# Patient Record
Sex: MALE | Race: WHITE | NOT HISPANIC OR LATINO | Employment: OTHER | URBAN - NONMETROPOLITAN AREA
[De-identification: names, ages, dates, MRNs, and addresses within clinical notes are randomized per-mention and may not be internally consistent; named-entity substitution may affect disease eponyms.]

---

## 2017-02-03 ENCOUNTER — COMMUNICATION - GICH (OUTPATIENT)
Dept: FAMILY MEDICINE | Facility: OTHER | Age: 57
End: 2017-02-03

## 2017-02-03 DIAGNOSIS — E11.65 TYPE 2 DIABETES MELLITUS WITH HYPERGLYCEMIA (H): ICD-10-CM

## 2017-02-08 ENCOUNTER — COMMUNICATION - GICH (OUTPATIENT)
Dept: FAMILY MEDICINE | Facility: OTHER | Age: 57
End: 2017-02-08

## 2017-02-08 DIAGNOSIS — E11.65 TYPE 2 DIABETES MELLITUS WITH HYPERGLYCEMIA (H): ICD-10-CM

## 2017-02-08 DIAGNOSIS — E78.1 PURE HYPERGLYCERIDEMIA: ICD-10-CM

## 2017-02-08 DIAGNOSIS — R74.8 ABNORMAL LEVELS OF OTHER SERUM ENZYMES: ICD-10-CM

## 2017-02-09 ENCOUNTER — AMBULATORY - GICH (OUTPATIENT)
Dept: LAB | Facility: OTHER | Age: 57
End: 2017-02-09

## 2017-02-09 DIAGNOSIS — E11.65 TYPE 2 DIABETES MELLITUS WITH HYPERGLYCEMIA (H): ICD-10-CM

## 2017-02-09 DIAGNOSIS — R74.8 ABNORMAL LEVELS OF OTHER SERUM ENZYMES: ICD-10-CM

## 2017-02-09 DIAGNOSIS — E78.1 PURE HYPERGLYCERIDEMIA: ICD-10-CM

## 2017-02-09 LAB
A/G RATIO - HISTORICAL: 1.7 (ref 1–2)
ALBUMIN SERPL-MCNC: 4.3 G/DL (ref 3.5–5.7)
ALP SERPL-CCNC: 41 IU/L (ref 34–104)
ALT (SGPT) - HISTORICAL: 28 IU/L (ref 7–52)
AST SERPL-CCNC: 18 IU/L (ref 13–39)
BILIRUB SERPL-MCNC: 0.5 MG/DL (ref 0.3–1)
BILIRUBIN, DIRECT: 0.09 MG/DL (ref 0.03–0.18)
BILIRUBIN,INDIRECT: 0.4 MG/DL (ref 0.2–0.8)
CHOL/HDL RATIO - HISTORICAL: 5.9
CHOLESTEROL TOTAL: 183 MG/DL
ESTIMATED AVERAGE GLUCOSE: 114 MG/DL
GLOBULIN - HISTORICAL: 2.6 G/DL (ref 2–3.7)
HDLC SERPL-MCNC: 31 MG/DL (ref 23–92)
HEMOGLOBIN A1C MONITORING (POCT) - HISTORICAL: 5.6 % (ref 4–6.2)
LDL COMMENT - HISTORICAL: ABNORMAL
NON-HDL CHOLESTEROL - HISTORICAL: 152 MG/DL
PATIENT STATUS - HISTORICAL: ABNORMAL
PROT SERPL-MCNC: 6.9 G/DL (ref 6.4–8.9)
TRIGL SERPL-MCNC: 634 MG/DL

## 2017-02-13 ENCOUNTER — OFFICE VISIT - GICH (OUTPATIENT)
Dept: FAMILY MEDICINE | Facility: OTHER | Age: 57
End: 2017-02-13

## 2017-02-13 DIAGNOSIS — E11.65 TYPE 2 DIABETES MELLITUS WITH HYPERGLYCEMIA (H): ICD-10-CM

## 2017-02-13 DIAGNOSIS — S76.211A STRAIN OF ADDUCTOR MUSCLE, FASCIA AND TENDON OF RIGHT THIGH, INITIAL ENCOUNTER: ICD-10-CM

## 2017-02-13 DIAGNOSIS — R74.8 ABNORMAL LEVELS OF OTHER SERUM ENZYMES: ICD-10-CM

## 2017-02-13 DIAGNOSIS — E78.1 PURE HYPERGLYCERIDEMIA: ICD-10-CM

## 2017-02-15 ENCOUNTER — AMBULATORY - GICH (OUTPATIENT)
Dept: LAB | Facility: OTHER | Age: 57
End: 2017-02-15

## 2017-02-15 DIAGNOSIS — E78.1 PURE HYPERGLYCERIDEMIA: ICD-10-CM

## 2017-02-15 LAB
CHOL/HDL RATIO - HISTORICAL: 5.16
CHOLESTEROL TOTAL: 196 MG/DL
HDLC SERPL-MCNC: 38 MG/DL (ref 23–92)
LDLC SERPL CALC-MCNC: 94 MG/DL
NON-HDL CHOLESTEROL - HISTORICAL: 158 MG/DL
PATIENT STATUS - HISTORICAL: ABNORMAL
TRIGL SERPL-MCNC: 321 MG/DL

## 2017-07-28 ENCOUNTER — COMMUNICATION - GICH (OUTPATIENT)
Dept: FAMILY MEDICINE | Facility: OTHER | Age: 57
End: 2017-07-28

## 2017-07-28 DIAGNOSIS — E11.65 TYPE 2 DIABETES MELLITUS WITH HYPERGLYCEMIA (H): ICD-10-CM

## 2017-08-04 ENCOUNTER — COMMUNICATION - GICH (OUTPATIENT)
Dept: FAMILY MEDICINE | Facility: OTHER | Age: 57
End: 2017-08-04

## 2017-08-04 DIAGNOSIS — R21 RASH AND OTHER NONSPECIFIC SKIN ERUPTION: ICD-10-CM

## 2017-09-21 ENCOUNTER — COMMUNICATION - GICH (OUTPATIENT)
Dept: FAMILY MEDICINE | Facility: OTHER | Age: 57
End: 2017-09-21

## 2017-09-21 DIAGNOSIS — E11.69 TYPE 2 DIABETES MELLITUS WITH OTHER SPECIFIED COMPLICATION (H): ICD-10-CM

## 2017-09-21 DIAGNOSIS — E11.65 TYPE 2 DIABETES MELLITUS WITH HYPERGLYCEMIA (H): ICD-10-CM

## 2017-09-22 ENCOUNTER — AMBULATORY - GICH (OUTPATIENT)
Dept: LAB | Facility: OTHER | Age: 57
End: 2017-09-22

## 2017-09-22 DIAGNOSIS — E11.65 TYPE 2 DIABETES MELLITUS WITH HYPERGLYCEMIA (H): ICD-10-CM

## 2017-09-22 DIAGNOSIS — E11.69 TYPE 2 DIABETES MELLITUS WITH OTHER SPECIFIED COMPLICATION (H): ICD-10-CM

## 2017-09-22 LAB
CHOL/HDL RATIO - HISTORICAL: 6.36
CHOLESTEROL TOTAL: 210 MG/DL
ESTIMATED AVERAGE GLUCOSE: 111 MG/DL
HDLC SERPL-MCNC: 33 MG/DL (ref 23–92)
HEMOGLOBIN A1C MONITORING (POCT) - HISTORICAL: 5.5 % (ref 4–6.2)
LDL COMMENT - HISTORICAL: ABNORMAL
NON-HDL CHOLESTEROL - HISTORICAL: 177 MG/DL
PROVIDER ORDERDED STATUS - HISTORICAL: ABNORMAL
TRIGL SERPL-MCNC: 520 MG/DL

## 2017-10-05 ENCOUNTER — HISTORY (OUTPATIENT)
Dept: FAMILY MEDICINE | Facility: OTHER | Age: 57
End: 2017-10-05

## 2017-10-05 ENCOUNTER — OFFICE VISIT - GICH (OUTPATIENT)
Dept: FAMILY MEDICINE | Facility: OTHER | Age: 57
End: 2017-10-05

## 2017-10-05 DIAGNOSIS — E78.1 PURE HYPERGLYCERIDEMIA: ICD-10-CM

## 2017-12-28 NOTE — TELEPHONE ENCOUNTER
Patient Information     Patient Name MRN Sex Jesus Franklin [id number] Male 1960      Telephone Encounter by Jesusita Wheeler RN at 2017  4:11 PM     Author:  Jesusita Wheeler RN Service:  (none) Author Type:  NURS- Registered Nurse     Filed:  2017  4:12 PM Encounter Date:  2017 Status:  Signed     :  Jesusita Wheeler RN (NURS- Registered Nurse)            Diabetic Supplies    Office visit in the past 12 months.    Last visit with MATT WHIPPLE was on: 2017 in [facility]  Next visit with MATT WHIPPLE is on: No future appointment listed with this provider  Next visit with Family Practice is on: No future appointment listed in this department    Max refill for 12 months from last office visit.  Always add ICD-9 code.    Needs not be listed on Med List to fill.  Need new RX every 12 months or if exceeds the limitations set by Medicare, then every 6 months.  Also when testing frequency is changed is there a need to obtain a new order.  A refill request does not need to be approved by the ordering physician-a beneficiary or their caregiver may request refills.  Physicians are not required to fill out additional forms such as home testing results for suppliers or provide additional documentation unless the supplier is audited and the  is requesting such documentation.      Prescription refilled per RN Medication Refill Policy.................... Jesusita Wheeler RN ....................  2017   4:11 PM

## 2017-12-28 NOTE — PROGRESS NOTES
Patient Information     Patient Name MRN Sex Jesus Franklin [id number] Male 1960      Progress Notes by Rodrigo Richardson MD at 10/5/2017 11:45 AM     Author:  Rodrigo Richardson MD  Service:  (none) Author Type:  Physician     Filed:  10/5/2017 12:20 PM  Encounter Date:  10/5/2017 Status:  Addendum     :  Rodrigo Richardson MD (Physician)        Related Notes: Original Note by Rodrigo Richardson MD (Physician) filed at 10/5/2017 12:15 PM            Nursing Notes:   Hemalatha Gil  10/5/2017 12:06 PM  Signed  Patient presents to the clinic to discuss recent labs.  Hemalatha Gil LPN....................10/5/2017 11:51 AM    Previous A1C is at goal of <8  HEMOGLOBIN A1C MONITORING (POCT) (%)    Date Value   2017 5.5     Urine microalbumin:creatine:   Foot exam will do today  Eye exam about a year ago    Patient is not a current smoker  Patient is on a daily aspirin  Patient is not on a Statin.  Blood pressure today of 128/74 is at the goal of <139/89 for diabetics.    Hemalatha Gil LPN..............10/5/2017 11:58 AM    Jesus Varghese is a 56 y.o. male who presents for   Chief Complaint     Patient presents with       Lab      Discuss lab results     HPI: Mr. Varghese has diabetes and hypertriglyceridemia- we discussed diet today and he does get some high fat foods- he has ++ FH of CAD at young age so medications are indicated.  Past Medical History:     Diagnosis  Date     Back pain      Colon cancer (HC)      CTS (carpal tunnel syndrome)      Diverticulosis     mild      Hepatitis     ? cause       Hypertriglyceridemia      Renal lithiasis      Rupture of left distal biceps tendon 2016     S/P left distal biceps tendon repair 2016     Senile cataract      Past Surgical History:      Procedure  Laterality Date     CATARACT REMOVAL  ,     Bilateral cataracts R 2011.. L 2012       COLECTOMY  06    Sigmoid colectomy for [facility] C2 adenocarcinoma       COLONOSCOPY  DIAGNOSTIC  10/19/15     F/U 2020       COLONOSCOPY SCREENING  9/07/07    next due in 2010       COLONOSCOPY SCREENING  08/30/2010    F/U 2015       HERNIA REPAIR  08/31/2010    Mesh Underlay       IR URETERAL STENT LEFT  2/12/15     LAP CHOLECYSTECTOMY  04/24/07     PORTACATH  10/11/06    Placement of Port-A-Cath, right anterior chest, for chemotherapy        S/P LEFT DISTAL BICEPS REPAIR Left 1/14/2016    arthrex equipment used       TONSIL AND ADENOIDECTOMY  age 1     VASECTOMY       x2, spontaneous reconnected, so needed 2nd procedure       Family History       Problem   Relation Age of Onset     Hypertension  Mother      Heart Disease  Father      40s and 50s       Heart Disease  Other      40s and 50s       Anesthesia Problem  No Family History      Stroke  No Family History      Blood Disease  No Family History      Current Outpatient Prescriptions       Medication  Sig Dispense Refill     ACCU-CHEK FASTCLIX CHECK GLUCOSE TWICE DAILY 200 Each 2     ACCU-CHEK SMARTVIEW TEST STRIP strip CHECK GLUCOSE TWICE DAILY 200 Strip 2     aspirin chewable 81 mg chewable tablet Take 1 tablet by mouth once daily with a meal.  0     Blood-Glucose Meter (ACCU-CHEK MARIETTA) Dispense glucose meter, test strips and lancets covered by the patient insurance. Test 2 times per day. 1 Device 0     Fluocinolone-Shower Cap (DERMA-SMOOTHE/FS SCALP OIL) 0.01 % oil Apply  topically to affected area(s) at bedtime if needed (for dermatitis). 118 mL 0     Fluocinolone-Shower Cap 0.01 % oil Apply 1 Cap topically to affected area(s) once daily in the evening.       loratadine (CLARITIN) 10 mg tablet Take 1 tablet by mouth once daily.  0     metFORMIN (GLUCOPHAGE) 500 mg tablet Take 2 tablets by mouth 2 times daily with meals. 360 tablet 3     multivitamins-minerals-lutein (CENTRUM SILVER) tab tablet Take 1 tablet by mouth once daily.  0     No current facility-administered medications for this visit.      Medications have been reviewed by me and  "are current to the best of my knowledge and ability.    No Known Allergies    EXAM:   Vitals:     10/05/17 1151   BP: 128/74   Weight: 101.2 kg (223 lb 3.2 oz)   Height: 1.71 m (5' 7.32\")     General Appearance: Pleasant, alert, appropriate appearance for age. No acute distress  Chest/Respiratory Exam: Normal chest wall and respirations. Clear to auscultation.  Cardiovascular Exam: Regular rate and rhythm. S1, S2, no murmur, click, gallop, or rubs.  ASSESSMENT AND PLAN:  1. HYPERTRIGLYCERIDEMIA  Will add atorvastatin   foot exam shows normal sensation and circulation/ looked at sk on his back and benign mole left arm              "

## 2017-12-28 NOTE — TELEPHONE ENCOUNTER
Patient Information     Patient Name MRN Sex Jesus Franklin [id number] Male 1960      Telephone Encounter by Rodrigo Richardson MD at 2017  2:23 PM     Author:  Rodrigo Richardson MD Service:  (none) Author Type:  Physician     Filed:  2017  2:23 PM Encounter Date:  2017 Status:  Signed     :  Rodrigo Richardson MD (Physician)            Ok for lab

## 2017-12-28 NOTE — TELEPHONE ENCOUNTER
Patient Information     Patient Name MRN Sex Jesus Franklin [id number] Male 1960      Telephone Encounter by Urbano Denson MD at 2017 10:50 AM     Author:  Urbano Denson MD Service:  (none) Author Type:  Physician     Filed:  2017 10:51 AM Encounter Date:  2017 Status:  Signed     :  Urbano Denson MD (Physician)            Not sure why the patient is on this medication. We'll need to wait for primary care physician or be seen.

## 2017-12-28 NOTE — TELEPHONE ENCOUNTER
Patient Information     Patient Name MRN Sex Jesus Franklin [id number] Male 1960      Telephone Encounter by Mary Lovelace at 2017 12:17 PM     Author:  Mary Lovelace Service:  (none) Author Type:  (none)     Filed:  2017 12:22 PM Encounter Date:  2017 Status:  Signed     :  Mary Lovelace            Patient inquiring about lab work. Wondering if he should come in. Did inform he could come in and have A1C, (if you feel necessary last one 17, as well as the lipids) patient wondering if you also would like a lipid, and anything else? Orders pending if appropriate. Pt would like call back.     Mary Lovelace LPN...................2017   12:19 PM

## 2017-12-28 NOTE — TELEPHONE ENCOUNTER
Patient Information     Patient Name MRN Sex Jesus Franklin [id number] Male 1960      Telephone Encounter by Hemalatha Gil at 2017  2:50 PM     Author:  Hemalatha Gil Service:  (none) Author Type:  (none)     Filed:  2017  2:50 PM Encounter Date:  2017 Status:  Signed     :  Hemalatha Gil            Patient transferred to call center to schedule appointment.  Hemalatha Gil LPN....................2017 2:50 PM

## 2017-12-28 NOTE — TELEPHONE ENCOUNTER
Patient Information     Patient Name MRN Sex Jesus Franklin [id number] Male 1960      Telephone Encounter by Sangeetha Stallings RN at 2017 10:31 AM     Author:  Sangeetha Stallings RN Service:  (none) Author Type:  NURS- Registered Nurse     Filed:  2017 10:41 AM Encounter Date:  2017 Status:  Signed     :  Sangeetha Stallings RN (NURS- Registered Nurse)            In clinical absence of patient's primary, Rodrigo Richardson MD, patient is requesting that this message be sent to the primary provider's Teamlet for consideration please.      This is a Refill request from: [facility]   Name of Medication: Fluocinonide Scalp 0.01% oil   Quantity requested: 118  Last fill date: Historical  Due for refill: unknown  Last visit with RODRIGO RICHARDSON was on: 2017 in [facility]  PCP:  Rodrigo Richardson MD  Controlled Substance Agreement:  n/a   Diagnosis r/t this medication request: none associated.     Unable to complete prescription refill per RN Medication Refill Policy.................... Sangeetha Stallings RN ....................  2017   10:33 AM

## 2017-12-30 NOTE — NURSING NOTE
Patient Information     Patient Name MRN Sex Jesus Franklin [id number] Male 1960      Nursing Note by Hemalatha Gil at 10/5/2017 11:45 AM     Author:  Hemalatha Gil Service:  (none) Author Type:  (none)     Filed:  10/5/2017 12:06 PM Encounter Date:  10/5/2017 Status:  Signed     :  Hemalatha Gil            Patient presents to the clinic to discuss recent labs.  Hemalatha Gil LPN....................10/5/2017 11:51 AM    Previous A1C is at goal of <8  HEMOGLOBIN A1C MONITORING (POCT) (%)    Date Value   2017 5.5     Urine microalbumin:creatine:   Foot exam will do today  Eye exam about a year ago    Patient is not a current smoker  Patient is on a daily aspirin  Patient is not on a Statin.  Blood pressure today of 128/74 is at the goal of <139/89 for diabetics.    Hemalatha Gil LPN..............10/5/2017 11:58 AM

## 2018-01-03 NOTE — TELEPHONE ENCOUNTER
Patient Information     Patient Name MRN Sex Jesus Franklin [id number] Male 1960      Telephone Encounter by Lindsay Lee at 2017 12:19 PM     Author:  Lindsay Lee Service:  (none) Author Type:  (none)     Filed:  2017 12:22 PM Encounter Date:  2017 Status:  Signed     :  Lindsay Lee            SDG- PATIENT CALLED TO SET UP AN APPT FOR AN A1C AND WE DO NOT HAVE ANY OPEN ORDERS FOR THIS. HE ALSO SAID HE WANTED TO TALK TO DR. WHIPPLE ABOUT IF HE NEEDED TO HAVE ADDITIONAL BLOOD WORK DONE. WE SET UP AN APPT FOR THE PATIENT WITH DR. WHIPPLE. PLEASE LET ME KNOW IF WE SHOULD JUST KEEP THIS APPT OR IF WE CAN GET ORDERS AND TO CHANGE IT TO A LAB APPT. THANKS    Lindsay Lee ....................  2017   12:21 PM

## 2018-01-03 NOTE — TELEPHONE ENCOUNTER
Patient Information     Patient Name MRN Sex Jesus Franklin [id number] Male 1960      Telephone Encounter by Hemalatha Gil at 2017  1:39 PM     Author:  Hemalatha Gil Service:  (none) Author Type:  (none)     Filed:  2017  1:39 PM Encounter Date:  2017 Status:  Signed     :  Hemalatha Gil            Patient has an appointment next week.  Hemalatha Gil LPN....................2017 1:39 PM

## 2018-01-03 NOTE — TELEPHONE ENCOUNTER
Patient Information     Patient Name MRN Sex Jesus Franklin [id number] Male 1960      Telephone Encounter by Rodrigo Richardson MD at 2017 12:53 PM     Author:  Rodrigo Richardson MD Service:  (none) Author Type:  Physician     Filed:  2017 12:54 PM Encounter Date:  2017 Status:  Signed     :  Rodrigo Richardson MD (Physician)            Lab orders in but it would be good to see him also if possible

## 2018-01-03 NOTE — NURSING NOTE
Patient Information     Patient Name MRN Sex Jesus Franklin [id number] Male 1960      Nursing Note by Hemalatha Gil at 2017  3:45 PM     Author:  Hemalatha Gil Service:  (none) Author Type:  (none)     Filed:  2017  4:07 PM Encounter Date:  2017 Status:  Signed     :  Hemalatha Gil            Patient presents to the clinic for a diabetic check.     HEMOGLOBIN A1C MONITORING (POCT) (%)    Date Value   2017 5.6     Hemalatha Gil LPN....................2017 4:00 PM  \

## 2018-01-03 NOTE — TELEPHONE ENCOUNTER
Patient Information     Patient Name MRN Sex Jesus Franklin [id number] Male 1960      Telephone Encounter by Sangeetha Stallings RN at 2/3/2017  8:20 AM     Author:  Sangeetha Stallings RN Service:  (none) Author Type:  NURS- Registered Nurse     Filed:  2/3/2017  8:46 AM Encounter Date:  2/3/2017 Status:  Signed     :  Sangeetha Stallings RN (NURS- Registered Nurse)            Biguanides    Office visit in the past 12 months or per provider note.    Last visit with MATT WHIPPLE was on: 2016 in [facility]  Next visit with MATT WHIPPLE is on: No future appointment listed with this provider  Next visit with Family Practice is on: No future appointment listed in this department    Lab test requirements:  HgbA1c annually or per provider note.  HEMOGLOBIN A1C MONITORING (POCT) (%)    Date Value   2016 6.9 (H)       Max refill for 12 months from last office visit or per provider note.    If taking for polycystic ovary disease, may refill for 12 months.    Per community measures guidelines, patient will be due for follow up and A1c in 2017. Limited refill provided at this time. Letter mailed for reminder to patient.Prescription refilled per RN Medication Refill Policy.................... Sangeetha Stallings RN ....................  2/3/2017   8:30 AM

## 2018-01-03 NOTE — PROGRESS NOTES
Patient Information     Patient Name MRN Sex Jesus Franklin [id number] Male 1960      Progress Notes by Rodrigo Richardson MD at 2017  3:45 PM     Author:  Rodrigo Richardson MD Service:  (none) Author Type:  Physician     Filed:  2017  4:17 PM Encounter Date:  2017 Status:  Signed     :  Rodrigo Ricahrdson MD (Physician)            Nursing Notes:   Hemalatha Gil  2017  4:07 PM  Signed  Patient presents to the clinic for a diabetic check.     HEMOGLOBIN A1C MONITORING (POCT) (%)    Date Value   2017 5.6     Hemalatha LILLY Gil LPN....................2017 4:00 PM  \  Jesus Varghese is a 56 y.o. male who presents for   Chief Complaint    Patient presents with      Diabetes     HPI: Mr. Varghese comes in for review of labs for diabetes- he had A1C which was 5.4- excellent- but he had elevated triglycerides but had just eaten barbecued wings! So this needs to be repeated.   Past Medical History      Diagnosis   Date     Back pain       Colon cancer (HCC)       CTS (carpal tunnel syndrome)       Diverticulosis       mild      Hepatitis       ? cause       Hypertriglyceridemia       Renal lithiasis       Rupture of left distal biceps tendon  2016     S/P left distal biceps tendon repair  2016     Senile cataract       Past Surgical History       Procedure   Laterality Date     Vasectomy         x2, spontaneous reconnected, so needed 2nd procedure       Tonsil and adenoidectomy   age 1     Colectomy   06     Sigmoid colectomy for [facility] C2 adenocarcinoma       Portacath   10/11/06     Placement of Port-A-Cath, right anterior chest, for chemotherapy        Lap cholecystectomy   07     Colonoscopy screening   07     next due in        Colonoscopy screening   2010     F/U 2015       Hernia repair   2010     Mesh Underlay       Cataract removal   ,      Bilateral cataracts R 2011.. L 2012       Ir ureteral stent left    "2/12/15     Colonoscopy diagnostic   10/19/15      F/U 2020       S/p left distal biceps repair  Left 1/14/2016     arthrex equipment used       Family History       Problem   Relation Age of Onset     Hypertension  Mother      Heart Disease  Father      40s and 50s       Heart Disease  Other      40s and 50s       Anesthesia Problem  No Family History      Stroke  No Family History      Blood Disease  No Family History      Current Outpatient Prescriptions       Medication  Sig Dispense Refill     aspirin chewable 81 mg chewable tablet Take 1 tablet by mouth once daily with a meal.  0     blood sugar diagnostic (ACCU-CHEK SMARTVIEW TEST STRIP) strip Dispense item covered by pt ins. E11.65 NIDDM type II, uncontrolled - Test 2 times/day. Reason: High A1C 200 Strip 3     Blood-Glucose Meter (ACCU-CHEK MARIETTA) Dispense glucose meter, test strips and lancets covered by the patient insurance. Test 2 times per day. 1 Device 0     Fluocinolone-Shower Cap 0.01 % oil Apply 1 Cap topically to affected area(s) once daily in the evening.       lancets (ACCU-CHEK FASTCLIX) Dispense item covered by pt ins. E11.65 NIDDM type II, uncontrolled - Test 2 times/day. Reason: High A1C 200 Each 3     loratadine (CLARITIN) 10 mg tablet Take 1 tablet by mouth once daily.  0     metFORMIN (GLUCOPHAGE) 500 mg tablet TAKE TWO TABLETS BY MOUTH TWICE DAILY 360 tablet 0     multivitamins-minerals-lutein (CENTRUM SILVER) tab tablet Take 1 tablet by mouth once daily.  0     No current facility-administered medications for this visit.      Medications have been reviewed by me and are current to the best of my knowledge and ability.    No Known Allergies    EXAM:   Vitals:     02/13/17 1559   BP: 126/84   Weight: 102.6 kg (226 lb 3.2 oz)   Height: 1.71 m (5' 7.32\")     General Appearance: Pleasant, alert, appropriate appearance for age. No acute distress  Abdomen/ no mass nor sign of hernia  ASSESSMENT AND PLAN:  1. Uncontrolled type 2 diabetes " mellitus without complication, without long-term current use of insulin (HC)  Excellent control    2. Groin strain, right, initial encounter  no bulge    3. Elevated liver enzymes  resolved    4. HYPERTRIGLYCERIDEMIA  Needs fasting level- not on med - fish oil constipated him

## 2018-01-15 ENCOUNTER — AMBULATORY - GICH (OUTPATIENT)
Dept: FAMILY MEDICINE | Facility: OTHER | Age: 58
End: 2018-01-15

## 2018-01-15 DIAGNOSIS — Z23 ENCOUNTER FOR IMMUNIZATION: ICD-10-CM

## 2018-01-16 ENCOUNTER — OFFICE VISIT - GICH (OUTPATIENT)
Dept: FAMILY MEDICINE | Facility: OTHER | Age: 58
End: 2018-01-16

## 2018-01-16 ENCOUNTER — HISTORY (OUTPATIENT)
Dept: FAMILY MEDICINE | Facility: OTHER | Age: 58
End: 2018-01-16

## 2018-01-16 DIAGNOSIS — E11.9 TYPE 2 DIABETES MELLITUS WITHOUT COMPLICATIONS (H): ICD-10-CM

## 2018-01-16 DIAGNOSIS — E78.1 PURE HYPERGLYCERIDEMIA: ICD-10-CM

## 2018-01-16 LAB
ALT (SGPT) - HISTORICAL: 32 IU/L (ref 7–52)
AST SERPL-CCNC: 24 IU/L (ref 13–39)
CHOL/HDL RATIO - HISTORICAL: 3.15
CHOLESTEROL TOTAL: 104 MG/DL
ESTIMATED AVERAGE GLUCOSE: 126 MG/DL
HDLC SERPL-MCNC: 33 MG/DL (ref 23–92)
HEMOGLOBIN A1C MONITORING (POCT) - HISTORICAL: 6 % (ref 4–6.2)
LDLC SERPL CALC-MCNC: 38 MG/DL
NON-HDL CHOLESTEROL - HISTORICAL: 71 MG/DL
PROVIDER ORDERDED STATUS - HISTORICAL: ABNORMAL
TRIGL SERPL-MCNC: 166 MG/DL

## 2018-01-25 VITALS
WEIGHT: 226.2 LBS | HEIGHT: 67 IN | DIASTOLIC BLOOD PRESSURE: 84 MMHG | BODY MASS INDEX: 35.5 KG/M2 | SYSTOLIC BLOOD PRESSURE: 126 MMHG

## 2018-01-25 VITALS
SYSTOLIC BLOOD PRESSURE: 128 MMHG | DIASTOLIC BLOOD PRESSURE: 74 MMHG | BODY MASS INDEX: 35.03 KG/M2 | HEIGHT: 67 IN | WEIGHT: 223.2 LBS

## 2018-01-26 ENCOUNTER — DOCUMENTATION ONLY (OUTPATIENT)
Dept: FAMILY MEDICINE | Facility: OTHER | Age: 58
End: 2018-01-26

## 2018-01-26 PROBLEM — G56.03 CARPAL TUNNEL SYNDROME, BILATERAL: Status: ACTIVE | Noted: 2018-01-26

## 2018-01-26 PROBLEM — Z85.038 HISTORY OF MALIGNANT NEOPLASM OF LARGE INTESTINE: Status: ACTIVE | Noted: 2018-01-26

## 2018-01-26 PROBLEM — K75.9 INFLAMMATORY LIVER DISEASE: Status: ACTIVE | Noted: 2018-01-26

## 2018-01-26 RX ORDER — LORATADINE 10 MG/1
10 TABLET ORAL DAILY
COMMUNITY
End: 2018-04-19

## 2018-01-26 RX ORDER — ATORVASTATIN CALCIUM 40 MG/1
40 TABLET, FILM COATED ORAL DAILY
COMMUNITY
Start: 2017-10-05 | End: 2018-08-21

## 2018-01-26 RX ORDER — FLUOCINOLONE ACETONIDE 0.11 MG/ML
OIL TOPICAL AT BEDTIME
Status: ON HOLD | COMMUNITY
Start: 2017-08-08 | End: 2022-02-06

## 2018-01-26 RX ORDER — BLOOD-GLUCOSE METER
EACH MISCELLANEOUS
COMMUNITY
Start: 2016-06-20 | End: 2021-03-08

## 2018-01-26 RX ORDER — ASPIRIN 81 MG/1
81 TABLET, CHEWABLE ORAL
COMMUNITY
End: 2018-11-20

## 2018-02-08 ENCOUNTER — AMBULATORY - GICH (OUTPATIENT)
Dept: SCHEDULING | Facility: OTHER | Age: 58
End: 2018-02-08

## 2018-02-09 VITALS
HEIGHT: 67 IN | BODY MASS INDEX: 33.56 KG/M2 | DIASTOLIC BLOOD PRESSURE: 88 MMHG | SYSTOLIC BLOOD PRESSURE: 138 MMHG | WEIGHT: 213.8 LBS

## 2018-02-12 NOTE — PROGRESS NOTES
Patient Information     Patient Name MRN Sex Jesus Franklin [id number] Male 1960      Progress Notes by Rodrigo Richardson MD at 2018 10:15 AM     Author:  Rodrigo Richardson MD Service:  (none) Author Type:  Physician     Filed:  2018 10:47 AM Encounter Date:  2018 Status:  Signed     :  Rodrigo Richardson MD (Physician)            Nursing Notes:   Hemalatha Gil  2018 10:33 AM  Signed  Previous A1C is at goal of <8  HEMOGLOBIN A1C MONITORING (POCT) (%)    Date Value   2017 5.5     Urine microalbumin:creatine:   Foot exam today  Eye exam due, will make an appointment.    Patient is not a current smoker  Patient is on a daily aspirin  Patient is on a Statin.  Blood pressure today of 138/88 is at the goal of <139/89 for diabetics.    Hemalatha Gil LPN..............2018 10:26 AM    Jesus Varghese is a 57 y.o. male who presents for   Chief Complaint     Patient presents with       Diabetes      Follow up     HPI: Mr. Varghese comes in for review of diabetes; sugars are great at home with readings rarely > 125. hehas been on lipitor now for 3 months so need to check progress there. He is down 10#  Past Medical History:     Diagnosis  Date     Back pain      Colon cancer (HC)      CTS (carpal tunnel syndrome)      Diverticulosis     mild      Hepatitis     ? cause       Hypertriglyceridemia      Renal lithiasis      Rupture of left distal biceps tendon 2016     S/P left distal biceps tendon repair 2016     Senile cataract      Past Surgical History:      Procedure  Laterality Date     CATARACT REMOVAL  ,     Bilateral cataracts R 2011.. L 2012       COLECTOMY  06    Sigmoid colectomy for [facility] C2 adenocarcinoma       COLONOSCOPY DIAGNOSTIC  10/19/15     F/U        COLONOSCOPY SCREENING  07    next due in        COLONOSCOPY SCREENING  2010    F/U        HERNIA REPAIR  2010    Mesh Underlay       IR URETERAL STENT  LEFT  2/12/15     LAP CHOLECYSTECTOMY  04/24/07     PORTACATH  10/11/06    Placement of Port-A-Cath, right anterior chest, for chemotherapy        S/P LEFT DISTAL BICEPS REPAIR Left 1/14/2016    arthrex equipment used       TONSIL AND ADENOIDECTOMY  age 1     VASECTOMY       x2, spontaneous reconnected, so needed 2nd procedure       Family History       Problem   Relation Age of Onset     Hypertension  Mother      Heart Disease  Father      40s and 50s       Heart Disease  Other      40s and 50s       Anesthesia Problem  No Family History      Stroke  No Family History      Blood Disease  No Family History      Current Outpatient Prescriptions       Medication  Sig Dispense Refill     ACCU-CHEK FASTCLIX CHECK GLUCOSE TWICE DAILY 200 Each 2     ACCU-CHEK SMARTVIEW TEST STRIP strip CHECK GLUCOSE TWICE DAILY 200 Strip 2     aspirin chewable 81 mg chewable tablet Take 1 tablet by mouth once daily with a meal.  0     atorvastatin (LIPITOR) 40 mg tablet Take 1 tablet by mouth once daily. 90 tablet 3     Blood-Glucose Meter (ACCU-CHEK MARIETTA) Dispense glucose meter, test strips and lancets covered by the patient insurance. Test 2 times per day. 1 Device 0     Fluocinolone-Shower Cap (DERMA-SMOOTHE/FS SCALP OIL) 0.01 % oil Apply  topically to affected area(s) at bedtime if needed (for dermatitis). 118 mL 0     Fluocinolone-Shower Cap 0.01 % oil Apply 1 Cap topically to affected area(s) once daily in the evening.       loratadine (CLARITIN) 10 mg tablet Take 1 tablet by mouth once daily.  0     metFORMIN (GLUCOPHAGE) 500 mg tablet Take 2 tablets by mouth 2 times daily with meals. 360 tablet 3     multivitamins-minerals-lutein (CENTRUM SILVER) tab tablet Take 1 tablet by mouth once daily.  0     No current facility-administered medications for this visit.      Medications have been reviewed by me and are current to the best of my knowledge and ability.    No Known Allergies    EXAM:   Vitals:     01/16/18 1024   BP: 138/88  "  Cuff Site: Right Arm   Position: Sitting   Cuff Size: Adult Large   Weight: 97 kg (213 lb 12.8 oz)   Height: 1.71 m (5' 7.32\")     General Appearance: Pleasant, alert, appropriate appearance for age. No acute distress  Chest/Respiratory Exam: Normal chest wall and respirations. Clear to auscultation.  Cardiovascular Exam: Regular rate and rhythm. S1, S2, no murmur, click, gallop, or rubs.  ASSESSMENT AND PLAN:  1.type 2 diabetes mellitus without complication, without long-term current use of insulin (HC)    - metFORMIN (GLUCOPHAGE) 500 mg tablet; Take 2 tablets by mouth 2 times daily with meals.  Dispense: 360 tablet; Refill: 3    2. HYPERTRIGLYCERIDEMIA    - atorvastatin (LIPITOR) 40 mg tablet; Take 1 tablet by mouth once daily.  Dispense: 90 tablet; Refill: 3    3 type 2 diabetes mellitus without complication, unspecified long term insulin use status (HC)    - blood sugar diagnostic (ACCU-CHEK SMARTVIEW TEST STRIP) strip; Dispense item covered by pt ins. DX [id number]  Dispense: 200 Strip; Refill: 2                 "

## 2018-02-12 NOTE — NURSING NOTE
Patient Information     Patient Name MRN Sex Jesus Franklin [id number] Male 1960      Nursing Note by Hemalatha Gil at 2018 10:15 AM     Author:  Hemalatha Gil Service:  (none) Author Type:  (none)     Filed:  2018 10:33 AM Encounter Date:  2018 Status:  Signed     :  Hemalatha Gil            Previous A1C is at goal of <8  HEMOGLOBIN A1C MONITORING (POCT) (%)    Date Value   2017 5.5     Urine microalbumin:creatine:   Foot exam today  Eye exam due, will make an appointment.    Patient is not a current smoker  Patient is on a daily aspirin  Patient is on a Statin.  Blood pressure today of 138/88 is at the goal of <139/89 for diabetics.    Hemalatha Gil LPN..............2018 10:26 AM

## 2018-04-19 ENCOUNTER — OFFICE VISIT (OUTPATIENT)
Dept: FAMILY MEDICINE | Facility: OTHER | Age: 58
End: 2018-04-19
Attending: FAMILY MEDICINE
Payer: COMMERCIAL

## 2018-04-19 VITALS
TEMPERATURE: 97 F | WEIGHT: 209.2 LBS | HEIGHT: 67 IN | SYSTOLIC BLOOD PRESSURE: 134 MMHG | DIASTOLIC BLOOD PRESSURE: 70 MMHG | BODY MASS INDEX: 32.83 KG/M2 | HEART RATE: 72 BPM

## 2018-04-19 DIAGNOSIS — H61.21 IMPACTED CERUMEN OF RIGHT EAR: ICD-10-CM

## 2018-04-19 DIAGNOSIS — F41.9 ANXIETY: ICD-10-CM

## 2018-04-19 DIAGNOSIS — H69.91 DYSFUNCTION OF RIGHT EUSTACHIAN TUBE: Primary | ICD-10-CM

## 2018-04-19 PROCEDURE — 69210 REMOVE IMPACTED EAR WAX UNI: CPT | Performed by: FAMILY MEDICINE

## 2018-04-19 PROCEDURE — 99213 OFFICE O/P EST LOW 20 MIN: CPT | Mod: 25 | Performed by: FAMILY MEDICINE

## 2018-04-19 RX ORDER — PAROXETINE 20 MG/1
TABLET, FILM COATED ORAL
Qty: 90 TABLET | Refills: 3 | Status: SHIPPED | OUTPATIENT
Start: 2018-04-19 | End: 2018-11-20

## 2018-04-19 ASSESSMENT — ANXIETY QUESTIONNAIRES
6. BECOMING EASILY ANNOYED OR IRRITABLE: NEARLY EVERY DAY
1. FEELING NERVOUS, ANXIOUS, OR ON EDGE: NEARLY EVERY DAY
2. NOT BEING ABLE TO STOP OR CONTROL WORRYING: NOT AT ALL
GAD7 TOTAL SCORE: 10
5. BEING SO RESTLESS THAT IT IS HARD TO SIT STILL: NOT AT ALL
7. FEELING AFRAID AS IF SOMETHING AWFUL MIGHT HAPPEN: NEARLY EVERY DAY
3. WORRYING TOO MUCH ABOUT DIFFERENT THINGS: SEVERAL DAYS

## 2018-04-19 ASSESSMENT — PATIENT HEALTH QUESTIONNAIRE - PHQ9: 5. POOR APPETITE OR OVEREATING: NOT AT ALL

## 2018-04-19 ASSESSMENT — PAIN SCALES - GENERAL: PAINLEVEL: NO PAIN (0)

## 2018-04-19 NOTE — MR AVS SNAPSHOT
"              After Visit Summary   2018    Jesus Varghese    MRN: [id number]           Patient Information     Date Of Birth          1960        Visit Information        Provider Department      2018 9:00 AM Rodrigo Richardson MD [facility]        Today's Diagnoses     Dysfunction of right eustachian tube    -  1    Anxiety        Impacted cerumen of right ear           Follow-ups after your visit        Who to contact     If you have questions or need follow up information about today's clinic visit or your schedule please contact [facility] directly at [phone].  Normal or non-critical lab and imaging results will be communicated to you by [employer]vas[employer], letter or phone within 4 business days after the clinic has received the results. If you do not hear from us within 7 days, please contact the clinic through [employer] or phone. If you have a critical or abnormal lab result, we will notify you by phone as soon as possible.  Submit refill requests through [employer] or call your pharmacy and they will forward the refill request to us. Please allow 3 business days for your refill to be completed.          Additional Information About Your Visit        MyChart Information     [employer] lets you send messages to your doctor, view your test results, renew your prescriptions, schedule appointments and more. To sign up, go to [email or web address] . Click on \"Log in\" on the left side of the screen, which will take you to the Welcome page. Then click on \"Sign up Now\" on the right side of the page.     You will be asked to enter the access code listed below, as well as some personal information. Please follow the directions to create your username and password.     Your access code is: [email or web address]  Expires: 2018  9:34 AM     Your access code will  in 90 days. If you need help or a new code, please call your [place] clinic or [phone].        Care " "EveryWhere ID     This is your Care EveryWhere ID. This could be used by other organizations to access your [facility] medical records  [id number]        Your Vitals Were     Pulse Temperature Height BMI (Body Mass Index)          72 97  F (36.1  C) (Temporal) 5' 7\" (1.702 m) 32.77 kg/m2         Blood Pressure from Last 3 Encounters:   04/19/18 134/70   01/16/18 138/88   10/05/17 128/74    Weight from Last 3 Encounters:   04/19/18 209 lb 3.2 oz (94.9 kg)   01/16/18 213 lb 12.8 oz (97 kg)   10/05/17 223 lb 3.2 oz (101.2 kg)              We Performed the Following     REMOVE IMPACTED [facility]        Primary Care Provider Office Phone # Fax #    Rodrigo Richardson [phone] [address] [facility] [address]        Equal Access to Services     [facility]: Hadii aad ku hadcurtiso Soanabel, waaxda luqadaha, qaybta kaalmada adeegyada, suzanna flanagan . So [facility] [phone].    ATENCIÓN: Si rolanla español, tiene a brown disposición servicios gratuitos de asistencia lingüística. Llame al [phone].    We comply with applicable federal civil rights laws and [place] laws. We do not discriminate on the basis of race, color, national origin, age, disability, sex, sexual orientation, or gender identity.            Thank you!     Thank you for choosing [facility] [place] [facility]  for your care. Our goal is always to provide you with excellent care. Hearing back from our patients is one way we can continue to improve our services. Please take a few minutes to complete the written survey that you may receive in the mail after your visit with us. Thank you!             Your Updated Medication List - Protect others around you: Learn how to safely use, store and throw away your medicines at www.disposemymeds.org.          This list is accurate as of 4/19/18  9:34 AM.  Always use your most recent med list.                   Brand Name Dispense Instructions for use " Diagnosis    aspirin 81 MG chewable tablet      Take 81 mg by mouth daily with food        atorvastatin 40 MG tablet    LIPITOR     Take 40 mg by mouth daily        Fluocinolone Acetonide Scalp 0.01 % Oil oil      Apply topically At Bedtime        GLUCOCOM BLOOD GLUCOSE MONITOR Dilma      Dispense glucose meter, test strips and lancets covered by the patient insurance. Test 2 times per day.        metFORMIN 500 MG tablet    GLUCOPHAGE     Take 1,000 mg by mouth 2 times daily (with meals)

## 2018-04-19 NOTE — NURSING NOTE
Patient presents in the clinic with concerns of a plugged right ear, for over a month.  Sapphire Florence LPN 4/19/2018 8:52 AM

## 2018-04-20 ASSESSMENT — ANXIETY QUESTIONNAIRES: GAD7 TOTAL SCORE: 10

## 2018-07-23 NOTE — PROGRESS NOTES
Patient Information     Patient Name  Jesus Varghese MRN  [id number] Sex  Male   1960      Letter by Rodrigo Richardson MD at      Author:  Rodrigo Richardson MD Service:  (none) Author Type:  (none)    Filed:   Encounter Date:  2017 Status:  (Other)           Jesus Varghese  [address]  [address]  [place] [address]          2017    Dear Mr. Varghese:    Sugar is great on your labs; lipids not so good; it would be wise to see you to discuss.  Rodrigo Richardson MD ....................  2017   1:10 PM     Results for orders placed or performed in visit on 17       HEMOGLOBIN A1C MONITORING (POCT)       Result  Value Ref Range Status    HEMOGLOBIN A1C MONITORING (POCT) 5.5 4.0 - 6.2 % Final    ESTIMATED AVERAGE GLUCOSE  111 mg/dL Final   LIPID PANEL       Result  Value Ref Range Status    CHOLESTEROL,TOTAL 210 (H) <200 mg/dL Final    TRIGLYCERIDES 520 (H) <150 mg/dL Final    HDL CHOLESTEROL 33 23 - 92 mg/dL Final    NON-HDL CHOLESTEROL 177 (H) <145 mg/dl Final    CHOL/HDL RATIO            6.36 (H) <4.50                 Final    LDL CHOLESTEROL   Final    PROVIDER ORDERED STATUS FASTING  Final

## 2018-07-24 NOTE — PROGRESS NOTES
Patient Information     Patient Name  Jesus Varghese MRN  [id number] Sex  Male   1960      Letter by Rodrigo Richardson MD at      Author:  Rodrigo Richardson MD Service:  (none) Author Type:  (none)    Filed:   Encounter Date:  2/15/2017 Status:  (Other)           Jesus Varghese  [address]  [address]  [place] [address]          February 15, 2017    Dear Mr. Varghese:    Your triglycerides are still fairly high. I would like to see you cut back on both your carbs and your fat calories which should bring the triglycerides down and cause some weight loss as a nice side effect.  Rodrigo Richardson MD ....................  2/15/2017   8:04 AM     Results for orders placed or performed in visit on 02/15/17       LIPID PANEL       Result  Value Ref Range Status    CHOLESTEROL,TOTAL 196 <200 mg/dL Final    TRIGLYCERIDES 321 (H) <150 mg/dL Final    HDL CHOLESTEROL 38 23 - 92 mg/dL Final    NON-HDL CHOLESTEROL 158 (H) <145 mg/dl Final    CHOL/HDL RATIO            5.16 (H) <4.50                 Final    LDL CHOLESTEROL 94 <100 mg/dL Final    PATIENT STATUS            FASTING                 Final

## 2018-07-24 NOTE — PROGRESS NOTES
Patient Information     Patient Name  Jesus Varghese MRN  [id number] Sex  Male   1960      Letter by Rodrigo Richardson MD at      Author:  Rodrigo Richardson MD Service:  (none) Author Type:  (none)    Filed:   Encounter Date:  2018 Status:  (Other)           Jesus Varghese  [address]  [address]  [place] [address]          2018    Dear Mr. Varghese:    Your labs look great.  Rodrigo Richardson MD ....................  2018   12:06 PM     Results for orders placed or performed in visit on 18       LIPID PANEL       Result  Value Ref Range Status    CHOLESTEROL,TOTAL 104 <200 mg/dL Final    TRIGLYCERIDES 166 (H) <150 mg/dL Final    HDL CHOLESTEROL 33 23 - 92 mg/dL Final    NON-HDL CHOLESTEROL 71 <145 mg/dl Final    CHOL/HDL RATIO            3.15 <4.50                 Final    LDL CHOLESTEROL 38 <100 mg/dL Final    PROVIDER ORDERED STATUS FASTING  Final   AST (SGOT)       Result  Value Ref Range Status    AST (SGOT) 24 13 - 39 IU/L Final   ALT (SGPT)       Result  Value Ref Range Status    ALT (SGPT) 32 7 - 52 IU/L Final   Hgb A1c       Result  Value Ref Range Status    HEMOGLOBIN A1C MONITORING (POCT) 6.0 4.0 - 6.2 % Final    ESTIMATED AVERAGE GLUCOSE  126 mg/dL Final

## 2018-08-21 ENCOUNTER — TELEPHONE (OUTPATIENT)
Dept: FAMILY MEDICINE | Facility: OTHER | Age: 58
End: 2018-08-21

## 2018-08-21 ENCOUNTER — OFFICE VISIT (OUTPATIENT)
Dept: FAMILY MEDICINE | Facility: OTHER | Age: 58
End: 2018-08-21
Attending: FAMILY MEDICINE
Payer: COMMERCIAL

## 2018-08-21 VITALS
DIASTOLIC BLOOD PRESSURE: 82 MMHG | WEIGHT: 211.6 LBS | SYSTOLIC BLOOD PRESSURE: 122 MMHG | BODY MASS INDEX: 33.21 KG/M2 | HEIGHT: 67 IN

## 2018-08-21 DIAGNOSIS — R74.8 ELEVATED LIVER ENZYMES: ICD-10-CM

## 2018-08-21 DIAGNOSIS — Z85.038 HISTORY OF MALIGNANT NEOPLASM OF LARGE INTESTINE: ICD-10-CM

## 2018-08-21 DIAGNOSIS — E11.9 TYPE 2 DIABETES MELLITUS WITHOUT COMPLICATION, WITHOUT LONG-TERM CURRENT USE OF INSULIN (H): ICD-10-CM

## 2018-08-21 DIAGNOSIS — N52.02 CORPORO-VENOUS OCCLUSIVE ERECTILE DYSFUNCTION: ICD-10-CM

## 2018-08-21 DIAGNOSIS — E11.9 TYPE 2 DIABETES MELLITUS WITHOUT COMPLICATION, WITHOUT LONG-TERM CURRENT USE OF INSULIN (H): Primary | ICD-10-CM

## 2018-08-21 DIAGNOSIS — N20.1 URETEROLITHIASIS: ICD-10-CM

## 2018-08-21 LAB
ALBUMIN SERPL-MCNC: 4.7 G/DL (ref 3.5–5.7)
ALP SERPL-CCNC: 39 U/L (ref 34–104)
ALT SERPL W P-5'-P-CCNC: 28 U/L (ref 7–52)
AST SERPL W P-5'-P-CCNC: 20 U/L (ref 13–39)
BILIRUB DIRECT SERPL-MCNC: 0.2 MG/DL (ref 0–0.2)
BILIRUB SERPL-MCNC: 0.8 MG/DL (ref 0.3–1)
CHOLEST SERPL-MCNC: 119 MG/DL
HBA1C MFR BLD: 5.8 % (ref 4–6)
HDLC SERPL-MCNC: 33 MG/DL (ref 23–92)
LDLC SERPL CALC-MCNC: 33 MG/DL
NONHDLC SERPL-MCNC: 86 MG/DL
PROT SERPL-MCNC: 7.1 G/DL (ref 6.4–8.9)
TRIGL SERPL-MCNC: 267 MG/DL

## 2018-08-21 PROCEDURE — 80076 HEPATIC FUNCTION PANEL: CPT | Performed by: FAMILY MEDICINE

## 2018-08-21 PROCEDURE — 83036 HEMOGLOBIN GLYCOSYLATED A1C: CPT | Performed by: FAMILY MEDICINE

## 2018-08-21 PROCEDURE — 99214 OFFICE O/P EST MOD 30 MIN: CPT | Performed by: FAMILY MEDICINE

## 2018-08-21 PROCEDURE — 80061 LIPID PANEL: CPT | Performed by: FAMILY MEDICINE

## 2018-08-21 PROCEDURE — 36415 COLL VENOUS BLD VENIPUNCTURE: CPT | Performed by: FAMILY MEDICINE

## 2018-08-21 RX ORDER — LANCETS
EACH MISCELLANEOUS
COMMUNITY
Start: 2018-01-20 | End: 2019-11-20

## 2018-08-21 RX ORDER — SILDENAFIL CITRATE 20 MG/1
40-100 TABLET ORAL DAILY PRN
Qty: 30 TABLET | Refills: 11 | Status: SHIPPED | OUTPATIENT
Start: 2018-08-21 | End: 2018-11-20

## 2018-08-21 RX ORDER — BLOOD SUGAR DIAGNOSTIC
STRIP MISCELLANEOUS
COMMUNITY
Start: 2018-01-20 | End: 2019-05-13

## 2018-08-21 RX ORDER — ATORVASTATIN CALCIUM 40 MG/1
40 TABLET, FILM COATED ORAL DAILY
Qty: 90 TABLET | Refills: 3 | Status: SHIPPED | OUTPATIENT
Start: 2018-08-21 | End: 2018-11-20

## 2018-08-21 ASSESSMENT — PAIN SCALES - GENERAL: PAINLEVEL: NO PAIN (0)

## 2018-08-21 NOTE — MR AVS SNAPSHOT
"              After Visit Summary   8/21/2018    Jesus Varghese    MRN: [id number]           Patient Information     Date Of Birth          1960        Visit Information        Provider Department      8/21/2018 1:45 PM Rodrigo Richardson MD [facility] [place]        Today's Diagnoses     Uncontrolled type 2 diabetes mellitus without complication, without long-term current use of insulin (H)    -  1    History of malignant neoplasm of large intestine        Elevated liver enzymes        Ureterolithiasis        Corporo-venous occlusive erectile dysfunction           Follow-ups after your visit        Who to contact     If you have questions or need follow up information about today's clinic visit or your schedule please contact [facility] directly at [phone].  Normal or non-critical lab and imaging results will be communicated to you by MyChart, letter or phone within 4 business days after the clinic has received the results. If you do not hear from us within 7 days, please contact the clinic through MyChart or phone. If you have a critical or abnormal lab result, we will notify you by phone as soon as possible.  Submit refill requests through [employer] or call your pharmacy and they will forward the refill request to us. Please allow 3 business days for your refill to be completed.          Additional Information About Your Visit        Care EveryWhere ID     This is your Care EveryWhere ID. This could be used by other organizations to access your [facility] medical records  [id number]        Your Vitals Were     Height BMI (Body Mass Index)                5' 7\" (1.702 m) 33.14 kg/m2           Blood Pressure from Last 3 Encounters:   08/21/18 122/82   04/19/18 134/70   01/16/18 138/88    Weight from Last 3 Encounters:   08/21/18 211 lb 9.6 oz (96 kg)   04/19/18 209 lb 3.2 oz (94.9 kg)   01/16/18 213 lb 12.8 oz (97 kg)              Today, you had the following     No " orders found for display         Today's Medication Changes          These changes are accurate as of 8/21/18  2:50 PM.  If you have any questions, ask your nurse or doctor.               Start taking these medicines.        Dose/Directions    sildenafil 20 MG tablet   Commonly known as:  REVATIO   Used for:  Corporo-venous occlusive erectile dysfunction   Started by:  Rodrigo Richardson MD        Dose:   mg   Take 2-5 tablets ( mg) by mouth daily as needed Take 1 tablet (20 mg) by mouth three times daily for pulmonary hypertension.  Never use with nitroglycerin, terazosin or doxazosin.   Quantity:  30 tablet   Refills:  11            Where to get your medicines      These medications were sent to [facility] [address] [address]     Phone:  [phone]     atorvastatin 40 MG tablet    metFORMIN 500 MG tablet         Some of these will need a paper prescription and others can be bought over the counter.  Ask your nurse if you have questions.     Bring a paper prescription for each of these medications     sildenafil 20 MG tablet                Primary Care Provider Office Phone # Fax #    Rodrigo Richardson -[phone]       [address] [facility] [address]        Equal Access to Services     DOMINGA BISWAS AH: Hadem silvao Soafricaali, waaxda luqadaha, qaybta kaalmada adeegyada, suzanna nelson. So [facility] [phone].    ATENCIÓN: Si habla español, tiene a brown disposición servicios gratuitos de asistencia lingüística. Srikanth al [phone].    We comply with applicable federal civil rights laws and [place] laws. We do not discriminate on the basis of race, color, national origin, age, disability, sex, sexual orientation, or gender identity.            Thank you!     Thank you for choosing [facility] AND [facility]  for your care. Our goal is always to provide you  with excellent care. Hearing back from our patients is one way we can continue to improve our services. Please take a few minutes to complete the written survey that you may receive in the mail after your visit with us. Thank you!             Your Updated Medication List - Protect others around you: Learn how to safely use, store and throw away your medicines at www.disposemymeds.org.          This list is accurate as of 8/21/18  2:50 PM.  Always use your most recent med list.                   Brand Name Dispense Instructions for use Diagnosis    ACCU-CHEK SMARTVIEW test strip   Generic drug:  blood glucose monitoring           aspirin 81 MG chewable tablet      Take 81 mg by mouth daily with food        atorvastatin 40 MG tablet    LIPITOR    90 tablet    Take 1 tablet (40 mg) by mouth daily    Uncontrolled type 2 diabetes mellitus without complication, without long-term current use of insulin (H)       blood glucose monitoring lancets           Fluocinolone Acetonide Scalp 0.01 % Oil oil      Apply topically At Bedtime        GLUCOCOM BLOOD GLUCOSE MONITOR Dilma      Dispense glucose meter, test strips and lancets covered by the patient insurance. Test 2 times per day.        metFORMIN 500 MG tablet    GLUCOPHAGE    360 tablet    Take 2 tablets (1,000 mg) by mouth 2 times daily (with meals)    Uncontrolled type 2 diabetes mellitus without complication, without long-term current use of insulin (H)       PARoxetine 20 MG tablet    PAXIL    90 tablet    Take 1/2 the first week and then 1 daily after that    Anxiety       sildenafil 20 MG tablet    REVATIO    30 tablet    Take 2-5 tablets ( mg) by mouth daily as needed Take 1 tablet (20 mg) by mouth three times daily for pulmonary hypertension.  Never use with nitroglycerin, terazosin or doxazosin.    Corporo-venous occlusive erectile dysfunction

## 2018-08-21 NOTE — TELEPHONE ENCOUNTER
Patient contacted scheduling wondering if you could come in now before his 1:45 appt for labs as he states he is getting hungry.  No future orders.  Will route to PCP and contact patient with information.    Alaina Love RN  ....................  8/21/2018   11:44 AM

## 2018-08-21 NOTE — PROGRESS NOTES
"  SUBJECTIVE:   Jesus Varghese is a 57 year old male who presents to clinic today for the following health issues:  Comes in for health care maintenance. He is doing very well having no complaints. He is active and his liver and other labs look great- A1C 5.8         Problem list and histories reviewed & adjusted, as indicated.  Additional history: as documented        Reviewed and updated as needed this visit by clinical staff  Tobacco  Meds       Reviewed and updated as needed this visit by Provider         ROS:  CONSTITUTIONAL: NEGATIVE for fever, chills, change in weight  INTEGUMENTARY/SKIN: NEGATIVE for worrisome rashes, moles or lesions  ENT/MOUTH: NEGATIVE for ear, mouth and throat problems  RESP: NEGATIVE for significant cough or SOB  CV: NEGATIVE for chest pain, palpitations or peripheral edema  GI: NEGATIVE for nausea, abdominal pain, heartburn, or change in bowel habits  : NEGATIVE for frequency, dysuria, or hematuria  MUSCULOSKELETAL: NEGATIVE for significant arthralgias or myalgia  NEURO: NEGATIVE for weakness, dizziness or paresthesias  HEME: NEGATIVE for bleeding problems  PSYCHIATRIC: NEGATIVE for changes in mood or affect    OBJECTIVE:     /82 (BP Location: Right arm, Patient Position: Sitting, Cuff Size: Adult Large)  Ht 5' 7\" (1.702 m)  Wt 211 lb 9.6 oz (96 kg)  BMI 33.14 kg/m2  Body mass index is 33.14 kg/(m^2).  GENERAL: healthy, alert and no distress  RESP: lungs clear to auscultation - no rales, rhonchi or wheezes  CV: regular rate and rhythm, normal S1 S2, no S3 or S4, no murmur, click or rub, no peripheral edema and peripheral pulses strong  ABDOMEN: soft, nontender, no hepatosplenomegaly, no masses and bowel sounds normal  MS: no gross musculoskeletal defects noted, no edema        ASSESSMENT/PLAN:         1. Uncontrolled type 2 diabetes mellitus without complication, without long-term current use of insulin (H)  5.8 A1C is good    2. History of malignant neoplasm of large " intestine  No sign of trouble    3. Elevated liver enzymes  resolved    4. Ureterolithiasis  No recurrence       See Patient Instructions    MATT WHIPPLE MD  [facility] AND [facility]

## 2018-08-21 NOTE — TELEPHONE ENCOUNTER
Noted labs signed for future and alerted patient and he will come a bit earlier for labs.  Still fasting. Patient appreciative.    Alaina Love RN  ....................  8/21/2018   1:20 PM

## 2018-08-21 NOTE — NURSING NOTE
Patient presents to clinic for a diabetic check.      Histories reviewed and updated in Epic.    Previous A1C is at goal of <8  No components found for: HGBA1C  Urine microalbumin:creatine:   Foot exam 3 months ago  Eye exam June 2018    Patient is not a current smoker  Patient is on a daily aspirin  Patient is on a Statin.  Blood pressure today of   is at the goal of <139/89 for diabetics.    Hemalatha Gil LPN..............8/21/2018 1:58 PM

## 2018-10-04 ENCOUNTER — ALLIED HEALTH/NURSE VISIT (OUTPATIENT)
Dept: FAMILY MEDICINE | Facility: OTHER | Age: 58
End: 2018-10-04
Attending: FAMILY MEDICINE
Payer: COMMERCIAL

## 2018-10-04 ENCOUNTER — TELEPHONE (OUTPATIENT)
Dept: PEDIATRICS | Facility: OTHER | Age: 58
End: 2018-10-04

## 2018-10-04 DIAGNOSIS — R74.8 ELEVATED LIVER ENZYMES: Primary | ICD-10-CM

## 2018-10-04 DIAGNOSIS — E78.1 HYPERTRIGLYCERIDEMIA: ICD-10-CM

## 2018-10-04 DIAGNOSIS — Z23 NEED FOR PROPHYLACTIC VACCINATION AND INOCULATION AGAINST INFLUENZA: Primary | ICD-10-CM

## 2018-10-04 DIAGNOSIS — Z13.29 SCREENING FOR THYROID DISORDER: ICD-10-CM

## 2018-10-04 DIAGNOSIS — Z23 NEED FOR VACCINATION: ICD-10-CM

## 2018-10-04 DIAGNOSIS — R73.03 PRE-DIABETES: ICD-10-CM

## 2018-10-04 DIAGNOSIS — Z12.5 SCREENING FOR PROSTATE CANCER: ICD-10-CM

## 2018-10-04 DIAGNOSIS — K75.9 INFLAMMATORY LIVER DISEASE: ICD-10-CM

## 2018-10-04 PROCEDURE — 90471 IMMUNIZATION ADMIN: CPT

## 2018-10-04 PROCEDURE — 90686 IIV4 VACC NO PRSV 0.5 ML IM: CPT

## 2018-10-04 NOTE — TELEPHONE ENCOUNTER
Will discuss vaccine in OV      ICD-10-CM    1. Elevated liver enzymes R74.8 Comprehensive metabolic panel     CBC with platelets   2. Inflammatory liver disease K75.9 Comprehensive metabolic panel     CBC with platelets   3. Hypertriglyceridemia E78.1 Lipid Profile   4. Pre-diabetes R73.03 Hemoglobin A1c   5. Screening for prostate cancer Z12.5 PSA Screen GH   6. Screening for thyroid disorder Z13.29 Thyrotropin GH   7. Need for vaccination Z23       No orders of the defined types were placed in this encounter.    Orders Placed This Encounter   Procedures     Comprehensive metabolic panel     CBC with platelets     Hemoglobin A1c     Lipid Profile     Thyrotropin GH     PSA Screen GH     Signed, Filiberto John MD  Internal Medicine & Pediatrics

## 2018-10-04 NOTE — TELEPHONE ENCOUNTER
Patient will be coming in November 20th for a diabetic check and to est care with . He would like to have lab work done that morning so they can review the results in the visit. Also he would like orders placed to have the Shingles vaccine and the chicken pox vaccine done at that time as well.     Yoly Gauthier on 10/4/2018 at 11:49 AM

## 2018-10-04 NOTE — MR AVS SNAPSHOT
After Visit Summary   10/4/2018    Jesus Varghese    MRN: [id number]           Patient Information     Date Of Birth          1960        Visit Information        Provider Department      10/4/2018 11:15 AM GH FLU [facility]        Today's Diagnoses     Need for prophylactic vaccination and inoculation against influenza    -  1       Follow-ups after your visit        Who to contact     If you have questions or need follow up information about today's clinic visit or your schedule please contact [facility] directly at [phone].  Normal or non-critical lab and imaging results will be communicated to you by MyChart, letter or phone within 4 business days after the clinic has received the results. If you do not hear from us within 7 days, please contact the clinic through MyChart or phone. If you have a critical or abnormal lab result, we will notify you by phone as soon as possible.  Submit refill requests through [employer] or call your pharmacy and they will forward the refill request to us. Please allow 3 business days for your refill to be completed.          Additional Information About Your Visit        Care EveryWhere ID     This is your Care EveryWhere ID. This could be used by other organizations to access your [place] medical records  [id number]         Blood Pressure from Last 3 Encounters:   08/21/18 122/82   04/19/18 134/70   01/16/18 138/88    Weight from Last 3 Encounters:   08/21/18 211 lb 9.6 oz (96 kg)   04/19/18 209 lb 3.2 oz (94.9 kg)   01/16/18 213 lb 12.8 oz (97 kg)              We Performed the Following     HC FLU VAC PRESRV FREE QUAD SPLIT VIR 3+YRS IM        Primary Care Provider Fax #    Physician No Ref-Primary [phone]       No address on file        Equal Access to Services     DOMINGA BISWAS : ashley Ellsworth, suzanna terry. So [facility]  [phone].    ATENCIÓN: Si jerry cano, tiene a brown disposición servicios gratuitos de asistencia lingüística. Srikanht cole [phone].    We comply with applicable federal civil rights laws and [place] laws. We do not discriminate on the basis of race, color, national origin, age, disability, sex, sexual orientation, or gender identity.            Thank you!     Thank you for choosing [facility] AND [facility]  for your care. Our goal is always to provide you with excellent care. Hearing back from our patients is one way we can continue to improve our services. Please take a few minutes to complete the written survey that you may receive in the mail after your visit with us. Thank you!             Your Updated Medication List - Protect others around you: Learn how to safely use, store and throw away your medicines at www.disposemymeds.org.          This list is accurate as of 10/4/18 11:38 AM.  Always use your most recent med list.                   Brand Name Dispense Instructions for use Diagnosis    ACCU-CHEK SMARTVIEW test strip   Generic drug:  blood glucose monitoring           aspirin 81 MG chewable tablet      Take 81 mg by mouth daily with food        atorvastatin 40 MG tablet    LIPITOR    90 tablet    Take 1 tablet (40 mg) by mouth daily    Uncontrolled type 2 diabetes mellitus without complication, without long-term current use of insulin (H)       blood glucose monitoring lancets           Fluocinolone Acetonide Scalp 0.01 % Oil oil      Apply topically At Bedtime        GLUCOCOM BLOOD GLUCOSE MONITOR Dilma      Dispense glucose meter, test strips and lancets covered by the patient insurance. Test 2 times per day.        metFORMIN 500 MG tablet    GLUCOPHAGE    360 tablet    Take 2 tablets (1,000 mg) by mouth 2 times daily (with meals)    Uncontrolled type 2 diabetes mellitus without complication, without long-term current use of insulin (H)       PARoxetine 20 MG tablet    PAXIL    90 tablet     Take 1/2 the first week and then 1 daily after that    Anxiety       sildenafil 20 MG tablet    REVATIO    30 tablet    Take 2-5 tablets ( mg) by mouth daily as needed Take 1 tablet (20 mg) by mouth three times daily for pulmonary hypertension.  Never use with nitroglycerin, terazosin or doxazosin.    Corporo-venous occlusive erectile dysfunction

## 2018-10-04 NOTE — PROGRESS NOTES
Injectable Influenza Immunization Documentation    1.  Is the person to be vaccinated sick today?   No    2. Does the person to be vaccinated have an allergy to a component   of the vaccine?   No  Egg Allergy Algorithm Link    3. Has the person to be vaccinated ever had a serious reaction   to influenza vaccine in the past?   No    4. Has the person to be vaccinated ever had Guillain-Barré syndrome?   No    Form completed by Cadence Koch LPN............. October 4, 2018 11:30 AM     Prior to injection verified patient identity using patient's name and date of birth.  Due to injection administration, patient instructed to remain in clinic for 15 minutes  afterwards, and to report any adverse reaction to me immediately.     Cadence Koch LPN............. October 4, 2018 11:30 AM

## 2018-10-05 NOTE — TELEPHONE ENCOUNTER
Called patient and informed him of the lab orders and that the immunizations will be discussed at the office visit. He already had a lab only appointment.  Carolann Amaya LPN on 10/5/2018 at 12:14 PM

## 2018-10-05 NOTE — TELEPHONE ENCOUNTER
Left message to call back....................  10/5/2018   8:55 AM  Carolann Amaya LPN on 10/5/2018 at 8:55 AM

## 2018-11-20 ENCOUNTER — OFFICE VISIT (OUTPATIENT)
Dept: PEDIATRICS | Facility: OTHER | Age: 58
End: 2018-11-20
Attending: INTERNAL MEDICINE
Payer: COMMERCIAL

## 2018-11-20 VITALS
HEART RATE: 82 BPM | DIASTOLIC BLOOD PRESSURE: 74 MMHG | WEIGHT: 222.8 LBS | SYSTOLIC BLOOD PRESSURE: 128 MMHG | BODY MASS INDEX: 34.97 KG/M2 | HEIGHT: 67 IN

## 2018-11-20 DIAGNOSIS — E78.2 MIXED HYPERLIPIDEMIA: ICD-10-CM

## 2018-11-20 DIAGNOSIS — Z23 NEED FOR VACCINATION: ICD-10-CM

## 2018-11-20 DIAGNOSIS — R73.03 PRE-DIABETES: ICD-10-CM

## 2018-11-20 DIAGNOSIS — F41.9 ANXIETY: ICD-10-CM

## 2018-11-20 DIAGNOSIS — E66.09 NON MORBID OBESITY DUE TO EXCESS CALORIES: ICD-10-CM

## 2018-11-20 DIAGNOSIS — Z76.89 ENCOUNTER TO ESTABLISH CARE: Primary | ICD-10-CM

## 2018-11-20 DIAGNOSIS — E78.1 HYPERTRIGLYCERIDEMIA: ICD-10-CM

## 2018-11-20 DIAGNOSIS — K75.9 INFLAMMATORY LIVER DISEASE: ICD-10-CM

## 2018-11-20 DIAGNOSIS — Z13.29 SCREENING FOR THYROID DISORDER: ICD-10-CM

## 2018-11-20 DIAGNOSIS — R74.8 ELEVATED LIVER ENZYMES: ICD-10-CM

## 2018-11-20 DIAGNOSIS — N52.02 CORPORO-VENOUS OCCLUSIVE ERECTILE DYSFUNCTION: ICD-10-CM

## 2018-11-20 DIAGNOSIS — Z87.442 HISTORY OF NEPHROLITHIASIS: ICD-10-CM

## 2018-11-20 DIAGNOSIS — E11.8 CONTROLLED TYPE 2 DIABETES MELLITUS WITH COMPLICATION, WITHOUT LONG-TERM CURRENT USE OF INSULIN (H): ICD-10-CM

## 2018-11-20 DIAGNOSIS — Z12.5 SCREENING FOR PROSTATE CANCER: ICD-10-CM

## 2018-11-20 DIAGNOSIS — Z90.49 S/P PARTIAL COLECTOMY: ICD-10-CM

## 2018-11-20 DIAGNOSIS — T45.1X5A CHEMOTHERAPY-INDUCED NEUROPATHY (H): ICD-10-CM

## 2018-11-20 DIAGNOSIS — C18.9 MALIGNANT NEOPLASM OF COLON, UNSPECIFIED PART OF COLON (H): ICD-10-CM

## 2018-11-20 DIAGNOSIS — R79.89 ELEVATED LFTS: ICD-10-CM

## 2018-11-20 DIAGNOSIS — G62.0 CHEMOTHERAPY-INDUCED NEUROPATHY (H): ICD-10-CM

## 2018-11-20 LAB
ALBUMIN SERPL-MCNC: 4.6 G/DL (ref 3.5–5.7)
ALP SERPL-CCNC: 39 U/L (ref 34–104)
ALT SERPL W P-5'-P-CCNC: 38 U/L (ref 7–52)
ANION GAP SERPL CALCULATED.3IONS-SCNC: 12 MMOL/L (ref 3–14)
AST SERPL W P-5'-P-CCNC: 24 U/L (ref 13–39)
BILIRUB SERPL-MCNC: 0.5 MG/DL (ref 0.3–1)
BUN SERPL-MCNC: 23 MG/DL (ref 7–25)
CALCIUM SERPL-MCNC: 9.2 MG/DL (ref 8.6–10.3)
CHLORIDE SERPL-SCNC: 103 MMOL/L (ref 98–107)
CHOLEST SERPL-MCNC: 130 MG/DL
CO2 SERPL-SCNC: 23 MMOL/L (ref 21–31)
CREAT SERPL-MCNC: 0.98 MG/DL (ref 0.7–1.3)
ERYTHROCYTE [DISTWIDTH] IN BLOOD BY AUTOMATED COUNT: 13.2 % (ref 10–15)
GFR SERPL CREATININE-BSD FRML MDRD: 78 ML/MIN/1.7M2
GLUCOSE SERPL-MCNC: 115 MG/DL (ref 70–105)
HBA1C MFR BLD: 6 % (ref 4–6)
HCT VFR BLD AUTO: 42.8 % (ref 40–53)
HDLC SERPL-MCNC: 30 MG/DL (ref 23–92)
HGB BLD-MCNC: 14.2 G/DL (ref 13.3–17.7)
LDLC SERPL CALC-MCNC: 45 MG/DL
MCH RBC QN AUTO: 28.5 PG (ref 26.5–33)
MCHC RBC AUTO-ENTMCNC: 33.2 G/DL (ref 31.5–36.5)
MCV RBC AUTO: 86 FL (ref 78–100)
NONHDLC SERPL-MCNC: 100 MG/DL
PLATELET # BLD AUTO: 165 10E9/L (ref 150–450)
POTASSIUM SERPL-SCNC: 4.3 MMOL/L (ref 3.5–5.1)
PROT SERPL-MCNC: 6.8 G/DL (ref 6.4–8.9)
PSA SERPL-ACNC: 0.15 NG/ML
RBC # BLD AUTO: 4.99 10E12/L (ref 4.4–5.9)
SODIUM SERPL-SCNC: 138 MMOL/L (ref 134–144)
TRIGL SERPL-MCNC: 275 MG/DL
TSH SERPL DL<=0.05 MIU/L-ACNC: 0.62 IU/ML (ref 0.34–5.6)
WBC # BLD AUTO: 16.3 10E9/L (ref 4–11)

## 2018-11-20 PROCEDURE — G0009 ADMIN PNEUMOCOCCAL VACCINE: HCPCS | Performed by: INTERNAL MEDICINE

## 2018-11-20 PROCEDURE — 80053 COMPREHEN METABOLIC PANEL: CPT | Performed by: INTERNAL MEDICINE

## 2018-11-20 PROCEDURE — 99214 OFFICE O/P EST MOD 30 MIN: CPT | Mod: 25 | Performed by: INTERNAL MEDICINE

## 2018-11-20 PROCEDURE — 85027 COMPLETE CBC AUTOMATED: CPT | Performed by: INTERNAL MEDICINE

## 2018-11-20 PROCEDURE — G0103 PSA SCREENING: HCPCS | Performed by: INTERNAL MEDICINE

## 2018-11-20 PROCEDURE — 36415 COLL VENOUS BLD VENIPUNCTURE: CPT | Performed by: INTERNAL MEDICINE

## 2018-11-20 PROCEDURE — 90472 IMMUNIZATION ADMIN EACH ADD: CPT | Performed by: INTERNAL MEDICINE

## 2018-11-20 PROCEDURE — 80061 LIPID PANEL: CPT | Performed by: INTERNAL MEDICINE

## 2018-11-20 PROCEDURE — 84443 ASSAY THYROID STIM HORMONE: CPT | Performed by: INTERNAL MEDICINE

## 2018-11-20 PROCEDURE — 90732 PPSV23 VACC 2 YRS+ SUBQ/IM: CPT | Performed by: INTERNAL MEDICINE

## 2018-11-20 PROCEDURE — 90715 TDAP VACCINE 7 YRS/> IM: CPT | Performed by: INTERNAL MEDICINE

## 2018-11-20 PROCEDURE — 83036 HEMOGLOBIN GLYCOSYLATED A1C: CPT | Performed by: INTERNAL MEDICINE

## 2018-11-20 RX ORDER — ASPIRIN 81 MG/1
81 TABLET, CHEWABLE ORAL
Qty: 90 TABLET | Refills: 3 | Status: SHIPPED | OUTPATIENT
Start: 2018-11-20 | End: 2024-05-28

## 2018-11-20 RX ORDER — ATORVASTATIN CALCIUM 40 MG/1
40 TABLET, FILM COATED ORAL DAILY
Qty: 90 TABLET | Refills: 3 | Status: SHIPPED | OUTPATIENT
Start: 2018-11-20 | End: 2019-07-15

## 2018-11-20 RX ORDER — PAROXETINE 20 MG/1
20 TABLET, FILM COATED ORAL EVERY MORNING
Qty: 90 TABLET | Refills: 3 | Status: SHIPPED | OUTPATIENT
Start: 2018-11-20 | End: 2019-07-15

## 2018-11-20 RX ORDER — LISINOPRIL 2.5 MG/1
2.5 TABLET ORAL DAILY
Qty: 90 TABLET | Refills: 3 | Status: SHIPPED | OUTPATIENT
Start: 2018-11-20 | End: 2019-07-15

## 2018-11-20 RX ORDER — SILDENAFIL CITRATE 20 MG/1
40-100 TABLET ORAL DAILY PRN
Qty: 30 TABLET | Refills: 11 | Status: SHIPPED | OUTPATIENT
Start: 2018-11-20 | End: 2022-01-13

## 2018-11-20 ASSESSMENT — PATIENT HEALTH QUESTIONNAIRE - PHQ9: SUM OF ALL RESPONSES TO PHQ QUESTIONS 1-9: 0

## 2018-11-20 ASSESSMENT — PAIN SCALES - GENERAL: PAINLEVEL: NO PAIN (0)

## 2018-11-20 NOTE — PATIENT INSTRUCTIONS
Aspects of Diabetes we can improve:  Hemoglobin A1c Lab Results   Component Value Date    A1C 6.0 11/20/2018    A1C 5.8 08/21/2018    Goal range is under 8. Best is 6.5 to 7   Blood Pressure 128/74 Goal to keep less than 140/90   Tobacco  reports that he quit smoking about 8 years ago. His smoking use included Cigarettes. He has never used smokeless tobacco. Goal to abstain from tobacco   Aspirin yes Aspirin reduces risk of heart disease and stroke   ACE/ARB Start lisinopril 2.5 mg daily These medications reduce risk of kidney disease   Cholesterol Lipitor Statins reduce risk of heart disease and stroke   Eye Exam due Annual diabetic eye exam   Healthy weight Body mass index is 34.9 kg/(m^2). Goal BMI under 30, best is under 25.      -- I'm trying to exercise daily (goal at least 20 min/day) with moderate aerobic activity   -- Eat healthy (resources from ADA at http://www.diabetes.org/)   -- I'm taking good care of my feet. Consider seeing the Podiatrist   -- Check blood sugars as directed, record in log book and bring to every appointment   -- Next diabetes lab draw: 6-12 months   -- Next diabetes office visit: 6-12 months        Your BMI is Body mass index is 34.9 kg/(m^2).  (BMI ranges: Normal 18.5 - 25, Overweight 25 - 30, Obesity greater than 30,     Morbid Obesity greater than 40 or greater than 35 with associated conditions.)    Facts about losing weight:   -- Overweight and Obesity increase your risk for developing diabetes, high blood pressure and stroke, and shorten your life.   -- 90% of weight loss comes from dietary changes, only 10% from exercise    What should I do?   -- Work on 5-10% weight loss   -- Focus on a few healthy dietary changes   -- Eat more fresh fruits and vegetables, and fewer carbohydrates   -- Cut out all calorie-containing beverages (milk, juice, alcohol, etc)   -- Exercise every day   -- Weigh yourself once a week   -- Consider the DASH Diet (http://http://bit.ly/DASHDiet -  redirects to the NIH)   -- Consider Weight Watchers (http://www.weightwatchers.com)   -- Consider My Fitness Pal (iOS, Android, http://www.KYCK.comnesspal.com)

## 2018-11-20 NOTE — NURSING NOTE
Patient presents to clinic to establish care today and for diabetic check.  Dottie Abel LPN ....................  11/20/2018   1:53 PM    No LMP for male patient.  Medication Reconciliation: complete    Dottie Abel LPN  11/20/2018 1:53 PM    Previous A1C is at goal of <8  Lab Results   Component Value Date    A1C 6.0 11/20/2018    A1C 5.8 08/21/2018     Urine microalbumin:creatine: DUE  Foot exam TODAY  Eye exam 7/218    Tobacco User NO  Patient is on a daily aspirin  Patient is on a Statin.  Blood pressure today of:     BP Readings from Last 1 Encounters:   11/20/18 128/74      is at the goal of <139/89 for diabetics.    Dottie Abel LPN on 11/20/2018 at 1:53 PM

## 2018-11-20 NOTE — MR AVS SNAPSHOT
After Visit Summary   11/20/2018    Jesus Varghese    MRN: [id number]           Patient Information     Date Of Birth          1960        Visit Information        Provider Department      11/20/2018 1:45 PM Filiberto John MD [facility]        Today's Diagnoses     Encounter to establish care    -  1    Malignant neoplasm of colon, unspecified part of colon (H)        Chemotherapy-induced neuropathy (H)        S/P partial colectomy        History of nephrolithiasis        Elevated LFTs        Controlled type 2 diabetes mellitus with complication, without long-term current use of insulin (H)        Non morbid obesity due to excess calories        Anxiety        Corporo-venous occlusive erectile dysfunction        Mixed hyperlipidemia        Need for vaccination          Care Instructions    Aspects of Diabetes we can improve:  Hemoglobin A1c Lab Results   Component Value Date    A1C 6.0 11/20/2018    A1C 5.8 08/21/2018    Goal range is under 8. Best is 6.5 to 7   Blood Pressure 128/74 Goal to keep less than 140/90   Tobacco  reports that he quit smoking about 8 years ago. His smoking use included Cigarettes. He has never used smokeless tobacco. Goal to abstain from tobacco   Aspirin yes Aspirin reduces risk of heart disease and stroke   ACE/ARB Start lisinopril 2.5 mg daily These medications reduce risk of kidney disease   Cholesterol Lipitor Statins reduce risk of heart disease and stroke   Eye Exam due Annual diabetic eye exam   Healthy weight Body mass index is 34.9 kg/(m^2). Goal BMI under 30, best is under 25.      -- I'm trying to exercise daily (goal at least 20 min/day) with moderate aerobic activity   -- Eat healthy (resources from ADA at http://www.diabetes.org/)   -- I'm taking good care of my feet. Consider seeing the Podiatrist   -- Check blood sugars as directed, record in log book and bring to every appointment   -- Next diabetes lab draw: 6-12  months   -- Next diabetes office visit: 6-12 months        Your BMI is Body mass index is 34.9 kg/(m^2).  (BMI ranges: Normal 18.5 - 25, Overweight 25 - 30, Obesity greater than 30,     Morbid Obesity greater than 40 or greater than 35 with associated conditions.)    Facts about losing weight:   -- Overweight and Obesity increase your risk for developing diabetes, high blood pressure and stroke, and shorten your life.   -- 90% of weight loss comes from dietary changes, only 10% from exercise    What should I do?   -- Work on 5-10% weight loss   -- Focus on a few healthy dietary changes   -- Eat more fresh fruits and vegetables, and fewer carbohydrates   -- Cut out all calorie-containing beverages (milk, juice, alcohol, etc)   -- Exercise every day   -- Weigh yourself once a week   -- Consider the DASH Diet (http://http://Fantom.TextCorner/DASHDiet - redirects to the [facility])   -- Consider Weight Watchers (http://www.weightUdemy.Flowdock)   -- Consider My Fitness Pal (iOS, Android, http://www.Loyalis.Flowdock)                Follow-ups after your visit        Follow-up notes from your care team     Return in about 1 year (around 11/20/2019) for diabetes.      Who to contact     If you have questions or need follow up information about today's clinic visit or your schedule please contact [facility] directly at [phone].  Normal or non-critical lab and imaging results will be communicated to you by MyChart, letter or phone within 4 business days after the clinic has received the results. If you do not hear from us within 7 days, please contact the clinic through MyChart or phone. If you have a critical or abnormal lab result, we will notify you by phone as soon as possible.  Submit refill requests through Hoods or call your pharmacy and they will forward the refill request to us. Please allow 3 business days for your refill to be completed.          Additional Information About Your Visit        Care  "EveryWhere ID     This is your Care EveryWhere ID. This could be used by other organizations to access your [facility] medical records  [id number]        Your Vitals Were     Pulse Height BMI (Body Mass Index)             82 5' 7\" (1.702 m) 34.9 kg/m2          Blood Pressure from Last 3 Encounters:   11/20/18 128/74   08/21/18 122/82   04/19/18 134/70    Weight from Last 3 Encounters:   11/20/18 222 lb 12.8 oz (101.1 kg)   08/21/18 211 lb 9.6 oz (96 kg)   04/19/18 209 lb 3.2 oz (94.9 kg)              We Performed the Following     Pneumococcal vaccine 23 valent PPSV23  (Pneumovax) [03176]     TDAP VACCINE (BOOSTRIX)          Today's Medication Changes          These changes are accurate as of 11/20/18  2:34 PM.  If you have any questions, ask your nurse or doctor.               Start taking these medicines.        Dose/Directions    lisinopril 2.5 MG tablet   Commonly known as:  PRINIVIL/Zestril   Used for:  Controlled type 2 diabetes mellitus with complication, without long-term current use of insulin (H)   Started by:  Filiberto John MD        Dose:  2.5 mg   Take 1 tablet (2.5 mg) by mouth daily   Quantity:  90 tablet   Refills:  3         These medicines have changed or have updated prescriptions.        Dose/Directions    PARoxetine 20 MG tablet   Commonly known as:  PAXIL   This may have changed:    - how much to take  - how to take this  - when to take this  - additional instructions   Used for:  Anxiety   Changed by:  Filiberto John MD        Dose:  20 mg   Take 1 tablet (20 mg) by mouth every morning   Quantity:  90 tablet   Refills:  3       sildenafil 20 MG tablet   Commonly known as:  REVATIO   This may have changed:    - reasons to take this  - additional instructions   Used for:  Corporo-venous occlusive erectile dysfunction   Changed by:  Filiberto John MD        Dose:   mg   Take 2-5 tablets ( mg) by mouth daily as needed (prior to intercourse)   Quantity:  30 " tablet   Refills:  11            Where to get your medicines      These medications were sent to [facility] [address] [address]     Phone:  [phone]     aspirin 81 MG chewable tablet    atorvastatin 40 MG tablet    lisinopril 2.5 MG tablet    metFORMIN 500 MG tablet    PARoxetine 20 MG tablet    sildenafil 20 MG tablet                Primary Care Provider Fax #    Physician No Ref-Primary [phone]       No address on file        Equal Access to Services     DOMINGA BISWAS : Hadii aad ku hadasho Soomaali, waaxda luqadaha, qaybta kaalmada adeegyada, waxay idiin hayaan adeeg mary flanagan . So [facility] [phone].    ATENCIÓN: Si habla español, tiene a brown disposición servicios gratuitos de asistencia lingüística. [facility] [phone].    We comply with applicable federal civil rights laws and [place] laws. We do not discriminate on the basis of race, color, national origin, age, disability, sex, sexual orientation, or gender identity.            Thank you!     Thank you for choosing [facility]  for your care. Our goal is always to provide you with excellent care. Hearing back from our patients is one way we can continue to improve our services. Please take a few minutes to complete the written survey that you may receive in the mail after your visit with us. Thank you!             Your Updated Medication List - Protect others around you: Learn how to safely use, store and throw away your medicines at www.disposemymeds.org.          This list is accurate as of 11/20/18  2:34 PM.  Always use your most recent med list.                   Brand Name Dispense Instructions for use Diagnosis    ACCU-CHEK SMARTVIEW test strip   Generic drug:  blood glucose monitoring           aspirin 81 MG chewable tablet     90 tablet    Take 1 tablet (81 mg) by mouth daily with food    Controlled type 2 diabetes mellitus with  complication, without long-term current use of insulin (H)       atorvastatin 40 MG tablet    LIPITOR    90 tablet    Take 1 tablet (40 mg) by mouth daily    Mixed hyperlipidemia       blood glucose monitoring lancets           Fluocinolone Acetonide Scalp 0.01 % Oil oil      Apply topically At Bedtime        GLUCOCOM BLOOD GLUCOSE MONITOR Dilma      Dispense glucose meter, test strips and lancets covered by the patient insurance. Test 2 times per day.        lisinopril 2.5 MG tablet    PRINIVIL/Zestril    90 tablet    Take 1 tablet (2.5 mg) by mouth daily    Controlled type 2 diabetes mellitus with complication, without long-term current use of insulin (H)       metFORMIN 500 MG tablet    GLUCOPHAGE    360 tablet    Take 2 tablets (1,000 mg) by mouth 2 times daily (with meals)    Controlled type 2 diabetes mellitus with complication, without long-term current use of insulin (H)       PARoxetine 20 MG tablet    PAXIL    90 tablet    Take 1 tablet (20 mg) by mouth every morning    Anxiety       sildenafil 20 MG tablet    REVATIO    30 tablet    Take 2-5 tablets ( mg) by mouth daily as needed (prior to intercourse)    Corporo-venous occlusive erectile dysfunction

## 2018-11-21 NOTE — PROGRESS NOTES
Subjective  Jesus Varghese is a 58 year old male who presents for diabetic check, establish primary care.  Previous physician was Dr. sabillon.  He has a history of diabetes mellitus type 2 which has been well-controlled with metformin, dietary changes.  He continues on aspirin for prophylaxis.  History of hyper lipidemia which is stable with Lipitor.  He tells me last time his numbers were the best they have ever been.  He has had a history of elevated LFTs in the past as well as kidney stones.  Through dietary changes he was able to lose 40 pounds.  He has a history of colon cancer status post partial colectomy with chemotherapy.  This has given him residual chemotherapy-induced neuropathy which is slight in the fingers and toes.  He has a history of erectile dysfunction and uses sildenafil as needed.  History of mood disorder which is stable with Paxil.  No chest pains with exertion.    Problem List/PMH: reviewed in EMR, and made relevant updates today.  Medications: reviewed in EMR, and made relevant updates today.  Allergies: reviewed in EMR, and made relevant updates today.    Social Hx:  Social History   Substance Use Topics     Smoking status: Former Smoker     Packs/day: 2.50     Types: Cigarettes     Quit date: 6/20/2010     Smokeless tobacco: Never Used     Alcohol use No     Social History     Social History Narrative        Retired from [employer] as the     Has children    Has several tattoos     I reviewed social history and made relevant updates today.    Family Hx:   Family History   Problem Relation Age of Onset     Hypertension Mother      Hypertension     HEART DISEASE Father      Heart Disease,40s and 50s     HEART DISEASE Other      Heart Disease,40s and 50s     Anesthesia Reaction No family hx of      Anesthesia Problem     Other - See Comments No family hx of      Stroke     Blood Disease No family hx of      Blood Disease       Objective  Vitals: reviewed in  "EMR.  /74 (BP Location: Right arm, Patient Position: Sitting, Cuff Size: Adult Large)  Pulse 82  Ht 5' 7\" (1.702 m)  Wt 222 lb 12.8 oz (101.1 kg)  BMI 34.9 kg/m2    Gen: Pleasant male, NAD.  HEENT: MMM, no OP erythema.   Neck: Supple, no JVD, no bruits.  CV: RRR no m/r/g.   Pulm: CTAB no w/r/r  Neuro: Grossly intact  Msk: No lower extremity edema.  Skin: No concerning lesions.  Psychiatric: Normal affect and insight. Does not appear anxious or depressed.    Labs:  Glucose   Date Value Ref Range Status   11/20/2018 115 (H) 70 - 105 mg/dL Final   01/13/2016 260 (H) 70 - 105 mg/dL      Hemoglobin A1C   Date Value Ref Range Status   11/20/2018 6.0 4.0 - 6.0 % Final   08/21/2018 5.8 4.0 - 6.0 % Final     Creatinine   Date Value Ref Range Status   11/20/2018 0.98 0.70 - 1.30 mg/dL Final   04/29/2016 0.91 0.70 - 1.30 mg/dL      LDL Cholesterol Calculated   Date Value Ref Range Status   11/20/2018 45 <100 mg/dL Final     Comment:     Desirable:       <100 mg/dl             Assessment    ICD-10-CM    1. Encounter to establish care Z76.89    2. Malignant neoplasm of colon, unspecified part of colon (H) C18.9    3. Chemotherapy-induced neuropathy (H) G62.0     T45.1X5A    4. S/P partial colectomy Z90.49    5. History of nephrolithiasis Z87.442    6. Elevated LFTs R94.5    7. Controlled type 2 diabetes mellitus with complication, without long-term current use of insulin (H) E11.8 lisinopril (PRINIVIL/ZESTRIL) 2.5 MG tablet     aspirin 81 MG chewable tablet     metFORMIN (GLUCOPHAGE) 500 MG tablet   8. Non morbid obesity due to excess calories E66.09    9. Anxiety F41.9 PARoxetine (PAXIL) 20 MG tablet   10. Corporo-venous occlusive erectile dysfunction N52.02 sildenafil (REVATIO) 20 MG tablet   11. Mixed hyperlipidemia E78.2 atorvastatin (LIPITOR) 40 MG tablet   12. Need for vaccination Z23 Pneumococcal vaccine 23 valent PPSV23  (Pneumovax) [56900]     TDAP VACCINE (BOOSTRIX)     Orders Placed This Encounter "   Procedures     Pneumococcal vaccine 23 valent PPSV23  (Pneumovax) [32085]     TDAP VACCINE (BOOSTRIX)         Plan   -- Expected clinical course discussed   -- Medications and their side effects discussed  Patient Instructions     Aspects of Diabetes we can improve:  Hemoglobin A1c Lab Results   Component Value Date    A1C 6.0 11/20/2018    A1C 5.8 08/21/2018    Goal range is under 8. Best is 6.5 to 7   Blood Pressure 128/74 Goal to keep less than 140/90   Tobacco  reports that he quit smoking about 8 years ago. His smoking use included Cigarettes. He has never used smokeless tobacco. Goal to abstain from tobacco   Aspirin yes Aspirin reduces risk of heart disease and stroke   ACE/ARB Start lisinopril 2.5 mg daily These medications reduce risk of kidney disease   Cholesterol Lipitor Statins reduce risk of heart disease and stroke   Eye Exam due Annual diabetic eye exam   Healthy weight Body mass index is 34.9 kg/(m^2). Goal BMI under 30, best is under 25.      -- I'm trying to exercise daily (goal at least 20 min/day) with moderate aerobic activity   -- Eat healthy (resources from ADA at http://www.diabetes.org/)   -- I'm taking good care of my feet. Consider seeing the Podiatrist   -- Check blood sugars as directed, record in log book and bring to every appointment   -- Next diabetes lab draw: 6-12 months   -- Next diabetes office visit: 6-12 months        Your BMI is Body mass index is 34.9 kg/(m^2).  (BMI ranges: Normal 18.5 - 25, Overweight 25 - 30, Obesity greater than 30,     Morbid Obesity greater than 40 or greater than 35 with associated conditions.)    Facts about losing weight:   -- Overweight and Obesity increase your risk for developing diabetes, high blood pressure and stroke, and shorten your life.   -- 90% of weight loss comes from dietary changes, only 10% from exercise    What should I do?   -- Work on 5-10% weight loss   -- Focus on a few healthy dietary changes   -- Eat more fresh fruits and  vegetables, and fewer carbohydrates   -- Cut out all calorie-containing beverages (milk, juice, alcohol, etc)   -- Exercise every day   -- Weigh yourself once a week   -- Consider the DASH Diet (http://http://bit.ly/DASHDiet - redirects to the NIH)   -- Consider Weight Watchers (http://www.weightCFO.com.MarketMuse)   -- Consider My Fitness Pal (iOS, Android, http://www.Grupo Phoenixpal.MarketMuse)            Return in about 1 year (around 11/20/2019) for diabetes.    Signed, Filiberto John MD  Internal Medicine & Pediatrics

## 2019-02-27 ENCOUNTER — HOSPITAL ENCOUNTER (OUTPATIENT)
Dept: GENERAL RADIOLOGY | Facility: OTHER | Age: 59
End: 2019-02-27
Attending: NURSE PRACTITIONER
Payer: COMMERCIAL

## 2019-02-27 ENCOUNTER — OFFICE VISIT (OUTPATIENT)
Dept: FAMILY MEDICINE | Facility: OTHER | Age: 59
End: 2019-02-27
Attending: NURSE PRACTITIONER
Payer: COMMERCIAL

## 2019-02-27 ENCOUNTER — HOSPITAL ENCOUNTER (OUTPATIENT)
Dept: GENERAL RADIOLOGY | Facility: OTHER | Age: 59
Discharge: HOME | End: 2019-02-27
Attending: NURSE PRACTITIONER | Admitting: NURSE PRACTITIONER
Payer: COMMERCIAL

## 2019-02-27 VITALS
RESPIRATION RATE: 16 BRPM | OXYGEN SATURATION: 98 % | TEMPERATURE: 98.3 F | HEART RATE: 71 BPM | SYSTOLIC BLOOD PRESSURE: 122 MMHG | DIASTOLIC BLOOD PRESSURE: 70 MMHG | BODY MASS INDEX: 34.66 KG/M2 | WEIGHT: 221.3 LBS

## 2019-02-27 DIAGNOSIS — R07.81 RIB PAIN ON RIGHT SIDE: Primary | ICD-10-CM

## 2019-02-27 DIAGNOSIS — S59.901A INJURY OF RIGHT ELBOW, INITIAL ENCOUNTER: ICD-10-CM

## 2019-02-27 DIAGNOSIS — S22.31XA CLOSED FRACTURE OF ONE RIB OF RIGHT SIDE, INITIAL ENCOUNTER: ICD-10-CM

## 2019-02-27 DIAGNOSIS — S50.01XA CONTUSION OF RIGHT ELBOW, INITIAL ENCOUNTER: ICD-10-CM

## 2019-02-27 PROCEDURE — 73080 X-RAY EXAM OF ELBOW: CPT | Mod: RT

## 2019-02-27 PROCEDURE — 71101 X-RAY EXAM UNILAT RIBS/CHEST: CPT | Mod: RT

## 2019-02-27 PROCEDURE — 99214 OFFICE O/P EST MOD 30 MIN: CPT | Performed by: NURSE PRACTITIONER

## 2019-02-27 ASSESSMENT — PAIN SCALES - GENERAL: PAINLEVEL: MODERATE PAIN (5)

## 2019-02-27 NOTE — NURSING NOTE
Chief Complaint   Patient presents with     Elbow Pain       Medication Reconciliation: complete   Patient presents with elbow pain. Patient fell outside this morning onto right side. Patient has right elbow, shoulder and rib pain. Mendy Vogel LPN .............2/27/2019  3:59 PM

## 2019-02-27 NOTE — PROGRESS NOTES
Nursing Notes:   Mendy Vogel LPN  2/27/2019  4:11 PM  Signed  Chief Complaint   Patient presents with     Elbow Pain       Medication Reconciliation: complete   Patient presents with elbow pain. Patient fell outside this morning onto right side. Patient has right elbow, shoulder and rib pain. Mendy Vogel LPN .............2/27/2019  3:59 PM      SUBJECTIVE:   Jesus Varghese is a 58 year old male who presents to clinic today for the following health issues:    Musculoskeletal problem/pain      Duration: Fall at 0700    Description  Location: RT elbow and RT posterior rib cage pain.     Intensity:  severe    Accompanying signs and symptoms: Bruising and mild swelling of the right elbow.  No bruising or redness noted the rib cage area.  He denies shortness of breath, wheezing, chest pain.  He denies numbness or tingling into the arm or hand.    History  Previous similar problem: Yes: past injury to the RT elbow.   Previous evaluation:  x-ray    Precipitating or alleviating factors:  Trauma or overuse: YES- He fell today while carrying newspapers.  He fell straight back onto his back, landing onto his elbow as well.  He had immediate pain in his elbow after the fall.  He was still able to use his hand and elbow after the fall.  He also had immediate pain in the posterior right rib cage just below the shoulder blade.  He has been able to use the shoulder since the incident.  Aggravating factors include: lifting    Therapies tried and outcome: rest/inactivity, ice and Ibuprofen    Cancer in the past and DM. Feels grating in the rib cage as he deep breaths.     Problem list and histories reviewed & adjusted, as indicated.  Additional history: as documented    Past Medical History:   Diagnosis Date     Age-related cataract     No Comments Provided     Calculus of kidney     No Comments Provided     Carpal tunnel syndrome     No Comments Provided     Diverticulosis of intestine without perforation or  abscess without bleeding     mild     Dorsalgia     No Comments Provided     Inflammatory liver disease     ? cause 1982     Malignant neoplasm of colon (H)     2006     Other specified postprocedural states     1/14/2016     Pure hyperglyceridemia     No Comments Provided     Strain of muscle, fascia and tendon of other parts of biceps, left arm, initial encounter     1/7/2016       Current Outpatient Medications   Medication Sig Dispense Refill     aspirin 81 MG chewable tablet Take 1 tablet (81 mg) by mouth daily with food 90 tablet 3     atorvastatin (LIPITOR) 40 MG tablet Take 1 tablet (40 mg) by mouth daily 90 tablet 3     Fluocinolone Acetonide Scalp 0.01 % OIL oil Apply topically At Bedtime       lisinopril (PRINIVIL/ZESTRIL) 2.5 MG tablet Take 1 tablet (2.5 mg) by mouth daily 90 tablet 3     metFORMIN (GLUCOPHAGE) 500 MG tablet Take 2 tablets (1,000 mg) by mouth 2 times daily (with meals) 360 tablet 3     PARoxetine (PAXIL) 20 MG tablet Take 1 tablet (20 mg) by mouth every morning 90 tablet 3     sildenafil (REVATIO) 20 MG tablet Take 2-5 tablets ( mg) by mouth daily as needed (prior to intercourse) 30 tablet 11     ACCU-CHEK SMARTVIEW test strip        blood glucose monitoring (ACCU-CHEK FASTCLIX) lancets        Blood Glucose Monitoring Suppl (GLUCOCOM BLOOD GLUCOSE MONITOR) JUANITA Dispense glucose meter, test strips and lancets covered by the patient insurance. Test 2 times per day.       No Known Allergies      ROS:  Notable findings in the HPI.       OBJECTIVE:     /70 (BP Location: Left arm, Patient Position: Sitting, Cuff Size: Adult Regular)   Pulse 71   Temp 98.3  F (36.8  C) (Tympanic)   Resp 16   Wt 100.4 kg (221 lb 4.8 oz)   SpO2 98%   BMI 34.66 kg/m    Body mass index is 34.66 kg/m .  GENERAL: alert  EYES: Eyes grossly normal to inspection  HENT: normal cephalic/atraumatic and oral mucous membranes moist  NECK: ROM normal.   RESP: lungs clear to auscultation - no rales, rhonchi  or wheezes, Tender to palpation under the RT scapula. Ribs below the scapula are tender as well. No redness or bruising.   CV: regular rates and rhythm, normal S1 S2, no S3 or S4, no murmur, click or rub, peripheral pulses strong and no peripheral edema  MS: neck exam shows normal strength and ROM is normal and RT elbow shows soft tissue swelling, Mild bruising of the lateral side, ROM of elbow intact. ROM of wrist intact.    SKIN: no suspicious lesions or rashes  PSYCH: mentation appears normal, affect normal/bright    Diagnostic Test Results:  Results for orders placed or performed in visit on 02/27/19 (from the past 24 hour(s))   XR Ribs & Chest Rt 3vw    Narrative    PROCEDURE:  XR RIBS & CHEST RT 3VW    HISTORY: fall posterior rib cage pain under shoulder blade.; Rib pain  on right side, .    COMPARISON:  None.    FINDINGS:  The cardiomediastinal contours are within normal limits.  The trachea  is midline.  There is calcific aortic atherosclerosis.  Trace fluid is present along the minor fissure. No focal consolidation  or pneumothorax is seen. The right hemidiaphragm is slightly elevated.  There is an acute fracture of the right posterior sixth rib.        Impression    IMPRESSION:      Acute right posterior sixth rib fracture without associated  pneumothorax.      CLARICE BORREGO MD   XR Elbow Right G/E 3 Views    Narrative    PROCEDURE:  XR ELBOW RT G/E 3 VW    HISTORY: Injury of right elbow, initial encounter.    COMPARISON:  None.    TECHNIQUE:  3 views right elbow.    FINDINGS:  No elbow joint effusion is present. There are some  degenerative changes at the olecranon, including a possible tiny joint  ossific body. No acute fracture is identified. There is extensive  posterior elbow swelling.      Impression    IMPRESSION: No acute fracture.      CLARICE BORREGO MD       ASSESSMENT/PLAN:     1. Rib pain on right side  - XR Ribs & Chest Rt 3vw    2. Injury of right elbow, initial encounter  - XR  Elbow Right G/E 3 Views    3. Contusion of right elbow, initial encounter    4. Closed fracture of one rib of right side, initial encounter      PLAN:    MS Injury/Pain  ice, heat, elevate, rest, Tylenol, Ibuprofen and Rib Pain: rest, takes 6-8 weeks for healing. Offered pain meds at this time he declined. Will call if changes his mind.     Followup:    If not improving or if condition worsens, follow up with your Primary Care Provider    I explained my diagnostic considerations and recommendations to the patient, who voiced understanding and agreement with the treatment plan. All questions were answered. We discussed potential side effects of any prescribed or recommended therapies, as well as expectations for response to treatments. He was advised to contact our office if there is no improvement or worsening of conditions or symptoms.  If s/s worsen or persist, patient will either come back or follow up with PCP.    Disclaimer:  This note consists of words and symbols derived from keyboarding, dictation, or using voice recognition software. As a result, there may be errors in the script that have gone undetected. Please consider this when interpreting information found in this note.      Mere Sanchez NP, 2/27/2019 4:11 PM

## 2019-02-27 NOTE — PATIENT INSTRUCTIONS
Patient Education     Elbow Bruise  You have a bruise (contusion) of your elbow. A bruise causes local pain, swelling, and sometimes bruising. There are no broken bones. This injury takes a few days to a few weeks to heal. You may be given a sling for comfort and arm support.  You may notice color changes over the skin. It may change from reddish to bluish to greenish or yellowish before the bruising fades. The skin will then go back to its normal color.  Home care  Follow these guidelines when caring for yourself at home.    Keep your arm elevated to reduce pain and swelling. This is most important during the first 2 days (48 hours) after the injury.    Put an ice pack on the injured area. Do this for 20 minutes every 1 to 2 hours the first day. You can make an ice pack by wrapping a plastic bag of ice in a thin towel. You should continue to use the ice pack 3 to 4 times a day for the next 2 days. Then use the ice pack as needed to ease pain and swelling.    Don t use a heating pad.    Don t stick a needle into the contusion or bruising to drain it.    You may use acetaminophen or ibuprofen to control pain, unless another pain medicine was prescribed. If you have chronic liver or kidney disease, talk with your healthcare provider before using these medicines. Also talk with your provider if you ve had a stomach ulcer or gastrointestinal bleeding.    If a sling was provided, you may take it off to shower or bathe. Don t wear it for more than 1 week or it may cause joint stiffness.  Follow-up care  Follow up with your healthcare provider, or as advised, if you are not starting to get better within the next 3 days.  When to seek medical advice  Call your healthcare provider right away if any of these occur:    Pain or swelling gets worse    The back of your elbow becomes very swollen where it almost looks like a gold ball or egg-like mass is growing there. This is a sign of olecrenon bursitis or septic bursitis which  may need immediate treatment.    Redness, red streaks down the arm, warmth, or drainage from the bruise    Hand or fingers becomes cold, blue, numb, or tingly    New bruises, and you don t know what caused them    Contusion doesn t heal        Patient Education     Rib Fracture    You broke one or more ribs. This is called a rib fracture. Rib fractures don't need a cast like other bones. They will heal by themselves in about 4 to 6 weeks. The first 3 to 4 weeks will be the most painful. During this time deep breathing, coughing, or changing position from sitting to lying down, may cause the broken ends to move slightly.  Home care    Rest. You should not be doing any heavy lifting or strenuous exertion until the pain goes away.    It hurts to breathe when you have a broken rib. This puts you at risk of getting pneumonia from poor airflow through your lungs. To prevent this:  ? Take several very deep breaths once an hour while you're awake. Breathe out through pursed lips as if you are blowing up a balloon. If possible, actually blow up a balloon or a rubber glove. This exercise builds up pressure inside the lung and prevents collapse of the small air sacs of the lung. This exercise may cause some pain at the site of injury. This is normal.  ? You may have gotten a breathing exercise device called an incentive spirometer. Use it at least 4 times a day, or as directed.    Apply an ice pack over the injured area for 15 to 20 minutes every 1 to 2 hours. You should do this for the first 24 to 48 hours. To make an ice pack, put ice cubes in a plastic bag that seals at the top. Wrap the bag in a clean, thin towel or cloth. Never put ice or an ice pack directly on the skin. Keep using ice packs as needed for the relief of pain and swelling.    You may use over-the-counter pain medicine to control pain, unless another pain medicine was prescribed. If you have chronic liver or kidney disease or ever had a stomach ulcer or GI  (gastrointestinal) bleeding, talk with your healthcare provider before using these medicines.    If your pain is not controlled, contact your healthcare provider. Sometimes a stronger pain medicine may be needed. A nerve block can be done in case of severe pain. It will numb the nerve between the ribs.  Follow-up care  Follow up with your healthcare provider, or as advised. In rare cases, a broken rib will cause complications in the first few days that may not be clearly seen during your initial exam. This can include collapsed lung, bleeding around the lung or into the belly (abdomen), or pneumonia. So watch for the signs below.  If X-rays were taken, you will be told of any new findings that may affect your care.  Call 911  Call 911 if you have:    Dizziness, weakness or fainting    Shortness of breath with or without chest discomfort    New or worsening abdominal pain    Discomfort in other areas of your upper body such as your shoulders, jaw, neck, or arms  When to seek medical advice  Call your healthcare provider right away if any of these occur:    Increasing chest pain with breathing    Fever of 100.4 F (38 C) or above, or as directed by your healthcare provider    Congested cough, nausea, or vomiting  Date Last Reviewed: 4/1/2018 2000-2018 [employer]. [address] [place] [address], [place] [address]. All rights reserved. This information is not intended as a substitute for professional medical care. Always follow your healthcare professional's instructions.

## 2019-05-13 DIAGNOSIS — E11.8 CONTROLLED TYPE 2 DIABETES MELLITUS WITH COMPLICATION, WITHOUT LONG-TERM CURRENT USE OF INSULIN (H): Primary | ICD-10-CM

## 2019-05-13 NOTE — TELEPHONE ENCOUNTER
Prescription approved per [facility] Refill Protocol.  LOV: 11/20/18  Per Care everywhere  blood sugar diagnostic (ACCU-CHEK SMARTVIEW TEST STRIP) strip    Indications: Controlled type 2 diabetes mellitus without complication, without long-term current use of insulin (HC) Dispense item covered by pt ins. E11.9 NIDDM type II - Test 2 times/day. Reason: New medication 200 Strip   2     Kayla Brice RN on 5/13/2019 at 3:41 PM

## 2019-07-02 ENCOUNTER — TELEPHONE (OUTPATIENT)
Dept: PEDIATRICS | Facility: OTHER | Age: 59
End: 2019-07-02

## 2019-07-02 DIAGNOSIS — E11.8 CONTROLLED TYPE 2 DIABETES MELLITUS WITH COMPLICATION, WITHOUT LONG-TERM CURRENT USE OF INSULIN (H): Primary | ICD-10-CM

## 2019-07-02 DIAGNOSIS — R79.89 ELEVATED LFTS: ICD-10-CM

## 2019-07-02 DIAGNOSIS — E78.1 HYPERTRIGLYCERIDEMIA: ICD-10-CM

## 2019-07-02 NOTE — TELEPHONE ENCOUNTER
Panel Management Review      Patient has the following on his problem list:     Diabetes    ASA: Passed    Last A1C  Lab Results   Component Value Date    A1C 6.0 11/20/2018    A1C 5.8 08/21/2018     A1C tested: Passed    Last LDL:    Lab Results   Component Value Date    CHOL 130 11/20/2018     Lab Results   Component Value Date    HDL 30 11/20/2018     Lab Results   Component Value Date    LDL 45 11/20/2018     Lab Results   Component Value Date    TRIG 275 11/20/2018     No results found for: CHOLHDLRATIO  Lab Results   Component Value Date    NHDL 100 11/20/2018       Is the patient on a Statin? YES             Is the patient on Aspirin? YES    Medications     HMG CoA Reductase Inhibitors     atorvastatin (LIPITOR) 40 MG tablet       Salicylates     aspirin 81 MG chewable tablet             Last three blood pressure readings:  BP Readings from Last 3 Encounters:   02/27/19 122/70   11/20/18 128/74   08/21/18 122/82       Date of last diabetes office visit: 11/2018     Tobacco History:     History   Smoking Status     Former Smoker     Packs/day: 2.50     Types: Cigarettes     Quit date: 6/20/2010   Smokeless Tobacco     Never Used           Composite cancer screening  Chart review shows that this patient is due/due soon for the following None  Summary:    Patient is due/failing the following:   A1C and BP CHECK    Action needed:   Routed to provider for review.    Type of outreach:    None, routed to provider for review.    Questions for provider review:    Last DM visit was 11/2018                                                                                                                                    Dottie Munson LPN ....................  7/2/2019   1:54 PM       Chart routed to Provider .

## 2019-07-02 NOTE — TELEPHONE ENCOUNTER
Please call Mr. Varghese and ask him to:   -- Schedule lab-only visit for fasting labs   -- Schedule diabetes office visit. Bring written blood sugar log book to the visit.    Signed, Filiberto John MD  Internal Medicine & Pediatrics

## 2019-07-03 NOTE — TELEPHONE ENCOUNTER
Patient called back. Verified patients full name and  verified. Notified patient of the below and transferred patient to scheduling.     Bety Grier LPN on 7/3/2019 at 3:23 PM

## 2019-07-03 NOTE — TELEPHONE ENCOUNTER
Left message to call back....................  7/3/2019   3:09 PM  Harriet Johnson LPN 7/3/2019   3:09 PM

## 2019-07-15 ENCOUNTER — OFFICE VISIT (OUTPATIENT)
Dept: PEDIATRICS | Facility: OTHER | Age: 59
End: 2019-07-15
Attending: INTERNAL MEDICINE
Payer: COMMERCIAL

## 2019-07-15 VITALS
RESPIRATION RATE: 14 BRPM | BODY MASS INDEX: 33.19 KG/M2 | HEART RATE: 74 BPM | DIASTOLIC BLOOD PRESSURE: 70 MMHG | WEIGHT: 219 LBS | TEMPERATURE: 98.3 F | HEIGHT: 68 IN | SYSTOLIC BLOOD PRESSURE: 128 MMHG

## 2019-07-15 DIAGNOSIS — E78.2 MIXED HYPERLIPIDEMIA: ICD-10-CM

## 2019-07-15 DIAGNOSIS — E78.1 HYPERTRIGLYCERIDEMIA: ICD-10-CM

## 2019-07-15 DIAGNOSIS — E11.8 CONTROLLED TYPE 2 DIABETES MELLITUS WITH COMPLICATION, WITHOUT LONG-TERM CURRENT USE OF INSULIN (H): ICD-10-CM

## 2019-07-15 DIAGNOSIS — E66.09 NON MORBID OBESITY DUE TO EXCESS CALORIES: ICD-10-CM

## 2019-07-15 DIAGNOSIS — E11.8 CONTROLLED TYPE 2 DIABETES MELLITUS WITH COMPLICATION, WITHOUT LONG-TERM CURRENT USE OF INSULIN (H): Primary | ICD-10-CM

## 2019-07-15 DIAGNOSIS — R79.89 ELEVATED LFTS: ICD-10-CM

## 2019-07-15 DIAGNOSIS — F41.9 ANXIETY: ICD-10-CM

## 2019-07-15 DIAGNOSIS — L82.1 SEBORRHEIC KERATOSES: ICD-10-CM

## 2019-07-15 LAB
ALBUMIN SERPL-MCNC: 4.8 G/DL (ref 3.5–5.7)
ALP SERPL-CCNC: 38 U/L (ref 34–104)
ALT SERPL W P-5'-P-CCNC: 69 U/L (ref 7–52)
ANION GAP SERPL CALCULATED.3IONS-SCNC: 12 MMOL/L (ref 3–14)
AST SERPL W P-5'-P-CCNC: 39 U/L (ref 13–39)
BILIRUB SERPL-MCNC: 0.6 MG/DL (ref 0.3–1)
BUN SERPL-MCNC: 15 MG/DL (ref 7–25)
CALCIUM SERPL-MCNC: 10.1 MG/DL (ref 8.6–10.3)
CHLORIDE SERPL-SCNC: 101 MMOL/L (ref 98–107)
CHOLEST SERPL-MCNC: 124 MG/DL
CO2 SERPL-SCNC: 25 MMOL/L (ref 21–31)
CREAT SERPL-MCNC: 1.07 MG/DL (ref 0.7–1.3)
GFR SERPL CREATININE-BSD FRML MDRD: 70 ML/MIN/{1.73_M2}
GLUCOSE SERPL-MCNC: 123 MG/DL (ref 70–105)
HBA1C MFR BLD: 6.3 % (ref 4–6)
HDLC SERPL-MCNC: 31 MG/DL (ref 23–92)
LDLC SERPL CALC-MCNC: 45 MG/DL
NONHDLC SERPL-MCNC: 93 MG/DL
POTASSIUM SERPL-SCNC: 4.3 MMOL/L (ref 3.5–5.1)
PROT SERPL-MCNC: 6.9 G/DL (ref 6.4–8.9)
SODIUM SERPL-SCNC: 138 MMOL/L (ref 134–144)
TRIGL SERPL-MCNC: 240 MG/DL

## 2019-07-15 PROCEDURE — 80053 COMPREHEN METABOLIC PANEL: CPT | Performed by: INTERNAL MEDICINE

## 2019-07-15 PROCEDURE — 83036 HEMOGLOBIN GLYCOSYLATED A1C: CPT | Performed by: INTERNAL MEDICINE

## 2019-07-15 PROCEDURE — 99214 OFFICE O/P EST MOD 30 MIN: CPT | Performed by: INTERNAL MEDICINE

## 2019-07-15 PROCEDURE — 80061 LIPID PANEL: CPT | Performed by: INTERNAL MEDICINE

## 2019-07-15 PROCEDURE — 36415 COLL VENOUS BLD VENIPUNCTURE: CPT | Performed by: INTERNAL MEDICINE

## 2019-07-15 RX ORDER — PAROXETINE 20 MG/1
20 TABLET, FILM COATED ORAL EVERY MORNING
Qty: 90 TABLET | Refills: 3 | Status: SHIPPED | OUTPATIENT
Start: 2019-07-15 | End: 2020-02-11

## 2019-07-15 RX ORDER — LISINOPRIL 2.5 MG/1
2.5 TABLET ORAL DAILY
Qty: 90 TABLET | Refills: 3 | Status: SHIPPED | OUTPATIENT
Start: 2019-07-15 | End: 2020-12-04

## 2019-07-15 RX ORDER — ATORVASTATIN CALCIUM 40 MG/1
40 TABLET, FILM COATED ORAL DAILY
Qty: 90 TABLET | Refills: 3 | Status: SHIPPED | OUTPATIENT
Start: 2019-07-15 | End: 2020-12-04

## 2019-07-15 ASSESSMENT — PAIN SCALES - GENERAL: PAINLEVEL: MODERATE PAIN (4)

## 2019-07-15 ASSESSMENT — MIFFLIN-ST. JEOR: SCORE: 1777.88

## 2019-07-15 NOTE — NURSING NOTE
Patient presents to clinic for diabetic check.  Dottie Munson LPN ....................  7/15/2019   3:01 PM    No LMP for male patient.  Medication Reconciliation: complete    Dottie Munson LPN  7/15/2019 3:01 PM      Previous A1C is at goal of <8  Lab Results   Component Value Date    A1C 6.3 07/15/2019    A1C 6.0 11/20/2018    A1C 5.8 08/21/2018     Urine microalbumin:creatine: DUE  Foot exam 11/20/18  Eye exam 7/30/19  Tobacco User No  Patient is on a daily aspirin  Patient is on a Statin.  Blood pressure today of:     BP Readings from Last 1 Encounters:   07/15/19 128/70      is at the goal of <139/89 for diabetics.    Dottie Munson LPN on 7/15/2019 at 3:01 PM

## 2019-07-15 NOTE — PATIENT INSTRUCTIONS
Aspects of Diabetes we can improve:  Hemoglobin A1c Lab Results   Component Value Date    A1C 6.3 07/15/2019    A1C 6.0 11/20/2018    A1C 5.8 08/21/2018    Goal range is under 8. Best is 6.5 to 7   Blood Pressure 128/70 Goal to keep less than 140/90   Tobacco  reports that he quit smoking about 9 years ago. His smoking use included cigarettes. He smoked 2.50 packs per day. He has never used smokeless tobacco. Goal to abstain from tobacco   Aspirin yes Aspirin reduces risk of heart disease and stroke   ACE/ARB lisinopril These medications reduce risk of kidney disease   Cholesterol Lipitor Statins reduce risk of heart disease and stroke   Eye Exam DUE Annual diabetic eye exam   Healthy weight Body mass index is 33.3 kg/m . Goal BMI under 30, best is under 25.      -- I'm trying to exercise daily (goal at least 20 min/day) with moderate aerobic activity   -- Eat healthy (resources from ADA at http://www.diabetes.org/)   -- I'm taking good care of my feet. Consider seeing the Podiatrist   -- Check blood sugars as directed, record in log book and bring to every appointment   -- Goal sugar before breakfast: under 140   -- Goal sugar 2 hours after supper: under 170   -- Next diabetes lab draw: 12 months   -- Next diabetes office visit: 12 months      Find Local Classes   * Preventing falls   * Preventing and managing diabetes   * Managing chronic conditions and pain    [email or web address]  [phone]    Examples of classes:   -- Diabetes prevention program (Pre-Diabetes or at risk for diabetes)   -- Living well with Diabetes   -- Living well with Chronic Conditions   -- Living well with Chronic Pain   -- Stay Active and Independent for Life (SAIL)   -- Morris Ji Glenn: Moving for better balance        Your BMI is Body mass index is 33.3 kg/m .  (BMI ranges: Normal 18.5 - 25, Overweight 25 - 30, Obesity greater than 30,     Morbid Obesity greater than 40 or greater than 35 with associated conditions.)    Facts about  losing weight:   -- Overweight and Obesity increase your risk for developing diabetes, high blood pressure and stroke, and shorten your life.   -- 90% of weight loss comes from dietary changes, only 10% from exercise    What should I do?   -- Work on 5-10% weight loss   -- Focus on a few healthy dietary changes   -- Eat more fresh fruits and vegetables, and fewer carbohydrates   -- Cut out all calorie-containing beverages (milk, juice, alcohol, etc)   -- Exercise every day   -- Weigh yourself once a week   -- Consider the DASH Diet (http://http://bit.[employer]/DASHDiet - redirects to the [facility])   -- Consider Weight Watchers (http://www.weightwatchers.com)   -- Consider My Fitness Pal (iOS, Android, [email or web address])

## 2019-07-15 NOTE — PROGRESS NOTES
"Subjective  Jesus Varghese is a 58 year old male who presents for diabetes follow-up.  History of diabetes mellitus type 2 which is well controlled on metformin.  He continues on aspirin for prophylaxis.  History of hypertriglyceridemia which is stable with Lipitor.  He continues on lisinopril for kidney protection.  He is been working on lifestyle modification.  He is trying to avoid sugary foods.  His main nemesis is bread.    Allergies: reviewed in EMR  Medications: reviewed in EMR  Problem List/PMH:reviewed in EMR    Social Hx:  Social History     Tobacco Use     Smoking status: Former Smoker     Packs/day: 2.50     Types: Cigarettes     Last attempt to quit: 2010     Years since quittin.0     Smokeless tobacco: Never Used   Substance Use Topics     Alcohol use: Yes     Alcohol/week: 0.0 oz     Comment: rare     Drug use: No     Social History     Social History Narrative        Retired from [employer] as the     Has children    Has several tattoos     I reviewed social history and made relevant updates today.    Family Hx:   Family History   Problem Relation Age of Onset     Hypertension Mother         Hypertension     Heart Disease Father         Heart Disease,40s and 50s     Heart Disease Other         Heart Disease,40s and 50s     Anesthesia Reaction No family hx of         Anesthesia Problem     Other - See Comments No family hx of         Stroke     Blood Disease No family hx of         Blood Disease       Objective  Vitals: reviewed in EMR.  /70 (BP Location: Right arm, Patient Position: Sitting, Cuff Size: Adult Large)   Pulse 74   Temp 98.3  F (36.8  C) (Tympanic)   Resp 14   Ht 1.727 m (5' 8\")   Wt 99.3 kg (219 lb)   BMI 33.30 kg/m      Gen: Pleasant male, NAD.  HEENT: MMM  Neck: Supple  Pulm: Breathing easily  Neuro: Grossly intact  Skin: No concerning lesions.  Psychiatric: Normal affect and insight. Does not appear anxious or " depressed.      Diabetes Labs  Hemoglobin A1C (%)   Date Value   07/15/2019 6.3 (H)   11/20/2018 6.0   08/21/2018 5.8     Creatinine (mg/dL)   Date Value   07/15/2019 1.07   11/20/2018 0.98   04/29/2016 0.91   01/13/2016 1.05   09/30/2015 0.95   02/17/2015 1.07     Glucose (mg/dL)   Date Value   07/15/2019 123 (H)   11/20/2018 115 (H)   01/13/2016 260 (H)   09/30/2015 115 (H)   02/17/2015 138 (H)   02/12/2015 102     Potassium (mmol/L)   Date Value   07/15/2019 4.3   11/20/2018 4.3   01/13/2016 4.2   09/30/2015 4.1   02/17/2015 4.5   02/12/2015 4.2       Assessment    ICD-10-CM    1. Controlled type 2 diabetes mellitus with complication, without long-term current use of insulin (H) E11.8 blood glucose (ACCU-CHEK SMARTVIEW) test strip     lisinopril (PRINIVIL/ZESTRIL) 2.5 MG tablet     metFORMIN (GLUCOPHAGE) 500 MG tablet   2. Hypertriglyceridemia E78.1    3. Non morbid obesity due to excess calories E66.09    4. Seborrheic keratoses L82.1    5. Mixed hyperlipidemia E78.2 atorvastatin (LIPITOR) 40 MG tablet   6. Anxiety F41.9 PARoxetine (PAXIL) 20 MG tablet       Plan  Patient Instructions     Aspects of Diabetes we can improve:  Hemoglobin A1c Lab Results   Component Value Date    A1C 6.3 07/15/2019    A1C 6.0 11/20/2018    A1C 5.8 08/21/2018    Goal range is under 8. Best is 6.5 to 7   Blood Pressure 128/70 Goal to keep less than 140/90   Tobacco  reports that he quit smoking about 9 years ago. His smoking use included cigarettes. He smoked 2.50 packs per day. He has never used smokeless tobacco. Goal to abstain from tobacco   Aspirin yes Aspirin reduces risk of heart disease and stroke   ACE/ARB lisinopril These medications reduce risk of kidney disease   Cholesterol Lipitor Statins reduce risk of heart disease and stroke   Eye Exam DUE Annual diabetic eye exam   Healthy weight Body mass index is 33.3 kg/m . Goal BMI under 30, best is under 25.      -- I'm trying to exercise daily (goal at least 20 min/day) with  moderate aerobic activity   -- Eat healthy (resources from ADA at http://www.diabetes.org/)   -- I'm taking good care of my feet. Consider seeing the Podiatrist   -- Check blood sugars as directed, record in log book and bring to every appointment   -- Goal sugar before breakfast: under 140   -- Goal sugar 2 hours after supper: under 170   -- Next diabetes lab draw: 12 months   -- Next diabetes office visit: 12 months      Find Local Classes   * Preventing falls   * Preventing and managing diabetes   * Managing chronic conditions and pain    [email or web address]  [phone]    Examples of classes:   -- Diabetes prevention program (Pre-Diabetes or at risk for diabetes)   -- Living well with Diabetes   -- Living well with Chronic Conditions   -- Living well with Chronic Pain   -- Stay Active and Independent for Life (SAIL)   -- Morris Ji Glenn: Moving for better balance        Your BMI is Body mass index is 33.3 kg/m .  (BMI ranges: Normal 18.5 - 25, Overweight 25 - 30, Obesity greater than 30,     Morbid Obesity greater than 40 or greater than 35 with associated conditions.)    Facts about losing weight:   -- Overweight and Obesity increase your risk for developing diabetes, high blood pressure and stroke, and shorten your life.   -- 90% of weight loss comes from dietary changes, only 10% from exercise    What should I do?   -- Work on 5-10% weight loss   -- Focus on a few healthy dietary changes   -- Eat more fresh fruits and vegetables, and fewer carbohydrates   -- Cut out all calorie-containing beverages (milk, juice, alcohol, etc)   -- Exercise every day   -- Weigh yourself once a week   -- Consider the DASH Diet (http://http://bit.ly/DASHDiet - redirects to the [employer])   -- Consider Weight Watchers (http://www.weightwatchers.com)   -- Consider My Fitness Pal (iOS, Android, http://[email or web address].comnesspal.com)            Return in about 1 year (around 7/15/2020) for diabetes.    Signed, Filiberto John MD  Internal Medicine &  Pediatrics

## 2019-11-02 ENCOUNTER — ALLIED HEALTH/NURSE VISIT (OUTPATIENT)
Dept: FAMILY MEDICINE | Facility: OTHER | Age: 59
End: 2019-11-02
Attending: NURSE PRACTITIONER
Payer: COMMERCIAL

## 2019-11-02 DIAGNOSIS — Z23 NEED FOR PROPHYLACTIC VACCINATION AND INOCULATION AGAINST INFLUENZA: Primary | ICD-10-CM

## 2019-11-02 PROCEDURE — 90471 IMMUNIZATION ADMIN: CPT

## 2019-11-02 PROCEDURE — 90686 IIV4 VACC NO PRSV 0.5 ML IM: CPT

## 2019-11-20 DIAGNOSIS — E11.8 CONTROLLED TYPE 2 DIABETES MELLITUS WITH COMPLICATION, WITHOUT LONG-TERM CURRENT USE OF INSULIN (H): Primary | ICD-10-CM

## 2019-11-20 RX ORDER — LANCETS
EACH MISCELLANEOUS
Qty: 200 EACH | Refills: 11 | Status: SHIPPED | OUTPATIENT
Start: 2019-11-20 | End: 2021-02-25

## 2020-02-11 ENCOUNTER — OFFICE VISIT (OUTPATIENT)
Dept: FAMILY MEDICINE | Facility: OTHER | Age: 60
End: 2020-02-11
Attending: NURSE PRACTITIONER
Payer: COMMERCIAL

## 2020-02-11 VITALS
HEART RATE: 80 BPM | DIASTOLIC BLOOD PRESSURE: 88 MMHG | WEIGHT: 224.5 LBS | RESPIRATION RATE: 16 BRPM | SYSTOLIC BLOOD PRESSURE: 154 MMHG | TEMPERATURE: 97.7 F | BODY MASS INDEX: 35.24 KG/M2 | HEIGHT: 67 IN | OXYGEN SATURATION: 97 %

## 2020-02-11 DIAGNOSIS — H65.91 OME (OTITIS MEDIA WITH EFFUSION), RIGHT: Primary | ICD-10-CM

## 2020-02-11 PROCEDURE — 99214 OFFICE O/P EST MOD 30 MIN: CPT | Performed by: NURSE PRACTITIONER

## 2020-02-11 RX ORDER — AMOXICILLIN 875 MG
875 TABLET ORAL 2 TIMES DAILY
Qty: 20 TABLET | Refills: 0 | Status: SHIPPED | OUTPATIENT
Start: 2020-02-11 | End: 2020-02-21

## 2020-02-11 ASSESSMENT — MIFFLIN-ST. JEOR: SCORE: 1780.83

## 2020-02-11 ASSESSMENT — PAIN SCALES - GENERAL: PAINLEVEL: NO PAIN (0)

## 2020-02-11 NOTE — PROGRESS NOTES
Subjective     Jesus Varghese is a 59 year old male who presents to clinic today for the following health issues:    HPI   ENT Symptoms             Symptoms: cc Present Absent Comment   Fever/Chills   x    Fatigue   x    Muscle Aches   x    Eye Irritation   x    Sneezing   x    Nasal Montez/Drg  x  thick greenish brown discharge   Sinus Pressure/Pain  x     Loss of smell   x    Dental pain  x  Does have a bad tooth that will be removed in March, has some pain shooting to ear    Sore Throat  x  R side scratchy   Swollen Glands   x    Ear Pain/Fullness  x  R ear plugged, L ear slightly plugged; R with occasional sharp pain, constant buzzing; no drainage   Cough   x    Wheeze   x    Chest Pain   x    Shortness of breath   x    Rash   x    Other   x      Symptom duration:  8 days   Symptom severity:  mild   Treatments tried:  mucinex, navage, sinus medicine   Contacts:  none known     Patient Active Problem List   Diagnosis     History of malignant neoplasm of large intestine     Carpal tunnel syndrome, bilateral     Senile cataract     Inflammatory liver disease     Hypertriglyceridemia     Rupture of left distal biceps tendon     Status post tendon repair     Ureterolithiasis     Malignant neoplasm of colon, unspecified part of colon (H)     Chemotherapy-induced neuropathy (H)     S/P partial colectomy     History of nephrolithiasis     Elevated LFTs     Controlled type 2 diabetes mellitus with complication, without long-term current use of insulin (H)     Non morbid obesity due to excess calories     Seborrheic keratoses     Past Surgical History:   Procedure Laterality Date     COLON SURGERY      8/22/06,Sigmoid colectomy for [facility] C2 adenocarcinoma     COLONOSCOPY      9/07/07,next due in 2010     COLONOSCOPY      08/30/2010,F/U 2015     COLONOSCOPY  10/19/2015    10/19/15,F/U 2020     EXTRACAPSULAR CATARACT EXTRATION WITH INTRAOCULAR LENS IMPLANT      2011, 2012,Bilateral cataracts R 2011.. L 2012      LAPAROSCOPIC CHOLECYSTECTOMY      07     OTHER SURGICAL HISTORY      10/11/06,878723,PORTACATH,Placement of Port-A-Cath, right anterior chest, for chemotherapy     OTHER SURGICAL HISTORY      2010,,HERNIA REPAIR,Mesh Underlay     OTHER SURGICAL HISTORY      2/12/15,918545,IR URETERAL STENT LEFT     OTHER SURGICAL HISTORY      2016,203176,OTHER,Left,arthrex equipment used     TONSILLECTOMY, ADENOIDECTOMY, COMBINED      age 1     VASECTOMY       x2, spontaneous reconnected, so needed 2nd procedure       Social History     Tobacco Use     Smoking status: Former Smoker     Packs/day: 2.50     Types: Cigarettes     Last attempt to quit: 2010     Years since quittin.6     Smokeless tobacco: Never Used   Substance Use Topics     Alcohol use: Yes     Alcohol/week: 0.0 standard drinks     Comment: rare     Family History   Problem Relation Age of Onset     Hypertension Mother         Hypertension     Heart Disease Father         Heart Disease,40s and 50s     Heart Disease Other         Heart Disease,40s and 50s     Anesthesia Reaction No family hx of         Anesthesia Problem     Other - See Comments No family hx of         Stroke     Blood Disease No family hx of         Blood Disease         Current Outpatient Medications   Medication Sig Dispense Refill     amoxicillin (AMOXIL) 875 MG tablet Take 1 tablet (875 mg) by mouth 2 times daily for 10 days 20 tablet 0     aspirin 81 MG chewable tablet Take 1 tablet (81 mg) by mouth daily with food 90 tablet 3     atorvastatin (LIPITOR) 40 MG tablet Take 1 tablet (40 mg) by mouth daily 90 tablet 3     blood glucose (ACCU-CHEK SMARTVIEW) test strip Dispense item covered by insurance Test 1 times/day 100 each 11     Blood Glucose Monitoring Suppl (GLUCOCOM BLOOD GLUCOSE MONITOR) JUANITA Dispense glucose meter, test strips and lancets covered by the patient insurance. Test 2 times per day.       Fluocinolone Acetonide Scalp 0.01 % OIL oil Apply topically  "At Bedtime       HEMP OIL OR EXTRACT OR OTHER CBD CANNABINOID, NOT MEDICAL CANNABIS, Take 4 drops by mouth 2 times daily       lisinopril (PRINIVIL/ZESTRIL) 2.5 MG tablet Take 1 tablet (2.5 mg) by mouth daily 90 tablet 3     metFORMIN (GLUCOPHAGE) 500 MG tablet Take 2 tablets (1,000 mg) by mouth 2 times daily (with meals) 360 tablet 3     sildenafil (REVATIO) 20 MG tablet Take 2-5 tablets ( mg) by mouth daily as needed (prior to intercourse) 30 tablet 11     blood glucose monitoring (ACCU-CHEK FASTCLIX) lancets Check blood glucose twice daily 200 each 11     No Known Allergies    Reviewed and updated as needed this visit by Provider  Tobacco  Allergies  Meds  Problems  Med Hx  Surg Hx  Fam Hx  Soc Hx          Review of Systems   ROS COMP: As above      Objective    BP (!) 154/88   Pulse 80   Temp 97.7  F (36.5  C) (Tympanic)   Resp 16   Ht 1.7 m (5' 6.93\")   Wt 101.8 kg (224 lb 8 oz)   SpO2 97%   BMI 35.24 kg/m    Body mass index is 35.24 kg/m .  Physical Exam   GENERAL: healthy, alert and no distress  HENT: normal cephalic/atraumatic, right ear: erythematous, bulging membrane and mucopurulent effusion, left ear: clear effusion and bulging membrane, nasal mucosa edematous , rhinorrhea clear, oral mucous membranes moist, sinuses: not tender and cobblestoning of posterior oropharynx  PSYCH: mentation appears normal, affect normal/bright    Diagnostic Test Results:  Labs reviewed in Epic  none         Assessment & Plan     1. OME (otitis media with effusion), right  Here with complaints of R ear plugged, otitis present on exam. No allergies, will treat with amoxicillin. Encouraged oral antihistamine, nasal saline/steroid spray for nasal congestion and ddrainage. If no improvement, return to clinic for re-evaluation.  - amoxicillin (AMOXIL) 875 MG tablet; Take 1 tablet (875 mg) by mouth 2 times daily for 10 days  Dispense: 20 tablet; Refill: 0       Kia Baker NP  [facility] AND " \[facility]\""

## 2020-02-11 NOTE — NURSING NOTE
"Chief Complaint   Patient presents with     Plugged Ears       Initial BP (!) 154/88   Pulse 80   Temp 97.7  F (36.5  C) (Tympanic)   Resp 16   Ht 1.7 m (5' 6.93\")   Wt 101.8 kg (224 lb 8 oz)   SpO2 97%   BMI 35.24 kg/m   Estimated body mass index is 35.24 kg/m  as calculated from the following:    Height as of this encounter: 1.7 m (5' 6.93\").    Weight as of this encounter: 101.8 kg (224 lb 8 oz).  Medication Reconciliation: complete    Flakita Prince LPN  "

## 2020-02-19 DIAGNOSIS — H65.91 OME (OTITIS MEDIA WITH EFFUSION), RIGHT: ICD-10-CM

## 2020-02-20 RX ORDER — AMOXICILLIN 875 MG
875 TABLET ORAL 2 TIMES DAILY
Qty: 20 TABLET | Refills: 0 | OUTPATIENT
Start: 2020-02-20

## 2020-02-20 NOTE — TELEPHONE ENCOUNTER
Amoxicillin refilled on 2/11/2020 #20 with end date 2/21/20 to [facility].  Kayla Brice RN on 2/20/2020 at 9:37 AM

## 2020-08-27 DIAGNOSIS — E11.8 CONTROLLED TYPE 2 DIABETES MELLITUS WITH COMPLICATION, WITHOUT LONG-TERM CURRENT USE OF INSULIN (H): ICD-10-CM

## 2020-08-27 NOTE — TELEPHONE ENCOUNTER
[facility] sent Rx request for the following:   metFORMIN (GLUCOPHAGE) 500 MG tablet   Sig: Take 2 tablets (1,000 mg) by mouth 2 times daily (with meals) - Oral     Last Prescription Date:   07/15/2019  Last Fill Qty/Refills:         360, R-3    Last Office Visit:              07/15/2019   Future Office visit:           none  Routing refill request to provider for review/approval because:  Biguanide Agents Failed  Patient has documented A1c within the specified period of time.   Patient's CR is NOT>1.4 OR Patient's EGFR is NOT<45 within past 12 mos.   Recent (6 mo) or future (30 days) visit within the authorizing provider's specialty         Unable to complete prescription refill per RN Medication Refill Policy. Pt is due for medication management appt.  Will send appt reminder letter, add note to Rx, sam up meds, and route to provider for consideration.      ................... Lindsay Daniels RN ....................  8/27/2020   11:04 AM

## 2020-08-27 NOTE — LETTER
August 27, 2020      Jesus Varghese  [address]  [place] [address]        Dear Jesus,         A refill of metFORMIN (GLUCOPHAGE)  has been requested by your pharmacy and we noticed that you are overdue for an annual exam.      This refill request has been sent to your provider for consideration at this time.    Your health is very important to us.  Please call the clinic at [phone] to schedule your appointment.    Thank you for choosing [facility] and [facility] for your health care needs.    Sincerely,    Refill RN  [facility]

## 2020-10-01 ENCOUNTER — TELEPHONE (OUTPATIENT)
Dept: SURGERY | Facility: OTHER | Age: 60
End: 2020-10-01

## 2020-10-01 NOTE — TELEPHONE ENCOUNTER
Screening Questions for the Scheduling of Screening Colonoscopies   (If Colonoscopy is diagnostic, Provider should review the chart before scheduling.)  Are you younger than 50 or older than 80?  NO   Do you take aspirin or fish oil?  YES - ASPIRIN   (if yes, tell patient to stop 1 week prior to Colonoscopy)  Do you take warfarin (Coumadin), clopidogrel (Plavix), apixaban (Eliquis), dabigatram (Pradaxa), rivaroxaban (Xarelto) or any blood thinner? NO   Do you use oxygen at home?  NO   Do you have kidney disease? NO   Are you on dialysis? NO   Have you had a stroke or heart attack in the last year? NO   Have you had a stent in your heart or any blood vessel in the last year? NO   Have you had a transplant of any organ? NO   Have you had a colonoscopy or upper endoscopy (EGD) before? YES          When?  2015  Date of scheduled Colonoscopy. 11/02/2020  Provider TAYLOR   Pharmacy [facility]

## 2020-10-02 ENCOUNTER — HOSPITAL ENCOUNTER (OUTPATIENT)
Facility: OTHER | Age: 60
End: 2020-10-02
Attending: SURGERY | Admitting: SURGERY
Payer: COMMERCIAL

## 2020-10-02 DIAGNOSIS — Z12.11 ENCOUNTER FOR SCREENING COLONOSCOPY: Primary | ICD-10-CM

## 2020-10-02 RX ORDER — BISACODYL 5 MG/1
TABLET, DELAYED RELEASE ORAL
Qty: 2 TABLET | Refills: 0 | Status: SHIPPED | OUTPATIENT
Start: 2020-10-02 | End: 2020-12-04

## 2020-10-02 RX ORDER — POLYETHYLENE GLYCOL 3350, SODIUM CHLORIDE, SODIUM BICARBONATE, POTASSIUM CHLORIDE 420; 11.2; 5.72; 1.48 G/4L; G/4L; G/4L; G/4L
4000 POWDER, FOR SOLUTION ORAL ONCE
Qty: 4000 ML | Refills: 0 | Status: SHIPPED | OUTPATIENT
Start: 2020-10-02 | End: 2020-10-02

## 2020-11-27 DIAGNOSIS — Z12.5 SCREENING FOR PROSTATE CANCER: ICD-10-CM

## 2020-11-27 DIAGNOSIS — E11.8 CONTROLLED TYPE 2 DIABETES MELLITUS WITH COMPLICATION, WITHOUT LONG-TERM CURRENT USE OF INSULIN (H): Primary | ICD-10-CM

## 2020-11-27 NOTE — LETTER
November 30, 2020      Jesus Varghese  [address]  [place] [address]        Dear Jesus,     A refill request was received from your pharmacy for Metformin.    Additional refills request an office visit with Dr. John for annual review and labs.    Please call [phone] to schedule appointment.          Sincerely,      Refill Nurse

## 2020-11-30 NOTE — TELEPHONE ENCOUNTER
ICD-10-CM    1. Controlled type 2 diabetes mellitus with complication, without long-term current use of insulin (H)  E11.8 metFORMIN (GLUCOPHAGE) 500 MG tablet     Basic metabolic panel     Hemoglobin A1c     Lipid Profile   2. Screening for prostate cancer  Z12.5 PSA Screen GH      Orders Placed This Encounter   Medications     metFORMIN (GLUCOPHAGE) 500 MG tablet     Sig: TAKE 2 TABLETS BY MOUTH TWICE DAILY WITH MEALS     Dispense:  120 tablet     Refill:  0      -- Schedule video/phone med check visit    Signed, Filiberto John MD, FAAP, FACP  Internal Medicine & Pediatrics

## 2020-11-30 NOTE — TELEPHONE ENCOUNTER
Routing refill request to provider for review/approval because:   Patient has documented A1c within the specified period of time. Protocol Details    Patient's CR is NOT>1.4 OR Patient's EGFR is NOT<45 within past 12 mos.     Recent (6 mo) or future (30 days) visit within the authorizing provider's specialty      LOV and labs: 7/15/2019  Letter sent      Kayla Brice RN on 11/30/2020 at 10:40 AM

## 2020-12-01 NOTE — TELEPHONE ENCOUNTER
Message left informing patient he is due for a medication recheck appointment and it can be done via video or telephone. Clinic number left to call to schedule appointment.     Bety Floyd CMA on 12/1/2020 at 10:05 AM

## 2020-12-04 ENCOUNTER — VIRTUAL VISIT (OUTPATIENT)
Dept: PEDIATRICS | Facility: OTHER | Age: 60
End: 2020-12-04
Attending: INTERNAL MEDICINE
Payer: COMMERCIAL

## 2020-12-04 VITALS
DIASTOLIC BLOOD PRESSURE: 85 MMHG | BODY MASS INDEX: 31.07 KG/M2 | WEIGHT: 205 LBS | HEIGHT: 68 IN | SYSTOLIC BLOOD PRESSURE: 141 MMHG | HEART RATE: 80 BPM

## 2020-12-04 DIAGNOSIS — E78.2 MIXED HYPERLIPIDEMIA: ICD-10-CM

## 2020-12-04 DIAGNOSIS — E11.8 CONTROLLED TYPE 2 DIABETES MELLITUS WITH COMPLICATION, WITHOUT LONG-TERM CURRENT USE OF INSULIN (H): Primary | ICD-10-CM

## 2020-12-04 PROCEDURE — 99212 OFFICE O/P EST SF 10 MIN: CPT | Mod: TEL | Performed by: INTERNAL MEDICINE

## 2020-12-04 RX ORDER — LISINOPRIL 2.5 MG/1
2.5 TABLET ORAL DAILY
Qty: 90 TABLET | Refills: 3 | Status: SHIPPED | OUTPATIENT
Start: 2020-12-04 | End: 2021-12-16

## 2020-12-04 RX ORDER — ATORVASTATIN CALCIUM 40 MG/1
40 TABLET, FILM COATED ORAL DAILY
Qty: 90 TABLET | Refills: 3 | Status: SHIPPED | OUTPATIENT
Start: 2020-12-04 | End: 2021-12-15

## 2020-12-04 ASSESSMENT — MIFFLIN-ST. JEOR: SCORE: 1704.37

## 2020-12-04 ASSESSMENT — PAIN SCALES - GENERAL: PAINLEVEL: NO PAIN (0)

## 2020-12-04 NOTE — NURSING NOTE
"Chief Complaint   Patient presents with     Recheck Medication     Patient following up on medications. He stopped taking Atorvastatin and Lisinopril. When asked why he stated he felt he was taking too many medications. He felt good and doesn't think he needs them.     Initial Ht 1.727 m (5' 8\")   Wt 93 kg (205 lb)   BMI 31.17 kg/m   Estimated body mass index is 31.17 kg/m  as calculated from the following:    Height as of this encounter: 1.727 m (5' 8\").    Weight as of this encounter: 93 kg (205 lb).  Medication Reconciliation: complete    Bety Floyd MA  "

## 2020-12-04 NOTE — PROGRESS NOTES
"Jesus Varghese is a 60 year old male who is being evaluated via a billable telephone visit.      The patient has been notified of following:     \"This telephone visit will be conducted via a call between you and your physician/provider. We have found that certain health care needs can be provided without the need for a physical exam.  This service lets us provide the care you need with a short phone conversation.  If a prescription is necessary we can send it directly to your pharmacy.  If lab work is needed we can place an order for that and you can then stop by our lab to have the test done at a later time.    Telephone visits are billed at different rates depending on your insurance coverage. During this emergency period, for some insurers they may be billed the same as an in-person visit.  Please reach out to your insurance provider with any questions.    If during the course of the call the physician/provider feels a telephone visit is not appropriate, you will not be charged for this service.\"    Patient has given verbal consent for Telephone visit?  Yes    What phone number would you like to be contacted at? [phone]    How would you like to obtain your AVS? Does not need copy of AVS     Subjective     Jesus Varghese is a 60 year old male who presents via phone visit today for the following health issues:    He has a history of diabetes mellitus type 2 and continues on Metformin.  Recently he was feeling fatigued.  He decided to stop his lisinopril and statin because he was \"on too many pills.\"  He started exercising and eating more healthy.  He started feeling better.  He has been either going for a long walk or using his aerodyne every day.  He continues on aspirin for prophylaxis.    Glucoses:  AM < 100  PM < 130        Review of Systems   Constitutional, HEENT, cardiovascular, pulmonary, gi and gu systems are negative, except as otherwise noted.       Objective   Vitals - Patient Reported  Pain " Score: No Pain (0)        healthy, alert and no distress  PSYCH: Alert and oriented times 3; coherent speech, normal   rate and volume, able to articulate logical thoughts, able   to abstract reason, no tangential thoughts, no hallucinations   or delusions  His affect is normal  RESP: No cough, no audible wheezing, able to talk in full sentences  Remainder of exam unable to be completed due to telephone visits    Labs:  Lab Results   Component Value Date    WBC 16.3 (H) 11/20/2018    HGB 14.2 11/20/2018    HCT 42.8 11/20/2018     11/20/2018    CHOL 124 07/15/2019    TRIG 240 (H) 07/15/2019    HDL 31 07/15/2019    ALT 69 (H) 07/15/2019    AST 39 07/15/2019     07/15/2019    BUN 15 07/15/2019    CO2 25 07/15/2019       Glucose   Date Value Ref Range Status   07/15/2019 123 (H) 70 - 105 mg/dL Final   11/20/2018 115 (H) 70 - 105 mg/dL Final     Hemoglobin A1C   Date Value Ref Range Status   07/15/2019 6.3 (H) 4.0 - 6.0 % Final   11/20/2018 6.0 4.0 - 6.0 % Final     Creatinine   Date Value Ref Range Status   07/15/2019 1.07 0.70 - 1.30 mg/dL Final   11/20/2018 0.98 0.70 - 1.30 mg/dL Final     LDL Cholesterol Calculated   Date Value Ref Range Status   07/15/2019 45 <100 mg/dL Final     Comment:     Desirable:       <100 mg/dl     Thyrotropin   Date Value Ref Range Status   11/20/2018 0.62 0.34 - 5.60 IU/mL Final                   Assessment/Plan:      ICD-10-CM    1. Controlled type 2 diabetes mellitus with complication, without long-term current use of insulin (H)  E11.8 metFORMIN (GLUCOPHAGE) 500 MG tablet     lisinopril (ZESTRIL) 2.5 MG tablet     Basic metabolic panel     Hemoglobin A1c     Vitamin B12   2. Mixed hyperlipidemia  E78.2 atorvastatin (LIPITOR) 40 MG tablet     Lipid Profile     Medications are reviewed, renewed.  Screening and surveillance blood work is ordered as outlined above.  Recommend fasting lab visit.  We had a lengthy discussion with regards to the use of ACE inhibitors and statins  in diabetes mellitus type 2.  We plan to restart them.  I asked him to monitor his blood pressure and notify me if they remain over 140 and 90.  I advised him to get an influenza vaccine at the pharmacy.    Phone call duration:  8 minutes

## 2020-12-14 DIAGNOSIS — E11.8 CONTROLLED TYPE 2 DIABETES MELLITUS WITH COMPLICATION, WITHOUT LONG-TERM CURRENT USE OF INSULIN (H): ICD-10-CM

## 2020-12-14 DIAGNOSIS — E78.2 MIXED HYPERLIPIDEMIA: ICD-10-CM

## 2020-12-14 DIAGNOSIS — Z12.5 SCREENING FOR PROSTATE CANCER: ICD-10-CM

## 2020-12-14 LAB
ANION GAP SERPL CALCULATED.3IONS-SCNC: 10 MMOL/L (ref 3–14)
BUN SERPL-MCNC: 13 MG/DL (ref 7–25)
CALCIUM SERPL-MCNC: 9.9 MG/DL (ref 8.6–10.3)
CHLORIDE SERPL-SCNC: 101 MMOL/L (ref 98–107)
CHOLEST SERPL-MCNC: 215 MG/DL
CO2 SERPL-SCNC: 27 MMOL/L (ref 21–31)
CREAT SERPL-MCNC: 1.13 MG/DL (ref 0.7–1.3)
GFR SERPL CREATININE-BSD FRML MDRD: 66 ML/MIN/{1.73_M2}
GLUCOSE SERPL-MCNC: 114 MG/DL (ref 70–105)
HBA1C MFR BLD: 6.1 % (ref 4–6)
HDLC SERPL-MCNC: 31 MG/DL (ref 23–92)
LDLC SERPL CALC-MCNC: ABNORMAL MG/DL
NONHDLC SERPL-MCNC: 184 MG/DL
POTASSIUM SERPL-SCNC: 4.5 MMOL/L (ref 3.5–5.1)
PSA SERPL-ACNC: 0.19 NG/ML
SODIUM SERPL-SCNC: 138 MMOL/L (ref 134–144)
TRIGL SERPL-MCNC: 606 MG/DL
VIT B12 SERPL-MCNC: 508 PG/ML (ref 180–914)

## 2020-12-14 PROCEDURE — 36415 COLL VENOUS BLD VENIPUNCTURE: CPT | Mod: ZL | Performed by: INTERNAL MEDICINE

## 2020-12-14 PROCEDURE — G0103 PSA SCREENING: HCPCS | Mod: ZL | Performed by: INTERNAL MEDICINE

## 2020-12-14 PROCEDURE — 80048 BASIC METABOLIC PNL TOTAL CA: CPT | Mod: ZL | Performed by: INTERNAL MEDICINE

## 2020-12-14 PROCEDURE — 82607 VITAMIN B-12: CPT | Mod: ZL | Performed by: INTERNAL MEDICINE

## 2020-12-14 PROCEDURE — 83036 HEMOGLOBIN GLYCOSYLATED A1C: CPT | Mod: ZL | Performed by: INTERNAL MEDICINE

## 2020-12-14 PROCEDURE — 80061 LIPID PANEL: CPT | Mod: ZL | Performed by: INTERNAL MEDICINE

## 2020-12-29 DIAGNOSIS — E11.8 CONTROLLED TYPE 2 DIABETES MELLITUS WITH COMPLICATION, WITHOUT LONG-TERM CURRENT USE OF INSULIN (H): ICD-10-CM

## 2020-12-30 NOTE — TELEPHONE ENCOUNTER
Refill request for Metformin, just filled #[id number] on 12/4/2020.  Receipt confirmed by pharmacy (12/4/2020 10:17 AM  Denying med refill at this time.  Unable to complete prescription refill per RN Medication Refill Policy. Funmi Orozco RN 12/30/2020 8:24 AM

## 2021-02-22 ENCOUNTER — TRANSFERRED RECORDS (OUTPATIENT)
Dept: HEALTH INFORMATION MANAGEMENT | Facility: OTHER | Age: 61
End: 2021-02-22

## 2021-02-22 LAB — RETINOPATHY: NEGATIVE

## 2021-02-24 DIAGNOSIS — E11.8 CONTROLLED TYPE 2 DIABETES MELLITUS WITH COMPLICATION, WITHOUT LONG-TERM CURRENT USE OF INSULIN (H): ICD-10-CM

## 2021-02-25 RX ORDER — LANCETS
EACH MISCELLANEOUS
Qty: 204 EACH | Refills: 0 | Status: SHIPPED | OUTPATIENT
Start: 2021-02-25 | End: 2021-03-08

## 2021-02-25 RX ORDER — BLOOD SUGAR DIAGNOSTIC
STRIP MISCELLANEOUS
Qty: 100 STRIP | Refills: 0 | Status: SHIPPED | OUTPATIENT
Start: 2021-02-25 | End: 2021-03-08

## 2021-02-25 NOTE — TELEPHONE ENCOUNTER
"[id number] sent Rx request for the following:   blood glucose (ACCU-CHEK SMARTVIEW) test strip  Sig: USE  TO CHECK GLUCOSE ONCE DAILY  Last Prescription Date:   7/15/2019  Last Fill Qty/Refills:         100, R-11       Diabetic Supplies Protocol Passed - 2/24/2021  1:26 PM        Passed - Medication is active on med list        Passed - Patient is 18 years of age or older        Passed - Recent (6 mo) or future (30 days) visit within the authorizing provider's specialty     Patient had office visit in the last 6 months or has a visit in the next 30 days with authorizing provider.  See \"Patient Info\" tab in inbasket, or \"Choose Columns\" in Meds & Orders section of the refill encounter.             blood glucose monitoring (ACCU-CHEK FASTCLIX) lancets  Sig  USE   TO CHECK GLUCOSE TWICE DAILY  Last Prescription Date:   11/20/2019  Last Fill Qty/Refills:         200, R-11    Last Office Visit:              12/4/2020   Future Office visit:           none                  Diabetic Supplies Protocol Passed - 2/24/2021  1:26 PM       Passed - Medication is active on med list     Prescription approved per [facility] Refill Protocol.  Ariadna Torres RN ....................  2/25/2021   2:15 PM              "

## 2021-03-08 ENCOUNTER — TELEPHONE (OUTPATIENT)
Dept: PEDIATRICS | Facility: OTHER | Age: 61
End: 2021-03-08

## 2021-03-08 DIAGNOSIS — E11.8 CONTROLLED TYPE 2 DIABETES MELLITUS WITH COMPLICATION, WITHOUT LONG-TERM CURRENT USE OF INSULIN (H): Primary | ICD-10-CM

## 2021-03-08 NOTE — TELEPHONE ENCOUNTER
Fax received from [facility]. They received prescriptions for test strips and lancets. Test strips state to test once daily and lancets state to check twice daily. Pharmacy needs clarification and new prescription sent for item that has incorrect directions.     Bety Floyd CMA on 3/8/2021 at 4:18 PM

## 2021-04-20 ENCOUNTER — TELEPHONE (OUTPATIENT)
Dept: PEDIATRICS | Facility: OTHER | Age: 61
End: 2021-04-20

## 2021-04-20 DIAGNOSIS — R79.89 ELEVATED LFTS: ICD-10-CM

## 2021-04-20 DIAGNOSIS — E11.8 CONTROLLED TYPE 2 DIABETES MELLITUS WITH COMPLICATION, WITHOUT LONG-TERM CURRENT USE OF INSULIN (H): Primary | ICD-10-CM

## 2021-04-20 DIAGNOSIS — E78.1 HYPERTRIGLYCERIDEMIA: ICD-10-CM

## 2021-04-20 DIAGNOSIS — Z12.5 SCREENING FOR PROSTATE CANCER: ICD-10-CM

## 2021-04-20 NOTE — TELEPHONE ENCOUNTER
ACC Review   Please call Mr. Jesus Varghese.      ICD-10-CM    1. Controlled type 2 diabetes mellitus with complication, without long-term current use of insulin (H)  E11.8 Hemoglobin A1c     Comprehensive metabolic panel   2. Elevated LFTs  R79.89 Comprehensive metabolic panel   3. Hypertriglyceridemia  E78.1 Lipid Profile   4. Screening for prostate cancer  Z12.5 PSA Screen GH        -- Schedule a lab only visit   -- Obtain up to date eye exam report   -- Schedule a diabetes medication management visit, in person or video    Signed, Filiberto John MD, FAAP, FACP  Internal Medicine & Pediatrics

## 2021-04-20 NOTE — LETTER
April 20, 2021      Jesus URBINA Abimael  [address]  [facility] [address]      Your healthcare team cares about your health. To provide you with the best care,   we have reviewed your chart and based on our findings, we see that you are due to:     - DIABETES FOLLOW UP: Schedule a diabetic follow up appointment as a Office Visit. Patients with diabetes should generally see their provider every 3-6 months.    Schedule a DIABETIC EYE EXAM.  This exam is done with an optometrist. You can schedule this appointment with your eye doctor.  If you need a referral, please let us know.  - COLON CANCER SCREENING:  Call the clinic to schedule your colonoscopy or Cologuard test.  - OTHER FOLLOW UP:  Lab Only Visit    If you have already completed these items, please contact the clinic via phone or   [facility] so your care team can review and update your records. Thank you for   choosing [facility] for your healthcare needs. For any questions,   concerns, or to schedule an appointment please contact the clinic.       Healthy Regards,      Your [facility] Care Team          Enclosure: N/A

## 2021-06-24 NOTE — PROGRESS NOTES
Patient Information     Patient Name  Jesus Varghese MRN  [id number] Sex  Male   1960      Letter by Rodrigo Richardson MD at      Author:  Rodrigo Richardson MD Service:  (none) Author Type:  (none)    Filed:   Encounter Date:  2/3/2017 Status:  (Other)           Jesus Varghese  [address]  [address]  [facility] [address]          February 3, 2017    Dear Mr. Varghese:    This letter is to remind you that you are due for your diabetes follow up with Rodrigo Richardson MD. Your last comprehensive visit was 2016.    Our records show that your last A1c was done on 2016 and was below 8.0%. We recommend you have this checked again at the six month point to monitor your progress and assess your need for medication adjustments.    Please call the clinic at [phone] to schedule your appointment for around 2017.    Thank you for choosing [facility] for your health care needs.    Sincerely,    Refill RN  [facility]         We attempted to call Dad with the interpretor service to determine what the [facility] papwerwork is in regard to. We will call back tomorrow.       Dr. Alie Almanzar 2/28/2019 5:06 PM

## 2022-01-04 ENCOUNTER — TELEPHONE (OUTPATIENT)
Dept: PEDIATRICS | Facility: OTHER | Age: 62
End: 2022-01-04
Payer: COMMERCIAL

## 2022-01-04 NOTE — TELEPHONE ENCOUNTER
Pt would like to do labs before appt on the 13th, please order and let pt know when that is done so we can schedule, thank you!     Cheryl Denny on 1/4/2022 at 1:20 PM

## 2022-01-13 ENCOUNTER — OFFICE VISIT (OUTPATIENT)
Dept: PEDIATRICS | Facility: OTHER | Age: 62
End: 2022-01-13
Attending: INTERNAL MEDICINE
Payer: COMMERCIAL

## 2022-01-13 VITALS
OXYGEN SATURATION: 98 % | DIASTOLIC BLOOD PRESSURE: 80 MMHG | WEIGHT: 213.6 LBS | RESPIRATION RATE: 20 BRPM | HEART RATE: 88 BPM | HEIGHT: 67 IN | TEMPERATURE: 99 F | BODY MASS INDEX: 33.53 KG/M2 | SYSTOLIC BLOOD PRESSURE: 154 MMHG

## 2022-01-13 DIAGNOSIS — C18.9 MALIGNANT NEOPLASM OF COLON, UNSPECIFIED PART OF COLON (H): ICD-10-CM

## 2022-01-13 DIAGNOSIS — E78.2 MIXED HYPERLIPIDEMIA: ICD-10-CM

## 2022-01-13 DIAGNOSIS — D72.829 LEUKOCYTOSIS, UNSPECIFIED TYPE: ICD-10-CM

## 2022-01-13 DIAGNOSIS — Z90.49 S/P PARTIAL COLECTOMY: ICD-10-CM

## 2022-01-13 DIAGNOSIS — R79.89 ELEVATED LFTS: ICD-10-CM

## 2022-01-13 DIAGNOSIS — N52.02 CORPORO-VENOUS OCCLUSIVE ERECTILE DYSFUNCTION: ICD-10-CM

## 2022-01-13 DIAGNOSIS — E78.1 HYPERTRIGLYCERIDEMIA: ICD-10-CM

## 2022-01-13 DIAGNOSIS — Z13.0 SCREENING, ANEMIA, DEFICIENCY, IRON: ICD-10-CM

## 2022-01-13 DIAGNOSIS — E11.8 CONTROLLED TYPE 2 DIABETES MELLITUS WITH COMPLICATION, WITHOUT LONG-TERM CURRENT USE OF INSULIN (H): Primary | ICD-10-CM

## 2022-01-13 LAB
ALBUMIN SERPL-MCNC: 4.8 G/DL (ref 3.5–5.7)
ALP SERPL-CCNC: 47 U/L (ref 34–104)
ALT SERPL W P-5'-P-CCNC: 41 U/L (ref 7–52)
ANION GAP SERPL CALCULATED.3IONS-SCNC: 11 MMOL/L (ref 3–14)
AST SERPL W P-5'-P-CCNC: 34 U/L (ref 13–39)
BILIRUB SERPL-MCNC: 0.7 MG/DL (ref 0.3–1)
BUN SERPL-MCNC: 11 MG/DL (ref 7–25)
CALCIUM SERPL-MCNC: 10.1 MG/DL (ref 8.6–10.3)
CHLORIDE BLD-SCNC: 101 MMOL/L (ref 98–107)
CHOLEST SERPL-MCNC: 92 MG/DL
CO2 SERPL-SCNC: 26 MMOL/L (ref 21–31)
CREAT SERPL-MCNC: 0.92 MG/DL (ref 0.7–1.3)
ERYTHROCYTE [DISTWIDTH] IN BLOOD BY AUTOMATED COUNT: 16.8 % (ref 10–15)
FASTING STATUS PATIENT QL REPORTED: ABNORMAL
GFR SERPL CREATININE-BSD FRML MDRD: >90 ML/MIN/1.73M2
GLUCOSE BLD-MCNC: 173 MG/DL (ref 70–105)
HBA1C MFR BLD: 6.6 % (ref 4–6.2)
HCT VFR BLD AUTO: 40.1 % (ref 40–53)
HDLC SERPL-MCNC: 27 MG/DL (ref 23–92)
HGB BLD-MCNC: 12.4 G/DL (ref 13.3–17.7)
LDLC SERPL CALC-MCNC: <5 MG/DL
MCH RBC QN AUTO: 25.8 PG (ref 26.5–33)
MCHC RBC AUTO-ENTMCNC: 30.9 G/DL (ref 31.5–36.5)
MCV RBC AUTO: 84 FL (ref 78–100)
NONHDLC SERPL-MCNC: 65 MG/DL
PLATELET # BLD AUTO: 164 10E3/UL (ref 150–450)
POTASSIUM BLD-SCNC: 4.6 MMOL/L (ref 3.5–5.1)
PROT SERPL-MCNC: 6.9 G/DL (ref 6.4–8.9)
RBC # BLD AUTO: 4.8 10E6/UL (ref 4.4–5.9)
SODIUM SERPL-SCNC: 138 MMOL/L (ref 134–144)
TRIGL SERPL-MCNC: 339 MG/DL
VIT B12 SERPL-MCNC: 453 PG/ML (ref 180–914)
WBC # BLD AUTO: 94.5 10E3/UL (ref 4–11)

## 2022-01-13 PROCEDURE — 85027 COMPLETE CBC AUTOMATED: CPT | Mod: ZL | Performed by: INTERNAL MEDICINE

## 2022-01-13 PROCEDURE — 36415 COLL VENOUS BLD VENIPUNCTURE: CPT | Mod: ZL | Performed by: INTERNAL MEDICINE

## 2022-01-13 PROCEDURE — 80053 COMPREHEN METABOLIC PANEL: CPT | Mod: ZL | Performed by: INTERNAL MEDICINE

## 2022-01-13 PROCEDURE — 82607 VITAMIN B-12: CPT | Mod: ZL | Performed by: INTERNAL MEDICINE

## 2022-01-13 PROCEDURE — 83718 ASSAY OF LIPOPROTEIN: CPT | Mod: ZL | Performed by: INTERNAL MEDICINE

## 2022-01-13 PROCEDURE — 83036 HEMOGLOBIN GLYCOSYLATED A1C: CPT | Mod: ZL | Performed by: INTERNAL MEDICINE

## 2022-01-13 PROCEDURE — 99214 OFFICE O/P EST MOD 30 MIN: CPT | Performed by: INTERNAL MEDICINE

## 2022-01-13 RX ORDER — SILDENAFIL CITRATE 20 MG/1
40-100 TABLET ORAL DAILY PRN
Qty: 30 TABLET | Refills: 11 | Status: SHIPPED | OUTPATIENT
Start: 2022-01-13 | End: 2022-05-10

## 2022-01-13 RX ORDER — LISINOPRIL 2.5 MG/1
2.5 TABLET ORAL DAILY
Qty: 90 TABLET | Refills: 3 | Status: SHIPPED | OUTPATIENT
Start: 2022-01-13 | End: 2022-11-02

## 2022-01-13 RX ORDER — ATORVASTATIN CALCIUM 40 MG/1
40 TABLET, FILM COATED ORAL DAILY
Qty: 90 TABLET | Refills: 3 | Status: SHIPPED | OUTPATIENT
Start: 2022-01-13 | End: 2022-11-02

## 2022-01-13 ASSESSMENT — PAIN SCALES - GENERAL: PAINLEVEL: NO PAIN (0)

## 2022-01-13 ASSESSMENT — MIFFLIN-ST. JEOR: SCORE: 1718.54

## 2022-01-13 NOTE — NURSING NOTE
Pt here for med check.  Pamela Kraus CMA (AAMA)......................1/13/2022  1:36 PM       Medication Reconciliation: complete    Pamela Kraus CMA  1/13/2022 1:36 PM

## 2022-01-13 NOTE — PROGRESS NOTES
"Subjective   Jesus Varghese is a 61 year old male who presents for medication management and labs. He has been feeling well. No night sweats. No fevers or body aches. He is overdue for his colon cancer screening, has a history of colon cancer status post colectomy. Has declined COVID-19 and influenza vaccinations.    Objective   Vitals: BP (!) 154/80 (BP Location: Right arm, Patient Position: Sitting, Cuff Size: Adult Large)   Pulse 88   Temp 99  F (37.2  C) (Tympanic)   Resp 20   Ht 1.695 m (5' 6.75\")   Wt 96.9 kg (213 lb 9.6 oz)   SpO2 98%   BMI 33.71 kg/m      General: well appearing  Neck: No JVD, no bruits  CV: Regular rate and rhythm, no murmur, rub or gallop  Pulm: Clear to auscultation bilaterally, no wheezing, rales or rhonchi  Neuro: Grossly intact  Musculoskeletal: No lower extremity edema  Skin: No rash  Psychiatry: Normal affect and insight.    Review and Analysis of Data   I personally reviewed the following:  External notes: No  Results: Yes Lab work from last year is reviewed  Use of an independent historian: No  Independent review of a test performed by another physician: No  Discussion of management with another physician: No  Moderate risk of morbidity from additional diagnostic testing and/or treatment.    Assessment & Plan   1. Controlled type 2 diabetes mellitus with complication, without long-term current use of insulin (H)  At goal, no change.  - metFORMIN (GLUCOPHAGE) 500 MG tablet; Take 2 tablets (1,000 mg) by mouth 2 times daily (with meals)  Dispense: 360 tablet; Refill: 4  - lisinopril (ZESTRIL) 2.5 MG tablet; Take 1 tablet (2.5 mg) by mouth daily  Dispense: 90 tablet; Refill: 3  - Comprehensive metabolic panel; Future  - Hemoglobin A1c; Future  - Vitamin B12; Future  - Vitamin B12  - Hemoglobin A1c  - Comprehensive metabolic panel    2. Mixed hyperlipidemia  At goal, no change.  - atorvastatin (LIPITOR) 40 MG tablet; Take 1 tablet (40 mg) by mouth daily  Dispense: 90 tablet; " Refill: 3  - Lipid Profile; Future  - Lipid Profile    3. Corporo-venous occlusive erectile dysfunction  At goal, no change.  - sildenafil (REVATIO) 20 MG tablet; Take 2-5 tablets ( mg) by mouth daily as needed (prior to intercourse)  Dispense: 30 tablet; Refill: 11    4. Hypertriglyceridemia  At goal, no change.    5. Elevated LFTs  At goal, no change.  - Comprehensive metabolic panel; Future  - Comprehensive metabolic panel    6. Screening, anemia, deficiency, iron  - CBC with platelets; Future  - CBC with platelets    7. Malignant neoplasm of colon, unspecified part of colon (H)  8. S/P partial colectomy  - Adult Gastro Ref - Procedure Only; Future    9. Leukocytosis, unspecified type  After he left today his white blood cell count returned critical high of 95. I called and talked to him on the phone. I believe this likely represents CLL. No symptoms that would be consistent with an acute process. Warning signs were discussed and prompt repeat evaluation recommended if symptoms worsen. Otherwise I request consultation with Dr. Stark of oncology.  - Adult Oncology/Hematology Referral; Future    Signed, Filiberto John MD, FAAP, FACP  Internal Medicine & Pediatrics

## 2022-01-13 NOTE — LETTER
January 13, 2022      Jesus Varghese  [address]  [place] [address]        Dear ,    We are writing to inform you of your test results.    White count is elevated, which makes me suspect CLL.  Next step is the consult to Dr. Stark. Other lab work looks good.    Signed, Filiberto John MD, FAAP, FACP  Internal Medicine & Pediatrics      Resulted Orders   Vitamin B12   Result Value Ref Range    Vitamin B12 453 180 - 914 pg/mL   Lipid Profile   Result Value Ref Range    Cholesterol 92 <200 mg/dL    Triglycerides 339 (H) <150 mg/dL    Direct Measure HDL 27 23 - 92 mg/dL    LDL Cholesterol Calculated <5 <=100 mg/dL    Non HDL Cholesterol 65 <130 mg/dL    Patient Fasting > 8hrs? Unknown     Narrative    Cholesterol  Desirable:  <200 mg/dL    Triglycerides  Normal:  Less than 150 mg/dL  Borderline High:  150-199 mg/dL  High:  200-499 mg/dL  Very High:  Greater than or equal to 500 mg/dL    Direct Measure HDL  Female:  Greater than or equal to 50 mg/dL   Male:  Greater than or equal to 40 mg/dL    LDL Cholesterol  Desirable:  <100mg/dL  Above Desirable:  100-129 mg/dL   Borderline High:  130-159 mg/dL   High:  160-189 mg/dL   Very High:  >= 190 mg/dL    Non HDL Cholesterol  Desirable:  130 mg/dL  Above Desirable:  130-159 mg/dL  Borderline High:  160-189 mg/dL  High:  190-219 mg/dL  Very High:  Greater than or equal to 220 mg/dL   Hemoglobin A1c   Result Value Ref Range    Hemoglobin A1C 6.6 (H) 4.0 - 6.2 %   CBC with platelets   Result Value Ref Range    WBC Count 94.5 (HH) 4.0 - 11.0 10e3/uL    RBC Count 4.80 4.40 - 5.90 10e6/uL    Hemoglobin 12.4 (L) 13.3 - 17.7 g/dL    Hematocrit 40.1 40.0 - 53.0 %    MCV 84 78 - 100 fL    MCH 25.8 (L) 26.5 - 33.0 pg    MCHC 30.9 (L) 31.5 - 36.5 g/dL    RDW 16.8 (H) 10.0 - 15.0 %    Platelet Count 164 150 - 450 10e3/uL   Comprehensive metabolic panel   Result Value Ref Range    Sodium 138 134 - 144 mmol/L    Potassium 4.6 3.5 - 5.1 mmol/L    Chloride 101 98 -  107 mmol/L    Carbon Dioxide (CO2) 26 21 - 31 mmol/L    Anion Gap 11 3 - 14 mmol/L    Urea Nitrogen 11 7 - 25 mg/dL    Creatinine 0.92 0.70 - 1.30 mg/dL    Calcium 10.1 8.6 - 10.3 mg/dL    Glucose 173 (H) 70 - 105 mg/dL    Alkaline Phosphatase 47 34 - 104 U/L    AST 34 13 - 39 U/L    ALT 41 7 - 52 U/L    Protein Total 6.9 6.4 - 8.9 g/dL    Albumin 4.8 3.5 - 5.7 g/dL    Bilirubin Total 0.7 0.3 - 1.0 mg/dL    GFR Estimate >90 >60 mL/min/1.73m2      Comment:      Effective December 21, 2021 eGFRcr in adults is calculated using the 2021 CKD-EPI creatinine equation which includes age and gender (Nelda et al., NEJM, DOI: 10.1056/QVKPun2352866)       If you have any questions or concerns, please call the clinic at the number listed above.       Sincerely,      Filiberto John MD

## 2022-01-14 ENCOUNTER — HOSPITAL ENCOUNTER (OUTPATIENT)
Facility: OTHER | Age: 62
End: 2022-01-14
Attending: SURGERY | Admitting: SURGERY
Payer: COMMERCIAL

## 2022-01-14 DIAGNOSIS — Z85.038 HISTORY OF MALIGNANT NEOPLASM OF LARGE INTESTINE: Primary | ICD-10-CM

## 2022-01-14 NOTE — TELEPHONE ENCOUNTER
Screening Questions for the Scheduling of Screening Colonoscopies   (If Colonoscopy is diagnostic, Provider should review the chart before scheduling.)  Are you younger than 50 or older than 80?  NO  Do you take aspirin or fish oil?  YES, ASPIRIN (if yes, tell patient to stop 1 week prior to Colonoscopy)  Do you take warfarin (Coumadin), clopidogrel (Plavix), apixaban (Eliquis), dabigatram (Pradaxa), rivaroxaban (Xarelto) or any blood thinner? NO  Do you use oxygen at home?  NO  Do you have kidney disease? NO  Are you on dialysis? NO  Have you had a stroke or heart attack in the last year? NO  Have you had a stent in your heart or any blood vessel in the last year? NO  Have you had a transplant of any organ? NO  Have you had a colonoscopy or upper endoscopy (EGD) before? YES, COLONSCOPY         When?  10/19/2015  Date of scheduled Colonoscopy. 02/14/22  Provider TAYLOR  Pharmacy [facility]

## 2022-01-17 RX ORDER — POLYETHYLENE GLYCOL 3350, SODIUM CHLORIDE, SODIUM BICARBONATE, POTASSIUM CHLORIDE 420; 11.2; 5.72; 1.48 G/4L; G/4L; G/4L; G/4L
4000 POWDER, FOR SOLUTION ORAL ONCE
Qty: 4000 ML | Refills: 0 | Status: SHIPPED | OUTPATIENT
Start: 2022-01-17 | End: 2022-01-17

## 2022-01-17 RX ORDER — BISACODYL 5 MG/1
TABLET, DELAYED RELEASE ORAL
Qty: 2 TABLET | Refills: 0 | Status: ON HOLD | OUTPATIENT
Start: 2022-01-17 | End: 2022-02-06

## 2022-01-18 ENCOUNTER — TELEPHONE (OUTPATIENT)
Dept: ONCOLOGY | Facility: OTHER | Age: 62
End: 2022-01-18
Payer: COMMERCIAL

## 2022-01-18 NOTE — TELEPHONE ENCOUNTER
Oncology/Hematology Care Coordination - Referral Review    Referred by:  Dr. John    Diagnosis:  leukocytosis    Imaging:  no    Lab:  1/13/22    Surgery/Biopsy:  na    Pathology:  na    Outside Records:  Regine PERDOMO will call and schedule this week in oncology due to critical WBC.  Patient has hx of colon cancer status post colectomy 8/22/2006. [place] C2 adenocarcinoma.    Ivonne Rees RN on 1/18/2022 at 3:11 PM

## 2022-01-21 ENCOUNTER — ONCOLOGY VISIT (OUTPATIENT)
Dept: ONCOLOGY | Facility: OTHER | Age: 62
End: 2022-01-21
Attending: INTERNAL MEDICINE
Payer: COMMERCIAL

## 2022-01-21 VITALS
DIASTOLIC BLOOD PRESSURE: 78 MMHG | HEART RATE: 82 BPM | RESPIRATION RATE: 14 BRPM | BODY MASS INDEX: 32.66 KG/M2 | SYSTOLIC BLOOD PRESSURE: 148 MMHG | TEMPERATURE: 99.2 F | WEIGHT: 207 LBS | OXYGEN SATURATION: 98 %

## 2022-01-21 DIAGNOSIS — D72.829 LEUKOCYTOSIS, UNSPECIFIED TYPE: Primary | ICD-10-CM

## 2022-01-21 DIAGNOSIS — Z01.812 PRE-PROCEDURE LAB EXAM: ICD-10-CM

## 2022-01-21 LAB
ALBUMIN SERPL-MCNC: 4.7 G/DL (ref 3.5–5.7)
ALP SERPL-CCNC: 46 U/L (ref 34–104)
ALT SERPL W P-5'-P-CCNC: 58 U/L (ref 7–52)
ANION GAP SERPL CALCULATED.3IONS-SCNC: 12 MMOL/L (ref 3–14)
AST SERPL W P-5'-P-CCNC: 44 U/L (ref 13–39)
BASOPHILS # BLD MANUAL: 0 10E3/UL (ref 0–0.2)
BASOPHILS NFR BLD MANUAL: 0 %
BILIRUB SERPL-MCNC: 0.8 MG/DL (ref 0.3–1)
BUN SERPL-MCNC: 12 MG/DL (ref 7–25)
CALCIUM SERPL-MCNC: 10.3 MG/DL (ref 8.6–10.3)
CHLORIDE BLD-SCNC: 100 MMOL/L (ref 98–107)
CO2 SERPL-SCNC: 23 MMOL/L (ref 21–31)
CREAT SERPL-MCNC: 1.14 MG/DL (ref 0.7–1.3)
EOSINOPHIL # BLD MANUAL: 0.7 10E3/UL (ref 0–0.7)
EOSINOPHIL NFR BLD MANUAL: 1 %
ERYTHROCYTE [DISTWIDTH] IN BLOOD BY AUTOMATED COUNT: 16.9 % (ref 10–15)
ERYTHROCYTE [SEDIMENTATION RATE] IN BLOOD BY WESTERGREN METHOD: 7 MM/HR (ref 0–20)
GFR SERPL CREATININE-BSD FRML MDRD: 72 ML/MIN/1.73M2
GLUCOSE BLD-MCNC: 127 MG/DL (ref 70–105)
HCT VFR BLD AUTO: 38.9 % (ref 40–53)
HGB BLD-MCNC: 12.6 G/DL (ref 13.3–17.7)
LAB DIRECTOR COMMENTS: NORMAL
LAB DIRECTOR COMMENTS: NORMAL
LAB DIRECTOR DISCLAIMER: NORMAL
LAB DIRECTOR DISCLAIMER: NORMAL
LAB DIRECTOR INTERPRETATION: NORMAL
LAB DIRECTOR INTERPRETATION: NORMAL
LAB DIRECTOR METHODOLOGY: NORMAL
LAB DIRECTOR METHODOLOGY: NORMAL
LAB DIRECTOR RESULTS: NORMAL
LAB DIRECTOR RESULTS: NORMAL
LDH SERPL L TO P-CCNC: 165 U/L (ref 140–271)
LYMPHOCYTES # BLD MANUAL: 66.7 10E3/UL (ref 0.8–5.3)
LYMPHOCYTES NFR BLD MANUAL: 93 %
MCH RBC QN AUTO: 26.7 PG (ref 26.5–33)
MCHC RBC AUTO-ENTMCNC: 32.4 G/DL (ref 31.5–36.5)
MCV RBC AUTO: 82 FL (ref 78–100)
MONOCYTES # BLD MANUAL: 2.9 10E3/UL (ref 0–1.3)
MONOCYTES NFR BLD MANUAL: 4 %
NEUTROPHILS # BLD MANUAL: 1.4 10E3/UL (ref 1.6–8.3)
NEUTROPHILS NFR BLD MANUAL: 2 %
PLAT MORPH BLD: ABNORMAL
PLATELET # BLD AUTO: 127 10E3/UL (ref 150–450)
POTASSIUM BLD-SCNC: 4.3 MMOL/L (ref 3.5–5.1)
PROT SERPL-MCNC: 6.9 G/DL (ref 6.4–8.9)
RBC # BLD AUTO: 4.72 10E6/UL (ref 4.4–5.9)
RBC MORPH BLD: ABNORMAL
RETICS # AUTO: 0.05 10E6/UL (ref 0.03–0.1)
RETICS/RBC NFR AUTO: 1.1 % (ref 0.5–2)
RHEUMATOID FACT SER NEPH-ACNC: <14 IU/ML
SMUDGE CELLS BLD QL SMEAR: PRESENT
SODIUM SERPL-SCNC: 135 MMOL/L (ref 134–144)
SPECIMEN DESCRIPTION: NORMAL
SPECIMEN DESCRIPTION: NORMAL
TOTAL PROTEIN SERUM FOR ELP: 7.2 G/DL (ref 6.8–8.8)
WBC # BLD AUTO: 71.7 10E3/UL (ref 4–11)

## 2022-01-21 PROCEDURE — 88184 FLOWCYTOMETRY/ TC 1 MARKER: CPT

## 2022-01-21 PROCEDURE — 85027 COMPLETE CBC AUTOMATED: CPT | Mod: ZL | Performed by: INTERNAL MEDICINE

## 2022-01-21 PROCEDURE — 84155 ASSAY OF PROTEIN SERUM: CPT | Mod: ZL | Performed by: INTERNAL MEDICINE

## 2022-01-21 PROCEDURE — 83615 LACTATE (LD) (LDH) ENZYME: CPT | Mod: ZL | Performed by: INTERNAL MEDICINE

## 2022-01-21 PROCEDURE — 86038 ANTINUCLEAR ANTIBODIES: CPT | Mod: ZL | Performed by: INTERNAL MEDICINE

## 2022-01-21 PROCEDURE — 88185 FLOWCYTOMETRY/TC ADD-ON: CPT

## 2022-01-21 PROCEDURE — 82040 ASSAY OF SERUM ALBUMIN: CPT | Mod: ZL | Performed by: INTERNAL MEDICINE

## 2022-01-21 PROCEDURE — 99205 OFFICE O/P NEW HI 60 MIN: CPT | Performed by: INTERNAL MEDICINE

## 2022-01-21 PROCEDURE — 81206 BCR/ABL1 GENE MAJOR BP: CPT | Mod: ZL | Performed by: INTERNAL MEDICINE

## 2022-01-21 PROCEDURE — 84165 PROTEIN E-PHORESIS SERUM: CPT | Mod: 26 | Performed by: PATHOLOGY

## 2022-01-21 PROCEDURE — 36415 COLL VENOUS BLD VENIPUNCTURE: CPT | Mod: ZL | Performed by: INTERNAL MEDICINE

## 2022-01-21 PROCEDURE — 88271 CYTOGENETICS DNA PROBE: CPT

## 2022-01-21 PROCEDURE — G0452 MOLECULAR PATHOLOGY INTERPR: HCPCS | Mod: 26 | Performed by: STUDENT IN AN ORGANIZED HEALTH CARE EDUCATION/TRAINING PROGRAM

## 2022-01-21 PROCEDURE — 86334 IMMUNOFIX E-PHORESIS SERUM: CPT | Mod: 26 | Performed by: PATHOLOGY

## 2022-01-21 PROCEDURE — 85045 AUTOMATED RETICULOCYTE COUNT: CPT | Mod: ZL | Performed by: INTERNAL MEDICINE

## 2022-01-21 PROCEDURE — 85652 RBC SED RATE AUTOMATED: CPT | Mod: ZL | Performed by: INTERNAL MEDICINE

## 2022-01-21 PROCEDURE — 88185 FLOWCYTOMETRY/TC ADD-ON: CPT | Mod: ZL | Performed by: INTERNAL MEDICINE

## 2022-01-21 PROCEDURE — 86431 RHEUMATOID FACTOR QUANT: CPT | Mod: ZL | Performed by: INTERNAL MEDICINE

## 2022-01-21 PROCEDURE — 99417 PROLNG OP E/M EACH 15 MIN: CPT | Performed by: INTERNAL MEDICINE

## 2022-01-21 PROCEDURE — 80053 COMPREHEN METABOLIC PANEL: CPT | Mod: ZL | Performed by: INTERNAL MEDICINE

## 2022-01-21 PROCEDURE — 88275 CYTOGENETICS 100-300: CPT | Mod: 91

## 2022-01-21 PROCEDURE — 84165 PROTEIN E-PHORESIS SERUM: CPT | Mod: ZL | Performed by: PATHOLOGY

## 2022-01-21 PROCEDURE — 86334 IMMUNOFIX E-PHORESIS SERUM: CPT | Mod: ZL | Performed by: INTERNAL MEDICINE

## 2022-01-21 PROCEDURE — 88184 FLOWCYTOMETRY/ TC 1 MARKER: CPT | Mod: ZL | Performed by: STUDENT IN AN ORGANIZED HEALTH CARE EDUCATION/TRAINING PROGRAM

## 2022-01-21 PROCEDURE — 81207 BCR/ABL1 GENE MINOR BP: CPT | Mod: ZL | Performed by: INTERNAL MEDICINE

## 2022-01-21 ASSESSMENT — PAIN SCALES - GENERAL: PAINLEVEL: NO PAIN (0)

## 2022-01-22 NOTE — PROGRESS NOTES
Visit Date: 01/21/2022    REASON FOR CONSULTATION:  Leukocytosis.  REQUESTING PHYSICIAN:  Dr. Filiberto John.    HISTORY OF PRESENT ILLNESS:  Mr. Ben Varghese is a 61-year-old white male with a history of colon cancer, type 2 diabetes mellitus. We were asked to evaluate concerning new diagnosis of leukocytosis.  The patient recently undergone routine evaluation by Dr. John, who had ordered a CBC and noted his white count was 94.5 with H and H 12.4, hematocrit 40.1, platelet count is 164.  Differential was not done.  Three years prior, in 11/2018, his white count was 16.3, hemoglobin 14.3, hematocrit 42.8, platelet count 165.  Differential was not done at that time.  The patient otherwise denies a family history of hematologic malignancy or cancer in general. He has some symptoms of night sweats, occasional fatigue.  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 Does have a personal history of colon cancer diagnosed in 2006.  At that time, he underwent colectomy by Dr. Lopez and was found to have stage III disease and was treated with adjuvant chemotherapy.  He is due to have a colonoscopy in December of this year.  Otherwise, his prior colonoscopies were negative.  Denies any bright red blood per rectum, fevers, weight loss, change in bowel habits, easy bruisability, gingival bleeding, epistaxis, early satiety.    PAST MEDICAL HISTORY:  Significant for colon cancer as above, seborrheic keratosis, history of nephrolithiasis, type 2 diabetes mellitus, obesity, carpal tunnel syndrome, inflammatory liver disease, status post tendon repair rupture left distal biceps tendon, ureterolithiasis, senile cataracts, hyperlipidemia.    SOCIAL HISTORY:  He was a former smoker.  He smoked a pack a day for 25 years.  He quit in 2006.  Alcohol is negative.  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    FAMILY HISTORY:  Noncontributory.    REVIEW OF SYSTEMS:  CENTRAL NERVOUS SYSTEM:  Negative for headache, change in mental status.  ENT:  Negative for hearing loss.  RESPIRATORY:  Occasional shortness of breath.  No cough, hemoptysis.  CARDIAC:  No chest pain, palpitations, orthopnea, PND, ankle edema.  GASTROINTESTINAL:  Negative for change in bowel habits, bright red blood per rectum, hematemesis.  MUSCULOSKELETAL:  Unremarkable.  GENITOURINARY:  Negative for nocturia, hematuria.  CONSTITUTIONAL:  Negative for fevers.  He does have night sweats.  No weight loss.  HEMATOLOGIC:  Negative for easy bruisability, gingival bleeding, epistaxis.    PHYSICAL EXAMINATION:  GENERAL:  He is a well-developed, well-nourished, middle-aged, white male in no acute distress.  VITAL SIGNS:  Reveal blood pressure 140/78, pulse 82, respirations 14, temperature 99.2.  HEENT:  Atraumatic, normocephalic.  Oropharynx nonerythematous.  NECK:  Supple.  No thyromegaly.  LUNGS:  Clear to auscultation and percussion.  HEART:  Regular rhythm.  S1, S2 normal.  ABDOMEN:  Obese, soft, normoactive bowel sounds, nondistended.  LYMPHATICS:  No cervical, supraclavicular, axillary, or inguinal nodes.  EXTREMITIES:  No edema.  NEUROLOGIC:  Nonfocal.    LABORATORY DATA:  From today are still pending.    CBC revealed white count of 71, hemoglobin 12.6, platelet count is 117.  Differential is pending.    IMPRESSION:  Leukocytosis.  Differential still pending.  We will review peripheral blood smear.  Nonetheless, given the fact he has thrombocytopenia, in addition to leukocytosis, differential includes CLL, AML, CML.  We would like to obtain a BCR/ABL transcript, leukemia, and lymphoma evaluation, a CT chest, abdomen, and pelvis, a sed rate, rheumatoid factor, CAMILA, serum protein electrophoresis.  Also, we would like to proceed with bone marrow aspiration biopsy to absolutely rule out acute leukemia given the fact he was exposed to pesticides and  possibly other solids.  The patient is willing to proceed.  Otherwise, we will see the patient after the above workup.    One-hundred 10 minutes were spent on this patient.  Time was spent reviewing lab work, previous physician provider notes, discussing lab results with the patient, performing a history and physical, documenting history and physical, ordering multiple investigations and lab studies, including bone marrow aspiration biopsy, also ordering CT chest, abdomen, and pelvis and follow up labs.    Med Stark MD        D: 2022   T: 2022   MT: Francy    Name:     ROSALINA MEZA  MRN:      [id number]        Account:    [id number]   :      1960           Visit Date: 2022     Document: [id number]    cc:  Filiberto John MD

## 2022-01-24 LAB
ANA SER QL IF: NEGATIVE
PATH REPORT.COMMENTS IMP SPEC: ABNORMAL
PATH REPORT.COMMENTS IMP SPEC: YES
PATH REPORT.FINAL DX SPEC: ABNORMAL
PATH REPORT.MICROSCOPIC SPEC OTHER STN: ABNORMAL
PATH REPORT.RELEVANT HX SPEC: ABNORMAL
PROT PATTERN SERPL IFE-IMP: NORMAL

## 2022-01-25 DIAGNOSIS — E11.8 CONTROLLED TYPE 2 DIABETES MELLITUS WITH COMPLICATION, WITHOUT LONG-TERM CURRENT USE OF INSULIN (H): ICD-10-CM

## 2022-01-25 LAB
ALBUMIN SERPL ELPH-MCNC: 4.6 G/DL (ref 3.7–5.1)
ALPHA1 GLOB SERPL ELPH-MCNC: 0.3 G/DL (ref 0.2–0.4)
ALPHA2 GLOB SERPL ELPH-MCNC: 1 G/DL (ref 0.5–0.9)
B-GLOBULIN SERPL ELPH-MCNC: 0.8 G/DL (ref 0.6–1)
GAMMA GLOB SERPL ELPH-MCNC: 0.4 G/DL (ref 0.7–1.6)
M PROTEIN SERPL ELPH-MCNC: 0.1 G/DL
PROT PATTERN SERPL ELPH-IMP: ABNORMAL

## 2022-01-26 NOTE — TELEPHONE ENCOUNTER
[facility] sent Rx request for the following:      metFORMIN HCl 500 MG Oral Tablet  Sig: TAKE 2 TABLETS BY MOUTH TWICE DAILY WITH MEALS      Last Prescription Date:   1/13/2022  Last Fill Qty/Refills:         360, R-4        Prescription refused.  This is a duplicate request.  David Nance RN.......1/26/2022 4:22 PM         with patient

## 2022-01-27 ENCOUNTER — OFFICE VISIT (OUTPATIENT)
Dept: PEDIATRICS | Facility: OTHER | Age: 62
End: 2022-01-27
Attending: INTERNAL MEDICINE
Payer: COMMERCIAL

## 2022-01-27 VITALS
RESPIRATION RATE: 20 BRPM | SYSTOLIC BLOOD PRESSURE: 128 MMHG | HEIGHT: 67 IN | BODY MASS INDEX: 31.86 KG/M2 | HEART RATE: 100 BPM | TEMPERATURE: 98.5 F | WEIGHT: 203 LBS | OXYGEN SATURATION: 96 % | DIASTOLIC BLOOD PRESSURE: 70 MMHG

## 2022-01-27 DIAGNOSIS — D72.829 LEUKOCYTOSIS, UNSPECIFIED TYPE: ICD-10-CM

## 2022-01-27 DIAGNOSIS — Z90.49 S/P PARTIAL COLECTOMY: ICD-10-CM

## 2022-01-27 DIAGNOSIS — C18.9 MALIGNANT NEOPLASM OF COLON, UNSPECIFIED PART OF COLON (H): ICD-10-CM

## 2022-01-27 DIAGNOSIS — Z01.818 PREOPERATIVE EXAMINATION: Primary | ICD-10-CM

## 2022-01-27 DIAGNOSIS — E66.09 NON MORBID OBESITY DUE TO EXCESS CALORIES: Chronic | ICD-10-CM

## 2022-01-27 LAB
ATRIAL RATE - MUSE: 84 BPM
DIASTOLIC BLOOD PRESSURE - MUSE: NORMAL MMHG
INTERPRETATION ECG - MUSE: NORMAL
P AXIS - MUSE: 47 DEGREES
PR INTERVAL - MUSE: 164 MS
QRS DURATION - MUSE: 98 MS
QT - MUSE: 372 MS
QTC - MUSE: 439 MS
R AXIS - MUSE: 70 DEGREES
SYSTOLIC BLOOD PRESSURE - MUSE: NORMAL MMHG
T AXIS - MUSE: 30 DEGREES
VENTRICULAR RATE- MUSE: 84 BPM

## 2022-01-27 PROCEDURE — 99214 OFFICE O/P EST MOD 30 MIN: CPT | Performed by: INTERNAL MEDICINE

## 2022-01-27 PROCEDURE — 93000 ELECTROCARDIOGRAM COMPLETE: CPT | Performed by: INTERNAL MEDICINE

## 2022-01-27 ASSESSMENT — PAIN SCALES - GENERAL: PAINLEVEL: NO PAIN (0)

## 2022-01-27 ASSESSMENT — MIFFLIN-ST. JEOR: SCORE: 1674.43

## 2022-01-27 NOTE — NURSING NOTE
"Patient presents to the clinic for pre-op exam.    FOOD SECURITY SCREENING QUESTIONS:    The next two questions are to help us understand your food security.  If you are feeling you need any assistance in this area, we have resources available to support you today.    Hunger Vital Signs:  Within the past 12 months we worried whether our food would run out before we got money to buy more. Never  Within the past 12 months the food we bought just didn't last and we didn't have money to get more. Never    Advance Care Directive on file? no  Advance Care Directive provided to patient? yes  Chief Complaint   Patient presents with     Pre-Op Exam       Initial /70 (BP Location: Right arm, Patient Position: Sitting, Cuff Size: Adult Large)   Pulse 100   Temp 98.5  F (36.9  C) (Tympanic)   Resp 20   Ht 1.702 m (5' 7\")   Wt 92.1 kg (203 lb)   SpO2 96%   BMI 31.79 kg/m   Estimated body mass index is 31.79 kg/m  as calculated from the following:    Height as of this encounter: 1.702 m (5' 7\").    Weight as of this encounter: 92.1 kg (203 lb).  Medication Reconciliation: complete        Claire Young LPN       " (5) normal, right/(5) normal, left

## 2022-01-27 NOTE — PATIENT INSTRUCTIONS
-- Stop metformin 2 days before bone marrow biopsy, resume after colonoscopy   -- Hold aspirin for 5-7 days before surgery, unless you have had a stent then continue aspirin.   -- Hold ibuprofen/Advil for 2-3 days before surgery   -- Hold naproxen/Aleve for 5-7 days before surgery   -- Acetaminophen (Tylenol) is okay   -- Hold vitamins and herbal remedies for 7 days before surgery

## 2022-01-27 NOTE — PROGRESS NOTES
[facility]  [address]  [place] [address]  Phone: [phone]  Fax: [phone]  Primary Provider: Filiberto John  Pre-op Performing Provider: FILIBERTO JOHN      PREOPERATIVE EVALUATION:  Today's date: 1/27/2022    Jesus Varghese is a 61 year old male who presents for a preoperative evaluation.    Surgical Information:  Surgery/Procedure: Bone Marrow Biopsy and Colonoscopy  Surgery Location: [facility] for both procedures  Surgeon: Dr. Holguin and Dr. Lopez  Surgery Date: 2- and 2-  Time of Surgery: unknown  Where patient plans to recover: At home with family  Fax number for surgical facility: Note does not need to be faxed, will be available electronically in Epic.    Type of Anesthesia Anticipated: General    Preoperative History & Physical   Date of Exam: 1/27/2022  Chief Complaint   Patient presents with     Pre-Op Exam       Assessment & Recommendations       ICD-10-CM    1. Preoperative examination  Z01.818 EKG 12-lead, tracing only   2. Leukocytosis, unspecified type  D72.829    3. Malignant neoplasm of colon, unspecified part of colon (H)  C18.9    4. S/P partial colectomy  Z90.49    5. Non morbid obesity due to excess calories  E66.09        For above listed surgery and anesthesia:   Patient is at increased risk for perioperative complications based on DM2, obesity, age.    APPROVAL GIVEN to proceed with proposed procedure, without further diagnostic evaluation    Patient is on chronic pain medications: No  Patient is on antiplatelet/anticoagulation: Yes  Other medications that need adjustment perioperatively: Yes  Patient Instructions    -- Stop metformin 2 days before bone marrow biopsy, resume after colonoscopy   -- Hold aspirin for 5-7 days before surgery, unless you have had a stent then continue aspirin.   -- Hold ibuprofen/Advil for 2-3 days before surgery   -- Hold naproxen/Aleve for 5-7 days before surgery   -- Acetaminophen  "(Tylenol) is okay   -- Hold vitamins and herbal remedies for 7 days before surgery        Signed, Filiberto John MD, FAAP, FACP  Internal Medicine & Pediatrics      Subjective   Nursing Notes:   Claire Young LPN  1/27/2022  2:33 PM  Signed  Patient presents to the clinic for pre-op exam.    FOOD SECURITY SCREENING QUESTIONS:    The next two questions are to help us understand your food security.  If you are feeling you need any assistance in this area, we have resources available to support you today.    Hunger Vital Signs:  Within the past 12 months we worried whether our food would run out before we got money to buy more. Never  Within the past 12 months the food we bought just didn't last and we didn't have money to get more. Never    Advance Care Directive on file? no  Advance Care Directive provided to patient? yes  Chief Complaint   Patient presents with     Pre-Op Exam       Initial /70 (BP Location: Right arm, Patient Position: Sitting, Cuff Size: Adult Large)   Pulse 100   Temp 98.5  F (36.9  C) (Tympanic)   Resp 20   Ht 1.702 m (5' 7\")   Wt 92.1 kg (203 lb)   SpO2 96%   BMI 31.79 kg/m   Estimated body mass index is 31.79 kg/m  as calculated from the following:    Height as of this encounter: 1.702 m (5' 7\").    Weight as of this encounter: 92.1 kg (203 lb).  Medication Reconciliation: complete        Claire Young LPN           I was asked to see . Jesus Varghese by Dr. Holguin for preoperative management.    Jesus Varghese is a 61 year old male with a history of DM2, Hx colon cancer here for preoperative examination.  The most physically active he has been over the past 2 weeks was: aerodyne without chest pain.    Patient Active Problem List    Diagnosis Date Noted     Seborrheic keratoses 07/15/2019     Priority: Medium     Malignant neoplasm of colon, unspecified part of colon (H) 11/20/2018     Priority: Medium     Chemotherapy-induced neuropathy (H) 11/20/2018     Priority: " Medium     S/P partial colectomy 11/20/2018     Priority: Medium     History of nephrolithiasis 11/20/2018     Priority: Medium     Elevated LFTs 11/20/2018     Priority: Medium     Controlled type 2 diabetes mellitus with complication, without long-term current use of insulin (H) 11/20/2018     Priority: Medium     Non morbid obesity due to excess calories 11/20/2018     Priority: Medium     History of malignant neoplasm of large intestine 01/26/2018     Priority: Medium     Overview:   stage lll, T3N1MO       Carpal tunnel syndrome, bilateral 01/26/2018     Priority: Medium     Inflammatory liver disease 01/26/2018     Priority: Medium     Overview:   in 1983 after tattoo has been vaccinated for Hepatitis B since. He did have   marked jaundice and fatigue during that illness       Status post tendon repair 01/14/2016     Priority: Medium     Rupture of left distal biceps tendon 01/07/2016     Priority: Medium     Ureterolithiasis 02/11/2015     Priority: Medium     Senile cataract 08/23/2011     Priority: Medium     Hypertriglyceridemia 08/23/2011     Priority: Medium     Past Medical History:   Diagnosis Date     Age-related cataract     No Comments Provided     Calculus of kidney     No Comments Provided     Carpal tunnel syndrome     No Comments Provided     Diverticulosis of intestine without perforation or abscess without bleeding     mild     Dorsalgia     No Comments Provided     Inflammatory liver disease     ? cause 1982     Malignant neoplasm of colon (H)     2006     Other specified postprocedural states     1/14/2016     Pure hyperglyceridemia     No Comments Provided     Strain of muscle, fascia and tendon of other parts of biceps, left arm, initial encounter     1/7/2016     Past Surgical History:   Procedure Laterality Date     COLON SURGERY      8/22/06,Sigmoid colectomy for [facility] C2 adenocarcinoma     COLONOSCOPY      9/07/07,next due in 2010     COLONOSCOPY      08/30/2010,F/U 2015      COLONOSCOPY  10/19/2015    10/19/15,F/U 2020     EXTRACAPSULAR CATARACT EXTRATION WITH INTRAOCULAR LENS IMPLANT      2011, 2012,Bilateral cataracts R 2011.. L 2012     LAPAROSCOPIC CHOLECYSTECTOMY      04/24/07     OTHER SURGICAL HISTORY      10/11/06,602081,PORTACATH,Placement of Port-A-Cath, right anterior chest, for chemotherapy     OTHER SURGICAL HISTORY      08/31/2010,,HERNIA REPAIR,Mesh Underlay     OTHER SURGICAL HISTORY      2/12/15,098414,IR URETERAL STENT LEFT     OTHER SURGICAL HISTORY      1/14/2016,225750,OTHER,Left,arthrex equipment used     TONSILLECTOMY, ADENOIDECTOMY, COMBINED      age 1     VASECTOMY       x2, spontaneous reconnected, so needed 2nd procedure     Current Outpatient Medications   Medication Sig Dispense Refill     aspirin 81 MG chewable tablet Take 1 tablet (81 mg) by mouth daily with food 90 tablet 3     atorvastatin (LIPITOR) 40 MG tablet Take 1 tablet (40 mg) by mouth daily 90 tablet 3     bisacodyl (DULCOLAX) 5 MG EC tablet Take as directed by colonoscopy prep instructions 2 tablet 0     blood glucose (NO BRAND SPECIFIED) lancets standard Dispense item covered by insurance. Test blood sugar 1 times daily. 100 each 11     blood glucose (NO BRAND SPECIFIED) test strip Dispense item covered by insurance. Test blood sugar 1 times daily. 100 strip 11     blood glucose monitoring (NO BRAND SPECIFIED) meter device kit Dispense option covered by insurance. Test blood sugar 1 times daily. 1 kit 11     Fluocinolone Acetonide Scalp 0.01 % OIL oil Apply topically At Bedtime       HEMP OIL OR EXTRACT OR OTHER CBD CANNABINOID, NOT MEDICAL CANNABIS, Take 4 drops by mouth At Bedtime        lisinopril (ZESTRIL) 2.5 MG tablet Take 1 tablet (2.5 mg) by mouth daily 90 tablet 3     metFORMIN (GLUCOPHAGE) 500 MG tablet Take 2 tablets (1,000 mg) by mouth 2 times daily (with meals) 360 tablet 4     sildenafil (REVATIO) 20 MG tablet Take 2-5 tablets ( mg) by mouth daily as needed (prior to  intercourse) 30 tablet 11     No Known Allergies  Family History   Problem Relation Age of Onset     Hypertension Mother         Hypertension     Heart Disease Father         Heart Disease,40s and 50s     Heart Disease Other         Heart Disease,40s and 50s     Anesthesia Reaction No family hx of         Anesthesia Problem     Other - See Comments No family hx of         Stroke     Blood Disease No family hx of         Blood Disease     Bleeding Diathesis No family hx of      Social History     Socioeconomic History     Marital status:      Spouse name: None     Number of children: None     Years of education: None     Highest education level: None   Occupational History     None   Tobacco Use     Smoking status: Former Smoker     Packs/day: 2.00     Years: 30.00     Pack years: 60.00     Types: Cigarettes     Quit date: 2010     Years since quittin.6     Smokeless tobacco: Never Used   Vaping Use     Vaping Use: Never used   Substance and Sexual Activity     Alcohol use: Yes     Alcohol/week: 0.0 standard drinks     Comment: rare     Drug use: Never     Sexual activity: Yes     Partners: Female     Birth control/protection: None   Other Topics Concern     Parent/sibling w/ CABG, MI or angioplasty before 65F 55M? Not Asked   Social History Narrative        Retired from [employer] as the     Has children    Has several tattoos     Social Determinants of Health     Financial Resource Strain: Not on file   Food Insecurity: Not on file   Transportation Needs: Not on file   Physical Activity: Not on file   Stress: Not on file   Social Connections: Not on file   Intimate Partner Violence: Not on file   Housing Stability: Not on file       REVIEW OF SYSTEMS:  Review of Systems:  Skin: Negative  Eyes: Negative  Ears/Nose/Throat: Negative  Respiratory: Negative  Cardiovascular: Rare heart palpitation, no chest pain  Gastrointestinal: Negative  Genitourinary:  "Negative  Musculoskeletal: Negative  Neurologic: Negative  Psychiatric: Negative  Hematologic/Lymphatic/Immunologic: Negative  Endocrine: Negative        Objective   Vitals: reviewed in EMR.  /70 (BP Location: Right arm, Patient Position: Sitting, Cuff Size: Adult Large)   Pulse 100   Temp 98.5  F (36.9  C) (Tympanic)   Resp 20   Ht 1.702 m (5' 7\")   Wt 92.1 kg (203 lb)   SpO2 96%   BMI 31.79 kg/m      Gen: Pleasant male, NAD.  HEENT: MMM, no OP erythema.   Neck: Supple, no JVD, no bruits.  CV: RRR no m/r/g.   Pulm: CTAB no w/r/r  Neuro: Grossly intact  Msk: No lower extremity edema.  Skin: No concerning lesions.  Psychiatric: Normal affect and insight. Does not appear anxious or depressed.      DIAGNOSTICS:   Labs:  Lab Results   Component Value Date    WBC 71.7 (HH) 01/21/2022    HGB 12.6 (L) 01/21/2022    HCT 38.9 (L) 01/21/2022     (L) 01/21/2022    CHOL 92 01/13/2022    TRIG 339 (H) 01/13/2022    HDL 27 01/13/2022    ALT 58 (H) 01/21/2022    AST 44 (H) 01/21/2022     01/21/2022    BUN 12 01/21/2022    CO2 23 01/21/2022       Glucose   Date Value Ref Range Status   01/21/2022 127 (H) 70 - 105 mg/dL Final   01/13/2022 173 (H) 70 - 105 mg/dL Final   12/14/2020 114 (H) 70 - 105 mg/dL Final   07/15/2019 123 (H) 70 - 105 mg/dL Final     Hemoglobin A1C POCT   Date Value Ref Range Status   12/14/2020 6.1 (H) 4.0 - 6.0 % Final   07/15/2019 6.3 (H) 4.0 - 6.0 % Final     Hemoglobin A1C   Date Value Ref Range Status   01/13/2022 6.6 (H) 4.0 - 6.2 % Final     Creatinine   Date Value Ref Range Status   01/21/2022 1.14 0.70 - 1.30 mg/dL Final   01/13/2022 0.92 0.70 - 1.30 mg/dL Final   12/14/2020 1.13 0.70 - 1.30 mg/dL Final   07/15/2019 1.07 0.70 - 1.30 mg/dL Final     LDL Cholesterol Calculated   Date Value Ref Range Status   01/13/2022 <5 <=100 mg/dL Final   12/14/2020  <100 mg/dL Final    Cannot estimate LDL when triglyceride exceeds 400 mg/dL     Comment:     Desirable:       <100 mg/dl "     Thyrotropin   Date Value Ref Range Status   11/20/2018 0.62 0.34 - 5.60 IU/mL Final           Results for orders placed or performed in visit on 01/27/22 (from the past 48 hour(s))   EKG 12-lead, tracing only   Result Value Ref Range    Systolic Blood Pressure  mmHg    Diastolic Blood Pressure  mmHg    Ventricular Rate 84 BPM    Atrial Rate 84 BPM    LA Interval 164 ms    QRS Duration 98 ms     ms    QTc 439 ms    P Axis 47 degrees    R AXIS 70 degrees    T Axis 30 degrees    Interpretation ECG       Sinus rhythm  Normal ECG  No previous ECGs available  Confirmed by MD MALAIKA, ANALISA ([id number]) on 1/27/2022 2:52:57 PM           CC: Dr. Holguin

## 2022-01-28 ENCOUNTER — HOSPITAL ENCOUNTER (OUTPATIENT)
Dept: CT IMAGING | Facility: OTHER | Age: 62
End: 2022-01-28
Attending: INTERNAL MEDICINE
Payer: COMMERCIAL

## 2022-01-28 DIAGNOSIS — D72.829 LEUKOCYTOSIS, UNSPECIFIED TYPE: ICD-10-CM

## 2022-01-28 PROCEDURE — 250N000011 HC RX IP 250 OP 636: Performed by: INTERNAL MEDICINE

## 2022-01-28 PROCEDURE — 71260 CT THORAX DX C+: CPT

## 2022-01-28 PROCEDURE — 74177 CT ABD & PELVIS W/CONTRAST: CPT

## 2022-01-28 RX ORDER — IOPAMIDOL 755 MG/ML
119 INJECTION, SOLUTION INTRAVASCULAR ONCE
Status: COMPLETED | OUTPATIENT
Start: 2022-01-28 | End: 2022-01-28

## 2022-01-28 RX ADMIN — IOPAMIDOL 119 ML: 755 INJECTION, SOLUTION INTRAVENOUS at 10:07

## 2022-01-28 NOTE — PROGRESS NOTES
1.  Has the patient had a previous reaction to IV contrast? no    2.  Does the patient have kidney disease? no    3.  Is the patient on dialysis? no    If YES to any of these questions, exam will be reviewed with a Radiologist before administering contrast.    IV Contrast- Discharge Instructions After Your CT Scan      The IV contrast you received today will be filtered from your bloodstream by your kidneys during the next 24 hours and pass from the body in urine.  You will not be aware of this process and your urine will not change in color.  To help this process you should drink at least 4 additional glasses of water or juice today.  This reduces stress on your kidneys.    Most contrast reactions are immediate.  Should you develop symptoms of concern after discharge, contact the department at the number below.  After hours you should contact your personal physician.  If you develop breathing distress or wheezing, call 911.         called patient to go over instructions for letrozole and provera - left voicemail

## 2022-01-31 LAB — PATH REPORT.FINAL DX SPEC: NORMAL

## 2022-02-03 RX ORDER — FAMOTIDINE 20 MG
1 TABLET ORAL DAILY
COMMUNITY

## 2022-02-03 RX ORDER — ZINC SULFATE 50(220)MG
220 CAPSULE ORAL DAILY
COMMUNITY
End: 2023-05-23

## 2022-02-03 RX ORDER — MULTIVITAMIN,THERAPEUTIC
1 TABLET ORAL DAILY
COMMUNITY

## 2022-02-06 ENCOUNTER — HOSPITAL ENCOUNTER (INPATIENT)
Facility: OTHER | Age: 62
LOS: 28 days | Discharge: HOME HEALTH | DRG: 177 | End: 2022-03-06
Attending: PHYSICIAN ASSISTANT | Admitting: INTERNAL MEDICINE
Payer: COMMERCIAL

## 2022-02-06 ENCOUNTER — APPOINTMENT (OUTPATIENT)
Dept: GENERAL RADIOLOGY | Facility: OTHER | Age: 62
DRG: 177 | End: 2022-02-06
Attending: PHYSICIAN ASSISTANT
Payer: COMMERCIAL

## 2022-02-06 DIAGNOSIS — Z79.899 ENCOUNTER FOR LONG-TERM (CURRENT) USE OF MEDICATIONS: ICD-10-CM

## 2022-02-06 DIAGNOSIS — U07.1 PNEUMONIA DUE TO 2019 NOVEL CORONAVIRUS: ICD-10-CM

## 2022-02-06 DIAGNOSIS — D72.829 LEUKOCYTOSIS, UNSPECIFIED TYPE: ICD-10-CM

## 2022-02-06 DIAGNOSIS — C18.9 MALIGNANT NEOPLASM OF COLON, UNSPECIFIED PART OF COLON (H): ICD-10-CM

## 2022-02-06 DIAGNOSIS — J12.82 PNEUMONIA DUE TO 2019 NOVEL CORONAVIRUS: ICD-10-CM

## 2022-02-06 DIAGNOSIS — E11.9 TYPE 2 DIABETES MELLITUS WITHOUT COMPLICATION, UNSPECIFIED WHETHER LONG TERM INSULIN USE (H): ICD-10-CM

## 2022-02-06 DIAGNOSIS — Z79.82 ENCOUNTER FOR LONG-TERM (CURRENT) USE OF ASPIRIN: ICD-10-CM

## 2022-02-06 DIAGNOSIS — U07.1 COVID: ICD-10-CM

## 2022-02-06 DIAGNOSIS — J12.82 PNEUMONIA DUE TO CORONAVIRUS DISEASE 2019 (CODE): ICD-10-CM

## 2022-02-06 DIAGNOSIS — Z87.891 PERSONAL HISTORY OF TOBACCO USE, PRESENTING HAZARDS TO HEALTH: ICD-10-CM

## 2022-02-06 DIAGNOSIS — J96.01 ACUTE RESPIRATORY FAILURE WITH HYPOXIA (H): ICD-10-CM

## 2022-02-06 PROBLEM — J18.9 PNEUMONIA OF BOTH LUNGS DUE TO INFECTIOUS ORGANISM: Status: ACTIVE | Noted: 2022-02-06

## 2022-02-06 LAB
ALBUMIN SERPL-MCNC: 3.9 G/DL (ref 3.5–5.7)
ALBUMIN UR-MCNC: 30 MG/DL
ALP SERPL-CCNC: 79 U/L (ref 34–104)
ALT SERPL W P-5'-P-CCNC: 137 U/L (ref 7–52)
ANION GAP SERPL CALCULATED.3IONS-SCNC: 16 MMOL/L (ref 3–14)
APPEARANCE UR: ABNORMAL
AST SERPL W P-5'-P-CCNC: 88 U/L (ref 13–39)
BASE EXCESS BLDA CALC-SCNC: 0.5 MMOL/L (ref -9–1.8)
BASOPHILS # BLD MANUAL: 0 10E3/UL (ref 0–0.2)
BASOPHILS NFR BLD MANUAL: 0 %
BILIRUB SERPL-MCNC: 0.7 MG/DL (ref 0.3–1)
BILIRUB UR QL STRIP: NEGATIVE
BUN SERPL-MCNC: 19 MG/DL (ref 7–25)
CALCIUM SERPL-MCNC: 10 MG/DL (ref 8.6–10.3)
CHLORIDE BLD-SCNC: 96 MMOL/L (ref 98–107)
CO2 SERPL-SCNC: 22 MMOL/L (ref 21–31)
COLOR UR AUTO: YELLOW
CREAT SERPL-MCNC: 1.07 MG/DL (ref 0.7–1.3)
CRP SERPL-MCNC: 278.1 MG/L
D DIMER PPP FEU-MCNC: 1.11 UG/ML FEU (ref 0–0.5)
ELLIPTOCYTES BLD QL SMEAR: SLIGHT
EOSINOPHIL # BLD MANUAL: 0 10E3/UL (ref 0–0.7)
EOSINOPHIL NFR BLD MANUAL: 0 %
ERYTHROCYTE [DISTWIDTH] IN BLOOD BY AUTOMATED COUNT: 16.7 % (ref 10–15)
ERYTHROCYTE [SEDIMENTATION RATE] IN BLOOD BY WESTERGREN METHOD: 100 MM/HR (ref 0–20)
FLUAV RNA SPEC QL NAA+PROBE: NEGATIVE
FLUBV RNA RESP QL NAA+PROBE: NEGATIVE
GFR SERPL CREATININE-BSD FRML MDRD: 78 ML/MIN/1.73M2
GLUCOSE BLD-MCNC: 148 MG/DL (ref 70–105)
GLUCOSE UR STRIP-MCNC: NEGATIVE MG/DL
HCO3 BLD-SCNC: 23 MMOL/L (ref 21–28)
HCT VFR BLD AUTO: 35 % (ref 40–53)
HGB BLD-MCNC: 11.6 G/DL (ref 13.3–17.7)
HGB UR QL STRIP: NEGATIVE
HYALINE CASTS: 1 /LPF
KETONES UR STRIP-MCNC: NEGATIVE MG/DL
LACTATE SERPL-SCNC: 3.6 MMOL/L (ref 0.7–2)
LEUKOCYTE ESTERASE UR QL STRIP: NEGATIVE
LYMPHOCYTES # BLD MANUAL: 33.9 10E3/UL (ref 0.8–5.3)
LYMPHOCYTES NFR BLD MANUAL: 81 %
MAGNESIUM SERPL-MCNC: 2 MG/DL (ref 1.9–2.7)
MCH RBC QN AUTO: 26.1 PG (ref 26.5–33)
MCHC RBC AUTO-ENTMCNC: 33.1 G/DL (ref 31.5–36.5)
MCV RBC AUTO: 79 FL (ref 78–100)
MONOCYTES # BLD MANUAL: 0.4 10E3/UL (ref 0–1.3)
MONOCYTES NFR BLD MANUAL: 1 %
MUCOUS THREADS #/AREA URNS LPF: PRESENT /LPF
NEUTROPHILS # BLD MANUAL: 7.5 10E3/UL (ref 1.6–8.3)
NEUTROPHILS NFR BLD MANUAL: 18 %
NITRATE UR QL: NEGATIVE
NT-PROBNP SERPL-MCNC: 35 PG/ML (ref 0–100)
O2/TOTAL GAS SETTING VFR VENT: 52 %
OXYHGB MFR BLD: 92 % (ref 92–100)
PCO2 BLD: 30 MM HG (ref 35–45)
PH BLD: 7.49 [PH] (ref 7.35–7.45)
PH UR STRIP: 5.5 [PH] (ref 5–9)
PLAT MORPH BLD: ABNORMAL
PLATELET # BLD AUTO: 350 10E3/UL (ref 150–450)
PO2 BLD: 60 MM HG (ref 80–105)
POTASSIUM BLD-SCNC: 3.9 MMOL/L (ref 3.5–5.1)
PROT SERPL-MCNC: 6.8 G/DL (ref 6.4–8.9)
RBC # BLD AUTO: 4.45 10E6/UL (ref 4.4–5.9)
RBC MORPH BLD: ABNORMAL
RBC URINE: 1 /HPF
RSV RNA SPEC NAA+PROBE: NEGATIVE
SARS-COV-2 RNA RESP QL NAA+PROBE: POSITIVE
SMUDGE CELLS BLD QL SMEAR: PRESENT
SODIUM SERPL-SCNC: 134 MMOL/L (ref 134–144)
SP GR UR STRIP: 1.03 (ref 1–1.03)
TROPONIN I SERPL-MCNC: 8.6 PG/ML (ref 0–34)
UROBILINOGEN UR STRIP-MCNC: NORMAL MG/DL
VARIANT LYMPHS BLD QL SMEAR: PRESENT
WBC # BLD AUTO: 41.9 10E3/UL (ref 4–11)
WBC URINE: 1 /HPF

## 2022-02-06 PROCEDURE — 83880 ASSAY OF NATRIURETIC PEPTIDE: CPT | Performed by: PHYSICIAN ASSISTANT

## 2022-02-06 PROCEDURE — XW033E5 INTRODUCTION OF REMDESIVIR ANTI-INFECTIVE INTO PERIPHERAL VEIN, PERCUTANEOUS APPROACH, NEW TECHNOLOGY GROUP 5: ICD-10-PCS | Performed by: INTERNAL MEDICINE

## 2022-02-06 PROCEDURE — 36415 COLL VENOUS BLD VENIPUNCTURE: CPT | Performed by: PHYSICIAN ASSISTANT

## 2022-02-06 PROCEDURE — 250N000011 HC RX IP 250 OP 636: Performed by: PHYSICIAN ASSISTANT

## 2022-02-06 PROCEDURE — 120N000001 HC R&B MED SURG/OB

## 2022-02-06 PROCEDURE — 999N000157 HC STATISTIC RCP TIME EA 10 MIN

## 2022-02-06 PROCEDURE — 250N000013 HC RX MED GY IP 250 OP 250 PS 637: Performed by: INTERNAL MEDICINE

## 2022-02-06 PROCEDURE — 86140 C-REACTIVE PROTEIN: CPT | Performed by: PHYSICIAN ASSISTANT

## 2022-02-06 PROCEDURE — 99223 1ST HOSP IP/OBS HIGH 75: CPT | Mod: AI | Performed by: INTERNAL MEDICINE

## 2022-02-06 PROCEDURE — 99285 EMERGENCY DEPT VISIT HI MDM: CPT | Performed by: PHYSICIAN ASSISTANT

## 2022-02-06 PROCEDURE — 250N000011 HC RX IP 250 OP 636: Performed by: INTERNAL MEDICINE

## 2022-02-06 PROCEDURE — 96365 THER/PROPH/DIAG IV INF INIT: CPT | Performed by: PHYSICIAN ASSISTANT

## 2022-02-06 PROCEDURE — 85379 FIBRIN DEGRADATION QUANT: CPT | Performed by: PHYSICIAN ASSISTANT

## 2022-02-06 PROCEDURE — 87040 BLOOD CULTURE FOR BACTERIA: CPT | Performed by: PHYSICIAN ASSISTANT

## 2022-02-06 PROCEDURE — 83735 ASSAY OF MAGNESIUM: CPT | Performed by: PHYSICIAN ASSISTANT

## 2022-02-06 PROCEDURE — 93010 ELECTROCARDIOGRAM REPORT: CPT | Performed by: INTERNAL MEDICINE

## 2022-02-06 PROCEDURE — 83605 ASSAY OF LACTIC ACID: CPT | Performed by: PHYSICIAN ASSISTANT

## 2022-02-06 PROCEDURE — 250N000009 HC RX 250: Performed by: INTERNAL MEDICINE

## 2022-02-06 PROCEDURE — 84484 ASSAY OF TROPONIN QUANT: CPT | Performed by: PHYSICIAN ASSISTANT

## 2022-02-06 PROCEDURE — 99285 EMERGENCY DEPT VISIT HI MDM: CPT | Mod: 25 | Performed by: PHYSICIAN ASSISTANT

## 2022-02-06 PROCEDURE — C9803 HOPD COVID-19 SPEC COLLECT: HCPCS | Performed by: PHYSICIAN ASSISTANT

## 2022-02-06 PROCEDURE — 71045 X-RAY EXAM CHEST 1 VIEW: CPT | Mod: TC

## 2022-02-06 PROCEDURE — 36600 WITHDRAWAL OF ARTERIAL BLOOD: CPT | Performed by: PHYSICIAN ASSISTANT

## 2022-02-06 PROCEDURE — 258N000003 HC RX IP 258 OP 636: Performed by: PHYSICIAN ASSISTANT

## 2022-02-06 PROCEDURE — 258N000003 HC RX IP 258 OP 636: Performed by: INTERNAL MEDICINE

## 2022-02-06 PROCEDURE — 96361 HYDRATE IV INFUSION ADD-ON: CPT | Performed by: PHYSICIAN ASSISTANT

## 2022-02-06 PROCEDURE — 85027 COMPLETE CBC AUTOMATED: CPT | Performed by: PHYSICIAN ASSISTANT

## 2022-02-06 PROCEDURE — 96367 TX/PROPH/DG ADDL SEQ IV INF: CPT | Performed by: PHYSICIAN ASSISTANT

## 2022-02-06 PROCEDURE — 81001 URINALYSIS AUTO W/SCOPE: CPT | Performed by: PHYSICIAN ASSISTANT

## 2022-02-06 PROCEDURE — XW0DXM6 INTRODUCTION OF BARICITINIB INTO MOUTH AND PHARYNX, EXTERNAL APPROACH, NEW TECHNOLOGY GROUP 6: ICD-10-PCS | Performed by: INTERNAL MEDICINE

## 2022-02-06 PROCEDURE — 85652 RBC SED RATE AUTOMATED: CPT | Performed by: PHYSICIAN ASSISTANT

## 2022-02-06 PROCEDURE — 82805 BLOOD GASES W/O2 SATURATION: CPT | Performed by: PHYSICIAN ASSISTANT

## 2022-02-06 PROCEDURE — 80053 COMPREHEN METABOLIC PANEL: CPT | Performed by: PHYSICIAN ASSISTANT

## 2022-02-06 PROCEDURE — 87637 SARSCOV2&INF A&B&RSV AMP PRB: CPT | Performed by: PHYSICIAN ASSISTANT

## 2022-02-06 RX ORDER — SODIUM CHLORIDE 9 MG/ML
INJECTION, SOLUTION INTRAVENOUS CONTINUOUS
Status: DISCONTINUED | OUTPATIENT
Start: 2022-02-06 | End: 2022-02-08

## 2022-02-06 RX ORDER — NICOTINE POLACRILEX 4 MG
15-30 LOZENGE BUCCAL
Status: DISCONTINUED | OUTPATIENT
Start: 2022-02-06 | End: 2022-03-06 | Stop reason: HOSPADM

## 2022-02-06 RX ORDER — ONDANSETRON 4 MG/1
4 TABLET, ORALLY DISINTEGRATING ORAL EVERY 6 HOURS PRN
Status: DISCONTINUED | OUTPATIENT
Start: 2022-02-06 | End: 2022-03-06 | Stop reason: HOSPADM

## 2022-02-06 RX ORDER — ACETAMINOPHEN 650 MG/1
650 SUPPOSITORY RECTAL EVERY 6 HOURS PRN
Status: DISCONTINUED | OUTPATIENT
Start: 2022-02-06 | End: 2022-03-06 | Stop reason: HOSPADM

## 2022-02-06 RX ORDER — ALBUTEROL SULFATE 90 UG/1
2 AEROSOL, METERED RESPIRATORY (INHALATION) EVERY 4 HOURS PRN
Status: DISCONTINUED | OUTPATIENT
Start: 2022-02-06 | End: 2022-02-23

## 2022-02-06 RX ORDER — LIDOCAINE 40 MG/G
CREAM TOPICAL
Status: DISCONTINUED | OUTPATIENT
Start: 2022-02-06 | End: 2022-03-06 | Stop reason: HOSPADM

## 2022-02-06 RX ORDER — LISINOPRIL 2.5 MG/1
2.5 TABLET ORAL DAILY
Status: DISCONTINUED | OUTPATIENT
Start: 2022-02-06 | End: 2022-03-06 | Stop reason: HOSPADM

## 2022-02-06 RX ORDER — AMOXICILLIN 250 MG
2 CAPSULE ORAL 2 TIMES DAILY PRN
Status: DISCONTINUED | OUTPATIENT
Start: 2022-02-06 | End: 2022-03-06 | Stop reason: HOSPADM

## 2022-02-06 RX ORDER — ONDANSETRON 2 MG/ML
4 INJECTION INTRAMUSCULAR; INTRAVENOUS EVERY 6 HOURS PRN
Status: DISCONTINUED | OUTPATIENT
Start: 2022-02-06 | End: 2022-03-06 | Stop reason: HOSPADM

## 2022-02-06 RX ORDER — AMOXICILLIN 250 MG
1 CAPSULE ORAL 2 TIMES DAILY PRN
Status: DISCONTINUED | OUTPATIENT
Start: 2022-02-06 | End: 2022-03-06 | Stop reason: HOSPADM

## 2022-02-06 RX ORDER — CEFAZOLIN SODIUM 1 G/50ML
2000 SOLUTION INTRAVENOUS ONCE
Status: COMPLETED | OUTPATIENT
Start: 2022-02-06 | End: 2022-02-06

## 2022-02-06 RX ORDER — DEXAMETHASONE SODIUM PHOSPHATE 4 MG/ML
6 INJECTION, SOLUTION INTRA-ARTICULAR; INTRALESIONAL; INTRAMUSCULAR; INTRAVENOUS; SOFT TISSUE DAILY
Status: DISCONTINUED | OUTPATIENT
Start: 2022-02-06 | End: 2022-02-09

## 2022-02-06 RX ORDER — IBUPROFEN 600 MG/1
600 TABLET, FILM COATED ORAL EVERY 6 HOURS PRN
Status: DISCONTINUED | OUTPATIENT
Start: 2022-02-06 | End: 2022-03-06 | Stop reason: HOSPADM

## 2022-02-06 RX ORDER — DEXTROSE MONOHYDRATE 25 G/50ML
25-50 INJECTION, SOLUTION INTRAVENOUS
Status: DISCONTINUED | OUTPATIENT
Start: 2022-02-06 | End: 2022-03-06 | Stop reason: HOSPADM

## 2022-02-06 RX ORDER — ACETAMINOPHEN 325 MG/1
650 TABLET ORAL EVERY 6 HOURS PRN
Status: DISCONTINUED | OUTPATIENT
Start: 2022-02-06 | End: 2022-03-06 | Stop reason: HOSPADM

## 2022-02-06 RX ORDER — ATORVASTATIN CALCIUM 40 MG/1
40 TABLET, FILM COATED ORAL DAILY
Status: DISCONTINUED | OUTPATIENT
Start: 2022-02-07 | End: 2022-03-06 | Stop reason: HOSPADM

## 2022-02-06 RX ADMIN — TAZOBACTAM SODIUM AND PIPERACILLIN SODIUM 3.38 G: 375; 3 INJECTION, SOLUTION INTRAVENOUS at 11:52

## 2022-02-06 RX ADMIN — LISINOPRIL 2.5 MG: 2.5 TABLET ORAL at 15:00

## 2022-02-06 RX ADMIN — SODIUM CHLORIDE: 9 INJECTION, SOLUTION INTRAVENOUS at 12:22

## 2022-02-06 RX ADMIN — TAZOBACTAM SODIUM AND PIPERACILLIN SODIUM 3.38 G: 375; 3 INJECTION, SOLUTION INTRAVENOUS at 18:17

## 2022-02-06 RX ADMIN — REMDESIVIR 200 MG: 100 INJECTION, POWDER, LYOPHILIZED, FOR SOLUTION INTRAVENOUS at 15:01

## 2022-02-06 RX ADMIN — SODIUM CHLORIDE 50 ML: 9 INJECTION, SOLUTION INTRAVENOUS at 23:46

## 2022-02-06 RX ADMIN — SODIUM CHLORIDE 1000 ML: 9 INJECTION, SOLUTION INTRAVENOUS at 10:55

## 2022-02-06 RX ADMIN — ENOXAPARIN SODIUM 40 MG: 40 INJECTION SUBCUTANEOUS at 15:00

## 2022-02-06 RX ADMIN — BARICITINIB 4 MG: 2 TABLET, FILM COATED ORAL at 15:00

## 2022-02-06 RX ADMIN — VANCOMYCIN HYDROCHLORIDE 2000 MG: 1 INJECTION, POWDER, LYOPHILIZED, FOR SOLUTION INTRAVENOUS at 12:22

## 2022-02-06 RX ADMIN — DEXAMETHASONE SODIUM PHOSPHATE 6 MG: 4 INJECTION, SOLUTION INTRA-ARTICULAR; INTRALESIONAL; INTRAMUSCULAR; INTRAVENOUS; SOFT TISSUE at 15:00

## 2022-02-06 RX ADMIN — ACETAMINOPHEN 650 MG: 325 TABLET, FILM COATED ORAL at 15:51

## 2022-02-06 ASSESSMENT — MIFFLIN-ST. JEOR
SCORE: 1664.45
SCORE: 1674.43

## 2022-02-06 ASSESSMENT — ACTIVITIES OF DAILY LIVING (ADL)
ADLS_ACUITY_SCORE: 3

## 2022-02-06 ASSESSMENT — ENCOUNTER SYMPTOMS
AGITATION: 0
TREMORS: 0
FLANK PAIN: 0
EYE PAIN: 0
BACK PAIN: 0
COUGH: 1
FATIGUE: 1
SEIZURES: 0
CONFUSION: 0
STRIDOR: 0
NAUSEA: 0
ABDOMINAL PAIN: 0
MYALGIAS: 1
CHILLS: 1
VOMITING: 0
FACIAL SWELLING: 0
WEAKNESS: 1
ACTIVITY CHANGE: 1
FACIAL ASYMMETRY: 0
SHORTNESS OF BREATH: 1
FEVER: 1

## 2022-02-06 NOTE — PROGRESS NOTES
" NSG ADMISSION NOTE    Patient admitted to room 355 at approximately 1300 via wheel chair from emergency room. Patient was accompanied by transport tech.     Verbal SBAR report received from Bety prior to patient arrival.     Patient ambulated to bed with one assist. Patient alert and oriented X 3.  . Admission vital signs: Blood pressure 125/74, pulse 93, temperature (!) 101.1  F (38.4  C), temperature source Tympanic, resp. rate (!) 31, height 1.702 m (5' 7\"), weight 92.1 kg (203 lb), SpO2 (!) 89 %. Patient was oriented to plan of care, call light, bed controls, tv, telephone, bathroom and visiting hours.     Risk Assessment    The following safety risks were identified during admission: isolation. Yellow risk band applied: YES.     Skin Initial Assessment    This writer admitted this patient and completed a full skin assessment and Juliano score in the Adult PCS flowsheet. Appropriate interventions initiated as needed.            Education    Patient has a [place] to Observation order: No  Observation education completed and documented: N/A      Marisol Daniel RN    "

## 2022-02-06 NOTE — PROVIDER NOTIFICATION
02/06/22 1300   Initial Information   Patient Belongings remains with patient   Patient Belongings Remaining with Patient glasses;cell phone/electronics;clothing;shoes;wallet;ring   Did you bring any home meds/supplements to the hospital?  No   List items sent to safe: none    I have reviewed my belongings list on admission and verify that it is correct.     Patient signature_______________________________      I have received all my belongings noted above at discharge.    Patient signature________________________________

## 2022-02-06 NOTE — PROGRESS NOTES
RCAT completed and pt score scored 11.  RT recommends Prn Treatments.  RT will continue to follow and re-assess as needed.      Arthur Ibarra,  on 2/6/2022 at 11:49 AM

## 2022-02-06 NOTE — PHARMACY-ADMISSION MEDICATION HISTORY
Pharmacy -- Admission Medication Reconciliation    Prior to admission (PTA) medications were reviewed and the patient's PTA medication list was updated.    Sources Consulted: Patient phone interview, [place]    The reliability of this Medication Reconciliation is: Reliability: Reliable  -Patient overall very knowledgeable about medications, doses, and times of administration.    The following significant changes were made:  -Updated patient's preferred pharmacy ([facility])    -Removed Bisacodyl  -Removed Fluocinolone oil      **Notes:**  -patient reports he takes all supplements and aspirin in the morning and all prescription items (besides AM dose of Metformin) at night.    In addition, the patient's allergies were reviewed with the patient and left as follows:   Allergies: Patient has no known allergies.    The pharmacist has reviewed with the patient that all personal medications should be removed from the building or locked in the belongings safe.  Patient shall only take medications ordered by the physician and administered by the nursing staff.       Medication barriers identified: none noted   Medication adherence concerns: none noted   Understanding of emergency medications: N/A    Tiago Holguin RPH, 2/6/2022,  5:53 PM

## 2022-02-06 NOTE — PROGRESS NOTES
Patient Arrived in the ER on Ra SpO2 80% patient placed on HFNC 8L Patient SpO2 increased to 90-93% patient transported to 355 Remained on 8L.RCAT done this shift.    Arthur Ibarra, RT on 2/6/2022 at 5:55 PM

## 2022-02-06 NOTE — PHARMACY-CONSULT NOTE
Pharmacy- Renal Dose Adjustment    Patient Active Problem List   Diagnosis     History of malignant neoplasm of large intestine     Carpal tunnel syndrome, bilateral     Senile cataract     Inflammatory liver disease     Hypertriglyceridemia     Rupture of left distal biceps tendon     Status post tendon repair     Ureterolithiasis     Malignant neoplasm of colon, unspecified part of colon (H)     Chemotherapy-induced neuropathy (H)     S/P partial colectomy     History of nephrolithiasis     Elevated LFTs     Controlled type 2 diabetes mellitus with complication, without long-term current use of insulin (H)     Non morbid obesity due to excess calories     Seborrheic keratoses     Acute respiratory failure with hypoxia (H)     Leukocytosis, unspecified type     Pneumonia due to 2019 novel coronavirus     Pneumonia of both lungs due to infectious organism        Relevant Labs:  Recent Labs   Lab Test 02/06/22  1056 01/21/22  1310   WBC 41.9* 71.7*   HGB 11.6* 12.6*    127*        CrCl: 76.8 mL/min      Intake/Output Summary (Last 24 hours) at 2/6/2022 1352  Last data filed at 2/6/2022 1342  Gross per 24 hour   Intake 240 ml   Output --   Net 240 ml          Per Renal Dose Adjustment Protocol, will adjust:  -Pip/tazo to 3.375 g Q6H (indication of CAP, CrCl > 40 mL/min)      Will continue to follow and make adjustments accordingly. Thank You.    Tiago Holguin [facility] ....................  2/6/2022   1:52 PM

## 2022-02-06 NOTE — ED TRIAGE NOTES
"Pt here with wife, pt reports not feeling well x 10 days and had a positive covid test on Friday at home, pt reports sob, fever and body aches, pt into bay 8 via w.c, no acute distress, o2 sats 78% on RA, pt weaned up to 8 liters and finally up to low 90's, VSS  ED Nursing Triage Note (General)   ________________________________    Jesus C Abimael is a 61 year old Male that presents to triage private car  With history of  Sob and covid reported by patient   Significant symptoms had onset 10 week(s) ago.  BP (!) 177/90   Pulse 111   Temp (!) 101.1  F (38.4  C) (Tympanic)   Resp 24   Ht 1.702 m (5' 7\")   Wt 92.1 kg (203 lb)   SpO2 (!) 78%   BMI 31.79 kg/m  t  Patient appears alert  and oriented, in no acute distress., and cooperative and pleasant behavior.    GCS Total = 15  Airway: intact  Breathing noted as Normal  Circulation Normal  Skin:  Normal  Action taken:  Triage to critical care immediately      PRE HOSPITAL PRIOR LIVING SITUATION Spouse    "

## 2022-02-06 NOTE — H&P
[facility]  History and Physical  Hospitalist       Date of Admission:  2/6/2022    Assessment & Plan   Jesus Varghese is a 61 year old male who presents with dyspnea    >  Pneumonia due to 2019 novel coronavirus  >  Pneumonia of both lungs due to infectious organism  >  Acute respiratory failure with hypoxia (H)  >  History of malignant neoplasm of large intestine  >  Leukocytosis, unspecified type  He has developed respiratory failure from a combined COVID infection and pneumonia.  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   -- Vanco   -- Zosyn   -- Remdesevir   -- Dexamethasone   -- Xarelto   -- Baricitinib   -- RCAT        DVT Prophylaxis: Xarelto  Code Status: FULL    Filiberto John    Primary Care Physician   Filiberto John    Chief Complaint   dyspnea    History is obtained from the patient and chart review.    History of Present Illness   Jesus Varghese is a 61 year old male who presents with dyspnea.  He has been sick a few days. His granddaughter had a cough.  He tested positive for COVID at home.  Now getting more winded. Had declined COVID vaccine.  Being worked-up for new significant rise in WBC without initiation of chemotherapy at this time.    Past Medical History    I have reviewed this patient's medical history and updated it with pertinent information if needed.   Past Medical History:   Diagnosis Date     Age-related cataract     No Comments Provided     Calculus of kidney     No Comments Provided     Carpal tunnel syndrome     No Comments Provided     Diverticulosis of intestine without perforation or abscess without bleeding     mild     Dorsalgia     No Comments Provided     Inflammatory liver disease     ? cause 1982     Malignant neoplasm of colon (H)     2006     Other specified postprocedural states     1/14/2016     Pure hyperglyceridemia     No Comments Provided     Strain  of muscle, fascia and tendon of other parts of biceps, left arm, initial encounter     1/7/2016       Past Surgical History   I have reviewed this patient's surgical history and updated it with pertinent information if needed.  Past Surgical History:   Procedure Laterality Date     COLON SURGERY      8/22/06,Sigmoid colectomy for [facility] C2 adenocarcinoma     COLONOSCOPY      9/07/07,next due in 2010     COLONOSCOPY      08/30/2010,F/U 2015     COLONOSCOPY  10/19/2015    10/19/15,F/U 2020     EXTRACAPSULAR CATARACT EXTRATION WITH INTRAOCULAR LENS IMPLANT      2011, 2012,Bilateral cataracts R 2011.. L 2012     LAPAROSCOPIC CHOLECYSTECTOMY      04/24/07     OTHER SURGICAL HISTORY      10/11/06,941772,PORTACATH,Placement of Port-A-Cath, right anterior chest, for chemotherapy     OTHER SURGICAL HISTORY      08/31/2010,,HERNIA REPAIR,Mesh Underlay     OTHER SURGICAL HISTORY      2/12/15,411654,IR URETERAL STENT LEFT     OTHER SURGICAL HISTORY      1/14/2016,108903,OTHER,Left,arthrex equipment used     TONSILLECTOMY, ADENOIDECTOMY, COMBINED      age 1     VASECTOMY       x2, spontaneous reconnected, so needed 2nd procedure       Prior to Admission Medications   Prior to Admission Medications   Prescriptions Last Dose Informant Patient Reported? Taking?   Fluocinolone Acetonide Scalp 0.01 % OIL oil   Yes No   Sig: Apply topically At Bedtime   HEMP OIL OR EXTRACT OR OTHER CBD CANNABINOID, NOT MEDICAL CANNABIS,   Yes No   Sig: Take 4 drops by mouth At Bedtime    Vitamin D, Cholecalciferol, 25 MCG (1000 UT) CAPS   Yes No   aspirin 81 MG chewable tablet   No No   Sig: Take 1 tablet (81 mg) by mouth daily with food   atorvastatin (LIPITOR) 40 MG tablet   No No   Sig: Take 1 tablet (40 mg) by mouth daily   bisacodyl (DULCOLAX) 5 MG EC tablet   No No   Sig: Take as directed by colonoscopy prep instructions   blood glucose (NO BRAND SPECIFIED) lancets standard   No No   Sig: Dispense item covered by insurance. Test blood sugar  1 times daily.   blood glucose (NO BRAND SPECIFIED) test strip   No No   Sig: Dispense item covered by insurance. Test blood sugar 1 times daily.   blood glucose monitoring (NO BRAND SPECIFIED) meter device kit   No No   Sig: Dispense option covered by insurance. Test blood sugar 1 times daily.   lisinopril (ZESTRIL) 2.5 MG tablet   No No   Sig: Take 1 tablet (2.5 mg) by mouth daily   metFORMIN (GLUCOPHAGE) 500 MG tablet   No No   Sig: Take 2 tablets (1,000 mg) by mouth 2 times daily (with meals)   multivitamin, therapeutic (THERA-VIT) TABS tablet   Yes No   Sig: Take 1 tablet by mouth daily   sildenafil (REVATIO) 20 MG tablet   No No   Sig: Take 2-5 tablets ( mg) by mouth daily as needed (prior to intercourse)   zinc sulfate (ZINCATE) 220 (50 Zn) MG capsule   Yes No   Sig: Take 220 mg by mouth daily      Facility-Administered Medications: None     Allergies   No Known Allergies    Social History   I have reviewed this patient's social history and updated it with pertinent information if needed. Jesus Varghese  reports that he quit smoking about 11 years ago. His smoking use included cigarettes. He has a 60.00 pack-year smoking history. He has never used smokeless tobacco. He reports current alcohol use. He reports that he does not use drugs.    Family History   I have reviewed this patient's family history and updated it with pertinent information if needed.   Family History   Problem Relation Age of Onset     Hypertension Mother         Hypertension     Heart Disease Father         Heart Disease,40s and 50s     Heart Disease Other         Heart Disease,40s and 50s     Anesthesia Reaction No family hx of         Anesthesia Problem     Other - See Comments No family hx of         Stroke     Blood Disease No family hx of         Blood Disease     Bleeding Diathesis No family hx of        Review of Systems     REVIEW OF SYSTEMS:    Constitutional: normal energy and appetite, no recent sick contacts  Eyes: no  changes in vision  Ears, nose, mouth, throat, and face: no mouth sores, dysphagia, or odynophagia  Respiratory: see hpi  Cardiovascular: no chest pain, palpitations, orthopnea, increased lower extremity edema, or syncope.   Gastrointestinal: no constipation, diarrhea, nausea, vomiting or abdominal pain.  Genitourinary: no dysuria, hematuria, urgency or frequency.   Hematologic/lymphatic: see hpi  Musculoskeletal: no pain to extremities or falls.   Neurological: no new weakness, tingling, numbness.   Psychiatric: no hallucinations ordelusions.  Endocrine: not a known diabetic.    Additions to the above include: see hpi    Physical Exam   Temp: (!) 101.1  F (38.4  C) Temp src: Tympanic BP: (!) 177/90 Pulse: 111   Resp: 24 SpO2: (!) 78 % O2 Device: None (Room air)    Vital Signs with Ranges  Temp:  [101.1  F (38.4  C)] 101.1  F (38.4  C)  Pulse:  [111] 111  Resp:  [24] 24  BP: (177)/(90) 177/90  SpO2:  [78 %] 78 %  203 lbs 0 oz    Gen: Alert, NAD.  HEENT: MMM  Neck: Supple  CV: RRR no m/r/g  Pulm: rales and rhonchi bilaterally.  No increased work of breathing. Speaking in complete sentences.  Msk: No LE edema  Abd: Soft  Neuro: Grossly intact  Skin: No concerning lesions.  Psychiatric: Normal affect and insight. Does not appear anxious or depressed.        Data   Data reviewed today:  I personally reviewed see below.  Recent Labs   Lab 02/06/22  1056   WBC 41.9*   HGB 11.6*   MCV 79         POTASSIUM 3.9   CHLORIDE 96*   CO2 22   BUN 19   CR 1.07   ANIONGAP 16*   LILIAN 10.0   *   ALBUMIN 3.9   PROTTOTAL 6.8   BILITOTAL 0.7   ALKPHOS 79   *   AST 88*       Recent Results (from the past 24 hour(s))   XR Chest Port 1 View    Narrative    PROCEDURE INFORMATION:   Exam: XR Chest   Exam date and time: 2/6/2022 10:56 AM   Age: 61 years old   Clinical indication: Cough and shortness of breath; Patient HX: Covid19+;   Additional info: SOB, possible covid     TECHNIQUE:   Imaging protocol: XR of the  chest.   Views: 1 view.     COMPARISON:   CT CHEST W CONTRAST 1/28/2022 10:07 AM     FINDINGS:   Lungs: Patchy bilateral opacities may represent multifocal pneumonia including   COVID-19..   Pleural spaces: Unremarkable. No pleural effusion. No pneumothorax.   Heart/Mediastinum: Cardiomegaly   Bones/joints: Unremarkable.       Impression    IMPRESSION:   Patchy bilateral opacities may represent multifocal pneumonia including   COVID-19..     THIS DOCUMENT HAS BEEN ELECTRONICALLY SIGNED BY JACQUELINE KNAPP MD

## 2022-02-06 NOTE — ED NOTES
Critical and/or Actionable Test Result   Date: February 6, 2022     Time Received:  1120  Lab: Lactic Acid 3.6  Verbal Result Read Back to Caller:   Result:  expected No  MD Notification: Yes: Bruce Walsh MD Read Back:  Yes:   Nursing Plan: Admission

## 2022-02-06 NOTE — PHARMACY-VANCOMYCIN DOSING SERVICE
"Pharmacy Vancomycin Initial Note  Date of Service 2022  Patient's  1960  61 year old, male    Indication: Sepsis    Current estimated CrCl = Estimated Creatinine Clearance: 76.5 mL/min (based on SCr of 1.07 mg/dL).    Creatinine for last 3 days  2022: 10:56 AM Creatinine 1.07 mg/dL    Recent Vancomycin Level(s) for last 3 days  No results found for requested labs within last 72 hours.      Vancomycin IV Administrations (past 72 hours)      No vancomycin orders with administrations in past 72 hours.                Nephrotoxins and other renal medications (From now, onward)    Start     Dose/Rate Route Frequency Ordered Stop    22 1140  vancomycin (VANCOCIN) 2,000 mg in sodium chloride 0.9 % 500 mL intermittent infusion         2,000 mg  over 2 Hours Intravenous ONCE 22 1137      22 1135  piperacillin-tazobactam (ZOSYN) infusion 3.375 g        Note to Pharmacy: For [facility], [facility] and [facility]: For Zosyn-naive patients, use the \"Zosyn initial dose + extended infusion\" order panel.    3.375 g  100 mL/hr over 30 Minutes Intravenous EVERY 8 HOURS 22 1130            Contrast Orders - past 72 hours (72h ago, onward)            None              Plan:  1. Give vancomycin  2,000 mg IV x1 dose (loading dose). Further dosing pending clinical course/discretion of hospitalist, if patient admitted.  2. Vancomycin monitoring method: AUC  3. Vancomycin therapeutic monitoring goal: 400-600 mg*h/L  4. Pharmacy will check vancomycin levels as appropriate in 1-3 Days.    5. Serum creatinine levels will be ordered daily for the first week of therapy and at least twice weekly for subsequent weeks.      Tiago Holguin [facility]  "

## 2022-02-06 NOTE — PHARMACY-VANCOMYCIN DOSING SERVICE
"Pharmacy Vancomycin Initial Note  Date of Service 2022  Patient's  1960  61 year old, male    Indication: Sepsis/Community Acquired Pneumonia    Current estimated CrCl = Estimated Creatinine Clearance: 76.8 mL/min (based on SCr of 1.07 mg/dL).    Creatinine for last 3 days  2022: 10:56 AM Creatinine 1.07 mg/dL    Recent Vancomycin Level(s) for last 3 days  No results found for requested labs within last 72 hours.      Vancomycin IV Administrations (past 72 hours)                   vancomycin (VANCOCIN) 2,000 mg in sodium chloride 0.9 % 500 mL intermittent infusion (mg) 2,000 mg New Bag 22 1222                Nephrotoxins and other renal medications (From now, onward)    Start     Dose/Rate Route Frequency Ordered Stop    22 1000  vancomycin (VANCOCIN) 1,500 mg in sodium chloride 0.9 % 500 mL intermittent infusion         1,500 mg  over 90 Minutes Intravenous EVERY 24 HOURS 22 1406      22 1752  piperacillin-tazobactam (ZOSYN) infusion 3.375 g        Note to Pharmacy: For [facility], [facility] and [facility]: For Zosyn-naive patients, use the \"Zosyn initial dose + extended infusion\" order panel.    3.375 g  100 mL/hr over 30 Minutes Intravenous EVERY 6 HOURS 22 1351      22 1330  lisinopril (ZESTRIL) tablet 2.5 mg         2.5 mg Oral DAILY 22 1304      22 1304  ibuprofen (ADVIL/MOTRIN) tablet 600 mg         600 mg Oral EVERY 6 HOURS PRN 22 1304            Contrast Orders - past 72 hours (72h ago, onward)            None          [employer] Prediction of Planned Initial Vancomycin Regimen  Loading dose: 2,000 mg x1 on   Regimen: 1500 mg IV every 24 hours.  Start time: 00:22 on 2022  Exposure target: AUC24 (range)400-600 mg/L.hr   AUC24,ss: 417 mg/L.hr  Probability of AUC24 > 400: 55 %  Ctrough,ss: 11.3 mg/L  Probability of Ctrough,ss > 20: 12 %  Probability of nephrotoxicity (Lodise PJ ): 7 %          Plan:  1. Start vancomycin  1,500 mg IV q24h, " starting tomorrow at 1000 (patient received 2,000 mg load x1 in ED today).   2. Vancomycin monitoring method: AUC  3. Vancomycin therapeutic monitoring goal: 400-600 mg*h/L  4. Pharmacy will check vancomycin levels as appropriate in 1-3 Days.    5. Serum creatinine levels will be ordered daily for the first week of therapy and at least twice weekly for subsequent weeks.      Tiago Holguin RPH

## 2022-02-06 NOTE — ED PROVIDER NOTES
History     Chief Complaint   Patient presents with     Covid Concern     HPI  Jesus Varghese is a 61 year old male who has been undergoing thorough evaluation due to significant leukocytosis over the past couple weeks.  He has been seeing Dr. John as well Dr. Stark.  He had a recent CT as to his abdomen and pelvis which showed Numerous enlarged retroperitoneal and mesenteric lymph nodes throughout the abdomen as well as splenomegaly, concerning for hematologic malignancy.  Patchy ground glass opacities in the visualized lower lung may be due to an infectious or inflammatory process.  He has not been vaccinated for Covid as well however at his last clinic appointment he was encouraged to begin this and he declined.  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  Past medical history is also significant for inflammatory liver disease, rupture of the left biceps tendon, chemotherapy-induced neuropathy, elevated LFTs, and controlled diabetes mellitus type 2 without the need for insulin.  He reports not feeling well over the past 10 days he had a positive home Covid test on Friday.  He has had fever, shortness of breath and body aches as well.  Upon arrival his SaO2 is 78% on room air.  He has been weaned up to 8 L and his SaO2 is in the low to mid 90% range.  He is not appear to be in any respiratory distress.  He talks in full sentences.    Allergies:  No Known Allergies    Problem List:    Patient Active Problem List    Diagnosis Date Noted     Seborrheic keratoses 07/15/2019     Priority: Medium     Malignant neoplasm of colon, unspecified part of colon (H) 11/20/2018     Priority: Medium     Chemotherapy-induced neuropathy (H) 11/20/2018     Priority: Medium     S/P partial colectomy 11/20/2018     Priority: Medium     History of nephrolithiasis 11/20/2018     Priority: Medium     Elevated LFTs 11/20/2018     Priority: Medium     Controlled  type 2 diabetes mellitus with complication, without long-term current use of insulin (H) 11/20/2018     Priority: Medium     Non morbid obesity due to excess calories 11/20/2018     Priority: Medium     History of malignant neoplasm of large intestine 01/26/2018     Priority: Medium     Overview:   stage lll, T3N1MO       Carpal tunnel syndrome, bilateral 01/26/2018     Priority: Medium     Inflammatory liver disease 01/26/2018     Priority: Medium     Overview:   in 1983 after tattoo has been vaccinated for Hepatitis B since. He did have   marked jaundice and fatigue during that illness       Status post tendon repair 01/14/2016     Priority: Medium     Rupture of left distal biceps tendon 01/07/2016     Priority: Medium     Ureterolithiasis 02/11/2015     Priority: Medium     Senile cataract 08/23/2011     Priority: Medium     Hypertriglyceridemia 08/23/2011     Priority: Medium        Past Medical History:    Past Medical History:   Diagnosis Date     Age-related cataract      Calculus of kidney      Carpal tunnel syndrome      Diverticulosis of intestine without perforation or abscess without bleeding      Dorsalgia      Inflammatory liver disease      Malignant neoplasm of colon (H)      Other specified postprocedural states      Pure hyperglyceridemia      Strain of muscle, fascia and tendon of other parts of biceps, left arm, initial encounter        Past Surgical History:    Past Surgical History:   Procedure Laterality Date     COLON SURGERY      8/22/06,Sigmoid colectomy for [facility] C2 adenocarcinoma     COLONOSCOPY      9/07/07,next due in 2010     COLONOSCOPY      08/30/2010,F/U 2015     COLONOSCOPY  10/19/2015    10/19/15,F/U 2020     EXTRACAPSULAR CATARACT EXTRATION WITH INTRAOCULAR LENS IMPLANT      2011, 2012,Bilateral cataracts R 2011.. L 2012     LAPAROSCOPIC CHOLECYSTECTOMY      04/24/07     OTHER SURGICAL HISTORY      10/11/06,573000,PORTACATH,Placement of Port-A-Cath, right anterior chest, for  chemotherapy     OTHER SURGICAL HISTORY      2010,,HERNIA REPAIR,Mesh Underlay     OTHER SURGICAL HISTORY      2/12/15,641764,IR URETERAL STENT LEFT     OTHER SURGICAL HISTORY      2016,[id number],OTHER,Left,arthrex equipment used     TONSILLECTOMY, ADENOIDECTOMY, COMBINED      age 1     VASECTOMY       x2, spontaneous reconnected, so needed 2nd procedure       Family History:    Family History   Problem Relation Age of Onset     Hypertension Mother         Hypertension     Heart Disease Father         Heart Disease,40s and 50s     Heart Disease Other         Heart Disease,40s and 50s     Anesthesia Reaction No family hx of         Anesthesia Problem     Other - See Comments No family hx of         Stroke     Blood Disease No family hx of         Blood Disease     Bleeding Diathesis No family hx of        Social History:  Marital Status:   [2]  Social History     Tobacco Use     Smoking status: Former Smoker     Packs/day: 2.00     Years: 30.00     Pack years: 60.00     Types: Cigarettes     Quit date: 2010     Years since quittin.6     Smokeless tobacco: Never Used   Vaping Use     Vaping Use: Never used   Substance Use Topics     Alcohol use: Yes     Alcohol/week: 0.0 standard drinks     Comment: rare     Drug use: Never        Medications:    aspirin 81 MG chewable tablet  atorvastatin (LIPITOR) 40 MG tablet  bisacodyl (DULCOLAX) 5 MG EC tablet  blood glucose (NO BRAND SPECIFIED) lancets standard  blood glucose (NO BRAND SPECIFIED) test strip  blood glucose monitoring (NO BRAND SPECIFIED) meter device kit  Fluocinolone Acetonide Scalp 0.01 % OIL oil  HEMP OIL OR EXTRACT OR OTHER CBD CANNABINOID, NOT MEDICAL CANNABIS,  lisinopril (ZESTRIL) 2.5 MG tablet  metFORMIN (GLUCOPHAGE) 500 MG tablet  multivitamin, therapeutic (THERA-VIT) TABS tablet  sildenafil (REVATIO) 20 MG tablet  Vitamin D, Cholecalciferol, 25 MCG (1000 UT) CAPS  zinc sulfate (ZINCATE) 220 (50 Zn) MG  "capsule          Review of Systems   Constitutional: Positive for activity change, chills, fatigue and fever.   HENT: Negative for drooling and facial swelling.    Eyes: Negative for pain and visual disturbance.   Respiratory: Positive for cough and shortness of breath. Negative for stridor.    Cardiovascular: Negative for chest pain.   Gastrointestinal: Negative for abdominal pain, nausea and vomiting.   Genitourinary: Negative for flank pain.   Musculoskeletal: Positive for myalgias. Negative for back pain.   Skin: Negative for pallor.   Neurological: Positive for weakness. Negative for tremors, seizures and facial asymmetry.   Psychiatric/Behavioral: Negative for agitation and confusion.   All other systems reviewed and are negative.      Physical Exam   BP: (!) 177/90  Pulse: 111  Temp: (!) 101.1  F (38.4  C)  Resp: 24  Height: 170.2 cm (5' 7\")  Weight: 92.1 kg (203 lb)  SpO2: (!) 78 %      Physical Exam  Vitals and nursing note reviewed.   Constitutional:       General: He is not in acute distress.     Appearance: Normal appearance. He is not ill-appearing or toxic-appearing.   HENT:      Head: Normocephalic. No raccoon eyes, right periorbital erythema or left periorbital erythema.      Right Ear: No drainage or tenderness.      Left Ear: No drainage or tenderness.      Nose: Nose normal.   Eyes:      General: Lids are normal. Gaze aligned appropriately. No scleral icterus.     Extraocular Movements: Extraocular movements intact.   Neck:      Trachea: No tracheal deviation.   Cardiovascular:      Rate and Rhythm: Normal rate.   Pulmonary:      Effort: Pulmonary effort is normal. No respiratory distress.      Breath sounds: No stridor. No wheezing.      Comments: Lung sounds are clear but decreased.  SaO2 is 92% on 8 L nonrebreather mask.  He does not appear to be in respiratory distress.  Talks in full sentences.  No tachypnea.  Abdominal:      Tenderness: There is no abdominal tenderness.   Musculoskeletal:    "      General: No deformity or signs of injury. Normal range of motion.      Cervical back: Normal range of motion. No signs of trauma.   Skin:     General: Skin is warm and dry.      Coloration: Skin is not jaundiced or pale.   Neurological:      General: No focal deficit present.      Mental Status: He is alert and oriented to person, place, and time.      GCS: GCS eye subscore is 4. GCS verbal subscore is 5. GCS motor subscore is 6.      Motor: No tremor or seizure activity.   Psychiatric:         Attention and Perception: Attention normal.         Mood and Affect: Mood normal.         ED Course   EKG shows sinus tachycardia.  Possible inferior infarct, age undetermined heart rate  The Lactic acid level is elevated due to pneumonia, at this time there is no sign of severe sepsis or septic shock.         Results for orders placed or performed during the hospital encounter of 02/06/22 (from the past 24 hour(s))   Symptomatic; Unknown Influenza A/B & SARS-CoV2 (COVID-19) Virus PCR Multiplex Nose    Specimen: Nose; Swab   Result Value Ref Range    Influenza A PCR Negative Negative    Influenza B PCR Negative Negative    RSV PCR Negative Negative    SARS CoV2 PCR Positive (A) Negative    Narrative    Testing was performed using the Xpert Xpress CoV2/Flu/RSV Assay on the [employer] GeneXpert Instrument. This test should be ordered for the detection of SARS-CoV-2 and influenza viruses in individuals who meet clinical and/or epidemiological criteria. Test performance is unknown in asymptomatic patients. This test is for in vitro diagnostic use under the FDA EUA for laboratories certified under CLIA to perform high or moderate complexity testing. This test has not been FDA cleared or approved. A negative result does not rule out the presence of PCR inhibitors in the specimen or target RNA in concentration below the limit of detection for the assay. If only one viral target is positive but coinfection with multiple targets is  suspected, the sample should be re-tested with another FDA cleared, approved, or authorized test, if coinfection would change clinical management. This test was validated by the [facility]. These laboratories are certified under the Clinical  Laboratory Improvement Amendments of 1988 (CLIA-88) as qualified to perform high complexity laboratory testing.   Lactic acid whole blood STAT   Result Value Ref Range    Lactic Acid 3.6 (H) 0.7 - 2.0 mmol/L   CBC with platelets differential    Narrative    The following orders were created for panel order CBC with platelets differential.  Procedure                               Abnormality         Status                     ---------                               -----------         ------                     CBC with platelets and d...[[id number]]  Abnormal            Final result               Manual Differential[[id number]]          Abnormal            Final result                 Please view results for these tests on the individual orders.   D dimer quantitative   Result Value Ref Range    D-Dimer Quantitative 1.11 (H) 0.00 - 0.50 ug/mL FEU    Narrative    This D-dimer assay is intended for use in conjunction with a clinical pretest probability assessment model to exclude pulmonary embolism (PE) and deep venous thrombosis (DVT) in outpatients suspected of PE or DVT. The cut-off value is 0.50 ug/mL FEU.   Comprehensive metabolic panel   Result Value Ref Range    Sodium 134 134 - 144 mmol/L    Potassium 3.9 3.5 - 5.1 mmol/L    Chloride 96 (L) 98 - 107 mmol/L    Carbon Dioxide (CO2) 22 21 - 31 mmol/L    Anion Gap 16 (H) 3 - 14 mmol/L    Urea Nitrogen 19 7 - 25 mg/dL    Creatinine 1.07 0.70 - 1.30 mg/dL    Calcium 10.0 8.6 - 10.3 mg/dL    Glucose 148 (H) 70 - 105 mg/dL    Alkaline Phosphatase 79 34 - 104 U/L    AST 88 (H) 13 - 39 U/L     (H) 7 - 52 U/L    Protein Total 6.8 6.4 - 8.9 g/dL    Albumin 3.9 3.5 - 5.7 g/dL    Bilirubin Total 0.7 0.3 - 1.0  mg/dL    GFR Estimate 78 >60 mL/min/1.73m2   Troponin I   Result Value Ref Range    Troponin I 8.6 0.0 - 34.0 pg/mL   Magnesium   Result Value Ref Range    Magnesium 2.0 1.9 - 2.7 mg/dL   Nt probnp inpatient (BNP)   Result Value Ref Range    N terminal Pro BNP Inpatient 35 0 - 100 pg/mL   CRP inflammation   Result Value Ref Range    CRP Inflammation 278.1 (H) <10.0 mg/L   Erythrocyte sedimentation rate auto   Result Value Ref Range    Erythrocyte Sedimentation Rate 100 (H) 0 - 20 mm/hr   CBC with platelets and differential   Result Value Ref Range    WBC Count 41.9 (H) 4.0 - 11.0 10e3/uL    RBC Count 4.45 4.40 - 5.90 10e6/uL    Hemoglobin 11.6 (L) 13.3 - 17.7 g/dL    Hematocrit 35.0 (L) 40.0 - 53.0 %    MCV 79 78 - 100 fL    MCH 26.1 (L) 26.5 - 33.0 pg    MCHC 33.1 31.5 - 36.5 g/dL    RDW 16.7 (H) 10.0 - 15.0 %    Platelet Count 350 150 - 450 10e3/uL   Manual Differential   Result Value Ref Range    % Neutrophils 18 %    % Lymphocytes 81 %    % Monocytes 1 %    % Eosinophils 0 %    % Basophils 0 %    Absolute Neutrophils 7.5 1.6 - 8.3 10e3/uL    Absolute Lymphocytes 33.9 (H) 0.8 - 5.3 10e3/uL    Absolute Monocytes 0.4 0.0 - 1.3 10e3/uL    Absolute Eosinophils 0.0 0.0 - 0.7 10e3/uL    Absolute Basophils 0.0 0.0 - 0.2 10e3/uL    RBC Morphology Confirmed RBC Indices     Platelet Assessment  Automated Count Confirmed. Platelet morphology is normal.     Automated Count Confirmed. Platelet morphology is normal.    Elliptocytes Slight (A) None Seen    Reactive Lymphocytes Present (A) None Seen    Smudge Cells Present (A) None Seen   Blood gas arterial and oxyhgb   Result Value Ref Range    pH Arterial 7.49 (H) 7.35 - 7.45    pCO2 Arterial 30 (L) 35 - 45 mm Hg    pO2 Arterial 60 (L) 80 - 105 mm Hg    Bicarbonate Arterial 23 21 - 28 mmol/L    Oxyhemoglobin Arterial 92 92 - 100 %    Base Excess/Deficit (+/-) 0.5 -9.0 - 1.8 mmol/L    FIO2 52    XR Chest Port 1 View    Narrative    PROCEDURE INFORMATION:   Exam: XR Chest   Exam  date and time: 2/6/2022 10:56 AM   Age: 61 years old   Clinical indication: Cough and shortness of breath; Patient HX: Covid19+;   Additional info: SOB, possible covid     TECHNIQUE:   Imaging protocol: XR of the chest.   Views: 1 view.     COMPARISON:   CT CHEST W CONTRAST 1/28/2022 10:07 AM     FINDINGS:   Lungs: Patchy bilateral opacities may represent multifocal pneumonia including   COVID-19..   Pleural spaces: Unremarkable. No pleural effusion. No pneumothorax.   Heart/Mediastinum: Cardiomegaly   Bones/joints: Unremarkable.       Impression    IMPRESSION:   Patchy bilateral opacities may represent multifocal pneumonia including   COVID-19..     THIS DOCUMENT HAS BEEN ELECTRONICALLY SIGNED BY JACQUELINE KNAPP MD       Medications   0.9% sodium chloride BOLUS (0 mLs Intravenous Stopped 2/6/22 1210)     Followed by   sodium chloride 0.9% infusion ( Intravenous New Bag 2/6/22 1222)   piperacillin-tazobactam (ZOSYN) infusion 3.375 g (0 g Intravenous Stopped 2/6/22 1222)   vancomycin (VANCOCIN) 2,000 mg in sodium chloride 0.9 % 500 mL intermittent infusion (2,000 mg Intravenous New Bag 2/6/22 1222)       Assessments & Plan (with Medical Decision Making)     I have reviewed the nursing notes.    I have reviewed the findings, diagnosis, plan and need for follow up with the patient.       ED to Inpatient Handoff:    D[facility] with [facility] at [facility]  Patient accepted for Inpatient Stay  Pending studies include blood cultures  Code Status: Not Addressed           New Prescriptions    No medications on file       Final diagnoses:   Acute respiratory failure with hypoxia (H)   Pneumonia due to 2019 novel coronavirus   Leukocytosis, unspecified type     Febrile temp is one 1.1.  Vital signs show decreased SaO2 at 70% on room air.  He does not appear to be in respiratory distress.  His oxygen was started and he gets to the mid 90% range on 8 L via nonrebreather.  IV established and he was given fluids.  EKG shows sinus  tachycardia with possible inferior infarct, age undetermined heart rate is 101.  Troponin is normal.  His BNP is normal.  His CRP returns elevated at 278.1 and his ESR is elevated 100.  His ABG shows a pH of 7.49, PCO2 is 30, PO2 is 60, HCO3 of 23, base excess is 0.5, and his oxyhemoglobin is 92 with a FiO2 of 52.  His CBC returns with significant leukocytosis and white blood cells at 41.9 reviewing his chart he has been as high as 95 and he has been undergoing thoroughly evaluation for this with Dr. John and .  Blood cultures are pending.  Lactic acid returns elevated 3.6.  D-dimer returns elevated 1.11.  His influenza, RSV tests are negative but his Covid returns positive.  He is already immunocompromised.  CMP shows slight increase in his LFTs with his AST at 88 and his ALT at 137.  These have slightly worsened from 2 weeks ago.  Dr. John happened to be on call so I discussed this case with Dr. John and the patient will be admitted at this time for further medical management and evaluation.  He was started on Zosyn as well as vancomycin.    .  2/6/2022   [facility]     AndreMescalero Service UnitBruce francis PA-C  02/06/22 [id number]

## 2022-02-07 ENCOUNTER — TELEPHONE (OUTPATIENT)
Dept: ONCOLOGY | Facility: OTHER | Age: 62
End: 2022-02-07
Payer: COMMERCIAL

## 2022-02-07 LAB
ANION GAP SERPL CALCULATED.3IONS-SCNC: 11 MMOL/L (ref 3–14)
BUN SERPL-MCNC: 19 MG/DL (ref 7–25)
CALCIUM SERPL-MCNC: 9 MG/DL (ref 8.6–10.3)
CHLORIDE BLD-SCNC: 110 MMOL/L (ref 98–107)
CO2 SERPL-SCNC: 22 MMOL/L (ref 21–31)
CREAT SERPL-MCNC: 0.83 MG/DL (ref 0.7–1.3)
CRP SERPL-MCNC: 223.5 MG/L
ERYTHROCYTE [DISTWIDTH] IN BLOOD BY AUTOMATED COUNT: 17.2 % (ref 10–15)
GFR SERPL CREATININE-BSD FRML MDRD: >90 ML/MIN/1.73M2
GLUCOSE BLD-MCNC: 130 MG/DL (ref 70–105)
HCT VFR BLD AUTO: 33 % (ref 40–53)
HGB BLD-MCNC: 10.6 G/DL (ref 13.3–17.7)
MCH RBC QN AUTO: 25.7 PG (ref 26.5–33)
MCHC RBC AUTO-ENTMCNC: 32.1 G/DL (ref 31.5–36.5)
MCV RBC AUTO: 80 FL (ref 78–100)
PLATELET # BLD AUTO: 327 10E3/UL (ref 150–450)
POTASSIUM BLD-SCNC: 4.5 MMOL/L (ref 3.5–5.1)
PROCALCITONIN SERPL-MCNC: 0.86 NG/ML
RBC # BLD AUTO: 4.12 10E6/UL (ref 4.4–5.9)
SODIUM SERPL-SCNC: 143 MMOL/L (ref 134–144)
WBC # BLD AUTO: 36.5 10E3/UL (ref 4–11)

## 2022-02-07 PROCEDURE — 250N000011 HC RX IP 250 OP 636: Performed by: PHYSICIAN ASSISTANT

## 2022-02-07 PROCEDURE — 84145 PROCALCITONIN (PCT): CPT | Performed by: FAMILY MEDICINE

## 2022-02-07 PROCEDURE — 250N000011 HC RX IP 250 OP 636: Performed by: INTERNAL MEDICINE

## 2022-02-07 PROCEDURE — 99232 SBSQ HOSP IP/OBS MODERATE 35: CPT | Performed by: FAMILY MEDICINE

## 2022-02-07 PROCEDURE — 80048 BASIC METABOLIC PNL TOTAL CA: CPT | Performed by: INTERNAL MEDICINE

## 2022-02-07 PROCEDURE — 85027 COMPLETE CBC AUTOMATED: CPT | Performed by: INTERNAL MEDICINE

## 2022-02-07 PROCEDURE — 250N000009 HC RX 250: Performed by: INTERNAL MEDICINE

## 2022-02-07 PROCEDURE — 36415 COLL VENOUS BLD VENIPUNCTURE: CPT | Performed by: INTERNAL MEDICINE

## 2022-02-07 PROCEDURE — 250N000013 HC RX MED GY IP 250 OP 250 PS 637: Performed by: INTERNAL MEDICINE

## 2022-02-07 PROCEDURE — 258N000003 HC RX IP 258 OP 636: Performed by: INTERNAL MEDICINE

## 2022-02-07 PROCEDURE — 999N000157 HC STATISTIC RCP TIME EA 10 MIN

## 2022-02-07 PROCEDURE — 258N000003 HC RX IP 258 OP 636: Performed by: PHYSICIAN ASSISTANT

## 2022-02-07 PROCEDURE — 86140 C-REACTIVE PROTEIN: CPT | Performed by: FAMILY MEDICINE

## 2022-02-07 PROCEDURE — 250N000012 HC RX MED GY IP 250 OP 636 PS 637: Performed by: INTERNAL MEDICINE

## 2022-02-07 PROCEDURE — 120N000001 HC R&B MED SURG/OB

## 2022-02-07 RX ADMIN — INSULIN ASPART 1 UNITS: 100 INJECTION, SOLUTION INTRAVENOUS; SUBCUTANEOUS at 11:09

## 2022-02-07 RX ADMIN — ENOXAPARIN SODIUM 40 MG: 40 INJECTION SUBCUTANEOUS at 11:12

## 2022-02-07 RX ADMIN — DEXAMETHASONE SODIUM PHOSPHATE 6 MG: 4 INJECTION, SOLUTION INTRA-ARTICULAR; INTRALESIONAL; INTRAMUSCULAR; INTRAVENOUS; SOFT TISSUE at 11:03

## 2022-02-07 RX ADMIN — SODIUM CHLORIDE 50 ML: 9 INJECTION, SOLUTION INTRAVENOUS at 20:18

## 2022-02-07 RX ADMIN — TAZOBACTAM SODIUM AND PIPERACILLIN SODIUM 3.38 G: 375; 3 INJECTION, SOLUTION INTRAVENOUS at 17:50

## 2022-02-07 RX ADMIN — TAZOBACTAM SODIUM AND PIPERACILLIN SODIUM 3.38 G: 375; 3 INJECTION, SOLUTION INTRAVENOUS at 05:46

## 2022-02-07 RX ADMIN — TAZOBACTAM SODIUM AND PIPERACILLIN SODIUM 3.38 G: 375; 3 INJECTION, SOLUTION INTRAVENOUS at 23:05

## 2022-02-07 RX ADMIN — SODIUM CHLORIDE: 9 INJECTION, SOLUTION INTRAVENOUS at 08:01

## 2022-02-07 RX ADMIN — VANCOMYCIN HYDROCHLORIDE 1500 MG: 500 INJECTION, POWDER, LYOPHILIZED, FOR SOLUTION INTRAVENOUS at 11:20

## 2022-02-07 RX ADMIN — TAZOBACTAM SODIUM AND PIPERACILLIN SODIUM 3.38 G: 375; 3 INJECTION, SOLUTION INTRAVENOUS at 00:09

## 2022-02-07 RX ADMIN — REMDESIVIR 100 MG: 100 INJECTION, POWDER, LYOPHILIZED, FOR SOLUTION INTRAVENOUS at 18:04

## 2022-02-07 RX ADMIN — ACETAMINOPHEN 650 MG: 325 TABLET, FILM COATED ORAL at 13:54

## 2022-02-07 RX ADMIN — INSULIN ASPART 1 UNITS: 100 INJECTION, SOLUTION INTRAVENOUS; SUBCUTANEOUS at 18:12

## 2022-02-07 RX ADMIN — TAZOBACTAM SODIUM AND PIPERACILLIN SODIUM 3.38 G: 375; 3 INJECTION, SOLUTION INTRAVENOUS at 13:43

## 2022-02-07 RX ADMIN — BARICITINIB 4 MG: 2 TABLET, FILM COATED ORAL at 11:15

## 2022-02-07 RX ADMIN — LISINOPRIL 2.5 MG: 2.5 TABLET ORAL at 11:15

## 2022-02-07 RX ADMIN — ATORVASTATIN CALCIUM 40 MG: 40 TABLET, FILM COATED ORAL at 11:15

## 2022-02-07 ASSESSMENT — ACTIVITIES OF DAILY LIVING (ADL)
ADLS_ACUITY_SCORE: 5
ADLS_ACUITY_SCORE: 3
ADLS_ACUITY_SCORE: 3
ADLS_ACUITY_SCORE: 7
ADLS_ACUITY_SCORE: 3
ADLS_ACUITY_SCORE: 3
ADLS_ACUITY_SCORE: 5
ADLS_ACUITY_SCORE: 7
ADLS_ACUITY_SCORE: 3
ADLS_ACUITY_SCORE: 3
ADLS_ACUITY_SCORE: 7
ADLS_ACUITY_SCORE: 5
ADLS_ACUITY_SCORE: 3
ADLS_ACUITY_SCORE: 7
ADLS_ACUITY_SCORE: 7
ADLS_ACUITY_SCORE: 3
ADLS_ACUITY_SCORE: 3
ADLS_ACUITY_SCORE: 5
ADLS_ACUITY_SCORE: 3
ADLS_ACUITY_SCORE: 3
ADLS_ACUITY_SCORE: 7
ADLS_ACUITY_SCORE: 7

## 2022-02-07 NOTE — ASSESSMENT & PLAN NOTE
Presenting with dyspnea and oxygen requirement  Chest x-ray imaging showing bilateral multifocal pneumonia consistent with Covid-19  Concern with elevated white count of a possible bacterial process,  he had been started on IV antibiotics as well, fairly broad-spectrum and that concern of some underlying hematologic disease, these have been stopped  Has been started on dexamethasone, baricitinib, with Lovenox for clot prophylaxis  Finished 14 days of Baricitinib, 10 days of decadron   CRP elevated yesterday at 78  White blood cell count trending down, currently at 9800, most likely some underlying CLL which will need outpatient follow-up by oncology  Due to worsening sxs last week, zosyn added, was transitioned to oral augmentin, course finished and was stop

## 2022-02-07 NOTE — PROGRESS NOTES
Patient resting with eyes closed. RR noted to be 30 with sleep. Sats 88% on 9LPM with high flow. High flow increased to 10LPM. Sats immediately improved to 93%. Patient is afebrile. Coarse crackles noted to left lower lobe. RL and LL lung bases diminished. Patient complains of generalized body aches rating a 2/10. No additional needs or concerns at this time.

## 2022-02-07 NOTE — TELEPHONE ENCOUNTER
tested positive for covid yesterday. Symptoms since 1/23/22 per day surgery nurse. Currently in patient for fever,shortness of breath, and body aches. Low saO2 saturation.   Call placed to patient and message left  that bone marrow biopsy will be rescheduled for March 10 and preadmission nurse will call with arrival time.   Ivonne Rees RN...........2/7/2022 11:56 AM

## 2022-02-07 NOTE — PHARMACY-VANCOMYCIN DOSING SERVICE
"Pharmacy Vancomycin Note  Date of Service 2022  Patient's  1960   61 year old, male    Indication: Sepsis/CAP  Day of Therapy: 2  Current vancomycin regimen:  1,500 mg IV q24h  Current vancomycin monitoring method: AUC  Current vancomycin therapeutic monitoring goal: 400-600 mg*h/L    Current estimated CrCl = Estimated Creatinine Clearance: 99 mL/min (based on SCr of 0.83 mg/dL).    Creatinine for last 3 days  2022: 10:56 AM Creatinine 1.07 mg/dL  2022:  7:25 AM Creatinine 0.83 mg/dL    Recent Vancomycin Levels (past 3 days)  No results found for requested labs within last 72 hours.    Vancomycin IV Administrations (past 72 hours)                   vancomycin (VANCOCIN) 1,500 mg in sodium chloride 0.9 % 500 mL intermittent infusion (mg) 1,500 mg New Bag 22 1120    vancomycin (VANCOCIN) 2,000 mg in sodium chloride 0.9 % 500 mL intermittent infusion (mg) 2,000 mg New Bag 22 1222                Nephrotoxins and other renal medications (From now, onward)    Start     Dose/Rate Route Frequency Ordered Stop    22 0600  vancomycin (VANCOCIN) 1,500 mg in sodium chloride 0.9 % 500 mL intermittent infusion         1,500 mg  over 90 Minutes Intravenous EVERY 18 HOURS 22 1319      22 1752  piperacillin-tazobactam (ZOSYN) infusion 3.375 g        Note to Pharmacy: For [facility], [facility] and [facility]: For Zosyn-naive patients, use the \"Zosyn initial dose + extended infusion\" order panel.    3.375 g  100 mL/hr over 30 Minutes Intravenous EVERY 6 HOURS 22 1351      22 1330  lisinopril (ZESTRIL) tablet 2.5 mg         2.5 mg Oral DAILY 22 1304      22 1304  ibuprofen (ADVIL/MOTRIN) tablet 600 mg         600 mg Oral EVERY 6 HOURS PRN 22 1304               Contrast Orders - past 72 hours (72h ago, onward)            None          Interpretation of levels and current regimen:  Has serum creatinine changed greater than 50% in last 72 hours: No    Urine output:  good " urine output    Renal Function: Stable    Insight[employer] Prediction of Planned New Vancomycin Regimen  Loading dose: N/A  Regimen: 1500 mg IV every 18 hours.  Start time: 11:20 on 02/08/2022  Exposure target: AUC24 (range)400-600 mg/L.hr   AUC24,ss: 456 mg/L.hr  Probability of AUC24 > 400: 64 %  Ctrough,ss: 12.9 mg/L  Probability of Ctrough,ss > 20: 19 %  Probability of nephrotoxicity (Lodise PJ 2009): 8 %      Plan:   Increase dosing frequency to Vancomycin 1,500 mg Q18H  1. Vancomycin monitoring method: AUC  2. Vancomycin therapeutic monitoring goal: 400-600 mg*h/L  3. Pharmacy will check vancomycin levels as appropriate in 1-3 Days.  4. Serum creatinine levels will be ordered daily for the first week of therapy and at least twice weekly for subsequent weeks.    Tiago Holguin, [facility]

## 2022-02-07 NOTE — ASSESSMENT & PLAN NOTE
Presenting with increased dyspnea after testing positive for Covid on home testing at home  Covid test positive in the ED, requiring supplemental oxygen  Slow improvement in oxygen needs, currently at 6-7 L HFNC, but desats easily with any activity  Reviewed with pulmonology, Dr. Cortés, on 2/24/2022  Continue antibiotics, no other changes unless decompensates  Started on Life 2000 device which supplies PEEP, varying with FiO2 at 40%  Especially likes and does well with the 3 settings that he can adjust upward with increased dyspnea and anticipation of needs with increased activity  Getting more confident with breathing, working with PT/OT and feeling better  Continue with current plan, likely home in next few days  3/2/2022-overnight having episodes of nightmares, awoke to have his machine off with sats in the 60s.  Feeling more concerned about his safety at home.  Spoke with  and will explore other options including possible ARU  3/3/2022-Better evening last nite, after long discussion with wife, he and she are looking for discharge home. Awaiting to get assurance for payment of Life 2000 and to get oxygen arranged.

## 2022-02-07 NOTE — PROGRESS NOTES
Shortness of breath talking/eating.  sats drop into mid  Eighties when up.  Headache--tylenol.  Dry cough with sternal pain when he has a coughing spell.

## 2022-02-07 NOTE — ASSESSMENT & PLAN NOTE
Fairly markedly elevated white count, recent history of elevated leukocytosis without clear cause  Concern is a possible underlying hematologic disorder, work-up in progress  Has been seen by oncology with flow cytometry showing probable CLL  will need outpatient follow-up

## 2022-02-07 NOTE — PROGRESS NOTES
Weights reviewed per MST screen: pt has had significant wt loss if ~7% in past month. Has been feeling poorly for ~10 days with positive COVID 19. Does also have hx of malignant neoplasm of large intestine and more recent diagnosis of leukocytosis. Appetite here has been good so far at % of carb controlled diet. Will monitor intakes and consider adding supplements if intakes decline. Follow up in 1-5 days.   Wt Readings from Last 10 Encounters:   02/06/22 89.5 kg (197 lb 4.8 oz)   01/27/22 92.1 kg (203 lb)   01/21/22 93.9 kg (207 lb)   01/13/22 96.9 kg (213 lb 9.6 oz)   12/04/20 93 kg (205 lb)   02/11/20 101.8 kg (224 lb 8 oz)   07/15/19 99.3 kg (219 lb)   02/27/19 100.4 kg (221 lb 4.8 oz)   11/20/18 101.1 kg (222 lb 12.8 oz)   08/21/18 96 kg (211 lb 9.6 oz)   Nirmala Sunshine RD on 2/7/2022 at 9:31 AM

## 2022-02-07 NOTE — PROGRESS NOTES
Incentive Spirometry education completed.  Pt goal 3000 mls.  Pt achieved 2000 mls.  Pt instructed to perform 10/hr while awake with at least one deep breath and cough per hour until able to perform baseline activity.  RT will follow and re-assess as need.      Iraida Curtis, RT on 2/7/2022 at 5:32 PM

## 2022-02-07 NOTE — PLAN OF CARE
Pt admitted with pneumonia from COVID. LS are diminished with crackles to left lower lobe. Dry cough. At baseline pt breaths are shallow. RR 24-28 and up to 40 with activity. Sats dropped to 77% after ambulating from bathroom. Oxygen sats up to 92% on 9LPM with high flow with deep breathing. Placed on continuous sat monitoring.

## 2022-02-07 NOTE — PROGRESS NOTES
[facility]    Medicine Progress Note - Hospitalist Service    Date of Admission:  2/6/2022    Assessment & Plan          Acute respiratory failure with hypoxia (H)  Presenting with increased dyspnea after testing positive for Covid on home testing at home  Covid test positive in the ED, requiring supplemental oxygen  Admitted, currently on 10 L high flow nasal cannula  Continue oxygen supplementation, wean as able    Pneumonia due to 2019 novel coronavirus  Presenting with dyspnea and oxygen requirement  Chest x-ray imaging showing bilateral multifocal pneumonia consistent with Covid-19  Concern with elevated white count of a possible bacterial process, so he has been started on IV antibiotics as well, fairly broad-spectrum and that concern of some underlying hematologic disease  Clinically stable, continue current treatment for now  Has been started on dexamethasone, baricitinib, remdesivir with Lovenox for clot prophylaxis    Leukocytosis, unspecified type  Fairly markedly elevated white count, recent history of elevated leukocytosis without clear cause  Concern is a possible underlying hematologic disorder, work-up in progress  Monitor white blood count, continue broad-spectrum antibiotic coverage           Diet: Moderate Consistent Carb (60 g CHO per Meal) Diet    DVT Prophylaxis: Enoxaparin (Lovenox) SQ  Portillo Catheter: Not present  Central Lines: None  Cardiac Monitoring: None  Code Status: Full Code      Disposition Plan   Expected Discharge:  2 to 3 days  Anticipated discharge location: home with family    Delays:  Controlled oxygen requirements       The patient's care was discussed with the Patient.    Aston Hutchison MD  Hospitalist Service  [facility]  Securely message with the Vocera Web Console (learn more here)  Text page via [employer] Paging/Directory         Clinically Significant Risk Factors Present on Admission             # Diabetes, type II: last A1C  "6.6 % (Ref range: 4.0 - 6.2 %)  # Obesity: Estimated body mass index is 30 kg/m  as calculated from the following:    Height as of this encounter: 1.727 m (5' 8\").    Weight as of this encounter: 89.5 kg (197 lb 4.8 oz).      ______________________________________________________________________    Interval History   States he is feeling better since yesterday.  Still requiring supplemental oxygen with desaturations with any activity.  Slight cough, denies any chest pain, abdominal pain or diarrhea.  Denies any fevers    Data reviewed today: I reviewed all medications, new labs and imaging results over the last 24 hours. I personally reviewed no images or EKG's today.    Physical Exam   Vital Signs: Temp: 98.1  F (36.7  C) Temp src: Tympanic BP: 114/66 Pulse: 64   Resp: 30 SpO2: 94 % O2 Device: High Flow Nasal Cannula (HFNC) Oxygen Delivery: 10 LPM  Weight: 197 lbs 4.8 oz  Constitutional: awake, alert, cooperative, no apparent distress, and appears stated age  Respiratory: Crackles in each lung field otherwise clear, no wheezing  Cardiovascular: regular rate and rhythm  GI: Soft, nondistended, nontender    Data   Results for orders placed or performed during the hospital encounter of 02/06/22 (from the past 24 hour(s))   UA with Microscopic reflex to Culture    Specimen: Urine, Midstream   Result Value Ref Range    Color Urine Yellow Colorless, Straw, Light Yellow, Yellow    Appearance Urine Slightly Cloudy (A) Clear    Glucose Urine Negative Negative mg/dL    Bilirubin Urine Negative Negative    Ketones Urine Negative Negative mg/dL    Specific Gravity Urine 1.028 1.000 - 1.030    Blood Urine Negative Negative    pH Urine 5.5 5.0 - 9.0    Protein Albumin Urine 30  (A) Negative mg/dL    Urobilinogen Urine Normal Normal, 2.0 mg/dL    Nitrite Urine Negative Negative    Leukocyte Esterase Urine Negative Negative    Mucus Urine Present (A) None Seen /LPF    RBC Urine 1 <=2 /HPF    WBC Urine 1 <=5 /HPF    Hyaline Casts " Urine 1 <=2 /LPF    Narrative    Urine Culture not indicated   Basic metabolic panel   Result Value Ref Range    Sodium 143 134 - 144 mmol/L    Potassium 4.5 3.5 - 5.1 mmol/L    Chloride 110 (H) 98 - 107 mmol/L    Carbon Dioxide (CO2) 22 21 - 31 mmol/L    Anion Gap 11 3 - 14 mmol/L    Urea Nitrogen 19 7 - 25 mg/dL    Creatinine 0.83 0.70 - 1.30 mg/dL    Calcium 9.0 8.6 - 10.3 mg/dL    Glucose 130 (H) 70 - 105 mg/dL    GFR Estimate >90 >60 mL/min/1.73m2   CBC with platelets   Result Value Ref Range    WBC Count 36.5 (H) 4.0 - 11.0 10e3/uL    RBC Count 4.12 (L) 4.40 - 5.90 10e6/uL    Hemoglobin 10.6 (L) 13.3 - 17.7 g/dL    Hematocrit 33.0 (L) 40.0 - 53.0 %    MCV 80 78 - 100 fL    MCH 25.7 (L) 26.5 - 33.0 pg    MCHC 32.1 31.5 - 36.5 g/dL    RDW 17.2 (H) 10.0 - 15.0 %    Platelet Count 327 150 - 450 10e3/uL   CRP inflammation   Result Value Ref Range    CRP Inflammation 223.5 (H) <10.0 mg/L   Procalcitonin   Result Value Ref Range    Procalcitonin 0.86 <0.50 ng/mL ng/mL

## 2022-02-08 LAB
ANION GAP SERPL CALCULATED.3IONS-SCNC: 11 MMOL/L (ref 3–14)
BUN SERPL-MCNC: 25 MG/DL (ref 7–25)
CALCIUM SERPL-MCNC: 8.7 MG/DL (ref 8.6–10.3)
CHLORIDE BLD-SCNC: 106 MMOL/L (ref 98–107)
CO2 SERPL-SCNC: 22 MMOL/L (ref 21–31)
CREAT SERPL-MCNC: 0.87 MG/DL (ref 0.7–1.3)
CRP SERPL-MCNC: 113.1 MG/L
D DIMER PPP FEU-MCNC: 1.29 UG/ML FEU (ref 0–0.5)
ERYTHROCYTE [DISTWIDTH] IN BLOOD BY AUTOMATED COUNT: 17 % (ref 10–15)
FIBRINOGEN PPP-MCNC: 725 MG/DL (ref 170–490)
GFR SERPL CREATININE-BSD FRML MDRD: >90 ML/MIN/1.73M2
GLUCOSE BLD-MCNC: 158 MG/DL (ref 70–105)
HCT VFR BLD AUTO: 32.8 % (ref 40–53)
HGB BLD-MCNC: 10.6 G/DL (ref 13.3–17.7)
MCH RBC QN AUTO: 25.7 PG (ref 26.5–33)
MCHC RBC AUTO-ENTMCNC: 32.3 G/DL (ref 31.5–36.5)
MCV RBC AUTO: 80 FL (ref 78–100)
PLATELET # BLD AUTO: 389 10E3/UL (ref 150–450)
POTASSIUM BLD-SCNC: 4 MMOL/L (ref 3.5–5.1)
RBC # BLD AUTO: 4.12 10E6/UL (ref 4.4–5.9)
SODIUM SERPL-SCNC: 139 MMOL/L (ref 134–144)
VANCOMYCIN SERPL-MCNC: 12 MG/L
WBC # BLD AUTO: 36.6 10E3/UL (ref 4–11)

## 2022-02-08 PROCEDURE — 80048 BASIC METABOLIC PNL TOTAL CA: CPT | Performed by: INTERNAL MEDICINE

## 2022-02-08 PROCEDURE — 250N000011 HC RX IP 250 OP 636: Performed by: FAMILY MEDICINE

## 2022-02-08 PROCEDURE — 85379 FIBRIN DEGRADATION QUANT: CPT | Performed by: FAMILY MEDICINE

## 2022-02-08 PROCEDURE — 99232 SBSQ HOSP IP/OBS MODERATE 35: CPT | Performed by: FAMILY MEDICINE

## 2022-02-08 PROCEDURE — 258N000003 HC RX IP 258 OP 636: Performed by: PHYSICIAN ASSISTANT

## 2022-02-08 PROCEDURE — 258N000003 HC RX IP 258 OP 636: Performed by: FAMILY MEDICINE

## 2022-02-08 PROCEDURE — 250N000011 HC RX IP 250 OP 636: Performed by: INTERNAL MEDICINE

## 2022-02-08 PROCEDURE — 250N000013 HC RX MED GY IP 250 OP 250 PS 637: Performed by: INTERNAL MEDICINE

## 2022-02-08 PROCEDURE — 86140 C-REACTIVE PROTEIN: CPT | Performed by: FAMILY MEDICINE

## 2022-02-08 PROCEDURE — 85014 HEMATOCRIT: CPT | Performed by: INTERNAL MEDICINE

## 2022-02-08 PROCEDURE — 85384 FIBRINOGEN ACTIVITY: CPT | Performed by: FAMILY MEDICINE

## 2022-02-08 PROCEDURE — 80202 ASSAY OF VANCOMYCIN: CPT | Performed by: FAMILY MEDICINE

## 2022-02-08 PROCEDURE — 120N000001 HC R&B MED SURG/OB

## 2022-02-08 PROCEDURE — 272N000054 HC CANNULA HIGH FLOW, ADULT

## 2022-02-08 PROCEDURE — 999N000157 HC STATISTIC RCP TIME EA 10 MIN

## 2022-02-08 PROCEDURE — 36415 COLL VENOUS BLD VENIPUNCTURE: CPT | Performed by: FAMILY MEDICINE

## 2022-02-08 RX ORDER — SODIUM CHLORIDE 9 MG/ML
INJECTION, SOLUTION INTRAVENOUS CONTINUOUS
Status: DISCONTINUED | OUTPATIENT
Start: 2022-02-08 | End: 2022-02-18

## 2022-02-08 RX ADMIN — SODIUM CHLORIDE: 9 INJECTION, SOLUTION INTRAVENOUS at 05:42

## 2022-02-08 RX ADMIN — VANCOMYCIN HYDROCHLORIDE 1250 MG: 500 INJECTION, POWDER, LYOPHILIZED, FOR SOLUTION INTRAVENOUS at 18:05

## 2022-02-08 RX ADMIN — TAZOBACTAM SODIUM AND PIPERACILLIN SODIUM 3.38 G: 375; 3 INJECTION, SOLUTION INTRAVENOUS at 17:10

## 2022-02-08 RX ADMIN — ATORVASTATIN CALCIUM 40 MG: 40 TABLET, FILM COATED ORAL at 09:51

## 2022-02-08 RX ADMIN — INSULIN ASPART 1 UNITS: 100 INJECTION, SOLUTION INTRAVENOUS; SUBCUTANEOUS at 08:36

## 2022-02-08 RX ADMIN — VANCOMYCIN HYDROCHLORIDE 1500 MG: 500 INJECTION, POWDER, LYOPHILIZED, FOR SOLUTION INTRAVENOUS at 06:12

## 2022-02-08 RX ADMIN — ACETAMINOPHEN 650 MG: 325 TABLET, FILM COATED ORAL at 15:21

## 2022-02-08 RX ADMIN — IBUPROFEN 600 MG: 600 TABLET, FILM COATED ORAL at 11:34

## 2022-02-08 RX ADMIN — DEXAMETHASONE SODIUM PHOSPHATE 6 MG: 4 INJECTION, SOLUTION INTRA-ARTICULAR; INTRALESIONAL; INTRAMUSCULAR; INTRAVENOUS; SOFT TISSUE at 09:50

## 2022-02-08 RX ADMIN — TAZOBACTAM SODIUM AND PIPERACILLIN SODIUM 3.38 G: 375; 3 INJECTION, SOLUTION INTRAVENOUS at 05:42

## 2022-02-08 RX ADMIN — TAZOBACTAM SODIUM AND PIPERACILLIN SODIUM 3.38 G: 375; 3 INJECTION, SOLUTION INTRAVENOUS at 11:34

## 2022-02-08 RX ADMIN — LISINOPRIL 2.5 MG: 2.5 TABLET ORAL at 09:51

## 2022-02-08 RX ADMIN — INSULIN ASPART 2 UNITS: 100 INJECTION, SOLUTION INTRAVENOUS; SUBCUTANEOUS at 17:11

## 2022-02-08 RX ADMIN — INSULIN ASPART 2 UNITS: 100 INJECTION, SOLUTION INTRAVENOUS; SUBCUTANEOUS at 11:38

## 2022-02-08 RX ADMIN — BARICITINIB 4 MG: 2 TABLET, FILM COATED ORAL at 09:50

## 2022-02-08 RX ADMIN — ENOXAPARIN SODIUM 40 MG: 40 INJECTION SUBCUTANEOUS at 09:50

## 2022-02-08 ASSESSMENT — ACTIVITIES OF DAILY LIVING (ADL)
ADLS_ACUITY_SCORE: 5
ADLS_ACUITY_SCORE: 7
ADLS_ACUITY_SCORE: 5
ADLS_ACUITY_SCORE: 7
ADLS_ACUITY_SCORE: 5
ADLS_ACUITY_SCORE: 7
ADLS_ACUITY_SCORE: 5
ADLS_ACUITY_SCORE: 7
ADLS_ACUITY_SCORE: 5
ADLS_ACUITY_SCORE: 5
ADLS_ACUITY_SCORE: 7
ADLS_ACUITY_SCORE: 5

## 2022-02-08 ASSESSMENT — MIFFLIN-ST. JEOR: SCORE: 1662.63

## 2022-02-08 NOTE — PROGRESS NOTES
[facility]    Medicine Progress Note - Hospitalist Service    Date of Admission:  2/6/2022    Assessment & Plan          Acute respiratory failure with hypoxia (H)  Presenting with increased dyspnea after testing positive for Covid on home testing at home  Covid test positive in the ED, requiring supplemental oxygen  Admitted, currently on vapotherm, 25 liters 65-75 %  Continue oxygen supplementation, wean as able    Pneumonia due to 2019 novel coronavirus  Presenting with dyspnea and oxygen requirement  Chest x-ray imaging showing bilateral multifocal pneumonia consistent with Covid-19  Concern with elevated white count of a possible bacterial process, so he has been started on IV antibiotics as well, fairly broad-spectrum and that concern of some underlying hematologic disease  Clinically stable, continue current treatment for now  Has been started on dexamethasone, baricitinib, with Lovenox for clot prophylaxis  Family requests stopping remdisivir and will do that    Leukocytosis, unspecified type  Fairly markedly elevated white count, recent history of elevated leukocytosis without clear cause  Concern is a possible underlying hematologic disorder, work-up in progress  Monitor white blood count, continue broad-spectrum antibiotic coverage           Diet: Moderate Consistent Carb (60 g CHO per Meal) Diet    DVT Prophylaxis: Enoxaparin (Lovenox) SQ  Portillo Catheter: Not present  Central Lines: None  Cardiac Monitoring: None  Code Status: Full Code      Disposition Plan   Expected Discharge:  3-4 days   Anticipated discharge location: home with family    Delays:   improved respiratory status        The patient's care was discussed with the Patient and Patient's Family.    Aston Hutchison MD  Hospitalist Service  [facility]  Securely message with the Vocera Web Console (learn more here)  Text page via [employer] Paging/Directory         Clinically Significant Risk Factors  "Present on Admission             # Diabetes, type II: last A1C 6.6 % (Ref range: 4.0 - 6.2 %)  # Overweight: Estimated body mass index is 29.94 kg/m  as calculated from the following:    Height as of this encounter: 1.727 m (5' 8\").    Weight as of this encounter: 89.3 kg (196 lb 14.4 oz).      ______________________________________________________________________    Interval History   Feeling somewhat more short of air, on vapotherm now at 25 L 65%. Otherwise, minimal pain, appetite poor. No diarrhea    Data reviewed today: I reviewed all medications, new labs and imaging results over the last 24 hours. I personally reviewed no images or EKG's today.    Physical Exam   Vital Signs: Temp: 100  F (37.8  C) Temp src: Tympanic BP: 130/71 Pulse: 94   Resp: 30 SpO2: 92 % O2 Device: High Flow Nasal Cannula (HFNC) (vapotherm) Oxygen Delivery: 25 LPM  Weight: 196 lbs 14.4 oz  Constitutional: awake, alert, cooperative and mild distress, anxious  Respiratory: Lungs with crackles both side, no wheeze  Cardiovascular: regular rate and rhythm  GI: normal bowel sounds, soft and non-distended    Data   Recent Labs   Lab 02/08/22  0721 02/07/22  0725 02/06/22  1056   WBC 36.6* 36.5* 41.9*   HGB 10.6* 10.6* 11.6*   MCV 80 80 79    327 350    143 134   POTASSIUM 4.0 4.5 3.9   CHLORIDE 106 110* 96*   CO2 22 22 22   BUN 25 19 19   CR 0.87 0.83 1.07   ANIONGAP 11 11 16*   LILIAN 8.7 9.0 10.0   * 130* 148*   ALBUMIN  --   --  3.9   PROTTOTAL  --   --  6.8   BILITOTAL  --   --  0.7   ALKPHOS  --   --  79   ALT  --   --  137*   AST  --   --  88*     "

## 2022-02-08 NOTE — PROGRESS NOTES
Patient sitting in bed, requested to use bathroom. Unable to ambulate to bathroom due to length of Vapotherm tubing and patient intolerance to activity. Patient FiO2 turned to 100% from 65% prior to activity. Patient up, pivot to commode. After using the commode, patient pivoted to recliner. Patient visibly SOB, tachypneic. Patient allowed time to recover after activity in recliner. RT at bedside. Patient able to be weaned back to 70%FiO2 at that time. Will continue to monitor.

## 2022-02-08 NOTE — PROGRESS NOTES
Shift Summary  Pt is on 10 LPM High Flow. Pt gets Short of breath eating and talking. Started Incentive Spirometry and Pt did 1500 mL initially, and improved to 2000 mL on a goal of 3000 mL. Pt alert and oriented during visits.    Iraida Curtis, RT

## 2022-02-08 NOTE — PLAN OF CARE
"Patient's O2 88-92% on 15 L via high-flow nasal cannula w/ humidification. Desats with minimal activity. Decreases to 83-85% while talking. O2 reached 69% after returning from walk to bathroom. RT aware and following patient's progress. Alternated right and left side-lying positions. Attempted proning; patient stated his \"heart slowed down and began to flutter\" so he turned back over. LS clear.  Breaths shallow. SBA to ambulate. Denies pain.     Temp: 98  F (36.7  C) Temp src: Tympanic BP: 131/74 Pulse: 64   Resp: 16 SpO2: 92 % O2 Device: High Flow Nasal Cannula (HFNC) Oxygen Delivery: 15 LPM         "

## 2022-02-08 NOTE — PROGRESS NOTES
Patient placed on Vapotherm over night. O2 needs increasing and not able to hold sats above 85-88% on 15L. Patient placed on 25L and 65%. See previous not for more info.     Russell Vasquez RRT

## 2022-02-08 NOTE — PHARMACY-VANCOMYCIN DOSING SERVICE
"Pharmacy Vancomycin Note  Date of Service 2022  Patient's  1960   61 year old, male    Indication: Sepsis/CAP  Day of Therapy: 3  Current vancomycin regimen:  1,500 mg IV q18h  Current vancomycin monitoring method: AUC  Current vancomycin therapeutic monitoring goal: 400-600 mg*h/L    [employer] Prediction of Current Vancomycin Regimen  Loading dose: N/A  Regimen: 1500 mg IV every 18 hours.  Start time: 00:12 on 2022  Exposure target: AUC24 (range)400-600 mg/L.hr   AUC24,ss: 371 mg/L.hr  Probability of AUC24 > 400: 37 %  Ctrough,ss: 10 mg/L  Probability of Ctrough,ss > 20: 7 %  Probability of nephrotoxicity (Lodise PJ ): 6 %      Current estimated CrCl = Estimated Creatinine Clearance: 94.4 mL/min (based on SCr of 0.87 mg/dL).    Creatinine for last 3 days  2022: 10:56 AM Creatinine 1.07 mg/dL  2022:  7:25 AM Creatinine 0.83 mg/dL  2022:  7:21 AM Creatinine 0.87 mg/dL    Recent Vancomycin Levels (past 3 days)  2022:  1:17 PM Vancomycin 12.0 mg/L    Vancomycin IV Administrations (past 72 hours)                   vancomycin (VANCOCIN) 1,500 mg in sodium chloride 0.9 % 500 mL intermittent infusion (mg) 1,500 mg New Bag 22 0612    vancomycin (VANCOCIN) 1,500 mg in sodium chloride 0.9 % 500 mL intermittent infusion (mg) 1,500 mg New Bag 22 1120    vancomycin (VANCOCIN) 2,000 mg in sodium chloride 0.9 % 500 mL intermittent infusion (mg) 2,000 mg New Bag 22 1222                Nephrotoxins and other renal medications (From now, onward)    Start     Dose/Rate Route Frequency Ordered Stop    22 1800  vancomycin (VANCOCIN) 1,250 mg in sodium chloride 0.9 % 250 mL intermittent infusion         1,250 mg  over 60-90 Minutes Intravenous EVERY 12 HOURS 22 1416      22 1752  piperacillin-tazobactam (ZOSYN) infusion 3.375 g        Note to Pharmacy: For [facility], [facility] and [facility]: For Zosyn-naive patients, use the \"Zosyn initial dose + extended infusion\" order " panel.    3.375 g  100 mL/hr over 30 Minutes Intravenous EVERY 6 HOURS 02/06/22 1351      02/06/22 1330  lisinopril (ZESTRIL) tablet 2.5 mg         2.5 mg Oral DAILY 02/06/22 1304      02/06/22 1304  ibuprofen (ADVIL/MOTRIN) tablet 600 mg         600 mg Oral EVERY 6 HOURS PRN 02/06/22 1304               Contrast Orders - past 72 hours (72h ago, onward)            None          Interpretation of levels and current regimen:  Vancomycin level is reflective of AUC less than 400    Has serum creatinine changed greater than 50% in last 72 hours: No    Urine output:  good urine output    Renal Function: Stable    InsightRX Prediction of Planned New Vancomycin Regimen  Loading dose: N/A  Regimen: 1250 mg IV every 12 hours.  Start time: 00:12 on 02/09/2022  Exposure target: AUC24 (range)400-600 mg/L.hr   AUC24,ss: 461 mg/L.hr  Probability of AUC24 > 400: 73 %  Ctrough,ss: 14.3 mg/L  Probability of Ctrough,ss > 20: 20 %  Probability of nephrotoxicity (Lodise PJ 2009): 9 %      Plan:  1. Change Vancomycin regimen to 1,250 mg Q12H  2. Vancomycin monitoring method: AUC  3. Vancomycin therapeutic monitoring goal: 400-600 mg*h/L  4. Pharmacy will check vancomycin levels as appropriate in 1-3 Days.  5. Serum creatinine levels will be ordered daily for the first week of therapy and at least twice weekly for subsequent weeks.    Tiago Holguin [facility]

## 2022-02-08 NOTE — PROGRESS NOTES
Our [facility] contacted by this RN. Pastor Barrera will call patient's room phone at 1400. Patient aware. No new concerns at this time. Will continue to monitor.     Rain Falk RN on 2/8/2022 at 11:51 AM

## 2022-02-08 NOTE — PROGRESS NOTES
Vapotherm ordered and has been set-up by Russell Vasquez.  Vapotherm ID number is [id number].  Initial settings are FiO2 50, Temp 33 degress C, 20 lpm Flow.  Humidity has been set-up and unit has standard Circuit in place.  Cannula size chosen was Adult. Device was set-up Without [employer] unit.  RT will continue to follow and check on pt and device Q2.    Russell Vasquez RRT

## 2022-02-08 NOTE — PROGRESS NOTES
Patient expressed interest in having a  speak with him. Patient informed that a  may not be able to meet him in person due to his COVID-19 positive status. Patient consented to have iPad or Phone conversation with . Will continue to monitor.

## 2022-02-08 NOTE — PLAN OF CARE
Patient admitted for acute respiratory failure with hypoxia. COVID-19 + 2/6/22. VSS and WNL. O2 stable on 13LPM via HFNC. Clear LS. C/o pain to R hip, relieved with PRN Tylenol, Ibuprofen and ice packs. Patient up in chair most of shift. Using IS at bedside. Voiding spontaneously. Good appetite. Will continue to monitor. Temp: 98.2  F (36.8  C) Temp src: Tympanic BP: 110/70 Pulse: 66   Resp: 20 SpO2: 91 % O2 Device: High Flow Nasal Cannula (HFNC) Oxygen Delivery: 13 LPM     Rain Falk RN on 2/8/2022 at 6:22 PM

## 2022-02-08 NOTE — PROGRESS NOTES
Patient upright in chair most of shift. Able to wean Vapotherm significantly. Will continue to monitor.

## 2022-02-09 PROBLEM — E11.8 CONTROLLED TYPE 2 DIABETES MELLITUS WITH COMPLICATION, WITHOUT LONG-TERM CURRENT USE OF INSULIN (H): Status: ACTIVE | Noted: 2018-11-20

## 2022-02-09 LAB
ANION GAP SERPL CALCULATED.3IONS-SCNC: 11 MMOL/L (ref 3–14)
BASE EXCESS BLDA CALC-SCNC: -0.6 MMOL/L (ref -9–1.8)
BUN SERPL-MCNC: 21 MG/DL (ref 7–25)
CALCIUM SERPL-MCNC: 9.4 MG/DL (ref 8.6–10.3)
CHLORIDE BLD-SCNC: 105 MMOL/L (ref 98–107)
CO2 SERPL-SCNC: 24 MMOL/L (ref 21–31)
CREAT SERPL-MCNC: 0.87 MG/DL (ref 0.7–1.3)
CRP SERPL-MCNC: 116 MG/L
D DIMER PPP FEU-MCNC: 2.07 UG/ML FEU (ref 0–0.5)
ERYTHROCYTE [DISTWIDTH] IN BLOOD BY AUTOMATED COUNT: 17.1 % (ref 10–15)
GFR SERPL CREATININE-BSD FRML MDRD: >90 ML/MIN/1.73M2
GLUCOSE BLD-MCNC: 103 MG/DL (ref 70–105)
GLUCOSE BLDC GLUCOMTR-MCNC: 83 MG/DL (ref 70–99)
HCO3 BLD-SCNC: 22 MMOL/L (ref 21–28)
HCT VFR BLD AUTO: 33.6 % (ref 40–53)
HGB BLD-MCNC: 10.7 G/DL (ref 13.3–17.7)
MCH RBC QN AUTO: 25.7 PG (ref 26.5–33)
MCHC RBC AUTO-ENTMCNC: 31.8 G/DL (ref 31.5–36.5)
MCV RBC AUTO: 81 FL (ref 78–100)
O2/TOTAL GAS SETTING VFR VENT: 21 %
OXYHGB MFR BLD: 86 % (ref 92–100)
PCO2 BLD: 29 MM HG (ref 35–45)
PH BLD: 7.48 [PH] (ref 7.35–7.45)
PLATELET # BLD AUTO: 411 10E3/UL (ref 150–450)
PO2 BLD: 50 MM HG (ref 80–105)
POTASSIUM BLD-SCNC: 4.4 MMOL/L (ref 3.5–5.1)
RBC # BLD AUTO: 4.16 10E6/UL (ref 4.4–5.9)
SODIUM SERPL-SCNC: 140 MMOL/L (ref 134–144)
WBC # BLD AUTO: 35.2 10E3/UL (ref 4–11)

## 2022-02-09 PROCEDURE — 82805 BLOOD GASES W/O2 SATURATION: CPT | Performed by: FAMILY MEDICINE

## 2022-02-09 PROCEDURE — 250N000013 HC RX MED GY IP 250 OP 250 PS 637: Performed by: INTERNAL MEDICINE

## 2022-02-09 PROCEDURE — 258N000003 HC RX IP 258 OP 636: Performed by: FAMILY MEDICINE

## 2022-02-09 PROCEDURE — 250N000011 HC RX IP 250 OP 636: Performed by: INTERNAL MEDICINE

## 2022-02-09 PROCEDURE — 85379 FIBRIN DEGRADATION QUANT: CPT | Performed by: FAMILY MEDICINE

## 2022-02-09 PROCEDURE — 80048 BASIC METABOLIC PNL TOTAL CA: CPT | Performed by: INTERNAL MEDICINE

## 2022-02-09 PROCEDURE — 86140 C-REACTIVE PROTEIN: CPT | Performed by: FAMILY MEDICINE

## 2022-02-09 PROCEDURE — 36415 COLL VENOUS BLD VENIPUNCTURE: CPT | Performed by: FAMILY MEDICINE

## 2022-02-09 PROCEDURE — 250N000011 HC RX IP 250 OP 636: Performed by: FAMILY MEDICINE

## 2022-02-09 PROCEDURE — 85027 COMPLETE CBC AUTOMATED: CPT | Performed by: INTERNAL MEDICINE

## 2022-02-09 PROCEDURE — 120N000001 HC R&B MED SURG/OB

## 2022-02-09 PROCEDURE — 36600 WITHDRAWAL OF ARTERIAL BLOOD: CPT | Performed by: FAMILY MEDICINE

## 2022-02-09 PROCEDURE — 999N000157 HC STATISTIC RCP TIME EA 10 MIN

## 2022-02-09 PROCEDURE — 99232 SBSQ HOSP IP/OBS MODERATE 35: CPT | Performed by: INTERNAL MEDICINE

## 2022-02-09 RX ORDER — ERGOCALCIFEROL 1.25 MG/1
50000 CAPSULE, LIQUID FILLED ORAL
Status: DISCONTINUED | OUTPATIENT
Start: 2022-02-09 | End: 2022-03-06 | Stop reason: HOSPADM

## 2022-02-09 RX ORDER — PIPERACILLIN SODIUM, TAZOBACTAM SODIUM 4; .5 G/20ML; G/20ML
4.5 INJECTION, POWDER, LYOPHILIZED, FOR SOLUTION INTRAVENOUS EVERY 6 HOURS
Status: DISCONTINUED | OUTPATIENT
Start: 2022-02-09 | End: 2022-02-13

## 2022-02-09 RX ORDER — MULTIPLE VITAMINS W/ MINERALS TAB 9MG-400MCG
1 TAB ORAL DAILY
Status: DISCONTINUED | OUTPATIENT
Start: 2022-02-09 | End: 2022-03-06 | Stop reason: HOSPADM

## 2022-02-09 RX ORDER — CYCLOBENZAPRINE HCL 10 MG
10 TABLET ORAL 3 TIMES DAILY
Status: DISCONTINUED | OUTPATIENT
Start: 2022-02-09 | End: 2022-02-26

## 2022-02-09 RX ADMIN — Medication 1 TABLET: at 17:33

## 2022-02-09 RX ADMIN — DEXAMETHASONE SODIUM PHOSPHATE 6 MG: 4 INJECTION, SOLUTION INTRA-ARTICULAR; INTRALESIONAL; INTRAMUSCULAR; INTRAVENOUS; SOFT TISSUE at 11:05

## 2022-02-09 RX ADMIN — ENOXAPARIN SODIUM 40 MG: 40 INJECTION SUBCUTANEOUS at 21:26

## 2022-02-09 RX ADMIN — INSULIN ASPART 4 UNITS: 100 INJECTION, SOLUTION INTRAVENOUS; SUBCUTANEOUS at 17:34

## 2022-02-09 RX ADMIN — ERGOCALCIFEROL 50000 UNITS: 1.25 CAPSULE ORAL at 17:51

## 2022-02-09 RX ADMIN — BARICITINIB 4 MG: 2 TABLET, FILM COATED ORAL at 11:06

## 2022-02-09 RX ADMIN — ACETAMINOPHEN 650 MG: 325 TABLET, FILM COATED ORAL at 08:54

## 2022-02-09 RX ADMIN — INSULIN ASPART 1 UNITS: 100 INJECTION, SOLUTION INTRAVENOUS; SUBCUTANEOUS at 11:49

## 2022-02-09 RX ADMIN — CYCLOBENZAPRINE 10 MG: 10 TABLET, FILM COATED ORAL at 14:20

## 2022-02-09 RX ADMIN — PIPERACILLIN AND TAZOBACTAM 4.5 G: 4; .5 INJECTION, POWDER, LYOPHILIZED, FOR SOLUTION INTRAVENOUS at 17:51

## 2022-02-09 RX ADMIN — ATORVASTATIN CALCIUM 40 MG: 40 TABLET, FILM COATED ORAL at 11:06

## 2022-02-09 RX ADMIN — PIPERACILLIN AND TAZOBACTAM 4.5 G: 4; .5 INJECTION, POWDER, LYOPHILIZED, FOR SOLUTION INTRAVENOUS at 12:48

## 2022-02-09 RX ADMIN — TAZOBACTAM SODIUM AND PIPERACILLIN SODIUM 3.38 G: 375; 3 INJECTION, SOLUTION INTRAVENOUS at 00:16

## 2022-02-09 RX ADMIN — CYCLOBENZAPRINE 10 MG: 10 TABLET, FILM COATED ORAL at 21:26

## 2022-02-09 RX ADMIN — VANCOMYCIN HYDROCHLORIDE 1250 MG: 500 INJECTION, POWDER, LYOPHILIZED, FOR SOLUTION INTRAVENOUS at 06:00

## 2022-02-09 RX ADMIN — TAZOBACTAM SODIUM AND PIPERACILLIN SODIUM 3.38 G: 375; 3 INJECTION, SOLUTION INTRAVENOUS at 05:23

## 2022-02-09 RX ADMIN — ENOXAPARIN SODIUM 40 MG: 40 INJECTION SUBCUTANEOUS at 11:06

## 2022-02-09 RX ADMIN — LISINOPRIL 2.5 MG: 2.5 TABLET ORAL at 11:06

## 2022-02-09 ASSESSMENT — ACTIVITIES OF DAILY LIVING (ADL)
ADLS_ACUITY_SCORE: 5

## 2022-02-09 ASSESSMENT — MIFFLIN-ST. JEOR: SCORE: 1741.1

## 2022-02-09 NOTE — PROGRESS NOTES
[facility]    Hospitalist Progress Note      Assessment & Plan   Jesus Varghese is a 61 year old male who was admitted on 2/6/2022.     Acute respiratory failure with hypoxia (H): Secondary to COVID-19 pneumonia and possible bacterial pneumonia.  Stable on Vapotherm with FiO2 at 80%.  - dexamethasone, baricitinib (day 4), with Lovenox for clot prophylaxis  - Family requests stopping remdisivir and discontinued after 2 doses  -Vancomycin/Zosyn day 4 will discontinue Vanco given cultures negative  -Family request multivitamin and vitamin D supplement and ordered  -Wean FiO2 as able    Diabetes type 2: Well controlled last hemoglobin A1c of 6.6  -ISS AC at bedtime  -Resume Metformin as an outpatient     CLL: Evaluated by oncology on 1/21 and flow cytometry consistent.  Given his lymphadenopathy will need reevaluation by oncology regarding consideration for treatment after repeat coverage from the above infection.  -Monitor white count daily  -Follow-up with oncology as an outpatient     Diet: Moderate Consistent Carb (60 g CHO per Meal) Diet    DVT Prophylaxis: Enoxaparin (Lovenox) SQ  Portillo Catheter: Not present  Code Status: Full Code           Disposition Plan   Expected Discharge: Pending  Anticipated discharge location: home with family      Entered: Daniel Purdy MD 02/09/2022, 4:06 PM       The patient's care was discussed with the Bedside Nurse, Patient and Patient's Family.  Personally updated patient's wife today, very appreciative of the information.    Daniel Purdy MD  Hospitalist Service  [facility]  Contact information available via [facility] Paging/Directory    ______________________________________________________________________    Interval History   CC: Covid    Overnight 1 further fever but breathing feels slightly better today, no productive cough, no wheezing, no increased lower extremity edema no DVT related symptoms, eating normally, no other new  complaints.    -Data reviewed today: I reviewed all new labs and imaging results over the last 24 hours. I personally reviewed no images or EKG's today.    Physical Exam   Temp: 99.1  F (37.3  C) Temp src: Tympanic BP: 129/70 Pulse: 64   Resp: 22 SpO2: 97 % O2 Device: High Flow Nasal Cannula (HFNC) (Vapotherm) Oxygen Delivery: 30 LPM  Vitals:    02/06/22 1330 02/08/22 0235 02/09/22 0026   Weight: 89.5 kg (197 lb 4.8 oz) 89.3 kg (196 lb 14.4 oz) 97.2 kg (214 lb 3.2 oz)     Vital Signs with Ranges  Temp:  [97.8  F (36.6  C)-101.1  F (38.4  C)] 99.1  F (37.3  C)  Pulse:  [59-94] 64  Resp:  [18-22] 22  BP: (106-153)/(57-73) 129/70  FiO2 (%):  [60 %-80 %] 80 %  SpO2:  [85 %-98 %] 97 %  I/O last 3 completed shifts:  In: 200 [P.O.:200]  Out: 1550 [Urine:1550]    Exam:   GENERAL: Talkative, sitting up in bed, in no apparent distress.  CARDIOVASCULAR: regular rate and rhythm, no murmurs, rubs, or gallops. Normal S1/S2. No lower extremity edema.   RESPIRATORY: Diffuse air movement in all fields, Velcro-like crackles throughout his bilateral lung fields,   no wheezes, no crackles.   GI: soft, non-tender, non-distended, normoactive bowel sounds.  MUSCULOSKELETAL: warm and well perfused, 2+ dorsalis pedis pulses bilaterally.  Calves bilaterally soft and symmetric.  SKIN: no pallor,jaundice, or rashes    Medications     - MEDICATION INSTRUCTIONS -       sodium chloride 10 mL/hr at 02/08/22 0949       atorvastatin  40 mg Oral Daily     baricitinib  4 mg Oral Daily     cyclobenzaprine  10 mg Oral TID     [START ON 2/10/2022] dexamethasone  6 mg Oral Daily     enoxaparin ANTICOAGULANT  40 mg Subcutaneous Q12H     insulin aspart  1-7 Units Subcutaneous TID AC     insulin aspart  1-5 Units Subcutaneous At Bedtime     lisinopril  2.5 mg Oral Daily     multivitamin w/minerals  1 tablet Oral Daily     piperacillin-tazobactam  4.5 g Intravenous Q6H     sodium chloride (PF)  3 mL Intracatheter Q8H     vancomycin  1,250 mg Intravenous Q12H      vitamin D2  50,000 Units Oral Q7 Days       Data   Recent Labs   Lab 02/09/22  0823 02/09/22  0550 02/08/22  0721 02/07/22  0725 02/06/22  1056   WBC  --  35.2* 36.6* 36.5* 41.9*   HGB  --  10.7* 10.6* 10.6* 11.6*   MCV  --  81 80 80 79   PLT  --  411 389 327 350   NA  --  140 139 143 134   POTASSIUM  --  4.4 4.0 4.5 3.9   CHLORIDE  --  105 106 110* 96*   CO2  --  24 22 22 22   BUN  --  21 25 19 19   CR  --  0.87 0.87 0.83 1.07   ANIONGAP  --  11 11 11 16*   LILIAN  --  9.4 8.7 9.0 10.0   GLC 83 103 158* 130* 148*   ALBUMIN  --   --   --   --  3.9   PROTTOTAL  --   --   --   --  6.8   BILITOTAL  --   --   --   --  0.7   ALKPHOS  --   --   --   --  79   ALT  --   --   --   --  137*   AST  --   --   --   --  88*       No results found for this or any previous visit (from the past 24 hour(s)).

## 2022-02-09 NOTE — PROGRESS NOTES
Called by nurse to Patient room due to SpO2 Low 78% o2 probe changed and  patient into breathing in through nose out through mouth spo2 increased to 90-91% nurse st bedside no other changes made.    RT Yordy on 2/9/2022 at 6:23 AM

## 2022-02-09 NOTE — PHARMACY
Pharmacy- Weight Dose Adjustment    Patient Active Problem List   Diagnosis     History of malignant neoplasm of large intestine     Carpal tunnel syndrome, bilateral     Senile cataract     Inflammatory liver disease     Hypertriglyceridemia     Rupture of left distal biceps tendon     Status post tendon repair     Ureterolithiasis     Malignant neoplasm of colon, unspecified part of colon (H)     Chemotherapy-induced neuropathy (H)     S/P partial colectomy     History of nephrolithiasis     Elevated LFTs     Controlled type 2 diabetes mellitus with complication, without long-term current use of insulin (H)     Non morbid obesity due to excess calories     Seborrheic keratoses     Acute respiratory failure with hypoxia (H)     Leukocytosis, unspecified type     Pneumonia due to 2019 novel coronavirus     Pneumonia of both lungs due to infectious organism        Relevant Labs:  Recent Labs   Lab Test 02/09/22  0550 02/08/22  0721   WBC 35.2* 36.6*   HGB 10.7* 10.6*    389        CrCl: 98.2 mL/min      Intake/Output Summary (Last 24 hours) at 2/9/2022 1124  Last data filed at 2/9/2022 1111  Gross per 24 hour   Intake 680 ml   Output 1550 ml   Net -870 ml          Per Weight Dose Adjustment Protocol, will adjust:  -Pip/tazo to 4.5 g Q6H (indication of CAP, patient weight > 90 kg)      Will continue to follow and make adjustments accordingly. Thank You.    Tiago Holguin [facility] ....................  2/9/2022   11:24 AM

## 2022-02-09 NOTE — PLAN OF CARE
"Patient has remained stable on vapotherm replaced this morning.  At rest 30L and 70%, with any activity patient requires increase to 35L.  Patient denies feeling shortness of breath while resting.  Has crackles in lung bases bilaterally.  Nonproductive cough.  Had fever this morning which came down after tylenol.  Patient has chronic back/shoulder pain.  Was started on flexeril and given warm/cold packs as needed.  Appetite has improved today, patient taking in oral fluids, no nausea.  Using urinal at bedside, voiding without difficulty.  VSS.     /70 (BP Location: Left arm)   Pulse 64   Temp 99.1  F (37.3  C)   Resp 22   Ht 1.727 m (5' 8\")   Wt 97.2 kg (214 lb 3.2 oz)   SpO2 90%   BMI 32.57 kg/m      "

## 2022-02-09 NOTE — PROGRESS NOTES
RT at patient's bedside at this time,  due to decreased O2 sats.   Sarbjit Hammond RN on 2/9/2022 at 8:20 AM

## 2022-02-09 NOTE — PROGRESS NOTES
Shift Summary   Patient throughout the night had spo2 in the 86-90 Range but quickly increased back to 90-95% patient remained stable  No other changes throughout the night patient remained on 13L HFNC.    RT Yordy on 2/9/2022 at 5:45 AM

## 2022-02-09 NOTE — PROVIDER NOTIFICATION
02/09/22 0845   Vital Signs   Temp (!) 101.1  F (38.4  C)   Temp src Tympanic   Resp 20   Pulse 86   Pulse Rate Source Monitor   /71   BP Location Left arm   Oxygen Therapy   SpO2 94 %   O2 Device High Flow Nasal Cannula (HFNC)  (vapotherm)   FiO2 (%) 80 %   Oxygen Delivery 35 LPM     Patient was 85-87% on 15L via HFNC, not induced by activity.  Patient states he feels more short of breath.  Lungs are diminished with some crackles in bases.  RT and Dr. Purdy notified.  RT came and put patient back on vapotherm, now Sp02 has remained >90%.   Patient also given tylenol for temp.  No new orders at this time.

## 2022-02-09 NOTE — PROGRESS NOTES
Shift Summary  Pt is on 13 LPM HHFNC with Sp02 holding between 88-94%. Pt on Vapotherm during most of shift, with it currently on Stand By in the room. Pt desaturates during movement but makes a good recovery. Pt is alert and oriented during visits.    Iraida Curtis, RT

## 2022-02-09 NOTE — PHARMACY-VANCOMYCIN DOSING SERVICE
"Pharmacy Vancomycin Note  Date of Service 2022  Patient's  1960   61 year old, male    Indication: Sepsis/CAP  Day of Therapy: 4  Current vancomycin regimen:  1,250 mg IV q12h  Current vancomycin monitoring method: AUC  Current vancomycin therapeutic monitoring goal: 400-600 mg*h/L    InsightRX Prediction of Current Vancomycin Regimen  Loading dose: N/A  Regimen: 1250 mg IV every 12 hours.  Start time: 18:00 on 2022  Exposure target: AUC24 (range)400-600 mg/L.hr   AUC24,ss: 464 mg/L.hr  Probability of AUC24 > 400: 73 %  Ctrough,ss: 14.4 mg/L  Probability of Ctrough,ss > 20: 22 %  Probability of nephrotoxicity (Lodise PJ ): 10 %  }    Current estimated CrCl = Estimated Creatinine Clearance: 98.2 mL/min (based on SCr of 0.87 mg/dL).    Creatinine for last 3 days  2022:  7:25 AM Creatinine 0.83 mg/dL  2022:  7:21 AM Creatinine 0.87 mg/dL  2022:  5:50 AM Creatinine 0.87 mg/dL    Recent Vancomycin Levels (past 3 days)  2022:  1:17 PM Vancomycin 12.0 mg/L    Vancomycin IV Administrations (past 72 hours)                   vancomycin (VANCOCIN) 1,250 mg in sodium chloride 0.9 % 250 mL intermittent infusion (mg) 1,250 mg New Bag 22 0600     1,250 mg New Bag 22 1805    vancomycin (VANCOCIN) 1,500 mg in sodium chloride 0.9 % 500 mL intermittent infusion (mg) 1,500 mg New Bag 22 0612    vancomycin (VANCOCIN) 1,500 mg in sodium chloride 0.9 % 500 mL intermittent infusion (mg) 1,500 mg New Bag 22 1120    vancomycin (VANCOCIN) 2,000 mg in sodium chloride 0.9 % 500 mL intermittent infusion (mg) 2,000 mg New Bag 22 1222                Nephrotoxins and other renal medications (From now, onward)    Start     Dose/Rate Route Frequency Ordered Stop    22 1200  piperacillin-tazobactam (ZOSYN) 4.5 g vial to attach to  mL bag        Note to Pharmacy: For [facility], [facility] and [facility]: For Zosyn-naive patients, use the \"Zosyn initial dose + extended infusion\" order " panel.    4.5 g  over 30 Minutes Intravenous EVERY 6 HOURS 02/09/22 1123      02/08/22 1800  vancomycin (VANCOCIN) 1,250 mg in sodium chloride 0.9 % 250 mL intermittent infusion         1,250 mg  166.7 mL/hr over 90 Minutes Intravenous EVERY 12 HOURS 02/08/22 1416      02/06/22 1330  lisinopril (ZESTRIL) tablet 2.5 mg         2.5 mg Oral DAILY 02/06/22 1304      02/06/22 1304  ibuprofen (ADVIL/MOTRIN) tablet 600 mg         600 mg Oral EVERY 6 HOURS PRN 02/06/22 1304               Contrast Orders - past 72 hours (72h ago, onward)            None          Interpretation of levels and current regimen:  Has serum creatinine changed greater than 50% in last 72 hours: No    Urine output:  good urine output    Renal Function: Stable    InsightRX Prediction of Planned New Vancomycin Regimen  Insight Rx prediction as above    Plan:  1. Continue Current Dose  2. Vancomycin monitoring method: AUC  3. Vancomycin therapeutic monitoring goal: 400-600 mg*h/L  4. Pharmacy will check vancomycin levels as appropriate in 1-3 Days.  5. Serum creatinine levels will be ordered daily for the first week of therapy and at least twice weekly for subsequent weeks.    Tiago Holguin, [facility]

## 2022-02-10 LAB
ANION GAP SERPL CALCULATED.3IONS-SCNC: 9 MMOL/L (ref 3–14)
ATRIAL RATE - MUSE: 101 BPM
BUN SERPL-MCNC: 25 MG/DL (ref 7–25)
CALCIUM SERPL-MCNC: 9.5 MG/DL (ref 8.6–10.3)
CHLORIDE BLD-SCNC: 105 MMOL/L (ref 98–107)
CO2 SERPL-SCNC: 24 MMOL/L (ref 21–31)
CREAT SERPL-MCNC: 0.91 MG/DL (ref 0.7–1.3)
CRP SERPL-MCNC: 160.9 MG/L
D DIMER PPP FEU-MCNC: 1.58 UG/ML FEU (ref 0–0.5)
DEPRECATED CALCIDIOL+CALCIFEROL SERPL-MC: 74 UG/L (ref 30–100)
DIASTOLIC BLOOD PRESSURE - MUSE: NORMAL MMHG
ERYTHROCYTE [DISTWIDTH] IN BLOOD BY AUTOMATED COUNT: 17.1 % (ref 10–15)
GFR SERPL CREATININE-BSD FRML MDRD: >90 ML/MIN/1.73M2
GLUCOSE BLD-MCNC: 144 MG/DL (ref 70–105)
HCT VFR BLD AUTO: 33.4 % (ref 40–53)
HGB BLD-MCNC: 10.7 G/DL (ref 13.3–17.7)
INTERPRETATION ECG - MUSE: NORMAL
MCH RBC QN AUTO: 25.7 PG (ref 26.5–33)
MCHC RBC AUTO-ENTMCNC: 32 G/DL (ref 31.5–36.5)
MCV RBC AUTO: 80 FL (ref 78–100)
P AXIS - MUSE: 30 DEGREES
PLATELET # BLD AUTO: 388 10E3/UL (ref 150–450)
POTASSIUM BLD-SCNC: 4.4 MMOL/L (ref 3.5–5.1)
PR INTERVAL - MUSE: 152 MS
QRS DURATION - MUSE: 94 MS
QT - MUSE: 350 MS
QTC - MUSE: 453 MS
R AXIS - MUSE: 54 DEGREES
RBC # BLD AUTO: 4.16 10E6/UL (ref 4.4–5.9)
SODIUM SERPL-SCNC: 138 MMOL/L (ref 134–144)
SYSTOLIC BLOOD PRESSURE - MUSE: NORMAL MMHG
T AXIS - MUSE: 24 DEGREES
VENTRICULAR RATE- MUSE: 101 BPM
WBC # BLD AUTO: 29.3 10E3/UL (ref 4–11)

## 2022-02-10 PROCEDURE — 250N000013 HC RX MED GY IP 250 OP 250 PS 637: Performed by: INTERNAL MEDICINE

## 2022-02-10 PROCEDURE — 250N000011 HC RX IP 250 OP 636: Performed by: INTERNAL MEDICINE

## 2022-02-10 PROCEDURE — 86140 C-REACTIVE PROTEIN: CPT | Performed by: FAMILY MEDICINE

## 2022-02-10 PROCEDURE — 85379 FIBRIN DEGRADATION QUANT: CPT | Performed by: FAMILY MEDICINE

## 2022-02-10 PROCEDURE — 250N000011 HC RX IP 250 OP 636: Performed by: FAMILY MEDICINE

## 2022-02-10 PROCEDURE — 85014 HEMATOCRIT: CPT | Performed by: INTERNAL MEDICINE

## 2022-02-10 PROCEDURE — 99232 SBSQ HOSP IP/OBS MODERATE 35: CPT | Performed by: INTERNAL MEDICINE

## 2022-02-10 PROCEDURE — 82306 VITAMIN D 25 HYDROXY: CPT | Performed by: INTERNAL MEDICINE

## 2022-02-10 PROCEDURE — 120N000001 HC R&B MED SURG/OB

## 2022-02-10 PROCEDURE — 999N000157 HC STATISTIC RCP TIME EA 10 MIN

## 2022-02-10 PROCEDURE — 36415 COLL VENOUS BLD VENIPUNCTURE: CPT | Performed by: INTERNAL MEDICINE

## 2022-02-10 PROCEDURE — 80048 BASIC METABOLIC PNL TOTAL CA: CPT | Performed by: INTERNAL MEDICINE

## 2022-02-10 RX ADMIN — DEXAMETHASONE 6 MG: 2 TABLET ORAL at 09:51

## 2022-02-10 RX ADMIN — ENOXAPARIN SODIUM 40 MG: 40 INJECTION SUBCUTANEOUS at 21:20

## 2022-02-10 RX ADMIN — ENOXAPARIN SODIUM 40 MG: 40 INJECTION SUBCUTANEOUS at 09:51

## 2022-02-10 RX ADMIN — INSULIN ASPART 3 UNITS: 100 INJECTION, SOLUTION INTRAVENOUS; SUBCUTANEOUS at 18:24

## 2022-02-10 RX ADMIN — LISINOPRIL 2.5 MG: 2.5 TABLET ORAL at 09:51

## 2022-02-10 RX ADMIN — CYCLOBENZAPRINE 10 MG: 10 TABLET, FILM COATED ORAL at 05:53

## 2022-02-10 RX ADMIN — ATORVASTATIN CALCIUM 40 MG: 40 TABLET, FILM COATED ORAL at 09:51

## 2022-02-10 RX ADMIN — CYCLOBENZAPRINE 10 MG: 10 TABLET, FILM COATED ORAL at 14:43

## 2022-02-10 RX ADMIN — BARICITINIB 4 MG: 2 TABLET, FILM COATED ORAL at 09:51

## 2022-02-10 RX ADMIN — PIPERACILLIN AND TAZOBACTAM 4.5 G: 4; .5 INJECTION, POWDER, LYOPHILIZED, FOR SOLUTION INTRAVENOUS at 12:50

## 2022-02-10 RX ADMIN — PIPERACILLIN AND TAZOBACTAM 4.5 G: 4; .5 INJECTION, POWDER, LYOPHILIZED, FOR SOLUTION INTRAVENOUS at 18:23

## 2022-02-10 RX ADMIN — PIPERACILLIN AND TAZOBACTAM 4.5 G: 4; .5 INJECTION, POWDER, LYOPHILIZED, FOR SOLUTION INTRAVENOUS at 05:54

## 2022-02-10 RX ADMIN — Medication 1 TABLET: at 09:51

## 2022-02-10 RX ADMIN — CYCLOBENZAPRINE 10 MG: 10 TABLET, FILM COATED ORAL at 21:20

## 2022-02-10 RX ADMIN — PIPERACILLIN AND TAZOBACTAM 4.5 G: 4; .5 INJECTION, POWDER, LYOPHILIZED, FOR SOLUTION INTRAVENOUS at 00:24

## 2022-02-10 ASSESSMENT — ACTIVITIES OF DAILY LIVING (ADL)
ADLS_ACUITY_SCORE: 6
ADLS_ACUITY_SCORE: 5
ADLS_ACUITY_SCORE: 6
ADLS_ACUITY_SCORE: 5
ADLS_ACUITY_SCORE: 6
ADLS_ACUITY_SCORE: 5
ADLS_ACUITY_SCORE: 6
ADLS_ACUITY_SCORE: 5
ADLS_ACUITY_SCORE: 6
ADLS_ACUITY_SCORE: 5
ADLS_ACUITY_SCORE: 6
ADLS_ACUITY_SCORE: 5
ADLS_ACUITY_SCORE: 6
ADLS_ACUITY_SCORE: 6

## 2022-02-10 ASSESSMENT — MIFFLIN-ST. JEOR
SCORE: 1738.38
SCORE: 1639.5

## 2022-02-10 NOTE — PLAN OF CARE
Patient requires hyper oxygenation before any activity in room.  Patient does have dyspnea with exertion and takes awhile to recover.  When at rest patient has had vapotherm 20-25L, 80%.  Other vitals are stable, afebrile.  Patient has chronic back pain and states the flexeril has been helpful, denies any other interventions this shift.  Lungs are clear, diminished with crackles in bases which have improved since yesterday.

## 2022-02-10 NOTE — PROGRESS NOTES
[facility]    Hospitalist Progress Note      Assessment & Plan   Jesus Varghese is a 61 year old male who was admitted on 2/6/2022.     Acute respiratory failure with hypoxia (H):  Secondary to COVID-19 pneumonia and possible bacterial pneumonia.  Continues on Vapotherm with FiO2 at 80% and unchanged today.  - dexamethasone, baricitinib (day 5), with Lovenox for clot prophylaxis  - Family requests stopping remdisivir and discontinued after 2 doses  -Vancomycin discontinued after day 4  -Zosyn day 5 and will complete a course  -Family request multivitamin and vitamin D supplement and ordered  -Wean FiO2 as able    Diabetes type 2: Well controlled last hemoglobin A1c of 6.6  -ISS AC at bedtime  -Resume Metformin as an outpatient     CLL: Chronic leukocytosis and evaluated by oncology on 1/21 and flow cytometry consistent.  Given his lymphadenopathy will need reevaluation by oncology regarding consideration for treatment after repeat coverage from the above infection.  -Monitor white count daily  -Follow-up with oncology as an outpatient     Diet: Moderate Consistent Carb (60 g CHO per Meal) Diet    DVT Prophylaxis: Enoxaparin (Lovenox) SQ  Portillo Catheter: Not present  Code Status: Full Code           Disposition Plan   Expected Discharge: Pending  Anticipated discharge location: home with family      Entered: Daniel Purdy MD 02/10/2022, 1:23 PM       The patient's care was discussed with the Bedside Nurse, Patient and Patient's Family.      Daniel Purdy MD  Hospitalist Service  [facility]  Contact information available via [facility] Paging/Directory    ______________________________________________________________________    Interval History   CC: Covid    Overnight no acute events and afebrile, breathing remains stable today, dyspneic with exertion but otherwise no dyspnea at rest, no new productive cough no increased lower extremity edema, appetite remains stable, no other new  complaints.    -Data reviewed today: I reviewed all new labs and imaging results over the last 24 hours. I personally reviewed no images or EKG's today.    Physical Exam   Temp: 98.3  F (36.8  C) Temp src: Tympanic BP: 107/63 Pulse: 74   Resp: 24 SpO2: 93 % O2 Device: High Flow Nasal Cannula (HFNC) (Vapotherm) Oxygen Delivery: 25 LPM  Vitals:    02/09/22 0026 02/10/22 0422 02/10/22 0500   Weight: 97.2 kg (214 lb 3.2 oz) 96.9 kg (213 lb 9.6 oz) 87 kg (191 lb 12.8 oz)     Vital Signs with Ranges  Temp:  [98  F (36.7  C)-99.2  F (37.3  C)] 98.3  F (36.8  C)  Pulse:  [64-74] 74  Resp:  [20-24] 24  BP: (106-121)/(63-67) 107/63  FiO2 (%):  [70 %-80 %] 80 %  SpO2:  [89 %-97 %] 93 %  I/O last 3 completed shifts:  In: 1504 [P.O.:440; I.V.:1064]  Out: 2550 [Urine:2550]    Exam:   GENERAL: Talkative, sitting up in bed attempting to use the commode, in no apparent distress.  CARDIOVASCULAR: regular rate and rhythm, no murmurs, rubs, or gallops. Normal S1/S2. No lower extremity edema.   RESPIRATORY: Diffuse air movement in all fields, Velcro-like crackles throughout his bilateral lung fields unchanged today, no wheezes, no crackles.   GI: soft, non-tender, non-distended, normoactive bowel sounds.  MUSCULOSKELETAL: warm and well perfused, 2+ dorsalis pedis pulses bilaterally.    SKIN: no pallor,jaundice, or rashes    Medications     - MEDICATION INSTRUCTIONS -       sodium chloride 10 mL/hr at 02/08/22 0949       atorvastatin  40 mg Oral Daily     baricitinib  4 mg Oral Daily     cyclobenzaprine  10 mg Oral TID     dexamethasone  6 mg Oral Daily     enoxaparin ANTICOAGULANT  40 mg Subcutaneous Q12H     insulin aspart  1-7 Units Subcutaneous TID AC     insulin aspart  1-5 Units Subcutaneous At Bedtime     lisinopril  2.5 mg Oral Daily     multivitamin w/minerals  1 tablet Oral Daily     piperacillin-tazobactam  4.5 g Intravenous Q6H     sodium chloride (PF)  3 mL Intracatheter Q8H     vitamin D2  50,000 Units Oral Q7 Days        Data   Recent Labs   Lab 02/10/22  0545 02/09/22  0823 02/09/22  0550 02/08/22  0721 02/07/22  0725 02/06/22  1056   WBC 29.3*  --  35.2* 36.6*   < > 41.9*   HGB 10.7*  --  10.7* 10.6*   < > 11.6*   MCV 80  --  81 80   < > 79     --  411 389   < > 350     --  140 139   < > 134   POTASSIUM 4.4  --  4.4 4.0   < > 3.9   CHLORIDE 105  --  105 106   < > 96*   CO2 24  --  24 22   < > 22   BUN 25  --  21 25   < > 19   CR 0.91  --  0.87 0.87   < > 1.07   ANIONGAP 9  --  11 11   < > 16*   LILIAN 9.5  --  9.4 8.7   < > 10.0   * 83 103 158*   < > 148*   ALBUMIN  --   --   --   --   --  3.9   PROTTOTAL  --   --   --   --   --  6.8   BILITOTAL  --   --   --   --   --  0.7   ALKPHOS  --   --   --   --   --  79   ALT  --   --   --   --   --  137*   AST  --   --   --   --   --  88*    < > = values in this interval not displayed.       No results found for this or any previous visit (from the past 24 hour(s)).

## 2022-02-10 NOTE — PROGRESS NOTES
Shift Summary     Patient remained stable at throughout the patient remained on vapotherm no settings changes made Vapotherm settings are as followed 20L 80% bottle changed .    Arthur Ibarra, RT on 2/10/2022 at 6:29 AM

## 2022-02-10 NOTE — PROGRESS NOTES
Shift Summary  Pt started off shift on 13 LPM HHFNC. Pt progressed to Vapotherm with current settings of 30 LPM and 70%. Pt alert and oriented during visits. States he is more tired today. Still desaturates on movement.    Iraida Curtis, RT

## 2022-02-10 NOTE — PROGRESS NOTES
SAFETY CHECKLIST  ID Bands and Risk clasps correct and in place (DNR, Fall risk, Allergy, Latex, Limb):  Yes  All Lines Reconciled and labeled correctly: Yes  Whiteboard updated:Yes  Environmental interventions (bed/chair alarm on, call light, side rails, restraints, sitter....): Yes

## 2022-02-11 LAB
ANION GAP SERPL CALCULATED.3IONS-SCNC: 9 MMOL/L (ref 3–14)
BACTERIA BLD CULT: NO GROWTH
BACTERIA BLD CULT: NO GROWTH
BUN SERPL-MCNC: 22 MG/DL (ref 7–25)
CALCIUM SERPL-MCNC: 9.3 MG/DL (ref 8.6–10.3)
CHLORIDE BLD-SCNC: 105 MMOL/L (ref 98–107)
CO2 SERPL-SCNC: 25 MMOL/L (ref 21–31)
CREAT SERPL-MCNC: 0.87 MG/DL (ref 0.7–1.3)
CRP SERPL-MCNC: 130 MG/L
D DIMER PPP FEU-MCNC: 1.24 UG/ML FEU (ref 0–0.5)
ERYTHROCYTE [DISTWIDTH] IN BLOOD BY AUTOMATED COUNT: 16.8 % (ref 10–15)
GFR SERPL CREATININE-BSD FRML MDRD: >90 ML/MIN/1.73M2
GLUCOSE BLD-MCNC: 107 MG/DL (ref 70–105)
HCT VFR BLD AUTO: 32.6 % (ref 40–53)
HGB BLD-MCNC: 10.5 G/DL (ref 13.3–17.7)
MCH RBC QN AUTO: 25.9 PG (ref 26.5–33)
MCHC RBC AUTO-ENTMCNC: 32.2 G/DL (ref 31.5–36.5)
MCV RBC AUTO: 80 FL (ref 78–100)
PLATELET # BLD AUTO: 403 10E3/UL (ref 150–450)
POTASSIUM BLD-SCNC: 4.5 MMOL/L (ref 3.5–5.1)
RBC # BLD AUTO: 4.06 10E6/UL (ref 4.4–5.9)
SODIUM SERPL-SCNC: 139 MMOL/L (ref 134–144)
WBC # BLD AUTO: 23.6 10E3/UL (ref 4–11)

## 2022-02-11 PROCEDURE — 999N000157 HC STATISTIC RCP TIME EA 10 MIN

## 2022-02-11 PROCEDURE — 250N000011 HC RX IP 250 OP 636: Performed by: INTERNAL MEDICINE

## 2022-02-11 PROCEDURE — 36415 COLL VENOUS BLD VENIPUNCTURE: CPT | Performed by: INTERNAL MEDICINE

## 2022-02-11 PROCEDURE — 86140 C-REACTIVE PROTEIN: CPT | Performed by: INTERNAL MEDICINE

## 2022-02-11 PROCEDURE — 250N000013 HC RX MED GY IP 250 OP 250 PS 637: Performed by: INTERNAL MEDICINE

## 2022-02-11 PROCEDURE — 250N000011 HC RX IP 250 OP 636: Performed by: FAMILY MEDICINE

## 2022-02-11 PROCEDURE — 85027 COMPLETE CBC AUTOMATED: CPT | Performed by: INTERNAL MEDICINE

## 2022-02-11 PROCEDURE — 85379 FIBRIN DEGRADATION QUANT: CPT | Performed by: INTERNAL MEDICINE

## 2022-02-11 PROCEDURE — 120N000001 HC R&B MED SURG/OB

## 2022-02-11 PROCEDURE — 99232 SBSQ HOSP IP/OBS MODERATE 35: CPT | Performed by: FAMILY MEDICINE

## 2022-02-11 PROCEDURE — 80048 BASIC METABOLIC PNL TOTAL CA: CPT | Performed by: INTERNAL MEDICINE

## 2022-02-11 RX ADMIN — INSULIN ASPART 1 UNITS: 100 INJECTION, SOLUTION INTRAVENOUS; SUBCUTANEOUS at 18:20

## 2022-02-11 RX ADMIN — Medication 1 TABLET: at 11:06

## 2022-02-11 RX ADMIN — LISINOPRIL 2.5 MG: 2.5 TABLET ORAL at 11:06

## 2022-02-11 RX ADMIN — CYCLOBENZAPRINE 10 MG: 10 TABLET, FILM COATED ORAL at 06:34

## 2022-02-11 RX ADMIN — ATORVASTATIN CALCIUM 40 MG: 40 TABLET, FILM COATED ORAL at 11:05

## 2022-02-11 RX ADMIN — PIPERACILLIN AND TAZOBACTAM 4.5 G: 4; .5 INJECTION, POWDER, LYOPHILIZED, FOR SOLUTION INTRAVENOUS at 12:41

## 2022-02-11 RX ADMIN — BARICITINIB 4 MG: 2 TABLET, FILM COATED ORAL at 11:05

## 2022-02-11 RX ADMIN — CYCLOBENZAPRINE 10 MG: 10 TABLET, FILM COATED ORAL at 22:20

## 2022-02-11 RX ADMIN — DEXAMETHASONE 6 MG: 2 TABLET ORAL at 11:05

## 2022-02-11 RX ADMIN — ACETAMINOPHEN 650 MG: 325 TABLET, FILM COATED ORAL at 22:20

## 2022-02-11 RX ADMIN — ENOXAPARIN SODIUM 40 MG: 40 INJECTION SUBCUTANEOUS at 22:23

## 2022-02-11 RX ADMIN — PIPERACILLIN AND TAZOBACTAM 4.5 G: 4; .5 INJECTION, POWDER, LYOPHILIZED, FOR SOLUTION INTRAVENOUS at 01:03

## 2022-02-11 RX ADMIN — ENOXAPARIN SODIUM 40 MG: 40 INJECTION SUBCUTANEOUS at 11:05

## 2022-02-11 RX ADMIN — PIPERACILLIN AND TAZOBACTAM 4.5 G: 4; .5 INJECTION, POWDER, LYOPHILIZED, FOR SOLUTION INTRAVENOUS at 18:21

## 2022-02-11 RX ADMIN — INSULIN ASPART 1 UNITS: 100 INJECTION, SOLUTION INTRAVENOUS; SUBCUTANEOUS at 12:41

## 2022-02-11 RX ADMIN — PIPERACILLIN AND TAZOBACTAM 4.5 G: 4; .5 INJECTION, POWDER, LYOPHILIZED, FOR SOLUTION INTRAVENOUS at 06:34

## 2022-02-11 RX ADMIN — CYCLOBENZAPRINE 10 MG: 10 TABLET, FILM COATED ORAL at 12:41

## 2022-02-11 ASSESSMENT — ACTIVITIES OF DAILY LIVING (ADL)
ADLS_ACUITY_SCORE: 7
ADLS_ACUITY_SCORE: 9
ADLS_ACUITY_SCORE: 7
ADLS_ACUITY_SCORE: 6
ADLS_ACUITY_SCORE: 9
ADLS_ACUITY_SCORE: 7
ADLS_ACUITY_SCORE: 6
ADLS_ACUITY_SCORE: 9
ADLS_ACUITY_SCORE: 7
ADLS_ACUITY_SCORE: 6
ADLS_ACUITY_SCORE: 9
ADLS_ACUITY_SCORE: 6
ADLS_ACUITY_SCORE: 7
ADLS_ACUITY_SCORE: 7
ADLS_ACUITY_SCORE: 6
ADLS_ACUITY_SCORE: 9
ADLS_ACUITY_SCORE: 6
ADLS_ACUITY_SCORE: 6
ADLS_ACUITY_SCORE: 9
ADLS_ACUITY_SCORE: 5
ADLS_ACUITY_SCORE: 9
ADLS_ACUITY_SCORE: 6
ADLS_ACUITY_SCORE: 6
ADLS_ACUITY_SCORE: 7

## 2022-02-11 ASSESSMENT — MIFFLIN-ST. JEOR: SCORE: 1651.75

## 2022-02-11 NOTE — PLAN OF CARE
Patient admitted with respiratory failure secondary to COVID.  He is alert, oriented, and pleasant.  He is currently on Vapotherm with 20L and 0.80 FIO2.  His vitals have been stable, however his desaturations are significant with activity.  Hyperoxygenation prior to activity has been suggested.    Problem: Adult Inpatient Plan of Care  Goal: Absence of Hospital-Acquired Illness or Injury  Intervention: Identify and Manage Fall Risk  Recent Flowsheet Documentation  Taken 2/11/2022 0442 by Nini oDwling RN  Safety Promotion/Fall Prevention:   safety round/check completed   room organization consistent   nonskid shoes/slippers when out of bed  Taken 2/10/2022 2110 by Nini Dowling RN  Safety Promotion/Fall Prevention:   safety round/check completed   room organization consistent   nonskid shoes/slippers when out of bed  Intervention: Prevent Infection  Recent Flowsheet Documentation  Taken 2/11/2022 0442 by Nini Dowling RN  Infection Prevention:   hand hygiene promoted   personal protective equipment utilized   rest/sleep promoted   single patient room provided   visitors restricted/screened  Taken 2/10/2022 2110 by Nini Dowling RN  Infection Prevention:   hand hygiene promoted   personal protective equipment utilized   rest/sleep promoted   single patient room provided   visitors restricted/screened

## 2022-02-11 NOTE — PLAN OF CARE
Patient is alert, oriented and uses call light appropriately.  Is SBA for mobility- needs nonrebreather mask at 15 LPM prior to any activity to maintain oxygen saturation- dipped into mid/high 80's with conversation and was able to recoup within a few minutes- currently on 80%/20L vapotherm.  Lungs clear, nonproductive cough, reported discomfort in chest from coughing- declined interventions, heart regular, bowel sounds active, no edema, chronic numbness in feet.  Discussed deep breathing and proning.  Katy Santoyo RN on 2/11/2022 at 8:42 AM     Appetite good today- fluid intake adequate.  Dr. Hutchison updated on reported intercostal discomfort from coughing.  Continued on 80%/20L all day with vapotherm and sats averaging 86-90%- continues to use nonrebreather prior to activity.  Katy Santoyo RN on 2/11/2022 at 6:57 PM

## 2022-02-11 NOTE — PROGRESS NOTES
Nutrition follow up:   Appetite has remained fairly good, tolerating diet. Weights vary, question accuracy of weights on 2/9 and 2/10.   Diet: consistent carb  Intakes: % most meals  Vitals:    02/06/22 1020 02/06/22 1330 02/08/22 0235 02/09/22 0026   Weight: 92.1 kg (203 lb) 89.5 kg (197 lb 4.8 oz) 89.3 kg (196 lb 14.4 oz) 97.2 kg (214 lb 3.2 oz)    02/10/22 0422 02/10/22 0500 02/11/22 0440   Weight: 96.9 kg (213 lb 9.6 oz) 87 kg (191 lb 12.8 oz) 88.2 kg (194 lb 8 oz)   Follow up weekly. If needed sooner, please order consult.   Nirmala Sunshine RD on 2/11/2022 at 9:25 AM

## 2022-02-11 NOTE — PROGRESS NOTES
[facility]    Medicine Progress Note - Hospitalist Service    Date of Admission:  2/6/2022    Assessment & Plan            Acute respiratory failure with hypoxia (H)  Presenting with increased dyspnea after testing positive for Covid on home testing at home  Covid test positive in the ED, requiring supplemental oxygen  Admitted, currently on vapotherm, 20 liters 80%  Continue oxygen supplementation, wean as able    Pneumonia due to 2019 novel coronavirus  Presenting with dyspnea and oxygen requirement  Chest x-ray imaging showing bilateral multifocal pneumonia consistent with Covid-19  Concern with elevated white count of a possible bacterial process, so he has been started on IV antibiotics as well, fairly broad-spectrum and that concern of some underlying hematologic disease  Clinically stable, continue current treatment for now  Has been started on dexamethasone, baricitinib, with Lovenox for clot prophylaxis  On day 7  Dex, day 6  baricitinib  WBC, CRP and d-dimer trending down    Leukocytosis, unspecified type  Fairly markedly elevated white count, recent history of elevated leukocytosis without clear cause  Concern is a possible underlying hematologic disorder, work-up in progress  Monitor white blood count, continue broad-spectrum antibiotic coverage           Diet: Moderate Consistent Carb (60 g CHO per Meal) Diet    DVT Prophylaxis: Enoxaparin (Lovenox) SQ  Portillo Catheter: Not present  Central Lines: None  Cardiac Monitoring: None  Code Status: Full Code      Disposition Plan   Expected Discharge:  3-4 days   Anticipated discharge location: home with family    Delays:  when oxygen needs improve        The patient's care was discussed with the Patient and Patient's Family.    Aston Hutchison MD  Hospitalist Service  [facility]  Securely message with the Vocera Web Console (learn more here)  Text page via [employer] Paging/Directory         Clinically Significant  Risk Factors Present on Admission                    ______________________________________________________________________    Interval History   Overall feeling better, less short of air with activity. Appetite better, no diarrhea, no fevers. Cough is less painful    Data reviewed today: I reviewed all medications, new labs and imaging results over the last 24 hours. I personally reviewed no images or EKG's today.    Physical Exam   Vital Signs: Temp: 98  F (36.7  C) Temp src: Tympanic BP: 113/62 Pulse: 64   Resp: 20 SpO2: (!) 87 % O2 Device: High Flow Nasal Cannula (HFNC) Oxygen Delivery: 20 LPM  Weight: 194 lbs 8 oz  Constitutional: awake, alert, cooperative, no apparent distress, and appears stated age  Respiratory: No increased work of breathing, good air exchange, clear to auscultation bilaterally, no crackles or wheezing  Cardiovascular: regular rate and rhythm  GI: normal bowel sounds, soft and non-distended    Data   Results for orders placed or performed during the hospital encounter of 02/06/22 (from the past 24 hour(s))   CRP inflammation   Result Value Ref Range    CRP Inflammation 130.0 (H) <10.0 mg/L   Basic metabolic panel   Result Value Ref Range    Sodium 139 134 - 144 mmol/L    Potassium 4.5 3.5 - 5.1 mmol/L    Chloride 105 98 - 107 mmol/L    Carbon Dioxide (CO2) 25 21 - 31 mmol/L    Anion Gap 9 3 - 14 mmol/L    Urea Nitrogen 22 7 - 25 mg/dL    Creatinine 0.87 0.70 - 1.30 mg/dL    Calcium 9.3 8.6 - 10.3 mg/dL    Glucose 107 (H) 70 - 105 mg/dL    GFR Estimate >90 >60 mL/min/1.73m2   D dimer quantitative   Result Value Ref Range    D-Dimer Quantitative 1.24 (H) 0.00 - 0.50 ug/mL FEU    Narrative    This D-dimer assay is intended for use in conjunction with a clinical pretest probability assessment model to exclude pulmonary embolism (PE) and deep venous thrombosis (DVT) in outpatients suspected of PE or DVT. The cut-off value is 0.50 ug/mL FEU.   CBC with platelets   Result Value Ref Range    WBC  Count 23.6 (H) 4.0 - 11.0 10e3/uL    RBC Count 4.06 (L) 4.40 - 5.90 10e6/uL    Hemoglobin 10.5 (L) 13.3 - 17.7 g/dL    Hematocrit 32.6 (L) 40.0 - 53.0 %    MCV 80 78 - 100 fL    MCH 25.9 (L) 26.5 - 33.0 pg    MCHC 32.2 31.5 - 36.5 g/dL    RDW 16.8 (H) 10.0 - 15.0 %    Platelet Count 403 150 - 450 10e3/uL

## 2022-02-12 LAB
ANION GAP SERPL CALCULATED.3IONS-SCNC: 10 MMOL/L (ref 3–14)
BUN SERPL-MCNC: 22 MG/DL (ref 7–25)
CALCIUM SERPL-MCNC: 9.3 MG/DL (ref 8.6–10.3)
CHLORIDE BLD-SCNC: 103 MMOL/L (ref 98–107)
CO2 SERPL-SCNC: 25 MMOL/L (ref 21–31)
CREAT SERPL-MCNC: 0.92 MG/DL (ref 0.7–1.3)
ERYTHROCYTE [DISTWIDTH] IN BLOOD BY AUTOMATED COUNT: 16.9 % (ref 10–15)
GFR SERPL CREATININE-BSD FRML MDRD: >90 ML/MIN/1.73M2
GLUCOSE BLD-MCNC: 126 MG/DL (ref 70–105)
HCT VFR BLD AUTO: 32.8 % (ref 40–53)
HGB BLD-MCNC: 10.6 G/DL (ref 13.3–17.7)
MCH RBC QN AUTO: 26.1 PG (ref 26.5–33)
MCHC RBC AUTO-ENTMCNC: 32.3 G/DL (ref 31.5–36.5)
MCV RBC AUTO: 81 FL (ref 78–100)
PLATELET # BLD AUTO: 383 10E3/UL (ref 150–450)
POTASSIUM BLD-SCNC: 4.5 MMOL/L (ref 3.5–5.1)
RBC # BLD AUTO: 4.06 10E6/UL (ref 4.4–5.9)
SODIUM SERPL-SCNC: 138 MMOL/L (ref 134–144)
WBC # BLD AUTO: 19 10E3/UL (ref 4–11)

## 2022-02-12 PROCEDURE — 36415 COLL VENOUS BLD VENIPUNCTURE: CPT | Performed by: INTERNAL MEDICINE

## 2022-02-12 PROCEDURE — 99232 SBSQ HOSP IP/OBS MODERATE 35: CPT | Performed by: FAMILY MEDICINE

## 2022-02-12 PROCEDURE — 999N000157 HC STATISTIC RCP TIME EA 10 MIN

## 2022-02-12 PROCEDURE — 250N000013 HC RX MED GY IP 250 OP 250 PS 637: Performed by: INTERNAL MEDICINE

## 2022-02-12 PROCEDURE — 250N000011 HC RX IP 250 OP 636: Performed by: INTERNAL MEDICINE

## 2022-02-12 PROCEDURE — 120N000001 HC R&B MED SURG/OB

## 2022-02-12 PROCEDURE — 80048 BASIC METABOLIC PNL TOTAL CA: CPT | Performed by: INTERNAL MEDICINE

## 2022-02-12 PROCEDURE — 85014 HEMATOCRIT: CPT | Performed by: INTERNAL MEDICINE

## 2022-02-12 PROCEDURE — 250N000011 HC RX IP 250 OP 636: Performed by: FAMILY MEDICINE

## 2022-02-12 RX ADMIN — INSULIN ASPART 2 UNITS: 100 INJECTION, SOLUTION INTRAVENOUS; SUBCUTANEOUS at 18:06

## 2022-02-12 RX ADMIN — Medication 1 TABLET: at 11:02

## 2022-02-12 RX ADMIN — ACETAMINOPHEN 650 MG: 325 TABLET, FILM COATED ORAL at 22:45

## 2022-02-12 RX ADMIN — LISINOPRIL 2.5 MG: 2.5 TABLET ORAL at 11:02

## 2022-02-12 RX ADMIN — ATORVASTATIN CALCIUM 40 MG: 40 TABLET, FILM COATED ORAL at 11:02

## 2022-02-12 RX ADMIN — BARICITINIB 4 MG: 2 TABLET, FILM COATED ORAL at 11:01

## 2022-02-12 RX ADMIN — INSULIN ASPART 2 UNITS: 100 INJECTION, SOLUTION INTRAVENOUS; SUBCUTANEOUS at 11:50

## 2022-02-12 RX ADMIN — DEXAMETHASONE 6 MG: 2 TABLET ORAL at 11:02

## 2022-02-12 RX ADMIN — PIPERACILLIN AND TAZOBACTAM 4.5 G: 4; .5 INJECTION, POWDER, LYOPHILIZED, FOR SOLUTION INTRAVENOUS at 00:18

## 2022-02-12 RX ADMIN — ENOXAPARIN SODIUM 40 MG: 40 INJECTION SUBCUTANEOUS at 22:46

## 2022-02-12 RX ADMIN — CYCLOBENZAPRINE 10 MG: 10 TABLET, FILM COATED ORAL at 15:52

## 2022-02-12 RX ADMIN — ENOXAPARIN SODIUM 40 MG: 40 INJECTION SUBCUTANEOUS at 11:02

## 2022-02-12 RX ADMIN — CYCLOBENZAPRINE 10 MG: 10 TABLET, FILM COATED ORAL at 22:45

## 2022-02-12 RX ADMIN — PIPERACILLIN AND TAZOBACTAM 4.5 G: 4; .5 INJECTION, POWDER, LYOPHILIZED, FOR SOLUTION INTRAVENOUS at 11:51

## 2022-02-12 RX ADMIN — PIPERACILLIN AND TAZOBACTAM 4.5 G: 4; .5 INJECTION, POWDER, LYOPHILIZED, FOR SOLUTION INTRAVENOUS at 18:06

## 2022-02-12 RX ADMIN — PIPERACILLIN AND TAZOBACTAM 4.5 G: 4; .5 INJECTION, POWDER, LYOPHILIZED, FOR SOLUTION INTRAVENOUS at 05:44

## 2022-02-12 RX ADMIN — CYCLOBENZAPRINE 10 MG: 10 TABLET, FILM COATED ORAL at 05:44

## 2022-02-12 ASSESSMENT — ACTIVITIES OF DAILY LIVING (ADL)
ADLS_ACUITY_SCORE: 7
ADLS_ACUITY_SCORE: 9
ADLS_ACUITY_SCORE: 7
ADLS_ACUITY_SCORE: 9
ADLS_ACUITY_SCORE: 7
ADLS_ACUITY_SCORE: 9

## 2022-02-12 ASSESSMENT — MIFFLIN-ST. JEOR: SCORE: 1617.73

## 2022-02-12 NOTE — PLAN OF CARE
Patient is alert, oriented and pleasant.  Denied pain- chest discomfort with coughing.  Is SBA for mobility.  Lungs clear, dyspnea with exertion, continues on vapotherm at 20L/60% with sats between 87-91 while sitting up in bed.  Heart regular, bowel sounds active, no edema, chronic numbness extremities.  Katy Santoyo RN on 2/12/2022 at 9:30 AM    Appetite good today.  Doing good with deep breathing and positioning self.  No changes with respiratory status- continues on vapotherm.  Katy Santoyo RN on 2/12/2022 at 5:15 PM

## 2022-02-12 NOTE — PLAN OF CARE
Patient admitted for acute respiratory failure. VSS and WNL. O2 stable at this time on 20LPM, 60% FiO2 via Vapotherm. Attempted to wean patient to HFNC with no success. Patient desaturated to 73% O2 standing at bedside. Dry, non-productive cough. Rested most of shift side lying, prone. Dim LS. Flexeril and Tylenol given for joint, muscle discomfort. Will continue to monitor. Temp: 99.3  F (37.4  C) Temp src: Tympanic BP: 138/68 Pulse: (!) 48   Resp: 22 SpO2: 94 % O2 Device: High Flow Nasal Cannula (HFNC) (Vapotherm) Oxygen Delivery: 20 LPM        Rain Falk RN on 2/12/2022 at 6:52 AM

## 2022-02-12 NOTE — PROGRESS NOTES
[facility]    Medicine Progress Note - Hospitalist Service    Date of Admission:  2/6/2022    Assessment & Plan          Acute respiratory failure with hypoxia (H)  Presenting with increased dyspnea after testing positive for Covid on home testing at home  Covid test positive in the ED, requiring supplemental oxygen  Admitted, currently on vapotherm, 20 liters 60%  Continue oxygen supplementation, wean as able    Pneumonia due to 2019 novel coronavirus  Presenting with dyspnea and oxygen requirement  Chest x-ray imaging showing bilateral multifocal pneumonia consistent with Covid-19  Concern with elevated white count of a possible bacterial process, so he has been started on IV antibiotics as well, fairly broad-spectrum and that concern of some underlying hematologic disease  Clinically stable, continue current treatment for now  Has been started on dexamethasone, baricitinib, with Lovenox for clot prophylaxis  On day 8  Dex, day 7  baricitinib  WBC, CRP and d-dimer trending down    Leukocytosis, unspecified type  Fairly markedly elevated white count, recent history of elevated leukocytosis without clear cause  Concern is a possible underlying hematologic disorder, work-up in progress  Monitor white blood count, continue broad-spectrum antibiotic coverage           Diet: Moderate Consistent Carb (60 g CHO per Meal) Diet    DVT Prophylaxis: Enoxaparin (Lovenox) SQ  Portillo Catheter: Not present  Central Lines: None  Cardiac Monitoring: None  Code Status: Full Code      Disposition Plan   Expected Discharge:  2-3 days   Anticipated discharge location: home with family    Delays:   improved resp status        The patient's care was discussed with the Patient and Patient's Family.    Aston Hutchison MD  Hospitalist Service  [facility]  Securely message with the Vocera Web Console (learn more here)  Text page via [employer] Paging/Directory         Clinically Significant Risk  Factors Present on Admission                    ______________________________________________________________________    Interval History   Feeling better, still some chest tightness, occasional cough, no fevers, appetite better, some loose stools    Data reviewed today: I reviewed all medications, new labs and imaging results over the last 24 hours. I personally reviewed no images or EKG's today.    Physical Exam   Vital Signs: Temp: 97.4  F (36.3  C) Temp src: Tympanic BP: 104/66 Pulse: 66   Resp: 22 SpO2: 91 % O2 Device: High Flow Nasal Cannula (HFNC) Oxygen Delivery: 20 LPM  Weight: 187 lbs 0 oz  Constitutional: awake, alert, cooperative, no apparent distress, and appears stated age  Respiratory: No increased work of breathing, good air exchange, clear to auscultation bilaterally, no crackles or wheezing  Cardiovascular: regular rate and rhythm  GI: normal bowel sounds, soft and non-distended    Data   Results for orders placed or performed during the hospital encounter of 02/06/22 (from the past 24 hour(s))   Basic metabolic panel   Result Value Ref Range    Sodium 138 134 - 144 mmol/L    Potassium 4.5 3.5 - 5.1 mmol/L    Chloride 103 98 - 107 mmol/L    Carbon Dioxide (CO2) 25 21 - 31 mmol/L    Anion Gap 10 3 - 14 mmol/L    Urea Nitrogen 22 7 - 25 mg/dL    Creatinine 0.92 0.70 - 1.30 mg/dL    Calcium 9.3 8.6 - 10.3 mg/dL    Glucose 126 (H) 70 - 105 mg/dL    GFR Estimate >90 >60 mL/min/1.73m2   CBC with platelets   Result Value Ref Range    WBC Count 19.0 (H) 4.0 - 11.0 10e3/uL    RBC Count 4.06 (L) 4.40 - 5.90 10e6/uL    Hemoglobin 10.6 (L) 13.3 - 17.7 g/dL    Hematocrit 32.8 (L) 40.0 - 53.0 %    MCV 81 78 - 100 fL    MCH 26.1 (L) 26.5 - 33.0 pg    MCHC 32.3 31.5 - 36.5 g/dL    RDW 16.9 (H) 10.0 - 15.0 %    Platelet Count 383 150 - 450 10e3/uL

## 2022-02-12 NOTE — PROGRESS NOTES
Patient resting comfortably in bed. Currently R side lying, semi-prone. O2 stable on Vapotherm, able to wean O2 needs. C/o discomfort to shoulders with prone positioning, PRN Tylenol and Flexeril given. Will continue to monitor. Temp: 99.3  F (37.4  C) Temp src: Tympanic BP: 105/65 Pulse: 85   Resp: 22 SpO2: 94 % O2 Device: High Flow Nasal Cannula (HFNC) (vapotherm) Oxygen Delivery: 20 LPM      Rain Falk RN on 2/11/2022 at 10:32 PM

## 2022-02-12 NOTE — PROGRESS NOTES
Patient remains on Vapotherm throughout shift.  Vapotherm weaned to 20L, 60%.  Patient does desat with activity.

## 2022-02-13 LAB
ANION GAP SERPL CALCULATED.3IONS-SCNC: 8 MMOL/L (ref 3–14)
BUN SERPL-MCNC: 24 MG/DL (ref 7–25)
CALCIUM SERPL-MCNC: 9.3 MG/DL (ref 8.6–10.3)
CHLORIDE BLD-SCNC: 104 MMOL/L (ref 98–107)
CO2 SERPL-SCNC: 28 MMOL/L (ref 21–31)
CREAT SERPL-MCNC: 0.98 MG/DL (ref 0.7–1.3)
ERYTHROCYTE [DISTWIDTH] IN BLOOD BY AUTOMATED COUNT: 16.3 % (ref 10–15)
GFR SERPL CREATININE-BSD FRML MDRD: 87 ML/MIN/1.73M2
GLUCOSE BLD-MCNC: 121 MG/DL (ref 70–105)
HCT VFR BLD AUTO: 34.7 % (ref 40–53)
HGB BLD-MCNC: 11.1 G/DL (ref 13.3–17.7)
MCH RBC QN AUTO: 25.8 PG (ref 26.5–33)
MCHC RBC AUTO-ENTMCNC: 32 G/DL (ref 31.5–36.5)
MCV RBC AUTO: 81 FL (ref 78–100)
PLATELET # BLD AUTO: 415 10E3/UL (ref 150–450)
POTASSIUM BLD-SCNC: 4.7 MMOL/L (ref 3.5–5.1)
RBC # BLD AUTO: 4.31 10E6/UL (ref 4.4–5.9)
SODIUM SERPL-SCNC: 140 MMOL/L (ref 134–144)
WBC # BLD AUTO: 20.2 10E3/UL (ref 4–11)

## 2022-02-13 PROCEDURE — 250N000013 HC RX MED GY IP 250 OP 250 PS 637: Performed by: FAMILY MEDICINE

## 2022-02-13 PROCEDURE — 250N000011 HC RX IP 250 OP 636: Performed by: INTERNAL MEDICINE

## 2022-02-13 PROCEDURE — 250N000009 HC RX 250: Performed by: FAMILY MEDICINE

## 2022-02-13 PROCEDURE — 120N000001 HC R&B MED SURG/OB

## 2022-02-13 PROCEDURE — 250N000013 HC RX MED GY IP 250 OP 250 PS 637: Performed by: INTERNAL MEDICINE

## 2022-02-13 PROCEDURE — 36415 COLL VENOUS BLD VENIPUNCTURE: CPT | Performed by: INTERNAL MEDICINE

## 2022-02-13 PROCEDURE — 250N000011 HC RX IP 250 OP 636: Performed by: FAMILY MEDICINE

## 2022-02-13 PROCEDURE — 999N000157 HC STATISTIC RCP TIME EA 10 MIN

## 2022-02-13 PROCEDURE — 82310 ASSAY OF CALCIUM: CPT | Performed by: INTERNAL MEDICINE

## 2022-02-13 PROCEDURE — 99232 SBSQ HOSP IP/OBS MODERATE 35: CPT | Performed by: FAMILY MEDICINE

## 2022-02-13 PROCEDURE — 85027 COMPLETE CBC AUTOMATED: CPT | Performed by: INTERNAL MEDICINE

## 2022-02-13 RX ADMIN — INSULIN ASPART 3 UNITS: 100 INJECTION, SOLUTION INTRAVENOUS; SUBCUTANEOUS at 18:18

## 2022-02-13 RX ADMIN — CYCLOBENZAPRINE 10 MG: 10 TABLET, FILM COATED ORAL at 22:08

## 2022-02-13 RX ADMIN — INSULIN ASPART 1 UNITS: 100 INJECTION, SOLUTION INTRAVENOUS; SUBCUTANEOUS at 12:01

## 2022-02-13 RX ADMIN — PIPERACILLIN AND TAZOBACTAM 4.5 G: 4; .5 INJECTION, POWDER, LYOPHILIZED, FOR SOLUTION INTRAVENOUS at 11:10

## 2022-02-13 RX ADMIN — LIDOCAINE HYDROCHLORIDE 10 ML: 20 SOLUTION TOPICAL at 22:10

## 2022-02-13 RX ADMIN — ATORVASTATIN CALCIUM 40 MG: 40 TABLET, FILM COATED ORAL at 11:11

## 2022-02-13 RX ADMIN — DEXAMETHASONE 6 MG: 2 TABLET ORAL at 11:10

## 2022-02-13 RX ADMIN — PIPERACILLIN AND TAZOBACTAM 4.5 G: 4; .5 INJECTION, POWDER, LYOPHILIZED, FOR SOLUTION INTRAVENOUS at 00:26

## 2022-02-13 RX ADMIN — PIPERACILLIN AND TAZOBACTAM 4.5 G: 4; .5 INJECTION, POWDER, LYOPHILIZED, FOR SOLUTION INTRAVENOUS at 05:55

## 2022-02-13 RX ADMIN — CYCLOBENZAPRINE 10 MG: 10 TABLET, FILM COATED ORAL at 15:27

## 2022-02-13 RX ADMIN — Medication 1 TABLET: at 11:12

## 2022-02-13 RX ADMIN — ENOXAPARIN SODIUM 40 MG: 40 INJECTION SUBCUTANEOUS at 22:06

## 2022-02-13 RX ADMIN — BARICITINIB 4 MG: 2 TABLET, FILM COATED ORAL at 11:10

## 2022-02-13 RX ADMIN — ENOXAPARIN SODIUM 40 MG: 40 INJECTION SUBCUTANEOUS at 11:09

## 2022-02-13 RX ADMIN — CYCLOBENZAPRINE 10 MG: 10 TABLET, FILM COATED ORAL at 05:55

## 2022-02-13 RX ADMIN — ACETAMINOPHEN 650 MG: 325 TABLET, FILM COATED ORAL at 05:55

## 2022-02-13 RX ADMIN — LISINOPRIL 2.5 MG: 2.5 TABLET ORAL at 11:11

## 2022-02-13 ASSESSMENT — ACTIVITIES OF DAILY LIVING (ADL)
ADLS_ACUITY_SCORE: 7

## 2022-02-13 ASSESSMENT — MIFFLIN-ST. JEOR: SCORE: 1615

## 2022-02-13 NOTE — PROGRESS NOTES
Patient started shift on Vapotherm.  Patient was weaned to 12L NC current SpO2 in the upper 90s.

## 2022-02-13 NOTE — PLAN OF CARE
Patient admitted for acute respiratory failure with hypoxia. VSS. Afebrile. O2 stable on 12LPM via HFNC. Non-rebreather mask applied for activity. Dim LS. Dry, non-productive cough. AxO. C/o joint/shoulder pain, PRN Flexeril and Tylenol given with relief. Patient rested well overnight. Will continue to monitor. Temp: 96.9  F (36.1  C) Temp src: Tympanic BP: 109/65 Pulse: 51   Resp: 18 SpO2: 96 % O2 Device: High Flow Nasal Cannula (HFNC) Oxygen Delivery: 12 LPM      Rain Falk RN on 2/13/2022 at 6:46 AM

## 2022-02-13 NOTE — PROGRESS NOTES
[facility]    Medicine Progress Note - Hospitalist Service    Date of Admission:  2/6/2022    Assessment & Plan            Acute respiratory failure with hypoxia (H)  Presenting with increased dyspnea after testing positive for Covid on home testing at home  Covid test positive in the ED, requiring supplemental oxygen  For the past week he has been weaning from his oxygen.  Yesterday down off Vapotherm to currently at 12 L high flow nasal cannula  Continue to wean, discussed discharge criteria of needing less than 4 L of oxygen Prior to discharge    Pneumonia due to 2019 novel coronavirus  Presenting with dyspnea and oxygen requirement  Chest x-ray imaging showing bilateral multifocal pneumonia consistent with Covid-19  Concern with elevated white count of a possible bacterial process, so he has been started on IV antibiotics as well, fairly broad-spectrum and that concern of some underlying hematologic disease  Has been started on dexamethasone, baricitinib, with Lovenox for clot prophylaxis  On day 9  Dex, day 8  Baricitinib, day 8 of Zosyn   CRP and d-dimer trending down  White blood cell count remains elevated at 20,000, most likely some underlying CLL which will need outpatient follow-up by oncology  Cultures have all been negative and will stop antibiotics at this time      Leukocytosis, unspecified type  Fairly markedly elevated white count, recent history of elevated leukocytosis without clear cause  Concern is a possible underlying hematologic disorder, work-up in progress  Has been seen by oncology with flow cytometry showing probable CLL  will need outpatient follow-up           Diet: Moderate Consistent Carb (60 g CHO per Meal) Diet    DVT Prophylaxis: Enoxaparin (Lovenox) SQ  Portillo Catheter: Not present  Central Lines: None  Cardiac Monitoring: None  Code Status: Full Code      Disposition Plan   Expected Discharge:  1 to 2 days  Anticipated discharge location: home with family     Delays:  Stable oxygen requirements, trending downward       The patient's care was discussed with the Patient and Patient's Family.    Aston Hutchison MD  Hospitalist Service  [facility]  Securely message with the Vocera Web Console (learn more here)  Text page via [employer] Paging/Directory         Clinically Significant Risk Factors Present on Admission                    ______________________________________________________________________    Interval History   Feeling better, eating less oxygen as the days go on.  Feeling able to get up and do more in the room.  Denies pain, diarrhea.    Data reviewed today: I reviewed all medications, new labs and imaging results over the last 24 hours. I personally reviewed no images or EKG's today.    Physical Exam   Vital Signs: Temp: 96.9  F (36.1  C) Temp src: Tympanic BP: 105/68 Pulse: 96   Resp: 18 SpO2: 92 % O2 Device: High Flow Nasal Cannula (HFNC) Oxygen Delivery: 11 LPM  Weight: 186 lbs 6.4 oz  Constitutional: awake, alert, cooperative, no apparent distress, and appears stated age  Respiratory: No increased work of breathing, good air exchange, clear to auscultation bilaterally, no crackles or wheezing  Cardiovascular: regular rate and rhythm  GI: normal bowel sounds, soft and non-distended    Data   Recent Labs   Lab 02/13/22  0538 02/12/22  0605 02/11/22  0528   WBC 20.2* 19.0* 23.6*   HGB 11.1* 10.6* 10.5*   MCV 81 81 80    383 403    138 139   POTASSIUM 4.7 4.5 4.5   CHLORIDE 104 103 105   CO2 28 25 25   BUN 24 22 22   CR 0.98 0.92 0.87   ANIONGAP 8 10 9   LILIAN 9.3 9.3 9.3   * 126* 107*

## 2022-02-13 NOTE — PLAN OF CARE
Patient is alert, oriented and pleasant- uses call light appropriately.  Is independent with pivot to recliner/bed- has been up most of the day.  Continues to have chest discomfort from coughing- flexeril to manage.  Reported having a sore spot (small open area) in mouth- updated Dr. Hutchison and received order for magic mouthwash.  Oxygen titrated down to 8 LPM high flow with sats in mid 90's at rest- desat to 80's with activity and is able to recover within a few minutes (nonrebreather 15L at bedside PRN with activity).  Lungs clear/dim bases, dyspnea with exertion, heart regular, bowel sounds active, no edema.  Appetite good- fluid intake adequate.  Katy Santoyo RN on 2/13/2022 at 4:50 PM     Reported concern regarding discharge and physical limitations related to dyspnea and hypoxia- has quite a distance to navigate necessary items/rooms in home and wife is not physically able to assist if patient becomes weak/falls.  Continues to need nonrebreather with activity to tolerate ambulation to bathroom/exertion.  Katy Santoyo RN on 2/13/2022 at 7:15 PM

## 2022-02-14 PROCEDURE — 250N000013 HC RX MED GY IP 250 OP 250 PS 637: Performed by: INTERNAL MEDICINE

## 2022-02-14 PROCEDURE — 250N000011 HC RX IP 250 OP 636: Performed by: INTERNAL MEDICINE

## 2022-02-14 PROCEDURE — 99232 SBSQ HOSP IP/OBS MODERATE 35: CPT | Performed by: INTERNAL MEDICINE

## 2022-02-14 PROCEDURE — 120N000001 HC R&B MED SURG/OB

## 2022-02-14 RX ADMIN — LISINOPRIL 2.5 MG: 2.5 TABLET ORAL at 10:29

## 2022-02-14 RX ADMIN — Medication 1 TABLET: at 10:29

## 2022-02-14 RX ADMIN — CYCLOBENZAPRINE 10 MG: 10 TABLET, FILM COATED ORAL at 05:48

## 2022-02-14 RX ADMIN — ENOXAPARIN SODIUM 40 MG: 40 INJECTION SUBCUTANEOUS at 22:31

## 2022-02-14 RX ADMIN — INSULIN ASPART 2 UNITS: 100 INJECTION, SOLUTION INTRAVENOUS; SUBCUTANEOUS at 17:15

## 2022-02-14 RX ADMIN — ATORVASTATIN CALCIUM 40 MG: 40 TABLET, FILM COATED ORAL at 10:29

## 2022-02-14 RX ADMIN — CYCLOBENZAPRINE 10 MG: 10 TABLET, FILM COATED ORAL at 14:58

## 2022-02-14 RX ADMIN — LIDOCAINE HYDROCHLORIDE 10 ML: 20 SOLUTION TOPICAL at 08:15

## 2022-02-14 RX ADMIN — LIDOCAINE HYDROCHLORIDE 10 ML: 20 SOLUTION TOPICAL at 12:08

## 2022-02-14 RX ADMIN — LIDOCAINE HYDROCHLORIDE 10 ML: 20 SOLUTION TOPICAL at 17:15

## 2022-02-14 RX ADMIN — LIDOCAINE HYDROCHLORIDE 10 ML: 20 SOLUTION TOPICAL at 22:30

## 2022-02-14 RX ADMIN — ENOXAPARIN SODIUM 40 MG: 40 INJECTION SUBCUTANEOUS at 10:28

## 2022-02-14 RX ADMIN — CYCLOBENZAPRINE 10 MG: 10 TABLET, FILM COATED ORAL at 22:29

## 2022-02-14 RX ADMIN — ACETAMINOPHEN 650 MG: 325 TABLET, FILM COATED ORAL at 22:29

## 2022-02-14 RX ADMIN — DEXAMETHASONE 6 MG: 2 TABLET ORAL at 10:29

## 2022-02-14 RX ADMIN — BARICITINIB 4 MG: 2 TABLET, FILM COATED ORAL at 10:29

## 2022-02-14 ASSESSMENT — ACTIVITIES OF DAILY LIVING (ADL)
ADLS_ACUITY_SCORE: 8
ADLS_ACUITY_SCORE: 7
ADLS_ACUITY_SCORE: 8
ADLS_ACUITY_SCORE: 8
ADLS_ACUITY_SCORE: 7
ADLS_ACUITY_SCORE: 8
ADLS_ACUITY_SCORE: 7
ADLS_ACUITY_SCORE: 8
ADLS_ACUITY_SCORE: 8
ADLS_ACUITY_SCORE: 7
ADLS_ACUITY_SCORE: 8
ADLS_ACUITY_SCORE: 7
ADLS_ACUITY_SCORE: 8
ADLS_ACUITY_SCORE: 8

## 2022-02-14 ASSESSMENT — MIFFLIN-ST. JEOR: SCORE: 1615.46

## 2022-02-14 NOTE — PLAN OF CARE
"Patient is alert and oriented x 4. Reports lower back pain of a level 5/10. Flexeril administered per MAR. Patient was sleeping for my follow up assessment. Lung sounds are clear in the upper lobes and diminished in the bases. Dyspnea with exertion. SpO2 dropped to 83% via high flow nasal cannula 8 LPM. O2 increased to 9 LPM. SpO2 increased to 92-99%. Patient transfers to bedside commode independently. Voids without difficulty.   Vital signs:  Temp: 97.2  F (36.2  C) Temp src: Tympanic BP: (!) 161/75 Pulse: 51   Resp: 20 SpO2: 92 % O2 Device: High Flow Nasal Cannula (HFNC) Oxygen Delivery: 9 LPM Height: 172.7 cm (5' 8\") Weight: 84.6 kg (186 lb 8 oz)  Estimated body mass index is 28.36 kg/m  as calculated from the following:    Height as of this encounter: 1.727 m (5' 8\").    Weight as of this encounter: 84.6 kg (186 lb 8 oz).      Saar Cantrell RN on 2/14/2022 at 7:03 AM    "

## 2022-02-14 NOTE — PROGRESS NOTES
[facility]    Medicine Progress Note - Hospitalist Service    Date of Admission:  2/6/2022    Assessment & Plan            Principal Problem:    Acute respiratory failure with hypoxia (H)    Assessment: secondary to covid. Slow improvement, down to 9 liters from vapotherm as recently as 2/12.     Plan: continue to wean oxygen to a home oxygen quantity    Active Problems:    Controlled type 2 diabetes mellitus with complication, without long-term current use of insulin (H)    Assessment: well controlled on sliding scale    Plan: monitor      Leukocytosis, unspecified type    Assessment: likely CLL per oncology notes    Plan: monitor      Pneumonia due to 2019 novel coronavirus    Assessment: present on admission     Plan: Day 9 baricitinib, dexamethasone. Off zosyn after 8 days.          Diet: Moderate Consistent Carb (60 g CHO per Meal) Diet    DVT Prophylaxis: Enoxaparin (Lovenox) SQ  Portillo Catheter: Not present  Central Lines: None  Cardiac Monitoring: None  Code Status: Full Code      Disposition Plan   Expected Discharge:  2-3 days   Anticipated discharge location: home with family    Delays:          The patient's care was discussed with the Patient.    Will Brian MD  Hospitalist Service  [facility]  Securely message with the Vocera Web Console (learn more here)  Text page via [employer] Paging/Directory       Interval History   Feeling better, still significant dyspnea on exertion. No chest pain. No nausea, vomiting. No fevers, chills.     Data reviewed today: I reviewed all medications, new labs and imaging results over the last 24 hours. I personally reviewed no images or EKG's today.    Physical Exam   Vital Signs: Temp: 97.1  F (36.2  C) Temp src: Tympanic BP: 97/61 Pulse: 88   Resp: 20 SpO2: (!) 89 % O2 Device: High Flow Nasal Cannula (HFNC) Oxygen Delivery: 9 LPM  Weight: 186 lbs 8 oz  GENERAL: Comfortable, no apparent distress.  CARDIOVASCULAR: regular rate  and rhythm, no murmur. No lower extremity edema   RESPIRATORY: Clear to auscultation bilaterally, no wheezes or crackles.  GI: non-tender, non-distended, normal bowel sounds.   SKIN: warm periphery, no rashes      Data   Recent Labs   Lab 02/13/22  0538 02/12/22  0605 02/11/22  0528   WBC 20.2* 19.0* 23.6*   HGB 11.1* 10.6* 10.5*   MCV 81 81 80    383 403    138 139   POTASSIUM 4.7 4.5 4.5   CHLORIDE 104 103 105   CO2 28 25 25   BUN 24 22 22   CR 0.98 0.92 0.87   ANIONGAP 8 10 9   LILIAN 9.3 9.3 9.3   * 126* 107*     Medications     - MEDICATION INSTRUCTIONS -       sodium chloride 10 mL/hr at 02/08/22 0949       lidocaine visc 2% & maalox/mylanta w/simethicone & diphenhydramine  10 mL Swish & Swallow QID BEFORE MEALS SCHEDULED     atorvastatin  40 mg Oral Daily     baricitinib  4 mg Oral Daily     cyclobenzaprine  10 mg Oral TID     dexamethasone  6 mg Oral Daily     enoxaparin ANTICOAGULANT  40 mg Subcutaneous Q12H     insulin aspart  1-7 Units Subcutaneous TID AC     insulin aspart  1-5 Units Subcutaneous At Bedtime     lisinopril  2.5 mg Oral Daily     multivitamin w/minerals  1 tablet Oral Daily     sodium chloride (PF)  3 mL Intracatheter Q8H     vitamin D2  50,000 Units Oral Q7 Days

## 2022-02-14 NOTE — PROGRESS NOTES
SAFETY CHECKLIST  ID Bands and Risk clasps correct and in place (DNR, Fall risk, Allergy, Latex, Limb):  Yes  All Lines Reconciled and labeled correctly: Yes  Whiteboard updated:Yes  Environmental interventions (bed/chair alarm on, call light, side rails, restraints, sitter....): Yes    Sara Cantrell RN on 2/13/2022 at 7:30 PM

## 2022-02-14 NOTE — PLAN OF CARE
Patient admitted with respiratory failure secondary to COVID.  He is alert, oriented, and pleasant.  He is currently on 9 L HFNC and does require the NRB with activity as he desats and experiences SOB.  He is vitally stable, will follow.    Problem: Adult Inpatient Plan of Care  Goal: Patient-Specific Goal (Individualized)  Flowsheets (Taken 2/14/2022 1138)  Patient-Specific Goals (Include Timeframe): improved activity tolerance  Goal: Absence of Hospital-Acquired Illness or Injury  Intervention: Identify and Manage Fall Risk  Recent Flowsheet Documentation  Taken 2/14/2022 0814 by Nini Dowling RN  Safety Promotion/Fall Prevention:   activity supervised   safety round/check completed   supervised activity  Intervention: Prevent and Manage VTE (Venous Thromboembolism) Risk  Recent Flowsheet Documentation  Taken 2/14/2022 0814 by Nini Dowling RN  VTE Prevention/Management:   fluids promoted   anticoagulant therapy maintained  Intervention: Prevent Infection  Recent Flowsheet Documentation  Taken 2/14/2022 0814 by Nini Dowling RN  Infection Prevention:   hand hygiene promoted   personal protective equipment utilized   rest/sleep promoted   single patient room provided   visitors restricted/screened     Problem: Infection (Pneumonia)  Goal: Resolution of Infection Signs and Symptoms  Intervention: Prevent Infection Progression  Recent Flowsheet Documentation  Taken 2/14/2022 0814 by Nini Dowling RN  Isolation Precautions: airborne precautions maintained

## 2022-02-15 PROCEDURE — 250N000011 HC RX IP 250 OP 636: Performed by: INTERNAL MEDICINE

## 2022-02-15 PROCEDURE — 250N000013 HC RX MED GY IP 250 OP 250 PS 637: Performed by: INTERNAL MEDICINE

## 2022-02-15 PROCEDURE — 120N000001 HC R&B MED SURG/OB

## 2022-02-15 PROCEDURE — 99231 SBSQ HOSP IP/OBS SF/LOW 25: CPT | Performed by: INTERNAL MEDICINE

## 2022-02-15 RX ADMIN — CYCLOBENZAPRINE 10 MG: 10 TABLET, FILM COATED ORAL at 15:16

## 2022-02-15 RX ADMIN — ENOXAPARIN SODIUM 40 MG: 40 INJECTION SUBCUTANEOUS at 22:03

## 2022-02-15 RX ADMIN — LISINOPRIL 2.5 MG: 2.5 TABLET ORAL at 10:55

## 2022-02-15 RX ADMIN — Medication 1 TABLET: at 10:55

## 2022-02-15 RX ADMIN — CYCLOBENZAPRINE 10 MG: 10 TABLET, FILM COATED ORAL at 22:02

## 2022-02-15 RX ADMIN — DEXAMETHASONE 6 MG: 2 TABLET ORAL at 10:55

## 2022-02-15 RX ADMIN — ENOXAPARIN SODIUM 40 MG: 40 INJECTION SUBCUTANEOUS at 10:54

## 2022-02-15 RX ADMIN — ATORVASTATIN CALCIUM 40 MG: 40 TABLET, FILM COATED ORAL at 10:55

## 2022-02-15 RX ADMIN — BARICITINIB 4 MG: 2 TABLET, FILM COATED ORAL at 10:55

## 2022-02-15 RX ADMIN — LIDOCAINE HYDROCHLORIDE 10 ML: 20 SOLUTION TOPICAL at 15:18

## 2022-02-15 RX ADMIN — CYCLOBENZAPRINE 10 MG: 10 TABLET, FILM COATED ORAL at 08:06

## 2022-02-15 RX ADMIN — INSULIN ASPART 1 UNITS: 100 INJECTION, SOLUTION INTRAVENOUS; SUBCUTANEOUS at 17:05

## 2022-02-15 RX ADMIN — INSULIN ASPART 1 UNITS: 100 INJECTION, SOLUTION INTRAVENOUS; SUBCUTANEOUS at 10:54

## 2022-02-15 RX ADMIN — LIDOCAINE HYDROCHLORIDE 10 ML: 20 SOLUTION TOPICAL at 17:12

## 2022-02-15 ASSESSMENT — ACTIVITIES OF DAILY LIVING (ADL)
ADLS_ACUITY_SCORE: 7
ADLS_ACUITY_SCORE: 7
ADLS_ACUITY_SCORE: 8
ADLS_ACUITY_SCORE: 8
ADLS_ACUITY_SCORE: 7
ADLS_ACUITY_SCORE: 7
ADLS_ACUITY_SCORE: 8
ADLS_ACUITY_SCORE: 7
ADLS_ACUITY_SCORE: 7
ADLS_ACUITY_SCORE: 8
ADLS_ACUITY_SCORE: 7
ADLS_ACUITY_SCORE: 7
ADLS_ACUITY_SCORE: 8
ADLS_ACUITY_SCORE: 7
ADLS_ACUITY_SCORE: 8
ADLS_ACUITY_SCORE: 7
ADLS_ACUITY_SCORE: 8
ADLS_ACUITY_SCORE: 8
ADLS_ACUITY_SCORE: 7
ADLS_ACUITY_SCORE: 8
ADLS_ACUITY_SCORE: 7

## 2022-02-15 NOTE — PLAN OF CARE
Patient admitted for acute respiratory failure with hypoxia. VSS and WNL. O2 stable on 8LPM via HFNC. Attempted to wean patient to 7LPM but patient desaturated to high 70s with minimal talking/activity. NRB applied at 15LPM for activity. Patient SOB, with increased BREEN. Dim LS in bases. Dry, non-productive cough. Shallow breathing. Tolerating regular diet with stable appetite. C/o discomfort to shoulders, back-PRN Tylenol and scheduled Flexeril given with relief. Patient motivated to get better, participate in improving his health. Will continue to monitor.     Temp: 97.7  F (36.5  C) Temp src: Tympanic BP: (!) 154/73 Pulse: (!) 48   Resp: 20 SpO2: 100 % O2 Device: High Flow Nasal Cannula (HFNC) Oxygen Delivery: 8 LPM    Rain Falk RN on 2/15/2022 at 6:31 AM

## 2022-02-15 NOTE — PROGRESS NOTES
[facility]    Medicine Progress Note - Hospitalist Service    Date of Admission:  2/6/2022    Assessment & Plan          Principal Problem:    Acute respiratory failure with hypoxia (H)    Assessment: secondary to covid. Slow improvement, down to 8 liters from vapotherm as recently as 2/12.     Plan: continue to wean oxygen to a home oxygen quantity, attempting 7 liters now.    Active Problems:    Controlled type 2 diabetes mellitus with complication, without long-term current use of insulin (H)    Assessment: well controlled on sliding scale    Plan: monitor      Leukocytosis, unspecified type    Assessment: likely CLL per oncology notes    Plan: monitor      Pneumonia due to 2019 novel coronavirus    Assessment: present on admission     Plan: Day 10 baricitinib, dexamethasone. Off zosyn after 8 days.            Diet: Moderate Consistent Carb (60 g CHO per Meal) Diet    DVT Prophylaxis: Enoxaparin (Lovenox) SQ  Portillo Catheter: Not present  Central Lines: None  Cardiac Monitoring: None  Code Status: Full Code      Disposition Plan   Expected Discharge:  2-3 days   Anticipated discharge location: home with family    Delays:          The patient's care was discussed with the Patient.    Will Brian MD  Hospitalist Service  [facility]  Securely message with the Vocera Web Console (learn more here)  Text page via [email or web address] Paging/Directory         Clinically Significant Risk Factors Present on Admission                    ______________________________________________________________________    Interval History   Feeling better. Gets a little anxious at night with sleeping.    Data reviewed today: I reviewed all medications, new labs and imaging results over the last 24 hours. I personally reviewed no images or EKG's today.    Physical Exam   Vital Signs: Temp: 98.4  F (36.9  C) Temp src: Tympanic BP: 94/56 Pulse: 88   Resp: 20 SpO2: 93 % O2 Device: High Flow Nasal Cannula  (HFNC) Oxygen Delivery: 8 LPM  Weight: 186 lbs 8 oz  GENERAL: Comfortable, no apparent distress.  CARDIOVASCULAR: regular rate and rhythm, no murmur. No lower extremity edema   RESPIRATORY: Clear to auscultation bilaterally, no wheezes or crackles.  GI: non-tender, non-distended, normal bowel sounds.   SKIN: warm periphery, no rashes      Data   Recent Labs   Lab 02/13/22  0538 02/12/22  0605 02/11/22  0528   WBC 20.2* 19.0* 23.6*   HGB 11.1* 10.6* 10.5*   MCV 81 81 80    383 403    138 139   POTASSIUM 4.7 4.5 4.5   CHLORIDE 104 103 105   CO2 28 25 25   BUN 24 22 22   CR 0.98 0.92 0.87   ANIONGAP 8 10 9   LILIAN 9.3 9.3 9.3   * 126* 107*     Medications     - MEDICATION INSTRUCTIONS -       sodium chloride 10 mL/hr at 02/08/22 0949       lidocaine visc 2% & maalox/mylanta w/simethicone & diphenhydramine  10 mL Swish & Swallow QID BEFORE MEALS SCHEDULED     atorvastatin  40 mg Oral Daily     baricitinib  4 mg Oral Daily     cyclobenzaprine  10 mg Oral TID     dexamethasone  6 mg Oral Daily     enoxaparin ANTICOAGULANT  40 mg Subcutaneous Q12H     insulin aspart  1-7 Units Subcutaneous TID AC     insulin aspart  1-5 Units Subcutaneous At Bedtime     lisinopril  2.5 mg Oral Daily     multivitamin w/minerals  1 tablet Oral Daily     sodium chloride (PF)  3 mL Intracatheter Q8H     vitamin D2  50,000 Units Oral Q7 Days

## 2022-02-15 NOTE — PROGRESS NOTES
Patient up to bathroom with assist of x1. Sats decreased, came back up in a few minutes. Bottom/Coccyx is blanchable red. Educated patient of the importance of weight shifting to prevent any skin breakage.   Lesli Monge RN on 2/15/2022 at 5:24 PM

## 2022-02-15 NOTE — PLAN OF CARE
"Patient alert and oriented. VSS. Denies pain. Afebrile. Infrequent dry cough. LS clear. BS audible.  O2 7L high flow NC. Patient SOB with activity, O2 increases after a few minutes. Appetite is good. Patient will prone himself and side lying as needed.  Patient has refused magic mouth wash, patient reported using Listerine for his mouth sore is more effective.  Will continue to monitor.    Blood pressure 106/65, pulse 88, temperature 98.4  F (36.9  C), temperature source Tympanic, resp. rate 20, height 1.727 m (5' 8\"), weight 84.6 kg (186 lb 8 oz), SpO2 93 %.   Problem: Infection (Pneumonia)  Goal: Resolution of Infection Signs and Symptoms  Outcome: No Change  Intervention: Prevent Infection Progression  Recent Flowsheet Documentation  Taken 2/15/2022 0759 by Lesli Monge, RN  Isolation Precautions: airborne precautions maintained     "

## 2022-02-15 NOTE — PROGRESS NOTES
SAFETY CHECKLIST  ID Bands and Risk clasps correct and in place (DNR, Fall risk, Allergy, Latex, Limb):  Yes  All Lines Reconciled and labeled correctly: Yes  Whiteboard updated:Yes  Environmental interventions (bed/chair alarm on, call light, side rails, restraints, sitter....): Yes  Verify Tele #: NA  Lesli Monge RN on 2/15/2022 at 7:34 AM

## 2022-02-16 LAB
GLUCOSE BLDC GLUCOMTR-MCNC: 111 MG/DL (ref 70–99)
GLUCOSE BLDC GLUCOMTR-MCNC: 149 MG/DL (ref 70–99)
GLUCOSE BLDC GLUCOMTR-MCNC: 174 MG/DL (ref 70–99)
GLUCOSE BLDC GLUCOMTR-MCNC: 214 MG/DL (ref 70–99)

## 2022-02-16 PROCEDURE — 99232 SBSQ HOSP IP/OBS MODERATE 35: CPT | Performed by: INTERNAL MEDICINE

## 2022-02-16 PROCEDURE — 250N000013 HC RX MED GY IP 250 OP 250 PS 637: Performed by: INTERNAL MEDICINE

## 2022-02-16 PROCEDURE — 120N000001 HC R&B MED SURG/OB

## 2022-02-16 PROCEDURE — 250N000011 HC RX IP 250 OP 636: Performed by: INTERNAL MEDICINE

## 2022-02-16 RX ADMIN — CYCLOBENZAPRINE 10 MG: 10 TABLET, FILM COATED ORAL at 21:52

## 2022-02-16 RX ADMIN — LIDOCAINE HYDROCHLORIDE 10 ML: 20 SOLUTION TOPICAL at 17:04

## 2022-02-16 RX ADMIN — BARICITINIB 4 MG: 2 TABLET, FILM COATED ORAL at 10:33

## 2022-02-16 RX ADMIN — INSULIN ASPART 1 UNITS: 100 INJECTION, SOLUTION INTRAVENOUS; SUBCUTANEOUS at 12:57

## 2022-02-16 RX ADMIN — CYCLOBENZAPRINE 10 MG: 10 TABLET, FILM COATED ORAL at 13:00

## 2022-02-16 RX ADMIN — ACETAMINOPHEN 650 MG: 325 TABLET, FILM COATED ORAL at 21:52

## 2022-02-16 RX ADMIN — ATORVASTATIN CALCIUM 40 MG: 40 TABLET, FILM COATED ORAL at 10:33

## 2022-02-16 RX ADMIN — Medication 1 TABLET: at 10:33

## 2022-02-16 RX ADMIN — ENOXAPARIN SODIUM 40 MG: 40 INJECTION SUBCUTANEOUS at 10:33

## 2022-02-16 RX ADMIN — ERGOCALCIFEROL 50000 UNITS: 1.25 CAPSULE ORAL at 17:04

## 2022-02-16 RX ADMIN — CYCLOBENZAPRINE 10 MG: 10 TABLET, FILM COATED ORAL at 06:41

## 2022-02-16 RX ADMIN — LISINOPRIL 2.5 MG: 2.5 TABLET ORAL at 10:33

## 2022-02-16 RX ADMIN — INSULIN ASPART 2 UNITS: 100 INJECTION, SOLUTION INTRAVENOUS; SUBCUTANEOUS at 17:05

## 2022-02-16 RX ADMIN — ENOXAPARIN SODIUM 40 MG: 40 INJECTION SUBCUTANEOUS at 21:52

## 2022-02-16 RX ADMIN — Medication 1 ML: at 21:52

## 2022-02-16 ASSESSMENT — ACTIVITIES OF DAILY LIVING (ADL)
ADLS_ACUITY_SCORE: 7
ADLS_ACUITY_SCORE: 7
ADLS_ACUITY_SCORE: 5
ADLS_ACUITY_SCORE: 7
ADLS_ACUITY_SCORE: 5
ADLS_ACUITY_SCORE: 7
ADLS_ACUITY_SCORE: 7
ADLS_ACUITY_SCORE: 5
ADLS_ACUITY_SCORE: 7
ADLS_ACUITY_SCORE: 5
ADLS_ACUITY_SCORE: 7
ADLS_ACUITY_SCORE: 7
ADLS_ACUITY_SCORE: 5
ADLS_ACUITY_SCORE: 7
ADLS_ACUITY_SCORE: 5
ADLS_ACUITY_SCORE: 7
ADLS_ACUITY_SCORE: 7

## 2022-02-16 NOTE — PROGRESS NOTES
[facility]    Medicine Progress Note - Hospitalist Service    Date of Admission:  2/6/2022    Assessment & Plan          Principal Problem:    Acute respiratory failure with hypoxia (H)    Assessment: secondary to covid. Slow improvement, down to 7 liters from vapotherm as recently as 2/12. Feeling ok, still needing high amounts of oxygen.     Plan: continue to wean oxygen to a home oxygen quantity, attempting 6 liters now.    Active Problems:    Controlled type 2 diabetes mellitus with complication, without long-term current use of insulin (H)    Assessment: well controlled on sliding scale    Plan: monitor      Leukocytosis, unspecified type    Assessment: likely CLL per oncology notes    Plan: monitor      Pneumonia due to 2019 novel coronavirus    Assessment: present on admission     Plan: Day 11 baricitinib, dexamethasone. Consider weaning dexamethasone starting tomorrow. Off zosyn after 8 days.              Diet: Moderate Consistent Carb (60 g CHO per Meal) Diet    DVT Prophylaxis: Enoxaparin (Lovenox) SQ  Portillo Catheter: Not present  Central Lines: None  Cardiac Monitoring: None  Code Status: Full Code      Disposition Plan   Expected Discharge:  2-3 days   Anticipated discharge location: home with family    Delays:          The patient's care was discussed with the Patient.    Will Brian MD  Hospitalist Service  [facility]  Securely message with the [employer]oc[employer] Web Console (learn more here)  Text page via [employer] Paging/Directory         Interval History   Feeling ok. Resting now.     Data reviewed today: I reviewed all medications, new labs and imaging results over the last 24 hours. I personally reviewed no images or EKG's today.    Physical Exam   Vital Signs: Temp: 97.6  F (36.4  C) Temp src: Tympanic BP: 101/57 Pulse: 69   Resp: 20 SpO2: 91 % O2 Device: High Flow Nasal Cannula (HFNC) Oxygen Delivery: 7 LPM  Weight: 183 lbs 0 oz  GENERAL: Comfortable, no  apparent distress.  CARDIOVASCULAR: regular rate and rhythm, no murmur. No lower extremity edema   RESPIRATORY: Clear to auscultation bilaterally, no wheezes or crackles.  GI: non-tender, non-distended, normal bowel sounds.   SKIN: warm periphery, no rashes      Data   Recent Labs   Lab 02/16/22  0735 02/13/22  0538 02/12/22  0605 02/11/22  0528   WBC  --  20.2* 19.0* 23.6*   HGB  --  11.1* 10.6* 10.5*   MCV  --  81 81 80   PLT  --  415 383 403   NA  --  140 138 139   POTASSIUM  --  4.7 4.5 4.5   CHLORIDE  --  104 103 105   CO2  --  28 25 25   BUN  --  24 22 22   CR  --  0.98 0.92 0.87   ANIONGAP  --  8 10 9   LILIAN  --  9.3 9.3 9.3   * 121* 126* 107*     Medications     - MEDICATION INSTRUCTIONS -       sodium chloride 10 mL/hr at 02/08/22 0949       lidocaine visc 2% & maalox/mylanta w/simethicone & diphenhydramine  10 mL Swish & Swallow QID BEFORE MEALS SCHEDULED     atorvastatin  40 mg Oral Daily     baricitinib  4 mg Oral Daily     cyclobenzaprine  10 mg Oral TID     enoxaparin ANTICOAGULANT  40 mg Subcutaneous Q12H     insulin aspart  1-7 Units Subcutaneous TID AC     insulin aspart  1-5 Units Subcutaneous At Bedtime     lisinopril  2.5 mg Oral Daily     multivitamin w/minerals  1 tablet Oral Daily     sodium chloride (PF)  3 mL Intracatheter Q8H     vitamin D2  50,000 Units Oral Q7 Days

## 2022-02-16 NOTE — PLAN OF CARE
"Patient is alert and oriented x 4. Denies pain. Lung sounds are clear in upper lobes and diminished in the bases. SpO2 drops to the 70's with activity. SpO2 maintains 90's with activity via high flow nasal cannula 7 LPM, and Non-rebreather at 10 LPM. Abdominal muscle use with respirations. Patient ambulates with a stand-by assist. Voids without difficulty.   Vital signs:  Temp: 97.6  F (36.4  C) Temp src: Tympanic BP: 120/67 Pulse: 56   Resp: 20 SpO2: 90 % O2 Device: High Flow Nasal Cannula (HFNC) Oxygen Delivery: 7 LPM Height: 172.7 cm (5' 8\") Weight: 83 kg (183 lb)  Estimated body mass index is 27.83 kg/m  as calculated from the following:    Height as of this encounter: 1.727 m (5' 8\").    Weight as of this encounter: 83 kg (183 lb).        Sara Cantrell RN on 2/16/2022 at 7:31 AM                    "

## 2022-02-16 NOTE — PROGRESS NOTES
SAFETY CHECKLIST  ID Bands and Risk clasps correct and in place (DNR, Fall risk, Allergy, Latex, Limb):  Yes  All Lines Reconciled and labeled correctly: Yes  Whiteboard updated:Yes  Environmental interventions (bed/chair alarm on, call light, side rails, restraints, sitter....): Yes    Sara Cantrell RN on 2/15/2022 at 7:32 PM

## 2022-02-17 ENCOUNTER — APPOINTMENT (OUTPATIENT)
Dept: GENERAL RADIOLOGY | Facility: OTHER | Age: 62
DRG: 177 | End: 2022-02-17
Attending: INTERNAL MEDICINE
Payer: COMMERCIAL

## 2022-02-17 LAB
GLUCOSE BLDC GLUCOMTR-MCNC: 112 MG/DL (ref 70–99)
GLUCOSE BLDC GLUCOMTR-MCNC: 120 MG/DL (ref 70–99)
GLUCOSE BLDC GLUCOMTR-MCNC: 168 MG/DL (ref 70–99)
GLUCOSE BLDC GLUCOMTR-MCNC: 68 MG/DL (ref 70–99)

## 2022-02-17 PROCEDURE — 71045 X-RAY EXAM CHEST 1 VIEW: CPT

## 2022-02-17 PROCEDURE — 250N000013 HC RX MED GY IP 250 OP 250 PS 637: Performed by: INTERNAL MEDICINE

## 2022-02-17 PROCEDURE — 99232 SBSQ HOSP IP/OBS MODERATE 35: CPT | Performed by: INTERNAL MEDICINE

## 2022-02-17 PROCEDURE — 120N000001 HC R&B MED SURG/OB

## 2022-02-17 PROCEDURE — 250N000011 HC RX IP 250 OP 636: Performed by: INTERNAL MEDICINE

## 2022-02-17 RX ADMIN — ENOXAPARIN SODIUM 40 MG: 40 INJECTION SUBCUTANEOUS at 22:12

## 2022-02-17 RX ADMIN — ENOXAPARIN SODIUM 40 MG: 40 INJECTION SUBCUTANEOUS at 11:38

## 2022-02-17 RX ADMIN — CYCLOBENZAPRINE 10 MG: 10 TABLET, FILM COATED ORAL at 22:13

## 2022-02-17 RX ADMIN — INSULIN ASPART 1 UNITS: 100 INJECTION, SOLUTION INTRAVENOUS; SUBCUTANEOUS at 17:46

## 2022-02-17 RX ADMIN — Medication 1 TABLET: at 11:38

## 2022-02-17 RX ADMIN — BARICITINIB 4 MG: 2 TABLET, FILM COATED ORAL at 11:38

## 2022-02-17 RX ADMIN — ATORVASTATIN CALCIUM 40 MG: 40 TABLET, FILM COATED ORAL at 11:38

## 2022-02-17 RX ADMIN — CYCLOBENZAPRINE 10 MG: 10 TABLET, FILM COATED ORAL at 08:30

## 2022-02-17 RX ADMIN — LISINOPRIL 2.5 MG: 2.5 TABLET ORAL at 11:39

## 2022-02-17 RX ADMIN — ACETAMINOPHEN 650 MG: 325 TABLET, FILM COATED ORAL at 22:13

## 2022-02-17 RX ADMIN — CYCLOBENZAPRINE 10 MG: 10 TABLET, FILM COATED ORAL at 16:00

## 2022-02-17 ASSESSMENT — ACTIVITIES OF DAILY LIVING (ADL)
ADLS_ACUITY_SCORE: 5

## 2022-02-17 NOTE — PROGRESS NOTES
SAFETY CHECKLIST  ID Bands and Risk clasps correct and in place (DNR, Fall risk, Allergy, Latex, Limb):  Yes  All Lines Reconciled and labeled correctly: Yes  Whiteboard updated:Yes  Environmental interventions (bed/chair alarm on, call light, side rails, restraints, sitter....): Yes  Wen Blount RN on 2/17/2022 at 7:22 AM

## 2022-02-17 NOTE — PROGRESS NOTES
Chest xray shows multifocal pneumonia, no pneumothorax or effusion. Will continue to wean oxygen.     Will Brian M.D. 2/17/2022  12:57 PM

## 2022-02-17 NOTE — PLAN OF CARE
Goal Outcome Evaluation: Patient admitted for acute respiratory failure with hypoxia. VSS and WNL. O2 stable on 6LPM via HFNC. Patient rested in prone most of the shift. Dim LS. Dry, non-productive cough. BREEN. NRB for activity. Tolerating regular diet. PRN Tylenol and Flexeril for pain. Will continue to monitor.     Plan of Care Reviewed With: patient     Rain Falk RN on 2/17/2022 at 6:35 AM

## 2022-02-17 NOTE — PROGRESS NOTES
[facility]    Medicine Progress Note - Hospitalist Service    Date of Admission:  2/6/2022    Assessment & Plan          Principal Problem:    Acute respiratory failure with hypoxia (H)    Assessment: secondary to covid. Slow improvement, down to 7 liters from vapotherm as recently as 2/12. Feeling ok, still needing high amounts of oxygen. More cough today.    Plan: continue to wean oxygen to a home oxygen quantity. Chest xray today. Check CBC and BMP in AM.    Active Problems:    Controlled type 2 diabetes mellitus with complication, without long-term current use of insulin (H)    Assessment: well controlled on sliding scale    Plan: monitor      Leukocytosis, unspecified type    Assessment: likely CLL per oncology notes    Plan: monitor      Pneumonia due to 2019 novel coronavirus    Assessment: present on admission     Plan: Day 12 baricitinib, dexamethasone. Off zosyn after 8 days. Repeat chest xray today.         Diet: Moderate Consistent Carb (60 g CHO per Meal) Diet    DVT Prophylaxis: Enoxaparin (Lovenox) SQ  Portillo Catheter: Not present  Central Lines: None  Cardiac Monitoring: None  Code Status: Full Code      Disposition Plan   Expected Discharge:  2-3 days   Anticipated discharge location: home with family    Delays:          The patient's care was discussed with the Patient.    Will Brian MD  Hospitalist Service  [facility]  Securely message with the Vocera Web Console (learn more here)  Text page via [employer] Paging/Directory         Interval History   Had a rough night. Thought he was going to die. Very short of breath with exertion and at night, and new cough today.     Data reviewed today: I reviewed all medications, new labs and imaging results over the last 24 hours. I personally reviewed no images or EKG's today.    Physical Exam   Vital Signs: Temp: 98  F (36.7  C) Temp src: Tympanic BP: 109/67 Pulse: (!) 123   Resp: 20 SpO2: 90 % O2 Device: High  Flow Nasal Cannula (HFNC) Oxygen Delivery: 8 LPM  Weight: 183 lbs 0 oz  GENERAL: Comfortable, no apparent distress.  CARDIOVASCULAR: regular rate and rhythm, no murmur. No lower extremity edema   RESPIRATORY: Clear to auscultation bilaterally, no wheezes or crackles.  GI: non-tender, non-distended, normal bowel sounds.   SKIN: warm periphery, no rashes      Data   Recent Labs   Lab 02/17/22  0755 02/16/22  2131 02/16/22  1630 02/16/22  0735 02/13/22  0538 02/12/22  0605 02/11/22  0528   WBC  --   --   --   --  20.2* 19.0* 23.6*   HGB  --   --   --   --  11.1* 10.6* 10.5*   MCV  --   --   --   --  81 81 80   PLT  --   --   --   --  415 383 403   NA  --   --   --   --  140 138 139   POTASSIUM  --   --   --   --  4.7 4.5 4.5   CHLORIDE  --   --   --   --  104 103 105   CO2  --   --   --   --  28 25 25   BUN  --   --   --   --  24 22 22   CR  --   --   --   --  0.98 0.92 0.87   ANIONGAP  --   --   --   --  8 10 9   LILIAN  --   --   --   --  9.3 9.3 9.3   GLC 68* 174* 214*   < > 121* 126* 107*    < > = values in this interval not displayed.     No results found for this or any previous visit (from the past 24 hour(s)).  Medications     - MEDICATION INSTRUCTIONS -       sodium chloride 10 mL/hr at 02/08/22 0949       lidocaine visc 2% & maalox/mylanta w/simethicone & diphenhydramine  10 mL Swish & Swallow QID BEFORE MEALS SCHEDULED     atorvastatin  40 mg Oral Daily     baricitinib  4 mg Oral Daily     cyclobenzaprine  10 mg Oral TID     enoxaparin ANTICOAGULANT  40 mg Subcutaneous Q12H     insulin aspart  1-7 Units Subcutaneous TID AC     insulin aspart  1-5 Units Subcutaneous At Bedtime     lisinopril  2.5 mg Oral Daily     multivitamin w/minerals  1 tablet Oral Daily     sodium chloride (PF)  3 mL Intracatheter Q8H     vitamin D2  50,000 Units Oral Q7 Days

## 2022-02-17 NOTE — PROGRESS NOTES
Patient Name: Jesus Varghese   MRN: [id number]   Admit Date: 2/6/2022    Current Infection Status: COVID-19   Current Isolation Status: Special Precautions-COVID-19     Review of COVID-19 status and required isolation precautions    Refer to Special Precautions Duration and Period of Transmissibility Guideline, in Policy Tech.        Criteria used to make decision:   Per ED triage note from 2/6/22, patient had symptoms x10 days (at that time) and had a positive COVID test at home on 2/4/22 putting the patient over the 10 day isolation precautions. Patient showing improvement; no longer taking Decadron and continuing to wean oxygen. Patient has been fever free; tylenol for pain PRN.     I have reviewed this patient's chart per the Special Precautions Duration and Period of Transmissibility guideline and have taken the following action:     DECISION - OPTION 1: Patient meets all the criteria for Special Precautions isolation discontinuation and this writer will resolve the COVID-19 infection flag and isolation status, due to: Immunocompetent Symptomatic: Mild or Moderate: 10 days since symptoms began AND greater than or equal to 24hrs since last fever (without fever-reducing meds) AND COVID-19 symptoms have substantially improved. .       Cookie Pitts, Infection Prevention on 2/17/2022 at 2:12 PM

## 2022-02-17 NOTE — PROGRESS NOTES
Assumed care at 1500 Patient able to make needs known. No further issues Wen Blount RN on 2/16/2022 at 6:33 PM

## 2022-02-17 NOTE — PLAN OF CARE
Problem: Adult Inpatient Plan of Care  Goal: Absence of Hospital-Acquired Illness or Injury  Intervention: Identify and Manage Fall Risk  Recent Flowsheet Documentation  Taken 2/17/2022 1300 by Wen Blount RN  Safety Promotion/Fall Prevention: (Simultaneous filing. User may not have seen previous data.) safety round/check completed  Intervention: Prevent Skin Injury  Recent Flowsheet Documentation  Taken 2/17/2022 1500 by Wen Blount RN  Body Position: position changed independently  Taken 2/17/2022 1300 by Wen Blount RN  Body Position: position changed independently  Taken 2/17/2022 0831 by Wen Blount RN  Body Position:   position changed independently   supine, head elevated   weight shifting  Intervention: Prevent and Manage VTE (Venous Thromboembolism) Risk  Recent Flowsheet Documentation  Taken 2/17/2022 1500 by Wen Blount RN  Activity Management: ambulated in room  Taken 2/17/2022 1300 by Wen Blount RN  Activity Management: ambulated in room  Taken 2/17/2022 0831 by Wen Blount RN  Activity Management:   activity adjusted per tolerance   activity encouraged   ambulated in room  Intervention: Prevent Infection  Recent Flowsheet Documentation  Taken 2/17/2022 1500 by Wen Blount RN  Infection Prevention:   equipment surfaces disinfected   personal protective equipment utilized   hand hygiene promoted   rest/sleep promoted   single patient room provided   visitors restricted/screened  Taken 2/17/2022 1300 by Wen Blount RN  Infection Prevention:   equipment surfaces disinfected   personal protective equipment utilized   hand hygiene promoted   rest/sleep promoted   single patient room provided   visitors restricted/screened   Goal Outcome Evaluation:    Plan of Care Reviewed With: patient   Patient continues to move safely around room will continue to monitor Wen Blount RN on 2/17/2022 at 5:09 PM

## 2022-02-17 NOTE — PROGRESS NOTES
Patient requiring more oxygen than previous shift, up to 10L high flow with sats lower 90% continues to have nonproductive/productive cough, tolerating regular diet well, patient was taken off isolation today and  Wife here to visit, will continue to monitor. Wen Blount RN on 2/17/2022 at 5:07 PM

## 2022-02-18 PROBLEM — C91.10 CLL (CHRONIC LYMPHOCYTIC LEUKEMIA) (H): Status: ACTIVE | Noted: 2022-02-06

## 2022-02-18 LAB
ANION GAP SERPL CALCULATED.3IONS-SCNC: 7 MMOL/L (ref 3–14)
BUN SERPL-MCNC: 19 MG/DL (ref 7–25)
CALCIUM SERPL-MCNC: 9.4 MG/DL (ref 8.6–10.3)
CHLORIDE BLD-SCNC: 99 MMOL/L (ref 98–107)
CO2 SERPL-SCNC: 30 MMOL/L (ref 21–31)
CREAT SERPL-MCNC: 0.9 MG/DL (ref 0.7–1.3)
CRP SERPL-MCNC: 30.1 MG/L
ERYTHROCYTE [DISTWIDTH] IN BLOOD BY AUTOMATED COUNT: 16.5 % (ref 10–15)
GFR SERPL CREATININE-BSD FRML MDRD: >90 ML/MIN/1.73M2
GLUCOSE BLD-MCNC: 88 MG/DL (ref 70–105)
GLUCOSE BLDC GLUCOMTR-MCNC: 120 MG/DL (ref 70–99)
GLUCOSE BLDC GLUCOMTR-MCNC: 153 MG/DL (ref 70–99)
GLUCOSE BLDC GLUCOMTR-MCNC: 167 MG/DL (ref 70–99)
GLUCOSE BLDC GLUCOMTR-MCNC: 95 MG/DL (ref 70–99)
HCT VFR BLD AUTO: 34.7 % (ref 40–53)
HGB BLD-MCNC: 11 G/DL (ref 13.3–17.7)
LACTATE SERPL-SCNC: 1.3 MMOL/L (ref 0.7–2)
MCH RBC QN AUTO: 26.1 PG (ref 26.5–33)
MCHC RBC AUTO-ENTMCNC: 31.7 G/DL (ref 31.5–36.5)
MCV RBC AUTO: 82 FL (ref 78–100)
PLATELET # BLD AUTO: 334 10E3/UL (ref 150–450)
POTASSIUM BLD-SCNC: 4.5 MMOL/L (ref 3.5–5.1)
RBC # BLD AUTO: 4.21 10E6/UL (ref 4.4–5.9)
SODIUM SERPL-SCNC: 136 MMOL/L (ref 134–144)
WBC # BLD AUTO: 14.1 10E3/UL (ref 4–11)

## 2022-02-18 PROCEDURE — 250N000013 HC RX MED GY IP 250 OP 250 PS 637: Performed by: INTERNAL MEDICINE

## 2022-02-18 PROCEDURE — 80048 BASIC METABOLIC PNL TOTAL CA: CPT | Performed by: INTERNAL MEDICINE

## 2022-02-18 PROCEDURE — 36415 COLL VENOUS BLD VENIPUNCTURE: CPT | Performed by: FAMILY MEDICINE

## 2022-02-18 PROCEDURE — 250N000011 HC RX IP 250 OP 636: Performed by: INTERNAL MEDICINE

## 2022-02-18 PROCEDURE — 86140 C-REACTIVE PROTEIN: CPT | Performed by: INTERNAL MEDICINE

## 2022-02-18 PROCEDURE — 36415 COLL VENOUS BLD VENIPUNCTURE: CPT | Performed by: INTERNAL MEDICINE

## 2022-02-18 PROCEDURE — 99232 SBSQ HOSP IP/OBS MODERATE 35: CPT | Performed by: FAMILY MEDICINE

## 2022-02-18 PROCEDURE — 83605 ASSAY OF LACTIC ACID: CPT | Performed by: FAMILY MEDICINE

## 2022-02-18 PROCEDURE — 85027 COMPLETE CBC AUTOMATED: CPT | Performed by: INTERNAL MEDICINE

## 2022-02-18 PROCEDURE — 120N000001 HC R&B MED SURG/OB

## 2022-02-18 RX ADMIN — IBUPROFEN 600 MG: 600 TABLET, FILM COATED ORAL at 05:32

## 2022-02-18 RX ADMIN — ENOXAPARIN SODIUM 40 MG: 40 INJECTION SUBCUTANEOUS at 09:54

## 2022-02-18 RX ADMIN — ACETAMINOPHEN 650 MG: 325 TABLET, FILM COATED ORAL at 20:50

## 2022-02-18 RX ADMIN — LISINOPRIL 2.5 MG: 2.5 TABLET ORAL at 09:55

## 2022-02-18 RX ADMIN — ENOXAPARIN SODIUM 40 MG: 40 INJECTION SUBCUTANEOUS at 20:50

## 2022-02-18 RX ADMIN — ATORVASTATIN CALCIUM 40 MG: 40 TABLET, FILM COATED ORAL at 09:54

## 2022-02-18 RX ADMIN — CYCLOBENZAPRINE 10 MG: 10 TABLET, FILM COATED ORAL at 20:50

## 2022-02-18 RX ADMIN — CYCLOBENZAPRINE 10 MG: 10 TABLET, FILM COATED ORAL at 08:24

## 2022-02-18 RX ADMIN — BARICITINIB 4 MG: 2 TABLET, FILM COATED ORAL at 09:54

## 2022-02-18 RX ADMIN — Medication 1 TABLET: at 09:54

## 2022-02-18 RX ADMIN — ACETAMINOPHEN 650 MG: 325 TABLET, FILM COATED ORAL at 05:32

## 2022-02-18 ASSESSMENT — ACTIVITIES OF DAILY LIVING (ADL)
ADLS_ACUITY_SCORE: 5

## 2022-02-18 NOTE — PROGRESS NOTES
Nutrition follow up:   Appetite has remained fairly good, tolerating diet. Weights vary.   Diet: consistent carb  Intakes: 100% most meals  Vitals:    02/06/22 1020 02/06/22 1330 02/08/22 0235 02/09/22 0026   Weight: 92.1 kg (203 lb) 89.5 kg (197 lb 4.8 oz) 89.3 kg (196 lb 14.4 oz) 97.2 kg (214 lb 3.2 oz)    02/10/22 0422 02/10/22 0500 02/11/22 0440 02/12/22 0539   Weight: 96.9 kg (213 lb 9.6 oz) 87 kg (191 lb 12.8 oz) 88.2 kg (194 lb 8 oz) 84.8 kg (187 lb)    02/13/22 0547 02/14/22 0223 02/16/22 0642 02/18/22 0538   Weight: 84.6 kg (186 lb 6.4 oz) 84.6 kg (186 lb 8 oz) 83 kg (183 lb) 82.4 kg (181 lb 11.2 oz)   Follow up weekly. If needed sooner, please order consult.   Nirmala Sunshine RD on 2/18/2022 at 9:09 AM

## 2022-02-18 NOTE — PLAN OF CARE
Goal Outcome Evaluation: Patient admitted for acute respiratory failure with hypoxia. VSS. O2 stable via HFNC on 6LPM at this time. NRB applied with activity. Dim LS. BREEN. Shallow breathing. Dry, nonproductive cough. Patient had flat affect for most of shift, which is unusual for patient. C/o discomfort to throat, PRN Tylenol, Ibuprofen, throat spray given with relief.  Patient rested well most of shift. Will continue to monitor. Temp: 98.6  F (37  C) Temp src: Tympanic BP: 106/64 Pulse: 76   Resp: 18 SpO2: 98 % O2 Device: High Flow Nasal Cannula (HFNC) Oxygen Delivery: 6 LPM    Rain Falk RN on 2/18/2022 at 6:44 AM      Plan of Care Reviewed With: patient

## 2022-02-18 NOTE — PROGRESS NOTES
[facility]    Hospitalist Progress Note      Assessment & Plan   Jesus Varghese is a 61 year old male who was admitted on 2/6/2022 with acute respiratory failure with hypoxia due to pneumonia from Covid 19.       Principal Problem:    Acute respiratory failure with hypoxia (H)    Assessment: secondary to covid. Slow improvement, down to 5 liters from vapotherm as recently as 2/12. Feeling ok, still needing high amounts of oxygen. More cough today.    Plan: continue to wean oxygen to a home oxygen quantity.      Active Problems:    Controlled type 2 diabetes mellitus with complication, without long-term current use of insulin (H)    Assessment: well controlled on sliding scale    Plan: monitor    CLL: Chronic leukocytosis and evaluated by oncology on 1/21 and flow cytometry consistent.  Given his lymphadenopathy will need reevaluation by oncology regarding consideration for treatment after repeat coverage from the above infection.  -Monitor white count daily  -Follow-up with oncology as an outpatient       Pneumonia due to 2019 novel coronavirus    Assessment: present on admission. Chest xray yesterday no worse.     Plan: Day 13 baricitinib. Dexamethasone was stopped 2/15/22. Off zosyn after 8 days.    Diet: Moderate Consistent Carb (60 g CHO per Meal) Diet    DVT Prophylaxis: Enoxaparin (Lovenox) SQ  Portillo Catheter: Not present  Code Status: Full Code           Disposition Plan   Expected Discharge:  2-3 days   Anticipated discharge location: home with family    Delays:         Entered: Rick Stevens MD 02/18/2022, 2:51 PM       The patient's care was discussed with the Patient.    Rick Stevens MD  Hospitalist Service  [facility]  Contact information available via [facility] Paging/Directory    ______________________________________________________________________    Interval History   Feels better, less shortness of breath and cough is slowly better. Still doesn't  feel like he can take a deep breath, but oxygen requirements are less.     -Data reviewed today: I reviewed all new labs and imaging results over the last 24 hours. I personally reviewed no images or EKG's today.    Physical Exam   Temp: 98.4  F (36.9  C) Temp src: Tympanic BP: 100/62 Pulse: 85   Resp: 20 SpO2: 96 % O2 Device: High Flow Nasal Cannula (HFNC) Oxygen Delivery: 6 LPM  Vitals:    02/14/22 0223 02/16/22 0642 02/18/22 0538   Weight: 84.6 kg (186 lb 8 oz) 83 kg (183 lb) 82.4 kg (181 lb 11.2 oz)     Vital Signs with Ranges  Temp:  [98  F (36.7  C)-98.7  F (37.1  C)] 98.4  F (36.9  C)  Pulse:  [74-87] 85  Resp:  [18-22] 20  BP: ()/(62-67) 100/62  SpO2:  [93 %-99 %] 96 %  I/O last 3 completed shifts:  In: 10 [I.V.:10]  Out: 400 [Urine:400]      GENERAL: Comfortable, no apparent distress.  CARDIOVASCULAR: regular rate and rhythm, no murmur. No lower extremity edema   RESPIRATORY: Clear to auscultation bilaterally, no wheezes or crackles.  GI: non-tender, non-distended, normal bowel sounds.   SKIN: warm periphery, no rashes    Medications     - MEDICATION INSTRUCTIONS -       sodium chloride 10 mL/hr at 02/08/22 0949       atorvastatin  40 mg Oral Daily     baricitinib  4 mg Oral Daily     cyclobenzaprine  10 mg Oral TID     enoxaparin ANTICOAGULANT  40 mg Subcutaneous Q12H     insulin aspart  1-7 Units Subcutaneous TID AC     insulin aspart  1-5 Units Subcutaneous At Bedtime     lisinopril  2.5 mg Oral Daily     multivitamin w/minerals  1 tablet Oral Daily     sodium chloride (PF)  3 mL Intracatheter Q8H     vitamin D2  50,000 Units Oral Q7 Days       Data   Recent Labs   Lab 02/18/22  1121 02/18/22  0729 02/18/22  0522 02/16/22  0735 02/13/22  0538 02/12/22  0605   WBC  --   --  14.1*  --  20.2* 19.0*   HGB  --   --  11.0*  --  11.1* 10.6*   MCV  --   --  82  --  81 81   PLT  --   --  334  --  415 383   NA  --   --  136  --  140 138   POTASSIUM  --   --  4.5  --  4.7 4.5   CHLORIDE  --   --  99  --  104  103   CO2  --   --  30  --  28 25   BUN  --   --  19  --  24 22   CR  --   --  0.90  --  0.98 0.92   ANIONGAP  --   --  7  --  8 10   LILIAN  --   --  9.4  --  9.3 9.3   * 95 88   < > 121* 126*    < > = values in this interval not displayed.       No results found for this or any previous visit (from the past 24 hour(s)).

## 2022-02-19 LAB
CRP SERPL-MCNC: 16 MG/L
GLUCOSE BLDC GLUCOMTR-MCNC: 101 MG/DL (ref 70–99)
GLUCOSE BLDC GLUCOMTR-MCNC: 131 MG/DL (ref 70–99)
GLUCOSE BLDC GLUCOMTR-MCNC: 135 MG/DL (ref 70–99)
GLUCOSE BLDC GLUCOMTR-MCNC: 167 MG/DL (ref 70–99)
GLUCOSE BLDC GLUCOMTR-MCNC: 180 MG/DL (ref 70–99)

## 2022-02-19 PROCEDURE — 250N000013 HC RX MED GY IP 250 OP 250 PS 637: Performed by: INTERNAL MEDICINE

## 2022-02-19 PROCEDURE — 250N000013 HC RX MED GY IP 250 OP 250 PS 637: Performed by: FAMILY MEDICINE

## 2022-02-19 PROCEDURE — 99232 SBSQ HOSP IP/OBS MODERATE 35: CPT | Performed by: FAMILY MEDICINE

## 2022-02-19 PROCEDURE — 250N000011 HC RX IP 250 OP 636: Performed by: INTERNAL MEDICINE

## 2022-02-19 PROCEDURE — 36415 COLL VENOUS BLD VENIPUNCTURE: CPT | Performed by: FAMILY MEDICINE

## 2022-02-19 PROCEDURE — 120N000001 HC R&B MED SURG/OB

## 2022-02-19 PROCEDURE — 86140 C-REACTIVE PROTEIN: CPT | Performed by: FAMILY MEDICINE

## 2022-02-19 RX ORDER — NYSTATIN 100000/ML
500000 SUSPENSION, ORAL (FINAL DOSE FORM) ORAL 4 TIMES DAILY
Status: DISCONTINUED | OUTPATIENT
Start: 2022-02-19 | End: 2022-03-03

## 2022-02-19 RX ADMIN — ENOXAPARIN SODIUM 40 MG: 40 INJECTION SUBCUTANEOUS at 09:48

## 2022-02-19 RX ADMIN — CYCLOBENZAPRINE 10 MG: 10 TABLET, FILM COATED ORAL at 21:02

## 2022-02-19 RX ADMIN — NYSTATIN 500000 UNITS: 500000 SUSPENSION ORAL at 12:32

## 2022-02-19 RX ADMIN — BARICITINIB 4 MG: 2 TABLET, FILM COATED ORAL at 09:48

## 2022-02-19 RX ADMIN — ATORVASTATIN CALCIUM 40 MG: 40 TABLET, FILM COATED ORAL at 09:48

## 2022-02-19 RX ADMIN — Medication 1 TABLET: at 09:48

## 2022-02-19 RX ADMIN — ENOXAPARIN SODIUM 40 MG: 40 INJECTION SUBCUTANEOUS at 20:57

## 2022-02-19 RX ADMIN — NYSTATIN 500000 UNITS: 500000 SUSPENSION ORAL at 15:18

## 2022-02-19 RX ADMIN — LISINOPRIL 2.5 MG: 2.5 TABLET ORAL at 09:48

## 2022-02-19 ASSESSMENT — ACTIVITIES OF DAILY LIVING (ADL)
ADLS_ACUITY_SCORE: 5

## 2022-02-19 NOTE — PLAN OF CARE
"Goal Outcome Evaluation:    Pt admitted 2/6/22 with COVID, now resolved with residual hypoxia. Sats 95% on 4.5 LPM via NC. Pt needs hyper oxygenation prior to repositioning. Stood on scale and sats dropped to 85%. Unable to tolerate ambulation due to SOB. LS have crackles with abd. Breathing. Very deconditioned, reports muscle wasting with significant weight loss since admit. Sore on top left side of mouth.     /70   Pulse 89   Temp 100  F (37.8  C) (Tympanic)   Resp 24   Ht 1.727 m (5' 8\")   Wt 82.4 kg (181 lb 11.2 oz)   SpO2 95%   BMI 27.63 kg/m        Plan of Care Reviewed With: patient                     "

## 2022-02-19 NOTE — PLAN OF CARE
Goal Outcome Evaluation: Patient's O2 stable on 3.5 L while resting. Sats dropped to 83% with ambulation. Non-rebreather mask applied; patient recovered within 1 minute. Dyspnea with exertion. Activity induced productive cough. Posterior crackles. Up in recliner most of shift. Generalized weakness. Discomfort of oral lesion improving with mycostatin.     Plan of Care Reviewed With: patient

## 2022-02-19 NOTE — PLAN OF CARE
Goal Outcome Evaluation: Patient c/o shortness of breath when moving. O2 stable on 4-5 L via n/c. Infrequent cough. LS clear, diminished. Denies pain. SBA to ambulate. Non-rebreather applied before ambulation. VSS. Spouse at bedside.     Plan of Care Reviewed With: patient

## 2022-02-19 NOTE — PROGRESS NOTES
[facility]    Hospitalist Progress Note      Assessment & Plan   Jesus Varghese is a 61 year old male who was admitted on 2/6/2022 with acute respiratory failure with hypoxia due to pneumonia from Covid 19.         Principal Problem:    Acute respiratory failure with hypoxia (H)    Assessment: secondary to covid. Slow improvement, down to 5 liters from vapotherm as recently as 2/12. Feeling ok, still needing high amounts of oxygen with any movement. Cough slightly better.     Plan: continue to wean oxygen to a home oxygen quantity.      Active Problems:    Controlled type 2 diabetes mellitus with complication, without long-term current use of insulin (H)    Assessment: well controlled on sliding scale    Plan: monitor     CLL: Chronic leukocytosis and evaluated by oncology on 1/21 and flow cytometry consistent.  Given his lymphadenopathy will need reevaluation by oncology regarding consideration for treatment after repeat coverage from the above infection.  -Monitor white count daily  -Follow-up with oncology as an outpatient        Pneumonia due to 2019 novel coronavirus    Assessment: present on admission. Chest xray 2/17 no worse. CRP is still trending downward after being off the steroids.     Plan: Day 14 baricitinib so will have completed the course. Dexamethasone was stopped 2/15/22. Off zosyn after 8 days.      Mouth sore    Assessment:  Has been using chloraseptic and magic mouthwash without much help. Was on IV Zosyn for 8 days, so may have a component of thrush  Plan:  Add Nystatin S/S.     Diet: Moderate Consistent Carb (60 g CHO per Meal) Diet    DVT Prophylaxis: Enoxaparin (Lovenox) SQ  Portillo Catheter: Not present  Code Status: Full Code           Disposition Plan   Expected Discharge:  2-3 days   Anticipated discharge location: home with family    Delays:       Entered: Rick Stveens MD 02/19/2022, 10:45 AM       The patient's care was discussed with the Patient.    Rick HUTCHISON  MD Rodney  Hospitalist Service  [facility]  Contact information available via [facility] Paging/Directory    ______________________________________________________________________    Interval History     Continues to desaturate with any movement or walking. Bothersome mouth sore not any better with magic mouthwash; chloraseptic helps some.     -Data reviewed today: I reviewed all new labs and imaging results over the last 24 hours. I personally reviewed no images or EKG's today.    Physical Exam   Temp: 98.3  F (36.8  C) Temp src: Tympanic BP: 113/73 Pulse: 90   Resp: 20 SpO2: 97 % O2 Device: Nasal cannula with humidification Oxygen Delivery: 5 LPM (dereased to 4.5)  Vitals:    02/16/22 0642 02/18/22 0538 02/19/22 0449   Weight: 83 kg (183 lb) 82.4 kg (181 lb 11.2 oz) 82.1 kg (181 lb)     Vital Signs with Ranges  Temp:  [97.9  F (36.6  C)-100  F (37.8  C)] 98.3  F (36.8  C)  Pulse:  [79-90] 90  Resp:  [20-24] 20  BP: (109-113)/() 113/73  SpO2:  [85 %-97 %] 97 %  I/O last 3 completed shifts:  In: 1130 [P.O.:1120; I.V.:10]  Out: 1275 [Urine:1275]      GENERAL: Comfortable, no apparent distress.  ENT:  Upper left palate by last molar with a shallow ulcer and some redness.  CARDIOVASCULAR: regular rate and rhythm, no murmur. No lower extremity edema   RESPIRATORY: Clear to auscultation bilaterally, no wheezes or crackles.  GI: non-tender, non-distended, normal bowel sounds.   SKIN: warm periphery, no rashes    Medications     - MEDICATION INSTRUCTIONS -         atorvastatin  40 mg Oral Daily     cyclobenzaprine  10 mg Oral TID     enoxaparin ANTICOAGULANT  40 mg Subcutaneous Q12H     insulin aspart  1-7 Units Subcutaneous TID AC     insulin aspart  1-5 Units Subcutaneous At Bedtime     lisinopril  2.5 mg Oral Daily     multivitamin w/minerals  1 tablet Oral Daily     nystatin  500,000 Units Swish & Spit 4x Daily     sodium chloride (PF)  3 mL Intracatheter Q8H     vitamin D2  50,000 Units Oral  Q7 Days       Data   Recent Labs   Lab 02/19/22  0749 02/18/22  2111 02/18/22  1754 02/18/22  0729 02/18/22  0522 02/16/22  0735 02/13/22  0538   WBC  --   --   --   --  14.1*  --  20.2*   HGB  --   --   --   --  11.0*  --  11.1*   MCV  --   --   --   --  82  --  81   PLT  --   --   --   --  334  --  415   NA  --   --   --   --  136  --  140   POTASSIUM  --   --   --   --  4.5  --  4.7   CHLORIDE  --   --   --   --  99  --  104   CO2  --   --   --   --  30  --  28   BUN  --   --   --   --  19  --  24   CR  --   --   --   --  0.90  --  0.98   ANIONGAP  --   --   --   --  7  --  8   LILIAN  --   --   --   --  9.4  --  9.3   * 167* 153*   < > 88   < > 121*    < > = values in this interval not displayed.       No results found for this or any previous visit (from the past 24 hour(s)).

## 2022-02-20 LAB
ALBUMIN SERPL-MCNC: 3.5 G/DL (ref 3.5–5.7)
ALP SERPL-CCNC: 61 U/L (ref 34–104)
ALT SERPL W P-5'-P-CCNC: 93 U/L (ref 7–52)
ANION GAP SERPL CALCULATED.3IONS-SCNC: 12 MMOL/L (ref 3–14)
AST SERPL W P-5'-P-CCNC: 43 U/L (ref 13–39)
BASOPHILS # BLD MANUAL: 0 10E3/UL (ref 0–0.2)
BASOPHILS NFR BLD MANUAL: 0 %
BILIRUB SERPL-MCNC: 0.5 MG/DL (ref 0.3–1)
BUN SERPL-MCNC: 18 MG/DL (ref 7–25)
CALCIUM SERPL-MCNC: 9.5 MG/DL (ref 8.6–10.3)
CHLORIDE BLD-SCNC: 99 MMOL/L (ref 98–107)
CO2 SERPL-SCNC: 26 MMOL/L (ref 21–31)
CREAT SERPL-MCNC: 0.78 MG/DL (ref 0.7–1.3)
CRP SERPL-MCNC: 15 MG/L
EOSINOPHIL # BLD MANUAL: 0.4 10E3/UL (ref 0–0.7)
EOSINOPHIL NFR BLD MANUAL: 2 %
ERYTHROCYTE [DISTWIDTH] IN BLOOD BY AUTOMATED COUNT: 16.4 % (ref 10–15)
GFR SERPL CREATININE-BSD FRML MDRD: >90 ML/MIN/1.73M2
GLUCOSE BLD-MCNC: 105 MG/DL (ref 70–105)
GLUCOSE BLDC GLUCOMTR-MCNC: 102 MG/DL (ref 70–99)
GLUCOSE BLDC GLUCOMTR-MCNC: 138 MG/DL (ref 70–99)
GLUCOSE BLDC GLUCOMTR-MCNC: 139 MG/DL (ref 70–99)
GLUCOSE BLDC GLUCOMTR-MCNC: 154 MG/DL (ref 70–99)
HCT VFR BLD AUTO: 36.3 % (ref 40–53)
HGB BLD-MCNC: 11.7 G/DL (ref 13.3–17.7)
LACTATE SERPL-SCNC: 1 MMOL/L (ref 0.7–2)
LYMPHOCYTES # BLD MANUAL: 10.6 10E3/UL (ref 0.8–5.3)
LYMPHOCYTES NFR BLD MANUAL: 59 %
MCH RBC QN AUTO: 26.1 PG (ref 26.5–33)
MCHC RBC AUTO-ENTMCNC: 32.2 G/DL (ref 31.5–36.5)
MCV RBC AUTO: 81 FL (ref 78–100)
MONOCYTES # BLD MANUAL: 0.5 10E3/UL (ref 0–1.3)
MONOCYTES NFR BLD MANUAL: 3 %
NEUTROPHILS # BLD MANUAL: 6.4 10E3/UL (ref 1.6–8.3)
NEUTROPHILS NFR BLD MANUAL: 36 %
PLAT MORPH BLD: ABNORMAL
PLATELET # BLD AUTO: 312 10E3/UL (ref 150–450)
POTASSIUM BLD-SCNC: 4.8 MMOL/L (ref 3.5–5.1)
PROT SERPL-MCNC: 6.2 G/DL (ref 6.4–8.9)
RBC # BLD AUTO: 4.48 10E6/UL (ref 4.4–5.9)
RBC MORPH BLD: ABNORMAL
SMUDGE CELLS BLD QL SMEAR: PRESENT
SODIUM SERPL-SCNC: 137 MMOL/L (ref 134–144)
WBC # BLD AUTO: 17.9 10E3/UL (ref 4–11)

## 2022-02-20 PROCEDURE — 83605 ASSAY OF LACTIC ACID: CPT | Performed by: FAMILY MEDICINE

## 2022-02-20 PROCEDURE — 250N000013 HC RX MED GY IP 250 OP 250 PS 637: Performed by: INTERNAL MEDICINE

## 2022-02-20 PROCEDURE — 36415 COLL VENOUS BLD VENIPUNCTURE: CPT | Performed by: FAMILY MEDICINE

## 2022-02-20 PROCEDURE — 99232 SBSQ HOSP IP/OBS MODERATE 35: CPT | Performed by: FAMILY MEDICINE

## 2022-02-20 PROCEDURE — 250N000011 HC RX IP 250 OP 636: Performed by: INTERNAL MEDICINE

## 2022-02-20 PROCEDURE — 80053 COMPREHEN METABOLIC PANEL: CPT | Performed by: FAMILY MEDICINE

## 2022-02-20 PROCEDURE — 85027 COMPLETE CBC AUTOMATED: CPT | Performed by: FAMILY MEDICINE

## 2022-02-20 PROCEDURE — 120N000001 HC R&B MED SURG/OB

## 2022-02-20 PROCEDURE — 86140 C-REACTIVE PROTEIN: CPT | Performed by: FAMILY MEDICINE

## 2022-02-20 PROCEDURE — 250N000013 HC RX MED GY IP 250 OP 250 PS 637: Performed by: FAMILY MEDICINE

## 2022-02-20 RX ORDER — FUROSEMIDE 20 MG
20 TABLET ORAL ONCE
Status: COMPLETED | OUTPATIENT
Start: 2022-02-20 | End: 2022-02-20

## 2022-02-20 RX ADMIN — CYCLOBENZAPRINE 10 MG: 10 TABLET, FILM COATED ORAL at 21:34

## 2022-02-20 RX ADMIN — NYSTATIN 500000 UNITS: 500000 SUSPENSION ORAL at 12:07

## 2022-02-20 RX ADMIN — NYSTATIN 500000 UNITS: 500000 SUSPENSION ORAL at 15:14

## 2022-02-20 RX ADMIN — NYSTATIN 500000 UNITS: 500000 SUSPENSION ORAL at 07:42

## 2022-02-20 RX ADMIN — LISINOPRIL 2.5 MG: 2.5 TABLET ORAL at 10:07

## 2022-02-20 RX ADMIN — ATORVASTATIN CALCIUM 40 MG: 40 TABLET, FILM COATED ORAL at 10:07

## 2022-02-20 RX ADMIN — ENOXAPARIN SODIUM 40 MG: 40 INJECTION SUBCUTANEOUS at 21:34

## 2022-02-20 RX ADMIN — Medication 1 TABLET: at 10:07

## 2022-02-20 RX ADMIN — ALBUTEROL SULFATE 2 PUFF: 90 AEROSOL, METERED RESPIRATORY (INHALATION) at 00:12

## 2022-02-20 RX ADMIN — ENOXAPARIN SODIUM 40 MG: 40 INJECTION SUBCUTANEOUS at 10:07

## 2022-02-20 RX ADMIN — NYSTATIN 500000 UNITS: 500000 SUSPENSION ORAL at 21:34

## 2022-02-20 RX ADMIN — FUROSEMIDE 20 MG: 20 TABLET ORAL at 13:55

## 2022-02-20 ASSESSMENT — ACTIVITIES OF DAILY LIVING (ADL)
ADLS_ACUITY_SCORE: 5
ADLS_ACUITY_SCORE: 5
ADLS_ACUITY_SCORE: 9
ADLS_ACUITY_SCORE: 5
ADLS_ACUITY_SCORE: 9
ADLS_ACUITY_SCORE: 5
ADLS_ACUITY_SCORE: 5
ADLS_ACUITY_SCORE: 7
ADLS_ACUITY_SCORE: 5
ADLS_ACUITY_SCORE: 7
ADLS_ACUITY_SCORE: 7
ADLS_ACUITY_SCORE: 5
ADLS_ACUITY_SCORE: 9
ADLS_ACUITY_SCORE: 5
ADLS_ACUITY_SCORE: 9
ADLS_ACUITY_SCORE: 7
ADLS_ACUITY_SCORE: 9
ADLS_ACUITY_SCORE: 5

## 2022-02-20 NOTE — PROGRESS NOTES
[facility]    Hospitalist Progress Note      Assessment & Plan   Jesus Varghese is a 61 year old male who was admitted on 2/6/2022 with acute respiratory failure with hypoxia due to pneumonia from Covid 19.         Principal Problem:    Acute respiratory failure with hypoxia (H)    Assessment: secondary to covid. Slow improvement, down to 5 liters from vapotherm as recently as 2/12. Feeling ok, still needing high amounts of oxygen with any movement. Cough slightly better.   -last night had PND and felt very short of breath. Weight is up 8# from yesterday    Plan: Lasix today to see if diuresis will help him turn the corner. Will continue to wean oxygen to a home oxygen quantity.      Active Problems:    Controlled type 2 diabetes mellitus with complication, without long-term current use of insulin (H)    Assessment: well controlled on sliding scale    Plan: monitor     CLL: Chronic leukocytosis and evaluated by oncology on 1/21 and flow cytometry consistent.  Given his lymphadenopathy will need reevaluation by oncology regarding consideration for treatment after repeat coverage from the above infection.  -Monitor white count daily  -Follow-up with oncology as an outpatient        Pneumonia due to 2019 novel coronavirus    Assessment: present on admission. Chest xray 2/17 no worse. CRP is still trending downward after being off the steroids. CRP continues to drift downward.     Plan: Finished Day 14 baricitinib so has completed the course. Dexamethasone was stopped 2/15/22. Off zosyn after 8 days.        Mouth sore    Assessment:  Has been using chloraseptic and magic mouthwash without much help. Was on IV Zosyn for 8 days, so may have a component of thrush  Plan:  Added Nystatin S/S day 2.     Diet: Moderate Consistent Carb (60 g CHO per Meal) Diet    DVT Prophylaxis: Enoxaparin (Lovenox) SQ  Portillo Catheter: Not present  Code Status: Full Code           Disposition Plan   Expected Discharge:   2-3 days   Anticipated discharge location: home with family    Delays:       Entered: Rick Stevens MD 02/20/2022, 1:46 PM       The patient's care was discussed with the Patient.    Rick Stevens MD  Hospitalist Service  [facility]  Contact information available via [facility] Paging/Directory    ______________________________________________________________________    Interval History     Awoke with acute shortness of breath last night, better after high flow NRB oxygen for a minute or 2. Weight is up so may need some diuresis. He is a bit frustrated with how slowly he is recovering so we reviewed his chest xray with him to help him gain insight that it will take time.     -Data reviewed today: I reviewed all new labs and imaging results over the last 24 hours. I personally reviewed no images or EKG's today.    Physical Exam   Temp: 98.2  F (36.8  C) Temp src: Tympanic BP: 116/71 Pulse: 88   Resp: 24 SpO2: 92 % O2 Device: High Flow Nasal Cannula (HFNC) Oxygen Delivery: 7 LPM  Vitals:    02/18/22 0538 02/19/22 0449 02/20/22 0502   Weight: 82.4 kg (181 lb 11.2 oz) 82.1 kg (181 lb) 86.5 kg (190 lb 12.8 oz)     Vital Signs with Ranges  Temp:  [98.2  F (36.8  C)-100.2  F (37.9  C)] 98.2  F (36.8  C)  Pulse:  [88-96] 88  Resp:  [22-24] 24  BP: ()/(63-85) 116/71  SpO2:  [77 %-96 %] 92 %  I/O last 3 completed shifts:  In: 120 [P.O.:120]  Out: 500 [Urine:500]      GENERAL: Comfortable, no apparent distress.  CARDIOVASCULAR: regular rate and rhythm, no murmur. No lower extremity edema   RESPIRATORY: Clear to auscultation bilaterally, no wheezes or crackles. Air movement improving.   GI: non-tender, non-distended, normal bowel sounds.   SKIN: warm periphery, no rashes    Medications     - MEDICATION INSTRUCTIONS -         atorvastatin  40 mg Oral Daily     cyclobenzaprine  10 mg Oral TID     enoxaparin ANTICOAGULANT  40 mg Subcutaneous Q12H     furosemide  20 mg Oral Once     insulin aspart  1-7  Units Subcutaneous TID AC     insulin aspart  1-5 Units Subcutaneous At Bedtime     lisinopril  2.5 mg Oral Daily     multivitamin w/minerals  1 tablet Oral Daily     nystatin  500,000 Units Swish & Spit 4x Daily     sodium chloride (PF)  3 mL Intracatheter Q8H     vitamin D2  50,000 Units Oral Q7 Days       Data   Recent Labs   Lab 02/20/22  1135 02/20/22  0754 02/20/22  0136 02/18/22  0729 02/18/22  0522   WBC  --   --  17.9*  --  14.1*   HGB  --   --  11.7*  --  11.0*   MCV  --   --  81  --  82   PLT  --   --  312  --  334   NA  --   --  137  --  136   POTASSIUM  --   --  4.8  --  4.5   CHLORIDE  --   --  99  --  99   CO2  --   --  26  --  30   BUN  --   --  18  --  19   CR  --   --  0.78  --  0.90   ANIONGAP  --   --  12  --  7   LILIAN  --   --  9.5  --  9.4   * 102* 105   < > 88   ALBUMIN  --   --  3.5  --   --    PROTTOTAL  --   --  6.2*  --   --    BILITOTAL  --   --  0.5  --   --    ALKPHOS  --   --  61  --   --    ALT  --   --  93*  --   --    AST  --   --  43*  --   --     < > = values in this interval not displayed.       No results found for this or any previous visit (from the past 24 hour(s)).

## 2022-02-20 NOTE — PLAN OF CARE
Goal Outcome Evaluation: O2 stable on 6 L via high flow nasal cannula w/ humidification. Shallow breaths. Occasional breathlessness while resting. Continues to desat when repositioning; requires non-rebreather to recover. Infrequent, dry cough. Discomfort of mouth lesion tolerable. Independent repositioning.       Plan of Care Reviewed With: patient

## 2022-02-20 NOTE — PLAN OF CARE
Goal Outcome Evaluation:    Pt woke from a deep sleep very SOB, sats 77%. Requested non-rebreather 15L, sats increase to 99% within a couple minutes. LS are diminished with crackles. RR 24 and abdominal breathing. PRN albuterol inhaler given. Increased to 7LPM via high flow cannula to keep sats at 90%.     Pt expressed frustration that he feels he is getting worse and not better. Emotional support provided.         Plan of Care Reviewed With: patient

## 2022-02-21 LAB
ALBUMIN SERPL-MCNC: 3.5 G/DL (ref 3.5–5.7)
ALP SERPL-CCNC: 60 U/L (ref 34–104)
ALT SERPL W P-5'-P-CCNC: 79 U/L (ref 7–52)
ANION GAP SERPL CALCULATED.3IONS-SCNC: 6 MMOL/L (ref 3–14)
AST SERPL W P-5'-P-CCNC: 34 U/L (ref 13–39)
BASOPHILS # BLD MANUAL: 0 10E3/UL (ref 0–0.2)
BASOPHILS NFR BLD MANUAL: 0 %
BILIRUB SERPL-MCNC: 0.5 MG/DL (ref 0.3–1)
BUN SERPL-MCNC: 18 MG/DL (ref 7–25)
CALCIUM SERPL-MCNC: 9.2 MG/DL (ref 8.6–10.3)
CHLORIDE BLD-SCNC: 99 MMOL/L (ref 98–107)
CO2 SERPL-SCNC: 31 MMOL/L (ref 21–31)
CREAT SERPL-MCNC: 0.83 MG/DL (ref 0.7–1.3)
CRP SERPL-MCNC: 18 MG/L
EOSINOPHIL # BLD MANUAL: 0.1 10E3/UL (ref 0–0.7)
EOSINOPHIL NFR BLD MANUAL: 1 %
ERYTHROCYTE [DISTWIDTH] IN BLOOD BY AUTOMATED COUNT: 16.6 % (ref 10–15)
GFR SERPL CREATININE-BSD FRML MDRD: >90 ML/MIN/1.73M2
GLUCOSE BLD-MCNC: 100 MG/DL (ref 70–105)
GLUCOSE BLDC GLUCOMTR-MCNC: 102 MG/DL (ref 70–99)
GLUCOSE BLDC GLUCOMTR-MCNC: 124 MG/DL (ref 70–99)
GLUCOSE BLDC GLUCOMTR-MCNC: 125 MG/DL (ref 70–99)
GLUCOSE BLDC GLUCOMTR-MCNC: 148 MG/DL (ref 70–99)
HCT VFR BLD AUTO: 35.7 % (ref 40–53)
HGB BLD-MCNC: 11.6 G/DL (ref 13.3–17.7)
LACTATE SERPL-SCNC: 0.7 MMOL/L (ref 0.7–2)
LYMPHOCYTES # BLD MANUAL: 6.7 10E3/UL (ref 0.8–5.3)
LYMPHOCYTES NFR BLD MANUAL: 52 %
MCH RBC QN AUTO: 26.7 PG (ref 26.5–33)
MCHC RBC AUTO-ENTMCNC: 32.5 G/DL (ref 31.5–36.5)
MCV RBC AUTO: 82 FL (ref 78–100)
METAMYELOCYTES # BLD MANUAL: 0.1 10E3/UL
METAMYELOCYTES NFR BLD MANUAL: 1 %
MONOCYTES # BLD MANUAL: 0.4 10E3/UL (ref 0–1.3)
MONOCYTES NFR BLD MANUAL: 3 %
NEUTROPHILS # BLD MANUAL: 5.5 10E3/UL (ref 1.6–8.3)
NEUTROPHILS NFR BLD MANUAL: 43 %
PLAT MORPH BLD: ABNORMAL
PLATELET # BLD AUTO: 251 10E3/UL (ref 150–450)
POTASSIUM BLD-SCNC: 4.6 MMOL/L (ref 3.5–5.1)
PROT SERPL-MCNC: 6.2 G/DL (ref 6.4–8.9)
RBC # BLD AUTO: 4.34 10E6/UL (ref 4.4–5.9)
RBC MORPH BLD: ABNORMAL
SMUDGE CELLS BLD QL SMEAR: PRESENT
SODIUM SERPL-SCNC: 136 MMOL/L (ref 134–144)
WBC # BLD AUTO: 12.9 10E3/UL (ref 4–11)

## 2022-02-21 PROCEDURE — 250N000013 HC RX MED GY IP 250 OP 250 PS 637: Performed by: FAMILY MEDICINE

## 2022-02-21 PROCEDURE — 94640 AIRWAY INHALATION TREATMENT: CPT

## 2022-02-21 PROCEDURE — 250N000013 HC RX MED GY IP 250 OP 250 PS 637: Performed by: INTERNAL MEDICINE

## 2022-02-21 PROCEDURE — 120N000001 HC R&B MED SURG/OB

## 2022-02-21 PROCEDURE — 36415 COLL VENOUS BLD VENIPUNCTURE: CPT | Performed by: FAMILY MEDICINE

## 2022-02-21 PROCEDURE — 999N000157 HC STATISTIC RCP TIME EA 10 MIN

## 2022-02-21 PROCEDURE — 99232 SBSQ HOSP IP/OBS MODERATE 35: CPT | Performed by: FAMILY MEDICINE

## 2022-02-21 PROCEDURE — 250N000011 HC RX IP 250 OP 636: Performed by: INTERNAL MEDICINE

## 2022-02-21 PROCEDURE — 80053 COMPREHEN METABOLIC PANEL: CPT | Performed by: FAMILY MEDICINE

## 2022-02-21 PROCEDURE — 85027 COMPLETE CBC AUTOMATED: CPT | Performed by: FAMILY MEDICINE

## 2022-02-21 PROCEDURE — 83605 ASSAY OF LACTIC ACID: CPT | Performed by: FAMILY MEDICINE

## 2022-02-21 PROCEDURE — 86140 C-REACTIVE PROTEIN: CPT | Performed by: FAMILY MEDICINE

## 2022-02-21 RX ADMIN — SENNOSIDES AND DOCUSATE SODIUM 2 TABLET: 50; 8.6 TABLET ORAL at 10:04

## 2022-02-21 RX ADMIN — LISINOPRIL 2.5 MG: 2.5 TABLET ORAL at 09:22

## 2022-02-21 RX ADMIN — ATORVASTATIN CALCIUM 40 MG: 40 TABLET, FILM COATED ORAL at 09:22

## 2022-02-21 RX ADMIN — ALBUTEROL SULFATE 2 PUFF: 90 AEROSOL, METERED RESPIRATORY (INHALATION) at 13:15

## 2022-02-21 RX ADMIN — CYCLOBENZAPRINE 10 MG: 10 TABLET, FILM COATED ORAL at 07:58

## 2022-02-21 RX ADMIN — NYSTATIN 500000 UNITS: 500000 SUSPENSION ORAL at 21:48

## 2022-02-21 RX ADMIN — NYSTATIN 500000 UNITS: 500000 SUSPENSION ORAL at 07:58

## 2022-02-21 RX ADMIN — NYSTATIN 500000 UNITS: 500000 SUSPENSION ORAL at 15:08

## 2022-02-21 RX ADMIN — NYSTATIN 500000 UNITS: 500000 SUSPENSION ORAL at 12:15

## 2022-02-21 RX ADMIN — CYCLOBENZAPRINE 10 MG: 10 TABLET, FILM COATED ORAL at 21:48

## 2022-02-21 RX ADMIN — Medication 1 TABLET: at 09:22

## 2022-02-21 RX ADMIN — INSULIN ASPART 1 UNITS: 100 INJECTION, SOLUTION INTRAVENOUS; SUBCUTANEOUS at 12:15

## 2022-02-21 RX ADMIN — ENOXAPARIN SODIUM 40 MG: 40 INJECTION SUBCUTANEOUS at 21:49

## 2022-02-21 RX ADMIN — CYCLOBENZAPRINE 10 MG: 10 TABLET, FILM COATED ORAL at 13:09

## 2022-02-21 RX ADMIN — ENOXAPARIN SODIUM 40 MG: 40 INJECTION SUBCUTANEOUS at 09:22

## 2022-02-21 ASSESSMENT — ACTIVITIES OF DAILY LIVING (ADL)
ADLS_ACUITY_SCORE: 9

## 2022-02-21 NOTE — PROGRESS NOTES
[facility]    Medicine Progress Note - Hospitalist Service    Date of Admission:  2/6/2022    Assessment & Plan          Acute respiratory failure with hypoxia (H)  Presenting with increased dyspnea after testing positive for Covid on home testing at home  Covid test positive in the ED, requiring supplemental oxygen  Slow improvement in oxygen needs, currently at 7 L HFNC, but desats easily with any activity  Continue to monitor, wean as able    Pneumonia due to 2019 novel coronavirus  Presenting with dyspnea and oxygen requirement  Chest x-ray imaging showing bilateral multifocal pneumonia consistent with Covid-19  Concern with elevated white count of a possible bacterial process,  he had been started on IV antibiotics as well, fairly broad-spectrum and that concern of some underlying hematologic disease, these have been stopped  Has been started on dexamethasone, baricitinib, with Lovenox for clot prophylaxis  Finished 14 days of Baricitinib, 10 days of decadron   CRP and d-dimer trending down  White blood cell count trending down, currently at 12,900, most likely some underlying CLL which will need outpatient follow-up by oncology        CLL (chronic lymphocytic leukemia) (H)  Fairly markedly elevated white count, recent history of elevated leukocytosis without clear cause  Concern is a possible underlying hematologic disorder, work-up in progress  Has been seen by oncology with flow cytometry showing probable CLL  will need outpatient follow-up           Diet: Moderate Consistent Carb (60 g CHO per Meal) Diet    DVT Prophylaxis: Enoxaparin (Lovenox) SQ  Portillo Catheter: Not present  Central Lines: None  Cardiac Monitoring: None  Code Status: Full Code      Disposition Plan   Expected Discharge:  2-3 days   Anticipated discharge location: home with family    Delays:   when down to 4 Liters NC oxygen        The patient's care was discussed with the Patient.    Aston Hutchison,  MD  Hospitalist Service  [facility]  Securely message with the Vocera Web Console (learn more here)  Text page via [facility] Paging/Directory         Clinically Significant Risk Factors Present on Admission                    ______________________________________________________________________    Interval History   Overall doing better, requiring 7 LNC, still short of air with activities. Minimal cough, no fevers, appetite fair,    Data reviewed today: I reviewed all medications, new labs and imaging results over the last 24 hours. I personally reviewed no images or EKG's today.    Physical Exam   Vital Signs: Temp: 98.6  F (37  C) Temp src: Tympanic BP: 122/81 Pulse: 88   Resp: 24 SpO2: 97 % O2 Device: High Flow Nasal Cannula (HFNC) Oxygen Delivery: 7 LPM  Weight: 179 lbs 14.4 oz  Constitutional: awake, alert, cooperative, no apparent distress, and appears stated age  Respiratory: lungs clear, no wheeze  Cardiovascular: regular rate and rhythm  GI: normal bowel sounds, soft and non-distended    Data   Recent Labs   Lab 02/21/22  0729 02/21/22  0618 02/20/22 2057 02/20/22  0754 02/20/22  0136 02/18/22  0729 02/18/22  0522   WBC  --  12.9*  --   --  17.9*  --  14.1*   HGB  --  11.6*  --   --  11.7*  --  11.0*   MCV  --  82  --   --  81  --  82   PLT  --  251  --   --  312  --  334   NA  --  136  --   --  137  --  136   POTASSIUM  --  4.6  --   --  4.8  --  4.5   CHLORIDE  --  99  --   --  99  --  99   CO2  --  31  --   --  26  --  30   BUN  --  18  --   --  18  --  19   CR  --  0.83  --   --  0.78  --  0.90   ANIONGAP  --  6  --   --  12  --  7   LILIAN  --  9.2  --   --  9.5  --  9.4   * 100 139*   < > 105   < > 88   ALBUMIN  --  3.5  --   --  3.5  --   --    PROTTOTAL  --  6.2*  --   --  6.2*  --   --    BILITOTAL  --  0.5  --   --  0.5  --   --    ALKPHOS  --  60  --   --  61  --   --    ALT  --  79*  --   --  93*  --   --    AST  --  34  --   --  43*  --   --     < > = values in this  interval not displayed.

## 2022-02-21 NOTE — PLAN OF CARE
Goal Outcome Evaluation:  Patient admitted with acute respiratory failure with hypoxia. Currently on 7L HFNC, becomes hypoxic with any movement or talking. Hyper-oxygenating with exertion to lessen oxygen desaturation. Patient reports feeling SOB. Unable to maintain O2 sats while prone/side-lying. LS diminished in bases. Coccyx red blanchable, encouraged to reposition ad candy. Requested senna for constipation. BG elevated, insulin correction given. SBA in room.   Plan of Care Reviewed With: patient      Weaned to 5L HFNC throughout shift, O2 saturations 97% at rest. Intake of fluids encouraged.

## 2022-02-21 NOTE — PROGRESS NOTES
Patient up to shower with use of shower chair with wheels, 15 L via non-rebreather and 10 L high flow nasal cannula. Desat to 85%. Increased HFNC to 15. Recovered within 5 minutes. Patient tolerated activity well. Currently O2 stable 92-94% on 7 L.

## 2022-02-21 NOTE — PLAN OF CARE
Goal Outcome Evaluation:    Pt continues to make no progress, Oxygen on HFNC, 7LPM. Significant air hunger and hypoxia with any movement, talking or eating. Unable to tolerate supine or side lying. Encouraged repositioning off coccyx, bright red, blanches. LS diminishd to LLL. Cough rare.     Weight from yesterday morning inaccurate (bedscale) weight currently 179.9 lb. (standing) Down 24 lbs from admit. Poor appetite. Nystatin swish/spit for sore to mouth.       0540- Low urine output, MD updated. Encourage fluids and trend AM labs.     Plan of Care Reviewed With: patient

## 2022-02-22 LAB
GLUCOSE BLDC GLUCOMTR-MCNC: 118 MG/DL (ref 70–99)
GLUCOSE BLDC GLUCOMTR-MCNC: 122 MG/DL (ref 70–99)
GLUCOSE BLDC GLUCOMTR-MCNC: 128 MG/DL (ref 70–99)
GLUCOSE BLDC GLUCOMTR-MCNC: 161 MG/DL (ref 70–99)
GLUCOSE BLDC GLUCOMTR-MCNC: 95 MG/DL (ref 70–99)
HOLD SPECIMEN: NORMAL
HOLD SPECIMEN: NORMAL
LACTATE SERPL-SCNC: 0.7 MMOL/L (ref 0.7–2)

## 2022-02-22 PROCEDURE — 250N000013 HC RX MED GY IP 250 OP 250 PS 637: Performed by: FAMILY MEDICINE

## 2022-02-22 PROCEDURE — 250N000013 HC RX MED GY IP 250 OP 250 PS 637: Performed by: INTERNAL MEDICINE

## 2022-02-22 PROCEDURE — 83605 ASSAY OF LACTIC ACID: CPT | Performed by: FAMILY MEDICINE

## 2022-02-22 PROCEDURE — 250N000011 HC RX IP 250 OP 636: Performed by: INTERNAL MEDICINE

## 2022-02-22 PROCEDURE — 99232 SBSQ HOSP IP/OBS MODERATE 35: CPT | Performed by: FAMILY MEDICINE

## 2022-02-22 PROCEDURE — 120N000001 HC R&B MED SURG/OB

## 2022-02-22 PROCEDURE — 36415 COLL VENOUS BLD VENIPUNCTURE: CPT | Performed by: FAMILY MEDICINE

## 2022-02-22 RX ADMIN — NYSTATIN 500000 UNITS: 500000 SUSPENSION ORAL at 08:10

## 2022-02-22 RX ADMIN — LISINOPRIL 2.5 MG: 2.5 TABLET ORAL at 09:36

## 2022-02-22 RX ADMIN — NYSTATIN 500000 UNITS: 500000 SUSPENSION ORAL at 12:49

## 2022-02-22 RX ADMIN — ACETAMINOPHEN 650 MG: 325 TABLET, FILM COATED ORAL at 19:46

## 2022-02-22 RX ADMIN — Medication 1 TABLET: at 09:36

## 2022-02-22 RX ADMIN — ATORVASTATIN CALCIUM 40 MG: 40 TABLET, FILM COATED ORAL at 09:36

## 2022-02-22 RX ADMIN — CYCLOBENZAPRINE 10 MG: 10 TABLET, FILM COATED ORAL at 08:10

## 2022-02-22 RX ADMIN — CYCLOBENZAPRINE 10 MG: 10 TABLET, FILM COATED ORAL at 22:08

## 2022-02-22 RX ADMIN — NYSTATIN 500000 UNITS: 500000 SUSPENSION ORAL at 15:15

## 2022-02-22 RX ADMIN — NYSTATIN 500000 UNITS: 500000 SUSPENSION ORAL at 22:08

## 2022-02-22 RX ADMIN — INSULIN ASPART 1 UNITS: 100 INJECTION, SOLUTION INTRAVENOUS; SUBCUTANEOUS at 12:49

## 2022-02-22 RX ADMIN — ENOXAPARIN SODIUM 40 MG: 40 INJECTION SUBCUTANEOUS at 22:08

## 2022-02-22 RX ADMIN — CYCLOBENZAPRINE 10 MG: 10 TABLET, FILM COATED ORAL at 15:15

## 2022-02-22 RX ADMIN — ENOXAPARIN SODIUM 40 MG: 40 INJECTION SUBCUTANEOUS at 09:36

## 2022-02-22 ASSESSMENT — ACTIVITIES OF DAILY LIVING (ADL)
ADLS_ACUITY_SCORE: 9
ADLS_ACUITY_SCORE: 7
ADLS_ACUITY_SCORE: 9
ADLS_ACUITY_SCORE: 7
ADLS_ACUITY_SCORE: 9
ADLS_ACUITY_SCORE: 9
ADLS_ACUITY_SCORE: 7
ADLS_ACUITY_SCORE: 9
ADLS_ACUITY_SCORE: 7
ADLS_ACUITY_SCORE: 9

## 2022-02-22 NOTE — PLAN OF CARE
Goal Outcome Evaluation:  Patient admitted with acute respiratory failure with hypoxia. LS diminished in bases. On 6L HFNC with O2 saturations 94-98%. Hyperoxygenate with non-rebreather with movement. Desats to low 80s with movement. Does not tolerate supine/side-lying position. Patient encouraged to reposition independently and shift weight from side/side off coccyx. Coccyx blanchable redness. Urine output mehnaz in color, patient encouraged to increase fluid intake. BG levels elevated, insulin correction given as needed. Up SBA.     Plan of Care Reviewed With: patient

## 2022-02-22 NOTE — PROGRESS NOTES
SAFETY CHECKLIST  ID Bands and Risk clasps correct and in place (DNR, Fall risk, Allergy, Latex, Limb):  Yes  All Lines Reconciled and labeled correctly: Yes  Whiteboard updated:Yes  Environmental interventions (bed/chair alarm on, call light, side rails, restraints, sitter....): Yes    Sara Cantrell RN on 2/21/2022 at 7:38 PM

## 2022-02-22 NOTE — PLAN OF CARE
"SpO2 dropped to 82% when patient rolled over in bed. Non-rebreather applied. Patient recovered quickly. Patient hyperoxygenates with non-rebreather at 15 LPM before activity. Lung sounds are clear in upper lobes and diminished in the bases. Dyspnea with exertion. HS blood sugar was 125. Patient ambulates with a stand-by assist. Voids without difficulty.  Vital signs:  Temp: 99.4  F (37.4  C) Temp src: Tympanic BP: 118/69 Pulse: 93   Resp: 24 SpO2: 96 % O2 Device: High Flow Nasal Cannula (HFNC) Oxygen Delivery: 6 LPM Height: 172.7 cm (5' 8\") Weight: 87.1 kg (192 lb)  Estimated body mass index is 29.19 kg/m  as calculated from the following:    Height as of this encounter: 1.727 m (5' 8\").    Weight as of this encounter: 87.1 kg (192 lb).      Sara Cantrell RN on 2/22/2022 at 6:39 AM                      "

## 2022-02-22 NOTE — PROGRESS NOTES
[facility]    Medicine Progress Note - Hospitalist Service    Date of Admission:  2/6/2022    Assessment & Plan          Acute respiratory failure with hypoxia (H)  Presenting with increased dyspnea after testing positive for Covid on home testing at home  Covid test positive in the ED, requiring supplemental oxygen  Slow improvement in oxygen needs, currently at 5-6 L HFNC, but desats easily with any activity  Continue to monitor, wean as able    Pneumonia due to 2019 novel coronavirus  Presenting with dyspnea and oxygen requirement  Chest x-ray imaging showing bilateral multifocal pneumonia consistent with Covid-19  Concern with elevated white count of a possible bacterial process,  he had been started on IV antibiotics as well, fairly broad-spectrum and that concern of some underlying hematologic disease, these have been stopped  Has been started on dexamethasone, baricitinib, with Lovenox for clot prophylaxis  Finished 14 days of Baricitinib, 10 days of decadron   CRP and d-dimer trending down  White blood cell count trending down, currently at 12,900, most likely some underlying CLL which will need outpatient follow-up by oncology        CLL (chronic lymphocytic leukemia) (H)  Fairly markedly elevated white count, recent history of elevated leukocytosis without clear cause  Concern is a possible underlying hematologic disorder, work-up in progress  Has been seen by oncology with flow cytometry showing probable CLL  will need outpatient follow-up           Diet: Moderate Consistent Carb (60 g CHO per Meal) Diet    DVT Prophylaxis: Enoxaparin (Lovenox) SQ  Portillo Catheter: Not present  Central Lines: None  Cardiac Monitoring: None  Code Status: Full Code      Disposition Plan   Expected Discharge:  2-3 days   Anticipated discharge location: home with family    Delays:   once oxygen needs less than 4 LPM        The patient's care was discussed with the Patient.    Aston Hutchison,  MD  Hospitalist Service  [facility]  Securely message with the [employer] Web Console (learn more here)  Text page via [employer] Paging/Directory         Clinically Significant Risk Factors Present on Admission                    ______________________________________________________________________    Interval History   Feeling about the same. SOB with any exertion, rebounds slowly, gets anxious. No pain, cough infrequent.    Data reviewed today: I reviewed all medications, new labs and imaging results over the last 24 hours. I personally reviewed no images or EKG's today.    Physical Exam   Vital Signs: Temp: 99  F (37.2  C) Temp src: Tympanic BP: 108/69 Pulse: 89   Resp: 22 SpO2: 98 % O2 Device: High Flow Nasal Cannula (HFNC) Oxygen Delivery: 6 LPM  Weight: 192 lbs 0 oz  Constitutional: awake, alert, cooperative, no apparent distress, and appears stated age  Respiratory: No increased work of breathing, good air exchange, clear to auscultation bilaterally, no crackles or wheezing  Cardiovascular: regular rate and rhythm  GI: normal bowel sounds, soft and non-distended    Data   Recent Labs   Lab 02/22/22  0753 02/22/22  0210 02/21/22  2108 02/21/22  0729 02/21/22  0618 02/20/22  0754 02/20/22  0136 02/18/22  0729 02/18/22  0522   WBC  --   --   --   --  12.9*  --  17.9*  --  14.1*   HGB  --   --   --   --  11.6*  --  11.7*  --  11.0*   MCV  --   --   --   --  82  --  81  --  82   PLT  --   --   --   --  251  --  312  --  334   NA  --   --   --   --  136  --  137  --  136   POTASSIUM  --   --   --   --  4.6  --  4.8  --  4.5   CHLORIDE  --   --   --   --  99  --  99  --  99   CO2  --   --   --   --  31  --  26  --  30   BUN  --   --   --   --  18  --  18  --  19   CR  --   --   --   --  0.83  --  0.78  --  0.90   ANIONGAP  --   --   --   --  6  --  12  --  7   LILIAN  --   --   --   --  9.2  --  9.5  --  9.4   GLC 95 122* 125*   < > 100   < > 105   < > 88   ALBUMIN  --   --   --   --  3.5  --  3.5  --    --    PROTTOTAL  --   --   --   --  6.2*  --  6.2*  --   --    BILITOTAL  --   --   --   --  0.5  --  0.5  --   --    ALKPHOS  --   --   --   --  60  --  61  --   --    ALT  --   --   --   --  79*  --  93*  --   --    AST  --   --   --   --  34  --  43*  --   --     < > = values in this interval not displayed.

## 2022-02-23 ENCOUNTER — APPOINTMENT (OUTPATIENT)
Dept: CT IMAGING | Facility: OTHER | Age: 62
DRG: 177 | End: 2022-02-23
Attending: FAMILY MEDICINE
Payer: COMMERCIAL

## 2022-02-23 LAB
ALBUMIN SERPL-MCNC: 3.5 G/DL (ref 3.5–5.7)
ALP SERPL-CCNC: 53 U/L (ref 34–104)
ALT SERPL W P-5'-P-CCNC: 51 U/L (ref 7–52)
ANION GAP SERPL CALCULATED.3IONS-SCNC: 8 MMOL/L (ref 3–14)
AST SERPL W P-5'-P-CCNC: 25 U/L (ref 13–39)
BASE EXCESS BLDA CALC-SCNC: 5.8 MMOL/L (ref -9–1.8)
BILIRUB DIRECT SERPL-MCNC: 0.2 MG/DL (ref 0–0.2)
BILIRUB SERPL-MCNC: 0.6 MG/DL (ref 0.3–1)
BUN SERPL-MCNC: 11 MG/DL (ref 7–25)
CALCIUM SERPL-MCNC: 9.4 MG/DL (ref 8.6–10.3)
CEA SERPL-MCNC: 17.5 NG/ML (ref 0–3)
CHLORIDE BLD-SCNC: 96 MMOL/L (ref 98–107)
CO2 SERPL-SCNC: 30 MMOL/L (ref 21–31)
CREAT SERPL-MCNC: 0.79 MG/DL (ref 0.7–1.3)
ERYTHROCYTE [DISTWIDTH] IN BLOOD BY AUTOMATED COUNT: 16.4 % (ref 10–15)
GFR SERPL CREATININE-BSD FRML MDRD: >90 ML/MIN/1.73M2
GLUCOSE BLD-MCNC: 107 MG/DL (ref 70–105)
GLUCOSE BLDC GLUCOMTR-MCNC: 101 MG/DL (ref 70–99)
GLUCOSE BLDC GLUCOMTR-MCNC: 121 MG/DL (ref 70–99)
GLUCOSE BLDC GLUCOMTR-MCNC: 136 MG/DL (ref 70–99)
GLUCOSE BLDC GLUCOMTR-MCNC: 96 MG/DL (ref 70–99)
HCO3 BLD-SCNC: 30 MMOL/L (ref 21–28)
HCT VFR BLD AUTO: 34.7 % (ref 40–53)
HGB BLD-MCNC: 11.3 G/DL (ref 13.3–17.7)
LACTATE SERPL-SCNC: 0.7 MMOL/L (ref 0.7–2)
MCH RBC QN AUTO: 26.3 PG (ref 26.5–33)
MCHC RBC AUTO-ENTMCNC: 32.6 G/DL (ref 31.5–36.5)
MCV RBC AUTO: 81 FL (ref 78–100)
O2/TOTAL GAS SETTING VFR VENT: 10 %
PCO2 BLD: 43 MM HG (ref 35–45)
PH BLD: 7.46 [PH] (ref 7.35–7.45)
PLATELET # BLD AUTO: 184 10E3/UL (ref 150–450)
PO2 BLD: 74 MM HG (ref 80–105)
POTASSIUM BLD-SCNC: 4.4 MMOL/L (ref 3.5–5.1)
PROCALCITONIN SERPL-MCNC: 0.9 NG/ML
PROT SERPL-MCNC: 6.3 G/DL (ref 6.4–8.9)
RBC # BLD AUTO: 4.29 10E6/UL (ref 4.4–5.9)
SODIUM SERPL-SCNC: 134 MMOL/L (ref 134–144)
WBC # BLD AUTO: 11.3 10E3/UL (ref 4–11)

## 2022-02-23 PROCEDURE — 83605 ASSAY OF LACTIC ACID: CPT | Performed by: FAMILY MEDICINE

## 2022-02-23 PROCEDURE — 250N000011 HC RX IP 250 OP 636: Performed by: FAMILY MEDICINE

## 2022-02-23 PROCEDURE — 85027 COMPLETE CBC AUTOMATED: CPT | Performed by: FAMILY MEDICINE

## 2022-02-23 PROCEDURE — 99232 SBSQ HOSP IP/OBS MODERATE 35: CPT | Performed by: FAMILY MEDICINE

## 2022-02-23 PROCEDURE — 82803 BLOOD GASES ANY COMBINATION: CPT | Performed by: FAMILY MEDICINE

## 2022-02-23 PROCEDURE — 36600 WITHDRAWAL OF ARTERIAL BLOOD: CPT | Performed by: FAMILY MEDICINE

## 2022-02-23 PROCEDURE — 36415 COLL VENOUS BLD VENIPUNCTURE: CPT | Performed by: FAMILY MEDICINE

## 2022-02-23 PROCEDURE — 250N000011 HC RX IP 250 OP 636: Performed by: INTERNAL MEDICINE

## 2022-02-23 PROCEDURE — 120N000001 HC R&B MED SURG/OB

## 2022-02-23 PROCEDURE — 999N000157 HC STATISTIC RCP TIME EA 10 MIN

## 2022-02-23 PROCEDURE — 250N000013 HC RX MED GY IP 250 OP 250 PS 637: Performed by: FAMILY MEDICINE

## 2022-02-23 PROCEDURE — 250N000013 HC RX MED GY IP 250 OP 250 PS 637: Performed by: INTERNAL MEDICINE

## 2022-02-23 PROCEDURE — 99233 SBSQ HOSP IP/OBS HIGH 50: CPT | Performed by: FAMILY MEDICINE

## 2022-02-23 PROCEDURE — 74177 CT ABD & PELVIS W/CONTRAST: CPT

## 2022-02-23 PROCEDURE — 94640 AIRWAY INHALATION TREATMENT: CPT

## 2022-02-23 PROCEDURE — 80053 COMPREHEN METABOLIC PANEL: CPT | Performed by: FAMILY MEDICINE

## 2022-02-23 PROCEDURE — 94640 AIRWAY INHALATION TREATMENT: CPT | Mod: 76

## 2022-02-23 PROCEDURE — 82248 BILIRUBIN DIRECT: CPT | Performed by: FAMILY MEDICINE

## 2022-02-23 PROCEDURE — 84145 PROCALCITONIN (PCT): CPT | Performed by: FAMILY MEDICINE

## 2022-02-23 PROCEDURE — 250N000009 HC RX 250: Performed by: FAMILY MEDICINE

## 2022-02-23 PROCEDURE — 82378 CARCINOEMBRYONIC ANTIGEN: CPT | Performed by: FAMILY MEDICINE

## 2022-02-23 RX ORDER — LORAZEPAM 0.5 MG/1
0.5 TABLET ORAL EVERY 4 HOURS PRN
Status: DISCONTINUED | OUTPATIENT
Start: 2022-02-23 | End: 2022-03-06 | Stop reason: HOSPADM

## 2022-02-23 RX ORDER — IOPAMIDOL 755 MG/ML
79 INJECTION, SOLUTION INTRAVASCULAR ONCE
Status: COMPLETED | OUTPATIENT
Start: 2022-02-23 | End: 2022-02-23

## 2022-02-23 RX ORDER — BUDESONIDE 0.5 MG/2ML
0.5 INHALANT ORAL DAILY
Status: DISCONTINUED | OUTPATIENT
Start: 2022-02-23 | End: 2022-02-26

## 2022-02-23 RX ORDER — PIPERACILLIN SODIUM, TAZOBACTAM SODIUM 4; .5 G/20ML; G/20ML
4.5 INJECTION, POWDER, LYOPHILIZED, FOR SOLUTION INTRAVENOUS EVERY 6 HOURS
Status: DISCONTINUED | OUTPATIENT
Start: 2022-02-23 | End: 2022-02-28

## 2022-02-23 RX ORDER — ALBUTEROL SULFATE 90 UG/1
2 AEROSOL, METERED RESPIRATORY (INHALATION)
Status: DISCONTINUED | OUTPATIENT
Start: 2022-02-23 | End: 2022-02-26

## 2022-02-23 RX ADMIN — BUDESONIDE 0.5 MG: 0.5 INHALANT RESPIRATORY (INHALATION) at 19:53

## 2022-02-23 RX ADMIN — NYSTATIN 500000 UNITS: 500000 SUSPENSION ORAL at 22:09

## 2022-02-23 RX ADMIN — ALBUTEROL SULFATE 2 PUFF: 90 INHALANT RESPIRATORY (INHALATION) at 22:45

## 2022-02-23 RX ADMIN — IOPAMIDOL 79 ML: 755 INJECTION, SOLUTION INTRAVENOUS at 18:10

## 2022-02-23 RX ADMIN — Medication 1 TABLET: at 10:50

## 2022-02-23 RX ADMIN — PIPERACILLIN AND TAZOBACTAM 4.5 G: 4; .5 INJECTION, POWDER, LYOPHILIZED, FOR SOLUTION INTRAVENOUS at 20:02

## 2022-02-23 RX ADMIN — ENOXAPARIN SODIUM 40 MG: 40 INJECTION SUBCUTANEOUS at 22:09

## 2022-02-23 RX ADMIN — NYSTATIN 500000 UNITS: 500000 SUSPENSION ORAL at 17:18

## 2022-02-23 RX ADMIN — NYSTATIN 500000 UNITS: 500000 SUSPENSION ORAL at 09:13

## 2022-02-23 RX ADMIN — ERGOCALCIFEROL 50000 UNITS: 1.25 CAPSULE ORAL at 17:18

## 2022-02-23 RX ADMIN — ATORVASTATIN CALCIUM 40 MG: 40 TABLET, FILM COATED ORAL at 10:50

## 2022-02-23 RX ADMIN — ENOXAPARIN SODIUM 40 MG: 40 INJECTION SUBCUTANEOUS at 10:50

## 2022-02-23 RX ADMIN — ACETAMINOPHEN 650 MG: 325 TABLET, FILM COATED ORAL at 08:28

## 2022-02-23 RX ADMIN — ALBUTEROL SULFATE 2 PUFF: 90 INHALANT RESPIRATORY (INHALATION) at 19:58

## 2022-02-23 RX ADMIN — NYSTATIN 500000 UNITS: 500000 SUSPENSION ORAL at 11:31

## 2022-02-23 RX ADMIN — LISINOPRIL 2.5 MG: 2.5 TABLET ORAL at 10:50

## 2022-02-23 RX ADMIN — ACETAMINOPHEN 650 MG: 325 TABLET, FILM COATED ORAL at 20:43

## 2022-02-23 ASSESSMENT — ACTIVITIES OF DAILY LIVING (ADL)
ADLS_ACUITY_SCORE: 7

## 2022-02-23 NOTE — PLAN OF CARE
Goal Outcome Evaluation:    Plan of Care Reviewed With: patient     Overall Patient Progress: no change    Outcome Evaluation: continue to work on weaning his oxygen  Patient in 7 liters, with sitting forward in bed, sa02 went from 97 to 94 on 7 liters. Will continue to monitor. Vital signs stable. Appetite good at meals. Denies pain at this time.

## 2022-02-23 NOTE — PROVIDER NOTIFICATION
Pt had been weaned to 6L while resting sustaining mid 90s. Pt was repositioning self in bed and dropped to 73% and became SOB. Non-rebreather placed over HFNC. O2 back up, non-rebreather removed. Currently on 9L HFNC at 88-90%. Will wean back down when able. Bety Grijalva RN on 2/23/2022 at 2:40 AM       02/23/22 0230 02/23/22 0235 02/23/22 0237   Oxygen Therapy   SpO2 (!) 73 %  (moving around in bed) 98 % 90 %   O2 Device High Flow Nasal Cannula (HFNC) Non-rebreather mask High Flow Nasal Cannula (HFNC)   Oxygen Delivery 6 LPM 15 LPM 9 LPM  (turned back up due to SOB)

## 2022-02-23 NOTE — PLAN OF CARE
Goal Outcome Evaluation:    Plan of Care Reviewed With: patient     Overall Patient Progress: no change    Outcome Evaluation: continue to work on weaning his oxygen  Patient got up to chair with just his 7 liters.Sa02 down to 74%. Placed on non-rebreather also.sa02 now back up to 96%. Will recheck in 15 minutes.

## 2022-02-23 NOTE — PROGRESS NOTES
[facility]    Medicine Progress Note - Hospitalist Service    Date of Admission:  2/6/2022    Assessment & Plan            Acute respiratory failure with hypoxia (H)  Presenting with increased dyspnea after testing positive for Covid on home testing at home  Covid test positive in the ED, requiring supplemental oxygen  Slow improvement in oxygen needs, currently at 5-6 L HFNC, but desats easily with any activity  Continue to monitor, wean as able    Pneumonia due to 2019 novel coronavirus  Presenting with dyspnea and oxygen requirement  Chest x-ray imaging showing bilateral multifocal pneumonia consistent with Covid-19  Concern with elevated white count of a possible bacterial process,  he had been started on IV antibiotics as well, fairly broad-spectrum and that concern of some underlying hematologic disease, these have been stopped  Has been started on dexamethasone, baricitinib, with Lovenox for clot prophylaxis  Finished 14 days of Baricitinib, 10 days of decadron   CRP and d-dimer trending down  White blood cell count trending down, currently at 12,900, most likely some underlying CLL which will need outpatient follow-up by oncology        CLL (chronic lymphocytic leukemia) (H)  Fairly markedly elevated white count, recent history of elevated leukocytosis without clear cause  Concern is a possible underlying hematologic disorder, work-up in progress  Has been seen by oncology with flow cytometry showing probable CLL  will need outpatient follow-up           Diet: Moderate Consistent Carb (60 g CHO per Meal) Diet    DVT Prophylaxis: Enoxaparin (Lovenox) SQ  Portillo Catheter: Not present  Central Lines: None  Cardiac Monitoring: None  Code Status: Full Code      Disposition Plan   Expected Discharge:  2-3 days   Anticipated discharge location: home with family    Delays:   improved respiratory status        The patient's care was discussed with the Patient.    Aston Hutchison,  MD  Hospitalist Service  [facility]  Securely message with the [employer] Web Console (learn more here)  Text page via [employer] Paging/Directory         Clinically Significant Risk Factors Present on Admission                    ______________________________________________________________________    Interval History   Feeling about the same, short of air when getting up, going to bathroom. Continued dry cough. Stuck at 6-7 liters    Data reviewed today: I reviewed all medications, new labs and imaging results over the last 24 hours. I personally reviewed no images or EKG's today.    Physical Exam   Vital Signs: Temp: 98.6  F (37  C) Temp src: Tympanic BP: 111/63 Pulse: 92   Resp: 24 SpO2: 96 % O2 Device: High Flow Nasal Cannula (HFNC) Oxygen Delivery: 7 LPM  Weight: 180 lbs 0 oz  Constitutional: awake, alert, cooperative, no apparent distress, and appears stated age  Respiratory: mild crackles, o/w clear  Cardiovascular: regular rate and rhythm  GI: normal bowel sounds, soft and non-distended    Data   Recent Labs   Lab 02/23/22  0752 02/22/22  2054 02/22/22  1639 02/21/22  0729 02/21/22  0618 02/20/22  0754 02/20/22  0136 02/18/22  0729 02/18/22  0522   WBC  --   --   --   --  12.9*  --  17.9*  --  14.1*   HGB  --   --   --   --  11.6*  --  11.7*  --  11.0*   MCV  --   --   --   --  82  --  81  --  82   PLT  --   --   --   --  251  --  312  --  334   NA  --   --   --   --  136  --  137  --  136   POTASSIUM  --   --   --   --  4.6  --  4.8  --  4.5   CHLORIDE  --   --   --   --  99  --  99  --  99   CO2  --   --   --   --  31  --  26  --  30   BUN  --   --   --   --  18  --  18  --  19   CR  --   --   --   --  0.83  --  0.78  --  0.90   ANIONGAP  --   --   --   --  6  --  12  --  7   LILIAN  --   --   --   --  9.2  --  9.5  --  9.4   * 128* 118*   < > 100   < > 105   < > 88   ALBUMIN  --   --   --   --  3.5  --  3.5  --   --    PROTTOTAL  --   --   --   --  6.2*  --  6.2*  --   --    BILITOTAL   --   --   --   --  0.5  --  0.5  --   --    ALKPHOS  --   --   --   --  60  --  61  --   --    ALT  --   --   --   --  79*  --  93*  --   --    AST  --   --   --   --  34  --  43*  --   --     < > = values in this interval not displayed.

## 2022-02-23 NOTE — PLAN OF CARE
Goal Outcome Evaluation:    Plan of Care Reviewed With: patient     Overall Patient Progress: no change    Outcome Evaluation: continue to work on weaning his oxygen    Patient unable to hold sa02 on non-re breather and 7 liters. Patient assisted back to bed, into prone position. Non-rebreather now off. Patient on 7 liters. Sa02 will not stay above 90%. Without the non-rebreather and high flow oxygen. Call placed to resp therapy and md.       Xolair Counseling:  Patient informed of potential adverse effects including but not limited to fever, muscle aches, rash and allergic reactions.  The patient verbalized understanding of the proper use and possible adverse effects of Xolair.  All of the patient's questions and concerns were addressed.

## 2022-02-23 NOTE — PLAN OF CARE
Goal Outcome Evaluation:    Plan of Care Reviewed With: patient     Overall Patient Progress: no change    Outcome Evaluation: continue to work on weaning his oxygen  Patient now on 10 liters via non-re breather. Sa02 in mid 90's. Patient remains prone, md here to see the patient. Labs and chest xray ordered. Wife here also, md to meet with patient and his wife.

## 2022-02-23 NOTE — PLAN OF CARE
Goal Outcome Evaluation:    Plan of Care Reviewed With: patient     Overall Patient Progress: no change    Outcome Evaluation: continue to work on weaning his oxygen    Patient color improved. He is 99% on 10 liters non-re breather. He denies resp distress at this time resp 20. Patient is waiting to go for a cat scan. Wife is here with him. Vital signs stable.

## 2022-02-23 NOTE — PROGRESS NOTES
[facility]    Medicine Progress Note - Hospitalist Service    Date of Admission:  2/6/2022    Assessment & Plan      Acute respiratory failure with hypoxia (H)  Pneumonia due to 2019 novel coronavirus  Patient has been treated with dexamethasone, baricitinib.  Lovenox for clot prophylaxis.  Completed 14-day course of baricitinib, 10-day course of Decadron.  CRP and D-dimer trending down at that time.  He became acutely more hypoxic.  Is now on a nonrebreather.  I have ordered a CT of chest abdomen pelvis including a CT PE rule out.  I repeated labs at this time.  Will  anticipate close follow-up and possible transfer to higher level of care depending on results.        CLL (chronic lymphocytic leukemia) (H), recent imaging from January showed concern for hematologic malignancy.  He had a preoperative evaluation with anticipation of bone marrow biopsy and colonoscopy.  However, he became ill with Covid and has not yet been able to have this done.  At this time, as he is covered recovered, I am concerned that malignancy now is more of an issue.  We do not have appropriate resources for example  available at our facility to do a bone marrow biopsy with his current respiratory status or to get a colonoscopy with his potential need for intubation or other respiratory support should he undergo a procedure.  As such, I have ordered imaging and labs here.  I will wait for them to come back.  Anticipate that he will likely need to be transferred to higher level of care for further treatment.  Discussed with family.       Diet: Moderate Consistent Carb (60 g CHO per Meal) Diet    DVT Prophylaxis: Enoxaparin (Lovenox) SQ  Portillo Catheter: Not present  Central Lines: None  Cardiac Monitoring: None  Code Status: Full Code      Disposition Plan   Expected Discharge: several days   Anticipated discharge location: home with family    Delays:   respiratory status. May need transfer to higher level of care.         The patient's care was discussed with the Patient and Patient's Family.    Pooja Miller DO  Hospitalist Service  [facility]  Securely message with the [employer] Web Console (learn more here)  Text page via "[employer]," Paging/Directory         Clinically Significant Risk Factors Present on Admission                    ______________________________________________________________________    Interval History   Call from RN due to acute respiratory concerns.  Patient requiring much higher oxygen.  Respiratory therapy was called.  He is now prone on a nonrebreather.  I reviewed the patient's chart.  Of significance imaging from 1/28/2022 including a CT of the chest abdomen and pelvis mention concern for numerous enlarged retroperitoneal and mesenteric lymph nodes throughout the abdomen as well as splenomegaly, concerning for hematologic malignancy.  Patchy groundglass opacities in the visualized lower lung may be due to an infectious or inflammatory process.  CT of the chest showed widespread groundglass opacity in both lungs suspicious for pneumonia.  He had enlarged mediastinal hilar and upper abdominal lymph nodes.  He was seen for preoperative evaluation for bone marrow biopsy and colonoscopy.  However, became ill with Covid and was transferred to our hospital for further care.    Data reviewed today: I reviewed all medications, new labs and imaging results over the last 24 hours. I personally reviewed chest CT abdomen and the pelvis pending.  Checking to rule out PE.  Labs pending.    Physical Exam   Vital Signs: Temp: 98  F (36.7  C) Temp src: Tympanic BP: 119/72 Pulse: 93   Resp: 24 SpO2: 97 % O2 Device: Non-rebreather mask Oxygen Delivery: 10 LPM  Weight: 180 lbs 0 oz  General Appearance: Acute and chronically ill-appearing.  Tachypneic.  Pale.  Respiratory: Diminished throughout.  On a nonrebreather.  No accessory muscle use.  Lying prone.  Cardiovascular: Regular rate.  No lower extremity  edema.  No calf tenderness.  GI: Abdomen soft, nontender, nondistended  Skin: Warm and dry.  No rashes or concerning lesions.  Other:      Data   Recent Labs   Lab 02/23/22  1634 02/23/22  1124 02/23/22  0752 02/21/22  0729 02/21/22  0618 02/20/22  0754 02/20/22  0136 02/18/22  0729 02/18/22  0522   WBC  --   --   --   --  12.9*  --  17.9*  --  14.1*   HGB  --   --   --   --  11.6*  --  11.7*  --  11.0*   MCV  --   --   --   --  82  --  81  --  82   PLT  --   --   --   --  251  --  312  --  334   NA  --   --   --   --  136  --  137  --  136   POTASSIUM  --   --   --   --  4.6  --  4.8  --  4.5   CHLORIDE  --   --   --   --  99  --  99  --  99   CO2  --   --   --   --  31  --  26  --  30   BUN  --   --   --   --  18  --  18  --  19   CR  --   --   --   --  0.83  --  0.78  --  0.90   ANIONGAP  --   --   --   --  6  --  12  --  7   LILIAN  --   --   --   --  9.2  --  9.5  --  9.4   GLC 96 136* 101*   < > 100   < > 105   < > 88   ALBUMIN  --   --   --   --  3.5  --  3.5  --   --    PROTTOTAL  --   --   --   --  6.2*  --  6.2*  --   --    BILITOTAL  --   --   --   --  0.5  --  0.5  --   --    ALKPHOS  --   --   --   --  60  --  61  --   --    ALT  --   --   --   --  79*  --  93*  --   --    AST  --   --   --   --  34  --  43*  --   --     < > = values in this interval not displayed.     No results found for this or any previous visit (from the past 24 hour(s)).

## 2022-02-23 NOTE — PLAN OF CARE
Goal Outcome Evaluation:  Patient was admitted for acute respiratory failure. O2 weaned off and on throughout night. Currently on 7L HFNC. O2 in mid 90s. Desats to 70s-80s with activity. Non-rebreather in room. SOB with exertion. Denied pain overnight. Lungs diminished to the bases. Temp 100.1 beginning of shift, decreased without interventions. Evening blood sugar 128. IV saline locked. Bety Grijalva RN on 2/23/2022 at 5:26 AM    Plan of Care Reviewed With: patient     Temp: 98.3  F (36.8  C) Temp src: Tympanic BP: 117/70 Pulse: 87   Resp: 24 SpO2: 96 % O2 Device: High Flow Nasal Cannula (HFNC) Oxygen Delivery: 7 LPM

## 2022-02-23 NOTE — PROGRESS NOTES
SAFETY CHECKLIST  ID Bands and Risk clasps correct and in place (DNR, Fall risk, Allergy, Latex, Limb):  Yes  All Lines Reconciled and labeled correctly: Yes  Whiteboard updated:Yes  Environmental interventions (bed/chair alarm on, call light, side rails, restraints, sitter....): Yes    Sara Cantrell RN on 2/22/2022 at 7:39 PM

## 2022-02-24 LAB
ANION GAP SERPL CALCULATED.3IONS-SCNC: 10 MMOL/L (ref 3–14)
BUN SERPL-MCNC: 11 MG/DL (ref 7–25)
CALCIUM SERPL-MCNC: 9.2 MG/DL (ref 8.6–10.3)
CHLORIDE BLD-SCNC: 96 MMOL/L (ref 98–107)
CO2 SERPL-SCNC: 28 MMOL/L (ref 21–31)
CREAT SERPL-MCNC: 0.9 MG/DL (ref 0.7–1.3)
CRP SERPL-MCNC: 87 MG/L
ERYTHROCYTE [DISTWIDTH] IN BLOOD BY AUTOMATED COUNT: 16.5 % (ref 10–15)
GFR SERPL CREATININE-BSD FRML MDRD: >90 ML/MIN/1.73M2
GLUCOSE BLD-MCNC: 99 MG/DL (ref 70–105)
GLUCOSE BLDC GLUCOMTR-MCNC: 119 MG/DL (ref 70–99)
GLUCOSE BLDC GLUCOMTR-MCNC: 121 MG/DL (ref 70–99)
GLUCOSE BLDC GLUCOMTR-MCNC: 131 MG/DL (ref 70–99)
GLUCOSE BLDC GLUCOMTR-MCNC: 132 MG/DL (ref 70–99)
GLUCOSE BLDC GLUCOMTR-MCNC: 98 MG/DL (ref 70–99)
HCT VFR BLD AUTO: 31.9 % (ref 40–53)
HGB BLD-MCNC: 10.7 G/DL (ref 13.3–17.7)
MCH RBC QN AUTO: 27.1 PG (ref 26.5–33)
MCHC RBC AUTO-ENTMCNC: 33.5 G/DL (ref 31.5–36.5)
MCV RBC AUTO: 81 FL (ref 78–100)
PLATELET # BLD AUTO: 171 10E3/UL (ref 150–450)
POTASSIUM BLD-SCNC: 4 MMOL/L (ref 3.5–5.1)
RBC # BLD AUTO: 3.95 10E6/UL (ref 4.4–5.9)
SODIUM SERPL-SCNC: 134 MMOL/L (ref 134–144)
WBC # BLD AUTO: 9.8 10E3/UL (ref 4–11)

## 2022-02-24 PROCEDURE — 82310 ASSAY OF CALCIUM: CPT | Performed by: FAMILY MEDICINE

## 2022-02-24 PROCEDURE — 250N000009 HC RX 250: Performed by: FAMILY MEDICINE

## 2022-02-24 PROCEDURE — 250N000013 HC RX MED GY IP 250 OP 250 PS 637: Performed by: INTERNAL MEDICINE

## 2022-02-24 PROCEDURE — 36415 COLL VENOUS BLD VENIPUNCTURE: CPT | Performed by: FAMILY MEDICINE

## 2022-02-24 PROCEDURE — 999N000157 HC STATISTIC RCP TIME EA 10 MIN

## 2022-02-24 PROCEDURE — 120N000001 HC R&B MED SURG/OB

## 2022-02-24 PROCEDURE — 94640 AIRWAY INHALATION TREATMENT: CPT | Mod: 76

## 2022-02-24 PROCEDURE — 94640 AIRWAY INHALATION TREATMENT: CPT

## 2022-02-24 PROCEDURE — 86140 C-REACTIVE PROTEIN: CPT | Performed by: FAMILY MEDICINE

## 2022-02-24 PROCEDURE — 250N000013 HC RX MED GY IP 250 OP 250 PS 637: Performed by: FAMILY MEDICINE

## 2022-02-24 PROCEDURE — 250N000011 HC RX IP 250 OP 636: Performed by: FAMILY MEDICINE

## 2022-02-24 PROCEDURE — 99232 SBSQ HOSP IP/OBS MODERATE 35: CPT | Performed by: FAMILY MEDICINE

## 2022-02-24 PROCEDURE — 85027 COMPLETE CBC AUTOMATED: CPT | Performed by: FAMILY MEDICINE

## 2022-02-24 RX ADMIN — ALBUTEROL SULFATE 2 PUFF: 90 INHALANT RESPIRATORY (INHALATION) at 11:27

## 2022-02-24 RX ADMIN — ENOXAPARIN SODIUM 40 MG: 40 INJECTION SUBCUTANEOUS at 22:21

## 2022-02-24 RX ADMIN — CYCLOBENZAPRINE 10 MG: 10 TABLET, FILM COATED ORAL at 22:21

## 2022-02-24 RX ADMIN — ALBUTEROL SULFATE 2 PUFF: 90 INHALANT RESPIRATORY (INHALATION) at 22:51

## 2022-02-24 RX ADMIN — ACETAMINOPHEN 650 MG: 325 TABLET, FILM COATED ORAL at 19:58

## 2022-02-24 RX ADMIN — ENOXAPARIN SODIUM 40 MG: 40 INJECTION SUBCUTANEOUS at 09:00

## 2022-02-24 RX ADMIN — ATORVASTATIN CALCIUM 40 MG: 40 TABLET, FILM COATED ORAL at 09:00

## 2022-02-24 RX ADMIN — PIPERACILLIN AND TAZOBACTAM 4.5 G: 4; .5 INJECTION, POWDER, LYOPHILIZED, FOR SOLUTION INTRAVENOUS at 08:05

## 2022-02-24 RX ADMIN — PIPERACILLIN AND TAZOBACTAM 4.5 G: 4; .5 INJECTION, POWDER, LYOPHILIZED, FOR SOLUTION INTRAVENOUS at 02:46

## 2022-02-24 RX ADMIN — LORAZEPAM 0.5 MG: 0.5 TABLET ORAL at 09:01

## 2022-02-24 RX ADMIN — ALBUTEROL SULFATE 2 PUFF: 90 INHALANT RESPIRATORY (INHALATION) at 15:50

## 2022-02-24 RX ADMIN — ALBUTEROL SULFATE 2 PUFF: 90 INHALANT RESPIRATORY (INHALATION) at 19:50

## 2022-02-24 RX ADMIN — LISINOPRIL 2.5 MG: 2.5 TABLET ORAL at 09:01

## 2022-02-24 RX ADMIN — NYSTATIN 500000 UNITS: 500000 SUSPENSION ORAL at 22:21

## 2022-02-24 RX ADMIN — Medication 1 TABLET: at 09:00

## 2022-02-24 RX ADMIN — PIPERACILLIN AND TAZOBACTAM 4.5 G: 4; .5 INJECTION, POWDER, LYOPHILIZED, FOR SOLUTION INTRAVENOUS at 14:27

## 2022-02-24 RX ADMIN — PIPERACILLIN AND TAZOBACTAM 4.5 G: 4; .5 INJECTION, POWDER, LYOPHILIZED, FOR SOLUTION INTRAVENOUS at 19:45

## 2022-02-24 RX ADMIN — ALBUTEROL SULFATE 2 PUFF: 90 INHALANT RESPIRATORY (INHALATION) at 06:10

## 2022-02-24 RX ADMIN — BUDESONIDE 0.5 MG: 0.5 INHALANT RESPIRATORY (INHALATION) at 06:08

## 2022-02-24 ASSESSMENT — ACTIVITIES OF DAILY LIVING (ADL)
ADLS_ACUITY_SCORE: 8
ADLS_ACUITY_SCORE: 7
ADLS_ACUITY_SCORE: 7
ADLS_ACUITY_SCORE: 8
ADLS_ACUITY_SCORE: 7
ADLS_ACUITY_SCORE: 7
ADLS_ACUITY_SCORE: 8
ADLS_ACUITY_SCORE: 7
ADLS_ACUITY_SCORE: 8
ADLS_ACUITY_SCORE: 7
ADLS_ACUITY_SCORE: 7
ADLS_ACUITY_SCORE: 8
ADLS_ACUITY_SCORE: 7

## 2022-02-24 NOTE — PLAN OF CARE
Goal Outcome Evaluation:    Plan of Care Reviewed With: patient     Overall Patient Progress: no change    Outcome Evaluation: continue to work on weaning his oxygen  Patient resp even, non-labored. He denies pain. He remains on non-re breather at 10 liters. Sa02 in mid 90's. Patient reports he feels better, and no complaints of resp distress.

## 2022-02-24 NOTE — PLAN OF CARE
Goal Outcome Evaluation:  Pt is here for acute respiratory failure. Able to wean pt from 10L non-rebreather to 6L HFNC. Stable in the 90s. On continuous pulse oximeter. RT updated. Desats with talking and any sort of activity. Lungs diminished to the bases. Dry cough present, cough drops at bedside. Lesions to back of throat, scheduled Nystatin being given. Pt complains of nasal congestion and dryness. Urine slightly mehnaz to color. IVs saline locked, received scheduled IV antibiotics. Bety Grijalva RN on 2/24/2022 at 6:10 AM    Plan of Care Reviewed With: patient, spouse     Overall Patient Progress: improving    Temp: 98.8  F (37.1  C) Temp src: Tympanic BP: 99/60 Pulse: 87   Resp: 20 SpO2: 93 % O2 Device: High Flow Nasal Cannula (HFNC) Oxygen Delivery: 6 LPM

## 2022-02-24 NOTE — PLAN OF CARE
Goal Outcome Evaluation:    Plan of Care Reviewed With: patient, spouse     Overall Patient Progress: improving    Outcome Evaluation: continue to work on weaning his oxygen    Patient calmer. Resp now 28. Oxygen sa02 is 93% on 10 liter non re breather. Md was updated.

## 2022-02-24 NOTE — PLAN OF CARE
Goal Outcome Evaluation:    Plan of Care Reviewed With: patient     Overall Patient Progress: no change    Outcome Evaluation: continue to work on weaning his oxygen  Pt is now on a simple mask at 6 liters. He is holding his sa02 in mid 90's range. Patient reports he is very comfortable. Resp even non-labored. Patient plans to take a nap.

## 2022-02-24 NOTE — PLAN OF CARE
Goal Outcome Evaluation:    Plan of Care Reviewed With: patient     Overall Patient Progress: no change    Outcome Evaluation: continue to work on weaning his oxygen    Patient has now been changed over to a simple mask, oxygen now at 6 liters. Sa02 is 94-95%. Discussed with patient plan of care. Patient in agreement to call if he feels sob. Currently resting in bed. Denies pain.

## 2022-02-24 NOTE — PROGRESS NOTES
CT neg for PE  Given concern for cancer, will increase to 0.5mg/kg bid of lovenox    Call to tele ICU, Re: steroids/antibiotics or other recommendations. Awaiting call back.     Consider transfer if not improving.     Discussed with wife and patient.     Per discussion with Tele ICU  Start zosyn now. If not improving in 2-3 days recommend restarting steroids  Repeat procalcitonin at this time    Notified family of elevated CEA. Patient has prior hx of colon cancer 16 years ago.

## 2022-02-24 NOTE — PROGRESS NOTES
SAFETY CHECKLIST  ID Bands and Risk clasps correct and in place (DNR, Fall risk, Allergy, Latex, Limb):  Yes  All Lines Reconciled and labeled correctly: Yes  Whiteboard updated:Yes  Environmental interventions (bed/chair alarm on, call light, side rails, restraints, sitter....): Yes  Verify Tele #: not on tele    Bety Grijalva RN on 2/23/2022 at 7:04 PM

## 2022-02-24 NOTE — PROGRESS NOTES
Patient started shift out on NRB not able to go to HFNC. RT changed out cannula for a new one and patient is now tolerating HFNC at 6-7L. Nebs started and MDI. No other interventions at this time.     Russell Vasquez RRT

## 2022-02-24 NOTE — PLAN OF CARE
Goal Outcome Evaluation:    Plan of Care Reviewed With: patient, spouse     Overall Patient Progress: improving    Outcome Evaluation: continue to work on weaning his oxygen  Patient repositioned self inbed.Following was complaining of not being able to breathe. Patient oxygen level on 7 liters was 84%.  Also feels he is having a panic attack. Switched from a nasal canula to [id number] mask. With rest,and medication.Patient calmer. sa02 now 90% with non-re breather.  Lung sounds tight. Resp now 32. Will update the md.

## 2022-02-24 NOTE — PROGRESS NOTES
[facility]    Medicine Progress Note - Hospitalist Service    Date of Admission:  2/6/2022    Assessment & Plan            Acute respiratory failure with hypoxia (H)  Presenting with increased dyspnea after testing positive for Covid on home testing at home  Covid test positive in the ED, requiring supplemental oxygen  Slow improvement in oxygen needs, currently at 6-7 L HFNC, but desats easily with any activity  Reviewed with pulmonology, Dr. Cortés.   Continue antibiotics, no other changes unless decompensates  If no improvement in next week, consider adding back steroid with prednisone, 40 mg daily    Pneumonia due to 2019 novel coronavirus  Presenting with dyspnea and oxygen requirement  Chest x-ray imaging showing bilateral multifocal pneumonia consistent with Covid-19  Concern with elevated white count of a possible bacterial process,  he had been started on IV antibiotics as well, fairly broad-spectrum and that concern of some underlying hematologic disease, these have been stopped  Has been started on dexamethasone, baricitinib, with Lovenox for clot prophylaxis  Finished 14 days of Baricitinib, 10 days of decadron   CRP elevated yesterday at 78  White blood cell count trending down, currently at 9800, most likely some underlying CLL which will need outpatient follow-up by oncology  Due to worsening sxs last PM, zosyn added and will continue for next week  If worsening sxs, consider adding prednisone next week      CLL (chronic lymphocytic leukemia) (H)  Fairly markedly elevated white count, recent history of elevated leukocytosis without clear cause  Concern is a possible underlying hematologic disorder, work-up in progress  Has been seen by oncology with flow cytometry showing probable CLL  will need outpatient follow-up           Diet: Moderate Consistent Carb (60 g CHO per Meal) Diet    DVT Prophylaxis: Enoxaparin (Lovenox) SQ  Portillo Catheter: Not present  Central Lines:  None  Cardiac Monitoring: None  Code Status: Full Code      Disposition Plan   Expected Discharge:  3-4 days   Anticipated discharge location: home with family    Delays:   when improved oxygen needs        The patient's care was discussed with the Patient.    Aston Hutchison MD  Hospitalist Service  [facility]  Securely message with the [employer] Web Console (learn more here)  Text page via [employer] Paging/Directory         Clinically Significant Risk Factors Present on Admission                    ______________________________________________________________________    Interval History   Feeling short air with any activity, desats when up at all. Frequent dry cough. No diarrhea. Good appetite. CT imaging yesterday with no acute changes, ongoing inflammation noted.     Data reviewed today: I reviewed all medications, new labs and imaging results over the last 24 hours. I personally reviewed no images or EKG's today.    Physical Exam   Vital Signs: Temp: 97.8  F (36.6  C) Temp src: Tympanic BP: 103/63 Pulse: 77   Resp: 20 SpO2: 96 % O2 Device: Non-rebreather mask Oxygen Delivery: 12 LPM  Weight: 184 lbs 0 oz  Constitutional: awake, alert, cooperative and mild distress  Respiratory: mild crackles in both lung fields  Cardiovascular: regular rate and rhythm  GI: normal bowel sounds, soft and non-distended    Data   Recent Labs   Lab 02/24/22  1133 02/24/22  0728 02/24/22  0555 02/23/22  2101 02/23/22  1717 02/21/22  0729 02/21/22  0618   WBC  --   --  9.8  --  11.3*  --  12.9*   HGB  --   --  10.7*  --  11.3*  --  11.6*   MCV  --   --  81  --  81  --  82   PLT  --   --  171  --  184  --  251   NA  --   --  134  --  134  --  136   POTASSIUM  --   --  4.0  --  4.4  --  4.6   CHLORIDE  --   --  96*  --  96*  --  99   CO2  --   --  28  --  30  --  31   BUN  --   --  11  --  11  --  18   CR  --   --  0.90  --  0.79  --  0.83   ANIONGAP  --   --  10  --  8  --  6   LILIAN  --   --  9.2  --  9.4  --  9.2    * 119* 99   < > 107*   < > 100   ALBUMIN  --   --   --   --  3.5  --  3.5   PROTTOTAL  --   --   --   --  6.3*  --  6.2*   BILITOTAL  --   --   --   --  0.6  --  0.5   ALKPHOS  --   --   --   --  53  --  60   ALT  --   --   --   --  51  --  79*   AST  --   --   --   --  25  --  34    < > = values in this interval not displayed.     Recent Results (from the past 24 hour(s))   CT Chest (PE) Abdomen Pelvis w Contrast    Narrative    CT angiogram of the chest, CT scan of the abdomen and pelvis with IV  contrast    HISTORY: Hypoxia, chest and abdominal pain    TECHNIQUE: CT angiogram of the chest was performed with sagittal and  coronal MIPS reconstructions.    CT scan of the abdomen and pelvis was performed with IV contrast  sagittal and coronal reconstructions were obtained    FINDINGS: CT angiogram of the chest: There are no pulmonary emboli.  The heart is normal in size. The thoracic aorta appears normal. There  are widespread pulmonary parenchymal opacities consistent with  pneumonia. Enlarged hilar and mediastinal lymph nodes are no noted.  Degenerative changes are present in the thoracic spine.    CT scan of the abdomen and pelvis: The liver is free of masses or  biliary ductal enlargement. The gallbladder has been removed. The  spleen and pancreas appear normal. The adrenal glands are normal.  There is a large parapelvic cyst seen in the left kidney. There are  right cortical cysts. There is no hydronephrosis.    There are borderline sized periaortic and pericaval lymph nodes. No  intraperitoneal masses or inflammatory changes are noted. The bladder  and rectum are normal.      Impression    IMPRESSION: No pulmonary emboli.    Widespread pulmonary parenchymal opacities consistent with pneumonia.    No intra peritoneal masses or inflammatory changes are noted    ALFONSO AMAYA MD         SYSTEM ID:  [id number]

## 2022-02-24 NOTE — PHARMACY-CONSULT NOTE
Pharmacy- Renal Dose Adjustment    Patient Active Problem List   Diagnosis     History of malignant neoplasm of large intestine     Carpal tunnel syndrome, bilateral     Senile cataract     Inflammatory liver disease     Hypertriglyceridemia     Rupture of left distal biceps tendon     Status post tendon repair     Ureterolithiasis     Malignant neoplasm of colon, unspecified part of colon (H)     Chemotherapy-induced neuropathy (H)     S/P partial colectomy     History of nephrolithiasis     Elevated LFTs     Controlled type 2 diabetes mellitus with complication, without long-term current use of insulin (H)     Non morbid obesity due to excess calories     Seborrheic keratoses     Acute respiratory failure with hypoxia (H)     CLL (chronic lymphocytic leukemia) (H)     Pneumonia due to 2019 novel coronavirus     Pneumonia of both lungs due to infectious organism        Relevant Labs:  Recent Labs   Lab Test 02/23/22  1717 02/21/22  0618   WBC 11.3* 12.9*   HGB 11.3* 11.6*    251        CrCl: 110.5 mL/min      Intake/Output Summary (Last 24 hours) at 2/23/2022 1924  Last data filed at 2/23/2022 1500  Gross per 24 hour   Intake 3 ml   Output 1100 ml   Net -1097 ml          Per Renal Dose Adjustment Protocol, will adjust:  -Pip/tazo 4.5 g (due to indication of Hospital-Acquired Pneumonia, CrCl > 40 ml/min)      Will continue to follow and make adjustments accordingly. Thank You.    Tiago Holguin [facility] ....................  2/23/2022   7:24 PM     [___] : [unfilled]

## 2022-02-25 LAB
GLUCOSE BLDC GLUCOMTR-MCNC: 114 MG/DL (ref 70–99)
GLUCOSE BLDC GLUCOMTR-MCNC: 137 MG/DL (ref 70–99)
GLUCOSE BLDC GLUCOMTR-MCNC: 155 MG/DL (ref 70–99)
GLUCOSE BLDC GLUCOMTR-MCNC: 158 MG/DL (ref 70–99)
LACTATE SERPL-SCNC: 0.7 MMOL/L (ref 0.7–2)

## 2022-02-25 PROCEDURE — 94640 AIRWAY INHALATION TREATMENT: CPT | Mod: 76

## 2022-02-25 PROCEDURE — 36415 COLL VENOUS BLD VENIPUNCTURE: CPT | Performed by: FAMILY MEDICINE

## 2022-02-25 PROCEDURE — 120N000001 HC R&B MED SURG/OB

## 2022-02-25 PROCEDURE — 250N000011 HC RX IP 250 OP 636: Performed by: FAMILY MEDICINE

## 2022-02-25 PROCEDURE — 250N000009 HC RX 250: Performed by: FAMILY MEDICINE

## 2022-02-25 PROCEDURE — 999N000157 HC STATISTIC RCP TIME EA 10 MIN

## 2022-02-25 PROCEDURE — 250N000013 HC RX MED GY IP 250 OP 250 PS 637: Performed by: INTERNAL MEDICINE

## 2022-02-25 PROCEDURE — 94640 AIRWAY INHALATION TREATMENT: CPT

## 2022-02-25 PROCEDURE — 250N000013 HC RX MED GY IP 250 OP 250 PS 637: Performed by: FAMILY MEDICINE

## 2022-02-25 PROCEDURE — 83605 ASSAY OF LACTIC ACID: CPT | Performed by: FAMILY MEDICINE

## 2022-02-25 PROCEDURE — 99232 SBSQ HOSP IP/OBS MODERATE 35: CPT | Performed by: FAMILY MEDICINE

## 2022-02-25 RX ADMIN — NYSTATIN 500000 UNITS: 500000 SUSPENSION ORAL at 15:37

## 2022-02-25 RX ADMIN — Medication 1 TABLET: at 11:32

## 2022-02-25 RX ADMIN — PIPERACILLIN AND TAZOBACTAM 4.5 G: 4; .5 INJECTION, POWDER, LYOPHILIZED, FOR SOLUTION INTRAVENOUS at 21:41

## 2022-02-25 RX ADMIN — LISINOPRIL 2.5 MG: 2.5 TABLET ORAL at 11:32

## 2022-02-25 RX ADMIN — BUDESONIDE 0.5 MG: 0.5 INHALANT RESPIRATORY (INHALATION) at 06:25

## 2022-02-25 RX ADMIN — CYCLOBENZAPRINE 10 MG: 10 TABLET, FILM COATED ORAL at 15:31

## 2022-02-25 RX ADMIN — ENOXAPARIN SODIUM 40 MG: 40 INJECTION SUBCUTANEOUS at 21:42

## 2022-02-25 RX ADMIN — ALBUTEROL SULFATE 2 PUFF: 90 INHALANT RESPIRATORY (INHALATION) at 15:47

## 2022-02-25 RX ADMIN — INSULIN ASPART 1 UNITS: 100 INJECTION, SOLUTION INTRAVENOUS; SUBCUTANEOUS at 12:20

## 2022-02-25 RX ADMIN — ALBUTEROL SULFATE 2 PUFF: 90 INHALANT RESPIRATORY (INHALATION) at 11:38

## 2022-02-25 RX ADMIN — NYSTATIN 500000 UNITS: 500000 SUSPENSION ORAL at 11:32

## 2022-02-25 RX ADMIN — ENOXAPARIN SODIUM 40 MG: 40 INJECTION SUBCUTANEOUS at 11:33

## 2022-02-25 RX ADMIN — ALBUTEROL SULFATE 2 PUFF: 90 INHALANT RESPIRATORY (INHALATION) at 06:25

## 2022-02-25 RX ADMIN — PIPERACILLIN AND TAZOBACTAM 4.5 G: 4; .5 INJECTION, POWDER, LYOPHILIZED, FOR SOLUTION INTRAVENOUS at 15:31

## 2022-02-25 RX ADMIN — PIPERACILLIN AND TAZOBACTAM 4.5 G: 4; .5 INJECTION, POWDER, LYOPHILIZED, FOR SOLUTION INTRAVENOUS at 02:09

## 2022-02-25 RX ADMIN — PIPERACILLIN AND TAZOBACTAM 4.5 G: 4; .5 INJECTION, POWDER, LYOPHILIZED, FOR SOLUTION INTRAVENOUS at 11:31

## 2022-02-25 RX ADMIN — ATORVASTATIN CALCIUM 40 MG: 40 TABLET, FILM COATED ORAL at 11:31

## 2022-02-25 ASSESSMENT — ACTIVITIES OF DAILY LIVING (ADL)
ADLS_ACUITY_SCORE: 12
ADLS_ACUITY_SCORE: 11
ADLS_ACUITY_SCORE: 8
ADLS_ACUITY_SCORE: 12
ADLS_ACUITY_SCORE: 8
ADLS_ACUITY_SCORE: 11
ADLS_ACUITY_SCORE: 8
ADLS_ACUITY_SCORE: 12
ADLS_ACUITY_SCORE: 8
ADLS_ACUITY_SCORE: 11
ADLS_ACUITY_SCORE: 8
ADLS_ACUITY_SCORE: 11
ADLS_ACUITY_SCORE: 11
ADLS_ACUITY_SCORE: 12
ADLS_ACUITY_SCORE: 11
ADLS_ACUITY_SCORE: 11
ADLS_ACUITY_SCORE: 8
ADLS_ACUITY_SCORE: 8
ADLS_ACUITY_SCORE: 11
ADLS_ACUITY_SCORE: 11

## 2022-02-25 NOTE — PLAN OF CARE
Pt presents with Acute respiratory failure with hypoxia. Pt has knocked off his simple facemask x 2 tonight. Saturations have dropped into the 60s, Staff able to get a non-rebreather on 15 LPM, pt rebounded well sats riley to upper 90's within a minute. Pt states that he feels comfortable, staff will continue to monitor frequently.     Goal Outcome Evaluation:    Plan of Care Reviewed With: patient     Overall Patient Progress: no change    Outcome Evaluation: continue to work on weaning his oxygen

## 2022-02-25 NOTE — PROGRESS NOTES
Patient has been resting in bed this afternoon.  Continues on 6 LPM simple mask with sats 90% at rest, has dyspnea with activity, some abdominal muscle use with breathing.  Appetite fair, drinking fluids.  Denied pain.  Katy Santoyo RN on 2/24/2022 at 6:35 PM

## 2022-02-25 NOTE — PROGRESS NOTES
[facility]    Medicine Progress Note - Hospitalist Service    Date of Admission:  2/6/2022    Assessment & Plan          Acute respiratory failure with hypoxia (H)  Presenting with increased dyspnea after testing positive for Covid on home testing at home  Covid test positive in the ED, requiring supplemental oxygen  Slow improvement in oxygen needs, currently at 6-7 L HFNC, but desats easily with any activity  Reviewed with pulmonology, Dr. Cortés, on 2/24/2022  Continue antibiotics, no other changes unless decompensates  If no improvement in next week, consider adding back steroid with prednisone, 40 mg daily    Pneumonia due to 2019 novel coronavirus  Presenting with dyspnea and oxygen requirement  Chest x-ray imaging showing bilateral multifocal pneumonia consistent with Covid-19  Concern with elevated white count of a possible bacterial process,  he had been started on IV antibiotics as well, fairly broad-spectrum and that concern of some underlying hematologic disease, these have been stopped  Has been started on dexamethasone, baricitinib, with Lovenox for clot prophylaxis  Finished 14 days of Baricitinib, 10 days of decadron   CRP elevated yesterday at 78  White blood cell count trending down, currently at 9800, most likely some underlying CLL which will need outpatient follow-up by oncology  Due to worsening sxs last PM, zosyn added and will continue for next week  If worsening sxs, consider adding prednisone next week      CLL (chronic lymphocytic leukemia) (H)  Fairly markedly elevated white count, recent history of elevated leukocytosis without clear cause  Concern is a possible underlying hematologic disorder, work-up in progress  Has been seen by oncology with flow cytometry showing probable CLL  will need outpatient follow-up           Diet: Moderate Consistent Carb (60 g CHO per Meal) Diet    DVT Prophylaxis: Enoxaparin (Lovenox) SQ  Portillo Catheter: Not present  Central Lines:  None  Cardiac Monitoring: None  Code Status: Full Code      Disposition Plan   Expected Discharge:  3-4 days   Anticipated discharge location: home with family    Delays:   safe plan for discharge with oxygen        The patient's care was discussed with the Patient.    Aston Hutchison MD  Hospitalist Service  [facility]  Securely message with the [employer] Web Console (learn more here)  Text page via [employer] Paging/Directory         Clinically Significant Risk Factors Present on Admission                    ______________________________________________________________________    Interval History   No new complaints, still quite short of air with exertion. Stable o 6-7 LPM. No other concerns.    Data reviewed today: I reviewed all medications, new labs and imaging results over the last 24 hours. I personally reviewed no images or EKG's today.    Physical Exam   Vital Signs: Temp: 98.7  F (37.1  C) Temp src: Tympanic BP: 117/74 Pulse: 95   Resp: 24 SpO2: 95 % O2 Device: High Flow Nasal Cannula (HFNC) Oxygen Delivery: 6 LPM  Weight: 188 lbs 3.2 oz  Constitutional: awake, alert, cooperative, no apparent distress, and appears stated age  Respiratory: crackles in periphery  Cardiovascular: regular rate and rhythm    Data   Recent Labs   Lab 02/25/22  0735 02/24/22  2201 02/24/22  1631 02/24/22  0728 02/24/22  0555 02/23/22  2101 02/23/22  1717 02/21/22  0729 02/21/22  0618   WBC  --   --   --   --  9.8  --  11.3*  --  12.9*   HGB  --   --   --   --  10.7*  --  11.3*  --  11.6*   MCV  --   --   --   --  81  --  81  --  82   PLT  --   --   --   --  171  --  184  --  251   NA  --   --   --   --  134  --  134  --  136   POTASSIUM  --   --   --   --  4.0  --  4.4  --  4.6   CHLORIDE  --   --   --   --  96*  --  96*  --  99   CO2  --   --   --   --  28  --  30  --  31   BUN  --   --   --   --  11  --  11  --  18   CR  --   --   --   --  0.90  --  0.79  --  0.83   ANIONGAP  --   --   --   --  10  --  8  --   6   LILIAN  --   --   --   --  9.2  --  9.4  --  9.2   * 121* 131*   < > 99   < > 107*   < > 100   ALBUMIN  --   --   --   --   --   --  3.5  --  3.5   PROTTOTAL  --   --   --   --   --   --  6.3*  --  6.2*   BILITOTAL  --   --   --   --   --   --  0.6  --  0.5   ALKPHOS  --   --   --   --   --   --  53  --  60   ALT  --   --   --   --   --   --  51  --  79*   AST  --   --   --   --   --   --  25  --  34    < > = values in this interval not displayed.

## 2022-02-25 NOTE — PROGRESS NOTES
Nutrition follow up:   Visited with pt today, he reported a fairly good appetite. Eats facility breakfast, skips lunch, and wife typically brings him dinner. He is not having any concerns with getting enough to eat. Discussed supplements for strength and he will ask for them when desired but does not want them scheduled. He had no questions at this time.   Diet: consistent carb  Intakes: 100% at most meals, snacks on own  Vitals:    02/06/22 1020 02/06/22 1330 02/08/22 0235 02/09/22 0026   Weight: 92.1 kg (203 lb) 89.5 kg (197 lb 4.8 oz) 89.3 kg (196 lb 14.4 oz) 97.2 kg (214 lb 3.2 oz)    02/10/22 0422 02/10/22 0500 02/11/22 0440 02/12/22 0539   Weight: 96.9 kg (213 lb 9.6 oz) 87 kg (191 lb 12.8 oz) 88.2 kg (194 lb 8 oz) 84.8 kg (187 lb)    02/13/22 0547 02/14/22 0223 02/16/22 0642 02/18/22 0538   Weight: 84.6 kg (186 lb 6.4 oz) 84.6 kg (186 lb 8 oz) 83 kg (183 lb) 82.4 kg (181 lb 11.2 oz)    02/19/22 0449 02/20/22 2104 02/22/22 0404 02/23/22 0247   Weight: 82.1 kg (181 lb) 81.6 kg (179 lb 14.4 oz) 87.1 kg (192 lb) 81.6 kg (180 lb)    02/24/22 0309 02/25/22 0300   Weight: 83.5 kg (184 lb) 85.4 kg (188 lb 3.2 oz)   Follow up weekly. If needed sooner, please order consult.   Nirmala Sunshine RD on 2/25/2022 at 2:14 PM

## 2022-02-25 NOTE — PROGRESS NOTES
At 0410 pt pressed call light, staff went to his room. Pt's O2 sat was 64 upon assessment, pt's simple facemask was off of face. Non-rebreather applied with 15 LPM. O2 sat climbed to 97 within 1 minute of application. High flow nasal cannula is in use now with hopes that it will be more secure on pt's face when sleeping. Currently high flow NC at 8 LPM, O2 sat at 94% will continue to wean down to 6 LPM as tolerated. Will continue to monitor.

## 2022-02-25 NOTE — PROGRESS NOTES
Pt was set-up today with the Life 2000 device.  This device provides PEEP (varying per setting) and set tidal volumes as well as approximately 40% FiO2.  Device was reviewed with pt and pt tolerated well.  Pt has ability to change from low to medium to higher settings depending upon his needs.  Rep will return on Sunday to meet with pt to reivew how he tolerated it and if any changes need to be made.  If pt is ready for discharge by Monday he will take equipment home at that time.  Pt home equipment consent for was completed and placed in shadow chart.

## 2022-02-25 NOTE — PROGRESS NOTES
6-7 liters of oxygen.  SOB talking. Cough.       SAFETY CHECKLIST  ID Bands and Risk clasps correct and in place (DNR, Fall risk, Allergy, Latex, Limb):  Yes  All Lines Reconciled and labeled correctly: Yes  Whiteboard updated:Yes  Environmental interventions (bed/chair alarm on, call light, side rails, restraints, sitter....): Yes     Verify Tele #:

## 2022-02-25 NOTE — PROGRESS NOTES
Pt pressed his call light at 0150, Staff checked on pt. Pt's facemask was off, pt was diaphoretic, pale, confused and speaking illogically. Pt's facemask was applied and O2 was turned up to 12 LPM simple facemask, oximiter applied saturation was 72% . Then switched to a non-rebreather at 15 LPM, pt's saturation elevated to 97%, respirations at 30 per minute, color quickly returned. Pt's words became organized and logical. Re-breather was slowly turned down to 12, then switched over to simple facemask and weaned down to 7 LPM via simple facemask. O2 saturation has been between 90 and 94, respirations at 24. Pt states he feels good right now. Will continue to monitor.

## 2022-02-26 ENCOUNTER — APPOINTMENT (OUTPATIENT)
Dept: OCCUPATIONAL THERAPY | Facility: OTHER | Age: 62
DRG: 177 | End: 2022-02-26
Attending: INTERNAL MEDICINE
Payer: COMMERCIAL

## 2022-02-26 ENCOUNTER — APPOINTMENT (OUTPATIENT)
Dept: PHYSICAL THERAPY | Facility: OTHER | Age: 62
DRG: 177 | End: 2022-02-26
Attending: INTERNAL MEDICINE
Payer: COMMERCIAL

## 2022-02-26 LAB
ANION GAP SERPL CALCULATED.3IONS-SCNC: 9 MMOL/L (ref 3–14)
BUN SERPL-MCNC: 10 MG/DL (ref 7–25)
CALCIUM SERPL-MCNC: 9.1 MG/DL (ref 8.6–10.3)
CHLORIDE BLD-SCNC: 97 MMOL/L (ref 98–107)
CO2 SERPL-SCNC: 30 MMOL/L (ref 21–31)
CREAT SERPL-MCNC: 0.85 MG/DL (ref 0.7–1.3)
CRP SERPL-MCNC: 112 MG/L
ERYTHROCYTE [DISTWIDTH] IN BLOOD BY AUTOMATED COUNT: 16.6 % (ref 10–15)
GFR SERPL CREATININE-BSD FRML MDRD: >90 ML/MIN/1.73M2
GLUCOSE BLD-MCNC: 109 MG/DL (ref 70–105)
GLUCOSE BLDC GLUCOMTR-MCNC: 119 MG/DL (ref 70–99)
GLUCOSE BLDC GLUCOMTR-MCNC: 126 MG/DL (ref 70–99)
GLUCOSE BLDC GLUCOMTR-MCNC: 145 MG/DL (ref 70–99)
GLUCOSE BLDC GLUCOMTR-MCNC: 195 MG/DL (ref 70–99)
HCT VFR BLD AUTO: 33.3 % (ref 40–53)
HGB BLD-MCNC: 10.8 G/DL (ref 13.3–17.7)
MCH RBC QN AUTO: 26.4 PG (ref 26.5–33)
MCHC RBC AUTO-ENTMCNC: 32.4 G/DL (ref 31.5–36.5)
MCV RBC AUTO: 81 FL (ref 78–100)
PLATELET # BLD AUTO: 167 10E3/UL (ref 150–450)
POTASSIUM BLD-SCNC: 4.2 MMOL/L (ref 3.5–5.1)
RBC # BLD AUTO: 4.09 10E6/UL (ref 4.4–5.9)
SODIUM SERPL-SCNC: 136 MMOL/L (ref 134–144)
WBC # BLD AUTO: 9.9 10E3/UL (ref 4–11)

## 2022-02-26 PROCEDURE — 97530 THERAPEUTIC ACTIVITIES: CPT | Mod: GP

## 2022-02-26 PROCEDURE — 86140 C-REACTIVE PROTEIN: CPT | Performed by: FAMILY MEDICINE

## 2022-02-26 PROCEDURE — 97162 PT EVAL MOD COMPLEX 30 MIN: CPT | Mod: GP

## 2022-02-26 PROCEDURE — 85014 HEMATOCRIT: CPT | Performed by: FAMILY MEDICINE

## 2022-02-26 PROCEDURE — 250N000011 HC RX IP 250 OP 636: Performed by: FAMILY MEDICINE

## 2022-02-26 PROCEDURE — 94640 AIRWAY INHALATION TREATMENT: CPT

## 2022-02-26 PROCEDURE — 97535 SELF CARE MNGMENT TRAINING: CPT | Mod: GO

## 2022-02-26 PROCEDURE — 99232 SBSQ HOSP IP/OBS MODERATE 35: CPT | Performed by: FAMILY MEDICINE

## 2022-02-26 PROCEDURE — 250N000013 HC RX MED GY IP 250 OP 250 PS 637: Performed by: FAMILY MEDICINE

## 2022-02-26 PROCEDURE — 250N000009 HC RX 250: Performed by: FAMILY MEDICINE

## 2022-02-26 PROCEDURE — 250N000013 HC RX MED GY IP 250 OP 250 PS 637: Performed by: INTERNAL MEDICINE

## 2022-02-26 PROCEDURE — 999N000157 HC STATISTIC RCP TIME EA 10 MIN

## 2022-02-26 PROCEDURE — 120N000001 HC R&B MED SURG/OB

## 2022-02-26 PROCEDURE — 97165 OT EVAL LOW COMPLEX 30 MIN: CPT | Mod: GO

## 2022-02-26 PROCEDURE — 36415 COLL VENOUS BLD VENIPUNCTURE: CPT | Performed by: FAMILY MEDICINE

## 2022-02-26 PROCEDURE — 80048 BASIC METABOLIC PNL TOTAL CA: CPT | Performed by: FAMILY MEDICINE

## 2022-02-26 RX ORDER — CYCLOBENZAPRINE HCL 10 MG
10 TABLET ORAL EVERY 8 HOURS PRN
Status: DISCONTINUED | OUTPATIENT
Start: 2022-02-26 | End: 2022-03-06 | Stop reason: HOSPADM

## 2022-02-26 RX ADMIN — PIPERACILLIN AND TAZOBACTAM 4.5 G: 4; .5 INJECTION, POWDER, LYOPHILIZED, FOR SOLUTION INTRAVENOUS at 16:58

## 2022-02-26 RX ADMIN — CYCLOBENZAPRINE 10 MG: 10 TABLET, FILM COATED ORAL at 08:40

## 2022-02-26 RX ADMIN — INSULIN ASPART 1 UNITS: 100 INJECTION, SOLUTION INTRAVENOUS; SUBCUTANEOUS at 17:06

## 2022-02-26 RX ADMIN — PIPERACILLIN AND TAZOBACTAM 4.5 G: 4; .5 INJECTION, POWDER, LYOPHILIZED, FOR SOLUTION INTRAVENOUS at 10:21

## 2022-02-26 RX ADMIN — ATORVASTATIN CALCIUM 40 MG: 40 TABLET, FILM COATED ORAL at 10:37

## 2022-02-26 RX ADMIN — NYSTATIN 500000 UNITS: 500000 SUSPENSION ORAL at 16:57

## 2022-02-26 RX ADMIN — NYSTATIN 500000 UNITS: 500000 SUSPENSION ORAL at 12:15

## 2022-02-26 RX ADMIN — ALBUTEROL SULFATE 2 PUFF: 90 INHALANT RESPIRATORY (INHALATION) at 06:23

## 2022-02-26 RX ADMIN — ENOXAPARIN SODIUM 40 MG: 40 INJECTION SUBCUTANEOUS at 22:31

## 2022-02-26 RX ADMIN — NYSTATIN 500000 UNITS: 500000 SUSPENSION ORAL at 22:31

## 2022-02-26 RX ADMIN — ENOXAPARIN SODIUM 40 MG: 40 INJECTION SUBCUTANEOUS at 10:39

## 2022-02-26 RX ADMIN — CYCLOBENZAPRINE 10 MG: 10 TABLET, FILM COATED ORAL at 22:31

## 2022-02-26 RX ADMIN — ACETAMINOPHEN 650 MG: 325 TABLET, FILM COATED ORAL at 08:40

## 2022-02-26 RX ADMIN — PIPERACILLIN AND TAZOBACTAM 4.5 G: 4; .5 INJECTION, POWDER, LYOPHILIZED, FOR SOLUTION INTRAVENOUS at 04:50

## 2022-02-26 RX ADMIN — PIPERACILLIN AND TAZOBACTAM 4.5 G: 4; .5 INJECTION, POWDER, LYOPHILIZED, FOR SOLUTION INTRAVENOUS at 22:29

## 2022-02-26 RX ADMIN — NYSTATIN 500000 UNITS: 500000 SUSPENSION ORAL at 09:22

## 2022-02-26 RX ADMIN — Medication 1 TABLET: at 10:37

## 2022-02-26 RX ADMIN — LISINOPRIL 2.5 MG: 2.5 TABLET ORAL at 10:38

## 2022-02-26 RX ADMIN — INSULIN ASPART 2 UNITS: 100 INJECTION, SOLUTION INTRAVENOUS; SUBCUTANEOUS at 12:04

## 2022-02-26 RX ADMIN — BUDESONIDE 0.5 MG: 0.5 INHALANT RESPIRATORY (INHALATION) at 06:22

## 2022-02-26 RX ADMIN — IBUPROFEN 600 MG: 600 TABLET, FILM COATED ORAL at 13:23

## 2022-02-26 ASSESSMENT — ACTIVITIES OF DAILY LIVING (ADL)
ADLS_ACUITY_SCORE: 12
ADLS_ACUITY_SCORE: 8
ADLS_ACUITY_SCORE: 12
ADLS_ACUITY_SCORE: 11
ADLS_ACUITY_SCORE: 12
ADLS_ACUITY_SCORE: 11
ADLS_ACUITY_SCORE: 12
ADLS_ACUITY_SCORE: 8
ADLS_ACUITY_SCORE: 12
ADLS_ACUITY_SCORE: 11
ADLS_ACUITY_SCORE: 12
ADLS_ACUITY_SCORE: 12

## 2022-02-26 NOTE — PROGRESS NOTES
02/26/22 1300   Quick Adds   Type of Visit Initial Occupational Therapy Evaluation   Living Environment   People in Home spouse   Current Living Arrangements house   Home Accessibility stairs to enter home   Number of Stairs, Main Entrance 5   Transportation Anticipated family or friend will provide   Self-Care   Usual Activity Tolerance good   Current Activity Tolerance poor   Equipment Currently Used at Home tub bench;other (see comments)  (tall toilets, walk-in shower)   General Information   Patient/Family Therapy Goal Statement (OT) improve respiration and function   Limitations/Impairments other (see comments)  (acute respiratory failure)   General Observations and Info Pt is a 61 y.o. male with dx of acute respiratory failure with hypoxia, is on 8 L O2 and requires reinforcement/support for breathing to manage O2 sats with mobility and activity. O2 sats decrease rapidly and have slow recovery. Pt has good awareness of safety and is receptive to instruction and recommendations to improve ADL and mobility.   Cognitive Status Examination   Orientation Status person;place;other (see comments)  (situation)   Affect/Mental Status (Cognitive) WFL   Follows Commands follows two-step commands   Safety Deficit safety precautions awareness;safety precautions follow-through/compliance   Cognitive Status Comments   (intact)   Visual Perception   Visual Impairment/Limitations WFL   Coordination   Upper Extremity Coordination No deficits were identified   Bed Mobility   Assistive Device (Bed Mobility) bed rails   Bathing Assessment/Intervention   [place] Level (Bathing) set up;supervision   Comment, (Bathing)   (washing face/hands)   Grooming Assessment/Training   [place] Level (Grooming) set up;supervision   Comment, (Grooming) seated for task brushing teeth   Clinical Impression   Criteria for Skilled Therapeutic Interventions Met (OT) Yes, treatment indicated   OT Diagnosis decline in ADL function    Influenced by the following impairments acute respiratory failure with hypoxia   OT Problem List-Impairments impacting ADL activity tolerance impaired;mobility;other (see comments)  (ADL)   Assessment of Occupational Performance 1-3 Performance Deficits   Identified Performance Deficits self cares and functional mobility   Planned Therapy Interventions (OT) ADL retraining;other (see comments)  (functional mobility, instruct breathing with activity)   Intervention Comments Pt requires instruction to pace activites for energy conservation   Clinical Decision Making Complexity (OT) low complexity   Anticipated Equipment Needs Upon Discharge (OT) walker, rolling;other (see comments)  (grab bars at toilet and shower)   Risk & Benefits of therapy have been explained patient   Clinical Impression Comments Pt has good safety awareness, has poor activity tolerance and    OT Discharge Planning   OT Discharge Recommendation (DC Rec) home with assist;home with home care occupational therapy   OT Rationale for DC Rec Pt requires ADL training including education on respiration/breathing techniques, energy conservation, and AE/DME needs assessment.    OT Brief overview of current status Pt has poor activity tolerance and is dependent on O2 supplement at 8 L. He is currently using a hand-held bi-pap device and sats drop rapidly with activity with slow recovery. He requires assist with ADL and instruction to conserve energy and improve activity tolerance with ADL and functional mobility. Pt has goal to return to home where he resides with spouse.   Total Evaluation Time (Minutes)   Total Evaluation Time (Minutes) 15   OT Goals   Therapy Frequency (OT) Daily   OT Predicated Duration/Target Date for Goal Attainment 03/19/22   OT Goals Toilet Transfer/Toileting;Hygiene/Grooming;Lower Body Dressing   OT: Hygiene/Grooming supervision/stand-by assist   OT: Lower Body Dressing Minimal assist   OT: Toilet Transfer/Toileting Minimal  assist;cleaning and garment management

## 2022-02-26 NOTE — PROGRESS NOTES
02/23/22 0127   Signing Clinician's Name / Credentials   Signing clinician's name / credentials Sangeetha Leos DPT   Quick Adds   Rehab Discipline PT   Gait Training   Distance in Feet (Required for LE Total Joints) 4 feet   Therapeutic Activity   Symptoms Noted During/After Treatment Fatigue;Shortness of breath   Treatment Detail sit<>stand from recliner x 2 for placement of waffle seat and SBA for bed mobility and short distance gait from bed to recliner. Significant time spend on education for energy conservation, progression from reclined to sitting at 90 deg for at least 30 min today and activity in chair to safely progress endurance. pt desaturation to below 70% during inital tranfer from bed to recliner but with cues prior to standing from recliner to place waffle seat pt staying above 81% on 8 L   PT Discharge Planning   PT Discharge Recommendation (DC Rec) home with assist;home with home care physical therapy   PT Rationale for DC Rec Pt currently is able to complete mobility with SBA but has trouble managing breathing and oxygen saturation levels. pt can benefit from homecare therapy to progress endurance to tolerate mobility required in his home and help with progression back to community mobility    PT Brief overview of current status Pt currently requires SBA for bed mobility, very short distance gait, and transfers but desaturates quickly due to SOB and anxiety. Pt requires a lot of cues and education to improve energy conservation and decrease speed of transfers to lessen feeling of SOB.    Additional Documentation   PT Plan continue with PT   Total Session Time   Total Session Time (minutes) 30 minutes

## 2022-02-26 NOTE — PROGRESS NOTES
[facility]    Medicine Progress Note - Hospitalist Service    Date of Admission:  2/6/2022    Assessment & Plan          Acute respiratory failure with hypoxia (H)  Presenting with increased dyspnea after testing positive for Covid on home testing at home  Covid test positive in the ED, requiring supplemental oxygen  Slow improvement in oxygen needs, currently at 6-7 L HFNC, but desats easily with any activity  Reviewed with pulmonology, Dr. Cortés, on 2/24/2022  Continue antibiotics, no other changes unless decompensates  Started yesterday on Life 2000 device which supplies PEEP, varying with FiO2 at 40%  Some issues with noise and strength of airflow into nose  Continue with adjustments, hope if stable to potentially discharge home with new device in 2 days    Pneumonia due to 2019 novel coronavirus  Presenting with dyspnea and oxygen requirement  Chest x-ray imaging showing bilateral multifocal pneumonia consistent with Covid-19  Concern with elevated white count of a possible bacterial process,  he had been started on IV antibiotics as well, fairly broad-spectrum and that concern of some underlying hematologic disease, these have been stopped  Has been started on dexamethasone, baricitinib, with Lovenox for clot prophylaxis  Finished 14 days of Baricitinib, 10 days of decadron   CRP elevated yesterday at 78  White blood cell count trending down, currently at 9800, most likely some underlying CLL which will need outpatient follow-up by oncology  Due to worsening sxs last PM, zosyn added and will continue for next week  If worsening sxs, consider adding prednisone next week      CLL (chronic lymphocytic leukemia) (H)  Fairly markedly elevated white count, recent history of elevated leukocytosis without clear cause  Concern is a possible underlying hematologic disorder, work-up in progress  Has been seen by oncology with flow cytometry showing probable CLL  will need outpatient  follow-up           Diet: Moderate Consistent Carb (60 g CHO per Meal) Diet    DVT Prophylaxis: Enoxaparin (Lovenox) SQ  [place] Catheter: Not present  Central Lines: None  Cardiac Monitoring: None  Code Status: Full Code      Disposition Plan   Expected Discharge:  possibly in 2 days   Anticipated discharge location: home with family    Delays:   when oxygen needs stable        The patient's care was discussed with the Bedside Nurse, Patient and Respiratory therapy.    Aston Hutchison MD  Hospitalist Service  [facility]  Securely message with the Vocera Web Console (learn more here)  Text page via [employer] Paging/Directory         Clinically Significant Risk Factors Present on Admission                    ______________________________________________________________________    Interval History   Started on new respiratory device yesterday which supplies some PEEP with varying flows. Worked well last PM, Did not wear for sleep, back on this AM although complains that it is noisy and pushes a lot of air into nose. Trying to adjust to it, hoping that if doing well could go home early next week, No other concerns    Data reviewed today: I reviewed all medications, new labs and imaging results over the last 24 hours. I personally reviewed no images or EKG's today.    Physical Exam   Vital Signs: Temp: 99.9  F (37.7  C) Temp src: Tympanic BP: 114/71 Pulse: 94   Resp: 24 SpO2: 92 % O2 Device: High Flow Nasal Cannula (HFNC) Oxygen Delivery: 11 LPM  Weight: 178 lbs 9.6 oz  Constitutional: awake, alert, cooperative and mild distress  Respiratory: mild crackles in both lung fields  Cardiovascular: regular rate and rhythm  GI: normal bowel sounds, soft and non-distended    Data   Results for orders placed or performed during the hospital encounter of 02/06/22 (from the past 24 hour(s))   Glucose by meter   Result Value Ref Range    GLUCOSE BY METER POCT 158 (H) 70 - 99 mg/dL   Glucose by meter   Result  Value Ref Range    GLUCOSE BY METER POCT 137 (H) 70 - 99 mg/dL   Glucose by meter   Result Value Ref Range    GLUCOSE BY METER POCT 155 (H) 70 - 99 mg/dL   CBC with platelets   Result Value Ref Range    WBC Count 9.9 4.0 - 11.0 10e3/uL    RBC Count 4.09 (L) 4.40 - 5.90 10e6/uL    Hemoglobin 10.8 (L) 13.3 - 17.7 g/dL    Hematocrit 33.3 (L) 40.0 - 53.0 %    MCV 81 78 - 100 fL    MCH 26.4 (L) 26.5 - 33.0 pg    MCHC 32.4 31.5 - 36.5 g/dL    RDW 16.6 (H) 10.0 - 15.0 %    Platelet Count 167 150 - 450 10e3/uL   Basic metabolic panel   Result Value Ref Range    Sodium 136 134 - 144 mmol/L    Potassium 4.2 3.5 - 5.1 mmol/L    Chloride 97 (L) 98 - 107 mmol/L    Carbon Dioxide (CO2) 30 21 - 31 mmol/L    Anion Gap 9 3 - 14 mmol/L    Urea Nitrogen 10 7 - 25 mg/dL    Creatinine 0.85 0.70 - 1.30 mg/dL    Calcium 9.1 8.6 - 10.3 mg/dL    Glucose 109 (H) 70 - 105 mg/dL    GFR Estimate >90 >60 mL/min/1.73m2   CRP inflammation   Result Value Ref Range    CRP Inflammation 112.0 (H) <10.0 mg/L   Glucose by meter   Result Value Ref Range    GLUCOSE BY METER POCT 126 (H) 70 - 99 mg/dL

## 2022-02-26 NOTE — PROGRESS NOTES
02/26/22 1300   Signing Clinician's Name / Credentials   Signing clinician's name / credentials Paula Luna Alexus, OTR/L   Quick Adds   Rehab Discipline OT   Therapeutic Activity   Treatment Detail supportive positioning in recliner to promote respiratory function, support UE's and decrease discomfort to buttocks   Grooming Training   [place] Level (Grooming Training) stand-by assist   Treatment Detail while seated in recliner    Assistance (Grooming Training) 1 person assist;set-up required;supervision   Bathing Training   [place] Level (Bathing Training) stand-by assist   Treatment Detail while seated in recliner   Assistance (Bathing Training) 1 person assist;set-up required;verbal cues   OT Discharge Planning   OT Discharge Recommendation (DC Rec) home with assist;home with home care occupational therapy   OT Rationale for DC Rec Pt requires ADL training including education on respiration/breathing techniques, energy conservation, and AE/DME needs assessment.    OT Brief overview of current status Pt has poor activity tolerance and is dependent on O2 supplement at 8 L. He is currently using a hand-held bi-pap device and sats drop rapidly with activity with slow recovery. He requires assist with ADL and instruction to conserve energy and improve activity tolerance with ADL and functional mobility. Pt has goal to return to home where he resides with spouse.   Additional Documentation   OT Plan OT to continue   Total Session Time   Total Session Time (minutes) 30 minutes

## 2022-02-26 NOTE — PLAN OF CARE
Problem: Adult Inpatient Plan of Care  Goal: Patient-Specific Goal (Individualized)  Flowsheets (Taken 2/26/2022 0511)  Patient-Specific Goals (Include Timeframe): increase strength and endurance with PT/OT for discharge  Goal: Absence of Hospital-Acquired Illness or Injury  Intervention: Identify and Manage Fall Risk  Recent Flowsheet Documentation  Taken 2/26/2022 0407 by Nini Dowling RN  Safety Promotion/Fall Prevention:   activity supervised   safety round/check completed   supervised activity   room door open  Taken 2/25/2022 2131 by Nini Dowling RN  Safety Promotion/Fall Prevention:   activity supervised   safety round/check completed   supervised activity   room door open  Intervention: Prevent Infection  Recent Flowsheet Documentation  Taken 2/26/2022 0407 by Nini Dowling RN  Infection Prevention:   hand hygiene promoted   rest/sleep promoted   single patient room provided   visitors restricted/screened  Taken 2/25/2022 2131 by Nini Dowling RN  Infection Prevention:   hand hygiene promoted   rest/sleep promoted   single patient room provided   visitors restricted/screened   Goal Outcome Evaluation:    Plan of Care Reviewed With: patient     Overall Patient Progress: no change    Outcome Evaluation: continue to work on weaning his oxygen

## 2022-02-26 NOTE — PROGRESS NOTES
02/23/22 0127   Quick Adds   Type of Visit Initial PT Evaluation   Living Environment   People in Home spouse   Current Living Arrangements house   Transportation Anticipated family or friend will provide   Living Environment Comments Pt lives in a trailer home with 5 stairs to enter home with railing. Pt reports he has a walkin shower with bench and is agreeable to mobility bars.    Self-Care   Usual Activity Tolerance good   Current Activity Tolerance poor   Regular Exercise No   Activity/Exercise/Self-Care Comment pt remained active on his motor cycle and doing yard work.    Cognition   Orientation Status (Cognition) oriented x 4   Range of Motion (ROM)   ROM Comment WFL   Strength (Manual Muscle Testing)   Strength Comments not formally assessed but pt safely SBA for all mobility. very poor endurance due to desaturation on 8 L of oxygen.    Bed Mobility   Bed Mobility supine-sit   Supine-Sit [place] (Bed Mobility) supervision   Bed Mobility Limitations other (see comments)   Impairments Contributing to Impaired Bed Mobility other (see comments)   Assistive Device (Bed Mobility) bed rails   Comment, (Bed Mobility) Pt on 8 L of oxygen during mobility, pt moving very fast and having increased anxiety with motion due to feeling SOB. oxgyen initially staying above 88% but dropping down to 73% in sitting, pt staring to feel more SOB and got up and transferred to chair right away due to feeling SOB.    Transfers   Transfer Safety Concerns Noted decreased sequencing ability   Impairments Contributing to Impaired Transfers other (see comments)   Comment, (Transfers) pt having decreased oxygen saturation and starting to breath faster and less deep resulting in oxygen decreasing below 70%, at that time pt feeling he needed to lean back and transfered SBA quickly to recliner with assit to recline. pt taking 5 plus minutes of breathing to return to 88% as he was not able to breath deeply enough to recover quicker due  to anxiety over the feeling of SOB.    Gait/Stairs (Locomotion)   Distance in Feet (Required for LE Total Joints) 4 feet   Pattern (Gait) other (see comments)   Comment, (Gait/Stairs) Pt did not use an AD and holding oxygen device in hand when stepping 4-5 steps from bed to recliner at a very fast pace due to anxiety over SOB.    Balance   Balance Comments does no require AD.    Clinical Impression   Criteria for Skilled Therapeutic Intervention Yes, treatment indicated   PT Diagnosis (PT) functional decline following increased oxygen dependence   Influenced by the following impairments fatigue, poor oxygen saturation, weakness   Functional limitations due to impairments transfers, gait, bed mobility   Clinical Presentation (PT Evaluation Complexity) Evolving/Changing   Clinical Presentation Rationale pt oxygen saturation levels change quickly impacting complexity of eval.    Clinical Decision Making (Complexity) moderate complexity   Planned Therapy Interventions (PT) balance training;gait training;home exercise program;bed mobility training;manual therapy techniques;motor coordination training;neuromuscular re-education;patient/family education;ROM (range of motion);stair training;strengthening;stretching;transfer training   Risk & Benefits of therapy have been explained evaluation/treatment results reviewed;care plan/treatment goals reviewed;risks/benefits reviewed;current/potential barriers reviewed;participants voiced agreement with care plan;participants included;patient   Clinical Impression Comments Pt is a 61 year old male referred to skilled PT services following a decline in function after recovery from COVID. Pt presents to PT services on 8 L of oxygen and desaturation with minimal mobility. Pt presents with SBA for mobiltiy but could benefit from PT services for education and progression of endurance.    PT Discharge Planning   PT Discharge Recommendation (DC Rec) home with assist;home with home care  physical therapy   PT Rationale for DC Rec Pt currently is able to complete mobility with SBA but has trouble managing breathing and oxygen saturation levels. pt can benefit from homecare therapy to progress endurance to tolerate mobility required in his home and help with progression back to community mobility    PT Brief overview of current status Pt currently requires SBA for bed mobility, very short distance gait, and transfers but desaturates quickly due to SOB and anxiety. Pt requires a lot of cues and education to improve energy conservation and decrease speed of transfers to lessen feeling of SOB.    Plan of Care Review   Plan of Care Reviewed With patient   Total Evaluation Time   Total Evaluation Time (Minutes) 30   Physical Therapy Goals   PT Frequency Daily   PT Predicated Duration/Target Date for Goal Attainment 03/19/22   PT Goals Gait;Aerobic Activity;Stairs;Transfers   PT: Transfers Modified independent   PT: Gait Modified independent;50 feet   PT: Stairs Supervision/stand-by assist;5 stairs;Rail on left;Rail on right   PT: Perform aerobic activity with stable cardiovascular response continuous activity;5 minutes;ambulation

## 2022-02-27 ENCOUNTER — APPOINTMENT (OUTPATIENT)
Dept: PHYSICAL THERAPY | Facility: OTHER | Age: 62
DRG: 177 | End: 2022-02-27
Payer: COMMERCIAL

## 2022-02-27 ENCOUNTER — APPOINTMENT (OUTPATIENT)
Dept: OCCUPATIONAL THERAPY | Facility: OTHER | Age: 62
DRG: 177 | End: 2022-02-27
Payer: COMMERCIAL

## 2022-02-27 LAB
CRP SERPL-MCNC: 115 MG/L
GLUCOSE BLDC GLUCOMTR-MCNC: 108 MG/DL (ref 70–99)
GLUCOSE BLDC GLUCOMTR-MCNC: 129 MG/DL (ref 70–99)
GLUCOSE BLDC GLUCOMTR-MCNC: 139 MG/DL (ref 70–99)
GLUCOSE BLDC GLUCOMTR-MCNC: 141 MG/DL (ref 70–99)

## 2022-02-27 PROCEDURE — 250N000013 HC RX MED GY IP 250 OP 250 PS 637: Performed by: FAMILY MEDICINE

## 2022-02-27 PROCEDURE — 97530 THERAPEUTIC ACTIVITIES: CPT | Mod: GP

## 2022-02-27 PROCEDURE — 97530 THERAPEUTIC ACTIVITIES: CPT | Mod: GO

## 2022-02-27 PROCEDURE — 250N000013 HC RX MED GY IP 250 OP 250 PS 637: Performed by: INTERNAL MEDICINE

## 2022-02-27 PROCEDURE — 36415 COLL VENOUS BLD VENIPUNCTURE: CPT | Performed by: FAMILY MEDICINE

## 2022-02-27 PROCEDURE — 120N000001 HC R&B MED SURG/OB

## 2022-02-27 PROCEDURE — 97535 SELF CARE MNGMENT TRAINING: CPT | Mod: GO

## 2022-02-27 PROCEDURE — 86140 C-REACTIVE PROTEIN: CPT | Performed by: FAMILY MEDICINE

## 2022-02-27 PROCEDURE — 250N000011 HC RX IP 250 OP 636: Performed by: FAMILY MEDICINE

## 2022-02-27 PROCEDURE — 99232 SBSQ HOSP IP/OBS MODERATE 35: CPT | Performed by: FAMILY MEDICINE

## 2022-02-27 RX ORDER — FLUTICASONE PROPIONATE 50 MCG
1 SPRAY, SUSPENSION (ML) NASAL DAILY
Status: DISCONTINUED | OUTPATIENT
Start: 2022-02-27 | End: 2022-03-06 | Stop reason: HOSPADM

## 2022-02-27 RX ADMIN — PIPERACILLIN AND TAZOBACTAM 4.5 G: 4; .5 INJECTION, POWDER, LYOPHILIZED, FOR SOLUTION INTRAVENOUS at 17:59

## 2022-02-27 RX ADMIN — NYSTATIN 500000 UNITS: 500000 SUSPENSION ORAL at 08:22

## 2022-02-27 RX ADMIN — ACETAMINOPHEN 650 MG: 325 TABLET, FILM COATED ORAL at 15:32

## 2022-02-27 RX ADMIN — INSULIN ASPART 1 UNITS: 100 INJECTION, SOLUTION INTRAVENOUS; SUBCUTANEOUS at 11:33

## 2022-02-27 RX ADMIN — NYSTATIN 500000 UNITS: 500000 SUSPENSION ORAL at 22:27

## 2022-02-27 RX ADMIN — Medication 1 TABLET: at 09:58

## 2022-02-27 RX ADMIN — PIPERACILLIN AND TAZOBACTAM 4.5 G: 4; .5 INJECTION, POWDER, LYOPHILIZED, FOR SOLUTION INTRAVENOUS at 11:30

## 2022-02-27 RX ADMIN — LISINOPRIL 2.5 MG: 2.5 TABLET ORAL at 09:59

## 2022-02-27 RX ADMIN — ATORVASTATIN CALCIUM 40 MG: 40 TABLET, FILM COATED ORAL at 09:58

## 2022-02-27 RX ADMIN — NYSTATIN 500000 UNITS: 500000 SUSPENSION ORAL at 12:44

## 2022-02-27 RX ADMIN — ENOXAPARIN SODIUM 40 MG: 40 INJECTION SUBCUTANEOUS at 10:00

## 2022-02-27 RX ADMIN — FLUTICASONE PROPIONATE 1 SPRAY: 50 SPRAY, METERED NASAL at 19:34

## 2022-02-27 RX ADMIN — ENOXAPARIN SODIUM 40 MG: 40 INJECTION SUBCUTANEOUS at 22:27

## 2022-02-27 RX ADMIN — PIPERACILLIN AND TAZOBACTAM 4.5 G: 4; .5 INJECTION, POWDER, LYOPHILIZED, FOR SOLUTION INTRAVENOUS at 04:29

## 2022-02-27 RX ADMIN — NYSTATIN 500000 UNITS: 500000 SUSPENSION ORAL at 15:32

## 2022-02-27 ASSESSMENT — ACTIVITIES OF DAILY LIVING (ADL)
ADLS_ACUITY_SCORE: 8

## 2022-02-27 NOTE — PROGRESS NOTES
02/27/22 0900   Signing Clinician's Name / Credentials   Signing clinician's name / credentials Sangeetha Leos DPT   Quick Adds   Rehab Discipline PT   Therapeutic Activity   Symptoms Noted During/After Treatment Fatigue;Shortness of breath   Treatment Detail increased time for education on slowing down motion and how therapy would progress from supine to sitting EOB this date to give pt an idea of how he could plan out movements. supine to sitting in bed with HOB raised to full height and legs flat with SBA for cues on how to progress HOB up at slow incriments, then pt advanced legs over EOB and sat up with SBA. oxygen monitored throughout and stayed above 80%, did not need to increase oxygen. supine to sitting EOB x 2 with SBA for cues. sitting EOB with UE assist for trunk support for 6 min and 8 min to complete ADL tasks. SBA to sitting to supine and cues on breathing and stayed 85% of above during most motion, 91% at rest.    PT Discharge Planning   PT Discharge Recommendation (DC Rec) home with assist;home with home care physical therapy   PT Rationale for DC Rec Pt currently is able to complete mobility with SBA but has trouble managing breathing and oxygen saturation levels. pt can benefit from homecare therapy to progress endurance to tolerate mobility required in his home and help with progression back to community mobility    PT Brief overview of current status Pt SBA for bed mobility and sitting EOB. pt did significantly better with energy conservation today and keeping SpO2 levels higher with activity. pt feeling more upbeat after therapy session as he is better able to control mobility without significant SOB.    Additional Documentation   PT Plan continue with PT   Total Session Time   Total Session Time (minutes) 30 minutes

## 2022-02-27 NOTE — PROGRESS NOTES
02/27/22 0952   Signing Clinician's Name / Credentials   Signing clinician's name / credentials Paula Vaughan, OTR/L   Quick Adds   Rehab Discipline OT   Grooming Training   Gooding Level (Grooming Training) moderate assist (50% patient effort)   Treatment Detail Pt provided with waterless shampoo using shampoo cap, seated EOB, instructed in pacing activity. Pt able to brush hair prior to and following shampoo, set up and vc provided. Pt provided with breathing coaching during activity and bed mobility to optimize O2 saturation, sats hovering in upper 80% during task, 20 min time with task paced for pt tolerance.   Assistance (Grooming Training) verbal cues;set-up required;supervision;1 person assist   Bathing Training   Treatment Detail Pt declined bathing task, agreeable to have assist with waterless shampoo   OT Discharge Planning   OT Discharge Recommendation (DC Rec) home with assist;home with home care occupational therapy   OT Rationale for DC Rec Pt requires ADL training including education on respiration/breathing techniques, energy conservation, and AE/DME needs assessment.    OT Brief overview of current status Pt showing some improvement today in activity tolerance. He requires coaching throughout activity and mobility to monitor O2 and conserve energy.   Additional Documentation   OT Plan OT to continue   Total Session Time   Total Session Time (minutes) 30 minutes

## 2022-02-27 NOTE — PLAN OF CARE
"Afebrile, vss. Lungs clear/diminished, 94% on patients home life 2000 device, controlled by pt only. Dyspnea upon exertion and SOB present. Pt anxious and hesitant to perform activity r/t dropping saturations with movement. Also pt and wife verbalized anxiety r/t care after hospitalization, social work consult ordered.  Neck pain 5/10, cold applied and tylenol given, will continue to monitor.    /71   Pulse 98   Temp 98.8  F (37.1  C) (Tympanic)   Resp 22   Ht 1.727 m (5' 8\")   Wt 85.3 kg (188 lb)   SpO2 94%   BMI 28.59 kg/m            Goal Outcome Evaluation:    Plan of Care Reviewed With: patient     Overall Patient Progress: no change    Outcome Evaluation: continue to work on weaning his oxygen          "

## 2022-02-27 NOTE — PROGRESS NOTES
[facility]    Medicine Progress Note - Hospitalist Service    Date of Admission:  2/6/2022    Assessment & Plan        Ongoing respiratory issues related to Covid. Breathing better with use of Life 2000, able to be up in room, chair, feeling more confident, not desatting as much or as quickly. Working with PT/OT with good improvement. Plan today is to continue current treatments.    Acute respiratory failure with hypoxia (H)  Presenting with increased dyspnea after testing positive for Covid on home testing at home  Covid test positive in the ED, requiring supplemental oxygen  Slow improvement in oxygen needs, currently at 6-7 L HFNC, but desats easily with any activity  Reviewed with pulmonology, Dr. Cortés, on 2/24/2022  Continue antibiotics, no other changes unless decompensates  Started on Life 2000 device which supplies PEEP, varying with FiO2 at 40%  Getting more confident with breathing, working with PT/OT and feeling better  Continue with current plan, likely home in next few days    Pneumonia due to 2019 novel coronavirus  Presenting with dyspnea and oxygen requirement  Chest x-ray imaging showing bilateral multifocal pneumonia consistent with Covid-19  Concern with elevated white count of a possible bacterial process,  he had been started on IV antibiotics as well, fairly broad-spectrum and that concern of some underlying hematologic disease, these have been stopped  Has been started on dexamethasone, baricitinib, with Lovenox for clot prophylaxis  Finished 14 days of Baricitinib, 10 days of decadron   CRP elevated yesterday at 78  White blood cell count trending down, currently at 9800, most likely some underlying CLL which will need outpatient follow-up by oncology  Due to worsening sxs last PM, zosyn added and will continue for next week  If worsening sxs, consider adding prednisone next week      CLL (chronic lymphocytic leukemia) (H)  Fairly markedly elevated white count, recent  history of elevated leukocytosis without clear cause  Concern is a possible underlying hematologic disorder, work-up in progress  Has been seen by oncology with flow cytometry showing probable CLL  will need outpatient follow-up           Diet: Moderate Consistent Carb (60 g CHO per Meal) Diet    DVT Prophylaxis: Enoxaparin (Lovenox) SQ  Portillo Catheter: Not present  Central Lines: None  Cardiac Monitoring: None  Code Status: Full Code      Disposition Plan   Expected Discharge:  1-2 days   Anticipated discharge location: home with family    Delays:   when breathing stable, able to care for self        The patient's care was discussed with the Patient and PT/OT.    Aston Hutchison MD  Hospitalist Service  [facility]  Securely message with the Vocera Web Console (learn more here)  Text page via [employer] Paging/Directory         Clinically Significant Risk Factors Present on Admission                    ______________________________________________________________________    Interval History   Feeling better. More comfortable with new breathing machine. Working with PT/OT and able to to more, up in room, sitting at side of bed. Less worried about numbers, starting to realize that he may be able to go home    Data reviewed today: I reviewed all medications, new labs and imaging results over the last 24 hours. I personally reviewed no images or EKG's today.    Physical Exam   Vital Signs: Temp: 98.4  F (36.9  C) Temp src: Tympanic BP: 120/74 Pulse: 84   Resp: 25 SpO2: 94 % O2 Device: High Flow Nasal Cannula (HFNC) Oxygen Delivery: 10 LPM  Weight: 188 lbs 0 oz  Constitutional: awake, alert, cooperative, no apparent distress, and appears stated age  Respiratory: on life 2000, no issues, breath sounds better some crackles, moving more air  Cardiovascular: regular rate and rhythm    Data   Recent Labs   Lab 02/27/22  0724 02/26/22  2147 02/26/22  1704 02/26/22  0751 02/26/22  0513 02/24/22  0728  02/24/22  0555 02/23/22  2101 02/23/22  1717 02/21/22  0729 02/21/22  0618   WBC  --   --   --   --  9.9  --  9.8  --  11.3*  --  12.9*   HGB  --   --   --   --  10.8*  --  10.7*  --  11.3*  --  11.6*   MCV  --   --   --   --  81  --  81  --  81  --  82   PLT  --   --   --   --  167  --  171  --  184  --  251   NA  --   --   --   --  136  --  134  --  134  --  136   POTASSIUM  --   --   --   --  4.2  --  4.0  --  4.4  --  4.6   CHLORIDE  --   --   --   --  97*  --  96*  --  96*  --  99   CO2  --   --   --   --  30  --  28  --  30  --  31   BUN  --   --   --   --  10  --  11  --  11  --  18   CR  --   --   --   --  0.85  --  0.90  --  0.79  --  0.83   ANIONGAP  --   --   --   --  9  --  10  --  8  --  6   LILIAN  --   --   --   --  9.1  --  9.2  --  9.4  --  9.2   * 119* 145*   < > 109*   < > 99   < > 107*   < > 100   ALBUMIN  --   --   --   --   --   --   --   --  3.5  --  3.5   PROTTOTAL  --   --   --   --   --   --   --   --  6.3*  --  6.2*   BILITOTAL  --   --   --   --   --   --   --   --  0.6  --  0.5   ALKPHOS  --   --   --   --   --   --   --   --  53  --  60   ALT  --   --   --   --   --   --   --   --  51  --  79*   AST  --   --   --   --   --   --   --   --  25  --  34    < > = values in this interval not displayed.

## 2022-02-27 NOTE — PLAN OF CARE
Patient has remained stable on personal Life 2000.  Was able to keep on throughout the night.  Lungs are clear, diminished with fine crackles in LLL.   Patient continues to have dyspnea with exertion and desaturations, did not require any increase in oxygen this shift although did not get out of bed, does desat with conversation.   Patient has denied any pain, given flexeril for lower back d/t patient hoping to be able to sleep better tonight.  VSS, afebrile         Problem: Adult Inpatient Plan of Care  Goal: Plan of Care Review  Outcome: Ongoing, Progressing  Goal: Patient-Specific Goal (Individualized)  Outcome: Ongoing, Progressing  Goal: Absence of Hospital-Acquired Illness or Injury  Outcome: Ongoing, Progressing  Intervention: Identify and Manage Fall Risk  Recent Flowsheet Documentation  Taken 2/26/2022 1948 by Mary Ellen Etienne RN  Safety Promotion/Fall Prevention:    activity supervised    assistive device/personal items within reach    clutter free environment maintained    fall prevention program maintained    nonskid shoes/slippers when out of bed    patient and family education    room organization consistent    safety round/check completed    supervised activity    treat reversible contributory factors    treat underlying cause  Intervention: Prevent and Manage VTE (Venous Thromboembolism) Risk  Recent Flowsheet Documentation  Taken 2/26/2022 1948 by Mary Ellen Etienne RN  VTE Prevention/Management: SCDs (sequential compression devices) off  Activity Management:    activity adjusted per tolerance    activity encouraged  Intervention: Prevent Infection  Recent Flowsheet Documentation  Taken 2/26/2022 1948 by Mary Ellen Etienne RN  Infection Prevention:    hand hygiene promoted    rest/sleep promoted    single patient room provided  Goal: Optimal Comfort and Wellbeing  Outcome: Ongoing, Progressing  Goal: Readiness for Transition of Care  Outcome: Ongoing, Progressing     Problem: Infection  (Pneumonia)  Goal: Resolution of Infection Signs and Symptoms  Outcome: Ongoing, Progressing     Problem: Diabetes Comorbidity  Goal: Blood Glucose Level Within Targeted Range  Outcome: Ongoing, Progressing     Problem: Hypertension Comorbidity  Goal: Blood Pressure in Desired Range  Outcome: Ongoing, Progressing  Intervention: Maintain Blood Pressure Management  Recent Flowsheet Documentation  Taken 2/26/2022 1948 by Mary Ellen Etienne RN  Medication Review/Management: medications reviewed   Goal Outcome Evaluation:    Plan of Care Reviewed With: patient     Overall Patient Progress: improving    Outcome Evaluation: continue to work on weaning his oxygen

## 2022-02-27 NOTE — PLAN OF CARE
"Low grade temps intermittently all other vss. Lungs diminished, 91-93% on 10-11 LPM HFNC and pt's personal life 2000 device ( pt operated only),desaturates to 70-80's with activity,recovers quickly to 90's.  Pt up in recliner most of shift, reported 8/10 pain in neck/back, PRN flexeril, tylenol and ibuprofen, will continue to monitor.      /70 (BP Location: Right arm)   Pulse 89   Temp 98.9  F (37.2  C) (Tympanic)   Resp 21   Ht 1.727 m (5' 8\")   Wt 81 kg (178 lb 9.6 oz)   SpO2 92%   BMI 27.16 kg/m                  Goal Outcome Evaluation:    Plan of Care Reviewed With: patient     Overall Patient Progress: no change    Outcome Evaluation: continue to work on weaning his oxygen          "

## 2022-02-28 ENCOUNTER — APPOINTMENT (OUTPATIENT)
Dept: PHYSICAL THERAPY | Facility: OTHER | Age: 62
DRG: 177 | End: 2022-02-28
Payer: COMMERCIAL

## 2022-02-28 ENCOUNTER — APPOINTMENT (OUTPATIENT)
Dept: OCCUPATIONAL THERAPY | Facility: OTHER | Age: 62
DRG: 177 | End: 2022-02-28
Payer: COMMERCIAL

## 2022-02-28 LAB
GLUCOSE BLDC GLUCOMTR-MCNC: 120 MG/DL (ref 70–99)
GLUCOSE BLDC GLUCOMTR-MCNC: 128 MG/DL (ref 70–99)
GLUCOSE BLDC GLUCOMTR-MCNC: 134 MG/DL (ref 70–99)
GLUCOSE BLDC GLUCOMTR-MCNC: 152 MG/DL (ref 70–99)
LACTATE SERPL-SCNC: 0.8 MMOL/L (ref 0.7–2)

## 2022-02-28 PROCEDURE — 250N000013 HC RX MED GY IP 250 OP 250 PS 637: Performed by: INTERNAL MEDICINE

## 2022-02-28 PROCEDURE — 83605 ASSAY OF LACTIC ACID: CPT | Performed by: FAMILY MEDICINE

## 2022-02-28 PROCEDURE — 250N000013 HC RX MED GY IP 250 OP 250 PS 637: Performed by: FAMILY MEDICINE

## 2022-02-28 PROCEDURE — 97535 SELF CARE MNGMENT TRAINING: CPT | Mod: GO | Performed by: OCCUPATIONAL THERAPIST

## 2022-02-28 PROCEDURE — 99232 SBSQ HOSP IP/OBS MODERATE 35: CPT | Performed by: FAMILY MEDICINE

## 2022-02-28 PROCEDURE — 36415 COLL VENOUS BLD VENIPUNCTURE: CPT | Performed by: FAMILY MEDICINE

## 2022-02-28 PROCEDURE — 250N000011 HC RX IP 250 OP 636: Performed by: FAMILY MEDICINE

## 2022-02-28 PROCEDURE — 120N000001 HC R&B MED SURG/OB

## 2022-02-28 PROCEDURE — 97530 THERAPEUTIC ACTIVITIES: CPT | Mod: GP

## 2022-02-28 RX ORDER — GUAIFENESIN/DEXTROMETHORPHAN 100-10MG/5
10 SYRUP ORAL EVERY 4 HOURS PRN
Status: DISCONTINUED | OUTPATIENT
Start: 2022-02-28 | End: 2022-03-01

## 2022-02-28 RX ORDER — BENZONATATE 100 MG/1
200 CAPSULE ORAL 3 TIMES DAILY PRN
Status: DISCONTINUED | OUTPATIENT
Start: 2022-02-28 | End: 2022-03-06 | Stop reason: HOSPADM

## 2022-02-28 RX ADMIN — ENOXAPARIN SODIUM 40 MG: 40 INJECTION SUBCUTANEOUS at 09:00

## 2022-02-28 RX ADMIN — LISINOPRIL 2.5 MG: 2.5 TABLET ORAL at 09:03

## 2022-02-28 RX ADMIN — PIPERACILLIN AND TAZOBACTAM 4.5 G: 4; .5 INJECTION, POWDER, LYOPHILIZED, FOR SOLUTION INTRAVENOUS at 06:30

## 2022-02-28 RX ADMIN — ATORVASTATIN CALCIUM 40 MG: 40 TABLET, FILM COATED ORAL at 09:00

## 2022-02-28 RX ADMIN — PIPERACILLIN AND TAZOBACTAM 4.5 G: 4; .5 INJECTION, POWDER, LYOPHILIZED, FOR SOLUTION INTRAVENOUS at 00:38

## 2022-02-28 RX ADMIN — NYSTATIN 500000 UNITS: 500000 SUSPENSION ORAL at 12:49

## 2022-02-28 RX ADMIN — NYSTATIN 500000 UNITS: 500000 SUSPENSION ORAL at 17:24

## 2022-02-28 RX ADMIN — BENZONATATE 200 MG: 100 CAPSULE, LIQUID FILLED ORAL at 17:39

## 2022-02-28 RX ADMIN — Medication 1 TABLET: at 09:00

## 2022-02-28 RX ADMIN — NYSTATIN 500000 UNITS: 500000 SUSPENSION ORAL at 08:55

## 2022-02-28 ASSESSMENT — ACTIVITIES OF DAILY LIVING (ADL)
ADLS_ACUITY_SCORE: 8

## 2022-02-28 NOTE — PROGRESS NOTES
SAFETY CHECKLIST  ID Bands and Risk clasps correct and in place (DNR, Fall risk, Allergy, Latex, Limb):  Yes  All Lines Reconciled and labeled correctly: Yes  Whiteboard updated:Yes  Environmental interventions (bed/chair alarm on, call light, side rails, restraints, sitter....): Yes  Joni Reyna RN on 2/28/2022 at 7:26 AM

## 2022-02-28 NOTE — PROGRESS NOTES
:     Spoke with patient on the phone to discuss discharge planning needs. Patient stated that he would like home care services at the time of discharge. Patient asked for [facility] but stated that he is open to other's if [facility] is not available. Spoke on the phone with patient's spouse to update discharge plans and she agreed to home care services for patient.     Pt/family was given the Medicare Compare list for Home Care, with associated star ratings to assist with choice for referrals/discharge planning Yes    Education was given to pt/family that star ratings are updated/maintained by Medicare and can be reviewed by visiting www.medicare.gov Yes    HEATHER Pelayo on 2/28/2022 at 11:14 AM    :     Spoke with Abbey from [facility] and she stated that she can see him next week.     HEATHER Pelayo on 2/28/2022 at 1:52 PM

## 2022-02-28 NOTE — PROGRESS NOTES
02/28/22 1500   Signing Clinician's Name / Credentials   Signing clinician's name / credentials Manfred Hamm MPT   Quick Adds   Rehab Discipline PT   PT Discharge Planning   PT Discharge Recommendation (DC Rec) home with assist;home with home care physical therapy   PT Rationale for DC Rec to promote return to functional, independent and stable mobility   PT Brief overview of current status SBA for mobilities; decreased activity tolerance remains significant impairement   Additional Documentation   Rehab Comments patient agreeable to home care PT   PT Plan continue PT   Total Session Time   Total Session Time (minutes) 25 minutes

## 2022-02-28 NOTE — PLAN OF CARE
Patient has been able to tolerate his life 2000 oxygen machine without difficulties, previously was unable to tolerate the noise and pressure of device.  Lungs are clear, diminished with fine crackles in LLL.   VSS, afebrile.     Problem: Adult Inpatient Plan of Care  Goal: Plan of Care Review  Outcome: Ongoing, Progressing  Goal: Patient-Specific Goal (Individualized)  Outcome: Ongoing, Progressing  Goal: Absence of Hospital-Acquired Illness or Injury  Outcome: Ongoing, Progressing  Intervention: Identify and Manage Fall Risk  Recent Flowsheet Documentation  Taken 2/27/2022 1932 by Mary Ellen Etienne RN  Safety Promotion/Fall Prevention:    clutter free environment maintained    fall prevention program maintained    nonskid shoes/slippers when out of bed    patient and family education    room organization consistent    safety round/check completed    treat reversible contributory factors    treat underlying cause  Intervention: Prevent Infection  Recent Flowsheet Documentation  Taken 2/27/2022 1932 by Mary Ellen Etienne RN  Infection Prevention:    hand hygiene promoted    personal protective equipment utilized    rest/sleep promoted    single patient room provided  Goal: Optimal Comfort and Wellbeing  Outcome: Ongoing, Progressing  Goal: Readiness for Transition of Care  Outcome: Ongoing, Progressing     Problem: Infection (Pneumonia)  Goal: Resolution of Infection Signs and Symptoms  Outcome: Ongoing, Progressing     Problem: Diabetes Comorbidity  Goal: Blood Glucose Level Within Targeted Range  Outcome: Ongoing, Progressing     Problem: Hypertension Comorbidity  Goal: Blood Pressure in Desired Range  Outcome: Ongoing, Progressing  Intervention: Maintain Blood Pressure Management  Recent Flowsheet Documentation  Taken 2/27/2022 1932 by Mary Ellen Etienne RN  Medication Review/Management: medications reviewed   Goal Outcome Evaluation:    Plan of Care Reviewed With: patient     Overall Patient Progress:  improving    Outcome Evaluation: continue to work on weaning his oxygen

## 2022-02-28 NOTE — PLAN OF CARE
Goal Outcome Evaluation:  Patient alert and orientated x 4. O2 has been 95-96%  at rest on Life 2000 machine, appears to be delivering around 14 LPM. When transferring to chair O2 sats dropped to mid- 70s and recovered after a few minutes. Minimal increase work of breathing with desaturation. Patient has been ambulating with standby assist just due to O2 sats decreasing with minimal activity. Joni Reyna RN on 2/28/2022 at 3:00 PM

## 2022-02-28 NOTE — PROGRESS NOTES
[facility]    Medicine Progress Note - Hospitalist Service    Date of Admission:  2/6/2022    Assessment & Plan            Acute respiratory failure with hypoxia (H)  Presenting with increased dyspnea after testing positive for Covid on home testing at home  Covid test positive in the ED, requiring supplemental oxygen  Slow improvement in oxygen needs, currently at 6-7 L HFNC, but desats easily with any activity  Reviewed with pulmonology, Dr. Cortés, on 2/24/2022  Continue antibiotics, no other changes unless decompensates  Started on Life 2000 device which supplies PEEP, varying with FiO2 at 40%  Especially likes and does well with the 3 settings that he can adjust upward with increased dyspnea and anticipation of needs with increased activity  Getting more confident with breathing, working with PT/OT and feeling better  Continue with current plan, likely home in next few days    Pneumonia due to 2019 novel coronavirus  Presenting with dyspnea and oxygen requirement  Chest x-ray imaging showing bilateral multifocal pneumonia consistent with Covid-19  Concern with elevated white count of a possible bacterial process,  he had been started on IV antibiotics as well, fairly broad-spectrum and that concern of some underlying hematologic disease, these have been stopped  Has been started on dexamethasone, baricitinib, with Lovenox for clot prophylaxis  Finished 14 days of Baricitinib, 10 days of decadron   CRP elevated yesterday at 78  White blood cell count trending down, currently at 9800, most likely some underlying CLL which will need outpatient follow-up by oncology  Due to worsening sxs last week, zosyn added, will transition to oral augmentin today        CLL (chronic lymphocytic leukemia) (H)  Fairly markedly elevated white count, recent history of elevated leukocytosis without clear cause  Concern is a possible underlying hematologic disorder, work-up in progress  Has been seen by  oncology with flow cytometry showing probable CLL  will need outpatient follow-up           Diet: Moderate Consistent Carb (60 g CHO per Meal) Diet    DVT Prophylaxis: Enoxaparin (Lovenox) SQ and DOAC  Portillo Catheter: Not present  Central Lines: None  Cardiac Monitoring: None  Code Status: Full Code      Disposition Plan   Expected Discharge:  1-2 days   Anticipated discharge location: home with family    Delays:   when improved respiratory status        The patient's care was discussed with the Patient.    Aston Hutchison MD  Hospitalist Service  [facility]  Securely message with the [employer] Web Console (learn more here)  Text page via [employer] Paging/Directory         Clinically Significant Risk Factors Present on Admission                    ______________________________________________________________________    Interval History   Feeling somewhat better. Doing well with Lifetime 2000, likes that he can adjust up the settings in anticipation of activity and with increased dyspnea. Frequent cough, clear phlegm. No fevers, appetite doing well. Working with PT/OT, getting more confident, anticipates going home sometime this week    Data reviewed today: I reviewed all medications, new labs and imaging results over the last 24 hours. I personally reviewed no images or EKG's today.    Physical Exam   Vital Signs: Temp: 98.6  F (37  C) Temp src: Tympanic BP: 115/75 Pulse: 99   Resp: 22 SpO2: 96 % O2 Device: Other (Comments) (Personal oxygen device) Oxygen Delivery: 10 LPM  Weight: 188 lbs 0 oz  Constitutional: awake, alert, cooperative, no apparent distress, and appears stated age  Respiratory: No increased work of breathing, good air exchange, clear to auscultation bilaterally, no crackles or wheezing  Cardiovascular: Normal apical impulse, regular rate and rhythm, normal S1 and S2, no S3 or S4, and no murmur noted  GI: normal bowel sounds, soft and non-distended    Data   Recent Labs   Lab  02/28/22  0743 02/27/22  2141 02/27/22  1733 02/26/22  0751 02/26/22  0513 02/24/22  0728 02/24/22  0555 02/23/22  2101 02/23/22  1717   WBC  --   --   --   --  9.9  --  9.8  --  11.3*   HGB  --   --   --   --  10.8*  --  10.7*  --  11.3*   MCV  --   --   --   --  81  --  81  --  81   PLT  --   --   --   --  167  --  171  --  184   NA  --   --   --   --  136  --  134  --  134   POTASSIUM  --   --   --   --  4.2  --  4.0  --  4.4   CHLORIDE  --   --   --   --  97*  --  96*  --  96*   CO2  --   --   --   --  30  --  28  --  30   BUN  --   --   --   --  10  --  11  --  11   CR  --   --   --   --  0.85  --  0.90  --  0.79   ANIONGAP  --   --   --   --  9  --  10  --  8   LILIAN  --   --   --   --  9.1  --  9.2  --  9.4   * 129* 139*   < > 109*   < > 99   < > 107*   ALBUMIN  --   --   --   --   --   --   --   --  3.5   PROTTOTAL  --   --   --   --   --   --   --   --  6.3*   BILITOTAL  --   --   --   --   --   --   --   --  0.6   ALKPHOS  --   --   --   --   --   --   --   --  53   ALT  --   --   --   --   --   --   --   --  51   AST  --   --   --   --   --   --   --   --  25    < > = values in this interval not displayed.

## 2022-03-01 ENCOUNTER — APPOINTMENT (OUTPATIENT)
Dept: OCCUPATIONAL THERAPY | Facility: OTHER | Age: 62
DRG: 177 | End: 2022-03-01
Payer: COMMERCIAL

## 2022-03-01 ENCOUNTER — APPOINTMENT (OUTPATIENT)
Dept: PHYSICAL THERAPY | Facility: OTHER | Age: 62
DRG: 177 | End: 2022-03-01
Payer: COMMERCIAL

## 2022-03-01 LAB
GLUCOSE BLDC GLUCOMTR-MCNC: 130 MG/DL (ref 70–99)
GLUCOSE BLDC GLUCOMTR-MCNC: 132 MG/DL (ref 70–99)
GLUCOSE BLDC GLUCOMTR-MCNC: 141 MG/DL (ref 70–99)
GLUCOSE BLDC GLUCOMTR-MCNC: 142 MG/DL (ref 70–99)
GLUCOSE BLDC GLUCOMTR-MCNC: 184 MG/DL (ref 70–99)
LACTATE SERPL-SCNC: 0.9 MMOL/L (ref 0.7–2)

## 2022-03-01 PROCEDURE — 97535 SELF CARE MNGMENT TRAINING: CPT | Mod: GO | Performed by: OCCUPATIONAL THERAPIST

## 2022-03-01 PROCEDURE — 83605 ASSAY OF LACTIC ACID: CPT | Performed by: FAMILY MEDICINE

## 2022-03-01 PROCEDURE — 250N000013 HC RX MED GY IP 250 OP 250 PS 637: Performed by: INTERNAL MEDICINE

## 2022-03-01 PROCEDURE — 97530 THERAPEUTIC ACTIVITIES: CPT | Mod: GO | Performed by: OCCUPATIONAL THERAPIST

## 2022-03-01 PROCEDURE — 250N000013 HC RX MED GY IP 250 OP 250 PS 637: Performed by: FAMILY MEDICINE

## 2022-03-01 PROCEDURE — 120N000001 HC R&B MED SURG/OB

## 2022-03-01 PROCEDURE — 97116 GAIT TRAINING THERAPY: CPT | Mod: GP

## 2022-03-01 PROCEDURE — 36415 COLL VENOUS BLD VENIPUNCTURE: CPT | Performed by: FAMILY MEDICINE

## 2022-03-01 PROCEDURE — 99232 SBSQ HOSP IP/OBS MODERATE 35: CPT | Performed by: FAMILY MEDICINE

## 2022-03-01 PROCEDURE — 97530 THERAPEUTIC ACTIVITIES: CPT | Mod: GP

## 2022-03-01 RX ADMIN — NYSTATIN 500000 UNITS: 500000 SUSPENSION ORAL at 22:42

## 2022-03-01 RX ADMIN — AMOXICILLIN AND CLAVULANATE POTASSIUM 1 TABLET: 875; 125 TABLET, FILM COATED ORAL at 08:02

## 2022-03-01 RX ADMIN — NYSTATIN 500000 UNITS: 500000 SUSPENSION ORAL at 08:02

## 2022-03-01 RX ADMIN — APIXABAN 10 MG: 5 TABLET, FILM COATED ORAL at 09:29

## 2022-03-01 RX ADMIN — ACETAMINOPHEN 650 MG: 325 TABLET, FILM COATED ORAL at 22:45

## 2022-03-01 RX ADMIN — NYSTATIN 500000 UNITS: 500000 SUSPENSION ORAL at 00:10

## 2022-03-01 RX ADMIN — AMOXICILLIN AND CLAVULANATE POTASSIUM 1 TABLET: 875; 125 TABLET, FILM COATED ORAL at 22:42

## 2022-03-01 RX ADMIN — Medication 1 TABLET: at 09:29

## 2022-03-01 RX ADMIN — BENZONATATE 200 MG: 100 CAPSULE, LIQUID FILLED ORAL at 05:43

## 2022-03-01 RX ADMIN — LISINOPRIL 2.5 MG: 2.5 TABLET ORAL at 09:29

## 2022-03-01 RX ADMIN — SENNOSIDES AND DOCUSATE SODIUM 2 TABLET: 50; 8.6 TABLET ORAL at 08:02

## 2022-03-01 RX ADMIN — BENZONATATE 200 MG: 100 CAPSULE, LIQUID FILLED ORAL at 20:06

## 2022-03-01 RX ADMIN — APIXABAN 10 MG: 5 TABLET, FILM COATED ORAL at 00:10

## 2022-03-01 RX ADMIN — APIXABAN 10 MG: 5 TABLET, FILM COATED ORAL at 22:42

## 2022-03-01 RX ADMIN — NYSTATIN 500000 UNITS: 500000 SUSPENSION ORAL at 12:49

## 2022-03-01 RX ADMIN — NYSTATIN 500000 UNITS: 500000 SUSPENSION ORAL at 15:20

## 2022-03-01 RX ADMIN — AMOXICILLIN AND CLAVULANATE POTASSIUM 1 TABLET: 875; 125 TABLET, FILM COATED ORAL at 00:11

## 2022-03-01 RX ADMIN — ATORVASTATIN CALCIUM 40 MG: 40 TABLET, FILM COATED ORAL at 09:29

## 2022-03-01 RX ADMIN — FLUTICASONE PROPIONATE 1 SPRAY: 50 SPRAY, METERED NASAL at 00:37

## 2022-03-01 ASSESSMENT — ACTIVITIES OF DAILY LIVING (ADL)
ADLS_ACUITY_SCORE: 8

## 2022-03-01 NOTE — PLAN OF CARE
Patient is alert & oriented. Patient is managing his own oxygen needs with a oxygen unit he will be bringing home. Dyspnea on exertion;  Denies pain;     Goal Outcome Evaluation:  Problem: Adult Inpatient Plan of Care  Goal: Plan of Care Review  Outcome: Ongoing, Not Progressing  Goal: Patient-Specific Goal (Individualized)  Outcome: Ongoing, Not Progressing  Goal: Absence of Hospital-Acquired Illness or Injury  Outcome: Ongoing, Not Progressing  Intervention: Prevent Skin Injury  Recent Flowsheet Documentation  Taken 3/1/2022 0454 by Belem Joiner RN  Body Position: position changed independently  Taken 2/28/2022 2142 by Belem Joiner RN  Body Position: position changed independently  Intervention: Prevent and Manage VTE (Venous Thromboembolism) Risk  Recent Flowsheet Documentation  Taken 3/1/2022 0454 by Belem Joiner RN  VTE Prevention/Management: SCDs (sequential compression devices) off  Activity Management: activity adjusted per tolerance  Taken 2/28/2022 2142 by Belem Joiner RN  VTE Prevention/Management: SCDs (sequential compression devices) off  Activity Management: activity adjusted per tolerance  Intervention: Prevent Infection  Recent Flowsheet Documentation  Taken 3/1/2022 0454 by Belem Joiner RN  Infection Prevention:   hand hygiene promoted   personal protective equipment utilized   rest/sleep promoted   single patient room provided  Taken 2/28/2022 2142 by Belem Joiner RN  Infection Prevention:   hand hygiene promoted   personal protective equipment utilized   rest/sleep promoted   single patient room provided  Goal: Optimal Comfort and Wellbeing  Outcome: Ongoing, Not Progressing  Goal: Readiness for Transition of Care  Outcome: Ongoing, Not Progressing

## 2022-03-01 NOTE — PROGRESS NOTES
[facility]    Medicine Progress Note - Hospitalist Service    Date of Admission:  2/6/2022    Assessment & Plan          Acute respiratory failure with hypoxia (H)  Presenting with increased dyspnea after testing positive for Covid on home testing at home  Covid test positive in the ED, requiring supplemental oxygen  Slow improvement in oxygen needs, currently at 6-7 L HFNC, but desats easily with any activity  Reviewed with pulmonology, Dr. Cortés, on 2/24/2022  Continue antibiotics, no other changes unless decompensates  Started on Life 2000 device which supplies PEEP, varying with FiO2 at 40%  Especially likes and does well with the 3 settings that he can adjust upward with increased dyspnea and anticipation of needs with increased activity  Getting more confident with breathing, working with PT/OT and feeling better  Continue with current plan, likely home in next few days    Pneumonia due to 2019 novel coronavirus  Presenting with dyspnea and oxygen requirement  Chest x-ray imaging showing bilateral multifocal pneumonia consistent with Covid-19  Concern with elevated white count of a possible bacterial process,  he had been started on IV antibiotics as well, fairly broad-spectrum and that concern of some underlying hematologic disease, these have been stopped  Has been started on dexamethasone, baricitinib, with Lovenox for clot prophylaxis  Finished 14 days of Baricitinib, 10 days of decadron   CRP elevated yesterday at 78  White blood cell count trending down, currently at 9800, most likely some underlying CLL which will need outpatient follow-up by oncology  Due to worsening sxs last week, zosyn added, will transitioned to oral augmentin         CLL (chronic lymphocytic leukemia) (H)  Fairly markedly elevated white count, recent history of elevated leukocytosis without clear cause  Concern is a possible underlying hematologic disorder, work-up in progress  Has been seen by oncology  with flow cytometry showing probable CLL  will need outpatient follow-up           Diet: Moderate Consistent Carb (60 g CHO per Meal) Diet    DVT Prophylaxis: DOAC  Portillo Catheter: Not present  Central Lines: None  Cardiac Monitoring: None  Code Status: Full Code      Disposition Plan   Expected Discharge:  1-2 days   Anticipated discharge location: home with family    Delays:   improved respiratory status        The patient's care was discussed with the Bedside Nurse and Patient.    Aston Hutchison MD  Hospitalist Service  [facility]  Securely message with the Vocera Web Console (learn more here)  Text page via [employer] Paging/Directory         Clinically Significant Risk Factors Present on Admission                    ______________________________________________________________________    Interval History   Feeling better, more confident but still anxious about discharge. No fevers, appetite is good,     Data reviewed today: I reviewed all medications, new labs and imaging results over the last 24 hours. I personally reviewed no images or EKG's today.    Physical Exam   Vital Signs: Temp: 99.2  F (37.3  C) Temp src: Tympanic BP: 132/78 Pulse: 95   Resp: 24 SpO2: 94 % O2 Device: Nasal cannula with humidification Oxygen Delivery: 10 LPM  Weight: 188 lbs 0 oz  Constitutional: awake, alert, cooperative and mild distress  Respiratory: some crackles, no wheeze  Cardiovascular: regular rate and rhythm  GI: normal bowel sounds, soft and non-distended    Data   Recent Labs   Lab 03/01/22  0825 03/01/22  0801 02/28/22  2102 02/26/22  0751 02/26/22  0513 02/24/22  0728 02/24/22  0555 02/23/22  2101 02/23/22  1717   WBC  --   --   --   --  9.9  --  9.8  --  11.3*   HGB  --   --   --   --  10.8*  --  10.7*  --  11.3*   MCV  --   --   --   --  81  --  81  --  81   PLT  --   --   --   --  167  --  171  --  184   NA  --   --   --   --  136  --  134  --  134   POTASSIUM  --   --   --   --  4.2  --  4.0   --  4.4   CHLORIDE  --   --   --   --  97*  --  96*  --  96*   CO2  --   --   --   --  30  --  28  --  30   BUN  --   --   --   --  10  --  11  --  11   CR  --   --   --   --  0.85  --  0.90  --  0.79   ANIONGAP  --   --   --   --  9  --  10  --  8   LILIAN  --   --   --   --  9.1  --  9.2  --  9.4   * 141* 134*   < > 109*   < > 99   < > 107*   ALBUMIN  --   --   --   --   --   --   --   --  3.5   PROTTOTAL  --   --   --   --   --   --   --   --  6.3*   BILITOTAL  --   --   --   --   --   --   --   --  0.6   ALKPHOS  --   --   --   --   --   --   --   --  53   ALT  --   --   --   --   --   --   --   --  51   AST  --   --   --   --   --   --   --   --  25    < > = values in this interval not displayed.

## 2022-03-01 NOTE — PROGRESS NOTES
:     Patient is from home with family. [facility] provide [facility] care services to patient upon discharge.  will continue to follow for discharge planning needs.     HEATHER Pelayo on 3/1/2022 at 11:05 AM

## 2022-03-01 NOTE — PROGRESS NOTES
03/01/22 1537   Signing Clinician's Name / Credentials   Signing clinician's name / credentials Jesusita Branch OTR/L    Quick Adds   Rehab Discipline OT   Functional Transfer Training   Symptoms Noted During/After Treatment shortness of breath;fatigue   Treatment Detail Pt transferred from bed and ambulated to toilet with SBA/CGA    Toilet Transfer Training   Toilet Transfer [place] Level (Training) stand-by assist   Toilet Transfer Physical Assistance Level (Training) supervision   Gait Training   [place] Level (Gait Training) stand-by assist   Therapeutic Activity   Treatment Detail Pt was attempting a BM on toilet, needed to return to bed and lay down due to increased SOB and decrease on O2 sats, sats dropped to low 70's and needed several minutes to recover to low 90's.    OT Discharge Planning   OT Discharge Recommendation (DC Rec) home with assist;home with home care occupational therapy   OT Rationale for DC Rec Pt would benefit from home with home care therapies to address energy conservation and safety/DME needs within the home, as well as caregiver training    OT Brief overview of current status Pt moved OOB to BR and onto toilet with SBA only, needed to return to bed after 1-2 minutes due to increased SOB and O2 dropping to low 70's, requiring several minutes for pt to recover to low 90's. Pt somewhat discouraged by this, provided support and encouragement    Additional Documentation   OT Plan cont OT    Total Session Time   Total Session Time (minutes) 45 minutes

## 2022-03-01 NOTE — PLAN OF CARE
"Low grade temp 99.1-99.2 this shift, all other vitals stable. Fine crackles in lower lobes, oxygen maintaining 90-96% on home life 2000 device. Pt has poor appetite, only eating crackers, wife bringing supper.  Pt reports feeling weaker today, refusing/declining to get out of bed this shift. OOB activity encouraged, will attempt again at later time. Pt reports 3/10 back pain, pt declines intervention, will continue to monitor.  Problem: Muscle Strength Impairment  Goal: Improved Muscle Strength  3/1/2022 1535 by Stanley Becerra, RN  Outcome: Ongoing, Progressing     Goal Outcome Evaluation:    Plan of Care Reviewed With: patient     Overall Patient Progress: improving      /80 (BP Location: Right arm)   Pulse 106   Temp 99.1  F (37.3  C) (Tympanic)   Resp 22   Ht 1.727 m (5' 8\")   Wt 85.3 kg (188 lb)   SpO2 96%   BMI 28.59 kg/m             "

## 2022-03-01 NOTE — PLAN OF CARE
"  Pt afebrile, vss. Lungs clear/ diminished throughout, dyspnea upon exertion, oxygen remains 89-94% on home life 2000 device.small sore on upper pallet of mouth, pt receiving oral nystatin. Pt denies pain at this time, will continue to monitor.  /78   Pulse 95   Temp 99.2  F (37.3  C) (Tympanic)   Resp 24   Ht 1.727 m (5' 8\")   Wt 85.3 kg (188 lb)   SpO2 94%   BMI 28.59 kg/m              Goal Outcome Evaluation:    Plan of Care Reviewed With: patient     Overall Patient Progress: improving           "

## 2022-03-01 NOTE — PROGRESS NOTES
02/28/22 0820   Signing Clinician's Name / Credentials   Signing clinician's name / credentials Jesusita Branch OTR/L    Quick Adds   Rehab Discipline OT   Grooming Training   [place] Level (Grooming Training) stand-by assist   Assistance (Grooming Training) supervision   Bathing Training   [place] Level (Bathing Training) stand-by assist   Treatment Detail for upper body bathing while seated    Assistance (Bathing Training) supervision   OT Discharge Planning   OT Discharge Recommendation (DC Rec) home with assist;home with home care occupational therapy   OT Rationale for DC Rec Pt would benefit from on-going home OT to address energy conservation in the home and safety with ADL's   OT Brief overview of current status Pt was up in recliner upon appraoch, reported feeling anxious as his earlier transfer from bed to chair was difficult in that his O2 sats dropped to 70's and he declined to perform any functional activity. Pt did complete U/B ADL's from chair level with setup only, tolerated well    Additional Documentation   OT Plan cont OT    Total Session Time   Total Session Time (minutes) 30 minutes

## 2022-03-01 NOTE — PROGRESS NOTES
03/01/22 1500   Signing Clinician's Name / Credentials   Signing clinician's name / credentials Manfred Hamm MPT   Quick Adds   Rehab Discipline PT   Quick Adds Interventions Mobility   Functional Transfer Training   Treatment Detail CGA to SBA   Gait Training   Symptoms Noted During/After Treatment (Gait Training) fatigue;shortness of breath   Distance in Feet (Required for LE Total Joints) 10   Treatment Detail short ambulation within patient's room   [place] Level (Gait Training) contact guard   Physical Assistance Level (Gait Training) supervision   Weight Bearing (Gait Training) full weight-bearing   Assistive Device (Gait Training)   (no AD)   Therapeutic Activity   Treatment Detail bed mobilities with SBA   PT Discharge Planning   PT Discharge Recommendation (DC Rec) home with assist;home with home care physical therapy   PT Rationale for DC Rec to promote return to functional activity tolerance, independent and stable mobility   PT Brief overview of current status SBA for mobilities; decreased activity tolerance remains significant impairement   Additional Documentation   Rehab Comments anxious patient continues to experience difficulty tolerating activity due to SOB with decreased O2 saturation; he will certainly benefit from continued PT    PT Plan continue PT   Total Session Time   Total Session Time (minutes) 25 minutes

## 2022-03-02 ENCOUNTER — APPOINTMENT (OUTPATIENT)
Dept: PHYSICAL THERAPY | Facility: OTHER | Age: 62
DRG: 177 | End: 2022-03-02
Payer: COMMERCIAL

## 2022-03-02 ENCOUNTER — APPOINTMENT (OUTPATIENT)
Dept: OCCUPATIONAL THERAPY | Facility: OTHER | Age: 62
DRG: 177 | End: 2022-03-02
Payer: COMMERCIAL

## 2022-03-02 LAB
GLUCOSE BLDC GLUCOMTR-MCNC: 124 MG/DL (ref 70–99)
GLUCOSE BLDC GLUCOMTR-MCNC: 143 MG/DL (ref 70–99)
GLUCOSE BLDC GLUCOMTR-MCNC: 174 MG/DL (ref 70–99)
GLUCOSE BLDC GLUCOMTR-MCNC: 241 MG/DL (ref 70–99)
LACTATE SERPL-SCNC: 1.4 MMOL/L (ref 0.7–2)

## 2022-03-02 PROCEDURE — 250N000013 HC RX MED GY IP 250 OP 250 PS 637: Performed by: INTERNAL MEDICINE

## 2022-03-02 PROCEDURE — 999N000157 HC STATISTIC RCP TIME EA 10 MIN

## 2022-03-02 PROCEDURE — 120N000001 HC R&B MED SURG/OB

## 2022-03-02 PROCEDURE — 99232 SBSQ HOSP IP/OBS MODERATE 35: CPT | Performed by: FAMILY MEDICINE

## 2022-03-02 PROCEDURE — 250N000013 HC RX MED GY IP 250 OP 250 PS 637: Performed by: FAMILY MEDICINE

## 2022-03-02 PROCEDURE — 97530 THERAPEUTIC ACTIVITIES: CPT | Mod: GO | Performed by: OCCUPATIONAL THERAPIST

## 2022-03-02 PROCEDURE — 83605 ASSAY OF LACTIC ACID: CPT | Performed by: FAMILY MEDICINE

## 2022-03-02 PROCEDURE — 36415 COLL VENOUS BLD VENIPUNCTURE: CPT | Performed by: FAMILY MEDICINE

## 2022-03-02 RX ADMIN — INSULIN ASPART 1 UNITS: 100 INJECTION, SOLUTION INTRAVENOUS; SUBCUTANEOUS at 08:20

## 2022-03-02 RX ADMIN — NYSTATIN 500000 UNITS: 500000 SUSPENSION ORAL at 18:07

## 2022-03-02 RX ADMIN — INSULIN ASPART 1 UNITS: 100 INJECTION, SOLUTION INTRAVENOUS; SUBCUTANEOUS at 12:24

## 2022-03-02 RX ADMIN — ERGOCALCIFEROL 50000 UNITS: 1.25 CAPSULE ORAL at 16:41

## 2022-03-02 RX ADMIN — ATORVASTATIN CALCIUM 40 MG: 40 TABLET, FILM COATED ORAL at 09:47

## 2022-03-02 RX ADMIN — APIXABAN 10 MG: 5 TABLET, FILM COATED ORAL at 21:37

## 2022-03-02 RX ADMIN — AMOXICILLIN AND CLAVULANATE POTASSIUM 1 TABLET: 875; 125 TABLET, FILM COATED ORAL at 09:45

## 2022-03-02 RX ADMIN — APIXABAN 10 MG: 5 TABLET, FILM COATED ORAL at 09:46

## 2022-03-02 RX ADMIN — BENZONATATE 200 MG: 100 CAPSULE, LIQUID FILLED ORAL at 21:37

## 2022-03-02 RX ADMIN — NYSTATIN 500000 UNITS: 500000 SUSPENSION ORAL at 08:20

## 2022-03-02 RX ADMIN — Medication 1 TABLET: at 09:47

## 2022-03-02 RX ADMIN — NYSTATIN 500000 UNITS: 500000 SUSPENSION ORAL at 21:37

## 2022-03-02 RX ADMIN — LISINOPRIL 2.5 MG: 2.5 TABLET ORAL at 09:46

## 2022-03-02 RX ADMIN — NYSTATIN 500000 UNITS: 500000 SUSPENSION ORAL at 12:24

## 2022-03-02 RX ADMIN — ACETAMINOPHEN 650 MG: 325 TABLET, FILM COATED ORAL at 21:37

## 2022-03-02 ASSESSMENT — ACTIVITIES OF DAILY LIVING (ADL)
ADLS_ACUITY_SCORE: 8

## 2022-03-02 NOTE — PROGRESS NOTES
[facility]    Medicine Progress Note - Hospitalist Service    Date of Admission:  2/6/2022    Assessment & Plan          Acute respiratory failure with hypoxia (H)  Presenting with increased dyspnea after testing positive for Covid on home testing at home  Covid test positive in the ED, requiring supplemental oxygen  Slow improvement in oxygen needs, currently at 6-7 L HFNC, but desats easily with any activity  Reviewed with pulmonology, Dr. Cortés, on 2/24/2022  Continue antibiotics, no other changes unless decompensates  Started on Life 2000 device which supplies PEEP, varying with FiO2 at 40%  Especially likes and does well with the 3 settings that he can adjust upward with increased dyspnea and anticipation of needs with increased activity  Getting more confident with breathing, working with PT/OT and feeling better  Continue with current plan, likely home in next few days  3/2/2022-overnight having episodes of nightmares, awoke to have his machine off with sats in the 60s.  Feeling more concerned about his safety at home.  Spoke with  and will explore other options including possible ARU    Pneumonia due to 2019 novel coronavirus  Presenting with dyspnea and oxygen requirement  Chest x-ray imaging showing bilateral multifocal pneumonia consistent with Covid-19  Concern with elevated white count of a possible bacterial process,  he had been started on IV antibiotics as well, fairly broad-spectrum and that concern of some underlying hematologic disease, these have been stopped  Has been started on dexamethasone, baricitinib, with Lovenox for clot prophylaxis  Finished 14 days of Baricitinib, 10 days of decadron   CRP elevated yesterday at 78  White blood cell count trending down, currently at 9800, most likely some underlying CLL which will need outpatient follow-up by oncology  Due to worsening sxs last week, zosyn added, will transitioned to oral augmentin         CLL  (chronic lymphocytic leukemia) (H)  Fairly markedly elevated white count, recent history of elevated leukocytosis without clear cause  Concern is a possible underlying hematologic disorder, work-up in progress  Has been seen by oncology with flow cytometry showing probable CLL  will need outpatient follow-up           Diet: Moderate Consistent Carb (60 g CHO per Meal) Diet    DVT Prophylaxis: DOAC  Portillo Catheter: Not present  Central Lines: None  Cardiac Monitoring: None  Code Status: Full Code      Disposition Plan   Expected Discharge:  1 to 3 days  Anticipated discharge location: inpatient rehabilitation facility;nursing home (placement for rehab)    Delays:  Safe disposition       The patient's care was discussed with the Patient.    Aston Hutchison MD  Hospitalist Service  [facility]  Securely message with the Vocera Web Console (learn more here)  Text page via [employer] Paging/Directory         Clinically Significant Risk Factors Present on Admission                    ______________________________________________________________________    Interval History   Episode last evening when he awoke having a bad dream and found that his equipment was removed from his face and he was satting in the 60s, panicked, he is to call light and was able to get his equipment back on.  Very anxious about his ability to function at home, not sure if his wife would be able to take care of him.  Asking about potential placement.  Cough is better, still very short of air with any activity.  Appetite good    Data reviewed today: I reviewed all medications, new labs and imaging results over the last 24 hours. I personally reviewed no images or EKG's today.    Physical Exam   Vital Signs: Temp: 99.2  F (37.3  C) Temp src: Tympanic BP: 126/77 Pulse: 106   Resp: 24 SpO2: 90 % O2 Device: Other (Comments) (Home device Life 2000) Oxygen Delivery: 13 LPM  Weight: 188 lbs 0 oz  Constitutional: awake, alert,  cooperative and mild distress  Respiratory: Lungs are clear  Cardiovascular: regular rate and rhythm  GI: normal bowel sounds, soft and non-distended  Musculoskeletal: no lower extremity pitting edema present  there is no redness, warmth, or swelling of the joints    Data   Recent Labs   Lab 03/02/22  1112 03/02/22  0805 03/01/22  2120 02/26/22  0751 02/26/22  0513 02/24/22  0728 02/24/22  0555 02/23/22  2101 02/23/22  1717   WBC  --   --   --   --  9.9  --  9.8  --  11.3*   HGB  --   --   --   --  10.8*  --  10.7*  --  11.3*   MCV  --   --   --   --  81  --  81  --  81   PLT  --   --   --   --  167  --  171  --  184   NA  --   --   --   --  136  --  134  --  134   POTASSIUM  --   --   --   --  4.2  --  4.0  --  4.4   CHLORIDE  --   --   --   --  97*  --  96*  --  96*   CO2  --   --   --   --  30  --  28  --  30   BUN  --   --   --   --  10  --  11  --  11   CR  --   --   --   --  0.85  --  0.90  --  0.79   ANIONGAP  --   --   --   --  9  --  10  --  8   LILIAN  --   --   --   --  9.1  --  9.2  --  9.4   * 143* 184*   < > 109*   < > 99   < > 107*   ALBUMIN  --   --   --   --   --   --   --   --  3.5   PROTTOTAL  --   --   --   --   --   --   --   --  6.3*   BILITOTAL  --   --   --   --   --   --   --   --  0.6   ALKPHOS  --   --   --   --   --   --   --   --  53   ALT  --   --   --   --   --   --   --   --  51   AST  --   --   --   --   --   --   --   --  25    < > = values in this interval not displayed.

## 2022-03-02 NOTE — PROGRESS NOTES
"   03/02/22 0500   Vital Signs   Temp 98.5  F (36.9  C)   Temp src Tympanic   Resp 24   Oximeter Heart Rate 97 bpm   /85   BP Location Right arm     Patient used call light, was found to be in fetal position with oxygen in the 50s with HFNC in nose.  Patient was alert and answering questions appropriately.  Per patient he woke up and \"everything was off, I tried to turn on my machine but it wouldn't show me the numbers so I put on the other oxygen and called you\".  Patient was placed on NRB and RT called, see note from RT.  Life 2000 device was turned on and placed back on patient.  Patient recovered back into the 90s within minutes.  Patient likes to sleep with door closed.  Talked with patient about having continuous pulse oximetry we are unable to hear alarms with door closed.  Patient in agreement to keep door open, alarms on at highest sound.  Patient is now resting with eyes closed.  VSS.  Will continue to monitor closely.    "

## 2022-03-02 NOTE — PROGRESS NOTES
Nutrition follow up:   Visited with pt today, he reported a slight decrease in appetite. He declined wanting any changes to be made with his diet and still does not want scheduled supplements but will continue to ask when desired. Planning at this time to discharge to a skilled nursing facility for rehab.    Diet: consistent carb  Intakes: had been 100% most meals but decreased appetite yesterday and today. Encouraged to drink the supplements if his appetite remains poor. He agreed.   Vitals:    02/06/22 1020 02/06/22 1330 02/08/22 0235 02/09/22 0026   Weight: 92.1 kg (203 lb) 89.5 kg (197 lb 4.8 oz) 89.3 kg (196 lb 14.4 oz) 97.2 kg (214 lb 3.2 oz)    02/10/22 0422 02/10/22 0500 02/11/22 0440 02/12/22 0539   Weight: 96.9 kg (213 lb 9.6 oz) 87 kg (191 lb 12.8 oz) 88.2 kg (194 lb 8 oz) 84.8 kg (187 lb)    02/13/22 0547 02/14/22 0223 02/16/22 0642 02/18/22 0538   Weight: 84.6 kg (186 lb 6.4 oz) 84.6 kg (186 lb 8 oz) 83 kg (183 lb) 82.4 kg (181 lb 11.2 oz)    02/19/22 0449 02/20/22 2104 02/22/22 0404 02/23/22 0247   Weight: 82.1 kg (181 lb) 81.6 kg (179 lb 14.4 oz) 87.1 kg (192 lb) 81.6 kg (180 lb)    02/24/22 0309 02/25/22 0300 02/26/22 0540 02/27/22 0725   Weight: 83.5 kg (184 lb) 85.4 kg (188 lb 3.2 oz) 81 kg (178 lb 9.6 oz) 85.3 kg (188 lb)   Follow up in 1-5 days. If needed sooner, please order consult.   Nirmala Sunshine RD on 3/2/2022 at 2:06 PM

## 2022-03-02 NOTE — PROGRESS NOTES
:     Met with patient in room to discuss discharge planning. Spoke with patient about going to an ARU facility. He stated that at this time he does not want to go to an ARU. Patient stated that he will talk with his wife to discuss this option to see what she thinks.     [facility] just in case patient changes his mind.  will continue to follow.    HEATHER Pelayo on 3/2/2022 at 3:14 PM

## 2022-03-02 NOTE — PLAN OF CARE
Patient lungs are clear, diminished with fine crackles in bases bilaterally.  Using personal oxygen device, life 2000, no need for any additional interventions.  Continues to have drops in oxygen with activity, down into higher 70s when up to bathroom, comes back up with rest and deep breathing.  Patient had some toast, has had decreased appetite during day.  Blood sugars stable with no coverage given.  Adequate intake and urine output.  Has denied any interventions for pain this shift.  Patient declines having any barrier creams placed to reddened coccyx.    Problem: Adult Inpatient Plan of Care  Goal: Plan of Care Review  Outcome: Ongoing, Progressing  Goal: Patient-Specific Goal (Individualized)  Outcome: Ongoing, Progressing  Goal: Absence of Hospital-Acquired Illness or Injury  Outcome: Ongoing, Progressing  Intervention: Identify and Manage Fall Risk  Recent Flowsheet Documentation  Taken 3/1/2022 2036 by Mary Ellen Etienne RN  Safety Promotion/Fall Prevention:    activity supervised    assistive device/personal items within reach    clutter free environment maintained    fall prevention program maintained    mobility aid in reach    nonskid shoes/slippers when out of bed    patient and family education    room organization consistent    safety round/check completed    supervised activity    treat reversible contributory factors    treat underlying cause  Intervention: Prevent and Manage VTE (Venous Thromboembolism) Risk  Recent Flowsheet Documentation  Taken 3/2/2022 0102 by Mary Ellen Etienne RN  Activity Management: ambulated to bathroom  Taken 3/1/2022 2036 by Mary Ellen Etienne RN  VTE Prevention/Management: (eliquis) --  Activity Management:    activity adjusted per tolerance    activity encouraged  Intervention: Prevent Infection  Recent Flowsheet Documentation  Taken 3/1/2022 2036 by Mary Ellen Etienne RN  Infection Prevention:    hand hygiene promoted    rest/sleep promoted    single patient  room provided  Goal: Optimal Comfort and Wellbeing  Outcome: Ongoing, Progressing  Goal: Readiness for Transition of Care  Outcome: Ongoing, Progressing     Problem: Infection (Pneumonia)  Goal: Resolution of Infection Signs and Symptoms  Outcome: Ongoing, Progressing  Intervention: Prevent Infection Progression  Recent Flowsheet Documentation  Taken 3/1/2022 2036 by Mary Ellen Etienne RN  Isolation Precautions: contact precautions discontinued     Problem: Diabetes Comorbidity  Goal: Blood Glucose Level Within Targeted Range  Outcome: Ongoing, Progressing     Problem: Hypertension Comorbidity  Goal: Blood Pressure in Desired Range  Outcome: Ongoing, Progressing  Intervention: Maintain Blood Pressure Management  Recent Flowsheet Documentation  Taken 3/1/2022 2036 by Mary Ellen Etienne RN  Medication Review/Management: medications reviewed     Problem: Muscle Strength Impairment  Goal: Improved Muscle Strength  Outcome: Ongoing, Progressing   Goal Outcome Evaluation:

## 2022-03-02 NOTE — PROGRESS NOTES
03/02/22 1500   Signing Clinician's Name / Credentials   Signing clinician's name / credentials Jesusita Branch, OTR/L    Quick Adds   Rehab Discipline OT   Functional Transfer Training   Treatment Detail bed to recliner transfer with SBA, tolerated well today, O2 fell to mid 80's but recovered fast today. Pt in better spirits today    OT Discharge Planning   OT Discharge Recommendation (DC Rec) Transitional Care Facility   OT Rationale for DC Rec Pt would benefit from on going rehab in STR to maximize level of independence needed to return home with spouse    OT Brief overview of current status Pt in better spirits today, moved from bed to recliner with SBA, O2 stayed in mid 80's today and pt recovered to low 90's very quickly    Additional Documentation   OT Plan cont OT    Total Session Time   Total Session Time (minutes) 15 minutes

## 2022-03-02 NOTE — PLAN OF CARE
"  Pt afebrile, vss. Fine crackles in bilateral lower lobes, oxygen saturations 84-92% on home life 2000 device, dyspnea upon exertion present, respirations 24. Pt expressed anxiety related to care after hospitalization, going home and oxygen desaturating with episode that happened during the night, see notes. Writer informed social work and they will follow up with pt, will continue to monitor.    /80 (BP Location: Right arm)   Pulse 95   Temp 97.9  F (36.6  C) (Tympanic)   Resp 24   Ht 1.727 m (5' 8\")   Wt 85.3 kg (188 lb)   SpO2 92%   BMI 28.59 kg/m              Goal Outcome Evaluation:          Plan of Care Reviewed With: patient     Overall Patient Progress: no change           "

## 2022-03-02 NOTE — PROGRESS NOTES
:     Patient is from home with wife. Met with patient in room to discuss discharge plan. Patient stated that he feels he will need to discharge to a SNF at this time. Patient stated he does not feel comfortable returning home at this time. Patient stated that referral's can be sent to [facility], [facility], and [facility].     Pt/family was given the Medicare Compare list for SNF, with associated star ratings to assist with choice for referrals/discharge planning Yes    Education was given to pt/family that star ratings are updated/maintained by Medicare and can be reviewed by visiting www.medicare.gov Yes    Referrals have been faxed to [facility], [facility], and [facility].  will continue to follow for discharge planning needs.     HEATHER Pelayo on 3/2/2022 at 8:51 AM

## 2022-03-02 NOTE — PROGRESS NOTES
"Called into pt room for low SpO2. Per pt he \"got hot and couldn't breath and took everything off.\" Per staff he was on a HFNC prior and was placed on NRB when they enter pt room, finding his sats in the 50's. Placed pt on his Life 2000 machine in active mode. Pt SpO2 increased to 90% at this time. Pt will wear his machine for the rest of the night and transition to resting mode, on his own, once he feels ready to do so. No further respiratory interventions done. Liliane Low RRT  "

## 2022-03-03 ENCOUNTER — APPOINTMENT (OUTPATIENT)
Dept: PHYSICAL THERAPY | Facility: OTHER | Age: 62
DRG: 177 | End: 2022-03-03
Payer: COMMERCIAL

## 2022-03-03 ENCOUNTER — APPOINTMENT (OUTPATIENT)
Dept: OCCUPATIONAL THERAPY | Facility: OTHER | Age: 62
DRG: 177 | End: 2022-03-03
Payer: COMMERCIAL

## 2022-03-03 LAB
GLUCOSE BLDC GLUCOMTR-MCNC: 113 MG/DL (ref 70–99)
GLUCOSE BLDC GLUCOMTR-MCNC: 119 MG/DL (ref 70–99)
GLUCOSE BLDC GLUCOMTR-MCNC: 127 MG/DL (ref 70–99)
GLUCOSE BLDC GLUCOMTR-MCNC: 142 MG/DL (ref 70–99)
GLUCOSE BLDC GLUCOMTR-MCNC: 186 MG/DL (ref 70–99)
LACTATE SERPL-SCNC: 1.1 MMOL/L (ref 0.7–2)

## 2022-03-03 PROCEDURE — 250N000013 HC RX MED GY IP 250 OP 250 PS 637: Performed by: INTERNAL MEDICINE

## 2022-03-03 PROCEDURE — 36415 COLL VENOUS BLD VENIPUNCTURE: CPT | Performed by: FAMILY MEDICINE

## 2022-03-03 PROCEDURE — 99232 SBSQ HOSP IP/OBS MODERATE 35: CPT | Performed by: FAMILY MEDICINE

## 2022-03-03 PROCEDURE — 250N000013 HC RX MED GY IP 250 OP 250 PS 637: Performed by: FAMILY MEDICINE

## 2022-03-03 PROCEDURE — 83605 ASSAY OF LACTIC ACID: CPT | Performed by: FAMILY MEDICINE

## 2022-03-03 PROCEDURE — 97116 GAIT TRAINING THERAPY: CPT | Mod: GP

## 2022-03-03 PROCEDURE — 97530 THERAPEUTIC ACTIVITIES: CPT | Mod: GO | Performed by: OCCUPATIONAL THERAPIST

## 2022-03-03 PROCEDURE — 120N000001 HC R&B MED SURG/OB

## 2022-03-03 PROCEDURE — 97530 THERAPEUTIC ACTIVITIES: CPT | Mod: GP

## 2022-03-03 RX ADMIN — ATORVASTATIN CALCIUM 40 MG: 40 TABLET, FILM COATED ORAL at 10:50

## 2022-03-03 RX ADMIN — Medication 1 TABLET: at 10:50

## 2022-03-03 RX ADMIN — APIXABAN 10 MG: 5 TABLET, FILM COATED ORAL at 21:38

## 2022-03-03 RX ADMIN — INSULIN ASPART 1 UNITS: 100 INJECTION, SOLUTION INTRAVENOUS; SUBCUTANEOUS at 11:59

## 2022-03-03 RX ADMIN — BENZONATATE 200 MG: 100 CAPSULE, LIQUID FILLED ORAL at 21:41

## 2022-03-03 RX ADMIN — FLUTICASONE PROPIONATE 1 SPRAY: 50 SPRAY, METERED NASAL at 21:38

## 2022-03-03 RX ADMIN — NYSTATIN 500000 UNITS: 500000 SUSPENSION ORAL at 07:55

## 2022-03-03 RX ADMIN — INSULIN ASPART 1 UNITS: 100 INJECTION, SOLUTION INTRAVENOUS; SUBCUTANEOUS at 07:58

## 2022-03-03 RX ADMIN — NYSTATIN 500000 UNITS: 500000 SUSPENSION ORAL at 11:59

## 2022-03-03 RX ADMIN — LISINOPRIL 2.5 MG: 2.5 TABLET ORAL at 10:50

## 2022-03-03 RX ADMIN — ACETAMINOPHEN 650 MG: 325 TABLET, FILM COATED ORAL at 21:41

## 2022-03-03 RX ADMIN — APIXABAN 10 MG: 5 TABLET, FILM COATED ORAL at 10:50

## 2022-03-03 ASSESSMENT — ACTIVITIES OF DAILY LIVING (ADL)
ADLS_ACUITY_SCORE: 8

## 2022-03-03 NOTE — PLAN OF CARE
Goal Outcome Evaluation: no change.  Patient is alert and orientated x 4. Up in the chair this afternoon. O2 drops to 80's with any exertion, continuous O2 monitoring in place.. Takes several minutes to recover, adjusts Life 2000 home oxygen delivery device per self. Requires standby assist due to SOB. Heart rate 106-110s. LS have crackles throughout monge posteriorly. Joni Reyna RN on 3/2/2022 at 7:03 PM

## 2022-03-03 NOTE — PROGRESS NOTES
03/03/22 2373   Signing Clinician's Name / Credentials   Signing clinician's name / credentials Jesusita Branch OTR/L    Quick Adds   Rehab Discipline OT   Functional Transfer Training   Symptoms Noted During/After Treatment shortness of breath;fatigue   Treatment Detail SBA for bed mobility and bed to recliner transfer. Pt's O2 dropped to low 80's with transfer but pt recovered faster today to low 90's. Pt seems in better spirits today,    Gait Training   Symptoms Noted During/After Treatment (Gait Training) fatigue;shortness of breath   Treatment Detail pt moved OOB with SBA and transferred from bed to recliner with SBA and extended time.    Stearns Level (Gait Training) stand-by assist   Bathing Training   Stearns Level (Bathing Training)   (completed prior )   OT Discharge Planning   OT Discharge Recommendation (DC Rec) home with assist;home with home care occupational therapy   OT Rationale for DC Rec Pt has now decided he wants to return home with wife and home care services/therapy    OT Brief overview of current status Pt very pleasant and in good spirits today, pt moved OOB and ambualted to recliner with SBA. Pt completed a few neck and chest opening exercises in standing, tolerated all well, O2 did drop into 80's with activity but recovered to low 90's faster today vs yesterday.    Additional Documentation   OT Plan cont OT    Total Session Time   Total Session Time (minutes) 30 minutes

## 2022-03-03 NOTE — PLAN OF CARE
Patient continues to have drops in Sp02 with any activity, bed mobility or conversation.  With using urinal patient dropped to low 70s.  Did use NRB to bring back up d/t patient having increased RR, WOB and Sp02 continued to stay down after patient was deep breathing.  Patient independently using his life 2000.    No events of patient accidentally taking off oxygen.  Patient kept on continuous pulse oximetry throughout shift.  Denies pain this shift.  Also denies any repositioning.   VSS, afebrile.     Problem: Adult Inpatient Plan of Care  Goal: Absence of Hospital-Acquired Illness or Injury  Intervention: Identify and Manage Fall Risk  Recent Flowsheet Documentation  Taken 3/2/2022 2034 by Mary Ellen Etienne RN  Safety Promotion/Fall Prevention:    clutter free environment maintained    fall prevention program maintained    nonskid shoes/slippers when out of bed    patient and family education    room organization consistent    safety round/check completed    treat reversible contributory factors    treat underlying cause  Intervention: Prevent and Manage VTE (Venous Thromboembolism) Risk  Recent Flowsheet Documentation  Taken 3/2/2022 2034 by Mary Ellen Etienne RN  VTE Prevention/Management: (eliquis) --  Intervention: Prevent Infection  Recent Flowsheet Documentation  Taken 3/2/2022 2034 by Mary Ellen Etienne RN  Infection Prevention:    hand hygiene promoted    rest/sleep promoted    single patient room provided     Problem: Hypertension Comorbidity  Goal: Blood Pressure in Desired Range  Intervention: Maintain Blood Pressure Management  Recent Flowsheet Documentation  Taken 3/2/2022 2034 by Mary Ellen Etienne RN  Medication Review/Management: medications reviewed   Goal Outcome Evaluation:

## 2022-03-03 NOTE — PLAN OF CARE
"Pt afebrile, vss. Fine crackle in bilateral bases, dyspnea upon exertion, oxygen 90-94% on home life 2000 device, pt operated. Pt reports feeling better today, up in recliner for most of shift, denies any pain.  Pallet sore/ small bleeding noted, nystatin swish discontinued and PRN magic mouthwash offered to patient, will continue to monitor.  /80   Pulse 104   Temp 97.3  F (36.3  C) (Tympanic)   Resp 24   Ht 1.727 m (5' 8\")   Wt 82.7 kg (182 lb 6.4 oz)   SpO2 94%   BMI 27.73 kg/m        Problem: Adult Inpatient Plan of Care  Goal: Patient-Specific Goal (Individualized)  3/3/2022 1108 by Stanley Becerra RN  Outcome: Ongoing, Progressing  Flowsheets (Taken 3/3/2022 1108)  Individualized Care Needs: poor appetite  Anxieties, Fears or Concerns: oxygen desautrating with activity  Patient-Specific Goals (Include Timeframe): encourage food/ boosts this shift         Goal Outcome Evaluation:    Plan of Care Reviewed With: patient     Overall Patient Progress: no change           "

## 2022-03-03 NOTE — PROGRESS NOTES
03/03/22 1300   Signing Clinician's Name / Credentials   Signing clinician's name / credentials Gurjit Hamm MPT   Quick Adds   Rehab Discipline PT   Functional Transfer Training   Symptoms Noted During/After Treatment fatigue;shortness of breath   Treatment Detail SBA for transfers   Gait Training   Symptoms Noted During/After Treatment (Gait Training) fatigue;shortness of breath   Distance in Feet (Required for LE Total Joints) 5   Treatment Detail a few steps taken during bed to recliner transfer   Barren Level (Gait Training) stand-by assist   Physical Assistance Level (Gait Training) supervision   Weight Bearing (Gait Training) full weight-bearing   Assistive Device (Gait Training)   (no assistive gait device)   Pattern Analysis (Gait Training) 2-point gait   Therapeutic Activity   Symptoms Noted During/After Treatment Fatigue;Shortness of breath   Treatment Detail SBA for bed mobilities   PT Discharge Planning   PT Discharge Recommendation (DC Rec) home with assist;home with home care physical therapy   PT Rationale for DC Rec to promote return to functional activity tolerance, independent and stable mobility   PT Brief overview of current status SBA for all mobilities; however, patient remains anxious with SOB   Additional Documentation   Rehab Comments patient will benefit from continued PT; patient and PT discussed importance of increasing his functional gait tolerance to ease transition to home environment.    PT Plan continue PT   Total Session Time   Total Session Time (minutes) 25 minutes

## 2022-03-03 NOTE — PROGRESS NOTES
:     Lindsay from [facility] called in regards to patient's referral. Per Doctor at ARU, patient does not meet requirements for the medical or rehabilitation aspect of the ARU. Patient is not accepted at this time.     HEATHER Pelayo on 3/3/2022 at 2:16 PM

## 2022-03-03 NOTE — PROGRESS NOTES
:     Met with patient in room to discuss discharge planning needs. Patient stated that he talked with his wife about going to a SNF or an ARU at the time of discharge. Patient stated that he would like to return to his home with his wife with home care. Patient stated that he doesn't know when he will go home due to not being able to take his [id number] machine home from the hospital. Patient stated he will need to wait for a new one.     Patient stated that he will need transportation to his home at the time of discharge. He requested a reclining wheel chair due to not being able to sit up straight for long periods of time. Informed patient that this would be private pay and he stated that he would like to know the price before making a decision.     HEATHER Pelayo on 3/3/2022 at 9:34 AM    :     Spoke on the phone with Deidra from [facility]. She stated at this time they cannot accept patient due to limited knowledge on his [id number] machine.     HEATHER Pelayo on 3/3/2022 at 12:52 PM

## 2022-03-03 NOTE — PROGRESS NOTES
03/02/22 1200   Signing Clinician's Name / Credentials   Signing clinician's name / credentials Gabriele Meeks DPT   Quick Adds   Rehab Discipline PT   Functional Transfer Training   Symptoms Noted During/After Treatment shortness of breath;fatigue   Treatment Detail Bed to chair transfer. Pt dropped to 80% with this mobililty. Pt utilizing his respiratory device. Improved to >90% with 5 minute recovery. SBA with bed mobility and transfer.   PT Discharge Planning   PT Discharge Recommendation (DC Rec) Transitional Care Facility   PT Rationale for DC Rec to promote return to functional activity tolerance, independent and stable mobility   PT Brief overview of current status Pt does moves without physical assist but does desaturate very quickly. Pt took quite a bit of time to reach >90%. He has poor endurance and limited tolerance for activity. Pt would like to d/c to short term rehab to work on his endurance, strength and progressing his activity tolerance.

## 2022-03-03 NOTE — PROGRESS NOTES
[facility]    Medicine Progress Note - Hospitalist Service    Date of Admission:  2/6/2022    Assessment & Plan          Acute respiratory failure with hypoxia (H)  Presenting with increased dyspnea after testing positive for Covid on home testing at home  Covid test positive in the ED, requiring supplemental oxygen  Slow improvement in oxygen needs, currently at 6-7 L HFNC, but desats easily with any activity  Reviewed with pulmonology, Dr. Cortés, on 2/24/2022  Continue antibiotics, no other changes unless decompensates  Started on Life 2000 device which supplies PEEP, varying with FiO2 at 40%  Especially likes and does well with the 3 settings that he can adjust upward with increased dyspnea and anticipation of needs with increased activity  Getting more confident with breathing, working with PT/OT and feeling better  Continue with current plan, likely home in next few days  3/2/2022-overnight having episodes of nightmares, awoke to have his machine off with sats in the 60s.  Feeling more concerned about his safety at home.  Spoke with  and will explore other options including possible ARU  3/3/2022-Better evening last nite, after long discussion with wife, he and she are looking for discharge home. Awaiting to get assurance for payment of Life 2000 and to get oxygen arranged.     Pneumonia due to 2019 novel coronavirus  Presenting with dyspnea and oxygen requirement  Chest x-ray imaging showing bilateral multifocal pneumonia consistent with Covid-19  Concern with elevated white count of a possible bacterial process,  he had been started on IV antibiotics as well, fairly broad-spectrum and that concern of some underlying hematologic disease, these have been stopped  Has been started on dexamethasone, baricitinib, with Lovenox for clot prophylaxis  Finished 14 days of Baricitinib, 10 days of decadron   CRP elevated yesterday at 78  White blood cell count trending down,  currently at 9800, most likely some underlying CLL which will need outpatient follow-up by oncology  Due to worsening sxs last week, zosyn added, was transitioned to oral augmentin, course finished and was stop        CLL (chronic lymphocytic leukemia) (H)  Fairly markedly elevated white count, recent history of elevated leukocytosis without clear cause  Concern is a possible underlying hematologic disorder, work-up in progress  Has been seen by oncology with flow cytometry showing probable CLL  will need outpatient follow-up           Diet: Moderate Consistent Carb (60 g CHO per Meal) Diet    DVT Prophylaxis: DOAC  Portillo Catheter: Not present  Central Lines: None  Cardiac Monitoring: None  Code Status: Full Code      Disposition Plan   Expected Discharge:  1-4 days   Anticipated discharge location: inpatient rehabilitation facility;nursing home (placement for rehab)    Delays:   arranged for home use of Life 2000, home oxygen        The patient's care was discussed with the Bedside Nurse, Patient and Resp therapy.    Aston Hutchison MD  Hospitalist Service  [facility]  Securely message with the Vocera Web Console (learn more here)  Text page via [employer] Paging/Directory         Clinically Significant Risk Factors Present on Admission                    ______________________________________________________________________    Interval History   Good nite last evening, less anxious, planning to go home in next few days. Less cough, feeling more comfortable about going home, wife is more supportive as well    Data reviewed today: I reviewed all medications, new labs and imaging results over the last 24 hours. I personally reviewed no images or EKG's today.    Physical Exam   Vital Signs: Temp: 98.3  F (36.8  C) Temp src: Tympanic BP: 121/80 Pulse: 97   Resp: 24 SpO2: 94 % O2 Device: Other (Comments) (home device) Oxygen Delivery: 8 LPM  Weight: 182 lbs 6.4 oz  Constitutional: awake, alert,  cooperative and comfortable on Life 2000  Respiratory: clear lung sounds  Cardiovascular: regular rate and rhythm    Data   Recent Labs   Lab 03/03/22  0752 03/03/22  0218 03/02/22  2145 02/26/22  0751 02/26/22  0513   WBC  --   --   --   --  9.9   HGB  --   --   --   --  10.8*   MCV  --   --   --   --  81   PLT  --   --   --   --  167   NA  --   --   --   --  136   POTASSIUM  --   --   --   --  4.2   CHLORIDE  --   --   --   --  97*   CO2  --   --   --   --  30   BUN  --   --   --   --  10   CR  --   --   --   --  0.85   ANIONGAP  --   --   --   --  9   LILIAN  --   --   --   --  9.1   * 119* 241*   < > 109*    < > = values in this interval not displayed.

## 2022-03-03 NOTE — PROGRESS NOTES
I touched base with pt this morning to see how he was using his life 2000 unit.  He seemed to be under the impression that he could not go home with this unit and needed a replacement device.  I am not aware of any such need.  When I last spoke to the rep for device she stated that it was all set up for him to take home.  Pt was encouraged to change to higher activity setting on device prior to any activity to help hyperoxygenate prior to activity.  Pt did request that the inspiratory time be increased, which is an improvement since set-up last week.  I will convey this information to the Life 2000 rep.    RT is currently waiting for discharge orders.  Once we are aware of discharge plans we will contact  [facility] to get pt set up with home O2.  Pt will require portable O2 tanks and a home concentrator that we can work together to provide.     Spoke with Dr. Zhu regarding potential discharge day for this pt.  Discharge date will most likely be Monday due to pt anxiety.  Inclement weather may lead to us not being able to be set up with required concentrator over the weekend.  We could get pt set-up tomorrow or next week with no problem.    Nakia Candelario, RT on 3/3/2022 at 1:26 PM

## 2022-03-04 ENCOUNTER — APPOINTMENT (OUTPATIENT)
Dept: PHYSICAL THERAPY | Facility: OTHER | Age: 62
DRG: 177 | End: 2022-03-04
Payer: COMMERCIAL

## 2022-03-04 ENCOUNTER — APPOINTMENT (OUTPATIENT)
Dept: OCCUPATIONAL THERAPY | Facility: OTHER | Age: 62
DRG: 177 | End: 2022-03-04
Payer: COMMERCIAL

## 2022-03-04 LAB
GLUCOSE BLDC GLUCOMTR-MCNC: 113 MG/DL (ref 70–99)
GLUCOSE BLDC GLUCOMTR-MCNC: 132 MG/DL (ref 70–99)
GLUCOSE BLDC GLUCOMTR-MCNC: 136 MG/DL (ref 70–99)
GLUCOSE BLDC GLUCOMTR-MCNC: 184 MG/DL (ref 70–99)
LACTATE SERPL-SCNC: 0.6 MMOL/L (ref 0.7–2)

## 2022-03-04 PROCEDURE — 120N000001 HC R&B MED SURG/OB

## 2022-03-04 PROCEDURE — 97530 THERAPEUTIC ACTIVITIES: CPT | Mod: GP

## 2022-03-04 PROCEDURE — 250N000013 HC RX MED GY IP 250 OP 250 PS 637: Performed by: INTERNAL MEDICINE

## 2022-03-04 PROCEDURE — 99231 SBSQ HOSP IP/OBS SF/LOW 25: CPT | Performed by: INTERNAL MEDICINE

## 2022-03-04 PROCEDURE — 36415 COLL VENOUS BLD VENIPUNCTURE: CPT | Performed by: INTERNAL MEDICINE

## 2022-03-04 PROCEDURE — 83605 ASSAY OF LACTIC ACID: CPT | Performed by: INTERNAL MEDICINE

## 2022-03-04 PROCEDURE — 97530 THERAPEUTIC ACTIVITIES: CPT | Mod: GO

## 2022-03-04 RX ADMIN — APIXABAN 2.5 MG: 2.5 TABLET, FILM COATED ORAL at 21:38

## 2022-03-04 RX ADMIN — ATORVASTATIN CALCIUM 40 MG: 40 TABLET, FILM COATED ORAL at 10:50

## 2022-03-04 RX ADMIN — ACETAMINOPHEN 650 MG: 325 TABLET, FILM COATED ORAL at 21:38

## 2022-03-04 RX ADMIN — Medication 1 TABLET: at 10:49

## 2022-03-04 RX ADMIN — FLUTICASONE PROPIONATE 1 SPRAY: 50 SPRAY, METERED NASAL at 21:39

## 2022-03-04 RX ADMIN — LISINOPRIL 2.5 MG: 2.5 TABLET ORAL at 10:50

## 2022-03-04 RX ADMIN — APIXABAN 2.5 MG: 2.5 TABLET, FILM COATED ORAL at 10:50

## 2022-03-04 ASSESSMENT — ACTIVITIES OF DAILY LIVING (ADL)
ADLS_ACUITY_SCORE: 8

## 2022-03-04 NOTE — PROGRESS NOTES
SAFETY CHECKLIST  ID Bands and Risk clasps correct and in place (DNR, Fall risk, Allergy, Latex, Limb):  Yes  All Lines Reconciled and labeled correctly: Yes  Whiteboard updated:Yes  Environmental interventions (bed/chair alarm on, call light, side rails, restraints, sitter....): Yes  Verify Tele #: not on tele    Bety Grijalva RN on 3/4/2022 at 7:25 AM

## 2022-03-04 NOTE — PROGRESS NOTES
:     Attempted phone call to patient's wife to discuss possible STR placements. No answer. Voicemail left.     HEATHER Pelayo on 3/4/2022 at 10:47 AM    :     Requested Director of Nursing phone number from Pamela at [facility] to discuss the Life [employer] machine and re-discuss eligibility for patient to be accepted at [facility].     HEATHER Pelayo on 3/4/2022 at 12:41 PM

## 2022-03-04 NOTE — PROGRESS NOTES
03/04/22 1400   Signing Clinician's Name / Credentials   Signing clinician's name / credentials Gabriele Meeks DPT   Quick Adds   Rehab Discipline PT   Functional Transfer Training   Symptoms Noted During/After Treatment fatigue;increased pain;significant change in vital signs   Treatment Detail SBA for bed to chair transfer   Gait Training   Symptoms Noted During/After Treatment (Gait Training) fatigue;shortness of breath   Distance in Feet (Required for LE Total Joints) 5   Treatment Detail a few steps taken during bed to recliner transfer   Bern Level (Gait Training) stand-by assist   Physical Assistance Level (Gait Training) supervision   Weight Bearing (Gait Training) full weight-bearing   Pattern Analysis (Gait Training) 2-point gait   Therapeutic Activity   Treatment Detail supine to sit with supervision and setup. Pt on NRB mask at 15L. O2 sats at 100%. Dropped to 95% with activity. HR increased to 133 at a high. Resolved within a few minutes and O2 sats returned to 100%. Discussed with RN about maybe turning down O2. She will discuss with MD/RT   PT Discharge Planning   PT Discharge Recommendation (DC Rec) home with assist;home with home care physical therapy   PT Rationale for DC Rec to promote return to functional activity tolerance, independent and stable mobility   PT Brief overview of current status SBA for all mobilities; however, patient remains anxious with SOB. HR increase today with activity to 133 bpm. Continues to need recovery time. Encouraged to continue moving arms and legs   Additional Documentation   PT Plan Continue PT   Total Session Time   Total Session Time (minutes) 25 minutes

## 2022-03-04 NOTE — PROGRESS NOTES
:     It was mentioned that a SNF might benefit patient. All referrals to SNF's in the area have been denied at this time due to lack of knowledge regarding patient's Life 2000 machine. Patient will most likely be discharged to home with home care services with [facility] and will need home oxygen set up. Spoke with Nakia, from RT, and she stated she will discuss the Life 2000 with [facility].     HEATHER Pelayo on 3/4/2022 at 1:07 PM

## 2022-03-04 NOTE — PLAN OF CARE
Goal Outcome Evaluation:  Pt is here for acute respiratory failure. Pt nose is congested and dry, nosebleed overnight per pt/report. Has preferred to wear non-rebreather today 13-15L. Tolerating well. O2 %. RT OK with pt to remain on this today. Pt on continuous pulse oximeter. Lungs diminished to the bases. SOB with activity, O2 continues to drop with activity. HR tachycardic at times. Up SBA in room, worked with PT/OT. Using urinal to void. Bety Grijalva RN on 3/4/2022 at 6:41 PM    Temp: 98.7  F (37.1  C) Temp src: Tympanic BP: 120/77 Pulse: 100   Resp: 24 SpO2: 100 % O2 Device: Non-rebreather mask Oxygen Delivery: 15 LPM

## 2022-03-04 NOTE — PLAN OF CARE
"Patient had bloody nose at start of shift.  Patient wants to not use Life 2000 tonight and \"give my nose a break\".  Discussed with RT for options, per RT ok for patient to use NRB/facemask for the night.   Patient kept on continuous pulse  oximetry and alarm sounded once during night.   Found patient in bed with mask off, had dropped to low 70s and was able to recover with NRB.   Lungs are diminished with crackles in bases with left worse than right.   Tylenol given for back pain and was effective for relief.  VSS, afebrile.      Problem: Adult Inpatient Plan of Care  Goal: Absence of Hospital-Acquired Illness or Injury  Intervention: Identify and Manage Fall Risk  Recent Flowsheet Documentation  Taken 3/4/2022 0047 by Mary Ellen Etienne RN  Safety Promotion/Fall Prevention:   activity supervised   assistive device/personal items within reach   clutter free environment maintained   fall prevention program maintained   nonskid shoes/slippers when out of bed   patient and family education   room organization consistent   safety round/check completed   supervised activity   treat reversible contributory factors   treat underlying cause  Taken 3/3/2022 2012 by Mary Ellen Etienne RN  Safety Promotion/Fall Prevention:   activity supervised   assistive device/personal items within reach   clutter free environment maintained   fall prevention program maintained   nonskid shoes/slippers when out of bed   patient and family education   room organization consistent   safety round/check completed   supervised activity   treat reversible contributory factors   treat underlying cause  Intervention: Prevent and Manage VTE (Venous Thromboembolism) Risk  Recent Flowsheet Documentation  Taken 3/4/2022 0047 by Mary Ellen Etienne RN  VTE Prevention/Management: (eliquis) SCDs (sequential compression devices) off  Taken 3/3/2022 2012 by Mary Ellen Etienne RN  VTE Prevention/Management: (eliquis) SCDs (sequential compression " devices) off  Intervention: Prevent Infection  Recent Flowsheet Documentation  Taken 3/4/2022 0047 by Mary Ellen Etienne, RN  Infection Prevention:   hand hygiene promoted   personal protective equipment utilized   rest/sleep promoted   single patient room provided  Taken 3/3/2022 2012 by Mary Ellen Etienne RN  Infection Prevention:   hand hygiene promoted   personal protective equipment utilized   rest/sleep promoted   single patient room provided     Problem: Hypertension Comorbidity  Goal: Blood Pressure in Desired Range  Intervention: Maintain Blood Pressure Management  Recent Flowsheet Documentation  Taken 3/4/2022 0047 by Mary Ellen Etienne RN  Medication Review/Management: medications reviewed  Taken 3/3/2022 2012 by Mary Ellen Etienne RN  Medication Review/Management: medications reviewed   Goal Outcome Evaluation:

## 2022-03-05 ENCOUNTER — APPOINTMENT (OUTPATIENT)
Dept: OCCUPATIONAL THERAPY | Facility: OTHER | Age: 62
DRG: 177 | End: 2022-03-05
Payer: COMMERCIAL

## 2022-03-05 ENCOUNTER — APPOINTMENT (OUTPATIENT)
Dept: PHYSICAL THERAPY | Facility: OTHER | Age: 62
DRG: 177 | End: 2022-03-05
Payer: COMMERCIAL

## 2022-03-05 LAB
GLUCOSE BLDC GLUCOMTR-MCNC: 118 MG/DL (ref 70–99)
GLUCOSE BLDC GLUCOMTR-MCNC: 120 MG/DL (ref 70–99)
GLUCOSE BLDC GLUCOMTR-MCNC: 172 MG/DL (ref 70–99)
GLUCOSE BLDC GLUCOMTR-MCNC: 175 MG/DL (ref 70–99)

## 2022-03-05 PROCEDURE — 250N000013 HC RX MED GY IP 250 OP 250 PS 637: Performed by: INTERNAL MEDICINE

## 2022-03-05 PROCEDURE — 99231 SBSQ HOSP IP/OBS SF/LOW 25: CPT | Performed by: INTERNAL MEDICINE

## 2022-03-05 PROCEDURE — 97530 THERAPEUTIC ACTIVITIES: CPT | Mod: GP

## 2022-03-05 PROCEDURE — 250N000013 HC RX MED GY IP 250 OP 250 PS 637: Performed by: FAMILY MEDICINE

## 2022-03-05 PROCEDURE — 97116 GAIT TRAINING THERAPY: CPT | Mod: GP

## 2022-03-05 PROCEDURE — 97530 THERAPEUTIC ACTIVITIES: CPT | Mod: GO | Performed by: OCCUPATIONAL THERAPIST

## 2022-03-05 PROCEDURE — 120N000001 HC R&B MED SURG/OB

## 2022-03-05 RX ADMIN — LISINOPRIL 2.5 MG: 2.5 TABLET ORAL at 09:17

## 2022-03-05 RX ADMIN — ACETAMINOPHEN 650 MG: 325 TABLET, FILM COATED ORAL at 09:17

## 2022-03-05 RX ADMIN — ACETAMINOPHEN 650 MG: 325 TABLET, FILM COATED ORAL at 22:00

## 2022-03-05 RX ADMIN — ATORVASTATIN CALCIUM 40 MG: 40 TABLET, FILM COATED ORAL at 09:17

## 2022-03-05 RX ADMIN — FLUTICASONE PROPIONATE 1 SPRAY: 50 SPRAY, METERED NASAL at 22:00

## 2022-03-05 RX ADMIN — APIXABAN 2.5 MG: 2.5 TABLET, FILM COATED ORAL at 09:17

## 2022-03-05 RX ADMIN — BENZONATATE 200 MG: 100 CAPSULE, LIQUID FILLED ORAL at 09:17

## 2022-03-05 RX ADMIN — BENZONATATE 200 MG: 100 CAPSULE, LIQUID FILLED ORAL at 22:00

## 2022-03-05 RX ADMIN — Medication 1 TABLET: at 09:17

## 2022-03-05 RX ADMIN — APIXABAN 2.5 MG: 2.5 TABLET, FILM COATED ORAL at 22:00

## 2022-03-05 ASSESSMENT — ACTIVITIES OF DAILY LIVING (ADL)
ADLS_ACUITY_SCORE: 7
ADLS_ACUITY_SCORE: 8
ADLS_ACUITY_SCORE: 7
ADLS_ACUITY_SCORE: 8
ADLS_ACUITY_SCORE: 8
ADLS_ACUITY_SCORE: 7
ADLS_ACUITY_SCORE: 8
ADLS_ACUITY_SCORE: 7
ADLS_ACUITY_SCORE: 7
ADLS_ACUITY_SCORE: 8
ADLS_ACUITY_SCORE: 7
ADLS_ACUITY_SCORE: 8
ADLS_ACUITY_SCORE: 7
ADLS_ACUITY_SCORE: 7

## 2022-03-05 NOTE — PLAN OF CARE
Goal Outcome Evaluation:  Pt is here for acute respiratory failure. Pt now on 8L via HFNC at 95%. Was using non-rebreather due to nasal irritation. Life 2000 not currently working for pt. MD aware, RT aware. Lungs with coarse crackles to the LLL, diminished to RLL. SOB with exertion. Per pt, O2 dropped to 78% working with PT/OT. Allevyn to coccyx CDI. Up SBA in room. Bety Grijalva RN on 3/5/2022 at 5:46 PM

## 2022-03-05 NOTE — PROGRESS NOTES
"[facility]    Medicine Progress Note - Hospitalist Service    Date of Admission:  2/6/2022    Assessment & Plan     Acute respiratory failure with hypoxia (H)  Presenting with increased dyspnea after testing positive for Covid on home testing at home  Covid test positive in the ED, requiring supplemental oxygen  Slow improvement in oxygen needs, currently at 6-7 L HFNC, but desats easily with any activity  Reviewed with pulmonology, Dr. Cortés, on 2/24/2022  Continue antibiotics, no other changes unless decompensates  Started on [employer] device which supplies PEEP, varying with FiO2 at 40%  Especially likes and does well with the 3 settings that he can adjust upward with increased dyspnea and anticipation of needs with increased activity  Getting more confident with breathing, working with PT/OT and feeling better  Continue with current plan, likely home in next few days  3/2/2022-overnight having episodes of nightmares, awoke to have his machine off with sats in the 60s.  Feeling more concerned about his safety at home.  Spoke with  and will explore other options including possible ARU  3/3/2022-Better evening last nite, after long discussion with wife, he and she are looking for discharge home. Awaiting to get assurance for payment of [employer] and to get oxygen arranged.   3/4/22- still having difficulties with nights. Currently on NRB mask to \"rest his nose\". Considering skilled nursing facility vs home.  3/5 [employer] not working. Switching back to nasal cannula today. Continue physical and occupational therapy.      Pneumonia due to 2019 novel coronavirus  Presenting with dyspnea and oxygen requirement  Chest x-ray imaging showing bilateral multifocal pneumonia consistent with Covid-19  Concern with elevated white count of a possible bacterial process,  he had been started on IV antibiotics as well, fairly broad-spectrum and that concern of some underlying hematologic " disease, these have been stopped  Has been started on dexamethasone, baricitinib, with Lovenox for clot prophylaxis  Finished 14 days of Baricitinib, 10 days of decadron   CRP elevated yesterday at 78  White blood cell count trending down, currently at 9800, most likely some underlying CLL which will need outpatient follow-up by oncology  Due to worsening sxs last week, zosyn added, was transitioned to oral augmentin, course finished and was stopped           CLL (chronic lymphocytic leukemia) (H)  Fairly markedly elevated white count, recent history of elevated leukocytosis without clear cause  Concern is a possible underlying hematologic disorder, work-up in progress  Has been seen by oncology with flow cytometry showing probable CLL  will need outpatient follow-up - plan BM biopsy on 3/10                     Diet: Moderate Consistent Carb (60 g CHO per Meal) Diet    DVT Prophylaxis: DOAC  Portillo Catheter: Not present  Central Lines: None  Cardiac Monitoring: None  Code Status: Full Code      Disposition Plan   Expected Discharge:  1-2 days   Anticipated discharge location: home with help/services    Delays:          The patient's care was discussed with the Patient.    Will Brian MD  Hospitalist Service  [facility]  Securely message with the Vocera Web Console (learn more here)  Text page via [employer] Paging/Directory         Clinically Significant Risk Factors Present on Admission                    ______________________________________________________________________    Interval History   Slept the best he's slept last night,     Data reviewed today: I reviewed all medications, new labs and imaging results over the last 24 hours. I personally reviewed no images or EKG's today.    Physical Exam   Vital Signs: Temp: 98.9  F (37.2  C) Temp src: Tympanic BP: 115/73 Pulse: 99   Resp: 22 SpO2: 96 % O2 Device: High Flow Nasal Cannula (HFNC) Oxygen Delivery: 6 LPM  Weight: 173 lbs 3.2  oz  GENERAL: Comfortable, no apparent distress.        Data   Recent Labs   Lab 03/05/22  1110 03/05/22  0737 03/04/22  2124   * 120* 136*     Medications     - MEDICATION INSTRUCTIONS -         apixaban ANTICOAGULANT  2.5 mg Oral BID     atorvastatin  40 mg Oral Daily     fluticasone  1 spray Both Nostrils Daily     lisinopril  2.5 mg Oral Daily     multivitamin w/minerals  1 tablet Oral Daily     vitamin D2  50,000 Units Oral Q7 Days

## 2022-03-05 NOTE — PROGRESS NOTES
03/05/22 1254   Signing Clinician's Name / Credentials   Signing clinician's name / credentials Jesusita Branch OTR/L    Quick Adds   Rehab Discipline OT   Functional Transfer Training   Symptoms Noted During/After Treatment fatigue;shortness of breath   Treatment Detail SBA with all bed mobility and transfers    Gait Training   Treatment Detail few steps from bed to recliner, O2 drops but recovered quickly today    [place] Level (Gait Training) stand-by assist   Physical Assistance Level (Gait Training) supervision   OT Discharge Planning   OT Discharge Recommendation (DC Rec) home with assist;home with home care occupational therapy   OT Rationale for DC Rec Pt would benefit from home care therapy    OT Brief overview of current status Pt agreeable to trasnfer to chair and wanted to ambulate short distances between chairs in his room, Pt's O2 dropped to mid 80's with this activity and recovered quickly.    Additional Documentation   OT Plan cont OT    Total Session Time   Total Session Time (minutes) 45 minutes

## 2022-03-05 NOTE — PROGRESS NOTES
SAFETY CHECKLIST  ID Bands and Risk clasps correct and in place (DNR, Fall risk, Allergy, Latex, Limb):  Yes  All Lines Reconciled and labeled correctly: Yes  Whiteboard updated:Yes  Environmental interventions (bed/chair alarm on, call light, side rails, restraints, sitter....): Yes  Verify Tele #: not on tele    Bety Grijalva RN on 3/5/2022 at 7:22 AM

## 2022-03-05 NOTE — PLAN OF CARE
Goal Outcome Evaluation:    Patient is alert, oriented and pleasant.  Is SBA with ambulation.  Reported back pain and had tylenol with relief.  Has dyspnea with exertion, continues on 15 LPM nonrebreather with sats in high 90's at rest- dip with activity, lungs faint crackles start of night and now clear, dry cough.  Heart tachy, bowel sounds active, no edema, urine straw colored.  Legs bouncy at start of night- slept well most of the night.  Katy Santoyo RN on 3/5/2022 at 6:06 AM     Plan of Care Reviewed With: patient

## 2022-03-05 NOTE — PROGRESS NOTES
03/05/22 1400   Signing Clinician's Name / Credentials   Signing clinician's name / credentials Gurjit Hamm MPT   Quick Adds   Rehab Discipline PT   Functional Transfer Training   Symptoms Noted During/After Treatment fatigue;shortness of breath   Treatment Detail SBA   Gait Training   Treatment Detail few steps from bed to recliner, O2 drops but recovered quickly today    [place] Level (Gait Training) stand-by assist   Physical Assistance Level (Gait Training) supervision   Weight Bearing (Gait Training) full weight-bearing   Assistive Device (Gait Training)   (no AD)   Pattern Analysis (Gait Training) 2-point gait   Therapeutic Activity   Treatment Detail SBA for bed mobilities   PT Discharge Planning   PT Discharge Recommendation (DC Rec) home with assist;home with home care physical therapy   PT Rationale for DC Rec to promote return to functional activity tolerance, independent and stable mobility   PT Brief overview of current status SBA for all mobilities; however, patient remains anxious with SOB.    Additional Documentation   Rehab Comments anxiety due to SOB continues to contribute to decreased activity/gait tolerance/willingness   PT Plan Continue PT   Total Session Time   Total Session Time (minutes) 30 minutes

## 2022-03-06 ENCOUNTER — DOCUMENTATION ONLY (OUTPATIENT)
Dept: HOME HEALTH SERVICES | Facility: CLINIC | Age: 62
End: 2022-03-06
Payer: COMMERCIAL

## 2022-03-06 VITALS
WEIGHT: 179 LBS | HEART RATE: 88 BPM | DIASTOLIC BLOOD PRESSURE: 67 MMHG | TEMPERATURE: 98.3 F | SYSTOLIC BLOOD PRESSURE: 114 MMHG | RESPIRATION RATE: 22 BRPM | OXYGEN SATURATION: 98 % | HEIGHT: 68 IN | BODY MASS INDEX: 27.13 KG/M2

## 2022-03-06 LAB
GLUCOSE BLDC GLUCOMTR-MCNC: 122 MG/DL (ref 70–99)
LACTATE SERPL-SCNC: 0.6 MMOL/L (ref 0.7–2)

## 2022-03-06 PROCEDURE — 250N000013 HC RX MED GY IP 250 OP 250 PS 637: Performed by: INTERNAL MEDICINE

## 2022-03-06 PROCEDURE — 250N000013 HC RX MED GY IP 250 OP 250 PS 637: Performed by: FAMILY MEDICINE

## 2022-03-06 PROCEDURE — 83605 ASSAY OF LACTIC ACID: CPT | Performed by: INTERNAL MEDICINE

## 2022-03-06 PROCEDURE — 99239 HOSP IP/OBS DSCHRG MGMT >30: CPT | Performed by: INTERNAL MEDICINE

## 2022-03-06 PROCEDURE — 36415 COLL VENOUS BLD VENIPUNCTURE: CPT | Performed by: INTERNAL MEDICINE

## 2022-03-06 RX ORDER — LORAZEPAM 0.5 MG/1
0.5 TABLET ORAL EVERY 4 HOURS PRN
Qty: 20 TABLET | Refills: 0 | Status: SHIPPED | OUTPATIENT
Start: 2022-03-06 | End: 2022-05-10

## 2022-03-06 RX ORDER — BENZONATATE 200 MG/1
200 CAPSULE ORAL 3 TIMES DAILY PRN
Qty: 90 CAPSULE | Refills: 1 | Status: SHIPPED | OUTPATIENT
Start: 2022-03-06 | End: 2022-05-24

## 2022-03-06 RX ORDER — AMOXICILLIN 250 MG
2 CAPSULE ORAL 2 TIMES DAILY PRN
Qty: 60 TABLET | Refills: 1 | Status: SHIPPED | OUTPATIENT
Start: 2022-03-06 | End: 2022-09-02

## 2022-03-06 RX ADMIN — ACETAMINOPHEN 650 MG: 325 TABLET, FILM COATED ORAL at 06:33

## 2022-03-06 RX ADMIN — ATORVASTATIN CALCIUM 40 MG: 40 TABLET, FILM COATED ORAL at 09:21

## 2022-03-06 RX ADMIN — LISINOPRIL 2.5 MG: 2.5 TABLET ORAL at 09:21

## 2022-03-06 RX ADMIN — APIXABAN 2.5 MG: 2.5 TABLET, FILM COATED ORAL at 09:21

## 2022-03-06 RX ADMIN — Medication 1 TABLET: at 09:21

## 2022-03-06 RX ADMIN — BENZONATATE 200 MG: 100 CAPSULE, LIQUID FILLED ORAL at 06:33

## 2022-03-06 ASSESSMENT — ACTIVITIES OF DAILY LIVING (ADL)
ADLS_ACUITY_SCORE: 7

## 2022-03-06 NOTE — PROGRESS NOTES
Discharge Note    Data:  Jesus Varghese discharged to home at 1500 via wheel chair. Accompanied by spouse, [employer] rep, RT, and staff.    Action:  Written discharge/follow-up instructions were provided to patient. Prescriptions sent to patients preferred pharmacy. All belongings sent with patient.  Pt set up on [employer] with rep at time of discharge. O2 98%. Home O2 delivered to pt's home. [employer] rep meeting pt and spouse at home upon discharge  Pt educated on oxygen safety. Pt had no further questions.  Pt did not meet with pharmacy at discharge. Had no questions at time of discharge for pharmacist.     Response:  Patient verbalized understanding of discharge instructions, reason for discharge, and necessary follow-up appointments.    Bety Grijalva RN on 3/6/2022 at 3:00 PM

## 2022-03-06 NOTE — PROGRESS NOTES
Claire from [employer] called back. She will be arriving to hospital in approximately 3.5 hours from now.   Dorothy Neumann RN on 3/6/2022 at 10:09 AM

## 2022-03-06 NOTE — PHARMACY - DISCHARGE MEDICATION RECONCILIATION AND EDUCATION
Pharmacy:  Discharge Counseling and Medication Reconciliation    Jesus Varghese  [address]  [place] [facility] [address]  [phone] (home)   61 year old male  PCP: Filiberto John    Allergies: Patient has no known allergies.    Discharge Counseling:    Pharmacist met with patient (and/or family) today to review the medication portion of the After Visit Summary (with an emphasis on NEW medications) and to address patient's questions/concerns.    Summary of Education: attempted to speak to patient on the phone regarding new medications x 3.  Patient did not answer.  Let nurse know, she was going to ask him if he wanted to discuss new medications but he declined. Discussed need for anticoagulation upon discharge and per MD, it is not needed.      Materials Provided:  MedCounselor sheets printed from Clinical Pharmacology on: NA patient declined    Discharge Medication Reconciliation:    It has been determined that the patient has an adequate supply of medications available or which can be obtained from the patient's preferred pharmacy, which HE/SHE has confirmed as: [facility]    Thank you for the consult.    Inocencia Harp RPH........March 6, 2022 10:27 AM

## 2022-03-06 NOTE — PLAN OF CARE
Goal Outcome Evaluation:  Pt is here with acute respiratory failure. Currently on 6L HFNC. O2 in mid 90s. Rep from [employer] on her way here to go over device with pt. Lungs dim to the bases. SOB with activity, O2 drops with activity. Dry, infrequent cough remains. HR tachy but improved. Up SBA. Once home O2 is set up and rep from [employer] [id number] has met with pt, pt discharging home with wife today. Bety Grijalva RN on 3/6/2022 at 11:52 AM    Plan of Care Reviewed With: patient     Overall Patient Progress: improving

## 2022-03-06 NOTE — PROGRESS NOTES
SAFETY CHECKLIST  ID Bands and Risk clasps correct and in place (DNR, Fall risk, Allergy, Latex, Limb):  Yes  All Lines Reconciled and labeled correctly: Yes  Whiteboard updated:Yes  Environmental interventions (bed/chair alarm on, call light, side rails, restraints, sitter....): Yes  Verify Tele #: not on tele    Bety Grijalva RN on 3/6/2022 at 7:14 AM

## 2022-03-06 NOTE — PROGRESS NOTES
I certify that this patient, Jesus Varghese has been under my care (or a nurse practitioner or physican's assistant working with me). This is the face-to-face encounter for oxygen medical necessity.      Jesus Varghese is now in a chronic stable state and continues to require supplemental oxygen. Patient has continued oxygen desaturation due to COVID-19 U07.1.    Alternative treatment(s) tried or considered and deemed clinically infective for treatment of COVID-19 U07.1 include nebulizers, inhalers, steroids, pulmonary toileting and pulmonary rehab.  If portability is ordered, is the patient mobile within the home? yes    Will Brian M.D. 3/6/2022  9:36 AM

## 2022-03-06 NOTE — PROGRESS NOTES
Per Dr. Brian. Patient can be discharged as long as home O2 is delivered and Rep from [employer] is able to meet with patient before discharge. Spoke with Russell RT he will let primary RN or charge know what time home O2 will be delivered today and we will then arrange meeting with [employer].   Dorothy Neumann RN on 3/6/2022 at 9:00 AM

## 2022-03-06 NOTE — DISCHARGE SUMMARY
"[facility]  Hospitalist Discharge Summary      Date of Admission:  2/6/2022  Date of Discharge:  3/6/2022  Discharging Provider: Will Brian MD  Discharge Service: Hospitalist Service    Discharge Diagnoses   Principal Problem:    Acute respiratory failure with hypoxia (H)  Active Problems:    CLL (chronic lymphocytic leukemia) (H)    Pneumonia due to 2019 novel coronavirus      Follow-ups Needed After Discharge   Follow-up Appointments     Follow-up and recommended labs and tests       Hospital Follow-up 3/8 at 420PM with Dr. Filiberto John                 Discharge Disposition   Discharged to home  Condition at discharge: Stable      Hospital Course      Per the H&P:  \"Jesus Varghese is a 61 year old male who presents with dyspnea.  He has been sick a few days. His granddaughter had a cough.  He tested positive for COVID at home.  Now getting more winded. Had declined COVID vaccine.  Being worked-up for new significant rise in WBC without initiation of chemotherapy at this time.\"    Acute respiratory failure with hypoxia (H)  Presenting with increased dyspnea after testing positive for Covid on home testing at home  Covid test positive in the ED, requiring supplemental oxygen  Slow improvement in oxygen needs, currently at 6-7 L HFNC, but desats easily with any activity  Reviewed with pulmonology, Dr. Cortés, on 2/24/2022  Continue antibiotics, no other changes unless decompensates  Started on Life 2000 device which supplies PEEP, varying with FiO2 at 40%  Especially likes and does well with the 3 settings that he can adjust upward with increased dyspnea and anticipation of needs with increased activity  Getting more confident with breathing, working with PT/OT and feeling better  Continue with current plan, likely home in next few days  3/2/2022-overnight having episodes of nightmares, awoke to have his machine off with sats in the 60s.  Feeling more concerned about his safety at " "home.  Spoke with  and will explore other options including possible ARU  3/3/2022-Better evening last nite, after long discussion with wife, he and she are looking for discharge home. Awaiting to get assurance for payment of Life 2000 and to get oxygen arranged.   3/4/22- still having difficulties with nights. Currently on NRB mask to \"rest his nose\". Considering skilled nursing facility vs home.  3/5 Life 2000 not working. Switching back to nasal cannula today. Continue physical and occupational therapy.   3/6 Tolerated nasal cannula, and able to get Life 2000 working for discharge today.     Pneumonia due to 2019 novel coronavirus  Presenting with dyspnea and oxygen requirement  Chest x-ray imaging showing bilateral multifocal pneumonia consistent with Covid-19  Concern with elevated white count of a possible bacterial process,  he had been started on IV antibiotics as well, fairly broad-spectrum and that concern of some underlying hematologic disease, these have been stopped  Has been started on dexamethasone, baricitinib, with Lovenox for clot prophylaxis  Finished 14 days of Baricitinib, 10 days of decadron   CRP elevated yesterday at 78  White blood cell count trending down, currently at 9800, most likely some underlying CLL which will need outpatient follow-up by oncology  Due to worsening sxs last week, zosyn added, was transitioned to oral augmentin, course finished and was stopped           CLL (chronic lymphocytic leukemia) (H)  Fairly markedly elevated white count, recent history of elevated leukocytosis without clear cause  Concern is a possible underlying hematologic disorder, work-up in progress  Has been seen by oncology with flow cytometry showing probable CLL  will need outpatient follow-up - plan BM biopsy on 3/10         Consultations This Hospital Stay   PHARMACY TO DOSE VANCO  PHYSICAL THERAPY ADULT IP CONSULT  OCCUPATIONAL THERAPY ADULT IP CONSULT  SOCIAL WORK IP " CONSULT  NUTRITION SERVICES ADULT IP CONSULT    Code Status   Full Code    Time Spent on this Encounter   I, Will Brian MD, personally saw the patient today and spent greater than 30 minutes discharging this patient.       Will Brian MD  [facility]  [address]  [place] [address]  Phone: [phone]  Fax: [phone]  ______________________________________________________________________    Physical Exam   Vital Signs: Temp: 98.3  F (36.8  C) Temp src: Tympanic BP: 114/67 Pulse: 88   Resp: 22 SpO2: 94 % O2 Device: High Flow Nasal Cannula (HFNC) Oxygen Delivery: 6 LPM  Weight: 179 lbs 0 oz  GENERAL: Comfortable, no apparent distress.    Primary Care Physician   Filiberto John    Discharge Orders      Reason for your hospital stay    COVID 19 pneumonia     Follow-up and recommended labs and tests     Hospital Follow-up 3/8 at 420PM with Dr. Filiberto John     Activity    Your activity upon discharge: activity as tolerated     Home Oxygen Order for DME - ONLY FOR DME    I, the undersigned, certify that the above prescribed supplies are medically necessary for this patient and is both reasonable and necessary in reference to accepted standards of medical and necessary in reference to accepted standards of medical practice in the treatment of this patient's condition and is not prescribed as a convenience.      Diet    As tolerated       Significant Results and Procedures   Most Recent 3 CBC's:Recent Labs   Lab Test 02/26/22  0513 02/24/22  0555 02/23/22  1717   WBC 9.9 9.8 11.3*   HGB 10.8* 10.7* 11.3*   MCV 81 81 81    171 184     Most Recent 3 BMP's:Recent Labs   Lab Test 03/06/22  0716 03/05/22  2115 03/05/22  1615 02/26/22  0751 02/26/22  0513 02/24/22  0728 02/24/22  0555 02/23/22  2101 02/23/22  1717   NA  --   --   --   --  136  --  134  --  134   POTASSIUM  --   --   --   --  4.2  --  4.0  --  4.4   CHLORIDE  --   --   --   --  97*  --  96*  --   96*   CO2  --   --   --   --  30  --  28  --  30   BUN  --   --   --   --  10  --  11  --  11   CR  --   --   --   --  0.85  --  0.90  --  0.79   ANIONGAP  --   --   --   --  9  --  10  --  8   LILIAN  --   --   --   --  9.1  --  9.2  --  9.4   * 118* 175*   < > 109*   < > 99   < > 107*    < > = values in this interval not displayed.     Most Recent 2 LFT's:Recent Labs   Lab Test 02/23/22  1717 02/21/22  0618   AST 25 34   ALT 51 79*   ALKPHOS 53 60   BILITOTAL 0.6 0.5   ,   Results for orders placed or performed during the hospital encounter of 02/06/22   XR Chest Port 1 View    Narrative    PROCEDURE INFORMATION:   Exam: XR Chest   Exam date and time: 2/6/2022 10:56 AM   Age: 61 years old   Clinical indication: Cough and shortness of breath; Patient HX: Covid19+;   Additional info: SOB, possible covid     TECHNIQUE:   Imaging protocol: XR of the chest.   Views: 1 view.     COMPARISON:   CT CHEST W CONTRAST 1/28/2022 10:07 AM     FINDINGS:   Lungs: Patchy bilateral opacities may represent multifocal pneumonia including   COVID-19..   Pleural spaces: Unremarkable. No pleural effusion. No pneumothorax.   Heart/Mediastinum: Cardiomegaly   Bones/joints: Unremarkable.       Impression    IMPRESSION:   Patchy bilateral opacities may represent multifocal pneumonia including   COVID-19..     THIS DOCUMENT HAS BEEN ELECTRONICALLY SIGNED BY JACQUELINE KNAPP MD   XR Chest Port 1 View    Narrative    PROCEDURE:  XR CHEST PORT 1 VIEW    HISTORY: COVID pn, hypoxia. .    COMPARISON:  2/6/2022    FINDINGS:    The cardiomediastinal contours are stable.  Multifocal pneumonia is worse when compared to 2/6/2022. No effusion  or pneumothorax.      Impression    IMPRESSION:  Progressive multifocal pneumonia when compared to prior.       CLARICE BORREGO MD         SYSTEM ID:  [id number]   CT Chest (PE) Abdomen Pelvis w Contrast    Narrative    CT angiogram of the chest, CT scan of the abdomen and pelvis with IV  contrast    HISTORY:  Hypoxia, chest and abdominal pain    TECHNIQUE: CT angiogram of the chest was performed with sagittal and  coronal MIPS reconstructions.    CT scan of the abdomen and pelvis was performed with IV contrast  sagittal and coronal reconstructions were obtained    FINDINGS: CT angiogram of the chest: There are no pulmonary emboli.  The heart is normal in size. The thoracic aorta appears normal. There  are widespread pulmonary parenchymal opacities consistent with  pneumonia. Enlarged hilar and mediastinal lymph nodes are no noted.  Degenerative changes are present in the thoracic spine.    CT scan of the abdomen and pelvis: The liver is free of masses or  biliary ductal enlargement. The gallbladder has been removed. The  spleen and pancreas appear normal. The adrenal glands are normal.  There is a large parapelvic cyst seen in the left kidney. There are  right cortical cysts. There is no hydronephrosis.    There are borderline sized periaortic and pericaval lymph nodes. No  intraperitoneal masses or inflammatory changes are noted. The bladder  and rectum are normal.      Impression    IMPRESSION: No pulmonary emboli.    Widespread pulmonary parenchymal opacities consistent with pneumonia.    No intra peritoneal masses or inflammatory changes are noted    ALFONSO AMAYA MD         SYSTEM ID:  [id number]       Discharge Medications   Current Discharge Medication List      START taking these medications    Details   benzonatate (TESSALON) 200 MG capsule Take 1 capsule (200 mg) by mouth 3 times daily as needed for cough  Qty: 90 capsule, Refills: 1    Associated Diagnoses: Pneumonia due to 2019 novel coronavirus      LORazepam (ATIVAN) 0.5 MG tablet Take 1 tablet (0.5 mg) by mouth every 4 hours as needed for anxiety  Qty: 20 tablet, Refills: 0    Associated Diagnoses: Pneumonia due to 2019 novel coronavirus      senna-docusate (SENOKOT-S/PERICOLACE) 8.6-50 MG tablet Take 2 tablets by mouth 2 times daily as needed for  constipation  Qty: 60 tablet, Refills: 1    Associated Diagnoses: Pneumonia due to 2019 novel coronavirus         CONTINUE these medications which have NOT CHANGED    Details   aspirin 81 MG chewable tablet Take 1 tablet (81 mg) by mouth daily with food  Qty: 90 tablet, Refills: 3    Associated Diagnoses: Controlled type 2 diabetes mellitus with complication, without long-term current use of insulin (H)      atorvastatin (LIPITOR) 40 MG tablet Take 1 tablet (40 mg) by mouth daily  Qty: 90 tablet, Refills: 3    Associated Diagnoses: Mixed hyperlipidemia      blood glucose (NO BRAND SPECIFIED) lancets standard Dispense item covered by insurance. Test blood sugar 1 times daily.  Qty: 100 each, Refills: 11    Associated Diagnoses: Controlled type 2 diabetes mellitus with complication, without long-term current use of insulin (H)      blood glucose (NO BRAND SPECIFIED) test strip Dispense item covered by insurance. Test blood sugar 1 times daily.  Qty: 100 strip, Refills: 11    Associated Diagnoses: Controlled type 2 diabetes mellitus with complication, without long-term current use of insulin (H)      blood glucose monitoring (NO BRAND SPECIFIED) meter device kit Dispense option covered by insurance. Test blood sugar 1 times daily.  Qty: 1 kit, Refills: 11    Associated Diagnoses: Controlled type 2 diabetes mellitus with complication, without long-term current use of insulin (H)      HEMP OIL OR EXTRACT OR OTHER CBD CANNABINOID, NOT MEDICAL CANNABIS, Take 4 drops by mouth At Bedtime       lisinopril (ZESTRIL) 2.5 MG tablet Take 1 tablet (2.5 mg) by mouth daily  Qty: 90 tablet, Refills: 3    Associated Diagnoses: Controlled type 2 diabetes mellitus with complication, without long-term current use of insulin (H)      metFORMIN (GLUCOPHAGE) 500 MG tablet Take 2 tablets (1,000 mg) by mouth 2 times daily (with meals)  Qty: 360 tablet, Refills: 4    Associated Diagnoses: Controlled type 2 diabetes mellitus with complication,  without long-term current use of insulin (H)      multivitamin, therapeutic (THERA-VIT) TABS tablet Take 1 tablet by mouth daily      sildenafil (REVATIO) 20 MG tablet Take 2-5 tablets ( mg) by mouth daily as needed (prior to intercourse)  Qty: 30 tablet, Refills: 11    Associated Diagnoses: Corporo-venous occlusive erectile dysfunction      Vitamin D, Cholecalciferol, 25 MCG (1000 UT) CAPS Take 1 capsule by mouth daily       zinc sulfate (ZINCATE) 220 (50 Zn) MG capsule Take 220 mg by mouth daily           Allergies   No Known Allergies

## 2022-03-06 NOTE — PROGRESS NOTES
RT contacted  [facility] to see about home o2 being delivered today. Did get in touch and was told they reached out to the patients wife to see about a time. Waiting on [id number] rep to verify device is working and that patient will be okay for ride home. Will continue to follow.     Russell Vasquez RRT

## 2022-03-06 NOTE — PLAN OF CARE
Goal Outcome Evaluation:    Patient is alert, oriented and pleasant.  SBA with mobility.  Reported back pain and had tylenol.  Dyspnea with exertion, shallow, cough- tesslon, accessory muscle use, supplemental oxygen high flow NC decreased to 6 LPM with sats 87-96% at rest- saturation decreases to 84% with teeth brushing and was able to recoup in approx 8 min.  Heart tachy, bowel sounds active, no edema, CMS/pulses intact.  Katy Santoyo RN on 3/6/2022 at 12:51 AM   Plan of Care Reviewed With: patient

## 2022-03-07 ENCOUNTER — PATIENT OUTREACH (OUTPATIENT)
Dept: CARE COORDINATION | Facility: CLINIC | Age: 62
End: 2022-03-07
Payer: COMMERCIAL

## 2022-03-07 NOTE — PROGRESS NOTES
Documentation of Face to Face and Certification for Home Health Services    I certify that patient: Jesus Varghese is under my care and that I, or a nurse practitioner or physician's assistant working with me, had a face-to-face encounter that meets the physician face-to-face encounter requirements with this patient on: 3/7/2022.    This encounter with the patient was in whole, or in part, for the following medical condition, which is the primary reason for home health care: COVID 19.    I certify that, based on my findings, the following services are medically necessary home health services: Nursing, Occupational Therapy and Physical Therapy.    My clinical findings support the need for the above services because: Nurse is needed: To provide assessment and oversight required in the home to assure adherence to the medical plan due to: Resp failure.., Occupational Therapy Services are needed to assess and treat cognitive ability and address ADL safety due to impairment in endurance and activity. and Physical Therapy Services are needed to assess and treat the following functional impairments: endurance and activity.    Further, I certify that my clinical findings support that this patient is homebound (i.e. absences from home require considerable and taxing effort and are for medical reasons or Hindu services or infrequently or of short duration when for other reasons) because: Leaving home is medically contraindicated for the following reason(s): Dyspnea on exertion that makes it so they cannot leave their home for needed services without clinical deterioration...    Based on the above findings. I certify that this patient is confined to the home and needs intermittent skilled nursing care, physical therapy and/or speech therapy.  The patient is under my care, and I have initiated the establishment of the plan of care.  This patient will be followed by a physician who will periodically review the plan of  care.  Physician/Provider to provide follow up care: Filiberto John    Attending hospital physician (the Medicare certified PECOS provider): Will Brian MD  Physician Signature: See electronic signature associated with these discharge orders.  Date: 3/7/2022  Will Brian M.D. 3/7/2022  8:41 AM

## 2022-03-07 NOTE — PROGRESS NOTES
Clinic Care Coordination Contact    Patient has PCP follow up 3/8/22.  No TCM call required per policy.  Pt has appt within 48 business hours of discharge.   Ariadna Torres RN ,....................  3/7/2022   8:07 AM

## 2022-03-08 NOTE — PROGRESS NOTES
:     Accessed chart to fax over face to face to [facility].     HEATHER Pelayo on 3/8/2022 at 8:23 AM

## 2022-03-09 ENCOUNTER — TELEPHONE (OUTPATIENT)
Dept: PEDIATRICS | Facility: OTHER | Age: 62
End: 2022-03-09
Payer: COMMERCIAL

## 2022-03-09 DIAGNOSIS — U07.1 COVID: Primary | ICD-10-CM

## 2022-03-09 DIAGNOSIS — U07.1 PNEUMONIA DUE TO 2019 NOVEL CORONAVIRUS: Primary | ICD-10-CM

## 2022-03-09 DIAGNOSIS — J12.82 PNEUMONIA DUE TO 2019 NOVEL CORONAVIRUS: Primary | ICD-10-CM

## 2022-03-09 NOTE — TELEPHONE ENCOUNTER
Called Nicole and verified name and date of birth of patient. She reports she needs a simple face mask for his oxygen. She needs an order for this. Pending order.    Carolann Amaya RN on 3/9/2022 at 4:24 PM

## 2022-03-09 NOTE — TELEPHONE ENCOUNTER
Please let patient know that if he needs a mask for his oxygen cannula system then he needs to contact [facility] which is supplying his oxygen.  If he is looking for a mask to go with the Life 2000 then he needs to call Ms. rOtiz who has worked with him on this.

## 2022-03-09 NOTE — TELEPHONE ENCOUNTER
Patients nose is plugged, He just got out the hospital and would like a oxy mask so he can breath better..  Please call    Vicki Powers on 3/9/2022 at 3:31 PM

## 2022-03-10 ENCOUNTER — TELEPHONE (OUTPATIENT)
Dept: PEDIATRICS | Facility: OTHER | Age: 62
End: 2022-03-10
Payer: COMMERCIAL

## 2022-03-10 NOTE — TELEPHONE ENCOUNTER
Dr John,     Just wanted to touch base since the patient was discharged to home and home care started with therapy and skilled nursing.     He is using an oxygen concentrator/ventilator at home called Life 2000. Liter flow is 8 lpm continuous and if client is sitting, his 02 sats are normally at 90 % or above. He has the ability to adjust his setting (on the ventilator) for increased activity with therapy but wanted to let you know the 02 sats drop into the 70's and 80's with 2-5  minute recovery time will come back up into the upper 80's and 90% range. Is this OK to continue with therapy/activity to tolerance?. Will continue to work with patient and update if any further decline. Please advise if any changes or updates.     Thanks for your time,   Alyssa Gore RN

## 2022-03-10 NOTE — TELEPHONE ENCOUNTER
"URGENT: per CMS best practice, response is requested within 24 hours.    Home Care regulation mandates that you are notified about drug discrepancies, interactions & contraindications. Response within a 24 hour timeframe is established by CMS as \"best practice\" for the delivery of home health care. Home Care is required to report if the 24 hour timeframe was met. The home health clinician will contact you again if this timeframe is not met or if the response does not address all concerns.       Situation:        The patient was admitted to [facility], after being discharged from [facility] on 3/6/22. Upon admission, a med reconciliation was completed to identify any drug discrepancies, interactions or contraindications. Home Care's drug regime review has revealed significant medication issues.     You are being contacted for clarifying orders related to the medication issues.     Background: Noted with review of epic medications he following discrepancies and interactions as follows:       Assessment:   Interactions  Moderate Level  1. Adult aspirin/ Lisinopril  2. Lisinopril/ Metformin    Discrepancies  1. Please add- Tylenol ES 2 tabs every 4-6 hrs as needed for fever or pain. (not to exceed greater than 4,000 mg in 24 hrs)  2. Metformin- client is taking 1 tab (500 mg) twice daily po for diabetes management, (not 2 tabs) Pleas update list.         Recommendations:    Please evaluate this information and indicate below whether or not changes are required. A copy of the patient's drug interaction/contraindications report is available upon request.       CLINIC NURSE - If changes are made, please update the Epic medication profile.     Please sign this encounter with your electronic signature.         "

## 2022-03-11 NOTE — TELEPHONE ENCOUNTER
Left message with below information. Told patient if he was having trouble breathing he should be seen in the emergency department.     Марина Tan LPN....3/11/2022 4:32 PM

## 2022-03-11 NOTE — TELEPHONE ENCOUNTER
If he is worsening he should be seen.    Signed, Filiberto John MD, FAAP, FACP  Internal Medicine & Pediatrics

## 2022-03-11 NOTE — TELEPHONE ENCOUNTER
Deb Karimi, OT for Home Care called. She saw patient this morning and stated that he is still having trouble breathing. Pt is very congested. He is on oxygen through nasal cannula and a Life 200 ventilator system. Patient's O2 sats was 97% this morning, he dropped to 83% with activity yesterday, on Wednesday physical therapy noted he was 78% with standing but he recovered to 92% within 2 minutes. Patient states he has tried tessalon pearls and a nasal rinse to clear his passages. Patient and his wife are wondering if they can do anything else to help with congestion and sinuses? He is having a productive cough at times also. Please advise.     Марина Tan LPN .......  3/11/2022  2:19 PM

## 2022-03-11 NOTE — TELEPHONE ENCOUNTER
After verifying last name and  patients wife was notifed of the below information.   Violet Calixto LPN on 3/11/2022 at 2:41 PM

## 2022-03-17 ENCOUNTER — TELEPHONE (OUTPATIENT)
Dept: PEDIATRICS | Facility: OTHER | Age: 62
End: 2022-03-17
Payer: COMMERCIAL

## 2022-03-17 NOTE — TELEPHONE ENCOUNTER
Patient's wife, Nicole, called wondering what patient is being seen for tomorrow. Confirmed patient's name and date of birth. Told her what appointment was scheduled for. She verbalized agreement.    Марина Tan LPN .......  3/17/2022  4:01 PM

## 2022-03-23 ENCOUNTER — MYC MEDICAL ADVICE (OUTPATIENT)
Dept: PEDIATRICS | Facility: OTHER | Age: 62
End: 2022-03-23
Payer: COMMERCIAL

## 2022-03-25 ENCOUNTER — HOSPITAL ENCOUNTER (OUTPATIENT)
Dept: GENERAL RADIOLOGY | Facility: OTHER | Age: 62
Discharge: HOME | End: 2022-03-25
Attending: INTERNAL MEDICINE
Payer: COMMERCIAL

## 2022-03-25 ENCOUNTER — OFFICE VISIT (OUTPATIENT)
Dept: PEDIATRICS | Facility: OTHER | Age: 62
End: 2022-03-25
Attending: INTERNAL MEDICINE
Payer: COMMERCIAL

## 2022-03-25 VITALS
OXYGEN SATURATION: 97 % | WEIGHT: 168 LBS | TEMPERATURE: 99.1 F | SYSTOLIC BLOOD PRESSURE: 134 MMHG | HEART RATE: 118 BPM | DIASTOLIC BLOOD PRESSURE: 70 MMHG | RESPIRATION RATE: 24 BRPM | BODY MASS INDEX: 25.54 KG/M2

## 2022-03-25 DIAGNOSIS — U07.1 PNEUMONIA DUE TO 2019 NOVEL CORONAVIRUS: ICD-10-CM

## 2022-03-25 DIAGNOSIS — J12.82 PNEUMONIA DUE TO 2019 NOVEL CORONAVIRUS: ICD-10-CM

## 2022-03-25 DIAGNOSIS — Z09 HOSPITAL DISCHARGE FOLLOW-UP: Primary | ICD-10-CM

## 2022-03-25 LAB
BASE EXCESS BLDA CALC-SCNC: 6.4 MMOL/L (ref -9–1.8)
BASOPHILS # BLD AUTO: 0.1 10E3/UL (ref 0–0.2)
BASOPHILS NFR BLD AUTO: 1 %
EOSINOPHIL # BLD AUTO: 0.5 10E3/UL (ref 0–0.7)
EOSINOPHIL NFR BLD AUTO: 3 %
ERYTHROCYTE [DISTWIDTH] IN BLOOD BY AUTOMATED COUNT: 15.9 % (ref 10–15)
HCO3 BLD-SCNC: 33 MMOL/L (ref 21–28)
HCT VFR BLD AUTO: 37.9 % (ref 40–53)
HGB BLD-MCNC: 12 G/DL (ref 13.3–17.7)
HOLD SPECIMEN: NORMAL
IMM GRANULOCYTES # BLD: 0.1 10E3/UL
IMM GRANULOCYTES NFR BLD: 1 %
LYMPHOCYTES # BLD AUTO: 6.5 10E3/UL (ref 0.8–5.3)
LYMPHOCYTES NFR BLD AUTO: 38 %
MCH RBC QN AUTO: 26.3 PG (ref 26.5–33)
MCHC RBC AUTO-ENTMCNC: 31.7 G/DL (ref 31.5–36.5)
MCV RBC AUTO: 83 FL (ref 78–100)
MONOCYTES # BLD AUTO: 1.3 10E3/UL (ref 0–1.3)
MONOCYTES NFR BLD AUTO: 8 %
NEUTROPHILS # BLD AUTO: 8.8 10E3/UL (ref 1.6–8.3)
NEUTROPHILS NFR BLD AUTO: 49 %
NRBC # BLD AUTO: 0 10E3/UL
NRBC BLD AUTO-RTO: 0 /100
O2/TOTAL GAS SETTING VFR VENT: 6 %
OXYHGB MFR BLD: 99 % (ref 92–100)
PCO2 BLD: 52 MM HG (ref 35–45)
PH BLD: 7.4 [PH] (ref 7.35–7.45)
PLATELET # BLD AUTO: 312 10E3/UL (ref 150–450)
PO2 BLD: 139 MM HG (ref 80–105)
RBC # BLD AUTO: 4.56 10E6/UL (ref 4.4–5.9)
WBC # BLD AUTO: 17.3 10E3/UL (ref 4–11)

## 2022-03-25 PROCEDURE — 82805 BLOOD GASES W/O2 SATURATION: CPT | Mod: ZL | Performed by: INTERNAL MEDICINE

## 2022-03-25 PROCEDURE — 36600 WITHDRAWAL OF ARTERIAL BLOOD: CPT | Mod: ZL | Performed by: INTERNAL MEDICINE

## 2022-03-25 PROCEDURE — 85025 COMPLETE CBC W/AUTO DIFF WBC: CPT | Mod: ZL | Performed by: INTERNAL MEDICINE

## 2022-03-25 PROCEDURE — 99214 OFFICE O/P EST MOD 30 MIN: CPT | Performed by: INTERNAL MEDICINE

## 2022-03-25 PROCEDURE — 36415 COLL VENOUS BLD VENIPUNCTURE: CPT | Mod: ZL | Performed by: INTERNAL MEDICINE

## 2022-03-25 PROCEDURE — 71046 X-RAY EXAM CHEST 2 VIEWS: CPT

## 2022-03-25 ASSESSMENT — ANXIETY QUESTIONNAIRES
5. BEING SO RESTLESS THAT IT IS HARD TO SIT STILL: NOT AT ALL
3. WORRYING TOO MUCH ABOUT DIFFERENT THINGS: NOT AT ALL
IF YOU CHECKED OFF ANY PROBLEMS ON THIS QUESTIONNAIRE, HOW DIFFICULT HAVE THESE PROBLEMS MADE IT FOR YOU TO DO YOUR WORK, TAKE CARE OF THINGS AT HOME, OR GET ALONG WITH OTHER PEOPLE: NOT DIFFICULT AT ALL
6. BECOMING EASILY ANNOYED OR IRRITABLE: NOT AT ALL
1. FEELING NERVOUS, ANXIOUS, OR ON EDGE: NOT AT ALL
2. NOT BEING ABLE TO STOP OR CONTROL WORRYING: NOT AT ALL
GAD7 TOTAL SCORE: 0
7. FEELING AFRAID AS IF SOMETHING AWFUL MIGHT HAPPEN: NOT AT ALL

## 2022-03-25 ASSESSMENT — PATIENT HEALTH QUESTIONNAIRE - PHQ9
SUM OF ALL RESPONSES TO PHQ QUESTIONS 1-9: 0
5. POOR APPETITE OR OVEREATING: NOT AT ALL

## 2022-03-25 ASSESSMENT — PAIN SCALES - GENERAL: PAINLEVEL: NO PAIN (0)

## 2022-03-25 NOTE — NURSING NOTE
"Patient presents to the clinic for Hospital Follow up.    FOOD SECURITY SCREENING QUESTIONS:    The next two questions are to help us understand your food security.  If you are feeling you need any assistance in this area, we have resources available to support you today.    Hunger Vital Signs:  Within the past 12 months we worried whether our food would run out before we got money to buy more. Never  Within the past 12 months the food we bought just didn't last and we didn't have money to get more. Never    Advance Care Directive on file? no  Advance Care Directive provided to patient? Yes.  Chief Complaint   Patient presents with     Hospital F/U       Initial /70 (BP Location: Left arm, Patient Position: Sitting, Cuff Size: Adult Regular)   Pulse 118   Temp 99.1  F (37.3  C) (Tympanic)   Resp 24   Wt 76.2 kg (168 lb)   SpO2 97%   BMI 25.54 kg/m   Estimated body mass index is 25.54 kg/m  as calculated from the following:    Height as of 2/6/22: 1.727 m (5' 8\").    Weight as of this encounter: 76.2 kg (168 lb).  Medication Reconciliation: complete        Claire Young LPN       "

## 2022-03-25 NOTE — PATIENT INSTRUCTIONS
-- Continue supplemental oxygen   -- Continue Life 2000   -- Nasal saline irrigation and Flonase for sinus irritation   -- Lab and x-ray today   -- 6 min walk   -- Schedule Pulmonary consult   -- Consider COVID vaccine   -- Follow-up with Dr. Stark     How to get your COVID-19 vaccine  COVID-19 vaccine is free    1. From [facility] is offering the Pfizer brand of the COVID-19 vaccine for all patients 5 and older. Booster doses are available to patients ages 12+ who received their second dose of either Moderna or Pfizer at least 5 months ago and for patients who received the Yunile & Yuniel vaccine at least 2 months ago. Boosters are also available to immunocompromised children ages 5-11 at least 28 days after receiving the second dose. All booster vaccines are Pfizer. 4th dose boosters are available to immunocompromised patients only at this time. In the coming weeks, we will be scheduling 4th doses for ages 65+ and will advertise at that time.    The quickest way for patients to schedule a vaccine appointment is on [employer], but you can also schedule an appointment by calling our appointment line at [phone] weekdays 7:00am - 5:00pm.     Our current COVID-19 vaccination hours are:  Ages 5-11  Tuesdays, Thursdays, Fridays from 3:00pm - 4:30pm  Ages 12+   Monday-Friday from 10:00am - 3:00pm    Thank you for choosing [facility].  -[facility]       2. From [place] [facility], pharmacy or other clinic  Who currently qualifies?    12+    See the FAQ at [place]://[place].gov/[place]id19/vaccine/find-vaccine/community-vaccination-program/faq.jsp    How to schedule?    Schedule on-line via Vaccine Connector at https://vaccineconnector.[place].gov/)    Call [phone] or toll free at [phone]

## 2022-03-25 NOTE — PROGRESS NOTES
Assessment & Plan   1. Hospital discharge follow-up  2. Pneumonia due to 2019 novel coronavirus  This has been a very significant illness for been.  He continues to require significant respiratory support with 6 L of supplemental oxygen at rest and the life 2000 bilevel technology with exertion and rest.  I see no evidence for hypercapnia on previous blood gases.  We discussed follow-up testing and decided on CBC, blood gas as well as chest x-ray.  I believe his chest x-ray will likely reveal bilateral patchy opacities consistent with his previous CT scan findings.  I expect he will have a quite prolonged course.  He and his wife are wondering why his white blood cell count improved so drastically, they think maybe he did not have cancer and it was an infection treated by the antibiotics.  I believe it is likely more that he has leukemia and/or lymphoma which was partially treated by high-dose steroids used to treat COVID-19.  - XR Chest 2 Views; Future  - 6 minute walk test; Future  - Blood Gas,Arterial; Future  - CBC with Platelets & Differential; Future  - Adult Pulmonary Medicine Referral; Future  - CBC with Platelets & Differential  - Blood Gas,Arterial      Patient Instructions    -- Continue supplemental oxygen   -- Continue Life 2000   -- Nasal saline irrigation and Flonase for sinus irritation   -- Lab and x-ray today   -- 6 min walk   -- Schedule Pulmonary consult   -- Consider COVID vaccine   -- Follow-up with Dr. Stark     How to get your COVID-19 vaccine  COVID-19 vaccine is free    1. From [facility] is offering the Pfizer brand of the COVID-19 vaccine for all patients 5 and older. Booster doses are available to patients ages 12+ who received their second dose of either Moderna or Pfizer at least 5 months ago and for patients who received the Yuniel & Yuniel vaccine at least 2 months ago. Boosters are also available to immunocompromised children ages 5-11 at least 28 days after  receiving the second dose. All booster vaccines are Pfizer. 4th dose boosters are available to immunocompromised patients only at this time. In the coming weeks, we will be scheduling 4th doses for ages 65+ and will advertise at that time.    The quickest way for patients to schedule a vaccine appointment is on [employer], but you can also schedule an appointment by calling our appointment line at [phone] weekdays 7:00am - 5:00pm.     Our current COVID-19 vaccination hours are:  Ages 5-11  Tuesdays, Thursdays, Fridays from 3:00pm - 4:30pm  Ages 12+   Monday-Friday from 10:00am - 3:00pm    Thank you for choosing [facility].  -[facility]       2. From [place] [facility], pharmacy or other clinic  Who currently qualifies?    12+    See the FAQ at [place]://[place].gov/[place]/vaccine/find-vaccine/community-vaccination-program/faq.jsp    How to schedule?    Schedule on-line via Vaccine Connector at https://vaccineconnector.[place].gov/)    Call [phone] or toll free at [phone]        Return if symptoms worsen or fail to improve.    Signed, Filiberto John MD, FAAP, FACP  Internal Medicine & Pediatrics    Subjective   Jesus Varghese is a 61 year old male who presents for hospital discharge follow-up.  He recently spent 30 days admitted to the [facility], discharging on March 6, 2022.  Hospitalization was for respiratory failure from COVID-19 infection.  Required intensive oxygen therapy although no intubation was required.  Was discharged with life 2000 and has been using that intermittently at home.  Has been checking his home oxygen level which has remained between 95 and 97%.  Currently using 6 L at rest.  Blood sugar has been 120-125 at home.  He has lost a lot of weight with his illness and would like to stop his Metformin.  They are wondering if we can check an x-ray to make sure the life 2000 is working correctly.  The last time he was on the life 2000 was for a  nap 2-1/2 hours prior to this visit.    Objective   Vitals: /70 (BP Location: Left arm, Patient Position: Sitting, Cuff Size: Adult Regular)   Pulse 118   Temp 99.1  F (37.3  C) (Tympanic)   Resp 24   Wt 76.2 kg (168 lb)   SpO2 97%   BMI 25.54 kg/m      HEENT: Supplemental oxygen in place  Cardiovascular: Tachycardic, regular  Pulmonary: Clear, distant    Review and Analysis of Data   I personally reviewed the following:  External notes: No  Results: Yes Laboratory data from hospital reviewed, imaging reports from hospital reviewed  Use of an independent historian: Yes I spoke with his wife  Independent review of a test performed by another physician: Yes I reviewed CT image  Discussion of management with another physician: No  High risk of morbidity from additional diagnostic testing and/or treatment.

## 2022-03-26 ENCOUNTER — HEALTH MAINTENANCE LETTER (OUTPATIENT)
Age: 62
End: 2022-03-26

## 2022-03-26 ASSESSMENT — ANXIETY QUESTIONNAIRES: GAD7 TOTAL SCORE: 0

## 2022-03-29 NOTE — TELEPHONE ENCOUNTER
Dr John reviewed and completed the following home care or hospice form(s) for Jesus Varghese on 03/29/2022   This covers the certification period effective 03/09/2022 to 05/07/2022.  Nirmala Sabillon LPN on 3/29/2022 at 4:35 PM

## 2022-04-06 ENCOUNTER — TELEPHONE (OUTPATIENT)
Dept: PEDIATRICS | Facility: OTHER | Age: 62
End: 2022-04-06

## 2022-04-06 NOTE — TELEPHONE ENCOUNTER
May from [facility] called to check status of a fax they sent 03/22/2022 for life 200 ventilation system.     Please contact Paula anderson at [phone]    Sara Estevez on 4/6/2022 at 9:41 AM

## 2022-04-06 NOTE — TELEPHONE ENCOUNTER
Left message that I was not able to find fax, or form.   Teagan Hernandez LPN .............4/6/2022     1:23 PM

## 2022-04-12 ENCOUNTER — MYC MEDICAL ADVICE (OUTPATIENT)
Dept: PEDIATRICS | Facility: OTHER | Age: 62
End: 2022-04-12
Payer: COMMERCIAL

## 2022-04-12 ENCOUNTER — HOSPITAL ENCOUNTER (OUTPATIENT)
Dept: RESPIRATORY THERAPY | Facility: OTHER | Age: 62
Discharge: HOME | End: 2022-04-12
Attending: INTERNAL MEDICINE | Admitting: INTERNAL MEDICINE
Payer: COMMERCIAL

## 2022-04-12 DIAGNOSIS — J12.82 PNEUMONIA DUE TO 2019 NOVEL CORONAVIRUS: ICD-10-CM

## 2022-04-12 DIAGNOSIS — U07.1 PNEUMONIA DUE TO 2019 NOVEL CORONAVIRUS: ICD-10-CM

## 2022-04-12 LAB
6 MIN WALK (FT): NORMAL
6 MIN WALK (M): NORMAL

## 2022-04-12 PROCEDURE — 94618 PULMONARY STRESS TESTING: CPT

## 2022-04-12 PROCEDURE — 999N000157 HC STATISTIC RCP TIME EA 10 MIN

## 2022-04-14 ENCOUNTER — TELEPHONE (OUTPATIENT)
Dept: PEDIATRICS | Facility: OTHER | Age: 62
End: 2022-04-14
Payer: COMMERCIAL

## 2022-04-14 DIAGNOSIS — U07.1 PNEUMONIA DUE TO 2019 NOVEL CORONAVIRUS: Primary | ICD-10-CM

## 2022-04-14 DIAGNOSIS — J12.82 PNEUMONIA DUE TO 2019 NOVEL CORONAVIRUS: Primary | ICD-10-CM

## 2022-04-14 NOTE — TELEPHONE ENCOUNTER
Plan to discharge from homecare 4/29/22 and transition to outpatient PT at [facility].  Outpatient PT orders are started.

## 2022-04-16 DIAGNOSIS — J12.82 PNEUMONIA DUE TO 2019 NOVEL CORONAVIRUS: ICD-10-CM

## 2022-04-16 DIAGNOSIS — U07.1 PNEUMONIA DUE TO 2019 NOVEL CORONAVIRUS: ICD-10-CM

## 2022-04-18 NOTE — TELEPHONE ENCOUNTER
[facility] sent Rx request for the following:      LORazepam 0.5 MG Oral Tablet      Last Prescription Date:   3/6/2022  Last Fill Qty/Refills:         20, R-0    Last Office Visit:              3/25/2022   Future Office visit:           5/9/2022    Routing refill request to provider for review/approval because:  Drug not on the [facility] or [facility] refill protocol or controlled substance    David Nance RN, BSN  ....................  4/18/2022   3:19 PM

## 2022-04-19 RX ORDER — LORAZEPAM 0.5 MG/1
TABLET ORAL
Qty: 20 TABLET | Refills: 0 | OUTPATIENT
Start: 2022-04-19

## 2022-04-19 NOTE — TELEPHONE ENCOUNTER
I would not recommend long term use of lorazepam.    Signed, Filiberto John MD, FAAP, FACP  Internal Medicine & Pediatrics

## 2022-04-19 NOTE — TELEPHONE ENCOUNTER
Called patient and after verifying patients name and date of birth. Relayed providers response. Patient reported he still has two left. He reports when going to appointments it takes the edge off. Did not want to make an appointment at this time but will if he finds he wants to look into this further with provider. Jana Coto RN  ....................  4/19/2022   10:46 AM

## 2022-04-26 DIAGNOSIS — E11.8 CONTROLLED TYPE 2 DIABETES MELLITUS WITH COMPLICATION, WITHOUT LONG-TERM CURRENT USE OF INSULIN (H): ICD-10-CM

## 2022-04-28 RX ORDER — BLOOD SUGAR DIAGNOSTIC
STRIP MISCELLANEOUS
Qty: 100 STRIP | Refills: 3 | Status: SHIPPED | OUTPATIENT
Start: 2022-04-28

## 2022-04-28 NOTE — TELEPHONE ENCOUNTER
[facility] sent Rx request for the following:      Requested Prescriptions   Pending Prescriptions Disp Refills     ACCU-CHEK GUIDE test strip [Pharmacy Med Name: Accu-Chek Guide In Vitro Strip]  0     Sig: USE 1 STRIP ONCE DAILY     Last Prescription Date:   3/8/21  Last Fill Qty/Refills:         100, R-11    Last Office Visit:              3/25/22   Future Office visit:           5/9/22  Unable to complete prescription refill per RN Medication Refill Policy.   Carolann Amaya RN on 4/28/2022 at 8:35 AM

## 2022-05-03 ENCOUNTER — HOSPITAL ENCOUNTER (OUTPATIENT)
Dept: PHYSICAL THERAPY | Facility: OTHER | Age: 62
Setting detail: THERAPIES SERIES
Discharge: HOME | End: 2022-05-03
Attending: INTERNAL MEDICINE
Payer: COMMERCIAL

## 2022-05-03 DIAGNOSIS — J12.82 PNEUMONIA DUE TO 2019 NOVEL CORONAVIRUS: ICD-10-CM

## 2022-05-03 DIAGNOSIS — U07.1 PNEUMONIA DUE TO 2019 NOVEL CORONAVIRUS: ICD-10-CM

## 2022-05-03 PROCEDURE — 97110 THERAPEUTIC EXERCISES: CPT | Mod: GP | Performed by: PHYSICAL THERAPIST

## 2022-05-03 PROCEDURE — 97162 PT EVAL MOD COMPLEX 30 MIN: CPT | Mod: GP | Performed by: PHYSICAL THERAPIST

## 2022-05-04 NOTE — PROGRESS NOTES
05/03/22 1300   Quick Adds   Type of Visit Initial OP PT Evaluation   General Information   Start of Care Date 05/03/22   Referring Physician Dr. Filiberto John   Orders Evaluate and Treat as Indicated   Order Date 04/14/22   Medical Diagnosis Pneumonia due to 2019 novel coronavirus   Onset of illness/injury or Date of Surgery 02/06/22   Precautions/Limitations oxygen therapy device and L/min  (2-4L/min depending on activity levels)   Surgical/Medical history reviewed Yes   Pertinent history of current problem (include personal factors and/or comorbidities that impact the POC) Feels like strength is ok, endurance needs work. Still on 2-4 L/min O2.Wants to get back to riding motorcycle, getting it out of the shed is difficult. Is retired, likes to travel. Takes care of lawn, [place] he [place] and likes walking on beach. Has an airdyne at home, rides that for 15 minutes while watching TV. Stairs on deck with railing into house, winded by the time he gets to the top, a little tired, carries his O2 tank with him. No issues getting dressed, has a bench for shower but now can stand for entire shower. No issues getting in and out of SUV, a little harder to get out of low riding car. Difficulty getting out of bed in the morning, states once he gets moving he feels better. Prior to COVID he was independent and had no difficulties. Feels like arms a still weak. STates it's a struggle to get up off the floor if he gets down.  states he was hospitalized with COVID-19 from 2/6/22-3/6/22. He then went home on 8L/min O2, had homecare and feels he has made great strides. Still gets SOB easily and O2 sats drop, lowest they went was 50's and he had walked a lot, showered, changed and felt tired.   Prior level of functional mobility Transfers;Ambulation;ADL   Transfers independent and painfree, did not take any rests   Ambulation independent and no rest required, unlimited walking   ADL did not need rest breaks  or O2   Prior level of function comment  states he did not use oxygen prior to being sick, did not need to sit and rest, was very active   Previous/Current Treatment Physical Therapy   Improvement after PT Significant   Current Community Support Family/friend caregiver   Patient role/Employment history Retired   Living environment [facility]   Home/Community Accessibility Comments reports steps up to deck to get into house, railing is intact and good condition, no stairs in house.   Assistive Devices Comments 2-4L/min O2 depending on activity levels   Patient/Family Goals Statement Be able to get off oxygen, be independent with yard cares, be able to ride motorcycle.   General Information Comments Wife is Nicole   Fall Risk Screen   Fall screen completed by PT   Have you fallen 2 or more times in the past year? No   Have you fallen and had an injury in the past year? No   Is patient a fall risk? No   Abuse Screen (yes response referral indicated)   Feels Unsafe at Home or Work/School no   Feels Threatened by Someone no   Does Anyone Try to Keep You From Having Contact with Others or Doing Things Outside Your Home? no   Physical Signs of Abuse Present no   Patient needs abuse support services and resources No   Pain   Patient currently in pain No   Vitals Signs   Heart Rate 95   SpO2 95   Vital Signs Comments SPO2 dropped to 87% after 5 sit to stands without UE A, took approx 1:35 to return to 90%   Cognitive Status Examination   Orientation orientation to person, place and time   Level of Consciousness alert   Follows Commands and Answers Questions 100% of the time   Personal Safety and Judgment intact   Memory intact   Integumentary   Integumentary No deficits were identified   Posture   Posture Normal   Strength   Manual Muscle Testing Quick Adds MMT: Shoulder;MMT: Hip;MMT: Knee;MMT: Ankle   MMT: Shoulder, Rehab Eval   Shoulder Flexion - Left Side (3+/5) fair plus, left   Shoulder Flexion - Right Side (4/5)  good, right   MMT: Hip, Rehab Eval   Hip Flexion - Left Side (4-/5) good minus, left   Hip Internal Rotation - Left Side (3+/5) fair plus, left   Hip External Rotation - Left Side (4-/5) good minus, left   Hip Flexion - Right Side (4-/5) good minus, right   Hip Internal Rotation - Right Side (3+/5) fair plus, right   Hip External Rotation - Right Side (4-/5) good minus, right   MMT: Knee, Rehab Eval   Knee Flexion - Left Side (4/5) good, left   Knee Extension - Left Side (4/5) good, left   Knee Flexion - Right Side (4/5) good, right   Knee Extension - Right Side (4/5) good, right   MMT: Ankle, Rehab Eval   Ankle Dorsiflexion - Left Side (4/5) good, left   Ankle Dorsiflexion - Right Side (4/5) good, right   Transfer Skills   Transfer Comments sit to stand without UE A, wide [id number]   Gait   Gait Comments Wide base of support, pulling O2 tank with him, slow pace, B trendelenburg   Balance   Balance Comments Able to maintain SLS 10 seconds B, romberg EC at least 10 seconds on firm surface   Balance Special Tests   Balance Special Tests Earl balance;Single leg stance right;Single leg stance left;Sharpened Romberg;Romberg   Balance Special Tests Earl Balance   Score out of 56 50/56   Balance Special Tests Single Leg Stance Right,   Right, seconds 10 Seconds   Balance Special Tests Single Leg Stance Left   Left, seconds 10 Seconds   Balance Special Tests Romberg   Seconds 30 Seconds   Comments EC, firm surface   Balance Special Tests Sharpened Romberg   Comments able to obtain and maintain tandem for 30 seconds with each LE leading   Sensory Examination   Sensory Perception Comments reports neuropathy in feet, decreased sensation   Planned Therapy Interventions   Planned Therapy Interventions balance training;gait training;joint mobilization;motor coordination training;neuromuscular re-education;ROM;strengthening;stretching;transfer training;manual therapy   Planned Therapy Interventions Comment TE, TA   Clinical Impression    Criteria for Skilled Therapeutic Interventions Met yes, treatment indicated   PT Diagnosis impaired endurance, UE and LE weakness, impaired gait   Influenced by the following impairments weakness and decreased endurance secondary to post COVID-19 impaired lung capacity   Functional limitations due to impairments impaired functional endurance for gait and independence   Clinical Presentation Evolving/Changing   Clinical Presentation Rationale clinical judgement, prolonged hospital stay, continues to require moderate levels of supplemental O2   Clinical Decision Making (Complexity) Moderate complexity   Therapy Frequency   (up to 16 visits)   Predicted Duration of Therapy Intervention (days/wks) 8 weeks   Risk & Benefits of therapy have been explained Yes   Patient, Family & other staff in agreement with plan of care Yes   Clinical Impression Comments  is a 60 yo retired male who presents with his wife Nicole for assessment due to impaired endurance s/p COVID-19 infection with subsequent pneumonia and prolonged hospital stay. He reports at rest he can be on 2-3L/min O2, however with activity he needs to bump it to 4L/min O2 and will still drop into the low 80's or upper 70's for O2 sats, monitors himself on pulse oxymeter at home. Would like to be able to get back to independently performing household and yard chores without needing rest, riding motorcycle and not needing to rest after walking for 5 minutes or less.   Education Assessment   Preferred Learning Style Listening;Demonstration;Pictures/video   Barriers to Learning No barriers   GOALS   PT Eval Goals 1;2;3;4;5;6   Goal 1   Goal Identifier testing   Goal Description  will tolerate completing 6 minute walk test safely to facilitate baseline score for endurance.   Target Date 06/01/22   Goal 2   Goal Identifier gait   Goal Description  will be able to ambulate for at least 7 minutes without rest and without SPO2 sats dropping below 85% to facilitate  improved endurance for independence walking in the yard without difficulty.   Target Date 06/15/22   Goal 3   Goal Identifier transfers   Goal Description  will be able to complete at least 10 sit to stand transfers without UE A and without SPO2 dropping below 90% for improved lung capacity and LE strength for independence with transfers without difficulty.   Target Date 06/29/22   Goal 4   Goal Identifier transfers   Goal Description  will be able to get up from the floor without intermediary support surface without SPO2 dropping below 90% for improved lung capacity as well as independence for household and yard chores for safety and independence.   Target Date 06/29/22   Goal 5   Goal Identifier strength   Goal Description  will have at least 4/5 LE strength to facilitate improved endurance and ability to transfer without assist or fatigue.   Target Date 06/01/22   Goal 6   Goal Identifier endurance   Goal Description  will be able to complete at least 550 meters on 6 minute walk test to faciltiate return to community ambulation with minimal difficulty.   Target Date 06/29/22   Total Evaluation Time   PT Eval, Moderate Complexity Minutes (64240) 40

## 2022-05-06 ENCOUNTER — HOSPITAL ENCOUNTER (OUTPATIENT)
Dept: PHYSICAL THERAPY | Facility: OTHER | Age: 62
Setting detail: THERAPIES SERIES
Discharge: HOME | End: 2022-05-06
Attending: INTERNAL MEDICINE
Payer: COMMERCIAL

## 2022-05-06 PROCEDURE — 97110 THERAPEUTIC EXERCISES: CPT | Mod: GP | Performed by: PHYSICAL THERAPIST

## 2022-05-09 ENCOUNTER — ALLIED HEALTH/NURSE VISIT (OUTPATIENT)
Dept: FAMILY MEDICINE | Facility: OTHER | Age: 62
End: 2022-05-09
Attending: INTERNAL MEDICINE
Payer: COMMERCIAL

## 2022-05-09 DIAGNOSIS — Z85.038 HISTORY OF MALIGNANT NEOPLASM OF LARGE INTESTINE: ICD-10-CM

## 2022-05-09 PROCEDURE — U0005 INFEC AGEN DETEC AMPLI PROBE: HCPCS | Mod: ZL

## 2022-05-09 PROCEDURE — C9803 HOPD COVID-19 SPEC COLLECT: HCPCS

## 2022-05-09 NOTE — PROGRESS NOTES
Patient here today for Covid test.     Flavia Alvarenga CNA .............................on 5/9/2022 at 9:50 AM

## 2022-05-10 ENCOUNTER — HOSPITAL ENCOUNTER (OUTPATIENT)
Dept: PHYSICAL THERAPY | Facility: OTHER | Age: 62
Setting detail: THERAPIES SERIES
Discharge: HOME | End: 2022-05-10
Attending: INTERNAL MEDICINE
Payer: COMMERCIAL

## 2022-05-10 ENCOUNTER — OFFICE VISIT (OUTPATIENT)
Dept: FAMILY MEDICINE | Facility: OTHER | Age: 62
End: 2022-05-10
Attending: FAMILY MEDICINE
Payer: COMMERCIAL

## 2022-05-10 VITALS
DIASTOLIC BLOOD PRESSURE: 64 MMHG | RESPIRATION RATE: 20 BRPM | HEART RATE: 81 BPM | SYSTOLIC BLOOD PRESSURE: 120 MMHG | OXYGEN SATURATION: 98 % | HEIGHT: 66 IN | WEIGHT: 180 LBS | BODY MASS INDEX: 28.93 KG/M2 | TEMPERATURE: 97.3 F

## 2022-05-10 DIAGNOSIS — Z01.818 PRE-OP EXAM: ICD-10-CM

## 2022-05-10 DIAGNOSIS — C91.10 CLL (CHRONIC LYMPHOCYTIC LEUKEMIA) (H): Primary | ICD-10-CM

## 2022-05-10 LAB — SARS-COV-2 RNA RESP QL NAA+PROBE: NEGATIVE

## 2022-05-10 PROCEDURE — 97110 THERAPEUTIC EXERCISES: CPT | Mod: GP | Performed by: PHYSICAL THERAPIST

## 2022-05-10 PROCEDURE — 99214 OFFICE O/P EST MOD 30 MIN: CPT | Performed by: FAMILY MEDICINE

## 2022-05-10 ASSESSMENT — PAIN SCALES - GENERAL: PAINLEVEL: NO PAIN (0)

## 2022-05-10 NOTE — NURSING NOTE
Patient here for preop for bone marrow biopsy with  at [facility]. Medication Reconciliation: complete.    Pauline Dela Cruz LPN  5/10/2022 3:24 PM

## 2022-05-10 NOTE — PROGRESS NOTES
[facility]  [address]  [place] [address]  Phone: [phone]  Fax: [phone]  Primary Provider: Filiberto John  Pre-op Performing Provider: MARTA LOPEZ      PREOPERATIVE EVALUATION:  Today's date: 5/10/2022    Jesus Varghese is a 61 year old male who presents for a preoperative evaluation.    Surgical Information:  Surgery/Procedure: Bone Marrow Biopsy   Surgery Location: [facility]  Surgeon:    Surgery Date: 05/12/2022  Time of Surgery: 10:00 am   Where patient plans to recover: At home with family  Fax number for surgical facility:     Type of Anesthesia Anticipated: to be determined    Assessment & Plan     The proposed surgical procedure is considered LOW risk.    CLL (chronic lymphocytic leukemia) (H)      Pre-op exam             Risks and Recommendations:  The patient has the following additional risks and recommendations for perioperative complications:   - No identified additional risk factors other than previously addressed    Medication Instructions:  asa    RECOMMENDATION:  APPROVAL GIVEN to proceed with proposed procedure, without further diagnostic evaluation.                  Patient arrives here for preop.  He will be undergoing bone marrow biopsy    Subjective     HPI related to upcoming procedure: Scheduled on Thursday.  Patient has a history of CLL.    Preop Questions 5/10/2022   1. Have you ever had a heart attack or stroke? No   2. Have you ever had surgery on your heart or blood vessels, such as a stent placement, a coronary artery bypass, or surgery on an artery in your head, neck, heart, or legs? No   3. Do you have chest pain with activity? No   4. Do you have a history of  heart failure? No   5. Do you currently have a cold, bronchitis or symptoms of other infection? No   6. Do you have a cough, shortness of breath, or wheezing? YES - post covid 02/06/2022   7. Do you or anyone in your family have previous history of blood clots?  No   8. Do you or does anyone in your family have a serious bleeding problem such as prolonged bleeding following surgeries or cuts? No   9. Have you ever had problems with anemia or been told to take iron pills? No   10. Have you had any abnormal blood loss such as black, tarry or bloody stools? No   11. Have you ever had a blood transfusion? No   11a. Have you ever had a transfusion reaction? -   12. Are you willing to have a blood transfusion if it is medically needed before, during, or after your surgery? Yes   13. Have you or any of your relatives ever had problems with anesthesia? No   14. Do you have sleep apnea, excessive snoring or daytime drowsiness? No   15. Do you have any artifical heart valves or other implanted medical devices like a pacemaker, defibrillator, or continuous glucose monitor? No   16. Do you have artificial joints? No   17. Are you allergic to latex? No       Health Care Directive:  Patient does not have a Health Care Directive or Living Will: Discussed advance care planning with patient; however, patient declined at this time.    Preoperative Review of :   reviewed - controlled substances reflected in medication list.          Review of Systems  CONSTITUTIONAL: NEGATIVE for fever, chills, 50 lb wt loss while in hospital   ENT/MOUTH: NEGATIVE for ear, mouth and throat problems  CV: NEGATIVE for chest pain, palpitations or peripheral edema    Patient Active Problem List    Diagnosis Date Noted     Acute respiratory failure with hypoxia (H) 02/06/2022     Priority: Medium     CLL (chronic lymphocytic leukemia) (H) 02/06/2022     Priority: Medium     Pneumonia due to 2019 novel coronavirus 02/06/2022     Priority: Medium     Pneumonia of both lungs due to infectious organism 02/06/2022     Priority: Medium     Seborrheic keratoses 07/15/2019     Priority: Medium     Malignant neoplasm of colon, unspecified part of colon (H) 11/20/2018     Priority: Medium     Chemotherapy-induced  neuropathy (H) 11/20/2018     Priority: Medium     S/P partial colectomy 11/20/2018     Priority: Medium     History of nephrolithiasis 11/20/2018     Priority: Medium     Elevated LFTs 11/20/2018     Priority: Medium     Controlled type 2 diabetes mellitus with complication, without long-term current use of insulin (H) 11/20/2018     Priority: Medium     Non morbid obesity due to excess calories 11/20/2018     Priority: Medium     History of malignant neoplasm of large intestine 01/26/2018     Priority: Medium     Overview:   stage lll, T3N1MO       Carpal tunnel syndrome, bilateral 01/26/2018     Priority: Medium     Inflammatory liver disease 01/26/2018     Priority: Medium     Overview:   in 1983 after tattoo has been vaccinated for Hepatitis B since. He did have   marked jaundice and fatigue during that illness       Status post tendon repair 01/14/2016     Priority: Medium     Rupture of left distal biceps tendon 01/07/2016     Priority: Medium     Ureterolithiasis 02/11/2015     Priority: Medium     Senile cataract 08/23/2011     Priority: Medium     Hypertriglyceridemia 08/23/2011     Priority: Medium      Past Medical History:   Diagnosis Date     Age-related cataract     No Comments Provided     Calculus of kidney     No Comments Provided     Carpal tunnel syndrome     No Comments Provided     CLL (chronic lymphocytic leukemia) (H) 2/6/2022     Diverticulosis of intestine without perforation or abscess without bleeding     mild     Dorsalgia     No Comments Provided     Inflammatory liver disease     ? cause 1982     Malignant neoplasm of colon (H)     2006     Other specified postprocedural states     1/14/2016     Pure hyperglyceridemia     No Comments Provided     Strain of muscle, fascia and tendon of other parts of biceps, left arm, initial encounter     1/7/2016     Past Surgical History:   Procedure Laterality Date     COLON SURGERY      8/22/06,Sigmoid colectomy for [facility] C2 adenocarcinoma      COLONOSCOPY      9/07/07,next due in 2010     COLONOSCOPY      08/30/2010,F/U 2015     COLONOSCOPY  10/19/2015    10/19/15,F/U 2020     EXTRACAPSULAR CATARACT EXTRATION WITH INTRAOCULAR LENS IMPLANT      2011, 2012,Bilateral cataracts R 2011.. L 2012     LAPAROSCOPIC CHOLECYSTECTOMY      04/24/07     OTHER SURGICAL HISTORY      10/11/06,679757,PORTACATH,Placement of Port-A-Cath, right anterior chest, for chemotherapy     OTHER SURGICAL HISTORY      08/31/2010,,HERNIA REPAIR,Mesh Underlay     OTHER SURGICAL HISTORY      2/12/15,037522,IR URETERAL STENT LEFT     OTHER SURGICAL HISTORY      1/14/2016,[id number],OTHER,Left,arthrex equipment used     TONSILLECTOMY, ADENOIDECTOMY, COMBINED      age 1     VASECTOMY       x2, spontaneous reconnected, so needed 2nd procedure     Current Outpatient Medications   Medication Sig Dispense Refill     ACCU-CHEK GUIDE test strip USE 1 STRIP ONCE DAILY 100 strip 3     aspirin 81 MG chewable tablet Take 1 tablet (81 mg) by mouth daily with food 90 tablet 3     atorvastatin (LIPITOR) 40 MG tablet Take 1 tablet (40 mg) by mouth daily 90 tablet 3     benzonatate (TESSALON) 200 MG capsule Take 1 capsule (200 mg) by mouth 3 times daily as needed for cough 90 capsule 1     blood glucose (NO BRAND SPECIFIED) lancets standard Dispense item covered by insurance. Test blood sugar 1 times daily. 100 each 11     blood glucose monitoring (NO BRAND SPECIFIED) meter device kit Dispense option covered by insurance. Test blood sugar 1 times daily. 1 kit 11     HEMP OIL OR EXTRACT OR OTHER CBD CANNABINOID, NOT MEDICAL CANNABIS, Take 4 drops by mouth At Bedtime        lisinopril (ZESTRIL) 2.5 MG tablet Take 1 tablet (2.5 mg) by mouth daily 90 tablet 3     multivitamin, therapeutic (THERA-VIT) TABS tablet Take 1 tablet by mouth daily       senna-docusate (SENOKOT-S/PERICOLACE) 8.6-50 MG tablet Take 2 tablets by mouth 2 times daily as needed for constipation 60 tablet 1     Vitamin D, Cholecalciferol,  "25 MCG (1000 UT) CAPS Take 1 capsule by mouth daily        zinc sulfate (ZINCATE) 220 (50 Zn) MG capsule Take 220 mg by mouth daily       LORazepam (ATIVAN) 0.5 MG tablet Take 1 tablet (0.5 mg) by mouth every 4 hours as needed for anxiety (Patient not taking: Reported on 5/10/2022) 20 tablet 0     sildenafil (REVATIO) 20 MG tablet Take 2-5 tablets ( mg) by mouth daily as needed (prior to intercourse) 30 tablet 11       No Known Allergies     Social History     Tobacco Use     Smoking status: Former Smoker     Packs/day: 2.00     Years: 30.00     Pack years: 60.00     Types: Cigarettes     Quit date: 2010     Years since quittin.8     Smokeless tobacco: Never Used   Substance Use Topics     Alcohol use: Not Currently     Comment: rare     Family History   Problem Relation Age of Onset     Hypertension Mother         Hypertension     Heart Disease Father         Heart Disease,40s and 50s     Heart Disease Other         Heart Disease,40s and 50s     Anesthesia Reaction No family hx of         Anesthesia Problem     Other - See Comments No family hx of         Stroke     Blood Disease No family hx of         Blood Disease     Bleeding Diathesis No family hx of      History   Drug Use Unknown         Objective     /64   Pulse 81   Temp 97.3  F (36.3  C)   Resp 20   Ht 1.683 m (5' 6.25\")   Wt 81.6 kg (180 lb)   SpO2 98%   BMI 28.83 kg/m      Physical Exam  GENERAL APPEARANCE: healthy, alert and no distress  HENT: ear canals and TM's normal and nose and mouth without ulcers or lesions  RESP: lungs clear to auscultation - no rales, rhonchi or wheezes  CV: regular rate and rhythm, normal S1 S2, no S3 or S4 and no murmur, click or rub   ABDOMEN: soft, nontender, no HSM or masses and bowel sounds normal  NEURO: Normal strength and tone, sensory exam grossly normal, mentation intact and speech normal    Recent Labs   Lab Test 22  1616 22  0513 22  0555 22  1310 22  1416 " 12/14/20  1029   HGB 12.0* 10.8* 10.7*   < > 12.4*  --     167 171   < > 164  --    NA  --  136 134   < > 138 138   POTASSIUM  --  4.2 4.0   < > 4.6 4.5   CR  --  0.85 0.90   < > 0.92 1.13   A1C  --   --   --   --  6.6* 6.1*    < > = values in this interval not displayed.        Diagnostics:  No labs were ordered during this visit.   No EKG required for low risk surgery (cataract, skin procedure, breast biopsy, etc).    Revised Cardiac Risk Index (RCRI):  The patient has the following serious cardiovascular risks for perioperative complications:   - No serious cardiac risks = 0 points     RCRI Interpretation: 0 points: Class I (very low risk - 0.4% complication rate)           Signed Electronically by: Urbano Denson MD  Copy of this evaluation report is provided to requesting physician.

## 2022-05-10 NOTE — H&P (VIEW-ONLY)
[facility]  [address]  [place] [address]  Phone: [phone]  Fax: [phone]  Primary Provider: Filiberto John  Pre-op Performing Provider: MARTA LOPEZ      PREOPERATIVE EVALUATION:  Today's date: 5/10/2022    Jesus Varghese is a 61 year old male who presents for a preoperative evaluation.    Surgical Information:  Surgery/Procedure: Bone Marrow Biopsy   Surgery Location: [facility]  Surgeon:    Surgery Date: 05/12/2022  Time of Surgery: 10:00 am   Where patient plans to recover: At home with family  Fax number for surgical facility:     Type of Anesthesia Anticipated: to be determined    Assessment & Plan     The proposed surgical procedure is considered LOW risk.    CLL (chronic lymphocytic leukemia) (H)      Pre-op exam             Risks and Recommendations:  The patient has the following additional risks and recommendations for perioperative complications:   - No identified additional risk factors other than previously addressed    Medication Instructions:  asa    RECOMMENDATION:  APPROVAL GIVEN to proceed with proposed procedure, without further diagnostic evaluation.                  Patient arrives here for preop.  He will be undergoing bone marrow biopsy    Subjective     HPI related to upcoming procedure: Scheduled on Thursday.  Patient has a history of CLL.    Preop Questions 5/10/2022   1. Have you ever had a heart attack or stroke? No   2. Have you ever had surgery on your heart or blood vessels, such as a stent placement, a coronary artery bypass, or surgery on an artery in your head, neck, heart, or legs? No   3. Do you have chest pain with activity? No   4. Do you have a history of  heart failure? No   5. Do you currently have a cold, bronchitis or symptoms of other infection? No   6. Do you have a cough, shortness of breath, or wheezing? YES - post covid 02/06/2022   7. Do you or anyone in your family have previous history of blood clots?  No   8. Do you or does anyone in your family have a serious bleeding problem such as prolonged bleeding following surgeries or cuts? No   9. Have you ever had problems with anemia or been told to take iron pills? No   10. Have you had any abnormal blood loss such as black, tarry or bloody stools? No   11. Have you ever had a blood transfusion? No   11a. Have you ever had a transfusion reaction? -   12. Are you willing to have a blood transfusion if it is medically needed before, during, or after your surgery? Yes   13. Have you or any of your relatives ever had problems with anesthesia? No   14. Do you have sleep apnea, excessive snoring or daytime drowsiness? No   15. Do you have any artifical heart valves or other implanted medical devices like a pacemaker, defibrillator, or continuous glucose monitor? No   16. Do you have artificial joints? No   17. Are you allergic to latex? No       Health Care Directive:  Patient does not have a Health Care Directive or Living Will: Discussed advance care planning with patient; however, patient declined at this time.    Preoperative Review of :   reviewed - controlled substances reflected in medication list.          Review of Systems  CONSTITUTIONAL: NEGATIVE for fever, chills, 50 lb wt loss while in hospital   ENT/MOUTH: NEGATIVE for ear, mouth and throat problems  CV: NEGATIVE for chest pain, palpitations or peripheral edema    Patient Active Problem List    Diagnosis Date Noted     Acute respiratory failure with hypoxia (H) 02/06/2022     Priority: Medium     CLL (chronic lymphocytic leukemia) (H) 02/06/2022     Priority: Medium     Pneumonia due to 2019 novel coronavirus 02/06/2022     Priority: Medium     Pneumonia of both lungs due to infectious organism 02/06/2022     Priority: Medium     Seborrheic keratoses 07/15/2019     Priority: Medium     Malignant neoplasm of colon, unspecified part of colon (H) 11/20/2018     Priority: Medium     Chemotherapy-induced  neuropathy (H) 11/20/2018     Priority: Medium     S/P partial colectomy 11/20/2018     Priority: Medium     History of nephrolithiasis 11/20/2018     Priority: Medium     Elevated LFTs 11/20/2018     Priority: Medium     Controlled type 2 diabetes mellitus with complication, without long-term current use of insulin (H) 11/20/2018     Priority: Medium     Non morbid obesity due to excess calories 11/20/2018     Priority: Medium     History of malignant neoplasm of large intestine 01/26/2018     Priority: Medium     Overview:   stage lll, T3N1MO       Carpal tunnel syndrome, bilateral 01/26/2018     Priority: Medium     Inflammatory liver disease 01/26/2018     Priority: Medium     Overview:   in 1983 after tattoo has been vaccinated for Hepatitis B since. He did have   marked jaundice and fatigue during that illness       Status post tendon repair 01/14/2016     Priority: Medium     Rupture of left distal biceps tendon 01/07/2016     Priority: Medium     Ureterolithiasis 02/11/2015     Priority: Medium     Senile cataract 08/23/2011     Priority: Medium     Hypertriglyceridemia 08/23/2011     Priority: Medium      Past Medical History:   Diagnosis Date     Age-related cataract     No Comments Provided     Calculus of kidney     No Comments Provided     Carpal tunnel syndrome     No Comments Provided     CLL (chronic lymphocytic leukemia) (H) 2/6/2022     Diverticulosis of intestine without perforation or abscess without bleeding     mild     Dorsalgia     No Comments Provided     Inflammatory liver disease     ? cause 1982     Malignant neoplasm of colon (H)     2006     Other specified postprocedural states     1/14/2016     Pure hyperglyceridemia     No Comments Provided     Strain of muscle, fascia and tendon of other parts of biceps, left arm, initial encounter     1/7/2016     Past Surgical History:   Procedure Laterality Date     COLON SURGERY      8/22/06,Sigmoid colectomy for [facility] C2 adenocarcinoma      COLONOSCOPY      9/07/07,next due in 2010     COLONOSCOPY      08/30/2010,F/U 2015     COLONOSCOPY  10/19/2015    10/19/15,F/U 2020     EXTRACAPSULAR CATARACT EXTRATION WITH INTRAOCULAR LENS IMPLANT      2011, 2012,Bilateral cataracts R 2011.. L 2012     LAPAROSCOPIC CHOLECYSTECTOMY      04/24/07     OTHER SURGICAL HISTORY      10/11/06,[id number],PORTACATH,Placement of Port-A-Cath, right anterior chest, for chemotherapy     OTHER SURGICAL HISTORY      08/31/2010,,HERNIA REPAIR,Mesh Underlay     OTHER SURGICAL HISTORY      2/12/15,[id number],IR URETERAL STENT LEFT     OTHER SURGICAL HISTORY      1/14/2016,[id number],OTHER,Left,arthrex equipment used     TONSILLECTOMY, ADENOIDECTOMY, COMBINED      age 1     VASECTOMY       x2, spontaneous reconnected, so needed 2nd procedure     Current Outpatient Medications   Medication Sig Dispense Refill     ACCU-CHEK GUIDE test strip USE 1 STRIP ONCE DAILY 100 strip 3     aspirin 81 MG chewable tablet Take 1 tablet (81 mg) by mouth daily with food 90 tablet 3     atorvastatin (LIPITOR) 40 MG tablet Take 1 tablet (40 mg) by mouth daily 90 tablet 3     benzonatate (TESSALON) 200 MG capsule Take 1 capsule (200 mg) by mouth 3 times daily as needed for cough 90 capsule 1     blood glucose (NO BRAND SPECIFIED) lancets standard Dispense item covered by insurance. Test blood sugar 1 times daily. 100 each 11     blood glucose monitoring (NO BRAND SPECIFIED) meter device kit Dispense option covered by insurance. Test blood sugar 1 times daily. 1 kit 11     HEMP OIL OR EXTRACT OR OTHER CBD CANNABINOID, NOT MEDICAL CANNABIS, Take 4 drops by mouth At Bedtime        lisinopril (ZESTRIL) 2.5 MG tablet Take 1 tablet (2.5 mg) by mouth daily 90 tablet 3     multivitamin, therapeutic (THERA-VIT) TABS tablet Take 1 tablet by mouth daily       senna-docusate (SENOKOT-S/PERICOLACE) 8.6-50 MG tablet Take 2 tablets by mouth 2 times daily as needed for constipation 60 tablet 1     Vitamin D, Cholecalciferol,  "25 MCG (1000 UT) CAPS Take 1 capsule by mouth daily        zinc sulfate (ZINCATE) 220 (50 Zn) MG capsule Take 220 mg by mouth daily       LORazepam (ATIVAN) 0.5 MG tablet Take 1 tablet (0.5 mg) by mouth every 4 hours as needed for anxiety (Patient not taking: Reported on 5/10/2022) 20 tablet 0     sildenafil (REVATIO) 20 MG tablet Take 2-5 tablets ( mg) by mouth daily as needed (prior to intercourse) 30 tablet 11       No Known Allergies     Social History     Tobacco Use     Smoking status: Former Smoker     Packs/day: 2.00     Years: 30.00     Pack years: 60.00     Types: Cigarettes     Quit date: 2010     Years since quittin.8     Smokeless tobacco: Never Used   Substance Use Topics     Alcohol use: Not Currently     Comment: rare     Family History   Problem Relation Age of Onset     Hypertension Mother         Hypertension     Heart Disease Father         Heart Disease,40s and 50s     Heart Disease Other         Heart Disease,40s and 50s     Anesthesia Reaction No family hx of         Anesthesia Problem     Other - See Comments No family hx of         Stroke     Blood Disease No family hx of         Blood Disease     Bleeding Diathesis No family hx of      History   Drug Use Unknown         Objective     /64   Pulse 81   Temp 97.3  F (36.3  C)   Resp 20   Ht 1.683 m (5' 6.25\")   Wt 81.6 kg (180 lb)   SpO2 98%   BMI 28.83 kg/m      Physical Exam  GENERAL APPEARANCE: healthy, alert and no distress  HENT: ear canals and TM's normal and nose and mouth without ulcers or lesions  RESP: lungs clear to auscultation - no rales, rhonchi or wheezes  CV: regular rate and rhythm, normal S1 S2, no S3 or S4 and no murmur, click or rub   ABDOMEN: soft, nontender, no HSM or masses and bowel sounds normal  NEURO: Normal strength and tone, sensory exam grossly normal, mentation intact and speech normal    Recent Labs   Lab Test 22  1616 22  0513 22  0555 22  1310 22  1416 " 12/14/20  1029   HGB 12.0* 10.8* 10.7*   < > 12.4*  --     167 171   < > 164  --    NA  --  136 134   < > 138 138   POTASSIUM  --  4.2 4.0   < > 4.6 4.5   CR  --  0.85 0.90   < > 0.92 1.13   A1C  --   --   --   --  6.6* 6.1*    < > = values in this interval not displayed.        Diagnostics:  No labs were ordered during this visit.   No EKG required for low risk surgery (cataract, skin procedure, breast biopsy, etc).    Revised Cardiac Risk Index (RCRI):  The patient has the following serious cardiovascular risks for perioperative complications:   - No serious cardiac risks = 0 points     RCRI Interpretation: 0 points: Class I (very low risk - 0.4% complication rate)           Signed Electronically by: Urbano Denson MD  Copy of this evaluation report is provided to requesting physician.

## 2022-05-11 ENCOUNTER — ANESTHESIA EVENT (OUTPATIENT)
Dept: SURGERY | Facility: OTHER | Age: 62
End: 2022-05-11
Payer: COMMERCIAL

## 2022-05-12 ENCOUNTER — HOSPITAL ENCOUNTER (OUTPATIENT)
Facility: OTHER | Age: 62
Discharge: HOME | End: 2022-05-12
Attending: PATHOLOGY | Admitting: PATHOLOGY
Payer: COMMERCIAL

## 2022-05-12 ENCOUNTER — ANESTHESIA (OUTPATIENT)
Dept: SURGERY | Facility: OTHER | Age: 62
End: 2022-05-12
Payer: COMMERCIAL

## 2022-05-12 VITALS
BODY MASS INDEX: 28.93 KG/M2 | HEIGHT: 66 IN | RESPIRATION RATE: 10 BRPM | TEMPERATURE: 97.6 F | WEIGHT: 180 LBS | OXYGEN SATURATION: 97 % | DIASTOLIC BLOOD PRESSURE: 87 MMHG | HEART RATE: 84 BPM | SYSTOLIC BLOOD PRESSURE: 121 MMHG

## 2022-05-12 DIAGNOSIS — C91.10 CLL (CHRONIC LYMPHOCYTIC LEUKEMIA) (H): Primary | ICD-10-CM

## 2022-05-12 LAB — GLUCOSE BLDC GLUCOMTR-MCNC: 114 MG/DL (ref 70–99)

## 2022-05-12 PROCEDURE — 88341 IMHCHEM/IMCYTCHM EA ADD ANTB: CPT

## 2022-05-12 PROCEDURE — 38222 DX BONE MARROW BX & ASPIR: CPT | Performed by: NURSE ANESTHETIST, CERTIFIED REGISTERED

## 2022-05-12 PROCEDURE — 88305 TISSUE EXAM BY PATHOLOGIST: CPT

## 2022-05-12 PROCEDURE — 88311 DECALCIFY TISSUE: CPT

## 2022-05-12 PROCEDURE — 250N000011 HC RX IP 250 OP 636: Performed by: NURSE ANESTHETIST, CERTIFIED REGISTERED

## 2022-05-12 PROCEDURE — 88264 CHROMOSOME ANALYSIS 20-25: CPT

## 2022-05-12 PROCEDURE — 85025 COMPLETE CBC W/AUTO DIFF WBC: CPT

## 2022-05-12 PROCEDURE — 360N000075 HC SURGERY LEVEL 2, PER MIN: Performed by: PATHOLOGY

## 2022-05-12 PROCEDURE — 370N000017 HC ANESTHESIA TECHNICAL FEE, PER MIN: Performed by: PATHOLOGY

## 2022-05-12 PROCEDURE — 999N000141 HC STATISTIC PRE-PROCEDURE NURSING ASSESSMENT: Performed by: PATHOLOGY

## 2022-05-12 PROCEDURE — 88237 TISSUE CULTURE BONE MARROW: CPT

## 2022-05-12 PROCEDURE — 88272 CYTOGENETICS 3-5: CPT

## 2022-05-12 PROCEDURE — 85045 AUTOMATED RETICULOCYTE COUNT: CPT

## 2022-05-12 PROCEDURE — 258N000003 HC RX IP 258 OP 636: Performed by: NURSE ANESTHETIST, CERTIFIED REGISTERED

## 2022-05-12 PROCEDURE — 88184 FLOWCYTOMETRY/ TC 1 MARKER: CPT

## 2022-05-12 PROCEDURE — 88313 SPECIAL STAINS GROUP 2: CPT

## 2022-05-12 PROCEDURE — 710N000012 HC RECOVERY PHASE 2, PER MINUTE: Performed by: PATHOLOGY

## 2022-05-12 PROCEDURE — 88342 IMHCHEM/IMCYTCHM 1ST ANTB: CPT

## 2022-05-12 PROCEDURE — 88271 CYTOGENETICS DNA PROBE: CPT

## 2022-05-12 PROCEDURE — 88285 CHROMOSOME COUNT ADDITIONAL: CPT

## 2022-05-12 PROCEDURE — 82962 GLUCOSE BLOOD TEST: CPT

## 2022-05-12 PROCEDURE — 88360 TUMOR IMMUNOHISTOCHEM/MANUAL: CPT

## 2022-05-12 PROCEDURE — 250N000009 HC RX 250: Performed by: NURSE ANESTHETIST, CERTIFIED REGISTERED

## 2022-05-12 PROCEDURE — 88185 FLOWCYTOMETRY/TC ADD-ON: CPT

## 2022-05-12 RX ORDER — LIDOCAINE 40 MG/G
CREAM TOPICAL
Status: DISCONTINUED | OUTPATIENT
Start: 2022-05-12 | End: 2022-05-12 | Stop reason: HOSPADM

## 2022-05-12 RX ORDER — NALOXONE HYDROCHLORIDE 0.4 MG/ML
0.2 INJECTION, SOLUTION INTRAMUSCULAR; INTRAVENOUS; SUBCUTANEOUS
Status: DISCONTINUED | OUTPATIENT
Start: 2022-05-12 | End: 2022-05-12 | Stop reason: HOSPADM

## 2022-05-12 RX ORDER — ONDANSETRON 4 MG/1
4 TABLET, ORALLY DISINTEGRATING ORAL EVERY 30 MIN PRN
Status: DISCONTINUED | OUTPATIENT
Start: 2022-05-12 | End: 2022-05-12 | Stop reason: HOSPADM

## 2022-05-12 RX ORDER — KETAMINE HYDROCHLORIDE 10 MG/ML
INJECTION INTRAMUSCULAR; INTRAVENOUS PRN
Status: DISCONTINUED | OUTPATIENT
Start: 2022-05-12 | End: 2022-05-12

## 2022-05-12 RX ORDER — NALOXONE HYDROCHLORIDE 0.4 MG/ML
0.4 INJECTION, SOLUTION INTRAMUSCULAR; INTRAVENOUS; SUBCUTANEOUS
Status: DISCONTINUED | OUTPATIENT
Start: 2022-05-12 | End: 2022-05-12 | Stop reason: HOSPADM

## 2022-05-12 RX ORDER — SODIUM CHLORIDE, SODIUM LACTATE, POTASSIUM CHLORIDE, CALCIUM CHLORIDE 600; 310; 30; 20 MG/100ML; MG/100ML; MG/100ML; MG/100ML
INJECTION, SOLUTION INTRAVENOUS CONTINUOUS
Status: DISCONTINUED | OUTPATIENT
Start: 2022-05-12 | End: 2022-05-12 | Stop reason: HOSPADM

## 2022-05-12 RX ORDER — LIDOCAINE HYDROCHLORIDE 10 MG/ML
8-10 INJECTION, SOLUTION EPIDURAL; INFILTRATION; INTRACAUDAL; PERINEURAL
Status: DISCONTINUED | OUTPATIENT
Start: 2022-05-12 | End: 2022-05-12 | Stop reason: HOSPADM

## 2022-05-12 RX ORDER — ONDANSETRON 2 MG/ML
4 INJECTION INTRAMUSCULAR; INTRAVENOUS EVERY 30 MIN PRN
Status: DISCONTINUED | OUTPATIENT
Start: 2022-05-12 | End: 2022-05-12 | Stop reason: HOSPADM

## 2022-05-12 RX ORDER — HYDROMORPHONE HYDROCHLORIDE 1 MG/ML
0.4 INJECTION, SOLUTION INTRAMUSCULAR; INTRAVENOUS; SUBCUTANEOUS EVERY 5 MIN PRN
Status: DISCONTINUED | OUTPATIENT
Start: 2022-05-12 | End: 2022-05-12 | Stop reason: HOSPADM

## 2022-05-12 RX ORDER — FENTANYL CITRATE 50 UG/ML
50 INJECTION, SOLUTION INTRAMUSCULAR; INTRAVENOUS EVERY 5 MIN PRN
Status: DISCONTINUED | OUTPATIENT
Start: 2022-05-12 | End: 2022-05-12 | Stop reason: HOSPADM

## 2022-05-12 RX ORDER — PROPOFOL 10 MG/ML
INJECTION, EMULSION INTRAVENOUS CONTINUOUS PRN
Status: DISCONTINUED | OUTPATIENT
Start: 2022-05-12 | End: 2022-05-12

## 2022-05-12 RX ORDER — FENTANYL CITRATE 50 UG/ML
25 INJECTION, SOLUTION INTRAMUSCULAR; INTRAVENOUS
Status: DISCONTINUED | OUTPATIENT
Start: 2022-05-12 | End: 2022-05-12 | Stop reason: HOSPADM

## 2022-05-12 RX ORDER — SODIUM CHLORIDE, SODIUM LACTATE, POTASSIUM CHLORIDE, CALCIUM CHLORIDE 600; 310; 30; 20 MG/100ML; MG/100ML; MG/100ML; MG/100ML
INJECTION, SOLUTION INTRAVENOUS CONTINUOUS PRN
Status: DISCONTINUED | OUTPATIENT
Start: 2022-05-12 | End: 2022-05-12

## 2022-05-12 RX ORDER — MEPERIDINE HYDROCHLORIDE 50 MG/ML
12.5 INJECTION INTRAMUSCULAR; INTRAVENOUS; SUBCUTANEOUS
Status: DISCONTINUED | OUTPATIENT
Start: 2022-05-12 | End: 2022-05-12 | Stop reason: HOSPADM

## 2022-05-12 RX ORDER — OXYCODONE HYDROCHLORIDE 5 MG/1
5 TABLET ORAL EVERY 4 HOURS PRN
Status: DISCONTINUED | OUTPATIENT
Start: 2022-05-12 | End: 2022-05-12 | Stop reason: HOSPADM

## 2022-05-12 RX ADMIN — PROPOFOL 150 MCG/KG/MIN: 10 INJECTION, EMULSION INTRAVENOUS at 10:55

## 2022-05-12 RX ADMIN — SODIUM CHLORIDE, POTASSIUM CHLORIDE, SODIUM LACTATE AND CALCIUM CHLORIDE 100 ML/HR: 600; 310; 30; 20 INJECTION, SOLUTION INTRAVENOUS at 10:26

## 2022-05-12 RX ADMIN — Medication 15 MG: at 10:55

## 2022-05-12 RX ADMIN — SODIUM CHLORIDE, SODIUM LACTATE, POTASSIUM CHLORIDE, AND CALCIUM CHLORIDE: 600; 310; 30; 20 INJECTION, SOLUTION INTRAVENOUS at 10:55

## 2022-05-12 NOTE — OP NOTE
Bone Marrow Note:     INDICATION:  CLL    The risks and benefit of the procedure were explained to the patient and questions were answered. Consent was obtained.     [place] protocol was followed. TIME OUT conducted just prior to starting the procedure confirmed patient identity, site/side, procedure, patient position, and availability of correct equipment.   YES    MEDICATIONS:  Bone marrow performed with anesthesia medication per anesthesiologist     The patient was prepped and draped in the usual sterile fashion. Approximately 3 ml of 1% lidocaine was used for local anesthesia. A LEFT trephine biopsy was obtained from posterior iliac crest with an 8 gauge manual core biopsy needle. A unilateral aspirate was obtained from the LEFT posterior crest with an 8 gauge manual biopsy needle.      Hemostasis was achieved using digital pressure and the wound was dressed in the usual fashion. The patient tolerated the procedure well without complications.     The patient was taken to the recovery room and was instructed to lie on his back with pressure on the biopsy site(s) for 30 minutes prior to discharge, if no bleeding present. He was counseled on driving and operating machinery per anesthesia guidelines. He should watch for signed of bleeding and infection and seek medical attention if they occur. The patient should keep the dressing dry for at least 24 hours (no bath or shower) and avoid heavy lifting and vigorous activities for 24 hours. After at least 24 hours, the pressure bandages can be removed and replaced with band-aids.

## 2022-05-12 NOTE — DISCHARGE INSTRUCTIONS
[facility]  Adult Discharge Orders & Instructions      For 24 hours after surgery:  Get plenty of rest.  A responsible adult must stay with you for at least 24 hours after you leave the hospital.   You may feel lightheaded.  IF so, sit for a few minutes before standing.  Have someone help you get up.   You may have a slight fever. Call the doctor if your fever is over 101 F (38.3 C) (taken under the tongue) or lasts longer than 24 hours.  You may have a dry mouth, a sore throat, muscle aches or trouble sleeping.  These should go away after 24 hours.  Do not make important or legal decisions.  6.   Do not drive or use heavy equipment.  If you have weakness or tingling, don't drive or use heavy equipment until this feeling goes away.                                                                                                                                                                         To contact a doctor, call    [phone]______________

## 2022-05-12 NOTE — ANESTHESIA POSTPROCEDURE EVALUATION
Patient: Jesus Varghese    Procedure: Procedure(s):  BIOPSY, BONE MARROW       Anesthesia Type:  MAC    Note:  Disposition: Outpatient   Postop Pain Control: Uneventful            Sign Out: Well controlled pain   PONV: No   Neuro/Psych: Uneventful            Sign Out: Acceptable/Baseline neuro status   Airway/Respiratory: Uneventful            Sign Out: Acceptable/Baseline resp. status   CV/Hemodynamics: Uneventful            Sign Out: Acceptable CV status   Other NRE: NONE   DID A NON-ROUTINE EVENT OCCUR? No           Last vitals:  Vitals Value Taken Time   /87 05/12/22 1200   Temp     Pulse 84 05/12/22 1200   Resp     SpO2 96 % 05/12/22 1210   Vitals shown include unvalidated device data.    Electronically Signed By: CUATE Bonner CRNA  May 12, 2022  12:47 PM

## 2022-05-12 NOTE — INTERVAL H&P NOTE
"I have reviewed the surgical (or preoperative) H&P that is linked to this encounter, and examined the patient. There are no significant changes    Clinical Conditions Present on Arrival:  Clinically Significant Risk Factors Present on Admission                  # Platelet Defect: home medication list includes an antiplatelet medication  # Overweight: Estimated body mass index is 29.05 kg/m  as calculated from the following:    Height as of this encounter: 1.676 m (5' 6\").    Weight as of this encounter: 81.6 kg (180 lb).       "

## 2022-05-12 NOTE — ANESTHESIA PREPROCEDURE EVALUATION
Anesthesia Pre-Procedure Evaluation    Patient: Jesus Varghese   MRN: [id number] : 1960        Procedure : Procedure(s):  BIOPSY, BONE MARROW          Past Medical History:   Diagnosis Date     Age-related cataract     No Comments Provided     Calculus of kidney     No Comments Provided     Carpal tunnel syndrome     No Comments Provided     CLL (chronic lymphocytic leukemia) (H) 2022     Diverticulosis of intestine without perforation or abscess without bleeding     mild     Dorsalgia     No Comments Provided     Inflammatory liver disease     ? cause 1982     Malignant neoplasm of colon (H)          Other specified postprocedural states     2016     Pure hyperglyceridemia     No Comments Provided     Strain of muscle, fascia and tendon of other parts of biceps, left arm, initial encounter     2016      Past Surgical History:   Procedure Laterality Date     COLON SURGERY      06,Sigmoid colectomy for [facility] C2 adenocarcinoma     COLONOSCOPY      07,next due in      COLONOSCOPY      2010,F/U      COLONOSCOPY  10/19/2015    10/19/15,F/U      EXTRACAPSULAR CATARACT EXTRATION WITH INTRAOCULAR LENS IMPLANT      , ,Bilateral cataracts R 2011.. L      LAPAROSCOPIC CHOLECYSTECTOMY      07     OTHER SURGICAL HISTORY      10/11/06,934327,PORTACATH,Placement of Port-A-Cath, right anterior chest, for chemotherapy     OTHER SURGICAL HISTORY      2010,,HERNIA REPAIR,Mesh Underlay     OTHER SURGICAL HISTORY      2/12/15,239738,IR URETERAL STENT LEFT     OTHER SURGICAL HISTORY      2016,263962,OTHER,Left,arthrex equipment used     TONSILLECTOMY, ADENOIDECTOMY, COMBINED      age 1     VASECTOMY       x2, spontaneous reconnected, so needed 2nd procedure      No Known Allergies   Social History     Tobacco Use     Smoking status: Former Smoker     Packs/day: 2.00     Years: 30.00     Pack years: 60.00     Types: Cigarettes     Quit date:  2010     Years since quittin.9     Smokeless tobacco: Never Used   Substance Use Topics     Alcohol use: Not Currently     Comment: rare      Wt Readings from Last 1 Encounters:   05/10/22 81.6 kg (180 lb)        Anesthesia Evaluation   Pt has had prior anesthetic.         ROS/MED HX  ENT/Pulmonary: Comment: Recovering from covid. Was hospitalized -3/22. Discharged on 8L oxygen. Continues to work with OT/PT and is currently down to 2L with rest and 4L with activity (lifting weights or riding stationary bike). Lost 50 lbs since . Wife states he's a shallow breather while asleep.      Neurologic:       Cardiovascular:  - neg cardiovascular ROS   (+) -----BREEN.     METS/Exercise Tolerance: >4 METS    Hematologic:  - neg hematologic  ROS     Musculoskeletal:  - neg musculoskeletal ROS     GI/Hepatic:     (+) hepatitis liver disease,     Renal/Genitourinary:     (+) renal disease, Nephrolithiasis ,     Endo:     (+) type II DM,     Psychiatric/Substance Use:  - neg psychiatric ROS     Infectious Disease:       Malignancy: Comment: CLL  Malignant neoplasm of colon, unspecified part of colon  S/P partial colectomy      Other:  - neg other ROS          Physical Exam    Airway        Mallampati: II   TM distance: > 3 FB   Neck ROM: full   Mouth opening: > 3 cm    Respiratory Devices and Support         Dental  no notable dental history     (+) implants      Cardiovascular   cardiovascular exam normal       Rhythm and rate: regular and normal     Pulmonary   pulmonary exam normal        breath sounds clear to auscultation           OUTSIDE LABS:  CBC:   Lab Results   Component Value Date    WBC 17.3 (H) 2022    WBC 9.9 2022    HGB 12.0 (L) 2022    HGB 10.8 (L) 2022    HCT 37.9 (L) 2022    HCT 33.3 (L) 2022     2022     2022     BMP:   Lab Results   Component Value Date     2022     2022    POTASSIUM 4.2 2022     POTASSIUM 4.0 02/24/2022    CHLORIDE 97 (L) 02/26/2022    CHLORIDE 96 (L) 02/24/2022    CO2 30 02/26/2022    CO2 28 02/24/2022    BUN 10 02/26/2022    BUN 11 02/24/2022    CR 0.85 02/26/2022    CR 0.90 02/24/2022     (H) 03/06/2022     (H) 03/05/2022     COAGS:   Lab Results   Component Value Date    FIBR 725 (H) 02/08/2022     POC: No results found for: BGM, HCG, HCGS  HEPATIC:   Lab Results   Component Value Date    ALBUMIN 3.5 02/23/2022    PROTTOTAL 6.3 (L) 02/23/2022    ALT 51 02/23/2022    AST 25 02/23/2022    ALKPHOS 53 02/23/2022    BILITOTAL 0.6 02/23/2022    BILIDIRECT 0.09 02/09/2017     OTHER:   Lab Results   Component Value Date    PH 7.40 03/25/2022    LACT 0.6 (L) 03/06/2022    A1C 6.6 (H) 01/13/2022    LILIAN 9.1 02/26/2022    MAG 2.0 02/06/2022    LIPASE 64.5 04/25/2016    AMYLASE 26 (L) 02/11/2015    .0 (H) 02/27/2022     (H) 02/06/2022       Anesthesia Plan    ASA Status:  3   NPO Status:  NPO Appropriate    Anesthesia Type: MAC.     - Reason for MAC: straight local not clinically adequate   Induction: Intravenous.   Maintenance: Balanced.        Consents    Anesthesia Plan(s) and associated risks, benefits, and realistic alternatives discussed. Questions answered and patient/representative(s) expressed understanding.     - Discussed: Risks, Benefits and Alternatives for BOTH SEDATION and the PROCEDURE were discussed     - Discussed with:  Patient, Spouse      - Extended Intubation/Ventilatory Support Discussed: No.      - Patient is DNR/DNI Status: No    Use of blood products discussed: No .     Postoperative Care            Comments:    Other Comments: Risks, benefits and alternatives discussed and would like to proceed. Agreeable to sedation for local infiltration and then awake if respiratory issues arise. General anesthesia ok if indicated.             CUATE Bonner CRNA

## 2022-05-12 NOTE — OR NURSING
Lab staff present during procedure to collect, document and transport specimens to lab.      3mL Lidocaine 1% from bone marrow kit injected by  at 11:05  AM.

## 2022-05-12 NOTE — ANESTHESIA CARE TRANSFER NOTE
Patient: Jesus Varghese    Procedure: Procedure(s):  BIOPSY, BONE MARROW       Diagnosis: Leukocytosis [D72.829]  Diagnosis Additional Information: No value filed.    Anesthesia Type:   MAC     Note:      Level of Consciousness: drowsy  Oxygen Supplementation: face mask  Level of Supplemental Oxygen (L/min / FiO2): 8 L/min  Independent Airway: airway patency satisfactory and stable  Dentition: dentition unchanged  Vital Signs Stable: post-procedure vital signs reviewed and stable  Report to RN Given: handoff report given  Patient transferred to: Phase II    Handoff Report: Identifed the Patient, Identified the Reponsible Provider, Reviewed the pertinent medical history, Discussed the surgical course, Reviewed Intra-OP anesthesia mangement and issues during anesthesia, Set expectations for post-procedure period and Allowed opportunity for questions and acknowledgement of understanding      Vitals:  Vitals Value Taken Time   BP     Temp     Pulse     Resp     SpO2         Electronically Signed By: David Kellerman, APRN CRNA  May 12, 2022  11:20 AM

## 2022-05-16 ENCOUNTER — HOSPITAL ENCOUNTER (OUTPATIENT)
Dept: PHYSICAL THERAPY | Facility: OTHER | Age: 62
Setting detail: THERAPIES SERIES
Discharge: HOME | End: 2022-05-16
Attending: INTERNAL MEDICINE
Payer: COMMERCIAL

## 2022-05-16 PROCEDURE — 97110 THERAPEUTIC EXERCISES: CPT | Mod: GP

## 2022-05-19 ENCOUNTER — HOSPITAL ENCOUNTER (OUTPATIENT)
Dept: PHYSICAL THERAPY | Facility: OTHER | Age: 62
Setting detail: THERAPIES SERIES
Discharge: HOME | End: 2022-05-19
Attending: INTERNAL MEDICINE
Payer: COMMERCIAL

## 2022-05-19 PROCEDURE — 97110 THERAPEUTIC EXERCISES: CPT | Mod: GP | Performed by: PHYSICAL THERAPIST

## 2022-05-22 DIAGNOSIS — J12.82 PNEUMONIA DUE TO 2019 NOVEL CORONAVIRUS: ICD-10-CM

## 2022-05-22 DIAGNOSIS — U07.1 PNEUMONIA DUE TO 2019 NOVEL CORONAVIRUS: ICD-10-CM

## 2022-05-24 ENCOUNTER — HOSPITAL ENCOUNTER (OUTPATIENT)
Dept: PHYSICAL THERAPY | Facility: OTHER | Age: 62
Setting detail: THERAPIES SERIES
Discharge: HOME | End: 2022-05-24
Attending: INTERNAL MEDICINE
Payer: COMMERCIAL

## 2022-05-24 PROCEDURE — 97110 THERAPEUTIC EXERCISES: CPT | Mod: GP | Performed by: PHYSICAL THERAPIST

## 2022-05-24 RX ORDER — BENZONATATE 200 MG/1
CAPSULE ORAL
Qty: 90 CAPSULE | Refills: 0 | Status: SHIPPED | OUTPATIENT
Start: 2022-05-24 | End: 2022-09-02

## 2022-05-24 NOTE — TELEPHONE ENCOUNTER
[id number] of [place] sent Rx request for the following:      Requested Prescriptions   Pending Prescriptions Disp Refills     benzonatate (TESSALON) 200 MG capsule [Pharmacy Med Name: Benzonatate 200 MG Oral Capsule]  Sig - Route: Take 1 capsule (200 mg) by mouth 3 times daily as needed for cough - Oral 90 capsule 0   Last Prescription Date:   3/6/22  Last Fill Qty/Refills:         90, R-1    Last Office Visit:              5/10/22   Future Office visit:           None    In clinical absence of patient's primary, Filiberto John, patient is requesting that this message be sent to the covering provider for consideration please.    Jesusita Arana RN .............. 5/24/2022  12:12 PM

## 2022-05-31 ENCOUNTER — TRANSFERRED RECORDS (OUTPATIENT)
Dept: HEALTH INFORMATION MANAGEMENT | Facility: OTHER | Age: 62
End: 2022-05-31
Payer: COMMERCIAL

## 2022-06-01 ENCOUNTER — HOSPITAL ENCOUNTER (OUTPATIENT)
Dept: PHYSICAL THERAPY | Facility: OTHER | Age: 62
Setting detail: THERAPIES SERIES
Discharge: HOME | End: 2022-06-01
Attending: INTERNAL MEDICINE
Payer: COMMERCIAL

## 2022-06-01 ENCOUNTER — ONCOLOGY VISIT (OUTPATIENT)
Dept: ONCOLOGY | Facility: OTHER | Age: 62
End: 2022-06-01
Attending: INTERNAL MEDICINE
Payer: COMMERCIAL

## 2022-06-01 VITALS
DIASTOLIC BLOOD PRESSURE: 76 MMHG | RESPIRATION RATE: 18 BRPM | SYSTOLIC BLOOD PRESSURE: 132 MMHG | WEIGHT: 184 LBS | HEIGHT: 66 IN | BODY MASS INDEX: 29.57 KG/M2 | OXYGEN SATURATION: 95 % | TEMPERATURE: 99.2 F | HEART RATE: 98 BPM

## 2022-06-01 DIAGNOSIS — D72.829 LEUKOCYTOSIS, UNSPECIFIED TYPE: ICD-10-CM

## 2022-06-01 DIAGNOSIS — C91.10 CLL (CHRONIC LYMPHOCYTIC LEUKEMIA) (H): Primary | ICD-10-CM

## 2022-06-01 PROCEDURE — 97110 THERAPEUTIC EXERCISES: CPT | Mod: GP

## 2022-06-01 PROCEDURE — 99417 PROLNG OP E/M EACH 15 MIN: CPT | Performed by: INTERNAL MEDICINE

## 2022-06-01 PROCEDURE — 99215 OFFICE O/P EST HI 40 MIN: CPT | Performed by: INTERNAL MEDICINE

## 2022-06-01 ASSESSMENT — PAIN SCALES - GENERAL: PAINLEVEL: NO PAIN (0)

## 2022-06-02 NOTE — PROGRESS NOTES
Visit Date: 06/01/2022    HISTORY OF PRESENT ILLNESS:  Mr. Varghese returns for followup of leukocytosis.  We had originally seen the patient in consultation back on 01/21/2022.  At that time, Dr. John had seen the patient.  A CBC had been drawn, which revealed a white count of 94.5.  H and H was 12.4 and 40.1, and platelet count was 164.  Differential was not done.  Two years prior his white count was 16.3, hemoglobin 14.3, hematocrit 42.8, and platelet count was 165.  When we saw the patient, we wanted to obtain a peripheral blood smear, as there was no differential that was done originally on the CBC.  We felt the patient could have CLL, AML or CML, and we wanted to obtain a BCR/ABL transcript, and this came back negative, and the plan was to proceed with bone marrow aspiration biopsy to absolutely rule out acute leukemia.  Apparently in the interim approximately 3 weeks later, the patient became extremely ill.  He was scheduled to have a bone marrow biopsy, but this was not done, as the patient was admitted on 02/06/2022 with acute respiratory failure.  He tested positive for COVID and required high-flow oxygen therapy but never was intubated.  He was discharged on supplemental oxygen and required a Life 2000 portable open ventilation system and was using bottled portable oxygen.  His O2 saturations had improved.  He had seen Dr. Rodriguez recently, who felt the patient was clinically doing better and recommended CT chest in 07/2022.  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  During his hospitalization, he was treated empirically with IV antibiotics.  Subsequently, a bone marrow aspiration biopsy was done, and review of peripheral blood smear had been done.  Review of peripheral blood smear was consistent with chronic lymphocytic leukemia.  There were no abnormal FISH prognostic gene abnormalities.  There was no 17p deletion.  The patient subsequently underwent bone  marrow aspiration biopsy, and this confirmed CLL with bone marrow involvement 75%.  Total leukocyte count had dropped significantly, as well as absolute lymphocyte count.  There was a small monoclonal IgM which was likely related to CLL.  There was no morphologic evidence of a plasma cell neoplasm.  The patient otherwise is doing relatively well.  He denies any fevers, night sweats, weight loss.  He is gaining strength. He still requires O2 but is clinically improved from his COVID-19 pneumonia.  He initially had imaging studies prior to being admitted.  A CT chest, abdomen and pelvis was  remarkable for ground-glass opacities which may be due to infectious inflammatory process, which turned out to be COVID-19 pneumonia.  He had a repeat CT chest on 02/06/2022, which revealed pneumonia.  There was no other lymphadenopathy noted.  There was no splenomegaly.  There were no enlarged hilar mediastinal nodes.  The patient otherwise is asymptomatic at this time other than shortness of breath.    PHYSICAL EXAMINATION:    GENERAL:  He is a mildly obese, middle-aged white male in no acute distress.  ECOG performance status is 0.  VITAL SIGNS:  Reveal blood pressure 124/68, pulse 98, respirations 18, temperature 98.2.  HEENT:  Atraumatic, normocephalic.  Oropharynx nonerythematous.  NECK:  Supple.  LUNGS:  Clear to auscultation and percussion.  HEART:  Regular rhythm.  S1, S2 normal.  ABDOMEN:  Soft.  Normoactive bowel sounds.  No mass.  Nontender.  LYMPHATICS:  No cervical, supraclavicular, axillary nodes.    EXTREMITIES:  No edema.   NEUROLOGIC:  Nonfocal.    LABORATORY DATA:  Reveal CBC with white count 12.9, hemoglobin 12.2 and 39.7, and platelet count is 205.    IMPRESSION AND PLAN:  Stage 0 chronic lymphocytic leukemia with initial leukocytosis, likely due to underlying COVID-19 pneumonia.  Now white count is significantly improved to 12.9 with minimal lymphocytosis.  No evidence of anemia, thrombocytopenia.  We would  like to adequately stage the patient with repeat CT neck, chest, abdomen and pelvis 3 months after he recovers from his post-COVID state to assess lymphadenopathy as well as splenomegaly.  For now, he is asymptomatic.  No need for therapy.  Likely he has stage 0 chronic lymphocytic leukemia.  We will see the patient after the above scans and obtain CBC, CMP, LDH,    TIME:  Seventy-four minutes were spent on this patient.  Time was spent reviewing physician provider notes, multiple hospital provider notes, performing history and physical, documenting history and physical, and ordering followup scans and labs.    Med Stark MD        D: 2022   T: 2022   MT: [facility]    Name:     ROSALINA MEZA  MRN:      [id number]        Account:    [id number]   :      1960           Visit Date: 2022     Document: [id number]    cc:  Filiberto John MD

## 2022-06-03 LAB — PATH REPORT.FINAL DX SPEC: NORMAL

## 2022-07-16 ENCOUNTER — HEALTH MAINTENANCE LETTER (OUTPATIENT)
Age: 62
End: 2022-07-16

## 2022-07-29 ENCOUNTER — TRANSFERRED RECORDS (OUTPATIENT)
Dept: HEALTH INFORMATION MANAGEMENT | Facility: OTHER | Age: 62
End: 2022-07-29

## 2022-08-03 NOTE — PROGRESS NOTES
[facility]    Outpatient Physical Therapy Discharge Note  Patient: Jesus Varghese  : 1960    Beginning/End Dates of Reporting Period:  5/3/22 to 8/3/2022     Referring Provider: Dr. Filiberto John    Therapy Diagnosis: impaired endurance, UE and LE weakness, impaired gait      Client Self Report:  had his blood gases checked in [place] yesterday, will see Dr. Stark this afternoon in regards to his CLL. Should get a call next week to schedule an appt with pulmonologist.  presented for PT appt on 22-cancelled that appt and all remaining as he could not wear a mask in the public areas of the therapy gym, PT discussed HEP and  agreed.     Objective Measurements:  Objective Measure: denies any pain  Details: assessed SPO2 throughout session  Objective Measure: 6 minute walk test: 1024 feet with 4L/min O2, no rests, pushing O2 tank, 312.19 meters-age norms for males 60-70 yons=258 meters    Goals:  Goal Identifier testing   Goal Description  will tolerate completing 6 minute walk test safely to facilitate baseline score for endurance.   Target Date 22   Date Met  22   Progress (detail required for progress note): Completed with 4L/min O2 pushing tank, no rests     Goal Identifier gait   Goal Description  will be able to ambulate for at least 7 minutes without rest and without SPO2 sats dropping below 85% to facilitate improved endurance for independence walking in the yard without difficulty.   Target Date 06/15/22   Date Met   Not met    Progress (detail required for progress note):       Goal Identifier transfers   Goal Description  will be able to complete at least 10 sit to stand transfers without UE A and without SPO2 dropping below 90% for improved lung capacity and LE strength for independence with transfers without difficulty.   Target Date 22   Date Met       Progress (detail required for progress note):  Able to complete however SPO2 dropped below 90%      Goal Identifier transfers   Goal Description  will be able to get up from the floor without intermediary support surface without SPO2 dropping below 90% for improved lung capacity as well as independence for household and yard chores for safety and independence.   Target Date 06/29/22   Date Met   Not assessed in clinic, states he gets up from the ground with assist from furniture at home    Progress (detail required for progress note):       Goal Identifier strength   Goal Description  will have at least 4/5 LE strength to facilitate improved endurance and ability to transfer without assist or fatigue.   Target Date 06/01/22   Date Met   not assessed    Progress (detail required for progress note):       Goal Identifier endurance   Goal Description  will be able to complete at least 550 meters on 6 minute walk test to faciltiate return to community ambulation with minimal difficulty.   Target Date 06/29/22   Date Met      Progress (detail required for progress note):  Not met      Not all goals reassessed as patient was unable to continue outpatient PT due to masking issue. Previously attended without a mask, changes in covid rules limited where he could be and no longer felt that attending would be beneficial as he would not have access to the [employer] weight machines.     HEP: continue HEP issued by homecare, add tband to knees for standing hip abduction and extension,  was also very active walking and performing outdoor chores and walking for exercise, very compliant with activities.       Plan:  Discharge from therapy.    Discharge:    Reason for Discharge: Patient chooses to discontinue therapy.    Equipment Issued: HEP    Discharge Plan: Patient to continue home program.

## 2022-08-15 ENCOUNTER — TRANSFERRED RECORDS (OUTPATIENT)
Dept: HEALTH INFORMATION MANAGEMENT | Facility: OTHER | Age: 62
End: 2022-08-15

## 2022-08-15 LAB — RETINOPATHY: NEGATIVE

## 2022-08-24 DIAGNOSIS — Z85.038 HISTORY OF MALIGNANT NEOPLASM OF LARGE INTESTINE: Primary | ICD-10-CM

## 2022-08-24 RX ORDER — BISACODYL 5 MG
TABLET, DELAYED RELEASE (ENTERIC COATED) ORAL
Qty: 2 TABLET | Refills: 0 | Status: SHIPPED | OUTPATIENT
Start: 2022-08-24 | End: 2022-09-02

## 2022-08-24 RX ORDER — POLYETHYLENE GLYCOL 3350, SODIUM CHLORIDE, SODIUM BICARBONATE, POTASSIUM CHLORIDE 420; 11.2; 5.72; 1.48 G/4L; G/4L; G/4L; G/4L
4000 POWDER, FOR SOLUTION ORAL ONCE
Qty: 4000 ML | Refills: 0 | Status: SHIPPED | OUTPATIENT
Start: 2022-08-24 | End: 2022-11-02

## 2022-08-24 NOTE — TELEPHONE ENCOUNTER
Screening Questions for the Scheduling of Screening Colonoscopies   (If Colonoscopy is diagnostic, Provider should review the chart before scheduling.)  Are you younger than 50 or older than 80?  NO  Do you take aspirin or fish oil?  NO (if yes, tell patient to stop 1 week prior to Colonoscopy)  Do you take warfarin (Coumadin), clopidogrel (Plavix), apixaban (Eliquis), dabigatram (Pradaxa), rivaroxaban (Xarelto) or any blood thinner? NO  Do you use oxygen at home?  YES DUE TO COVID 6 MONTHS AGO  Do you have kidney disease? NO  Are you on dialysis? NO  Have you had a stroke or heart attack in the last year? NO  Have you had a stent in your heart or any blood vessel in the last year? NO  Have you had a transplant of any organ? NO  Have you had a colonoscopy or upper endoscopy (EGD) before? YES         When?  UNKNOWN  Date of scheduled Colonoscopy. 11/07/2022  Provider TAYLOR  [facility]

## 2022-08-31 ASSESSMENT — PATIENT HEALTH QUESTIONNAIRE - PHQ9
SUM OF ALL RESPONSES TO PHQ QUESTIONS 1-9: 0
SUM OF ALL RESPONSES TO PHQ QUESTIONS 1-9: 0
10. IF YOU CHECKED OFF ANY PROBLEMS, HOW DIFFICULT HAVE THESE PROBLEMS MADE IT FOR YOU TO DO YOUR WORK, TAKE CARE OF THINGS AT HOME, OR GET ALONG WITH OTHER PEOPLE: NOT DIFFICULT AT ALL

## 2022-09-01 ENCOUNTER — LAB (OUTPATIENT)
Dept: LAB | Facility: OTHER | Age: 62
End: 2022-09-01
Attending: INTERNAL MEDICINE
Payer: COMMERCIAL

## 2022-09-01 ENCOUNTER — HOSPITAL ENCOUNTER (OUTPATIENT)
Dept: CT IMAGING | Facility: OTHER | Age: 62
Discharge: HOME | End: 2022-09-01
Attending: INTERNAL MEDICINE
Payer: COMMERCIAL

## 2022-09-01 DIAGNOSIS — D72.829 LEUKOCYTOSIS, UNSPECIFIED TYPE: ICD-10-CM

## 2022-09-01 DIAGNOSIS — C91.10 CLL (CHRONIC LYMPHOCYTIC LEUKEMIA) (H): ICD-10-CM

## 2022-09-01 LAB
ALBUMIN SERPL-MCNC: 4.6 G/DL (ref 3.5–5.7)
ALP SERPL-CCNC: 44 U/L (ref 34–104)
ALT SERPL W P-5'-P-CCNC: 19 U/L (ref 7–52)
ANION GAP SERPL CALCULATED.3IONS-SCNC: 9 MMOL/L (ref 3–14)
AST SERPL W P-5'-P-CCNC: 20 U/L (ref 13–39)
BASOPHILS # BLD AUTO: 0 10E3/UL (ref 0–0.2)
BASOPHILS NFR BLD AUTO: 0 %
BILIRUB SERPL-MCNC: 0.6 MG/DL (ref 0.3–1)
BUN SERPL-MCNC: 16 MG/DL (ref 7–25)
CALCIUM SERPL-MCNC: 9.9 MG/DL (ref 8.6–10.3)
CHLORIDE BLD-SCNC: 104 MMOL/L (ref 98–107)
CO2 SERPL-SCNC: 28 MMOL/L (ref 21–31)
CREAT SERPL-MCNC: 1.21 MG/DL (ref 0.7–1.3)
EOSINOPHIL # BLD AUTO: 0.8 10E3/UL (ref 0–0.7)
EOSINOPHIL NFR BLD AUTO: 5 %
ERYTHROCYTE [DISTWIDTH] IN BLOOD BY AUTOMATED COUNT: 17.4 % (ref 10–15)
GFR SERPL CREATININE-BSD FRML MDRD: 67 ML/MIN/1.73M2
GLUCOSE BLD-MCNC: 105 MG/DL (ref 70–105)
HCT VFR BLD AUTO: 44.8 % (ref 40–53)
HGB BLD-MCNC: 14.8 G/DL (ref 13.3–17.7)
IMM GRANULOCYTES # BLD: 0.1 10E3/UL
IMM GRANULOCYTES NFR BLD: 0 %
LDH SERPL L TO P-CCNC: 260 U/L (ref 140–271)
LYMPHOCYTES # BLD AUTO: 11.8 10E3/UL (ref 0.8–5.3)
LYMPHOCYTES NFR BLD AUTO: 72 %
MCH RBC QN AUTO: 26.1 PG (ref 26.5–33)
MCHC RBC AUTO-ENTMCNC: 33 G/DL (ref 31.5–36.5)
MCV RBC AUTO: 79 FL (ref 78–100)
MONOCYTES # BLD AUTO: 0.9 10E3/UL (ref 0–1.3)
MONOCYTES NFR BLD AUTO: 5 %
NEUTROPHILS # BLD AUTO: 3 10E3/UL (ref 1.6–8.3)
NEUTROPHILS NFR BLD AUTO: 18 %
NRBC # BLD AUTO: 0 10E3/UL
NRBC BLD AUTO-RTO: 0 /100
PLATELET # BLD AUTO: 159 10E3/UL (ref 150–450)
POTASSIUM BLD-SCNC: 5 MMOL/L (ref 3.5–5.1)
PROT SERPL-MCNC: 7 G/DL (ref 6.4–8.9)
RBC # BLD AUTO: 5.68 10E6/UL (ref 4.4–5.9)
SODIUM SERPL-SCNC: 141 MMOL/L (ref 134–144)
WBC # BLD AUTO: 16.6 10E3/UL (ref 4–11)

## 2022-09-01 PROCEDURE — 36415 COLL VENOUS BLD VENIPUNCTURE: CPT | Mod: ZL

## 2022-09-01 PROCEDURE — 250N000011 HC RX IP 250 OP 636: Performed by: INTERNAL MEDICINE

## 2022-09-01 PROCEDURE — 74177 CT ABD & PELVIS W/CONTRAST: CPT

## 2022-09-01 PROCEDURE — 82040 ASSAY OF SERUM ALBUMIN: CPT | Mod: ZL

## 2022-09-01 PROCEDURE — 80053 COMPREHEN METABOLIC PANEL: CPT | Mod: ZL

## 2022-09-01 PROCEDURE — 85025 COMPLETE CBC W/AUTO DIFF WBC: CPT | Mod: ZL

## 2022-09-01 PROCEDURE — 70491 CT SOFT TISSUE NECK W/DYE: CPT

## 2022-09-01 PROCEDURE — 83615 LACTATE (LD) (LDH) ENZYME: CPT | Mod: ZL

## 2022-09-01 PROCEDURE — 71260 CT THORAX DX C+: CPT

## 2022-09-01 RX ORDER — IOPAMIDOL 755 MG/ML
100 INJECTION, SOLUTION INTRAVASCULAR ONCE
Status: COMPLETED | OUTPATIENT
Start: 2022-09-01 | End: 2022-09-01

## 2022-09-01 RX ADMIN — IOPAMIDOL 100 ML: 755 INJECTION, SOLUTION INTRAVENOUS at 09:51

## 2022-09-02 ENCOUNTER — OFFICE VISIT (OUTPATIENT)
Dept: FAMILY MEDICINE | Facility: OTHER | Age: 62
End: 2022-09-02
Attending: NURSE PRACTITIONER
Payer: COMMERCIAL

## 2022-09-02 VITALS
OXYGEN SATURATION: 95 % | RESPIRATION RATE: 20 BRPM | DIASTOLIC BLOOD PRESSURE: 70 MMHG | BODY MASS INDEX: 32.28 KG/M2 | TEMPERATURE: 97.1 F | WEIGHT: 200 LBS | SYSTOLIC BLOOD PRESSURE: 122 MMHG | HEART RATE: 70 BPM

## 2022-09-02 DIAGNOSIS — E11.8 CONTROLLED TYPE 2 DIABETES MELLITUS WITH COMPLICATION, WITHOUT LONG-TERM CURRENT USE OF INSULIN (H): Primary | ICD-10-CM

## 2022-09-02 LAB
CREAT UR-MCNC: 166 MG/DL
HBA1C MFR BLD: 6 % (ref 4–6.2)
MICROALBUMIN UR-MCNC: 29.2 MG/L
MICROALBUMIN/CREAT UR: 17.59 MG/G CR (ref 0–17)

## 2022-09-02 PROCEDURE — 36415 COLL VENOUS BLD VENIPUNCTURE: CPT | Mod: ZL | Performed by: NURSE PRACTITIONER

## 2022-09-02 PROCEDURE — 99213 OFFICE O/P EST LOW 20 MIN: CPT | Performed by: NURSE PRACTITIONER

## 2022-09-02 PROCEDURE — 83036 HEMOGLOBIN GLYCOSYLATED A1C: CPT | Mod: ZL | Performed by: NURSE PRACTITIONER

## 2022-09-02 PROCEDURE — 82043 UR ALBUMIN QUANTITATIVE: CPT | Mod: ZL | Performed by: NURSE PRACTITIONER

## 2022-09-02 ASSESSMENT — PAIN SCALES - GENERAL: PAINLEVEL: NO PAIN (0)

## 2022-09-02 ASSESSMENT — PATIENT HEALTH QUESTIONNAIRE - PHQ9
SUM OF ALL RESPONSES TO PHQ QUESTIONS 1-9: 0
10. IF YOU CHECKED OFF ANY PROBLEMS, HOW DIFFICULT HAVE THESE PROBLEMS MADE IT FOR YOU TO DO YOUR WORK, TAKE CARE OF THINGS AT HOME, OR GET ALONG WITH OTHER PEOPLE: NOT DIFFICULT AT ALL

## 2022-09-02 NOTE — PROGRESS NOTES
Assessment & Plan   Problem List Items Addressed This Visit        Endocrine    Controlled type 2 diabetes mellitus with complication, without long-term current use of insulin (H) - Primary    Relevant Orders    FOOT EXAM (Completed)    Hemoglobin A1c (Completed)    Albumin Random Urine Quantitative with Creat Ratio      Stable type 2 diabetes, diet controlled.  Hemoglobin A1c 6.0.  Did discuss with him that the lisinopril and atorvastatin are important to take even if blood pressure and diabetes is stable as these are preventative measures.  Urine albumin is pending currently.    Ordering of each unique test  24 minutes spent on the date of the encounter doing chart review, history and exam, documentation and further activities per the note         No follow-ups on file.    CUATE Recinos [facility]    Subjective    is a 61 year old, presenting for the following health issues:  Diabetes      History of Present Illness       Diabetes:   He presents for follow up of diabetes.  He is checking home blood glucose a few times a month. He checks blood glucose before and after meals and at bedtime.  Blood glucose is never over 200 and never under 70. When his blood glucose is low, the patient is asymptomatic for confusion, blurred vision, lethargy and reports not feeling dizzy, shaky, or weak.  He is concerned about other.  He is having numbness in feet.         He eats 2-3 servings of fruits and vegetables daily.He consumes 1 sweetened beverage(s) daily.He exercises with enough effort to increase his heart rate 30 to 60 minutes per day.  He exercises with enough effort to increase his heart rate 5 days per week.   He is taking medications regularly.    Today's PHQ-9         PHQ-9 Total Score: 0    PHQ-9 Q9 Thoughts of better off dead/self-harm past 2 weeks :   Not at all    How difficult have these problems made it for you to do your work, take care of things at home, or get along with  other people: Not difficult at all     He comes in today to update his hemoglobin A1c.  He reports his blood sugars are checked periodically at home and typically range between 95 and 125.  He is not currently taking any medications.  Historically he was on metformin but he had COVID-19 in February, lost about 50 pounds including some muscle mass and came off medications.  He has been managing his diabetes through diet.  He does report numbness and tingling in his feet since chemotherapy in 2007, not related to diabetes.  He had not been taking his lisinopril or atorvastatin.  He states he was told at some point while he was in the hospital to stop this.    Review of Systems   See above      Objective    /70 (BP Location: Right arm, Patient Position: Sitting, Cuff Size: Adult Large)   Pulse 70   Temp 97.1  F (36.2  C) (Tympanic)   Resp 20   Wt 90.7 kg (200 lb)   SpO2 95%   BMI 32.28 kg/m    Body mass index is 32.28 kg/m .  Physical Exam   GENERAL: healthy, alert and no distress  EYES: Eyes grossly normal to inspection  RESP: lungs clear to auscultation - no rales, rhonchi or wheezes  CV: regular rate and rhythm, normal S1 S2  NEURO: Normal strength and tone, mentation intact and speech normal  PSYCH: mentation appears normal, affect normal/bright  Diabetic foot exam: normal DP and PT pulses, no trophic changes or ulcerative lesions and normal sensory exam    Results for orders placed or performed in visit on 09/02/22 (from the past 24 hour(s))   Hemoglobin A1c   Result Value Ref Range    Hemoglobin A1C 6.0 4.0 - 6.2 %

## 2022-09-02 NOTE — NURSING NOTE
"Patient presents to the clinic for diabetes management.    FOOD SECURITY SCREENING QUESTIONS:    The next two questions are to help us understand your food security.  If you are feeling you need any assistance in this area, we have resources available to support you today.    Hunger Vital Signs:  Within the past 12 months we worried whether our food would run out before we got money to buy more. Never  Within the past 12 months the food we bought just didn't last and we didn't have money to get more. Never    Advance Care Directive on file? no  Advance Care Directive provided to patient? Declined.    Lab Results   Component Value Date    A1C 6.6 01/13/2022    A1C 6.1 12/14/2020    A1C 6.3 07/15/2019    A1C 6.0 11/20/2018    ALBUMIN 4.6 09/01/2022    ALBUMIN 4.8 07/15/2019     Previous A1C is at goal of <8  Date of last Foot exam TODAY  Eye exam Yes- Date of last eye exam: 8-15-22,  [facility]  Patient is not a Tobacco User  Patient is on a daily aspirin  Patient is not on a Statin.  Blood pressure today of:     BP Readings from Last 1 Encounters:   09/02/22 122/70      is at the goal of <139/89 for diabetics.    Chief Complaint   Patient presents with     Diabetes       Initial /70 (BP Location: Right arm, Patient Position: Sitting, Cuff Size: Adult Large)   Pulse 70   Temp 97.1  F (36.2  C) (Tympanic)   Resp 20   Wt 90.7 kg (200 lb)   SpO2 95%   BMI 32.28 kg/m   Estimated body mass index is 32.28 kg/m  as calculated from the following:    Height as of 6/1/22: 1.676 m (5' 6\").    Weight as of this encounter: 90.7 kg (200 lb).  Medication Reconciliation: complete        Claire Young LPN         "

## 2022-09-08 ENCOUNTER — TELEPHONE (OUTPATIENT)
Dept: ONCOLOGY | Facility: CLINIC | Age: 62
End: 2022-09-08

## 2022-09-08 ENCOUNTER — ONCOLOGY VISIT (OUTPATIENT)
Dept: ONCOLOGY | Facility: OTHER | Age: 62
End: 2022-09-08
Attending: INTERNAL MEDICINE
Payer: COMMERCIAL

## 2022-09-08 VITALS
RESPIRATION RATE: 18 BRPM | SYSTOLIC BLOOD PRESSURE: 132 MMHG | WEIGHT: 204 LBS | HEART RATE: 78 BPM | BODY MASS INDEX: 32.93 KG/M2 | TEMPERATURE: 97.6 F | OXYGEN SATURATION: 96 % | DIASTOLIC BLOOD PRESSURE: 76 MMHG

## 2022-09-08 DIAGNOSIS — C91.10 CLL (CHRONIC LYMPHOCYTIC LEUKEMIA) (H): Primary | ICD-10-CM

## 2022-09-08 PROCEDURE — 99215 OFFICE O/P EST HI 40 MIN: CPT | Performed by: INTERNAL MEDICINE

## 2022-09-08 PROCEDURE — 99417 PROLNG OP E/M EACH 15 MIN: CPT | Performed by: INTERNAL MEDICINE

## 2022-09-08 RX ORDER — PROCHLORPERAZINE MALEATE 10 MG
10 TABLET ORAL EVERY 6 HOURS PRN
Qty: 30 TABLET | Refills: 2 | Status: SHIPPED | OUTPATIENT
Start: 2022-09-11 | End: 2022-11-02

## 2022-09-08 ASSESSMENT — PAIN SCALES - GENERAL: PAINLEVEL: NO PAIN (0)

## 2022-09-08 NOTE — TELEPHONE ENCOUNTER
Prior Authorization Approval    Authorization Effective Date: 8/8/2022  Authorization Expiration Date: 3/8/2023  Medication: Calquence 100MG capsules - Approved  Approved Dose/Quantity: 60/30  Reference #: [id number]   Insurance Company: [employer] - Phone [phone] Fax [phone]  Expected CoPay:  $985.21    CoPay Card Available: Yes     Foundation Assistance Needed: No   Which Pharmacy is filling the prescription (Not needed for infusion/clinic administered): [facility] - [place], [place] - [address]  Pharmacy Notified: Yes  Patient Notified:  Yes

## 2022-09-08 NOTE — TELEPHONE ENCOUNTER
PA Initiation    Medication: Calquence 100MG capsules - Pending  Insurance Company: [employer] - Phone [phone] Fax [phone]  Pharmacy Filling the Rx:    Filling Pharmacy Phone:    Filling Pharmacy Fax:    Start Date: 9/8/2022

## 2022-09-08 NOTE — NURSING NOTE
Chief Complaint   Patient presents with     Oncology Clinic Visit     F/U CLL     Medication Reconciliation: complete  Screening questionnaires done last week; refused today.    Mitzi Henson CMA ([facility])

## 2022-09-08 NOTE — PROGRESS NOTES
Visit Date: 09/08/2022    HEMATOLOGY/ONCOLOGY CLINIC NOTE     HISTORY OF PRESENT ILLNESS:  Mr. Varghese returns for followup of leukocytosis/CLL.  We had originally seen the patient in consultation back on 01/21/2022.  At that time, Dr. John had seen the patient.  CBC revealed a white count of 94.5, H and H were 12.4 and 40.1, and platelet count was 164.  Differential was not done.  Two years prior, his white count was 16.3, hemoglobin 14.3, hematocrit 42.8, and platelet count was 165.  When we saw the patient, we wanted to obtain a peripheral blood smear, as there was no differential done on the original CBC.  We felt the patient could have CLL, AML or CML, and we wanted to obtain a BCR/ABL transcript, and this came back negative, and the plan was to proceed with bone marrow aspiration biopsy to absolutely rule out acute leukemia.  Apparently, in the interim approximately 3 weeks later, the patient became extremely ill.  He was scheduled to have a bone marrow biopsy but this was not done, as the patient was admitted on 02/06/2022 with acute respiratory failure.  He tested positive for COVID-19 and required high-flow oxygen therapy but never was intubated.  He was discharged on supplemental oxygen and required a [id number] portable open ventilation system and was using bottled portable oxygen.  His O2 saturations improved.  He had seen Dr. Donavan Rodriguez of Pulmonary recently, who felt the patient was clinically doing better and recommended CT chest in 07/2022.  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  During his hospitalization, he was treated empirically with IV antibiotics.  Subsequently, a bone marrow aspiration biopsy was done and review of peripheral blood smear was done, which revealed CLL.  There was no abnormal FISH prognostic gene abnormalities.  There was no 17p deletion, no 11q22, no TP53.    The patient subsequently underwent bone marrow aspiration biopsy  which confirmed CLL with bone marrow involvement of 75%.  Leukocyte count had dropped significantly as well as absolute lymphocyte count.  There was a small monoclonal IgM which was likely related to CLL.  There was no morphologic evidence of a plasma cell neoplasm.  When we saw the patient on 06/01/2022, the patient was doing relatively well.  He had no complaints of fevers, night sweats, weight loss.  He was gaining strength. His O2 requirements had improved.  He had imaging studies prior to being admitted for COVID.  CT chest, abdomen and pelvis were unremarkable except for ground-glass opacities, which was felt to be COVID-19 pneumonia.  He had a repeat CT chest in 02/06/2022 which revealed pneumonia.  There was no other lymphadenopathy noted.  No splenomegaly.  No enlargement of hilar mediastinal nodes.      The patient now returns for followup.  He states he is more fatigued as of late.  He gets occasional abdominal cramping.  No fevers, night sweats or weight loss.  He did have imaging that was performed on 09/01/2022.  CT neck revealed 1.8 x 1.4 cm right paratracheal and paraesophageal lymph nodes in the superior mediastinum.  Otherwise no cervical lymphadenopathy.  CT chest, abdomen and pelvis revealed there was significant enlargement of lymph nodes in the chest, abdomen and pelvis with enlarging paraesophageal lymph nodes and paratracheal nodes measuring up to 2.1 cm.  There was also an enlarging lymph node along the right lateral aspect of the distal esophagus.  There was also significant splenomegaly with the spleen having enlarged from 12.9 cm to 14.5 cm.  There was evidence of retroperitoneal, mesenteric lymphadenopathy with an enlarged 2.8 x 1.7 cm lymph node cephalad to the body of the pancreas as well as a large 2.3 cm aortocaval lymph node and retrocaval nikkie involvement that was significantly increased in size.  Given these findings, the patient now has progressed to stage II.  Given her  splenomegaly and fatigue, he is a candidate for treatment.  Otherwise, he denies any other symptoms of shortness of breath, chest pain or bone pain.    PHYSICAL EXAMINATION:    GENERAL:  He is a middle-aged white male in no acute distress. ECOG performance status is 0.  VITAL SIGNS:  Blood pressure 132/76, pulse 78, respirations 18, temperature 97.6.  HEENT:  Atraumatic, normocephalic.  Oropharynx nonerythematous.  NECK:  Supple.  LUNGS:  Clear to auscultation and percussion.  HEART:  Regular rhythm.  S1, S2 normal.  ABDOMEN:  Soft, normoactive bowel sounds.  Spleen tip is palpable approximately 3 to 4 cm below the left costal margin, nontender.  LYMPHATICS:  No cervical, supraclavicular, axillary or inguinal nodes.  EXTREMITIES:  Trace ankle edema.  NEUROLOGIC:  Nonfocal.    LABORATORY DATA:  Labs from 09/01/2022:  CBC reveals white count of 16.6 with 11.8 lymphocytes, H and H are 14.8 and 44.8, platelet count 159.  LDH is 260.  BUN is 16, creatinine 1.21.  LFTs are normal.    IMPRESSION:  Stage 0 chronic lymphocytic leukemia, now with evidence of progression with splenomegaly and significant retroperitoneal mediastinal hilar adenopathy as well as symptoms of fatigue.  The patient has progressed to stage II disease and would be a candidate for therapy. Based on NCCN guidelines in patients who are not 17p deleted, the recommendation is for a BTK inhibitor.  Efficacy data for acalabrutinib were comparable; however, safety data revealed a more favorable toxicity profile for acalabrutinib.  A phase 2 [facility] trial evaluated acalabrutinib among 535 patients with treatment naive CLL, who are greater than or equal a 65-year-old or less than 65 years old, with coexisting conditions.  Randomized study groups received either acalabrutinib or acalabrutinib plus obinutuzumab or chlorambucil plus obinutuzumab in a median followup of a 20.3 months. Median progression-free survival was prolonged in both acalabrutinib receiving  groups versus the chlorambucil/obinutuzumab group, with patients treated with acalabrutinib having a progression-free survival of 24 months.  With acalabrutinib monotherapy, response rates were 94%.  In patient receiving acalabrutinib, median progression-free survival was longer than chlorambucil-based regimens.  Therefore, the recommendation is to proceed with acalabrutinib 100 mg p.o. b.i.d.  The patient is willing to start.  We will monitor labs monthly.  Otherwise, we will see the patient 3 months, repeat CT chest, abdomen and pelvis.    Seventy-eight minutes was spent on this patient.  Time was spent reviewing the literature on treatment of CLL, reviewing imaging results with Radiology, performing history and physical, documenting history and physical, ordering acalabrutinib and followup labs and imaging.    Med Stark MD        D: 2022   T: 2022   MT: HUMZA    Name:     ROSALINA MEZALeyda  MRN:      [id number]        Account:    [id number]   :      1960           Visit Date: 2022     Document: [id number]

## 2022-09-09 ENCOUNTER — TELEPHONE (OUTPATIENT)
Dept: ONCOLOGY | Facility: OTHER | Age: 62
End: 2022-09-09

## 2022-09-09 DIAGNOSIS — C91.10 CLL (CHRONIC LYMPHOCYTIC LEUKEMIA) (H): Primary | ICD-10-CM

## 2022-09-09 NOTE — ORAL ONC MGMT
"Oral Chemotherapy Monitoring Program    Lab Monitoring Plan  CMP/CBC [id number]  Subjective/Objective:  Jesus Varghese is a 61 year old male contacted by phone for an initial visit for oral chemotherapy education.  We discussed starting on calquence.    ORAL CHEMOTHERAPY 9/9/2022   Assessment Type New Teach   Diagnosis Code Non-Hodgkin Lymphoma (NHL)   Providers Lincoln   Clinic Name/Location [facility]   Drug Name Calquence (acalabrutinib)   Dose 100 mg   Current Schedule BID   Cycle Details Continuous   Any new drug interactions? No   Is the dose as ordered appropriate for the patient? Yes       Last PHQ-2 Score on record:   PHQ-2 ( 1999 [employer]) 8/16/2022 5/10/2022   Q1: Little interest or pleasure in doing things 0 0   Q2: Feeling down, depressed or hopeless 0 0   PHQ-2 Score 0 0   PHQ-2 Total Score (12-17 Years)- Positive if 3 or more points; Administer PHQ-A if positive - -   Q1: Little interest or pleasure in doing things Not at all -   Q2: Feeling down, depressed or hopeless Not at all -   PHQ-2 Score 0 -       Vitals:  BP:   BP Readings from Last 1 Encounters:   09/08/22 132/76     Wt Readings from Last 1 Encounters:   09/08/22 92.5 kg (204 lb)     Estimated body surface area is 2.08 meters squared as calculated from the following:    Height as of 6/1/22: 1.676 m (5' 6\").    Weight as of 9/8/22: 92.5 kg (204 lb).    Labs:  _  Result Component Current Result Ref Range   Sodium 141 (9/1/2022) 134 - 144 mmol/L     _  Result Component Current Result Ref Range   Potassium 5.0 (9/1/2022) 3.5 - 5.1 mmol/L     _  Result Component Current Result Ref Range   Calcium 9.9 (9/1/2022) 8.6 - 10.3 mg/dL     No results found for Mag within last 30 days.     No results found for Phos within last 30 days.     _  Result Component Current Result Ref Range   Albumin 4.6 (9/1/2022) 3.5 - 5.7 g/dL     _  Result Component Current Result Ref Range   Urea Nitrogen 16 (9/1/2022) 7 - 25 mg/dL     _  Result Component Current Result Ref " Range   Creatinine 1.21 (9/1/2022) 0.70 - 1.30 mg/dL     _  Result Component Current Result Ref Range   AST 20 (9/1/2022) 13 - 39 U/L     _  Result Component Current Result Ref Range   ALT 19 (9/1/2022) 7 - 52 U/L     _  Result Component Current Result Ref Range   Bilirubin Total 0.6 (9/1/2022) 0.3 - 1.0 mg/dL     _  Result Component Current Result Ref Range   WBC Count 16.6 (H) (9/1/2022) 4.0 - 11.0 10e3/uL     _  Result Component Current Result Ref Range   Hemoglobin 14.8 (9/1/2022) 13.3 - 17.7 g/dL     _  Result Component Current Result Ref Range   Platelet Count 159 (9/1/2022) 150 - 450 10e3/uL     No results found for ANC within last 30 days.     _  Result Component Current Result Ref Range   Absolute Neutrophils 3.0 (9/1/2022) 1.6 - 8.3 10e3/uL        Assessment:  Patient is appropriate to start therapy.    Plan:  Basic chemotherapy teaching was reviewed with the patient including indication, start date of therapy, dose, administration, adverse effects, missed doses, food and drug interactions, monitoring, side effect management, office contact information, and safe handling. Written materials were provided and all questions answered.    Follow-Up:  1 week after the start of therapy     Benedict Porter, PharmD, MS  Hematology/Oncology Clinical Pharmacist  [facility]  [phone]

## 2022-09-17 ENCOUNTER — HEALTH MAINTENANCE LETTER (OUTPATIENT)
Age: 62
End: 2022-09-17

## 2022-09-21 ENCOUNTER — DOCUMENTATION ONLY (OUTPATIENT)
Dept: ONCOLOGY | Facility: CLINIC | Age: 62
End: 2022-09-21

## 2022-09-21 NOTE — PROGRESS NOTES
"Oral Chemotherapy Monitoring Program    Primary Oncologist: Lincoln  Drug: Calquence; Take 100mg BID  Start Date: 9/16/22    Subjective/Objective:  Jesus Varghese is a 61 year old male contacted by phone for a follow-up visit for oral chemotherapy.      ORAL CHEMOTHERAPY 9/9/2022 9/21/2022   Assessment Type New Teach Initial Follow up   Diagnosis Code Non-Hodgkin Lymphoma (NHL) Non-Hodgkin Lymphoma (NHL)   Providers Lincoln Stark   Clinic Name/Location [facility]   Drug Name Calquence (acalabrutinib) Calquence (acalabrutinib)   Dose 100 mg 100 mg   Current Schedule BID BID   Cycle Details Continuous Continuous   Start Date of Last Cycle - 9/16/2022   Planned next cycle start date - 10/16/2022   Doses missed in last 2 weeks - 0   Adherence Assessment - Adherent   Adverse Effects - No AE identified during assessment   Home BPs - Not applicable   Any new drug interactions? No No   Is the dose as ordered appropriate for the patient? Yes Yes   Is the patient currently in pain? - No   Has the patient been assessed within the past 6 months for depression? - Yes   Has the patient missed any days of school, work, or other routine activity? - No   Since the last time we talked, have you been hospitalized or used the emergency room? - No       Vitals:  BP:   BP Readings from Last 1 Encounters:   09/08/22 132/76     Wt Readings from Last 1 Encounters:   09/08/22 92.5 kg (204 lb)     Estimated body surface area is 2.08 meters squared as calculated from the following:    Height as of 6/1/22: 1.676 m (5' 6\").    Weight as of 9/8/22: 92.5 kg (204 lb).    Labs:  _  Result Component Current Result Ref Range   Sodium 141 (9/1/2022) 134 - 144 mmol/L     _  Result Component Current Result Ref Range   Potassium 5.0 (9/1/2022) 3.5 - 5.1 mmol/L     _  Result Component Current Result Ref Range   Calcium 9.9 (9/1/2022) 8.6 - 10.3 mg/dL     No results found for Mag within last 30 days.     No results found for Phos within " last 30 days.     _  Result Component Current Result Ref Range   Albumin 4.6 (9/1/2022) 3.5 - 5.7 g/dL     _  Result Component Current Result Ref Range   Urea Nitrogen 16 (9/1/2022) 7 - 25 mg/dL     _  Result Component Current Result Ref Range   Creatinine 1.21 (9/1/2022) 0.70 - 1.30 mg/dL       _  Result Component Current Result Ref Range   AST 20 (9/1/2022) 13 - 39 U/L     _  Result Component Current Result Ref Range   ALT 19 (9/1/2022) 7 - 52 U/L     _  Result Component Current Result Ref Range   Bilirubin Total 0.6 (9/1/2022) 0.3 - 1.0 mg/dL       _  Result Component Current Result Ref Range   WBC Count 16.6 (H) (9/1/2022) 4.0 - 11.0 10e3/uL     _  Result Component Current Result Ref Range   Hemoglobin 14.8 (9/1/2022) 13.3 - 17.7 g/dL     _  Result Component Current Result Ref Range   Platelet Count 159 (9/1/2022) 150 - 450 10e3/uL     No results found for ANC within last 30 days.         Assessment/Plan:  I spoke with patient today. Patient reports doing well on therapy. No issues or concerns reported. Patient is compliant with therapy and no missed doses. Future appointments have been confirmed with patient. We will continue to follow.      Follow-Up:  10/14/22 for labs      Britany Alvares  Oncology Pharmacy Intern  [facility]  [phone]

## 2022-10-05 DIAGNOSIS — C91.10 CLL (CHRONIC LYMPHOCYTIC LEUKEMIA) (H): Primary | ICD-10-CM

## 2022-10-10 ENCOUNTER — TELEPHONE (OUTPATIENT)
Dept: ONCOLOGY | Facility: OTHER | Age: 62
End: 2022-10-10

## 2022-10-10 DIAGNOSIS — C91.10 CLL (CHRONIC LYMPHOCYTIC LEUKEMIA) (H): Primary | ICD-10-CM

## 2022-10-10 NOTE — TELEPHONE ENCOUNTER
[facility] Prior Authorization Team   Phone: [phone]  Fax: [phone]    PA Initiation    Medication: Calquence  Insurance Company: [employer] - Phone [phone] Fax [phone]  Pharmacy Filling the Rx: [facility] - [place], [place] - [address]  Filling Pharmacy Phone: [phone]  Filling Pharmacy Fax: [phone]  Start Date: 10/10/2022    Per [employer] rep, patient's plan changed within insurance and even though there was a PA good through March 2023, it required a new PA now.

## 2022-10-14 ENCOUNTER — LAB (OUTPATIENT)
Dept: LAB | Facility: OTHER | Age: 62
End: 2022-10-14
Attending: INTERNAL MEDICINE
Payer: COMMERCIAL

## 2022-10-14 ENCOUNTER — DOCUMENTATION ONLY (OUTPATIENT)
Dept: ONCOLOGY | Facility: CLINIC | Age: 62
End: 2022-10-14

## 2022-10-14 DIAGNOSIS — C91.10 CLL (CHRONIC LYMPHOCYTIC LEUKEMIA) (H): ICD-10-CM

## 2022-10-14 LAB
ALBUMIN SERPL-MCNC: 4.6 G/DL (ref 3.5–5.7)
ALP SERPL-CCNC: 42 U/L (ref 34–104)
ALT SERPL W P-5'-P-CCNC: 24 U/L (ref 7–52)
ANION GAP SERPL CALCULATED.3IONS-SCNC: 9 MMOL/L (ref 3–14)
AST SERPL W P-5'-P-CCNC: 20 U/L (ref 13–39)
BASOPHILS # BLD MANUAL: 0 10E3/UL (ref 0–0.2)
BASOPHILS NFR BLD MANUAL: 0 %
BILIRUB SERPL-MCNC: 0.8 MG/DL (ref 0.3–1)
BUN SERPL-MCNC: 16 MG/DL (ref 7–25)
CALCIUM SERPL-MCNC: 10.3 MG/DL (ref 8.6–10.3)
CHLORIDE BLD-SCNC: 102 MMOL/L (ref 98–107)
CO2 SERPL-SCNC: 28 MMOL/L (ref 21–31)
CREAT SERPL-MCNC: 1.07 MG/DL (ref 0.7–1.3)
EOSINOPHIL # BLD MANUAL: 0 10E3/UL (ref 0–0.7)
EOSINOPHIL NFR BLD MANUAL: 0 %
ERYTHROCYTE [DISTWIDTH] IN BLOOD BY AUTOMATED COUNT: 17.1 % (ref 10–15)
GFR SERPL CREATININE-BSD FRML MDRD: 77 ML/MIN/1.73M2
GLUCOSE BLD-MCNC: 106 MG/DL (ref 70–105)
HCT VFR BLD AUTO: 47.8 % (ref 40–53)
HGB BLD-MCNC: 15.7 G/DL (ref 13.3–17.7)
LYMPHOCYTES # BLD MANUAL: 15.2 10E3/UL (ref 0.8–5.3)
LYMPHOCYTES NFR BLD MANUAL: 76 %
MCH RBC QN AUTO: 27.1 PG (ref 26.5–33)
MCHC RBC AUTO-ENTMCNC: 32.8 G/DL (ref 31.5–36.5)
MCV RBC AUTO: 83 FL (ref 78–100)
MONOCYTES # BLD MANUAL: 0.6 10E3/UL (ref 0–1.3)
MONOCYTES NFR BLD MANUAL: 3 %
NEUTROPHILS # BLD MANUAL: 4.2 10E3/UL (ref 1.6–8.3)
NEUTROPHILS NFR BLD MANUAL: 21 %
PLAT MORPH BLD: ABNORMAL
PLATELET # BLD AUTO: 174 10E3/UL (ref 150–450)
POTASSIUM BLD-SCNC: 4.5 MMOL/L (ref 3.5–5.1)
PROT SERPL-MCNC: 7.3 G/DL (ref 6.4–8.9)
RBC # BLD AUTO: 5.79 10E6/UL (ref 4.4–5.9)
RBC MORPH BLD: ABNORMAL
SODIUM SERPL-SCNC: 139 MMOL/L (ref 134–144)
URATE SERPL-MCNC: 8.3 MG/DL (ref 4.4–7.6)
WBC # BLD AUTO: 20 10E3/UL (ref 4–11)

## 2022-10-14 PROCEDURE — 84550 ASSAY OF BLOOD/URIC ACID: CPT | Mod: ZL

## 2022-10-14 PROCEDURE — 80053 COMPREHEN METABOLIC PANEL: CPT | Mod: ZL

## 2022-10-14 PROCEDURE — 85007 BL SMEAR W/DIFF WBC COUNT: CPT | Mod: ZL

## 2022-10-14 PROCEDURE — 85014 HEMATOCRIT: CPT | Mod: ZL

## 2022-10-14 PROCEDURE — 36415 COLL VENOUS BLD VENIPUNCTURE: CPT | Mod: ZL

## 2022-10-14 NOTE — PROGRESS NOTES
Oral Chemotherapy Program  Lab review     Reviewed labs from 10/14/2022.    Labs are unremarkable and do not require any dose adjustments at this time. Continue to monitor uric acid closely.      Follow-up/plan  10/18/2022: Appointment with Dr. Lincoln Jose, PharmD, MPH, BCPS  October 14, 2022

## 2022-10-18 ENCOUNTER — ONCOLOGY VISIT (OUTPATIENT)
Dept: ONCOLOGY | Facility: OTHER | Age: 62
End: 2022-10-18
Attending: INTERNAL MEDICINE
Payer: COMMERCIAL

## 2022-10-18 VITALS
OXYGEN SATURATION: 98 % | DIASTOLIC BLOOD PRESSURE: 68 MMHG | TEMPERATURE: 97.8 F | RESPIRATION RATE: 16 BRPM | WEIGHT: 202 LBS | HEART RATE: 80 BPM | SYSTOLIC BLOOD PRESSURE: 118 MMHG | BODY MASS INDEX: 32.6 KG/M2

## 2022-10-18 DIAGNOSIS — E79.0 HYPERURICEMIA: ICD-10-CM

## 2022-10-18 DIAGNOSIS — C91.10 CLL (CHRONIC LYMPHOCYTIC LEUKEMIA) (H): Primary | ICD-10-CM

## 2022-10-18 PROCEDURE — 99215 OFFICE O/P EST HI 40 MIN: CPT | Performed by: INTERNAL MEDICINE

## 2022-10-18 PROCEDURE — 99417 PROLNG OP E/M EACH 15 MIN: CPT | Performed by: INTERNAL MEDICINE

## 2022-10-18 RX ORDER — ALLOPURINOL 300 MG/1
300 TABLET ORAL DAILY
Qty: 90 TABLET | Refills: 3 | Status: SHIPPED | OUTPATIENT
Start: 2022-10-18 | End: 2022-11-02

## 2022-10-18 NOTE — NURSING NOTE
Chief Complaint   Patient presents with     Oncology Clinic Visit     F/U CLL     Medication Reconciliation: complete    Mitzi Henson CMA ([facility])

## 2022-10-18 NOTE — PROGRESS NOTES
Visit Date: 10/18/2022    HISTORY OF PRESENT ILLNESS:  Mr. Varghese returns for followup of CLL.  We had originally seen the patient in consultation back on 06/21/2022.  At that time, Dr. John had seen the patient.  CBC revealed white count of 94.5, H and H was 12.4 and 40.1, platelet count was 164.  Differential was not done.  Two years prior, his white count was 16.3, hemoglobin 14.3, hematocrit 42.8, platelet count was 165.  When we saw the patient, we wanted to obtain a peripheral blood smear as there was no differential done on the original CBC, we felt the patient could have CLL, AML or CML.  We wanted to obtain a BCR/ABL transcript.  This came back negative.  The plan was to proceed with bone marrow aspiration biopsy to absolutely rule out acute leukemia.  Apparently in the interim, approximately three weeks later, the patient became extremely ill.  He was scheduled to have a bone marrow biopsy but this was not done as the patient was admitted on 02/06/2022 with acute respiratory failure, was tested positive for COVID-19, required high-flow oxygen therapy, but never was intubated and was discharged on supplemental oxygen, required a Life 2000 portable open ventilation system and was using bottled portable oxygen.  His O2 saturations improved.  He was seen by Dr. Donavan Rodriguez of Pulmonary recently who felt the patient was clinically doing better, and during his hospitalization, he was treated empirically with IV antibiotics.  Subsequently a bone marrow aspiration biopsy was done and reviewed.  Peripheral blood smear was done, which revealed CLL.  There was no abnormal FISH prognostic genome, there was no 17p deletion, no 11q22, no TP53.  The patient subsequently underwent a bone marrow aspiration biopsy, which confirmed CLL with bone marrow involvement of 75% of leukocytes.  Count had dropped significantly as well as absolute lymphocyte count.  There was a small monoclonal IgM, which was likely related to CLL.   There was no morphologic evidence of plasma cell neoplasm.  When we saw the patient on 06/01/2022, the patient was doing relatively well.  He had no complaints of fevers, night sweats, weight loss.  He was gaining strength.  His O2 requirements improved.  He had imaging studies prior to being admitted for COVID.  CT chest, abdomen and pelvis was unremarkable except for ground-glass opacities, which was felt to be due to COVID-19 pneumonia.  He had a repeat CT chest again on 02/06/2022, which revealed pneumonia.  Otherwise, no splenomegaly, lymphadenopathy, no enlargement of the hilar mediastinal nodes.  The patient was seen on 09/08/2022.  He was complaining of fatigue, occasional abdominal cramping.  No fevers, night sweats, weight loss.  He did have imaging that was performed on 09/01/2022.  CT neck revealed a 1.8 x 1.4 cm right paratracheal and paraesophageal lymph node in the superior mediastinum.  Otherwise no cervical lymphadenopathy.  CT chest, abdomen and pelvis, there was a significant enlargement of lymph nodes in the chest, abdomen and pelvis with enlarging paraesophageal lymph nodes and paratracheal nodes measuring up to 2.8 cm.  There was an enlarging lymph node along the right lateral aspect of the distal esophagus.  There was significant splenomegaly with spleen having enlarged from 12.9 cm to 14.5 cm.  There was evidence of retroperitoneal, mesenteric lymphadenopathy with a large 2.8 x 1.7 cm lymph node cephalad to the body of the pancreas as well as a large 2.3 cm aortocaval lymph node and retrocaval node involvement.  There was significant increase in size.  Given these findings, the patient now had progressed to bulky stage II disease.  Given his history of splenomegaly and fatigue, he was a candidate for treatment.  When we saw the patient, we felt that based on the [facility] [place] trial, that the patient would qualify for acalabrutinib alone, which has shown to improve median progression-free  survival compared to chlorambucil based regimens, as well as response rates were recorded at 94%.  We recommend starting the patient on acalabrutinib 100 mg p.o. b.i.d.  He has been on this for about a month.  He says he is doing relatively well.  He denies any fevers, night sweats, weight loss.  He may get an occasional rash.  He says he gets occasional headaches, and has had some recent back pain.  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  Otherwise, no other complaints of bleeding episodes, lymphadenopathy, early satiety.    PHYSICAL EXAMINATION:    GENERAL:  He is a middle-aged white male in no acute distress.  ECOG performance status is 0.  VITAL SIGNS:  Reveal blood pressure is 118/68, pulse 80, respirations 16, temperature 97.8.  HEENT:  Atraumatic, normocephalic.  Oropharynx nonerythematous.  NECK:  Supple.  LUNGS:  Clear to auscultation and percussion.  HEART:  Regular rhythm.  S1, S2 normal.  ABDOMEN:  Soft, normoactive bowel sounds, no masses, nontender.  LYMPHATICS:  No cervical, supraclavicular, axillary or inguinal nodes.  EXTREMITIES:  No edema.  NEUROLOGIC:  Nonfocal.    LABORATORY DATA:  Reveal CBC with white count of 20.0 with 76% lymphocytes.  Hemoglobin is 15.7, hematocrit 47.8, platelet count 174.  BUN 16, creatinine 1.07, glucose 106.  Uric acid is 8.3.    IMPRESSION AND PLAN:  Stage 0 chronic lymphocytic leukemia, now with evidence of progression of splenomegaly with significant retroperitoneal mediastinal hilar adenopathy as well as symptoms of fatigue.  The patient has progressed to stage II disease, will be a candidate for therapy.  We elected to start the patient on acalabrutinib, which has shown improved disease-free survival in these patients, as well as increased response rate.  The patient has been on acalabrutinib for one month and is essentially asymptomatic, but his uric acid is elevated, indicating tumor lysis.  We will start the patient on  allopurinol 300 mg p.o. daily.  Monitor uric acid levels monthly.  Otherwise, restage the patient in three months with CT neck, chest, abdomen and pelvis.    Seventy-four minutes was spent with the patient.  Time was spent reviewing the literature on treatment of CLL, performing history and physical, documenting history and physical, ordering allopurinol and ordering followup labs and scans.    Med Stark MD        D: 10/18/2022   T: 10/18/2022   MT: [facility]    Name:     ROSALINA MEZA  MRN:      [id number]        Account:    [id number]   :      1960           Visit Date: 10/18/2022     Document: [id number]    cc:  Filiberto John MD

## 2022-11-01 PROBLEM — J12.82 PNEUMONIA DUE TO 2019 NOVEL CORONAVIRUS: Status: RESOLVED | Noted: 2022-02-06 | Resolved: 2022-11-01

## 2022-11-01 PROBLEM — J96.01 ACUTE RESPIRATORY FAILURE WITH HYPOXIA (H): Status: RESOLVED | Noted: 2022-02-06 | Resolved: 2022-11-01

## 2022-11-01 PROBLEM — U07.1 PNEUMONIA DUE TO 2019 NOVEL CORONAVIRUS: Status: RESOLVED | Noted: 2022-02-06 | Resolved: 2022-11-01

## 2022-11-01 PROBLEM — J18.9 PNEUMONIA OF BOTH LUNGS DUE TO INFECTIOUS ORGANISM: Status: RESOLVED | Noted: 2022-02-06 | Resolved: 2022-11-01

## 2022-11-02 DIAGNOSIS — C91.10 CLL (CHRONIC LYMPHOCYTIC LEUKEMIA) (H): Primary | ICD-10-CM

## 2022-11-02 RX ORDER — POLYETHYLENE GLYCOL 3350, SODIUM CHLORIDE, SODIUM BICARBONATE, POTASSIUM CHLORIDE 420; 11.2; 5.72; 1.48 G/4L; G/4L; G/4L; G/4L
4000 POWDER, FOR SOLUTION ORAL ONCE
Qty: 4000 ML | Refills: 0 | Status: SHIPPED | OUTPATIENT
Start: 2022-11-02 | End: 2022-11-02

## 2022-11-02 RX ORDER — BISACODYL 5 MG
TABLET, DELAYED RELEASE (ENTERIC COATED) ORAL
Qty: 2 TABLET | Refills: 0 | Status: ON HOLD | OUTPATIENT
Start: 2022-11-02 | End: 2022-11-07

## 2022-11-02 NOTE — TELEPHONE ENCOUNTER
Colonoscopy 2022    Medications . Needed to resend to pharmacy for colonoscopy. Jana Coto RN  ....................  2022   2:15 PM

## 2022-11-07 ENCOUNTER — ANESTHESIA (OUTPATIENT)
Dept: SURGERY | Facility: OTHER | Age: 62
End: 2022-11-07
Payer: COMMERCIAL

## 2022-11-07 ENCOUNTER — HOSPITAL ENCOUNTER (OUTPATIENT)
Facility: OTHER | Age: 62
Discharge: HOME | End: 2022-11-07
Attending: SURGERY | Admitting: SURGERY
Payer: COMMERCIAL

## 2022-11-07 ENCOUNTER — ANESTHESIA EVENT (OUTPATIENT)
Dept: SURGERY | Facility: OTHER | Age: 62
End: 2022-11-07
Payer: COMMERCIAL

## 2022-11-07 VITALS
HEIGHT: 66 IN | SYSTOLIC BLOOD PRESSURE: 102 MMHG | HEART RATE: 83 BPM | OXYGEN SATURATION: 95 % | WEIGHT: 202 LBS | DIASTOLIC BLOOD PRESSURE: 68 MMHG | TEMPERATURE: 97.6 F | BODY MASS INDEX: 32.47 KG/M2 | RESPIRATION RATE: 12 BRPM

## 2022-11-07 PROBLEM — K63.5 COLON POLYP: Status: ACTIVE | Noted: 2022-11-07

## 2022-11-07 LAB — GLUCOSE BLDC GLUCOMTR-MCNC: 133 MG/DL (ref 70–99)

## 2022-11-07 PROCEDURE — 45384 COLONOSCOPY W/LESION REMOVAL: CPT | Mod: PT | Performed by: SURGERY

## 2022-11-07 PROCEDURE — 250N000009 HC RX 250: Performed by: NURSE ANESTHETIST, CERTIFIED REGISTERED

## 2022-11-07 PROCEDURE — 250N000011 HC RX IP 250 OP 636: Performed by: NURSE ANESTHETIST, CERTIFIED REGISTERED

## 2022-11-07 PROCEDURE — 45384 COLONOSCOPY W/LESION REMOVAL: CPT | Performed by: NURSE ANESTHETIST, CERTIFIED REGISTERED

## 2022-11-07 PROCEDURE — 88305 TISSUE EXAM BY PATHOLOGIST: CPT

## 2022-11-07 PROCEDURE — 45380 COLONOSCOPY AND BIOPSY: CPT | Performed by: SURGERY

## 2022-11-07 PROCEDURE — 82962 GLUCOSE BLOOD TEST: CPT

## 2022-11-07 PROCEDURE — 258N000003 HC RX IP 258 OP 636: Performed by: SURGERY

## 2022-11-07 RX ORDER — LIDOCAINE HYDROCHLORIDE 20 MG/ML
INJECTION, SOLUTION INFILTRATION; PERINEURAL PRN
Status: DISCONTINUED | OUTPATIENT
Start: 2022-11-07 | End: 2022-11-07

## 2022-11-07 RX ORDER — SODIUM CHLORIDE, SODIUM LACTATE, POTASSIUM CHLORIDE, CALCIUM CHLORIDE 600; 310; 30; 20 MG/100ML; MG/100ML; MG/100ML; MG/100ML
INJECTION, SOLUTION INTRAVENOUS CONTINUOUS
Status: DISCONTINUED | OUTPATIENT
Start: 2022-11-07 | End: 2022-11-07 | Stop reason: HOSPADM

## 2022-11-07 RX ORDER — NALOXONE HYDROCHLORIDE 0.4 MG/ML
0.4 INJECTION, SOLUTION INTRAMUSCULAR; INTRAVENOUS; SUBCUTANEOUS
Status: DISCONTINUED | OUTPATIENT
Start: 2022-11-07 | End: 2022-11-07 | Stop reason: HOSPADM

## 2022-11-07 RX ORDER — PROPOFOL 10 MG/ML
INJECTION, EMULSION INTRAVENOUS CONTINUOUS PRN
Status: DISCONTINUED | OUTPATIENT
Start: 2022-11-07 | End: 2022-11-07

## 2022-11-07 RX ORDER — NALOXONE HYDROCHLORIDE 0.4 MG/ML
0.2 INJECTION, SOLUTION INTRAMUSCULAR; INTRAVENOUS; SUBCUTANEOUS
Status: DISCONTINUED | OUTPATIENT
Start: 2022-11-07 | End: 2022-11-07 | Stop reason: HOSPADM

## 2022-11-07 RX ORDER — FLUMAZENIL 0.1 MG/ML
0.2 INJECTION, SOLUTION INTRAVENOUS
Status: DISCONTINUED | OUTPATIENT
Start: 2022-11-07 | End: 2022-11-07 | Stop reason: HOSPADM

## 2022-11-07 RX ORDER — ONDANSETRON 2 MG/ML
4 INJECTION INTRAMUSCULAR; INTRAVENOUS
Status: DISCONTINUED | OUTPATIENT
Start: 2022-11-07 | End: 2022-11-07 | Stop reason: HOSPADM

## 2022-11-07 RX ORDER — LIDOCAINE 40 MG/G
CREAM TOPICAL
Status: DISCONTINUED | OUTPATIENT
Start: 2022-11-07 | End: 2022-11-07 | Stop reason: HOSPADM

## 2022-11-07 RX ORDER — PROPOFOL 10 MG/ML
INJECTION, EMULSION INTRAVENOUS PRN
Status: DISCONTINUED | OUTPATIENT
Start: 2022-11-07 | End: 2022-11-07

## 2022-11-07 RX ADMIN — PROPOFOL 80 MG: 10 INJECTION, EMULSION INTRAVENOUS at 07:31

## 2022-11-07 RX ADMIN — PROPOFOL 140 MCG/KG/MIN: 10 INJECTION, EMULSION INTRAVENOUS at 07:31

## 2022-11-07 RX ADMIN — LIDOCAINE HYDROCHLORIDE 40 MG: 20 INJECTION, SOLUTION INFILTRATION; PERINEURAL at 07:31

## 2022-11-07 RX ADMIN — SODIUM CHLORIDE, POTASSIUM CHLORIDE, SODIUM LACTATE AND CALCIUM CHLORIDE 100 ML/HR: 600; 310; 30; 20 INJECTION, SOLUTION INTRAVENOUS at 07:15

## 2022-11-07 ASSESSMENT — ACTIVITIES OF DAILY LIVING (ADL): ADLS_ACUITY_SCORE: 35

## 2022-11-07 NOTE — ANESTHESIA POSTPROCEDURE EVALUATION
Patient: Jesus Varghese    Procedure: Procedure(s):  COLONOSCOPY, WITH POLYPECTOMY AND BIOPSY       Anesthesia Type:  MAC    Note:  Disposition: Outpatient   Postop Pain Control: Uneventful            Sign Out: Well controlled pain   PONV: No   Neuro/Psych: Uneventful            Sign Out: Acceptable/Baseline neuro status   Airway/Respiratory: Uneventful            Sign Out: Acceptable/Baseline resp. status   CV/Hemodynamics: Uneventful            Sign Out: Acceptable CV status   Other NRE: NONE   DID A NON-ROUTINE EVENT OCCUR? No           Last vitals:  Vitals Value Taken Time   /68 11/07/22 0815   Temp     Pulse 83 11/07/22 0815   Resp 12 11/07/22 0815   SpO2 96 % 11/07/22 0816   Vitals shown include unvalidated device data.    Electronically Signed By: CUATE Bonner CRNA  November 7, 2022  10:38 AM

## 2022-11-07 NOTE — H&P
History and Physical    CHIEF COMPLAINT / REASON FOR PROCEDURE:  H/o colon cancer    PERTINENT HISTORY   Patient is a 62 year old male who presents today for colonoscopy for h/o colon cancer.   Last colonoscopy 2015.  Patient has no complaints.    Past Medical History:   Diagnosis Date     Acute respiratory failure with hypoxia (H) 2/6/2022     Age-related cataract     No Comments Provided     Calculus of kidney     No Comments Provided     Carpal tunnel syndrome     No Comments Provided     CLL (chronic lymphocytic leukemia) (H) 2/6/2022     Diverticulosis of intestine without perforation or abscess without bleeding     mild     Dorsalgia     No Comments Provided     Inflammatory liver disease     ? cause 1982     Malignant neoplasm of colon (H)     2006     Other specified postprocedural states     1/14/2016     Pneumonia due to 2019 novel coronavirus 2/6/2022     Pneumonia of both lungs due to infectious organism 2/6/2022     Pure hyperglyceridemia     No Comments Provided     Strain of muscle, fascia and tendon of other parts of biceps, left arm, initial encounter     1/7/2016     Past Surgical History:   Procedure Laterality Date     BONE MARROW BIOPSY, BONE SPECIMEN, NEEDLE/TROCAR Left 5/12/2022    Procedure: BIOPSY, BONE MARROW;  Surgeon: Delia Holguin MD;  Location: [facility] OR     COLON SURGERY      8/22/06,Sigmoid colectomy for [facility] C2 adenocarcinoma     COLONOSCOPY      9/07/07,next due in 2010     COLONOSCOPY      08/30/2010,F/U 2015     COLONOSCOPY  10/19/2015    10/19/15,F/U 2020     EXTRACAPSULAR CATARACT EXTRATION WITH INTRAOCULAR LENS IMPLANT      2011, 2012,Bilateral cataracts R 2011.. L 2012     LAPAROSCOPIC CHOLECYSTECTOMY      04/24/07     OTHER SURGICAL HISTORY      10/11/06,092726,PORTACATH,Placement of Port-A-Cath, right anterior chest, for chemotherapy     OTHER SURGICAL HISTORY      08/31/2010,,HERNIA REPAIR,Mesh Underlay     OTHER SURGICAL HISTORY      2/12/15,506275,IR URETERAL STENT  "LEFT     OTHER SURGICAL HISTORY      1/14/2016,710998,OTHER,Left,arthrex equipment used     TONSILLECTOMY, ADENOIDECTOMY, COMBINED      age 1     VASECTOMY       x2, spontaneous reconnected, so needed 2nd procedure       Bleeding tendencies:  No    ALLERGIES/SENSITIVITIES: No Known Allergies     CURRENT MEDICATIONS:    Prior to Admission medications    Medication Sig Start Date End Date Taking? Authorizing Provider   acalabrutinib (CALQUENCE) 100 MG capsule Take 1 capsule (100 mg) by mouth 2 times daily for 30 days 10/12/22 11/11/22 Yes Med Stark MD   ACCU-CHEK GUIDE test strip USE 1 STRIP ONCE DAILY 4/28/22  Yes Maidha Skinner NP   aspirin 81 MG chewable tablet Take 1 tablet (81 mg) by mouth daily with food 11/20/18  Yes Filiberto John MD   blood glucose (NO BRAND SPECIFIED) lancets standard Dispense item covered by insurance. Test blood sugar 1 times daily. 3/8/21  Yes Filiberto John MD   blood glucose monitoring (NO BRAND SPECIFIED) meter device kit Dispense option covered by insurance. Test blood sugar 1 times daily. 3/8/21  Yes Filiberto John MD   HEMP OIL OR EXTRACT OR OTHER CBD CANNABINOID, NOT MEDICAL CANNABIS, Take 4 drops by mouth At Bedtime    Yes Reported, Patient   multivitamin, therapeutic (THERA-VIT) TABS tablet Take 1 tablet by mouth daily    Reported, Patient   Vitamin D, Cholecalciferol, 25 MCG (1000 UT) CAPS Take 1 capsule by mouth daily     Reported, Patient   zinc sulfate (ZINCATE) 220 (50 Zn) MG capsule Take 220 mg by mouth daily    Reported, Patient       Physical Exam:  /89   Pulse 88   Temp 97.6  F (36.4  C) (Tympanic)   Resp 10   Ht 1.676 m (5' 6\")   Wt 91.6 kg (202 lb)   SpO2 98%   BMI 32.60 kg/m    EXAM:  Chest/Respiratory Exam: Normal - Clear to auscultation without rales, rhonchi, or wheezing.  Cardiovascular Exam: normal, regular rate and rhythm        PLAN: COLONOSCOPY .  Patient understands risks of bleeding, perforation, potential " inability to reach cecum, aspiration and wishes to proceed. MAC needed for ASA III.

## 2022-11-07 NOTE — OP NOTE
PROCEDURE NOTE    DATE OF SERVICE: 11/7/2022    SURGEON: Emeka Lopez MD    PRE-OP DIAGNOSIS:    History of Colon Cancer      POST-OP DIAGNOSIS:  Same  Diverticulosis,mild  Polyp at 35 cm    PROCEDURE:   Colonoscopy with hot biopsy    ANESTHESIA:  VIPIN Cotto CRNA    INDICATION FOR THE PROCEDURE: The patient is a 62 year old male with h/o colon cancer . The patient has no other complaints  . After explaining the risks to include bleeding, perforation, potential inability toreach the cecum, the patient wished to proceed.    PROCEDURE:After adequate sedation, the patient was in the left lateral decubitus position.  Rectal exam was performed.  There was normal tone and no palpable masses .  The colonoscope was introduced into the rectum and advanced to the cecum with Mild difficulty.  The patient's prep was good.  The terminal cecum was reached.  The cecum, ascending, transverse, and descending  colon was with mild diverticulosis and a diminutive polyp at 35 cm that was hot biopsied and destroyed  .  The scope was retroflexed in the rectum.  The rectum was unremarkable  .  The scope was straightened and removed.  The patient tolerated the procedure well.     ESTIMATED BLOOD LOSS: none    COMPLICATIONS:  None    TISSUE REMOVED:  Yes    RECOMMEND:    Follow-up pending pathology  Fiber  Given literature on diverticulosis    Emeka Lopez MD FACS

## 2022-11-07 NOTE — DISCHARGE INSTRUCTIONS
[facility]  Adult Discharge Orders & Instructions    ________________________________________________________________          For 12 hours after surgery  Get plenty of rest.  A responsible adult must stay with you for at least 12 hours after you leave the hospital.   You may feel lightheaded.  IF so, sit for a few minutes before standing.  Have someone help you get up.   You may have a slight fever. Call the doctor if your fever is over 101 F (38.3 C) (taken under the tongue) or lasts longer than 24 hours.  You may have a dry mouth, a sore throat, muscle aches or trouble sleeping.  These should go away after 24 hours.  Do not make important or legal decisions.  6.   Do not drive or use heavy equipment.  If you have weakness or tingling, don't drive or use heavy equipment until this feeling goes away.    To contact a doctor, call   [phone]_______________________

## 2022-11-07 NOTE — ANESTHESIA CARE TRANSFER NOTE
Patient: Jesus Varghese    Procedure: Procedure(s):  COLONOSCOPY, WITH POLYPECTOMY AND BIOPSY       Diagnosis: Neoplasm of colon, malignant (H) [C18.9]  Diagnosis Additional Information: No value filed.    Anesthesia Type:   MAC     Note:    Oropharynx: oropharynx clear of all foreign objects  Level of Consciousness: awake  Oxygen Supplementation: nasal cannula  Level of Supplemental Oxygen (L/min / FiO2): 3    Dentition: dentition unchanged  Vital Signs Stable: post-procedure vital signs reviewed and stable  Report to RN Given: handoff report given  Patient transferred to: Phase II    Handoff Report: Identifed the Patient, Identified the Reponsible Provider, Reviewed the pertinent medical history, Discussed the surgical course, Reviewed Intra-OP anesthesia mangement and issues during anesthesia, Set expectations for post-procedure period and Allowed opportunity for questions and acknowledgement of understanding      Vitals:  Vitals Value Taken Time   BP     Temp     Pulse     Resp     SpO2         Electronically Signed By: CUATE Bonner CRNA  November 7, 2022  7:50 AM

## 2022-11-07 NOTE — ANESTHESIA PREPROCEDURE EVALUATION
Anesthesia Pre-Procedure Evaluation    Patient: Jesus Varghese   MRN: [id number] : 1960        Procedure : Procedure(s):  COLONOSCOPY          Past Medical History:   Diagnosis Date     Acute respiratory failure with hypoxia (H) 2022     Age-related cataract     No Comments Provided     Calculus of kidney     No Comments Provided     Carpal tunnel syndrome     No Comments Provided     CLL (chronic lymphocytic leukemia) (H) 2022     Diverticulosis of intestine without perforation or abscess without bleeding     mild     Dorsalgia     No Comments Provided     Inflammatory liver disease     ? cause 1982     Malignant neoplasm of colon (H)     2006     Other specified postprocedural states     2016     Pneumonia due to 2019 novel coronavirus 2022     Pneumonia of both lungs due to infectious organism 2022     Pure hyperglyceridemia     No Comments Provided     Strain of muscle, fascia and tendon of other parts of biceps, left arm, initial encounter     2016      Past Surgical History:   Procedure Laterality Date     BONE MARROW BIOPSY, BONE SPECIMEN, NEEDLE/TROCAR Left 2022    Procedure: BIOPSY, BONE MARROW;  Surgeon: Delia Holguin MD;  Location: [facility] OR     COLON SURGERY      06,Sigmoid colectomy for [facility] C2 adenocarcinoma     COLONOSCOPY      07,next due in      COLONOSCOPY      2010,F/U      COLONOSCOPY  10/19/2015    10/19/15,F/U      EXTRACAPSULAR CATARACT EXTRATION WITH INTRAOCULAR LENS IMPLANT      , ,Bilateral cataracts R .. L      LAPAROSCOPIC CHOLECYSTECTOMY      07     OTHER SURGICAL HISTORY      10/11/06,964471,PORTACATH,Placement of Port-A-Cath, right anterior chest, for chemotherapy     OTHER SURGICAL HISTORY      2010,,HERNIA REPAIR,Mesh Underlay     OTHER SURGICAL HISTORY      2/12/15,905078,IR URETERAL STENT LEFT     OTHER SURGICAL HISTORY      2016,612311,OTHER,Left,arthrex equipment used      TONSILLECTOMY, ADENOIDECTOMY, COMBINED      age 1     VASECTOMY       x2, spontaneous reconnected, so needed 2nd procedure      No Known Allergies   Social History     Tobacco Use     Smoking status: Former     Packs/day: 2.00     Years: 30.00     Pack years: 60.00     Types: Cigarettes     Quit date: 2010     Years since quittin.3     Smokeless tobacco: Never   Substance Use Topics     Alcohol use: Not Currently      Wt Readings from Last 1 Encounters:   22 91.6 kg (202 lb)        Anesthesia Evaluation   Pt has had prior anesthetic.         ROS/MED HX  ENT/Pulmonary: Comment: Recovering from covid. Was hospitalized -3/22. Discharged on 8L oxygen. Lost 50 lbs since  (now regained 40 back). Feeling much better but continues with 1-2L O2 at night. Wife states he's a shallow breather while asleep.      Neurologic:       Cardiovascular:  - neg cardiovascular ROS   (+) -----BREEN.     METS/Exercise Tolerance: >4 METS    Hematologic:  - neg hematologic  ROS     Musculoskeletal:  - neg musculoskeletal ROS     GI/Hepatic:     (+) hepatitis liver disease,     Renal/Genitourinary:     (+) renal disease, Nephrolithiasis ,     Endo:     (+) type II DM,     Psychiatric/Substance Use:  - neg psychiatric ROS     Infectious Disease:       Malignancy: Comment: CLL  Malignant neoplasm of colon, unspecified part of colon  S/P partial colectomy      Other:  - neg other ROS          Physical Exam    Airway        Mallampati: II   TM distance: > 3 FB   Neck ROM: full   Mouth opening: > 3 cm    Respiratory Devices and Support         Dental  no notable dental history     (+) implants      Cardiovascular   cardiovascular exam normal       Rhythm and rate: regular and normal     Pulmonary   pulmonary exam normal        breath sounds clear to auscultation           OUTSIDE LABS:  CBC:   Lab Results   Component Value Date    WBC 20.0 (H) 10/14/2022    WBC 16.6 (H) 2022    HGB 15.7 10/14/2022    HGB 14.8 2022     HCT 47.8 10/14/2022    HCT 44.8 09/01/2022     10/14/2022     09/01/2022     BMP:   Lab Results   Component Value Date     10/14/2022     09/01/2022    POTASSIUM 4.5 10/14/2022    POTASSIUM 5.0 09/01/2022    CHLORIDE 102 10/14/2022    CHLORIDE 104 09/01/2022    CO2 28 10/14/2022    CO2 28 09/01/2022    BUN 16 10/14/2022    BUN 16 09/01/2022    CR 1.07 10/14/2022    CR 1.21 09/01/2022     (H) 10/14/2022     09/01/2022     COAGS:   Lab Results   Component Value Date    FIBR 725 (H) 02/08/2022     POC: No results found for: BGM, HCG, HCGS  HEPATIC:   Lab Results   Component Value Date    ALBUMIN 4.6 10/14/2022    PROTTOTAL 7.3 10/14/2022    ALT 24 10/14/2022    AST 20 10/14/2022    ALKPHOS 42 10/14/2022    BILITOTAL 0.8 10/14/2022    BILIDIRECT 0.09 02/09/2017     OTHER:   Lab Results   Component Value Date    PH 7.40 03/25/2022    LACT 0.6 (L) 03/06/2022    A1C 6.0 09/02/2022    LILIAN 10.3 10/14/2022    MAG 2.0 02/06/2022    LIPASE 64.5 04/25/2016    AMYLASE 26 (L) 02/11/2015    .0 (H) 02/27/2022     (H) 02/06/2022       Anesthesia Plan    ASA Status:  3   NPO Status:  NPO Appropriate    Anesthesia Type: MAC.     - Reason for MAC: straight local not clinically adequate   Induction: Intravenous.   Maintenance: Balanced.        Consents    Anesthesia Plan(s) and associated risks, benefits, and realistic alternatives discussed. Questions answered and patient/representative(s) expressed understanding.     - Discussed: Risks, Benefits and Alternatives for BOTH SEDATION and the PROCEDURE were discussed     - Discussed with:  Patient, Spouse         Postoperative Care            Comments:    Other Comments: Risks, benefits and alternatives discussed and would like to proceed. General anesthesia ok if indicated.             CUATE Bonner CRNA

## 2022-11-08 PROBLEM — K63.5 COLON POLYP: Status: RESOLVED | Noted: 2022-11-07 | Resolved: 2022-11-08

## 2022-11-08 LAB
PATH REPORT.COMMENTS IMP SPEC: NORMAL
PATH REPORT.FINAL DX SPEC: NORMAL
PATH REPORT.RELEVANT HX SPEC: NORMAL
PHOTO IMAGE: NORMAL

## 2022-11-09 ENCOUNTER — TELEPHONE (OUTPATIENT)
Dept: ONCOLOGY | Facility: OTHER | Age: 62
End: 2022-11-09

## 2022-11-09 DIAGNOSIS — C91.10 CLL (CHRONIC LYMPHOCYTIC LEUKEMIA) (H): Primary | ICD-10-CM

## 2022-11-10 NOTE — TELEPHONE ENCOUNTER
PA Initiation    Medication: Calquence 100mg, pa pending tablets  Ref.# [id number]  Insurance Company: [employer] - Phone [phone] Fax [phone]  Pharmacy Filling the Rx: [facility] - [place], [place] - [address]  Filling Pharmacy Phone:    Filling Pharmacy Fax:    Start Date: 11/9/2022

## 2022-11-10 NOTE — TELEPHONE ENCOUNTER
Prior Authorization Approval    Authorization Effective Date: 10/10/2022  Authorization Expiration Date: 11/10/2023  Medication: Calquence 100mg, pa approved tablets  Approved Dose/Quantity: 60/30 days  Reference #: [id number]   Insurance Company: [employer] - Phone [phone] Fax [phone]  Expected CoPay:        $985.21  CoPay Card Available:   Foundation Assistance Needed:    Which Pharmacy is filling the prescription (Not needed for infusion/clinic administered): [facility] - [place], [place] - [address]  Pharmacy Notified: Yes  Patient Notified: Yes

## 2022-11-11 ENCOUNTER — LAB (OUTPATIENT)
Dept: LAB | Facility: OTHER | Age: 62
End: 2022-11-11
Attending: INTERNAL MEDICINE
Payer: COMMERCIAL

## 2022-11-11 DIAGNOSIS — C91.10 CLL (CHRONIC LYMPHOCYTIC LEUKEMIA) (H): ICD-10-CM

## 2022-11-11 LAB
ALBUMIN SERPL BCG-MCNC: 4.3 G/DL (ref 3.5–5.2)
ALP SERPL-CCNC: 58 U/L (ref 40–129)
ALT SERPL W P-5'-P-CCNC: 45 U/L (ref 10–50)
ANION GAP SERPL CALCULATED.3IONS-SCNC: 10 MMOL/L (ref 7–15)
AST SERPL W P-5'-P-CCNC: 30 U/L (ref 10–50)
BASOPHILS # BLD MANUAL: 0 10E3/UL (ref 0–0.2)
BASOPHILS NFR BLD MANUAL: 0 %
BILIRUB SERPL-MCNC: 0.6 MG/DL
BUN SERPL-MCNC: 15.3 MG/DL (ref 8–23)
CALCIUM SERPL-MCNC: 10.1 MG/DL (ref 8.8–10.2)
CHLORIDE SERPL-SCNC: 100 MMOL/L (ref 98–107)
CREAT SERPL-MCNC: 1.02 MG/DL (ref 0.67–1.17)
DEPRECATED HCO3 PLAS-SCNC: 29 MMOL/L (ref 22–29)
EOSINOPHIL # BLD MANUAL: 0.3 10E3/UL (ref 0–0.7)
EOSINOPHIL NFR BLD MANUAL: 2 %
ERYTHROCYTE [DISTWIDTH] IN BLOOD BY AUTOMATED COUNT: 15 % (ref 10–15)
GFR SERPL CREATININE-BSD FRML MDRD: 82 ML/MIN/1.73M2
GLUCOSE SERPL-MCNC: 164 MG/DL (ref 70–99)
HCT VFR BLD AUTO: 47.3 % (ref 40–53)
HGB BLD-MCNC: 15.7 G/DL (ref 13.3–17.7)
LDH SERPL L TO P-CCNC: 187 U/L (ref 0–250)
LYMPHOCYTES # BLD MANUAL: 13.4 10E3/UL (ref 0.8–5.3)
LYMPHOCYTES NFR BLD MANUAL: 77 %
MCH RBC QN AUTO: 27.9 PG (ref 26.5–33)
MCHC RBC AUTO-ENTMCNC: 33.2 G/DL (ref 31.5–36.5)
MCV RBC AUTO: 84 FL (ref 78–100)
MONOCYTES # BLD MANUAL: 0.5 10E3/UL (ref 0–1.3)
MONOCYTES NFR BLD MANUAL: 3 %
NEUTROPHILS # BLD MANUAL: 3.1 10E3/UL (ref 1.6–8.3)
NEUTROPHILS NFR BLD MANUAL: 18 %
PLAT MORPH BLD: ABNORMAL
PLATELET # BLD AUTO: 156 10E3/UL (ref 150–450)
POTASSIUM SERPL-SCNC: 4.1 MMOL/L (ref 3.4–5.3)
PROT SERPL-MCNC: 6.5 G/DL (ref 6.4–8.3)
RBC # BLD AUTO: 5.63 10E6/UL (ref 4.4–5.9)
RBC MORPH BLD: ABNORMAL
SMUDGE CELLS BLD QL SMEAR: PRESENT
SODIUM SERPL-SCNC: 139 MMOL/L (ref 136–145)
URATE SERPL-MCNC: 8.4 MG/DL (ref 3.4–7)
WBC # BLD AUTO: 17.4 10E3/UL (ref 4–11)

## 2022-11-11 PROCEDURE — 36415 COLL VENOUS BLD VENIPUNCTURE: CPT | Mod: ZL

## 2022-11-11 PROCEDURE — 85027 COMPLETE CBC AUTOMATED: CPT | Mod: ZL

## 2022-11-11 PROCEDURE — 83615 LACTATE (LD) (LDH) ENZYME: CPT | Mod: ZL

## 2022-11-11 PROCEDURE — 84550 ASSAY OF BLOOD/URIC ACID: CPT | Mod: ZL

## 2022-11-11 PROCEDURE — 85007 BL SMEAR W/DIFF WBC COUNT: CPT | Mod: ZL

## 2022-11-11 PROCEDURE — 80053 COMPREHEN METABOLIC PANEL: CPT | Mod: ZL

## 2022-11-30 DIAGNOSIS — C91.10 CLL (CHRONIC LYMPHOCYTIC LEUKEMIA) (H): Primary | ICD-10-CM

## 2022-12-09 ENCOUNTER — LAB (OUTPATIENT)
Dept: LAB | Facility: OTHER | Age: 62
End: 2022-12-09
Attending: INTERNAL MEDICINE
Payer: COMMERCIAL

## 2022-12-09 DIAGNOSIS — C91.10 CLL (CHRONIC LYMPHOCYTIC LEUKEMIA) (H): Primary | ICD-10-CM

## 2022-12-09 DIAGNOSIS — C91.10 CLL (CHRONIC LYMPHOCYTIC LEUKEMIA) (H): ICD-10-CM

## 2022-12-09 LAB
ALBUMIN SERPL BCG-MCNC: 4.5 G/DL (ref 3.5–5.2)
ALP SERPL-CCNC: 57 U/L (ref 40–129)
ALT SERPL W P-5'-P-CCNC: 50 U/L (ref 10–50)
ANION GAP SERPL CALCULATED.3IONS-SCNC: 9 MMOL/L (ref 7–15)
AST SERPL W P-5'-P-CCNC: 30 U/L (ref 10–50)
BASOPHILS # BLD MANUAL: 0 10E3/UL (ref 0–0.2)
BASOPHILS NFR BLD MANUAL: 0 %
BILIRUB SERPL-MCNC: 0.8 MG/DL
BUN SERPL-MCNC: 17.8 MG/DL (ref 8–23)
CALCIUM SERPL-MCNC: 9.7 MG/DL (ref 8.8–10.2)
CHLORIDE SERPL-SCNC: 103 MMOL/L (ref 98–107)
CREAT SERPL-MCNC: 1.07 MG/DL (ref 0.67–1.17)
DEPRECATED HCO3 PLAS-SCNC: 29 MMOL/L (ref 22–29)
EOSINOPHIL # BLD MANUAL: 0 10E3/UL (ref 0–0.7)
EOSINOPHIL NFR BLD MANUAL: 0 %
ERYTHROCYTE [DISTWIDTH] IN BLOOD BY AUTOMATED COUNT: 14.3 % (ref 10–15)
GFR SERPL CREATININE-BSD FRML MDRD: 77 ML/MIN/1.73M2
GLUCOSE SERPL-MCNC: 119 MG/DL (ref 70–99)
HCT VFR BLD AUTO: 48.9 % (ref 40–53)
HGB BLD-MCNC: 16.6 G/DL (ref 13.3–17.7)
LDH SERPL L TO P-CCNC: 170 U/L (ref 0–250)
LYMPHOCYTES # BLD MANUAL: 9.5 10E3/UL (ref 0.8–5.3)
LYMPHOCYTES NFR BLD MANUAL: 63 %
MCH RBC QN AUTO: 28.1 PG (ref 26.5–33)
MCHC RBC AUTO-ENTMCNC: 33.9 G/DL (ref 31.5–36.5)
MCV RBC AUTO: 83 FL (ref 78–100)
MONOCYTES # BLD MANUAL: 0.8 10E3/UL (ref 0–1.3)
MONOCYTES NFR BLD MANUAL: 5 %
NEUTROPHILS # BLD MANUAL: 4.8 10E3/UL (ref 1.6–8.3)
NEUTROPHILS NFR BLD MANUAL: 32 %
PLAT MORPH BLD: ABNORMAL
PLATELET # BLD AUTO: 157 10E3/UL (ref 150–450)
POTASSIUM SERPL-SCNC: 4.5 MMOL/L (ref 3.4–5.3)
PROT SERPL-MCNC: 7 G/DL (ref 6.4–8.3)
RBC # BLD AUTO: 5.91 10E6/UL (ref 4.4–5.9)
RBC MORPH BLD: ABNORMAL
SODIUM SERPL-SCNC: 141 MMOL/L (ref 136–145)
URATE SERPL-MCNC: 8.8 MG/DL (ref 3.4–7)
VARIANT LYMPHS BLD QL SMEAR: PRESENT
WBC # BLD AUTO: 15.1 10E3/UL (ref 4–11)

## 2022-12-09 PROCEDURE — 85007 BL SMEAR W/DIFF WBC COUNT: CPT | Mod: ZL

## 2022-12-09 PROCEDURE — 85018 HEMOGLOBIN: CPT | Mod: ZL

## 2022-12-09 PROCEDURE — 36415 COLL VENOUS BLD VENIPUNCTURE: CPT | Mod: ZL

## 2022-12-09 PROCEDURE — 84550 ASSAY OF BLOOD/URIC ACID: CPT | Mod: ZL

## 2022-12-09 PROCEDURE — 80053 COMPREHEN METABOLIC PANEL: CPT | Mod: ZL

## 2022-12-09 PROCEDURE — 83615 LACTATE (LD) (LDH) ENZYME: CPT | Mod: ZL

## 2023-01-09 DIAGNOSIS — C91.10 CLL (CHRONIC LYMPHOCYTIC LEUKEMIA) (H): Primary | ICD-10-CM

## 2023-01-17 ENCOUNTER — HOSPITAL ENCOUNTER (OUTPATIENT)
Dept: CT IMAGING | Facility: OTHER | Age: 63
Discharge: HOME | End: 2023-01-17
Attending: INTERNAL MEDICINE
Payer: COMMERCIAL

## 2023-01-17 ENCOUNTER — LAB (OUTPATIENT)
Dept: LAB | Facility: OTHER | Age: 63
End: 2023-01-17
Attending: INTERNAL MEDICINE
Payer: COMMERCIAL

## 2023-01-17 DIAGNOSIS — C91.10 CLL (CHRONIC LYMPHOCYTIC LEUKEMIA) (H): ICD-10-CM

## 2023-01-17 LAB
ALBUMIN SERPL BCG-MCNC: 4.5 G/DL (ref 3.5–5.2)
ALP SERPL-CCNC: 57 U/L (ref 40–129)
ALT SERPL W P-5'-P-CCNC: 79 U/L (ref 10–50)
ANION GAP SERPL CALCULATED.3IONS-SCNC: 13 MMOL/L (ref 7–15)
AST SERPL W P-5'-P-CCNC: 48 U/L (ref 10–50)
BASOPHILS # BLD MANUAL: 0.1 10E3/UL (ref 0–0.2)
BASOPHILS NFR BLD MANUAL: 1 %
BILIRUB SERPL-MCNC: 0.8 MG/DL
BUN SERPL-MCNC: 14.5 MG/DL (ref 8–23)
CALCIUM SERPL-MCNC: 9.9 MG/DL (ref 8.8–10.2)
CHLORIDE SERPL-SCNC: 104 MMOL/L (ref 98–107)
CREAT SERPL-MCNC: 1.07 MG/DL (ref 0.67–1.17)
DEPRECATED HCO3 PLAS-SCNC: 25 MMOL/L (ref 22–29)
EOSINOPHIL # BLD MANUAL: 0.1 10E3/UL (ref 0–0.7)
EOSINOPHIL NFR BLD MANUAL: 1 %
ERYTHROCYTE [DISTWIDTH] IN BLOOD BY AUTOMATED COUNT: 14.2 % (ref 10–15)
GFR SERPL CREATININE-BSD FRML MDRD: 77 ML/MIN/1.73M2
GLUCOSE SERPL-MCNC: 122 MG/DL (ref 70–99)
HCT VFR BLD AUTO: 50.4 % (ref 40–53)
HGB BLD-MCNC: 17.2 G/DL (ref 13.3–17.7)
HOLD SPECIMEN: NORMAL
LDH SERPL L TO P-CCNC: 201 U/L (ref 0–250)
LYMPHOCYTES # BLD MANUAL: 8.1 10E3/UL (ref 0.8–5.3)
LYMPHOCYTES NFR BLD MANUAL: 61 %
MCH RBC QN AUTO: 28.4 PG (ref 26.5–33)
MCHC RBC AUTO-ENTMCNC: 34.1 G/DL (ref 31.5–36.5)
MCV RBC AUTO: 83 FL (ref 78–100)
MONOCYTES # BLD MANUAL: 0.8 10E3/UL (ref 0–1.3)
MONOCYTES NFR BLD MANUAL: 6 %
NEUTROPHILS # BLD MANUAL: 4.1 10E3/UL (ref 1.6–8.3)
NEUTROPHILS NFR BLD MANUAL: 31 %
PLAT MORPH BLD: ABNORMAL
PLATELET # BLD AUTO: 165 10E3/UL (ref 150–450)
POTASSIUM SERPL-SCNC: 4.7 MMOL/L (ref 3.4–5.3)
PROT SERPL-MCNC: 7.1 G/DL (ref 6.4–8.3)
RBC # BLD AUTO: 6.05 10E6/UL (ref 4.4–5.9)
RBC MORPH BLD: ABNORMAL
SODIUM SERPL-SCNC: 142 MMOL/L (ref 136–145)
URATE SERPL-MCNC: 9.1 MG/DL (ref 3.4–7)
WBC # BLD AUTO: 13.3 10E3/UL (ref 4–11)

## 2023-01-17 PROCEDURE — 71260 CT THORAX DX C+: CPT

## 2023-01-17 PROCEDURE — 70491 CT SOFT TISSUE NECK W/DYE: CPT

## 2023-01-17 PROCEDURE — 74177 CT ABD & PELVIS W/CONTRAST: CPT

## 2023-01-17 PROCEDURE — 80053 COMPREHEN METABOLIC PANEL: CPT | Mod: ZL

## 2023-01-17 PROCEDURE — 36415 COLL VENOUS BLD VENIPUNCTURE: CPT | Mod: ZL

## 2023-01-17 PROCEDURE — 83615 LACTATE (LD) (LDH) ENZYME: CPT | Mod: ZL

## 2023-01-17 PROCEDURE — 85007 BL SMEAR W/DIFF WBC COUNT: CPT | Mod: ZL

## 2023-01-17 PROCEDURE — 85027 COMPLETE CBC AUTOMATED: CPT | Mod: ZL

## 2023-01-17 PROCEDURE — 84550 ASSAY OF BLOOD/URIC ACID: CPT | Mod: ZL

## 2023-01-17 PROCEDURE — 250N000011 HC RX IP 250 OP 636: Performed by: INTERNAL MEDICINE

## 2023-01-17 RX ORDER — IOPAMIDOL 755 MG/ML
117 INJECTION, SOLUTION INTRAVASCULAR ONCE
Status: COMPLETED | OUTPATIENT
Start: 2023-01-17 | End: 2023-01-17

## 2023-01-17 RX ADMIN — IOPAMIDOL 117 ML: 755 INJECTION, SOLUTION INTRAVENOUS at 12:11

## 2023-01-24 ENCOUNTER — ONCOLOGY VISIT (OUTPATIENT)
Dept: ONCOLOGY | Facility: OTHER | Age: 63
End: 2023-01-24
Attending: INTERNAL MEDICINE
Payer: COMMERCIAL

## 2023-01-24 VITALS
HEART RATE: 80 BPM | WEIGHT: 217 LBS | DIASTOLIC BLOOD PRESSURE: 78 MMHG | TEMPERATURE: 97.9 F | SYSTOLIC BLOOD PRESSURE: 138 MMHG | BODY MASS INDEX: 35.02 KG/M2 | OXYGEN SATURATION: 94 % | RESPIRATION RATE: 16 BRPM

## 2023-01-24 DIAGNOSIS — C91.10 CLL (CHRONIC LYMPHOCYTIC LEUKEMIA) (H): ICD-10-CM

## 2023-01-24 DIAGNOSIS — E79.0 HYPERURICEMIA: Primary | ICD-10-CM

## 2023-01-24 PROCEDURE — 99215 OFFICE O/P EST HI 40 MIN: CPT | Performed by: INTERNAL MEDICINE

## 2023-01-24 RX ORDER — ALLOPURINOL 300 MG/1
300 TABLET ORAL DAILY
Qty: 90 TABLET | Refills: 3 | Status: SHIPPED | OUTPATIENT
Start: 2023-01-24 | End: 2024-01-25

## 2023-01-24 ASSESSMENT — PAIN SCALES - GENERAL: PAINLEVEL: NO PAIN (0)

## 2023-01-24 NOTE — NURSING NOTE
Chief Complaint   Patient presents with     Oncology Clinic Visit     F/U CLL & Elevated uric acid level     Medication Reconciliation: complete    Mitzi Henson CMA ([facility])

## 2023-01-24 NOTE — PATIENT INSTRUCTIONS
Follow up with Dr Stark in 3 months.      You will need imaging done prior to your next visit.  Radiology scheduling will contact you to arrange these scans.  If you have not received a call in the next 2 weeks, please call them at [phone].

## 2023-01-25 NOTE — PROGRESS NOTES
Visit Date: 01/24/2023    HISTORY OF PRESENT ILLNESS:  Mr. Varghese returns for followup of CLL.  We had seen the patient in consultation back on 06/21/2022.  At that time, Dr. John had seen the patient, and his CBC revealed a white count 94.5, H and H 12.4 and 40.1, and platelet count was 164.  Differential was not done.  Three years prior, the white count 16.3, hemoglobin 14.3, hematocrit 42.8, platelet count 165.  When we saw the patient, we wanted to obtain peripheral blood smears.  There was no differential done on the original CBC.  We felt the patient could have CLL, AML or CML.  We wanted to obtain a BCR/ABL transcript.  This came back negative.  The plan was to proceed with bone marrow aspiration biopsy to absolutely rule out acute leukemia.  Apparently approximately 3 weeks later, the patient became extremely ill and he was scheduled to have a bone marrow biopsy, but this was not done, as the patient was admitted on 02/06/2022 with acute respiratory failure and was tested positive for COVID-19, requiring high-flow oxygen therapy.  He never was intubated and was discharged with supplemental oxygen, requiring a [id number] portable open ventilation system and required portable oxygen.  His O2 saturations improved.  He was seen by Dr. Donavan Rodriguez of Pulmonary subsequently, who felt the patient was clinically doing better since his hospitalization, and he was treated empirically with IV antibiotics.  Subsequently, bone marrow aspiration biopsy was done and reviewed.  Peripheral blood smear was done, which revealed CLL.  There was a normal FISH prognostic panel.  He had no 17p deletion, no 11q22,  no TP53.  The patient subsequently underwent a bone marrow aspiration biopsy, which confirmed CLL with bone marrow involvement of 75%.  There was a small monoclonal IgM, which was likely related to CLL.  There was no morphologic evidence of plasma cell neoplasm.  When we saw the patient on 06/01/2022, the patient was  doing relatively well.  He had no complaints of fevers, night sweats, weight loss.  He was gaining strength and no recurrence and improving on imaging studies prior to being admitted for COVID.  CT chest, abdomen and pelvis was unremarkable except for a ground-glass opacity, which was felt to be due to COVID-19 pneumonia.  He had a repeat CT chest again on 02/06/2022, which revealed pneumonia, otherwise no splenomegaly or lymphadenopathy, and he had large right hilar mediastinal nodes.  The patient was seen again on 09/08/2022.  He was complaining of fatigue, occasional abdominal cramping, no fevers, night sweats or weight loss.  He did have imaging that was performed on 09/01/2022.  CT neck, abdomen and pelvis revealed significant enlarged lymph nodes in chest, abdomen and pelvis with enlarging paraesophageal lymph nodes and paratracheal nodes measuring up to 2.8 cm.  There was an enlarging lymph node along the right lateral aspect of the distal esophagus.  There was significant splenomegaly with spleen having enlarged from 12.9 cm to 14.5 cm.  There was evidence of retroperitoneal and mesenteric lymphadenopathy with a large 2.8 x 0.7 cm left node cephalad to the body of the pancreas as well as a large 2.3 cm aortocaval lymph node and retrocaval node involvement.  There was significant increase in size.  Given these findings, we felt the patient had progressive bulky stage II disease.  Given his history of splenomegaly and fatigue, he was a candidate for acalabrutinib alone at a dose of 100 mg p.o. b.i.d.  This was shown to improve median progression-free survival compared to chlorambucil-based regimens.  Response rates were as high as 94%.  When we saw the patient last on 10/18/2022, he was on acalabrutinib for 1 month.  We did note an elevated uric acid and recommended the patient start on allopurinol 300 mg p.o. daily.  Apparently, the patient did not start this, but he did receive at least 4 cycles  acalabrutinib and had staging studies done on 01/17.  CT neck revealed interval decrease in size of right paratracheal upper mediastinal lymph node, as well as there was no evidence of cervical adenopathy.  The lymph node had decreased from 18 mm down to 8 mm.  CT chest, abdomen and pelvis indicated positive response with decreasing spleen size, with spleen size having decreased from 14.5 cm down to 12.8 cm.  There was considerable improvement in the hilar mediastinal, retroperitoneal and mesenteric lymphadenopathy with most of these lymph nodes having decreased by approximately 50%.  The patient otherwise is doing well.  He denies any fevers, night sweats, weight loss.  He is eating well.  He has no shortness of breath.  Overall, he is doing well.  No history of recurrent infections.    PHYSICAL EXAMINATION:    GENERAL:  He is a well-developed, well-nourished, middle-aged white male in no acute distress, ECOG performance status is 0.  VITAL SIGNS:  Reveal blood pressure is 130/78, pulse 80, respirations 16, temperature 97.9.  HEENT:  Atraumatic, normocephalic.  Oropharynx nonerythematous.  NECK:  Supple, no thyromegaly.  LUNGS:  Clear to auscultation and percussion.  HEART:  Regular rhythm.  S1, S2 normal.  ABDOMEN:  Soft, nontender, no masses.  Spleen tip is approximately 2 cm below the left costal margin, nontender.  LYMPHATICS:  No cervical, supraclavicular, axillary or inguinal nodes.  EXTREMITIES:  No edema.  NEUROLOGIC:  Nonfocal.    LABORATORY DATA:  Reveal CBC with a white count of 13.3, lymphocyte count of 8.1, hemoglobin 17.2, hematocrit 50.4, and platelet count is 165.  BUN is 14.5, creatinine 1.07, ALT 79.  Uric acid is 9.1.  LDH is 201.    IMPRESSION AND PLAN:  History of stage 0 chronic lymphocytic leukemia with evidence of progression to stage II disease with splenomegaly, bulky retroperitoneal and mediastinal hilar adenopathy as well as symptoms of fatigue.  The patient was treated with  acalabrutinib 100 mg p.o. b.i.d.  After 4 cycles, he has had a positive response with resolution of splenomegaly, 50% reduction in lymphadenopathy.  The patient does have elevated uric acid.  We would like to prescribe the patient allopurinol 300 mg p.o. daily to prevent tumor lysis.  Otherwise, the patient is clinically doing well.  Plan is to continue acalabrutinib 100 mg p.o. b.i.d.  We will see the patient in 3 months, repeat CT neck, chest, abdomen and pelvis.    TIME SPENT:  Seventy-five minutes were spent on this patient.  Time was spent reviewing labs with the patient, reviewing imaging with the patient, performing a history and physical, documenting history, ordering followup and ordering acalabrutinib as well as scans.    Med Stark MD        D: 2023   T: 2023   MT: AMINAH    Name:     ROSALINA MEZA  MRN:      [id number]        Account:    [id number]   :      1960           Visit Date: 2023     Document: [id number]    cc:  Filiberto John

## 2023-01-27 ENCOUNTER — DOCUMENTATION ONLY (OUTPATIENT)
Dept: PEDIATRICS | Facility: OTHER | Age: 63
End: 2023-01-27
Payer: COMMERCIAL

## 2023-01-27 DIAGNOSIS — J96.01 ACUTE RESPIRATORY FAILURE WITH HYPOXIA (H): Primary | ICD-10-CM

## 2023-01-27 NOTE — TELEPHONE ENCOUNTER
[facility] Prior Authorization Team   Phone: [phone]  Fax: [phone]    Prior Authorization Approval    Authorization Effective Date: 10/10/2022  Authorization Expiration Date: 11/10/2023  Medication: Calquence  Approved Dose/Quantity: 100mg - 60  Reference #: Key: [id number], PA#: [id number]   Insurance Company: [employer] - Phone [phone] Fax [phone]  Expected CoPay:       CoPay Card Available:      Foundation Assistance Needed:    Which Pharmacy is filling the prescription (Not needed for infusion/clinic administered): [facility] - [place], [place] - [facility] [address]  Pharmacy Notified: Yes  Patient Notified: No

## 2023-02-08 DIAGNOSIS — C91.10 CLL (CHRONIC LYMPHOCYTIC LEUKEMIA) (H): Primary | ICD-10-CM

## 2023-02-16 ENCOUNTER — DOCUMENTATION ONLY (OUTPATIENT)
Dept: ONCOLOGY | Facility: CLINIC | Age: 63
End: 2023-02-16

## 2023-02-16 ENCOUNTER — LAB (OUTPATIENT)
Dept: LAB | Facility: OTHER | Age: 63
End: 2023-02-16
Attending: INTERNAL MEDICINE
Payer: COMMERCIAL

## 2023-02-16 DIAGNOSIS — C91.10 CLL (CHRONIC LYMPHOCYTIC LEUKEMIA) (H): ICD-10-CM

## 2023-02-16 LAB
ALBUMIN SERPL BCG-MCNC: 4.5 G/DL (ref 3.5–5.2)
ALP SERPL-CCNC: 54 U/L (ref 40–129)
ALT SERPL W P-5'-P-CCNC: 93 U/L (ref 10–50)
ANION GAP SERPL CALCULATED.3IONS-SCNC: 8 MMOL/L (ref 7–15)
AST SERPL W P-5'-P-CCNC: 60 U/L (ref 10–50)
BASOPHILS # BLD MANUAL: 0 10E3/UL (ref 0–0.2)
BASOPHILS NFR BLD MANUAL: 0 %
BILIRUB SERPL-MCNC: 0.7 MG/DL
BLASTS # BLD MANUAL: 0.6 10E3/UL
BLASTS NFR BLD MANUAL: 5 %
BUN SERPL-MCNC: 11 MG/DL (ref 8–23)
CALCIUM SERPL-MCNC: 9.8 MG/DL (ref 8.8–10.2)
CHLORIDE SERPL-SCNC: 103 MMOL/L (ref 98–107)
CREAT SERPL-MCNC: 1.02 MG/DL (ref 0.67–1.17)
DEPRECATED HCO3 PLAS-SCNC: 28 MMOL/L (ref 22–29)
EOSINOPHIL # BLD MANUAL: 0.3 10E3/UL (ref 0–0.7)
EOSINOPHIL NFR BLD MANUAL: 2 %
ERYTHROCYTE [DISTWIDTH] IN BLOOD BY AUTOMATED COUNT: 14.5 % (ref 10–15)
GFR SERPL CREATININE-BSD FRML MDRD: 82 ML/MIN/1.73M2
GLUCOSE SERPL-MCNC: 150 MG/DL (ref 70–99)
HCT VFR BLD AUTO: 50.3 % (ref 40–53)
HGB BLD-MCNC: 17 G/DL (ref 13.3–17.7)
LDH SERPL L TO P-CCNC: 201 U/L (ref 0–250)
LYMPHOCYTES # BLD MANUAL: 5.2 10E3/UL (ref 0.8–5.3)
LYMPHOCYTES NFR BLD MANUAL: 41 %
MCH RBC QN AUTO: 28.2 PG (ref 26.5–33)
MCHC RBC AUTO-ENTMCNC: 33.8 G/DL (ref 31.5–36.5)
MCV RBC AUTO: 83 FL (ref 78–100)
MONOCYTES # BLD MANUAL: 1.2 10E3/UL (ref 0–1.3)
MONOCYTES NFR BLD MANUAL: 9 %
NEUTROPHILS # BLD MANUAL: 5.5 10E3/UL (ref 1.6–8.3)
NEUTROPHILS NFR BLD MANUAL: 43 %
NRBC # BLD AUTO: 0.3 10E3/UL
NRBC BLD MANUAL-RTO: 2 %
PLAT MORPH BLD: ABNORMAL
PLATELET # BLD AUTO: 160 10E3/UL (ref 150–450)
POTASSIUM SERPL-SCNC: 4.6 MMOL/L (ref 3.4–5.3)
PROT SERPL-MCNC: 7 G/DL (ref 6.4–8.3)
RBC # BLD AUTO: 6.03 10E6/UL (ref 4.4–5.9)
RBC MORPH BLD: ABNORMAL
SODIUM SERPL-SCNC: 139 MMOL/L (ref 136–145)
URATE SERPL-MCNC: 8.8 MG/DL (ref 3.4–7)
WBC # BLD AUTO: 12.8 10E3/UL (ref 4–11)

## 2023-02-16 PROCEDURE — 80053 COMPREHEN METABOLIC PANEL: CPT | Mod: ZL

## 2023-02-16 PROCEDURE — 83615 LACTATE (LD) (LDH) ENZYME: CPT | Mod: ZL

## 2023-02-16 PROCEDURE — 84550 ASSAY OF BLOOD/URIC ACID: CPT | Mod: ZL

## 2023-02-16 PROCEDURE — 85007 BL SMEAR W/DIFF WBC COUNT: CPT | Mod: ZL

## 2023-02-16 PROCEDURE — 36415 COLL VENOUS BLD VENIPUNCTURE: CPT | Mod: ZL

## 2023-02-16 PROCEDURE — 85027 COMPLETE CBC AUTOMATED: CPT | Mod: ZL

## 2023-02-16 NOTE — PROGRESS NOTES
Oral Chemotherapy Program  Lab review     Reviewed labs from 12/16/2023.     Labs are unremarkable and do not require any dose adjustments at this time.    Follow-up/plan  3/16/2023: Labs    Carole Jose PharmD, MPH, BCPS  February 16, 2023

## 2023-03-10 DIAGNOSIS — C91.10 CLL (CHRONIC LYMPHOCYTIC LEUKEMIA) (H): Primary | ICD-10-CM

## 2023-03-10 DIAGNOSIS — C91.10 CLL (CHRONIC LYMPHOCYTIC LEUKEMIA) (H): ICD-10-CM

## 2023-03-14 RX ORDER — ACALABRUTINIB 100 MG/1
TABLET, FILM COATED ORAL
Qty: 60 TABLET | Refills: 0 | OUTPATIENT
Start: 2023-03-14

## 2023-03-14 NOTE — TELEPHONE ENCOUNTER
This was already released by [facility].  Ivonne Rees RN...........3/14/2023 8:33 AM

## 2023-03-16 ENCOUNTER — TELEPHONE (OUTPATIENT)
Dept: PHARMACY | Facility: CLINIC | Age: 63
End: 2023-03-16

## 2023-03-16 ENCOUNTER — LAB (OUTPATIENT)
Dept: LAB | Facility: OTHER | Age: 63
End: 2023-03-16
Attending: INTERNAL MEDICINE
Payer: COMMERCIAL

## 2023-03-16 DIAGNOSIS — C91.10 CLL (CHRONIC LYMPHOCYTIC LEUKEMIA) (H): Primary | ICD-10-CM

## 2023-03-16 LAB
ALBUMIN SERPL BCG-MCNC: 4.6 G/DL (ref 3.5–5.2)
ALP SERPL-CCNC: 61 U/L (ref 40–129)
ALT SERPL W P-5'-P-CCNC: 96 U/L (ref 10–50)
ANION GAP SERPL CALCULATED.3IONS-SCNC: 12 MMOL/L (ref 7–15)
AST SERPL W P-5'-P-CCNC: 54 U/L (ref 10–50)
BASOPHILS # BLD MANUAL: 0 10E3/UL (ref 0–0.2)
BASOPHILS NFR BLD MANUAL: 0 %
BILIRUB SERPL-MCNC: 0.8 MG/DL
BUN SERPL-MCNC: 14.6 MG/DL (ref 8–23)
CALCIUM SERPL-MCNC: 9.8 MG/DL (ref 8.8–10.2)
CHLORIDE SERPL-SCNC: 101 MMOL/L (ref 98–107)
CREAT SERPL-MCNC: 1.07 MG/DL (ref 0.67–1.17)
DEPRECATED HCO3 PLAS-SCNC: 27 MMOL/L (ref 22–29)
EOSINOPHIL # BLD MANUAL: 0.4 10E3/UL (ref 0–0.7)
EOSINOPHIL NFR BLD MANUAL: 3 %
ERYTHROCYTE [DISTWIDTH] IN BLOOD BY AUTOMATED COUNT: 14.6 % (ref 10–15)
GFR SERPL CREATININE-BSD FRML MDRD: 77 ML/MIN/1.73M2
GLUCOSE SERPL-MCNC: 150 MG/DL (ref 70–99)
HCT VFR BLD AUTO: 49.8 % (ref 40–53)
HGB BLD-MCNC: 17.1 G/DL (ref 13.3–17.7)
HOLD SPECIMEN: NORMAL
LDH SERPL L TO P-CCNC: 224 U/L (ref 0–250)
LYMPHOCYTES # BLD MANUAL: 7.6 10E3/UL (ref 0.8–5.3)
LYMPHOCYTES NFR BLD MANUAL: 61 %
MCH RBC QN AUTO: 29 PG (ref 26.5–33)
MCHC RBC AUTO-ENTMCNC: 34.3 G/DL (ref 31.5–36.5)
MCV RBC AUTO: 84 FL (ref 78–100)
MONOCYTES # BLD MANUAL: 0.8 10E3/UL (ref 0–1.3)
MONOCYTES NFR BLD MANUAL: 6 %
NEUTROPHILS # BLD MANUAL: 3.8 10E3/UL (ref 1.6–8.3)
NEUTROPHILS NFR BLD MANUAL: 30 %
PLAT MORPH BLD: ABNORMAL
PLATELET # BLD AUTO: 164 10E3/UL (ref 150–450)
POTASSIUM SERPL-SCNC: 4.4 MMOL/L (ref 3.4–5.3)
PROT SERPL-MCNC: 6.9 G/DL (ref 6.4–8.3)
RBC # BLD AUTO: 5.9 10E6/UL (ref 4.4–5.9)
RBC MORPH BLD: ABNORMAL
SMUDGE CELLS BLD QL SMEAR: PRESENT
SODIUM SERPL-SCNC: 140 MMOL/L (ref 136–145)
URATE SERPL-MCNC: 6.6 MG/DL (ref 3.4–7)
VARIANT LYMPHS BLD QL SMEAR: PRESENT
WBC # BLD AUTO: 12.5 10E3/UL (ref 4–11)

## 2023-03-16 PROCEDURE — 85027 COMPLETE CBC AUTOMATED: CPT | Mod: ZL

## 2023-03-16 PROCEDURE — 36415 COLL VENOUS BLD VENIPUNCTURE: CPT | Mod: ZL

## 2023-03-16 PROCEDURE — 85007 BL SMEAR W/DIFF WBC COUNT: CPT | Mod: ZL

## 2023-03-16 PROCEDURE — 84550 ASSAY OF BLOOD/URIC ACID: CPT | Mod: ZL

## 2023-03-16 PROCEDURE — 83615 LACTATE (LD) (LDH) ENZYME: CPT | Mod: ZL

## 2023-03-16 PROCEDURE — 80053 COMPREHEN METABOLIC PANEL: CPT | Mod: ZL

## 2023-03-16 NOTE — TELEPHONE ENCOUNTER
Oral Chemotherapy Monitoring Program- lab review     ORAL CHEMOTHERAPY 9/9/2022 9/21/2022 10/10/2022 10/14/2022 1/9/2023 2/16/2023 3/16/2023   Assessment Type New Teach Initial Follow up Refill Lab Monitoring Refill Lab Monitoring Lab Monitoring   Diagnosis Code Non-Hodgkin Lymphoma (NHL) Non-Hodgkin Lymphoma (NHL) Non-Hodgkin Lymphoma (NHL) Non-Hodgkin Lymphoma (NHL) (No Data) Other Other   Other - - - - - MCL MCL   [facility]   Clinic Name/Location [facility]   Drug Name Calquence (acalabrutinib) Calquence (acalabrutinib) Calquence (acalabrutinib) Calquence (acalabrutinib) Calquence (acalabrutinib) Calquence (acalabrutinib) Calquence (acalabrutinib)   Dose 100 mg 100 mg 100 mg 100 mg 100 mg 100 mg 100 mg   Current Schedule BID BID BID BID BID BID BID   Cycle Details Continuous Continuous Continuous Continuous Continuous Continuous Continuous   Start Date of Last Cycle - 9/16/2022 - - - - -   Planned next cycle start date - 10/16/2022 - - - - -   Doses missed in last 2 weeks - 0 - - - - -   Adherence Assessment - Adherent - - - - -   Adverse Effects - No AE identified during assessment - - - - -   Home BPs - Not applicable - - - - -   Any new drug interactions? No No - - - - -   Is the dose as ordered appropriate for the patient? Yes Yes - - - - -   Is the patient currently in pain? - No - - - - -   Has the patient been assessed within the past 6 months for depression? - Yes - - - - -   Has the patient missed any days of school, work, or other routine activity? - No - - - - -   Since the last time we talked, have you been hospitalized or used the emergency room? - No - - - - -       Vitals:  BP:   BP Readings from Last 1 Encounters:   01/24/23 138/78     Wt Readings from Last 1 Encounters:   01/24/23 98.4 kg (217 lb)     Estimated body surface area is 2.14 meters squared as  "calculated from the following:    Height as of 11/7/22: 1.676 m (5' 6\").    Weight as of 1/24/23: 98.4 kg (217 lb).    Labs:  _  Result Component Current Result Ref Range   Sodium 140 (3/16/2023) 136 - 145 mmol/L     _  Result Component Current Result Ref Range   Potassium 4.4 (3/16/2023) 3.4 - 5.3 mmol/L     _  Result Component Current Result Ref Range   Calcium 9.8 (3/16/2023) 8.8 - 10.2 mg/dL     No results found for Mag within last 30 days.     No results found for Phos within last 30 days.     _  Result Component Current Result Ref Range   Albumin 4.6 (3/16/2023) 3.5 - 5.2 g/dL     _  Result Component Current Result Ref Range   Urea Nitrogen 14.6 (3/16/2023) 8.0 - 23.0 mg/dL     _  Result Component Current Result Ref Range   Creatinine 1.07 (3/16/2023) 0.67 - 1.17 mg/dL       _  Result Component Current Result Ref Range   AST 54 (H) (3/16/2023) 10 - 50 U/L     _  Result Component Current Result Ref Range   ALT 96 (H) (3/16/2023) 10 - 50 U/L     _  Result Component Current Result Ref Range   Bilirubin Total 0.8 (3/16/2023) <=1.2 mg/dL       _  Result Component Current Result Ref Range   WBC Count 12.5 (H) (3/16/2023) 4.0 - 11.0 10e3/uL     _  Result Component Current Result Ref Range   Hemoglobin 17.1 (3/16/2023) 13.3 - 17.7 g/dL     _  Result Component Current Result Ref Range   Platelet Count 164 (3/16/2023) 150 - 450 10e3/uL     _  Result Component Current Result Ref Range   Absolute Neutrophils 3.8 (3/16/2023) 1.6 - 8.3 10e3/uL       Reviewed labs from 3/16/2023.      Labs slight elevation of LFT's but do not require any dose adjustments at this time.     Follow-up-  Labs and CT 4/10, appt with Provatas 4/13/23    Alvaro Solis, PharmD, BCOP  March 16, 2023    "

## 2023-04-10 ENCOUNTER — HOSPITAL ENCOUNTER (OUTPATIENT)
Dept: CT IMAGING | Facility: OTHER | Age: 63
Discharge: HOME | End: 2023-04-10
Attending: INTERNAL MEDICINE
Payer: COMMERCIAL

## 2023-04-10 ENCOUNTER — LAB (OUTPATIENT)
Dept: LAB | Facility: OTHER | Age: 63
End: 2023-04-10
Attending: INTERNAL MEDICINE
Payer: COMMERCIAL

## 2023-04-10 DIAGNOSIS — C91.10 CLL (CHRONIC LYMPHOCYTIC LEUKEMIA) (H): ICD-10-CM

## 2023-04-10 DIAGNOSIS — E11.8 CONTROLLED TYPE 2 DIABETES MELLITUS WITH COMPLICATION, WITHOUT LONG-TERM CURRENT USE OF INSULIN (H): Primary | ICD-10-CM

## 2023-04-10 DIAGNOSIS — C91.10 CLL (CHRONIC LYMPHOCYTIC LEUKEMIA) (H): Primary | ICD-10-CM

## 2023-04-10 LAB
ALBUMIN SERPL BCG-MCNC: 4.4 G/DL (ref 3.5–5.2)
ALP SERPL-CCNC: 66 U/L (ref 40–129)
ALT SERPL W P-5'-P-CCNC: 100 U/L (ref 10–50)
ANION GAP SERPL CALCULATED.3IONS-SCNC: 13 MMOL/L (ref 7–15)
AST SERPL W P-5'-P-CCNC: 58 U/L (ref 10–50)
BASOPHILS # BLD MANUAL: 0 10E3/UL (ref 0–0.2)
BASOPHILS NFR BLD MANUAL: 0 %
BILIRUB SERPL-MCNC: 1 MG/DL
BUN SERPL-MCNC: 14.9 MG/DL (ref 8–23)
CALCIUM SERPL-MCNC: 9.8 MG/DL (ref 8.8–10.2)
CHLORIDE SERPL-SCNC: 102 MMOL/L (ref 98–107)
CREAT SERPL-MCNC: 1.01 MG/DL (ref 0.67–1.17)
DEPRECATED HCO3 PLAS-SCNC: 23 MMOL/L (ref 22–29)
EOSINOPHIL # BLD MANUAL: 0.4 10E3/UL (ref 0–0.7)
EOSINOPHIL NFR BLD MANUAL: 3 %
ERYTHROCYTE [DISTWIDTH] IN BLOOD BY AUTOMATED COUNT: 14.7 % (ref 10–15)
GFR SERPL CREATININE-BSD FRML MDRD: 83 ML/MIN/1.73M2
GLUCOSE SERPL-MCNC: 147 MG/DL (ref 70–99)
HCT VFR BLD AUTO: 49.7 % (ref 40–53)
HGB BLD-MCNC: 17.1 G/DL (ref 13.3–17.7)
HOLD SPECIMEN: NORMAL
LDH SERPL L TO P-CCNC: 236 U/L (ref 0–250)
LYMPHOCYTES # BLD MANUAL: 9.8 10E3/UL (ref 0.8–5.3)
LYMPHOCYTES NFR BLD MANUAL: 69 %
MCH RBC QN AUTO: 29.3 PG (ref 26.5–33)
MCHC RBC AUTO-ENTMCNC: 34.4 G/DL (ref 31.5–36.5)
MCV RBC AUTO: 85 FL (ref 78–100)
MONOCYTES # BLD MANUAL: 0.6 10E3/UL (ref 0–1.3)
MONOCYTES NFR BLD MANUAL: 4 %
NEUTROPHILS # BLD MANUAL: 3.4 10E3/UL (ref 1.6–8.3)
NEUTROPHILS NFR BLD MANUAL: 24 %
PLAT MORPH BLD: ABNORMAL
PLATELET # BLD AUTO: 183 10E3/UL (ref 150–450)
POTASSIUM SERPL-SCNC: 4.2 MMOL/L (ref 3.4–5.3)
PROT SERPL-MCNC: 7.1 G/DL (ref 6.4–8.3)
RBC # BLD AUTO: 5.84 10E6/UL (ref 4.4–5.9)
RBC MORPH BLD: ABNORMAL
SODIUM SERPL-SCNC: 138 MMOL/L (ref 136–145)
URATE SERPL-MCNC: 6.6 MG/DL (ref 3.4–7)
VARIANT LYMPHS BLD QL SMEAR: PRESENT
WBC # BLD AUTO: 14.2 10E3/UL (ref 4–11)

## 2023-04-10 PROCEDURE — 83615 LACTATE (LD) (LDH) ENZYME: CPT | Mod: ZL

## 2023-04-10 PROCEDURE — 250N000011 HC RX IP 250 OP 636: Performed by: INTERNAL MEDICINE

## 2023-04-10 PROCEDURE — 70491 CT SOFT TISSUE NECK W/DYE: CPT

## 2023-04-10 PROCEDURE — 85007 BL SMEAR W/DIFF WBC COUNT: CPT | Mod: ZL

## 2023-04-10 PROCEDURE — 74177 CT ABD & PELVIS W/CONTRAST: CPT

## 2023-04-10 PROCEDURE — 71260 CT THORAX DX C+: CPT

## 2023-04-10 PROCEDURE — 85027 COMPLETE CBC AUTOMATED: CPT | Mod: ZL

## 2023-04-10 PROCEDURE — 84550 ASSAY OF BLOOD/URIC ACID: CPT | Mod: ZL

## 2023-04-10 PROCEDURE — 36415 COLL VENOUS BLD VENIPUNCTURE: CPT | Mod: ZL

## 2023-04-10 PROCEDURE — 80053 COMPREHEN METABOLIC PANEL: CPT | Mod: ZL

## 2023-04-10 RX ORDER — LANCETS
EACH MISCELLANEOUS
Qty: 100 EACH | Refills: 0 | Status: SHIPPED | OUTPATIENT
Start: 2023-04-10

## 2023-04-10 RX ORDER — IOPAMIDOL 755 MG/ML
124 INJECTION, SOLUTION INTRAVASCULAR ONCE
Status: COMPLETED | OUTPATIENT
Start: 2023-04-10 | End: 2023-04-10

## 2023-04-10 RX ADMIN — IOPAMIDOL 124 ML: 755 INJECTION, SOLUTION INTRAVENOUS at 12:20

## 2023-04-10 NOTE — TELEPHONE ENCOUNTER
[facility] sent Rx request for the following:    SOFTCLIX LANCETS    MIS  Last Prescription Date:   3/8/21  Last Fill Qty/Refills:         100, R-11    Last Office Visit:              9/2/22   Future Office visit:           None     Carolann Amaya RN on 4/10/2023 at 3:35 PM

## 2023-04-13 ENCOUNTER — ONCOLOGY VISIT (OUTPATIENT)
Dept: ONCOLOGY | Facility: OTHER | Age: 63
End: 2023-04-13
Attending: INTERNAL MEDICINE
Payer: COMMERCIAL

## 2023-04-13 VITALS
WEIGHT: 214 LBS | SYSTOLIC BLOOD PRESSURE: 138 MMHG | RESPIRATION RATE: 16 BRPM | HEART RATE: 83 BPM | BODY MASS INDEX: 34.54 KG/M2 | OXYGEN SATURATION: 96 % | TEMPERATURE: 96.6 F | DIASTOLIC BLOOD PRESSURE: 82 MMHG

## 2023-04-13 DIAGNOSIS — C91.10 CLL (CHRONIC LYMPHOCYTIC LEUKEMIA) (H): Primary | ICD-10-CM

## 2023-04-13 PROCEDURE — 99215 OFFICE O/P EST HI 40 MIN: CPT | Performed by: INTERNAL MEDICINE

## 2023-04-13 ASSESSMENT — PAIN SCALES - GENERAL: PAINLEVEL: NO PAIN (0)

## 2023-04-13 NOTE — PATIENT INSTRUCTIONS
Follow up with Dr Stark in 6 weeks.  Please come prior for lab work.     Keep doing monthly labs.    Decrease Calquence to 100 mgs daily.

## 2023-04-13 NOTE — NURSING NOTE
Chief Complaint   Patient presents with     Follow Up     Labs and CT Scan        Medication Reconciliation: complete  Mary Lovelace LPN on 4/13/2023 at 11:02 AM

## 2023-04-13 NOTE — PROGRESS NOTES
Visit Date: 04/13/2023    HISTORY OF PRESENT ILLNESS:  Mr. Varghese returns for followup of CLL.  We had seen the patient in consultation back on 06/21/2022.  At that time, he was referred to us by Dr. John, who noted an abnormal CBC with a white count of 94.5, H and H was 12.4 and 40.1, platelet count is 164.  Differential was not done.  Three years prior, his white count was 16.3, hemoglobin 14.3, hematocrit 42.8, platelet count 165.  When we saw the patient, we wanted to obtain a peripheral blood smear.  We also obtained a BCR/ABL transcript.  This came back negative.  The plan was to proceed with bone marrow aspiration biopsy to absolutely rule out acute leukemia.  Apparently approximately three weeks later, the patient became extremely ill and was scheduled to have a bone marrow biopsy, but this was not done as the patient was admitted on 02/06/2022, with acute respiratory failure, was tested positive for COVID-19, requiring high-flow oxygen therapy.  Inevitably he was intubated and was discharged with supplemental oxygen, requiring portable oxygen with a [id number] portable open ventilation system.  His O2 saturations improved.  He was seen by Dr. Donavan Rodriguez of Pulmonary, subsequently, who felt the patient was clinically doing better since his hospitalization.  He was treated empirically with IV antibiotics.  Subsequently, bone marrow aspiration biopsy was done, and the findings were there was CLL with bone marrow involvement of 75%.  There was a small monoclonal IgM, which was likely related to CLL.  There was no morphologic evidence of plasma cell neoplasm.  Peripheral blood smear was consistent with CLL with a normal FISH prognostic panel.  There was no 17p deletion or 11q22 or no TP53.  We saw the patient on 06/01/2022.  He was doing well at that time.  CT chest, abdomen and pelvis at that time was unremarkable except for ground-glass opacities, which were felt to be due to COVID-19 pneumonia.  Subsequently,  he had a repeat CT chest, which revealed no evidence of pneumonia, no splenomegaly or lymphadenopathy.  There was potentially a right hilar mediastinal node.  The patient was seen again on 09/08/2022.  He was complaining of fatigue, abdominal cramping.  Imaging was performed on 09/01/2022.  CT neck, chest, abdomen and pelvis revealed significantly enlarged lymph nodes in the chest, abdomen and pelvis with enlarging paraesophageal lymph nodes and paratracheal nodes measuring up to 2.8 cm with enlarged lymph node along the right lateral aspect of the distal esophagus.  There was significant splenomegaly with his spleen size having increased from 12.9 cm to 14.5 cm.  There was evidence of retroperitoneal, mesenteric lymphadenopathy, a large 2.8 x 0.7 cm lymph node cephalad to the body of the pancreas.  There was a large 2.3 cm aortocaval lymph node and retrocaval node that was significantly increased in size.  Given these findings, we felt the patient had progressive bulky stage II CLL.  We felt he would be a candidate for acalabrutinib at a dose 100 p.o. b.i.d., which has shown improved median progression-free survival when compared to chlorambucil-based regimens.  Response rates were as high as 94%.  When we saw the patient subsequently on 10/18/2022, he had been on acalabrutinib for one month.  We did note an elevated uric acid and started the patient on allopurinol 300 mg daily.  He had completed four cycles of acalabrutinib and had repeat imaging studies on 01/17/2023.  CT neck revealed decreasing size of the right paratracheal upper mediastinal lymph node, having decreased from 18 mm down to 8 mm.  Otherwise no cervical lymphadenopathy.  CT chest, abdomen and pelvis revealed that the spleen size had decreased to normal from 14.5 cm down to 12.8 cm.  There was considerable improvement in the hilar mediastinal, retroperitoneal, or mesenteric lymphadenopathy, most of these lymph nodes having decreased by  approximately 50%.  The patient otherwise is doing well.  He comes in today for evaluation.  He continues with acalabrutinib 100 mg p.o. b.i.d.  He says he has noticed with acalabrutinib, he has been getting more headaches, primarily frontotemporal.  He says he gets a fuzzy feeling in his eyes and wonders if he should stop the acalabrutinib.  He did have imaging studies and these were reviewed with the radiologist.  CT neck was essentially normal with no evidence of lymphadenopathy.  CT chest, abdomen and pelvis revealed considerable improvement overall compared to 09/2022.  Spleen size was normal.  All lymphadenopathy including right hilar, paratracheal lymph nodes, had all normalized in size as well as retroperitoneal, mesenteric adenopathy had essentially resolved.  There was mention of hepatic steatosis, otherwise no evidence of recurrence.  The patient denies fevers, night sweats, weight loss or bone pain.  He says he gets occasional dyspnea on exertion, which he attributes to long COVID.    PHYSICAL EXAMINATION:    GENERAL:  He is a well-developed, well-nourished, middle-aged white male in no acute distress.  ECOG performance status is 0.  VITAL SIGNS:  Reveal blood pressure 138/82, pulse 80, respirations 16, temperature 98.6.  HEENT:  Atraumatic, normocephalic.  Oropharynx nonerythematous.  NECK:  Supple, no thyromegaly.  LUNGS:  Clear to auscultation and percussion.  HEART:  Regular rhythm.  S1, S2 normal.  ABDOMEN:  Soft, normoactive bowel sounds.  No masses, nontender.  There is no splenomegaly.  LYMPHATICS:  No cervical, supraclavicular, axillary or inguinal nodes.  EXTREMITIES:  No edema.  NEUROLOGIC:  Nonfocal.    LABORATORY DATA:  Reveal CBC with white count of 14.2 with 9.8 lymphocytes, H and H was 17.1 and 49.7, platelet count 183, BUN 42.9, creatinine 1.01, glucose 147, AST 58, ALT is 100.  Total bilirubin is normal.  Uric acid is 6.6.    IMPRESSION AND PLAN:  History of stage 0 chronic lymphocytic  leukemia with evidence of progression to stage II disease with splenomegaly, bulky retroperitoneal and mediastinal adenopathy, as well as symptoms of fatigue.  The patient was treated with acalabrutinib 100 mg p.o. b.i.d.  After four cycles, he had a positive response with resolution of splenomegaly with significant reduction in lymphadenopathy.  Now after 8 cycles, he has had a significant response with resolution of all lymphadenopathy.  Splenomegaly has resolved.  The patient has potential side effects due to acalabrutinib, i.e., headaches.  We will attempt a trial of 100 mg p.o. daily for at least 1 cycle and then see the patient in six weeks and repeat CBC, CMP, LDH. If lymphocytosis worsens, then we will go back to 100 mg p.o. b.i.d.  Otherwise, the plan is to restage in three months.  We will otherwise see the patient in six weeks and order scans at that point.    Eighty minutes were spent on this patient.  Time was spent reviewing multiple physician provider notes, discussing imaging studies with Radiology, reviewing labs, reviewing side effects of acalabrutinib, performing history and physical, documenting history and physical, ordering followup labs and acalabrutinib.    Med Stark MD        D: 2023   T: 2023   MT: [email or web address]    Name:     ROSALINA MEZA  MRN:      [id number]        Account:    [id number]   :      1960           Visit Date: 2023     Document: [id number]    cc:  Filiberto John

## 2023-05-06 ENCOUNTER — HEALTH MAINTENANCE LETTER (OUTPATIENT)
Age: 63
End: 2023-05-06

## 2023-05-15 ENCOUNTER — LAB (OUTPATIENT)
Dept: LAB | Facility: OTHER | Age: 63
End: 2023-05-15
Attending: INTERNAL MEDICINE
Payer: COMMERCIAL

## 2023-05-15 ENCOUNTER — TELEPHONE (OUTPATIENT)
Dept: PEDIATRICS | Facility: OTHER | Age: 63
End: 2023-05-15

## 2023-05-15 DIAGNOSIS — C91.10 CLL (CHRONIC LYMPHOCYTIC LEUKEMIA) (H): ICD-10-CM

## 2023-05-15 LAB
ALBUMIN SERPL BCG-MCNC: 4.7 G/DL (ref 3.5–5.2)
ALP SERPL-CCNC: 73 U/L (ref 40–129)
ALT SERPL W P-5'-P-CCNC: 102 U/L (ref 10–50)
ANION GAP SERPL CALCULATED.3IONS-SCNC: 13 MMOL/L (ref 7–15)
AST SERPL W P-5'-P-CCNC: 56 U/L (ref 10–50)
BASOPHILS # BLD AUTO: 0.1 10E3/UL (ref 0–0.2)
BASOPHILS NFR BLD AUTO: 1 %
BILIRUB SERPL-MCNC: 0.8 MG/DL
BUN SERPL-MCNC: 14.6 MG/DL (ref 8–23)
CALCIUM SERPL-MCNC: 9.9 MG/DL (ref 8.8–10.2)
CHLORIDE SERPL-SCNC: 101 MMOL/L (ref 98–107)
CREAT SERPL-MCNC: 1.05 MG/DL (ref 0.67–1.17)
DEPRECATED HCO3 PLAS-SCNC: 25 MMOL/L (ref 22–29)
EOSINOPHIL # BLD AUTO: 0.2 10E3/UL (ref 0–0.7)
EOSINOPHIL NFR BLD AUTO: 2 %
ERYTHROCYTE [DISTWIDTH] IN BLOOD BY AUTOMATED COUNT: 13.6 % (ref 10–15)
GFR SERPL CREATININE-BSD FRML MDRD: 79 ML/MIN/1.73M2
GLUCOSE SERPL-MCNC: 214 MG/DL (ref 70–99)
HCT VFR BLD AUTO: 49.7 % (ref 40–53)
HGB BLD-MCNC: 17.1 G/DL (ref 13.3–17.7)
IMM GRANULOCYTES # BLD: 0.1 10E3/UL
IMM GRANULOCYTES NFR BLD: 1 %
LDH SERPL L TO P-CCNC: 249 U/L (ref 0–250)
LYMPHOCYTES # BLD AUTO: 5.1 10E3/UL (ref 0.8–5.3)
LYMPHOCYTES NFR BLD AUTO: 50 %
MCH RBC QN AUTO: 29.6 PG (ref 26.5–33)
MCHC RBC AUTO-ENTMCNC: 34.4 G/DL (ref 31.5–36.5)
MCV RBC AUTO: 86 FL (ref 78–100)
MONOCYTES # BLD AUTO: 0.9 10E3/UL (ref 0–1.3)
MONOCYTES NFR BLD AUTO: 9 %
NEUTROPHILS # BLD AUTO: 3.7 10E3/UL (ref 1.6–8.3)
NEUTROPHILS NFR BLD AUTO: 37 %
NRBC # BLD AUTO: 0 10E3/UL
NRBC BLD AUTO-RTO: 0 /100
PLATELET # BLD AUTO: 156 10E3/UL (ref 150–450)
POTASSIUM SERPL-SCNC: 4.2 MMOL/L (ref 3.4–5.3)
PROT SERPL-MCNC: 7.1 G/DL (ref 6.4–8.3)
RBC # BLD AUTO: 5.78 10E6/UL (ref 4.4–5.9)
SODIUM SERPL-SCNC: 139 MMOL/L (ref 136–145)
WBC # BLD AUTO: 10.4 10E3/UL (ref 4–11)

## 2023-05-15 PROCEDURE — 36415 COLL VENOUS BLD VENIPUNCTURE: CPT | Mod: ZL

## 2023-05-15 PROCEDURE — 83615 LACTATE (LD) (LDH) ENZYME: CPT | Mod: ZL

## 2023-05-15 PROCEDURE — 80053 COMPREHEN METABOLIC PANEL: CPT | Mod: ZL

## 2023-05-15 PROCEDURE — 85004 AUTOMATED DIFF WBC COUNT: CPT | Mod: ZL

## 2023-05-15 NOTE — TELEPHONE ENCOUNTER
is coming in today for blood work and wanted to know if he can have his A1C drawn also.  Is it possible to add this order for today's draw? Extra blood was drawn today for the AC1 order.  Patient came in today fasting.    Thank you,  Pat Edwards on 5/15/2023 at 10:08 AM

## 2023-05-18 ENCOUNTER — TRANSFERRED RECORDS (OUTPATIENT)
Dept: MULTI SPECIALTY CLINIC | Facility: CLINIC | Age: 63
End: 2023-05-18

## 2023-05-18 LAB — RETINOPATHY: NORMAL

## 2023-05-23 ENCOUNTER — ONCOLOGY VISIT (OUTPATIENT)
Dept: ONCOLOGY | Facility: OTHER | Age: 63
End: 2023-05-23
Attending: INTERNAL MEDICINE
Payer: COMMERCIAL

## 2023-05-23 VITALS
OXYGEN SATURATION: 97 % | DIASTOLIC BLOOD PRESSURE: 80 MMHG | SYSTOLIC BLOOD PRESSURE: 120 MMHG | TEMPERATURE: 98.6 F | WEIGHT: 214 LBS | RESPIRATION RATE: 18 BRPM | BODY MASS INDEX: 33.59 KG/M2 | HEIGHT: 67 IN | HEART RATE: 92 BPM

## 2023-05-23 DIAGNOSIS — C91.10 CLL (CHRONIC LYMPHOCYTIC LEUKEMIA) (H): Primary | ICD-10-CM

## 2023-05-23 PROCEDURE — 99215 OFFICE O/P EST HI 40 MIN: CPT | Performed by: INTERNAL MEDICINE

## 2023-05-23 RX ORDER — CELERY SEED OIL
4 OIL (ML) MISCELLANEOUS DAILY
COMMUNITY

## 2023-05-23 RX ORDER — GUAIFENESIN 600 MG/1
600 TABLET, EXTENDED RELEASE ORAL DAILY
COMMUNITY
End: 2024-05-28

## 2023-05-23 ASSESSMENT — PAIN SCALES - GENERAL: PAINLEVEL: NO PAIN (0)

## 2023-05-23 NOTE — PROGRESS NOTES
Visit Date: 05/23/2023    HISTORY OF PRESENT ILLNESS:  Mr. Varghese returns for followup of CLL.  For details of history, see previous note.  He was just seen on 04/13/2023.  At that time, he was on acalabrutinib for treatment of his CLL.  He has been started on acalabrutinib 100 mg p.o. b.i.d. after he developed bulky stage II disease.  This was started in 09/2022.  He completed 4 cycles and had repeat imaging on 01/17/2023.  CT neck revealed decrease in size of the right paratracheal upper mediastinal lymph node, having decreased from 18 mm down to 8 mm.  Otherwise, no cervical lymphadenopathy.  CT chest, abdomen and pelvis revealed that the spleen size had decreased normal from 14.5 cm down to 12.8 cm.  There was considerable improvement in the hilar mediastinal, retroperitoneal, mesenteric lymphadenopathy, mostly lymph nodes had decreased by approximately 50%.  When we saw him last on 04/13/2023, he was complaining of frontotemporal headaches which he thought were associated with the acalabrutinib. Imaging done on 04/10/2023 included CT neck, chest, abdomen and pelvis revealed no evidence of adenopathy and spleen size of 12.9 cm, consistent with positive response.  Given his complaints of headaches, decided to cut his dose of acalabrutinib to 100 mg p.o. daily. Has been on it since 04/13/2023. He comes in today, says he feels much better at the lower dose.  He denies any fevers, night sweats, weight loss, abdominal pain, chest pain.  Overall, he is doing well.    PHYSICAL EXAMINATION:    GENERAL:  He is a middle-aged white male in no acute distress.  ECOG performance status is 0.  VITAL SIGNS:  Reveal blood pressure is 120/80, pulse 92, respirations 18, temperature 98.6.  HEENT:  Atraumatic, normocephalic.  Oropharynx nonerythematous.  NECK:  Supple.  LUNGS:  Clear to auscultation and percussion.  HEART:  Regular rhythm.  S1, S2 normal.  ABDOMEN:  Soft, normoactive bowel sounds.  No masses.  Spleen tip is  approximately 1 fingerbreadth below the left costal margin.  LYMPHATICS:  Reveal no cervical, supraclavicular, axillary or inguinal nodes.  EXTREMITIES:  No edema.  NEUROLOGIC:  Nonfocal.    LABORATORY DATA:  Reveal CBC with white count 10.4 with 50% lymphocytes.  H and H is 17.1 and 49.7, platelet count 156.  BUN is 14.6, creatinine 1.05, glucose 214, AST 66, ALT is 102.  LDH is 249.    IMPRESSION:  History of stage 0 chronic lymphocytic leukemia with evidence of progression to stage II disease with splenomegaly with bulky retroperitoneal and mediastinal adenopathy as well as symptoms of fatigue.  The patient was treated with acalabrutinib 100 mg p.o. b.i.d.  After 4 cycles, he had a positive response with resolution of splenomegaly, significant reduction in lymphadenopathy.  After 8 cycles, he has had significant response with resolution of all lymphadenopathy, splenomegaly has resolved.  The patient developed side effects including headaches.  We elected to dose reduce on acalabrutinib 100 mg p.o. daily.  He has been on this for 1 month.  He has no evidence of progression of CLL.  Hematologically, he has a normal white count, no anemia, no thrombocytopenia.  Therefore, we will continue acalabrutinib 100 mg p.o. daily.  We will monitor labs monthly including CBC, CMP, LDH, uric acid.  Otherwise, we will see the patient in 3 months, repeat CT neck, chest, abdomen and pelvis.    Seventy minutes were spent on this patient.  Time was spent reviewing labs with the patient, reviewing previous physician provider notes, performing history and physical, documenting history and physical, ordering followup labs and scans.    Med Stark MD        D: 2023   T: 2023   MT: co    Name:     ROSALINA MEZA  MRN:      [id number]        Account:    [id number]   :      1960           Visit Date: 2023     Document: [id number]    cc:  Filiberto John

## 2023-05-23 NOTE — PATIENT INSTRUCTIONS
Follow up with Dr Stark in 3 months.  Keep doing monthly labs.    Please come prior for imaging. Radiology scheduling will contact you to arrange these scans.  If you have not received a call in the next 2 weeks, please call them at [phone].

## 2023-05-23 NOTE — OR NURSING
Discharge instructions given to patient and patient's spouse. No questions. Ambulated out of unit. Denies pain, nausea or dizziness    
Yes...

## 2023-05-23 NOTE — NURSING NOTE
Chief Complaint   Patient presents with     Oncology Clinic Visit     F/U CLL     Medication Reconciliation: complete    Mitzi Henson CMA ([facility])

## 2023-06-07 DIAGNOSIS — C91.10 CLL (CHRONIC LYMPHOCYTIC LEUKEMIA) (H): Primary | ICD-10-CM

## 2023-06-15 ENCOUNTER — LAB (OUTPATIENT)
Dept: LAB | Facility: OTHER | Age: 63
End: 2023-06-15
Attending: INTERNAL MEDICINE
Payer: COMMERCIAL

## 2023-06-15 ENCOUNTER — TELEPHONE (OUTPATIENT)
Dept: ONCOLOGY | Facility: OTHER | Age: 63
End: 2023-06-15

## 2023-06-15 DIAGNOSIS — C91.10 CLL (CHRONIC LYMPHOCYTIC LEUKEMIA) (H): ICD-10-CM

## 2023-06-15 DIAGNOSIS — E79.0 HYPERURICEMIA: Primary | ICD-10-CM

## 2023-06-15 LAB
ALBUMIN SERPL BCG-MCNC: 4.7 G/DL (ref 3.5–5.2)
ALP SERPL-CCNC: 64 U/L (ref 40–129)
ALT SERPL W P-5'-P-CCNC: 124 U/L (ref 0–70)
ANION GAP SERPL CALCULATED.3IONS-SCNC: 15 MMOL/L (ref 7–15)
AST SERPL W P-5'-P-CCNC: 100 U/L (ref 0–45)
BASOPHILS # BLD AUTO: 0.1 10E3/UL (ref 0–0.2)
BASOPHILS NFR BLD AUTO: 1 %
BILIRUB SERPL-MCNC: 0.8 MG/DL
BUN SERPL-MCNC: 13.8 MG/DL (ref 8–23)
CALCIUM SERPL-MCNC: 9.9 MG/DL (ref 8.8–10.2)
CHLORIDE SERPL-SCNC: 100 MMOL/L (ref 98–107)
CREAT SERPL-MCNC: 1.02 MG/DL (ref 0.67–1.17)
DEPRECATED HCO3 PLAS-SCNC: 23 MMOL/L (ref 22–29)
EOSINOPHIL # BLD AUTO: 0.2 10E3/UL (ref 0–0.7)
EOSINOPHIL NFR BLD AUTO: 2 %
ERYTHROCYTE [DISTWIDTH] IN BLOOD BY AUTOMATED COUNT: 12.9 % (ref 10–15)
GFR SERPL CREATININE-BSD FRML MDRD: 82 ML/MIN/1.73M2
GLUCOSE SERPL-MCNC: 187 MG/DL (ref 70–99)
HCT VFR BLD AUTO: 50.2 % (ref 40–53)
HGB BLD-MCNC: 17.8 G/DL (ref 13.3–17.7)
HOLD SPECIMEN: NORMAL
IMM GRANULOCYTES # BLD: 0.1 10E3/UL
IMM GRANULOCYTES NFR BLD: 1 %
LDH SERPL L TO P-CCNC: 257 U/L (ref 0–250)
LYMPHOCYTES # BLD AUTO: 3.8 10E3/UL (ref 0.8–5.3)
LYMPHOCYTES NFR BLD AUTO: 42 %
MCH RBC QN AUTO: 30.3 PG (ref 26.5–33)
MCHC RBC AUTO-ENTMCNC: 35.5 G/DL (ref 31.5–36.5)
MCV RBC AUTO: 85 FL (ref 78–100)
MONOCYTES # BLD AUTO: 0.8 10E3/UL (ref 0–1.3)
MONOCYTES NFR BLD AUTO: 9 %
NEUTROPHILS # BLD AUTO: 4.1 10E3/UL (ref 1.6–8.3)
NEUTROPHILS NFR BLD AUTO: 45 %
NRBC # BLD AUTO: 0 10E3/UL
NRBC BLD AUTO-RTO: 0 /100
PLATELET # BLD AUTO: 155 10E3/UL (ref 150–450)
POTASSIUM SERPL-SCNC: 4.4 MMOL/L (ref 3.4–5.3)
PROT SERPL-MCNC: 7.3 G/DL (ref 6.4–8.3)
RBC # BLD AUTO: 5.88 10E6/UL (ref 4.4–5.9)
SODIUM SERPL-SCNC: 138 MMOL/L (ref 136–145)
URATE SERPL-MCNC: 9.9 MG/DL (ref 3.4–7)
WBC # BLD AUTO: 9 10E3/UL (ref 4–11)

## 2023-06-15 PROCEDURE — 36415 COLL VENOUS BLD VENIPUNCTURE: CPT | Mod: ZL

## 2023-06-15 PROCEDURE — 83615 LACTATE (LD) (LDH) ENZYME: CPT | Mod: ZL

## 2023-06-15 PROCEDURE — 85025 COMPLETE CBC W/AUTO DIFF WBC: CPT | Mod: ZL

## 2023-06-15 PROCEDURE — 84550 ASSAY OF BLOOD/URIC ACID: CPT | Mod: ZL

## 2023-06-15 PROCEDURE — 80053 COMPREHEN METABOLIC PANEL: CPT | Mod: ZL

## 2023-06-15 RX ORDER — ALLOPURINOL 300 MG/1
300 TABLET ORAL DAILY
Qty: 90 TABLET | Refills: 1 | Status: CANCELLED | OUTPATIENT
Start: 2023-06-15

## 2023-06-15 NOTE — TELEPHONE ENCOUNTER
Uric acid 9.9. per Med Stark MD needs to start Allopurinol 300 mg daily. Left message for patient to call back.  Ivonne Rees RN...........6/15/2023 1:28 PM

## 2023-06-15 NOTE — TELEPHONE ENCOUNTER
Patient returned call and states he has Allopurinol at home still and will call if he needs refill.  Ivonne Rees RN...........6/15/2023 2:55 PM

## 2023-07-13 ENCOUNTER — LAB (OUTPATIENT)
Dept: LAB | Facility: OTHER | Age: 63
End: 2023-07-13
Attending: INTERNAL MEDICINE
Payer: COMMERCIAL

## 2023-07-13 DIAGNOSIS — C91.10 CLL (CHRONIC LYMPHOCYTIC LEUKEMIA) (H): ICD-10-CM

## 2023-07-13 LAB
ALBUMIN SERPL BCG-MCNC: 4.4 G/DL (ref 3.5–5.2)
ALP SERPL-CCNC: 61 U/L (ref 40–129)
ALT SERPL W P-5'-P-CCNC: 102 U/L (ref 0–70)
ANION GAP SERPL CALCULATED.3IONS-SCNC: 14 MMOL/L (ref 7–15)
AST SERPL W P-5'-P-CCNC: 72 U/L (ref 0–45)
BASOPHILS # BLD AUTO: 0.1 10E3/UL (ref 0–0.2)
BASOPHILS NFR BLD AUTO: 1 %
BILIRUB SERPL-MCNC: 1 MG/DL
BUN SERPL-MCNC: 12 MG/DL (ref 8–23)
CALCIUM SERPL-MCNC: 9.7 MG/DL (ref 8.8–10.2)
CHLORIDE SERPL-SCNC: 102 MMOL/L (ref 98–107)
CREAT SERPL-MCNC: 1.03 MG/DL (ref 0.67–1.17)
DEPRECATED HCO3 PLAS-SCNC: 22 MMOL/L (ref 22–29)
EOSINOPHIL # BLD AUTO: 0.2 10E3/UL (ref 0–0.7)
EOSINOPHIL NFR BLD AUTO: 2 %
ERYTHROCYTE [DISTWIDTH] IN BLOOD BY AUTOMATED COUNT: 13.5 % (ref 10–15)
GFR SERPL CREATININE-BSD FRML MDRD: 81 ML/MIN/1.73M2
GLUCOSE SERPL-MCNC: 208 MG/DL (ref 70–99)
HCT VFR BLD AUTO: 50.3 % (ref 40–53)
HGB BLD-MCNC: 17.2 G/DL (ref 13.3–17.7)
HOLD SPECIMEN: NORMAL
IMM GRANULOCYTES # BLD: 0.1 10E3/UL
IMM GRANULOCYTES NFR BLD: 1 %
LDH SERPL L TO P-CCNC: 230 U/L (ref 0–250)
LYMPHOCYTES # BLD AUTO: 4.4 10E3/UL (ref 0.8–5.3)
LYMPHOCYTES NFR BLD AUTO: 47 %
MCH RBC QN AUTO: 29 PG (ref 26.5–33)
MCHC RBC AUTO-ENTMCNC: 34.2 G/DL (ref 31.5–36.5)
MCV RBC AUTO: 85 FL (ref 78–100)
MONOCYTES # BLD AUTO: 0.8 10E3/UL (ref 0–1.3)
MONOCYTES NFR BLD AUTO: 9 %
NEUTROPHILS # BLD AUTO: 3.7 10E3/UL (ref 1.6–8.3)
NEUTROPHILS NFR BLD AUTO: 40 %
NRBC # BLD AUTO: 0 10E3/UL
NRBC BLD AUTO-RTO: 0 /100
PLATELET # BLD AUTO: 157 10E3/UL (ref 150–450)
POTASSIUM SERPL-SCNC: 4.3 MMOL/L (ref 3.4–5.3)
PROT SERPL-MCNC: 6.8 G/DL (ref 6.4–8.3)
RBC # BLD AUTO: 5.93 10E6/UL (ref 4.4–5.9)
SODIUM SERPL-SCNC: 138 MMOL/L (ref 136–145)
URATE SERPL-MCNC: 6.7 MG/DL (ref 3.4–7)
WBC # BLD AUTO: 9.1 10E3/UL (ref 4–11)

## 2023-07-13 PROCEDURE — 84550 ASSAY OF BLOOD/URIC ACID: CPT | Mod: ZL

## 2023-07-13 PROCEDURE — 80053 COMPREHEN METABOLIC PANEL: CPT | Mod: ZL

## 2023-07-13 PROCEDURE — 83615 LACTATE (LD) (LDH) ENZYME: CPT | Mod: ZL

## 2023-07-13 PROCEDURE — 36415 COLL VENOUS BLD VENIPUNCTURE: CPT | Mod: ZL

## 2023-07-13 PROCEDURE — 85025 COMPLETE CBC W/AUTO DIFF WBC: CPT | Mod: ZL

## 2023-08-04 DIAGNOSIS — C91.10 CLL (CHRONIC LYMPHOCYTIC LEUKEMIA) (H): Primary | ICD-10-CM

## 2023-08-17 ENCOUNTER — HOSPITAL ENCOUNTER (OUTPATIENT)
Dept: CT IMAGING | Facility: OTHER | Age: 63
Discharge: HOME | End: 2023-08-17
Attending: INTERNAL MEDICINE
Payer: COMMERCIAL

## 2023-08-17 ENCOUNTER — LAB (OUTPATIENT)
Dept: LAB | Facility: OTHER | Age: 63
End: 2023-08-17
Attending: INTERNAL MEDICINE
Payer: COMMERCIAL

## 2023-08-17 DIAGNOSIS — C91.10 CLL (CHRONIC LYMPHOCYTIC LEUKEMIA) (H): ICD-10-CM

## 2023-08-17 DIAGNOSIS — C91.10 CLL (CHRONIC LYMPHOCYTIC LEUKEMIA) (H): Primary | ICD-10-CM

## 2023-08-17 LAB
ALBUMIN SERPL BCG-MCNC: 4.4 G/DL (ref 3.5–5.2)
ALP SERPL-CCNC: 57 U/L (ref 40–129)
ALT SERPL W P-5'-P-CCNC: 117 U/L (ref 0–70)
ANION GAP SERPL CALCULATED.3IONS-SCNC: 11 MMOL/L (ref 7–15)
AST SERPL W P-5'-P-CCNC: 92 U/L (ref 0–45)
BASOPHILS # BLD AUTO: 0.1 10E3/UL (ref 0–0.2)
BASOPHILS NFR BLD AUTO: 1 %
BILIRUB SERPL-MCNC: 0.8 MG/DL
BUN SERPL-MCNC: 12.5 MG/DL (ref 8–23)
CALCIUM SERPL-MCNC: 9.6 MG/DL (ref 8.8–10.2)
CHLORIDE SERPL-SCNC: 104 MMOL/L (ref 98–107)
CREAT SERPL-MCNC: 1.06 MG/DL (ref 0.67–1.17)
DEPRECATED HCO3 PLAS-SCNC: 23 MMOL/L (ref 22–29)
EOSINOPHIL # BLD AUTO: 0.2 10E3/UL (ref 0–0.7)
EOSINOPHIL NFR BLD AUTO: 2 %
ERYTHROCYTE [DISTWIDTH] IN BLOOD BY AUTOMATED COUNT: 14.1 % (ref 10–15)
GFR SERPL CREATININE-BSD FRML MDRD: 78 ML/MIN/1.73M2
GLUCOSE SERPL-MCNC: 146 MG/DL (ref 70–99)
HCT VFR BLD AUTO: 48.4 % (ref 40–53)
HGB BLD-MCNC: 16.8 G/DL (ref 13.3–17.7)
IMM GRANULOCYTES # BLD: 0.1 10E3/UL
IMM GRANULOCYTES NFR BLD: 1 %
LDH SERPL L TO P-CCNC: 254 U/L (ref 0–250)
LYMPHOCYTES # BLD AUTO: 5.1 10E3/UL (ref 0.8–5.3)
LYMPHOCYTES NFR BLD AUTO: 47 %
MCH RBC QN AUTO: 29.7 PG (ref 26.5–33)
MCHC RBC AUTO-ENTMCNC: 34.7 G/DL (ref 31.5–36.5)
MCV RBC AUTO: 86 FL (ref 78–100)
MONOCYTES # BLD AUTO: 0.9 10E3/UL (ref 0–1.3)
MONOCYTES NFR BLD AUTO: 8 %
NEUTROPHILS # BLD AUTO: 4.3 10E3/UL (ref 1.6–8.3)
NEUTROPHILS NFR BLD AUTO: 41 %
NRBC # BLD AUTO: 0 10E3/UL
NRBC BLD AUTO-RTO: 0 /100
PLATELET # BLD AUTO: 149 10E3/UL (ref 150–450)
POTASSIUM SERPL-SCNC: 4.2 MMOL/L (ref 3.4–5.3)
PROT SERPL-MCNC: 6.8 G/DL (ref 6.4–8.3)
RBC # BLD AUTO: 5.66 10E6/UL (ref 4.4–5.9)
SODIUM SERPL-SCNC: 138 MMOL/L (ref 136–145)
URATE SERPL-MCNC: 7.2 MG/DL (ref 3.4–7)
WBC # BLD AUTO: 10.6 10E3/UL (ref 4–11)

## 2023-08-17 PROCEDURE — 250N000011 HC RX IP 250 OP 636: Performed by: INTERNAL MEDICINE

## 2023-08-17 PROCEDURE — 84550 ASSAY OF BLOOD/URIC ACID: CPT | Mod: ZL

## 2023-08-17 PROCEDURE — 36415 COLL VENOUS BLD VENIPUNCTURE: CPT | Mod: ZL

## 2023-08-17 PROCEDURE — 70491 CT SOFT TISSUE NECK W/DYE: CPT

## 2023-08-17 PROCEDURE — 74177 CT ABD & PELVIS W/CONTRAST: CPT

## 2023-08-17 PROCEDURE — 71260 CT THORAX DX C+: CPT

## 2023-08-17 PROCEDURE — 83615 LACTATE (LD) (LDH) ENZYME: CPT | Mod: ZL

## 2023-08-17 PROCEDURE — 80053 COMPREHEN METABOLIC PANEL: CPT | Mod: ZL

## 2023-08-17 PROCEDURE — 85025 COMPLETE CBC W/AUTO DIFF WBC: CPT | Mod: ZL

## 2023-08-17 RX ORDER — IOPAMIDOL 755 MG/ML
123 INJECTION, SOLUTION INTRAVASCULAR ONCE
Status: COMPLETED | OUTPATIENT
Start: 2023-08-17 | End: 2023-08-17

## 2023-08-17 RX ADMIN — IOPAMIDOL 123 ML: 755 INJECTION, SOLUTION INTRAVENOUS at 12:26

## 2023-08-17 NOTE — PROGRESS NOTES

## 2023-08-22 ENCOUNTER — ONCOLOGY VISIT (OUTPATIENT)
Dept: ONCOLOGY | Facility: OTHER | Age: 63
End: 2023-08-22
Attending: INTERNAL MEDICINE
Payer: COMMERCIAL

## 2023-08-22 VITALS
RESPIRATION RATE: 16 BRPM | DIASTOLIC BLOOD PRESSURE: 88 MMHG | TEMPERATURE: 97.4 F | SYSTOLIC BLOOD PRESSURE: 138 MMHG | HEART RATE: 88 BPM | OXYGEN SATURATION: 97 % | WEIGHT: 219 LBS | BODY MASS INDEX: 34.82 KG/M2

## 2023-08-22 DIAGNOSIS — E79.0 HYPERURICEMIA: ICD-10-CM

## 2023-08-22 DIAGNOSIS — C91.10 CLL (CHRONIC LYMPHOCYTIC LEUKEMIA) (H): Primary | ICD-10-CM

## 2023-08-22 PROCEDURE — 99215 OFFICE O/P EST HI 40 MIN: CPT | Performed by: INTERNAL MEDICINE

## 2023-08-22 ASSESSMENT — PAIN SCALES - GENERAL: PAINLEVEL: NO PAIN (0)

## 2023-08-22 NOTE — NURSING NOTE
Chief Complaint   Patient presents with    Oncology Clinic Visit     F/U CLL     Medication Reconciliation: complete    Mitzi Henson CMA ([facility])

## 2023-08-22 NOTE — PROGRESS NOTES
Visit Date: 08/22/2023    HEMATOLOGY ONCOLOGY CLINIC NOTE    The patient returns for followup of CLL.  We have seen the patient in the past for stage 0 CLL.  For details, please see previous dictation.  Most recently, he had progressed with splenomegaly and bulky retroperitoneal and mediastinal adenopathy.  We elected to treat him with acalabrutinib at a dose of 100 p.o. b.i.d.  This was done in 09/2022.  He completed 4 cycles.  He had repeat imaging on 01/17/2023.  CT of the neck revealed decrease in size of the right paratracheal upper mediastinal lymph node, having decreased from 18 mm down to 8 mm.  Otherwise, no cervical lymphadenopathy.  CT chest, abdomen, and pelvis revealed the spleen size had decreased down to normal range from 14.5 cm further down to 12.8 cm. There was considerable improvement in the hilar, mediastinal, retroperitoneal, and mesenteric lymphadenopathy.  Most of the lymph nodes had decreased by approximately 50%.  When we saw him again on 04/13/2023, he was complaining of frontotemporal headaches, which he thought was associated with the acalabrutinib.  Imaging done on 04/10/2023, including CT neck, chest, abdomen, and pelvis revealed no evidence of adenopathy and spleen size was normal.  Given his complaints of headaches, we decided to cut his dose of acalabrutinib to 100 mg p.o. daily and he has been on this since 04/13/2023.  He felt better on the lower dose when we saw him in May.  His white count was normal.  The plan was to continue acalabrutinib 100 mg daily.  He had repeat imaging done on 08/17/2023. He had a CT neck, which was normal.  CT chest, abdomen, and pelvis did not reveal any adenopathy.  Spleen size was normal.  The patient otherwise is doing well.  He continues on allopurinol 100 mg daily.  He denies any fevers, night sweats, weight loss.  He continues to go on motorcycle rides.  Otherwise, no other specific complaints.  He has no further headache.    PHYSICAL EXAMINATION:   He is a well-developed, well-nourished, middle-aged white male in no acute distress.  ECOG performance status is 0.  VITAL SIGNS:  Reveal blood pressure 138/88, pulse 88, respirations 16, temperature 97.4.  HEENT:  Atraumatic, normocephalic.  Oropharynx nonerythematous.  NECK:  Supple, no thyromegaly.  LUNGS:  Clear to auscultation and percussion.  HEART:  Regular rhythm.  S1, S2 normal.  ABDOMEN:  Soft, normoactive bowel sounds.  There is no palpable spleen.  No mass, nontender.  LYMPHATICS:  No cervical supraclavicular, axillary, or inguinal nodes.  EXTREMITIES:  No edema.  NEUROLOGIC:  Nonfocal.    LABORATORY DATA:  Reveal CBC -- white count 9.1, H and H of 17.2 and 50.3, platelet count is 157 with normal lymphocyte count.  LDH is 254.  Uric acid is 7.2.  BUN is 12.5, creatinine 1.06, glucose 146, AST 92, .    IMPRESSION:  History of stage 0 chronic lymphocytic leukemia with subsequent development of progression to stage II disease with splenomegaly, bulky retroperitoneal, and mediastinal adenopathy, as well as symptoms of fatigue.  The patient was treated with acalabrutinib 100 mg p.o. b.i.d.  After 4 cycles, he had a positive response with resolution of splenomegaly and significant reduction in lymphadenopathy.  After 8 cycles, he had a complete response.  The patient developed side effects, including headaches.  We elected to dose reduce his acalabrutinib to 100 mg p.o. daily and he has been on this for at least 4 months and staging studies indicate no evidence of lymphadenopathy or splenomegaly consistent with complete response. White count is normal.  No evidence of lymphocytosis, anemia, or thrombocytopenia.  Uric acid is slightly elevated.  We will continue on allopurinol 100 mg daily.  Otherwise, we would like to monitor labs monthly, including CBC, CMP, LDH, and uric acid.  Otherwise, we will see the patient in 4 months with repeat CT neck, chest, abdomen, and pelvis    Seventy-five minutes was  spent with the patient.  Time was spent reviewing previous physician provider notes, lab results, imaging results, performing history and physical, documenting history and physical, ordering followup labs and scans.    Med Stark MD        D: 2023   T: 2023   MT: juan    Name:     ROSALINA MEZA  MRN:      [id number]        Account:    [id number]   :      1960           Visit Date: 2023     Document: [id number]    cc:  Filiberto John

## 2023-08-22 NOTE — PATIENT INSTRUCTIONS
Follow up with Dr Stark in 4 months.      Schedule for monthly labs.    Radiology scheduling will contact you to arrange the CT scans that have been ordered.  If you have not received a call in the next 2 weeks, please call them at [phone].

## 2023-08-27 DIAGNOSIS — C91.10 CLL (CHRONIC LYMPHOCYTIC LEUKEMIA) (H): Primary | ICD-10-CM

## 2023-09-01 DIAGNOSIS — C91.10 CLL (CHRONIC LYMPHOCYTIC LEUKEMIA) (H): Primary | ICD-10-CM

## 2023-09-14 ENCOUNTER — LAB (OUTPATIENT)
Dept: LAB | Facility: OTHER | Age: 63
End: 2023-09-14
Attending: INTERNAL MEDICINE
Payer: COMMERCIAL

## 2023-09-14 DIAGNOSIS — C91.10 CLL (CHRONIC LYMPHOCYTIC LEUKEMIA) (H): ICD-10-CM

## 2023-09-14 LAB
ALBUMIN SERPL BCG-MCNC: 4.5 G/DL (ref 3.5–5.2)
ALP SERPL-CCNC: 59 U/L (ref 40–129)
ALT SERPL W P-5'-P-CCNC: 112 U/L (ref 0–70)
ANION GAP SERPL CALCULATED.3IONS-SCNC: 11 MMOL/L (ref 7–15)
AST SERPL W P-5'-P-CCNC: 66 U/L (ref 0–45)
BASOPHILS # BLD AUTO: 0.1 10E3/UL (ref 0–0.2)
BASOPHILS NFR BLD AUTO: 1 %
BILIRUB SERPL-MCNC: 0.9 MG/DL
BUN SERPL-MCNC: 11.1 MG/DL (ref 8–23)
CALCIUM SERPL-MCNC: 10 MG/DL (ref 8.8–10.2)
CHLORIDE SERPL-SCNC: 103 MMOL/L (ref 98–107)
CREAT SERPL-MCNC: 1.07 MG/DL (ref 0.67–1.17)
DEPRECATED HCO3 PLAS-SCNC: 26 MMOL/L (ref 22–29)
EGFRCR SERPLBLD CKD-EPI 2021: 77 ML/MIN/1.73M2
EOSINOPHIL # BLD AUTO: 0.2 10E3/UL (ref 0–0.7)
EOSINOPHIL NFR BLD AUTO: 2 %
ERYTHROCYTE [DISTWIDTH] IN BLOOD BY AUTOMATED COUNT: 14.1 % (ref 10–15)
GLUCOSE SERPL-MCNC: 164 MG/DL (ref 70–99)
HCT VFR BLD AUTO: 49.4 % (ref 40–53)
HGB BLD-MCNC: 17.1 G/DL (ref 13.3–17.7)
IMM GRANULOCYTES # BLD: 0.1 10E3/UL
IMM GRANULOCYTES NFR BLD: 1 %
LYMPHOCYTES # BLD AUTO: 3.9 10E3/UL (ref 0.8–5.3)
LYMPHOCYTES NFR BLD AUTO: 44 %
MCH RBC QN AUTO: 30.1 PG (ref 26.5–33)
MCHC RBC AUTO-ENTMCNC: 34.6 G/DL (ref 31.5–36.5)
MCV RBC AUTO: 87 FL (ref 78–100)
MONOCYTES # BLD AUTO: 0.8 10E3/UL (ref 0–1.3)
MONOCYTES NFR BLD AUTO: 9 %
NEUTROPHILS # BLD AUTO: 3.9 10E3/UL (ref 1.6–8.3)
NEUTROPHILS NFR BLD AUTO: 43 %
NRBC # BLD AUTO: 0 10E3/UL
NRBC BLD AUTO-RTO: 0 /100
PLATELET # BLD AUTO: 141 10E3/UL (ref 150–450)
POTASSIUM SERPL-SCNC: 4.4 MMOL/L (ref 3.4–5.3)
PROT SERPL-MCNC: 7 G/DL (ref 6.4–8.3)
RBC # BLD AUTO: 5.68 10E6/UL (ref 4.4–5.9)
SODIUM SERPL-SCNC: 140 MMOL/L (ref 136–145)
WBC # BLD AUTO: 8.9 10E3/UL (ref 4–11)

## 2023-09-14 PROCEDURE — 85025 COMPLETE CBC W/AUTO DIFF WBC: CPT | Mod: ZL

## 2023-09-14 PROCEDURE — 36415 COLL VENOUS BLD VENIPUNCTURE: CPT | Mod: ZL

## 2023-09-14 PROCEDURE — 80053 COMPREHEN METABOLIC PANEL: CPT | Mod: ZL

## 2023-10-09 DIAGNOSIS — C91.10 CLL (CHRONIC LYMPHOCYTIC LEUKEMIA) (H): Primary | ICD-10-CM

## 2023-10-12 ENCOUNTER — LAB (OUTPATIENT)
Dept: LAB | Facility: OTHER | Age: 63
End: 2023-10-12
Attending: INTERNAL MEDICINE
Payer: MEDICARE

## 2023-10-12 DIAGNOSIS — C91.10 CLL (CHRONIC LYMPHOCYTIC LEUKEMIA) (H): ICD-10-CM

## 2023-10-12 DIAGNOSIS — E79.0 HYPERURICEMIA: ICD-10-CM

## 2023-10-12 LAB
ALBUMIN SERPL BCG-MCNC: 4.6 G/DL (ref 3.5–5.2)
ALP SERPL-CCNC: 58 U/L (ref 40–129)
ALT SERPL W P-5'-P-CCNC: 110 U/L (ref 0–70)
ANION GAP SERPL CALCULATED.3IONS-SCNC: 9 MMOL/L (ref 7–15)
AST SERPL W P-5'-P-CCNC: 77 U/L (ref 0–45)
BASO+EOS+MONOS # BLD AUTO: ABNORMAL 10*3/UL
BASO+EOS+MONOS NFR BLD AUTO: ABNORMAL %
BASOPHILS # BLD AUTO: 0.1 10E3/UL (ref 0–0.2)
BASOPHILS NFR BLD AUTO: 1 %
BILIRUB SERPL-MCNC: 1 MG/DL
BUN SERPL-MCNC: 14.9 MG/DL (ref 8–23)
CALCIUM SERPL-MCNC: 9.8 MG/DL (ref 8.8–10.2)
CHLORIDE SERPL-SCNC: 103 MMOL/L (ref 98–107)
CREAT SERPL-MCNC: 1.12 MG/DL (ref 0.67–1.17)
DEPRECATED HCO3 PLAS-SCNC: 26 MMOL/L (ref 22–29)
EGFRCR SERPLBLD CKD-EPI 2021: 73 ML/MIN/1.73M2
EOSINOPHIL # BLD AUTO: 0.2 10E3/UL (ref 0–0.7)
EOSINOPHIL NFR BLD AUTO: 3 %
ERYTHROCYTE [DISTWIDTH] IN BLOOD BY AUTOMATED COUNT: 13.7 % (ref 10–15)
GLUCOSE SERPL-MCNC: 179 MG/DL (ref 70–99)
HCT VFR BLD AUTO: 48.2 % (ref 40–53)
HGB BLD-MCNC: 16.5 G/DL (ref 13.3–17.7)
IMM GRANULOCYTES # BLD: 0.1 10E3/UL
IMM GRANULOCYTES NFR BLD: 1 %
LDH SERPL L TO P-CCNC: 234 U/L (ref 0–250)
LYMPHOCYTES # BLD AUTO: 4.3 10E3/UL (ref 0.8–5.3)
LYMPHOCYTES NFR BLD AUTO: 49 %
MCH RBC QN AUTO: 29.7 PG (ref 26.5–33)
MCHC RBC AUTO-ENTMCNC: 34.2 G/DL (ref 31.5–36.5)
MCV RBC AUTO: 87 FL (ref 78–100)
MONOCYTES # BLD AUTO: 0.8 10E3/UL (ref 0–1.3)
MONOCYTES NFR BLD AUTO: 9 %
NEUTROPHILS # BLD AUTO: 3.3 10E3/UL (ref 1.6–8.3)
NEUTROPHILS NFR BLD AUTO: 37 %
NRBC # BLD AUTO: 0 10E3/UL
NRBC BLD AUTO-RTO: 0 /100
PLATELET # BLD AUTO: 147 10E3/UL (ref 150–450)
POTASSIUM SERPL-SCNC: 4.5 MMOL/L (ref 3.4–5.3)
PROT SERPL-MCNC: 6.8 G/DL (ref 6.4–8.3)
RBC # BLD AUTO: 5.56 10E6/UL (ref 4.4–5.9)
SODIUM SERPL-SCNC: 138 MMOL/L (ref 135–145)
URATE SERPL-MCNC: 6.7 MG/DL (ref 3.4–7)
WBC # BLD AUTO: 8.7 10E3/UL (ref 4–11)

## 2023-10-12 PROCEDURE — 36415 COLL VENOUS BLD VENIPUNCTURE: CPT | Mod: ZL

## 2023-10-12 PROCEDURE — 85025 COMPLETE CBC W/AUTO DIFF WBC: CPT | Mod: ZL

## 2023-10-12 PROCEDURE — 84550 ASSAY OF BLOOD/URIC ACID: CPT | Mod: ZL

## 2023-10-12 PROCEDURE — 80053 COMPREHEN METABOLIC PANEL: CPT | Mod: ZL

## 2023-10-12 PROCEDURE — 83615 LACTATE (LD) (LDH) ENZYME: CPT | Mod: ZL

## 2023-10-16 ENCOUNTER — TELEPHONE (OUTPATIENT)
Dept: ONCOLOGY | Facility: OTHER | Age: 63
End: 2023-10-16
Payer: MEDICARE

## 2023-10-16 NOTE — TELEPHONE ENCOUNTER
PA Initiation    Medication: CALQUENCE 100 MG PO TABS  Ref.#  [id number]  Insurance Company: [employer] - Phone [phone] Fax [phone]  Pharmacy Filling the Rx: [facility] - [place], [place] - [address]  Filling Pharmacy Phone:    Filling Pharmacy Fax:    Start Date: 10/16/2023

## 2023-10-17 NOTE — TELEPHONE ENCOUNTER
Prior Authorization Approval    Medication: CALQUENCE 100 MG PO TABS  Authorization Effective Date: 9/16/2023  Authorization Expiration Date: 10/16/2024  Approved Dose/Quantity: 60/30 days  Reference #: [id number]   Insurance Company: [employer] - Phone [phone] Fax [phone]  Expected CoPay: $  0  CoPay Card Available: No    Financial Assistance Needed: betty approved  [facility] - [place], [place] - [address]  Pharmacy Notified: yes  Patient Notified: yes

## 2023-11-09 DIAGNOSIS — C91.10 CLL (CHRONIC LYMPHOCYTIC LEUKEMIA) (H): Primary | ICD-10-CM

## 2023-11-16 ENCOUNTER — LAB (OUTPATIENT)
Dept: LAB | Facility: OTHER | Age: 63
End: 2023-11-16
Attending: INTERNAL MEDICINE
Payer: MEDICARE

## 2023-11-16 DIAGNOSIS — C91.10 CLL (CHRONIC LYMPHOCYTIC LEUKEMIA) (H): ICD-10-CM

## 2023-11-16 DIAGNOSIS — E79.0 HYPERURICEMIA: ICD-10-CM

## 2023-11-16 LAB
ALBUMIN SERPL BCG-MCNC: 4.6 G/DL (ref 3.5–5.2)
ALP SERPL-CCNC: 64 U/L (ref 40–150)
ALT SERPL W P-5'-P-CCNC: 83 U/L (ref 0–70)
ANION GAP SERPL CALCULATED.3IONS-SCNC: 12 MMOL/L (ref 7–15)
AST SERPL W P-5'-P-CCNC: 58 U/L (ref 0–45)
BASOPHILS # BLD AUTO: 0.1 10E3/UL (ref 0–0.2)
BASOPHILS NFR BLD AUTO: 1 %
BILIRUB SERPL-MCNC: 0.7 MG/DL
BUN SERPL-MCNC: 17 MG/DL (ref 8–23)
CALCIUM SERPL-MCNC: 10.2 MG/DL (ref 8.8–10.2)
CHLORIDE SERPL-SCNC: 103 MMOL/L (ref 98–107)
CREAT SERPL-MCNC: 1.04 MG/DL (ref 0.67–1.17)
DEPRECATED HCO3 PLAS-SCNC: 26 MMOL/L (ref 22–29)
EGFRCR SERPLBLD CKD-EPI 2021: 80 ML/MIN/1.73M2
EOSINOPHIL # BLD AUTO: 0.2 10E3/UL (ref 0–0.7)
EOSINOPHIL NFR BLD AUTO: 2 %
ERYTHROCYTE [DISTWIDTH] IN BLOOD BY AUTOMATED COUNT: 13.3 % (ref 10–15)
GLUCOSE SERPL-MCNC: 183 MG/DL (ref 70–99)
HCT VFR BLD AUTO: 49.4 % (ref 40–53)
HGB BLD-MCNC: 17.1 G/DL (ref 13.3–17.7)
HOLD SPECIMEN: NORMAL
IMM GRANULOCYTES # BLD: 0.1 10E3/UL
IMM GRANULOCYTES NFR BLD: 1 %
LDH SERPL L TO P-CCNC: 227 U/L (ref 0–250)
LYMPHOCYTES # BLD AUTO: 4.7 10E3/UL (ref 0.8–5.3)
LYMPHOCYTES NFR BLD AUTO: 50 %
MCH RBC QN AUTO: 29.8 PG (ref 26.5–33)
MCHC RBC AUTO-ENTMCNC: 34.6 G/DL (ref 31.5–36.5)
MCV RBC AUTO: 86 FL (ref 78–100)
MONOCYTES # BLD AUTO: 0.8 10E3/UL (ref 0–1.3)
MONOCYTES NFR BLD AUTO: 8 %
NEUTROPHILS # BLD AUTO: 3.5 10E3/UL (ref 1.6–8.3)
NEUTROPHILS NFR BLD AUTO: 38 %
NRBC # BLD AUTO: 0 10E3/UL
NRBC BLD AUTO-RTO: 0 /100
PLATELET # BLD AUTO: 148 10E3/UL (ref 150–450)
POTASSIUM SERPL-SCNC: 5 MMOL/L (ref 3.4–5.3)
PROT SERPL-MCNC: 7.1 G/DL (ref 6.4–8.3)
RBC # BLD AUTO: 5.73 10E6/UL (ref 4.4–5.9)
SODIUM SERPL-SCNC: 141 MMOL/L (ref 135–145)
URATE SERPL-MCNC: 6.9 MG/DL (ref 3.4–7)
WBC # BLD AUTO: 9.3 10E3/UL (ref 4–11)

## 2023-11-16 PROCEDURE — 83615 LACTATE (LD) (LDH) ENZYME: CPT | Mod: ZL

## 2023-11-16 PROCEDURE — 80053 COMPREHEN METABOLIC PANEL: CPT | Mod: ZL

## 2023-11-16 PROCEDURE — 36415 COLL VENOUS BLD VENIPUNCTURE: CPT | Mod: ZL

## 2023-11-16 PROCEDURE — 85004 AUTOMATED DIFF WBC COUNT: CPT | Mod: ZL

## 2023-11-16 PROCEDURE — 84550 ASSAY OF BLOOD/URIC ACID: CPT | Mod: ZL

## 2023-12-04 DIAGNOSIS — C91.10 CLL (CHRONIC LYMPHOCYTIC LEUKEMIA) (H): Primary | ICD-10-CM

## 2023-12-11 DIAGNOSIS — C91.10 CLL (CHRONIC LYMPHOCYTIC LEUKEMIA) (H): Primary | ICD-10-CM

## 2023-12-16 ENCOUNTER — HEALTH MAINTENANCE LETTER (OUTPATIENT)
Age: 63
End: 2023-12-16

## 2023-12-21 ENCOUNTER — HOSPITAL ENCOUNTER (OUTPATIENT)
Dept: CT IMAGING | Facility: OTHER | Age: 63
Discharge: HOME | End: 2023-12-21
Attending: INTERNAL MEDICINE
Payer: MEDICARE

## 2023-12-21 ENCOUNTER — LAB (OUTPATIENT)
Dept: LAB | Facility: OTHER | Age: 63
End: 2023-12-21
Attending: INTERNAL MEDICINE
Payer: MEDICARE

## 2023-12-21 DIAGNOSIS — C91.10 CLL (CHRONIC LYMPHOCYTIC LEUKEMIA) (H): ICD-10-CM

## 2023-12-21 DIAGNOSIS — E79.0 HYPERURICEMIA: ICD-10-CM

## 2023-12-21 LAB
ALBUMIN SERPL BCG-MCNC: 4.6 G/DL (ref 3.5–5.2)
ALP SERPL-CCNC: 63 U/L (ref 40–150)
ALT SERPL W P-5'-P-CCNC: 111 U/L (ref 0–70)
ANION GAP SERPL CALCULATED.3IONS-SCNC: 13 MMOL/L (ref 7–15)
AST SERPL W P-5'-P-CCNC: 66 U/L (ref 0–45)
BASOPHILS # BLD AUTO: 0.1 10E3/UL (ref 0–0.2)
BASOPHILS NFR BLD AUTO: 1 %
BILIRUB SERPL-MCNC: 0.9 MG/DL
BUN SERPL-MCNC: 16.8 MG/DL (ref 8–23)
CALCIUM SERPL-MCNC: 10.1 MG/DL (ref 8.8–10.2)
CHLORIDE SERPL-SCNC: 103 MMOL/L (ref 98–107)
CREAT SERPL-MCNC: 1.07 MG/DL (ref 0.67–1.17)
DEPRECATED HCO3 PLAS-SCNC: 24 MMOL/L (ref 22–29)
EGFRCR SERPLBLD CKD-EPI 2021: 77 ML/MIN/1.73M2
EOSINOPHIL # BLD AUTO: 0.3 10E3/UL (ref 0–0.7)
EOSINOPHIL NFR BLD AUTO: 3 %
ERYTHROCYTE [DISTWIDTH] IN BLOOD BY AUTOMATED COUNT: 13.3 % (ref 10–15)
GLUCOSE SERPL-MCNC: 202 MG/DL (ref 70–99)
HCT VFR BLD AUTO: 48.4 % (ref 40–53)
HGB BLD-MCNC: 16.9 G/DL (ref 13.3–17.7)
IMM GRANULOCYTES # BLD: 0.1 10E3/UL
IMM GRANULOCYTES NFR BLD: 1 %
LDH SERPL L TO P-CCNC: 197 U/L (ref 0–250)
LYMPHOCYTES # BLD AUTO: 5.1 10E3/UL (ref 0.8–5.3)
LYMPHOCYTES NFR BLD AUTO: 48 %
MCH RBC QN AUTO: 29.9 PG (ref 26.5–33)
MCHC RBC AUTO-ENTMCNC: 34.9 G/DL (ref 31.5–36.5)
MCV RBC AUTO: 86 FL (ref 78–100)
MONOCYTES # BLD AUTO: 0.9 10E3/UL (ref 0–1.3)
MONOCYTES NFR BLD AUTO: 9 %
NEUTROPHILS # BLD AUTO: 4 10E3/UL (ref 1.6–8.3)
NEUTROPHILS NFR BLD AUTO: 38 %
NRBC # BLD AUTO: 0 10E3/UL
NRBC BLD AUTO-RTO: 0 /100
PLATELET # BLD AUTO: 139 10E3/UL (ref 150–450)
POTASSIUM SERPL-SCNC: 4.5 MMOL/L (ref 3.4–5.3)
PROT SERPL-MCNC: 7.1 G/DL (ref 6.4–8.3)
RBC # BLD AUTO: 5.66 10E6/UL (ref 4.4–5.9)
SODIUM SERPL-SCNC: 140 MMOL/L (ref 135–145)
URATE SERPL-MCNC: 7 MG/DL (ref 3.4–7)
WBC # BLD AUTO: 10.4 10E3/UL (ref 4–11)

## 2023-12-21 PROCEDURE — 74177 CT ABD & PELVIS W/CONTRAST: CPT | Mod: MG

## 2023-12-21 PROCEDURE — G1010 CDSM STANSON: HCPCS

## 2023-12-21 PROCEDURE — 36415 COLL VENOUS BLD VENIPUNCTURE: CPT | Mod: ZL

## 2023-12-21 PROCEDURE — 250N000011 HC RX IP 250 OP 636: Mod: JZ | Performed by: INTERNAL MEDICINE

## 2023-12-21 PROCEDURE — 85025 COMPLETE CBC W/AUTO DIFF WBC: CPT | Mod: ZL

## 2023-12-21 PROCEDURE — 83615 LACTATE (LD) (LDH) ENZYME: CPT | Mod: ZL

## 2023-12-21 PROCEDURE — 80053 COMPREHEN METABOLIC PANEL: CPT | Mod: ZL

## 2023-12-21 PROCEDURE — 84550 ASSAY OF BLOOD/URIC ACID: CPT | Mod: ZL

## 2023-12-21 RX ORDER — IOPAMIDOL 755 MG/ML
126 INJECTION, SOLUTION INTRAVASCULAR ONCE
Status: COMPLETED | OUTPATIENT
Start: 2023-12-21 | End: 2023-12-21

## 2023-12-21 RX ADMIN — IOPAMIDOL 126 ML: 755 INJECTION, SOLUTION INTRAVENOUS at 09:11

## 2024-01-03 DIAGNOSIS — C91.10 CLL (CHRONIC LYMPHOCYTIC LEUKEMIA) (H): Primary | ICD-10-CM

## 2024-01-10 DIAGNOSIS — C91.10 CLL (CHRONIC LYMPHOCYTIC LEUKEMIA) (H): Primary | ICD-10-CM

## 2024-01-18 ENCOUNTER — ONCOLOGY VISIT (OUTPATIENT)
Dept: ONCOLOGY | Facility: OTHER | Age: 64
End: 2024-01-18
Attending: INTERNAL MEDICINE
Payer: MEDICARE

## 2024-01-18 VITALS
RESPIRATION RATE: 16 BRPM | SYSTOLIC BLOOD PRESSURE: 122 MMHG | TEMPERATURE: 98.2 F | OXYGEN SATURATION: 97 % | DIASTOLIC BLOOD PRESSURE: 80 MMHG | HEART RATE: 103 BPM | BODY MASS INDEX: 34.34 KG/M2 | WEIGHT: 216 LBS

## 2024-01-18 DIAGNOSIS — E79.0 HYPERURICEMIA: ICD-10-CM

## 2024-01-18 DIAGNOSIS — L40.9 PSORIASIS OF SCALP: Primary | ICD-10-CM

## 2024-01-18 DIAGNOSIS — C91.10 CLL (CHRONIC LYMPHOCYTIC LEUKEMIA) (H): ICD-10-CM

## 2024-01-18 LAB
ALBUMIN SERPL BCG-MCNC: 4.6 G/DL (ref 3.5–5.2)
ALP SERPL-CCNC: 72 U/L (ref 40–150)
ALT SERPL W P-5'-P-CCNC: 96 U/L (ref 0–70)
ANION GAP SERPL CALCULATED.3IONS-SCNC: 10 MMOL/L (ref 7–15)
AST SERPL W P-5'-P-CCNC: 65 U/L (ref 0–45)
BASOPHILS # BLD AUTO: 0.1 10E3/UL (ref 0–0.2)
BASOPHILS NFR BLD AUTO: 1 %
BILIRUB SERPL-MCNC: 0.6 MG/DL
BUN SERPL-MCNC: 16.4 MG/DL (ref 8–23)
CALCIUM SERPL-MCNC: 10 MG/DL (ref 8.8–10.2)
CHLORIDE SERPL-SCNC: 99 MMOL/L (ref 98–107)
CREAT SERPL-MCNC: 1.06 MG/DL (ref 0.67–1.17)
DEPRECATED HCO3 PLAS-SCNC: 29 MMOL/L (ref 22–29)
EGFRCR SERPLBLD CKD-EPI 2021: 78 ML/MIN/1.73M2
EOSINOPHIL # BLD AUTO: 0.2 10E3/UL (ref 0–0.7)
EOSINOPHIL NFR BLD AUTO: 2 %
ERYTHROCYTE [DISTWIDTH] IN BLOOD BY AUTOMATED COUNT: 13.1 % (ref 10–15)
GLUCOSE SERPL-MCNC: 222 MG/DL (ref 70–99)
HCT VFR BLD AUTO: 48.9 % (ref 40–53)
HGB BLD-MCNC: 16.9 G/DL (ref 13.3–17.7)
IMM GRANULOCYTES # BLD: 0.1 10E3/UL
IMM GRANULOCYTES NFR BLD: 1 %
LYMPHOCYTES # BLD AUTO: 4.6 10E3/UL (ref 0.8–5.3)
LYMPHOCYTES NFR BLD AUTO: 42 %
MCH RBC QN AUTO: 29.3 PG (ref 26.5–33)
MCHC RBC AUTO-ENTMCNC: 34.6 G/DL (ref 31.5–36.5)
MCV RBC AUTO: 85 FL (ref 78–100)
MONOCYTES # BLD AUTO: 1.2 10E3/UL (ref 0–1.3)
MONOCYTES NFR BLD AUTO: 11 %
NEUTROPHILS # BLD AUTO: 4.9 10E3/UL (ref 1.6–8.3)
NEUTROPHILS NFR BLD AUTO: 43 %
NRBC # BLD AUTO: 0 10E3/UL
NRBC BLD AUTO-RTO: 0 /100
PLATELET # BLD AUTO: 158 10E3/UL (ref 150–450)
POTASSIUM SERPL-SCNC: 4.5 MMOL/L (ref 3.4–5.3)
PROT SERPL-MCNC: 6.9 G/DL (ref 6.4–8.3)
RBC # BLD AUTO: 5.77 10E6/UL (ref 4.4–5.9)
SODIUM SERPL-SCNC: 138 MMOL/L (ref 135–145)
URATE SERPL-MCNC: 5.3 MG/DL (ref 3.4–7)
WBC # BLD AUTO: 11 10E3/UL (ref 4–11)

## 2024-01-18 PROCEDURE — 99215 OFFICE O/P EST HI 40 MIN: CPT | Performed by: INTERNAL MEDICINE

## 2024-01-18 PROCEDURE — 84550 ASSAY OF BLOOD/URIC ACID: CPT | Mod: ZL | Performed by: INTERNAL MEDICINE

## 2024-01-18 PROCEDURE — 85025 COMPLETE CBC W/AUTO DIFF WBC: CPT | Mod: ZL | Performed by: INTERNAL MEDICINE

## 2024-01-18 PROCEDURE — G0463 HOSPITAL OUTPT CLINIC VISIT: HCPCS

## 2024-01-18 PROCEDURE — 36415 COLL VENOUS BLD VENIPUNCTURE: CPT | Mod: ZL | Performed by: INTERNAL MEDICINE

## 2024-01-18 PROCEDURE — 99417 PROLNG OP E/M EACH 15 MIN: CPT | Performed by: INTERNAL MEDICINE

## 2024-01-18 PROCEDURE — 80053 COMPREHEN METABOLIC PANEL: CPT | Mod: ZL | Performed by: INTERNAL MEDICINE

## 2024-01-18 RX ORDER — FLUOCINOLONE ACETONIDE 0.11 MG/ML
OIL TOPICAL AT BEDTIME
Qty: 118 ML | Refills: 1 | Status: SHIPPED | OUTPATIENT
Start: 2024-01-18

## 2024-01-18 ASSESSMENT — PAIN SCALES - GENERAL: PAINLEVEL: NO PAIN (0)

## 2024-01-18 NOTE — NURSING NOTE
"Oncology Rooming Note    January 18, 2024 3:38 PM   Jesus Varghese is a 63 year old male who presents for:    Chief Complaint   Patient presents with    Hematology     CLL, elevated uric acid     Initial Vitals: /80 (BP Location: Right arm, Patient Position: Sitting, Cuff Size: Adult Large)   Pulse 103   Temp 98.2  F (36.8  C) (Tympanic)   Resp 16   Wt 98 kg (216 lb)   SpO2 97%   BMI 34.34 kg/m   Estimated body mass index is 34.34 kg/m  as calculated from the following:    Height as of 5/23/23: 1.689 m (5' 6.5\").    Weight as of this encounter: 98 kg (216 lb). Body surface area is 2.14 meters squared.  No Pain (0) Comment: Data Unavailable   No LMP for male patient.  Allergies reviewed: Yes  Medications reviewed: Yes    Medications: Medication refills not needed today.  Pharmacy name entered into [employer]:    [id number] - [place], [place] - [address]  [address]  [facility] - [place], [place] - [address]    Frailty Screening:   Is the patient here for a new oncology consult visit in cancer care? 2. No      Clinical concerns: nothing specific, last lab done 12/21/23. Will have monthly labs drawn today.  Dr Stark was notified.      Sara Hardy, JEAN PAUL 1/18/2024 3:42 PM                "

## 2024-01-18 NOTE — PATIENT INSTRUCTIONS
Continue monthly labs    Follow up with Med Stark MD in 4 weeks 1 week after labs    Assist patient in finding new primary care provider

## 2024-01-19 NOTE — PROGRESS NOTES
[facility] Progress Note    Patient: Jesus Varghese  MRN: [id number]  Date of Service: Jan 18, 2024         Reason for Visit    Chief Complaint   Patient presents with    Hematology     CLL, elevated uric acid       ECOG Performance Status  0      Encounter Diagnoses:    Stage II CLL will bulky adenopathy.      History of Present Illness    . Jesus Varghese returns for follow-up of CLL with bulky adenopathy stage II disease.  For details history see previous notes.Reviewed, the patient was deemed a candidate for treatment after he developed splenomegaly bulky retroperitoneal and mediastinal adenopathy as well as ongoing worsening fatigue.  He had initially been diagnosed stage 0 CLL but now had stage II CLL with bulky adenopathy we elected to treat the patient with acalabrutinib 100 mg p.o. twice daily after 4 cycles he reports response with resolution of splenomegaly and significant reduction lymphadenopathy.  After 8 cycles had a complete response.  He developed headaches which he attributed to adalimumab with dose reduced 100 mg p.o. daily and serial scans continue to show complete response with resolution of splenomegaly as well as lymphocytosis.  Was on allopurinol for elevated uric acid.  Comes in today he is doing relatively well denies any fevers night sweats weight loss overall tolerating acalabrutinib well.  He had CT scans including CT neck chest abdomen pelvis performed on December 21, 2023 there was no evidence of lymphadenopathy spleen size was normal.  This was consistent with a complete response.      ______________________________________________________________________________    Past History    Past Medical History:   Diagnosis Date    Acute respiratory failure with hypoxia (H) 2/6/2022    Age-related cataract     No Comments Provided    Calculus of kidney     No Comments Provided    Carpal tunnel syndrome     No Comments Provided    CLL (chronic lymphocytic  leukemia) (H) 2/6/2022    Diverticulosis of intestine without perforation or abscess without bleeding     mild    Dorsalgia     No Comments Provided    Inflammatory liver disease     ? cause 1982    Malignant neoplasm of colon (H)     2006    Other specified postprocedural states     1/14/2016    Pneumonia due to 2019 novel coronavirus 2/6/2022    Pneumonia of both lungs due to infectious organism 2/6/2022    Pure hyperglyceridemia     No Comments Provided    Strain of muscle, fascia and tendon of other parts of biceps, left arm, initial encounter     1/7/2016       Past Surgical History:   Procedure Laterality Date    BONE MARROW BIOPSY, BONE SPECIMEN, NEEDLE/TROCAR Left 05/12/2022    Procedure: BIOPSY, BONE MARROW;  Surgeon: Delia Holguin MD;  Location: [facility] OR    COLON SURGERY      8/22/06,Sigmoid colectomy for [facility] C2 adenocarcinoma    COLONOSCOPY      9/07/07,next due in 2010    COLONOSCOPY      08/30/2010,F/U 2015    COLONOSCOPY  10/19/2015    10/19/15,F/U 2020    COLONOSCOPY N/A 11/07/2022    F/U 2027 h/o colon cancer    EXTRACAPSULAR CATARACT EXTRATION WITH INTRAOCULAR LENS IMPLANT      2011, 2012,Bilateral cataracts R 2011.. L 2012    LAPAROSCOPIC CHOLECYSTECTOMY      04/24/07    OTHER SURGICAL HISTORY      10/11/06,[id number],PORTACATH,Placement of Port-A-Cath, right anterior chest, for chemotherapy    OTHER SURGICAL HISTORY      08/31/2010,,HERNIA REPAIR,Mesh Underlay    OTHER SURGICAL HISTORY      2/12/15,870790,IR URETERAL STENT LEFT    OTHER SURGICAL HISTORY      1/14/2016,[id number],OTHER,Left,arthrex equipment used    TONSILLECTOMY, ADENOIDECTOMY, COMBINED      age 1    VASECTOMY       x2, spontaneous reconnected, so needed 2nd procedure           Review of systems.  CNS: There are no headaches, no blurred vision, no change in mental status,   ENT: There is no hearing loss.  Respiratory: There is no dyspnea, cough or hemoptysis  Cardiac: There is no chest pain, orthopnea, PND, or ankle edema.  GI:  There is no bright red blood per rectum, no hematemesis, no reflux, no diarrhea or constipation  Musculoskeletal: No joint pains  : There is no urinary frequency, hematuria.  Constitutional: There is no fevers, night sweats, weight loss.  Endocrine: No fatigue  Neuro: There is no tingling or numbness in the hands or feet.  Hematologic: There is no gingival bleeding, epistaxis, or easy bruisability.  Dermatologic: There is no skin rash.  A 14 point review of systems is otherwise negative.          Physical Exam    /80 (BP Location: Right arm, Patient Position: Sitting, Cuff Size: Adult Large)   Pulse 103   Temp 98.2  F (36.8  C) (Tympanic)   Resp 16   Wt 98 kg (216 lb)   SpO2 97%   BMI 34.34 kg/m        GENERAL: Alert and oriented to time place and person. Seated comfortably. In no distress.    HEAD: Atraumatic and normocephalic.    EYES: BEVERLEY, EOMI.  No pallor.  No icterus.    Oral cavity: no mucosal lesion or tonsillar enlargement.    NECK: supple. JVP normal.  No thyroid enlargement.    LYMPH NODES: There are no palpable cervical, supraclavicular, axillary, or inguinal nodes.    LUNGS: clear to auscultation bilaterally.  Resonant to percussion throughout bilaterally.  Symmetrical breath movements bilaterally.    HEART: S1 and S2 are heard. Regular rate and rhythm.  No murmur or gallop or rub heard.  No peripheral edema.    ABDOMEN: Soft. Not tender. Not distended.  No palpable hepatomegaly or splenomegaly.  No other mass palpable.  Bowel sounds heard.    EXTREMITIES: There is no ankle edema.  SKIN: no rash, or bruising or purpura.    NEURO: Grossly non-focal.    Lab Results    Recent Results (from the past 240 hour(s))   Comprehensive metabolic panel    Collection Time: 01/18/24  3:46 PM   Result Value Ref Range    Sodium 138 135 - 145 mmol/L    Potassium 4.5 3.4 - 5.3 mmol/L    Carbon Dioxide (CO2) 29 22 - 29 mmol/L    Anion Gap 10 7 - 15 mmol/L    Urea Nitrogen 16.4 8.0 - 23.0 mg/dL    Creatinine  1.06 0.67 - 1.17 mg/dL    GFR Estimate 78 >60 mL/min/1.73m2    Calcium 10.0 8.8 - 10.2 mg/dL    Chloride 99 98 - 107 mmol/L    Glucose 222 (H) 70 - 99 mg/dL    Alkaline Phosphatase 72 40 - 150 U/L    AST 65 (H) 0 - 45 U/L    ALT 96 (H) 0 - 70 U/L    Protein Total 6.9 6.4 - 8.3 g/dL    Albumin 4.6 3.5 - 5.2 g/dL    Bilirubin Total 0.6 <=1.2 mg/dL   Uric acid    Collection Time: 01/18/24  3:46 PM   Result Value Ref Range    Uric Acid 5.3 3.4 - 7.0 mg/dL   CBC with platelets and differential    Collection Time: 01/18/24  3:46 PM   Result Value Ref Range    WBC Count 11.0 4.0 - 11.0 10e3/uL    RBC Count 5.77 4.40 - 5.90 10e6/uL    Hemoglobin 16.9 13.3 - 17.7 g/dL    Hematocrit 48.9 40.0 - 53.0 %    MCV 85 78 - 100 fL    MCH 29.3 26.5 - 33.0 pg    MCHC 34.6 31.5 - 36.5 g/dL    RDW 13.1 10.0 - 15.0 %    Platelet Count 158 150 - 450 10e3/uL    % Neutrophils 43 %    % Lymphocytes 42 %    % Monocytes 11 %    % Eosinophils 2 %    % Basophils 1 %    % Immature Granulocytes 1 %    NRBCs per 100 WBC 0 <1 /100    Absolute Neutrophils 4.9 1.6 - 8.3 10e3/uL    Absolute Lymphocytes 4.6 0.8 - 5.3 10e3/uL    Absolute Monocytes 1.2 0.0 - 1.3 10e3/uL    Absolute Eosinophils 0.2 0.0 - 0.7 10e3/uL    Absolute Basophils 0.1 0.0 - 0.2 10e3/uL    Absolute Immature Granulocytes 0.1 <=0.4 10e3/uL    Absolute NRBCs 0.0 10e3/uL       Imaging    CT Soft Tissue Neck w Contrast    Result Date: 12/21/2023  CT SOFT TISSUE NECK W CONTRAST HISTORY: 63 yearsMale CLL (chronic lymphocytic leukemia) (H) TECHNIQUE: Axial CT imaging of the neck was performed with intervenous contrast. Coronal and sagittal reconstructions were obtained. This exam was performed using one or more of the following dose reduction techniques: Automated exposure control, adjustment of the MARGOT and/or KV according to patient's size, and/or use of iterative reconstruction technique. COMPARISON: 8/17/2023 FINDINGS: The parotid glands and submandibular glands are unremarkable. The  thyroid is unremarkable. There is no evidence of a pharyngeal or parapharyngeal mass. There is a 10 x 12 mm lymph node at the thoracic inlet, series 3 image 52 similar to that seen previously. No abnormally enlarged lymph nodes are otherwise seen in the anterior or posterior cervical chains.     IMPRESSION: No significant interval change from the prior study. No evidence of new or worsening disease. LAURA CARTER MD   SYSTEM ID:  [id number]    CT Chest w Contrast    Result Date: 12/21/2023  CT CHEST W CONTRAST HISTORY: 63 years Male CLL (chronic lymphocytic leukemia) (H) TECHNIQUE: Axial CT imaging of the chest was performed With intravenous contrast. Coronal and sagittal reconstructions were obtained. This exam was performed using one or more of the following dose reduction techniques: Automated exposure control, adjustment of the MARGOT and/or KV according to patient's size, and/or use of iterative reconstruction technique. COMPARISON: 8/17/2023 FINDINGS: There is no mediastinal or hilar or axillary lymphadenopathy. Bilateral interstitial opacities are again seen, most prominent in the upper lung fields. Appearance is unchanged. Lungs are otherwise clear.      No concerning osseous lesions are identified     IMPRESSION: No concerning interval changes. No evidence of mass or lymphadenopathy. LAURA CARTER MD   SYSTEM ID:  [id number]    CT Abdomen Pelvis w Contrast    Result Date: 12/21/2023  Exam:CT ABDOMEN PELVIS W CONTRAST History: 63 years Male CLL (chronic lymphocytic leukemia) (H) Comparisons: 8/17/2023 Technique: Axial CT imaging of the abdomen and pelvis was performed contrast. Coronal and sagittal reconstructions were obtained.  This exam was performed using one or more of the following dose reduction techniques: Automated exposure control, adjustment of the MARGOT and/or KV according to patient's size, and/or use of iterative reconstruction technique.  FINDINGS: Lung bases:The lung bases are clear Abdomen:  Liver: There is diffuse fatty replacement. There is uniform enhancement. Appearance of the liver is unchanged. Gallbladder and biliary tree:]. The gallbladder surgically absent. There is no biliary dilatation. Pancreas:Unremarkable Spleen:Unremarkable Adrenals:Normal Kidneys and ureters: There is a cyst at the upper pole of the left kidney unchanged from the prior study. There is an unchanged simple cyst at the lower pole of the right kidney. Lymph nodes: Intra-abdominal and retroperitoneal lymph nodes are normal in size. Bowel:No abnormally distended or thickened loops of bowel are present. There is no evidence of bowel obstruction. Appendix:Unremarkable Vessels: There is atherosclerotic disease of the aorta. There is no evidence of aneurysm or dissection Osseous structures:Unremarkable Pelvis:There is no evidence of mass or lymphadenopathy. No abnormal fluid collections are present.     IMPRESSION: No concerning interval changes. No evidence of mass or lymphadenopathy at this time. LAURA CARTER MD   SYSTEM ID:  [id number]     Assessment and Plan: #1:Stage II bulky CLL presenting with splenomegaly bulky retroperitoneal mass adenopathy patient was treated with acalabrutinib with complete response but developed headaches Helbert number after 8 cycles he was switched to acalabrutinib 100 mg daily and has a continued complete response with resolution of splenomegaly and no lymphadenopathy.  Plan to continue surveillance we will continue monthly labs including CBC CMP LDH and uric acid.  His uric acid was 5.3.  Therefore we would recommend to continue allopurinol as he is no evidence of any residual disease that would result in any kind of tumor lysis..  Time spent: 70 minutes was spent still patient visit, spent time reviewing history with patient performing history physical, document his physical  Follow-up labs and scans.    Cancer Staging   No matching staging information was found for the patient.        Signed  by: Med Stark MD    CC: Filiberto John MD

## 2024-01-24 DIAGNOSIS — E79.0 HYPERURICEMIA: ICD-10-CM

## 2024-01-25 RX ORDER — ALLOPURINOL 300 MG/1
300 TABLET ORAL DAILY
Qty: 90 TABLET | Refills: 0 | Status: SHIPPED | OUTPATIENT
Start: 2024-01-25

## 2024-01-25 NOTE — TELEPHONE ENCOUNTER
Disp Refills Start End MULU   allopurinol (ZYLOPRIM) 300 MG tablet 90 tablet 3 1/24/2023 -- --     Last Office Visit: 01/10/2024  Future Office visit:       Routing refill request to provider for review/approval because:

## 2024-02-08 DIAGNOSIS — C91.10 CLL (CHRONIC LYMPHOCYTIC LEUKEMIA) (H): Primary | ICD-10-CM

## 2024-02-16 ENCOUNTER — LAB (OUTPATIENT)
Dept: LAB | Facility: OTHER | Age: 64
End: 2024-02-16
Attending: INTERNAL MEDICINE
Payer: MEDICARE

## 2024-02-16 DIAGNOSIS — E79.0 HYPERURICEMIA: ICD-10-CM

## 2024-02-16 DIAGNOSIS — C91.10 CLL (CHRONIC LYMPHOCYTIC LEUKEMIA) (H): ICD-10-CM

## 2024-02-16 LAB
ALBUMIN SERPL BCG-MCNC: 4.7 G/DL (ref 3.5–5.2)
ALP SERPL-CCNC: 59 U/L (ref 40–150)
ALT SERPL W P-5'-P-CCNC: 97 U/L (ref 0–70)
ANION GAP SERPL CALCULATED.3IONS-SCNC: 13 MMOL/L (ref 7–15)
AST SERPL W P-5'-P-CCNC: 61 U/L (ref 0–45)
BASOPHILS # BLD AUTO: ABNORMAL 10*3/UL
BASOPHILS # BLD MANUAL: 0.1 10E3/UL (ref 0–0.2)
BASOPHILS NFR BLD AUTO: ABNORMAL %
BASOPHILS NFR BLD MANUAL: 1 %
BILIRUB SERPL-MCNC: 0.7 MG/DL
BUN SERPL-MCNC: 14.7 MG/DL (ref 8–23)
CALCIUM SERPL-MCNC: 9.9 MG/DL (ref 8.8–10.2)
CHLORIDE SERPL-SCNC: 104 MMOL/L (ref 98–107)
CREAT SERPL-MCNC: 0.98 MG/DL (ref 0.67–1.17)
DEPRECATED HCO3 PLAS-SCNC: 22 MMOL/L (ref 22–29)
EGFRCR SERPLBLD CKD-EPI 2021: 86 ML/MIN/1.73M2
EOSINOPHIL # BLD AUTO: ABNORMAL 10*3/UL
EOSINOPHIL # BLD MANUAL: 0.1 10E3/UL (ref 0–0.7)
EOSINOPHIL NFR BLD AUTO: ABNORMAL %
EOSINOPHIL NFR BLD MANUAL: 1 %
ERYTHROCYTE [DISTWIDTH] IN BLOOD BY AUTOMATED COUNT: 13 % (ref 10–15)
GLUCOSE SERPL-MCNC: 154 MG/DL (ref 70–99)
HCT VFR BLD AUTO: 46.6 % (ref 40–53)
HGB BLD-MCNC: 16.7 G/DL (ref 13.3–17.7)
IMM GRANULOCYTES # BLD: ABNORMAL 10*3/UL
IMM GRANULOCYTES NFR BLD: ABNORMAL %
LDH SERPL L TO P-CCNC: 202 U/L (ref 0–250)
LYMPHOCYTES # BLD AUTO: ABNORMAL 10*3/UL
LYMPHOCYTES # BLD MANUAL: 6.4 10E3/UL (ref 0.8–5.3)
LYMPHOCYTES NFR BLD AUTO: ABNORMAL %
LYMPHOCYTES NFR BLD MANUAL: 62 %
MCH RBC QN AUTO: 29.9 PG (ref 26.5–33)
MCHC RBC AUTO-ENTMCNC: 35.8 G/DL (ref 31.5–36.5)
MCV RBC AUTO: 83 FL (ref 78–100)
MONOCYTES # BLD AUTO: ABNORMAL 10*3/UL
MONOCYTES # BLD MANUAL: 0.6 10E3/UL (ref 0–1.3)
MONOCYTES NFR BLD AUTO: ABNORMAL %
MONOCYTES NFR BLD MANUAL: 6 %
NEUTROPHILS # BLD AUTO: ABNORMAL 10*3/UL
NEUTROPHILS # BLD MANUAL: 3.1 10E3/UL (ref 1.6–8.3)
NEUTROPHILS NFR BLD AUTO: ABNORMAL %
NEUTROPHILS NFR BLD MANUAL: 30 %
NRBC # BLD AUTO: 0 10E3/UL
NRBC BLD AUTO-RTO: 0 /100
PLAT MORPH BLD: ABNORMAL
PLATELET # BLD AUTO: 142 10E3/UL (ref 150–450)
POTASSIUM SERPL-SCNC: 4.4 MMOL/L (ref 3.4–5.3)
PROT SERPL-MCNC: 7 G/DL (ref 6.4–8.3)
RBC # BLD AUTO: 5.59 10E6/UL (ref 4.4–5.9)
RBC MORPH BLD: ABNORMAL
SMUDGE CELLS BLD QL SMEAR: PRESENT
SODIUM SERPL-SCNC: 139 MMOL/L (ref 135–145)
URATE SERPL-MCNC: 8.2 MG/DL (ref 3.4–7)
VARIANT LYMPHS BLD QL SMEAR: PRESENT
WBC # BLD AUTO: 10.4 10E3/UL (ref 4–11)

## 2024-02-16 PROCEDURE — 36415 COLL VENOUS BLD VENIPUNCTURE: CPT | Mod: ZL

## 2024-02-16 PROCEDURE — 85007 BL SMEAR W/DIFF WBC COUNT: CPT | Mod: ZL

## 2024-02-16 PROCEDURE — 83615 LACTATE (LD) (LDH) ENZYME: CPT | Mod: ZL | Performed by: INTERNAL MEDICINE

## 2024-02-16 PROCEDURE — 80053 COMPREHEN METABOLIC PANEL: CPT | Mod: ZL

## 2024-02-16 PROCEDURE — 85027 COMPLETE CBC AUTOMATED: CPT | Mod: ZL

## 2024-02-16 PROCEDURE — 84550 ASSAY OF BLOOD/URIC ACID: CPT | Mod: ZL

## 2024-03-04 DIAGNOSIS — C91.10 CLL (CHRONIC LYMPHOCYTIC LEUKEMIA) (H): Primary | ICD-10-CM

## 2024-03-11 DIAGNOSIS — C91.10 CLL (CHRONIC LYMPHOCYTIC LEUKEMIA) (H): Primary | ICD-10-CM

## 2024-03-13 NOTE — TELEPHONE ENCOUNTER
's numbers are looking great, doesn't need this fill yet and has a scan  in May and will check then. Has a whole month on hand also if needed to start again.

## 2024-03-14 ENCOUNTER — PATIENT OUTREACH (OUTPATIENT)
Dept: ONCOLOGY | Facility: OTHER | Age: 64
End: 2024-03-14
Payer: MEDICARE

## 2024-03-14 NOTE — PROGRESS NOTES
[facility]: Cancer Care Follow-Up Note                                    Discussion with Patient:                                                       was having headaches and pain chronically while on Calquence. He is willing to continue to do monthly labs and follow up with Med Stark MD in May. He is feeling much better off Calquence.              Dates of Treatment:                                                      Infusion given in last 28 days       None          Treatment Plan Medications       Oral ONC Mantle Cell Lymphoma - Acalabrutinib       Take Home Medications       Medication Sig Start/End Day/Cycle Status    acalabrutinib (CALQUENCE) 100 MG tablet Take 1 tablet (100 mg) by mouth daily 3/11/2024 to -- Day 1, Cycle 17 - 1/5/2024 Released                            Assessment:                                                          [facility] Short Symptom Review:     Assessment completed with:: Patient    General/Short Assessment  Does the patient have all their medications?: Yes  Is the patient experiencing any new or worsening symptoms?: No  Discussion with patient: Answered patient's questions and addressed concerns;Reviewed how and when to contact clinic;Reviewed patient's future appointments    Pain  Patient Reported Pain?: No    Patient Coping  Accepting    Clinic Utilization  Patient Aware of Next Appointment?: Yes    Other Patient Concerns  Other Patient Reported Concerns: No    Intervention/Education provided during outreach:                                                       Continue monthly labs.   Med Stark MD notified that  has decided not to take the Calquence any longer and will discuss with provider in May.     Patient to follow up as scheduled at next appt  Patient to call/[employer] message with updates  Confirmed patient has clinic and triage numbers    Signature:  Ivonne Rees RN

## 2024-03-15 ENCOUNTER — LAB (OUTPATIENT)
Dept: LAB | Facility: OTHER | Age: 64
End: 2024-03-15
Attending: INTERNAL MEDICINE
Payer: MEDICARE

## 2024-03-15 DIAGNOSIS — C91.10 CLL (CHRONIC LYMPHOCYTIC LEUKEMIA) (H): ICD-10-CM

## 2024-03-15 DIAGNOSIS — E79.0 HYPERURICEMIA: ICD-10-CM

## 2024-03-15 LAB
ALBUMIN SERPL BCG-MCNC: 4.8 G/DL (ref 3.5–5.2)
ALP SERPL-CCNC: 61 U/L (ref 40–150)
ALT SERPL W P-5'-P-CCNC: 130 U/L (ref 0–70)
ANION GAP SERPL CALCULATED.3IONS-SCNC: 14 MMOL/L (ref 7–15)
AST SERPL W P-5'-P-CCNC: 87 U/L (ref 0–45)
BASOPHILS # BLD AUTO: 0.1 10E3/UL (ref 0–0.2)
BASOPHILS NFR BLD AUTO: 1 %
BILIRUB SERPL-MCNC: 0.5 MG/DL
BUN SERPL-MCNC: 14.8 MG/DL (ref 8–23)
CALCIUM SERPL-MCNC: 10.1 MG/DL (ref 8.8–10.2)
CHLORIDE SERPL-SCNC: 100 MMOL/L (ref 98–107)
CREAT SERPL-MCNC: 0.99 MG/DL (ref 0.67–1.17)
DEPRECATED HCO3 PLAS-SCNC: 24 MMOL/L (ref 22–29)
EGFRCR SERPLBLD CKD-EPI 2021: 85 ML/MIN/1.73M2
EOSINOPHIL # BLD AUTO: 0.3 10E3/UL (ref 0–0.7)
EOSINOPHIL NFR BLD AUTO: 3 %
ERYTHROCYTE [DISTWIDTH] IN BLOOD BY AUTOMATED COUNT: 13 % (ref 10–15)
GLUCOSE SERPL-MCNC: 177 MG/DL (ref 70–99)
HCT VFR BLD AUTO: 49.5 % (ref 40–53)
HGB BLD-MCNC: 16.9 G/DL (ref 13.3–17.7)
HOLD SPECIMEN: NORMAL
IMM GRANULOCYTES # BLD: 0.1 10E3/UL
IMM GRANULOCYTES NFR BLD: 1 %
LDH SERPL L TO P-CCNC: 241 U/L (ref 0–250)
LYMPHOCYTES # BLD AUTO: 4.8 10E3/UL (ref 0.8–5.3)
LYMPHOCYTES NFR BLD AUTO: 50 %
MCH RBC QN AUTO: 28.6 PG (ref 26.5–33)
MCHC RBC AUTO-ENTMCNC: 34.1 G/DL (ref 31.5–36.5)
MCV RBC AUTO: 84 FL (ref 78–100)
MONOCYTES # BLD AUTO: 1.1 10E3/UL (ref 0–1.3)
MONOCYTES NFR BLD AUTO: 11 %
NEUTROPHILS # BLD AUTO: 3.3 10E3/UL (ref 1.6–8.3)
NEUTROPHILS NFR BLD AUTO: 34 %
NRBC # BLD AUTO: 0 10E3/UL
NRBC BLD AUTO-RTO: 0 /100
PLATELET # BLD AUTO: 151 10E3/UL (ref 150–450)
POTASSIUM SERPL-SCNC: 4.9 MMOL/L (ref 3.4–5.3)
PROT SERPL-MCNC: 7.2 G/DL (ref 6.4–8.3)
RBC # BLD AUTO: 5.9 10E6/UL (ref 4.4–5.9)
SODIUM SERPL-SCNC: 138 MMOL/L (ref 135–145)
URATE SERPL-MCNC: 9 MG/DL (ref 3.4–7)
WBC # BLD AUTO: 9.6 10E3/UL (ref 4–11)

## 2024-03-15 PROCEDURE — 85025 COMPLETE CBC W/AUTO DIFF WBC: CPT | Mod: ZL

## 2024-03-15 PROCEDURE — 84550 ASSAY OF BLOOD/URIC ACID: CPT | Mod: ZL

## 2024-03-15 PROCEDURE — 83615 LACTATE (LD) (LDH) ENZYME: CPT | Mod: ZL

## 2024-03-15 PROCEDURE — 84460 ALANINE AMINO (ALT) (SGPT): CPT | Mod: ZL

## 2024-03-15 PROCEDURE — 36415 COLL VENOUS BLD VENIPUNCTURE: CPT | Mod: ZL

## 2024-04-11 NOTE — PROGRESS NOTES
"  Assessment & Plan     1. Acute respiratory failure with hypoxia (H)  Continues to do well with home oxygen use in the morning. Home O2 in the AM prior to use will average 88-90% and improve to 95+% with use. Patient continues to benefit from supportive home oxygen use.     2. Controlled type 2 diabetes mellitus with complication, without long-term current use of insulin (H)  BPA triggering and discussed with patient. Chronic, had been stable. Due for labs including A1c, lipid panel, eye exam, foot exam. Will schedule annual medicare wellness with PCP and diabetic check to be completed at that time.     3. Class 2 severe obesity due to excess calories with serious comorbidity and body mass index (BMI) of 35.0 to 35.9 in adult (H)  BPA triggering and discussed with patient. Chronic, stable. Encourage to focus on healthy lifestyle.     4. Chemotherapy-induced neuropathy (H24)  BPA triggering. Chronic, stable. Continues to follow with oncology.       BMI  Estimated body mass index is 35.26 kg/m  as calculated from the following:    Height as of this encounter: 1.689 m (5' 6.5\").    Weight as of this encounter: 100.6 kg (221 lb 12.8 oz).   Weight management plan: Discussed healthy diet and exercise guidelines      No follow-ups on file.    Subjective    is a 63 year old, presenting for the following health issues:  Consult    History of Present Illness       Reason for visit:  Oxygen test    He eats 2-3 servings of fruits and vegetables daily.He consumes 2 sweetened beverage(s) daily.He exercises with enough effort to increase his heart rate 20 to 29 minutes per day.  He exercises with enough effort to increase his heart rate 5 days per week.   He is taking medications regularly.     Here for follow-up regarding continued use of home oxygen.  Patient has continued difficulties with breathing and associated lower oxygen saturations for which he uses home oxygen mainly in the mornings.  Initially began using after " acute respiratory failure with hypoxia secondary to COVID-pneumonia a couple years ago.  Patient continues to use home oxygen in the mornings as he reports his home oxygen saturations average between 88 and 90% in the morning with improvement to 95+% with oxygen use.  Will use as needed during the day.  Not using at night.  Continues to feel well with current use.  Current oxygen saturation clinic is 96%.      PAST MEDICAL HISTORY:   Past Medical History:   Diagnosis Date    Acute respiratory failure with hypoxia (H) 2/6/2022    Age-related cataract     No Comments Provided    Calculus of kidney     No Comments Provided    Carpal tunnel syndrome     No Comments Provided    CLL (chronic lymphocytic leukemia) (H) 2/6/2022    Diverticulosis of intestine without perforation or abscess without bleeding     mild    Dorsalgia     No Comments Provided    Inflammatory liver disease     ? cause 1982    Malignant neoplasm of colon (H)     2006    Other specified postprocedural states     1/14/2016    Pneumonia due to 2019 novel coronavirus 2/6/2022    Pneumonia of both lungs due to infectious organism 2/6/2022    Pure hyperglyceridemia     No Comments Provided    Strain of muscle, fascia and tendon of other parts of biceps, left arm, initial encounter     1/7/2016       PAST SURGICAL HISTORY:   Past Surgical History:   Procedure Laterality Date    BONE MARROW BIOPSY, BONE SPECIMEN, NEEDLE/TROCAR Left 05/12/2022    Procedure: BIOPSY, BONE MARROW;  Surgeon: Delia Holguin MD;  Location: [facility] OR    COLON SURGERY      8/22/06,Sigmoid colectomy for [facility] C2 adenocarcinoma    COLONOSCOPY      9/07/07,next due in 2010    COLONOSCOPY      08/30/2010,F/U 2015    COLONOSCOPY  10/19/2015    10/19/15,F/U 2020    COLONOSCOPY N/A 11/07/2022    F/U 2027 h/o colon cancer    EXTRACAPSULAR CATARACT EXTRATION WITH INTRAOCULAR LENS IMPLANT      2011, 2012,Bilateral cataracts R 2011.. L 2012    LAPAROSCOPIC CHOLECYSTECTOMY      04/24/07    OTHER  SURGICAL HISTORY      10/11/06,264446,PORTACATH,Placement of Port-A-Cath, right anterior chest, for chemotherapy    OTHER SURGICAL HISTORY      2010,,HERNIA REPAIR,Mesh Underlay    OTHER SURGICAL HISTORY      2/12/15,[id number],IR URETERAL STENT LEFT    OTHER SURGICAL HISTORY      2016,421975,OTHER,Left,arthrex equipment used    TONSILLECTOMY, ADENOIDECTOMY, COMBINED      age 1    VASECTOMY       x2, spontaneous reconnected, so needed 2nd procedure       FAMILY HISTORY:   Family History   Problem Relation Age of Onset    Hypertension Mother         Hypertension    Heart Disease Father         Heart Disease,40s and 50s    Heart Disease Other         Heart Disease,40s and 50s    Anesthesia Reaction No family hx of         Anesthesia Problem    Other - See Comments No family hx of         Stroke    Blood Disease No family hx of         Blood Disease    Bleeding Diathesis No family hx of        SOCIAL HISTORY:   Social History     Tobacco Use    Smoking status: Former     Current packs/day: 0.00     Average packs/day: 2.0 packs/day for 30.0 years (60.0 ttl pk-yrs)     Types: Cigarettes     Start date: 1980     Quit date: 2010     Years since quittin.8     Passive exposure: Past    Smokeless tobacco: Never    Tobacco comments:     Passive exposure in adult home.    Substance Use Topics    Alcohol use: Not Currently      No Known Allergies  Current Outpatient Medications   Medication Sig Dispense Refill    acalabrutinib (CALQUENCE) 100 MG tablet Take 1 tablet (100 mg) by mouth daily 30 tablet 0    ACCU-CHEK GUIDE test strip USE 1 STRIP ONCE DAILY 100 strip 3    allopurinol (ZYLOPRIM) 300 MG tablet TAKE 1 TABLET(300 MG) BY MOUTH DAILY 90 tablet 0    blood glucose (NO BRAND SPECIFIED) lancets standard Dispense item covered by insurance. Test blood sugar 1 times daily. 100 each 11    blood glucose monitoring (NO BRAND SPECIFIED) meter device kit Dispense option covered by insurance. Test blood  "sugar 1 times daily. 1 kit 11    blood glucose monitoring (SOFTCLIX) lancets USE 1  TO CHECK GLUCOSE ONCE DAILY 100 each 0    Celery Seed OIL 4 drops daily      Fluocinolone Acetonide Scalp 0.01 % OIL oil Apply topically at bedtime 118 mL 1    guaiFENesin (MUCINEX) 600 MG 12 hr tablet Take 600 mg by mouth daily      HEMP OIL OR EXTRACT OR OTHER CBD CANNABINOID, NOT MEDICAL CANNABIS, Take 4 drops by mouth At Bedtime       MAGNESIUM PO Take 1 tablet by mouth daily      multivitamin, therapeutic (THERA-VIT) TABS tablet Take 1 tablet by mouth daily      UNABLE TO FIND MEDICATION NAME: Cherry juice, 2 oz once daily      Vitamin D, Cholecalciferol, 25 MCG (1000 UT) CAPS Take 1 capsule by mouth daily       aspirin 81 MG chewable tablet Take 1 tablet (81 mg) by mouth daily with food (Patient not taking: Reported on 4/13/2023) 90 tablet 3     No current facility-administered medications for this visit.           Objective    /76   Pulse 84   Temp (!) 96.7  F (35.9  C)   Resp 14   Ht 1.689 m (5' 6.5\")   Wt 100.6 kg (221 lb 12.8 oz)   SpO2 96%   BMI 35.26 kg/m    Body mass index is 35.26 kg/m .  Physical Exam   General: Pleasant, in no apparent distress.  Cardiovascular: Regular rate and rhythm with S1 equal to S2. No murmurs, friction rubs, or gallops.   Respiratory: Lungs are resonant and clear to auscultation bilaterally. No wheezes, crackles, or rhonchi.  Psych: Appropriate mood and affect.      Signed Electronically by: Mary Kay Noble PA-C      "

## 2024-04-15 ENCOUNTER — OFFICE VISIT (OUTPATIENT)
Dept: FAMILY MEDICINE | Facility: OTHER | Age: 64
End: 2024-04-15
Attending: PHYSICIAN ASSISTANT
Payer: MEDICARE

## 2024-04-15 VITALS
SYSTOLIC BLOOD PRESSURE: 132 MMHG | RESPIRATION RATE: 14 BRPM | HEART RATE: 84 BPM | BODY MASS INDEX: 34.81 KG/M2 | WEIGHT: 221.8 LBS | DIASTOLIC BLOOD PRESSURE: 76 MMHG | HEIGHT: 67 IN | TEMPERATURE: 96.7 F | OXYGEN SATURATION: 96 %

## 2024-04-15 DIAGNOSIS — E66.01 CLASS 2 SEVERE OBESITY DUE TO EXCESS CALORIES WITH SERIOUS COMORBIDITY AND BODY MASS INDEX (BMI) OF 35.0 TO 35.9 IN ADULT (H): ICD-10-CM

## 2024-04-15 DIAGNOSIS — E11.8 CONTROLLED TYPE 2 DIABETES MELLITUS WITH COMPLICATION, WITHOUT LONG-TERM CURRENT USE OF INSULIN (H): ICD-10-CM

## 2024-04-15 DIAGNOSIS — E66.812 CLASS 2 SEVERE OBESITY DUE TO EXCESS CALORIES WITH SERIOUS COMORBIDITY AND BODY MASS INDEX (BMI) OF 35.0 TO 35.9 IN ADULT (H): ICD-10-CM

## 2024-04-15 DIAGNOSIS — G62.0 CHEMOTHERAPY-INDUCED NEUROPATHY (H): ICD-10-CM

## 2024-04-15 DIAGNOSIS — J96.01 ACUTE RESPIRATORY FAILURE WITH HYPOXIA (H): Primary | ICD-10-CM

## 2024-04-15 DIAGNOSIS — T45.1X5A CHEMOTHERAPY-INDUCED NEUROPATHY (H): ICD-10-CM

## 2024-04-15 PROCEDURE — G0463 HOSPITAL OUTPT CLINIC VISIT: HCPCS

## 2024-04-15 PROCEDURE — 99214 OFFICE O/P EST MOD 30 MIN: CPT | Performed by: PHYSICIAN ASSISTANT

## 2024-04-15 ASSESSMENT — PAIN SCALES - GENERAL: PAINLEVEL: NO PAIN (0)

## 2024-04-15 NOTE — NURSING NOTE
Patient presents to clinic to have oxygen Saturation checked as his Medicare is requesting it. He is using oxygen in the mornings and at HS if needed.  Amanda Garcia, RULA ....................  4/15/2024   8:02 AM  EXT [id number]

## 2024-04-19 ENCOUNTER — LAB (OUTPATIENT)
Dept: LAB | Facility: OTHER | Age: 64
End: 2024-04-19
Payer: MEDICARE

## 2024-04-19 DIAGNOSIS — E79.0 HYPERURICEMIA: ICD-10-CM

## 2024-04-19 DIAGNOSIS — C91.10 CLL (CHRONIC LYMPHOCYTIC LEUKEMIA) (H): ICD-10-CM

## 2024-04-19 LAB
ALBUMIN SERPL BCG-MCNC: 4.5 G/DL (ref 3.5–5.2)
ALP SERPL-CCNC: 63 U/L (ref 40–150)
ALT SERPL W P-5'-P-CCNC: 106 U/L (ref 0–70)
ANION GAP SERPL CALCULATED.3IONS-SCNC: 14 MMOL/L (ref 7–15)
AST SERPL W P-5'-P-CCNC: 64 U/L (ref 0–45)
BASOPHILS # BLD AUTO: 0.1 10E3/UL (ref 0–0.2)
BASOPHILS NFR BLD AUTO: 1 %
BILIRUB SERPL-MCNC: 0.5 MG/DL
BUN SERPL-MCNC: 13.3 MG/DL (ref 8–23)
CALCIUM SERPL-MCNC: 10.2 MG/DL (ref 8.8–10.2)
CHLORIDE SERPL-SCNC: 102 MMOL/L (ref 98–107)
CREAT SERPL-MCNC: 0.96 MG/DL (ref 0.67–1.17)
DEPRECATED HCO3 PLAS-SCNC: 22 MMOL/L (ref 22–29)
EGFRCR SERPLBLD CKD-EPI 2021: 88 ML/MIN/1.73M2
EOSINOPHIL # BLD AUTO: 0.3 10E3/UL (ref 0–0.7)
EOSINOPHIL NFR BLD AUTO: 3 %
ERYTHROCYTE [DISTWIDTH] IN BLOOD BY AUTOMATED COUNT: 13.1 % (ref 10–15)
GLUCOSE SERPL-MCNC: 210 MG/DL (ref 70–99)
HCT VFR BLD AUTO: 49.7 % (ref 40–53)
HGB BLD-MCNC: 16.9 G/DL (ref 13.3–17.7)
IMM GRANULOCYTES # BLD: 0.1 10E3/UL
IMM GRANULOCYTES NFR BLD: 1 %
LDH SERPL L TO P-CCNC: 247 U/L (ref 0–250)
LYMPHOCYTES # BLD AUTO: 4.9 10E3/UL (ref 0.8–5.3)
LYMPHOCYTES NFR BLD AUTO: 51 %
MCH RBC QN AUTO: 28.6 PG (ref 26.5–33)
MCHC RBC AUTO-ENTMCNC: 34 G/DL (ref 31.5–36.5)
MCV RBC AUTO: 84 FL (ref 78–100)
MONOCYTES # BLD AUTO: 0.7 10E3/UL (ref 0–1.3)
MONOCYTES NFR BLD AUTO: 8 %
NEUTROPHILS # BLD AUTO: 3.5 10E3/UL (ref 1.6–8.3)
NEUTROPHILS NFR BLD AUTO: 36 %
NRBC # BLD AUTO: 0 10E3/UL
NRBC BLD AUTO-RTO: 0 /100
PLATELET # BLD AUTO: 148 10E3/UL (ref 150–450)
POTASSIUM SERPL-SCNC: 4.8 MMOL/L (ref 3.4–5.3)
PROT SERPL-MCNC: 6.8 G/DL (ref 6.4–8.3)
RBC # BLD AUTO: 5.91 10E6/UL (ref 4.4–5.9)
SODIUM SERPL-SCNC: 138 MMOL/L (ref 135–145)
URATE SERPL-MCNC: 7.8 MG/DL (ref 3.4–7)
WBC # BLD AUTO: 9.6 10E3/UL (ref 4–11)

## 2024-04-19 PROCEDURE — 83615 LACTATE (LD) (LDH) ENZYME: CPT | Mod: ZL

## 2024-04-19 PROCEDURE — 36415 COLL VENOUS BLD VENIPUNCTURE: CPT | Mod: ZL

## 2024-04-19 PROCEDURE — 80053 COMPREHEN METABOLIC PANEL: CPT | Mod: ZL

## 2024-04-19 PROCEDURE — 85025 COMPLETE CBC W/AUTO DIFF WBC: CPT | Mod: ZL

## 2024-04-19 PROCEDURE — 84550 ASSAY OF BLOOD/URIC ACID: CPT | Mod: ZL

## 2024-05-06 ENCOUNTER — HOSPITAL ENCOUNTER (OUTPATIENT)
Dept: CT IMAGING | Facility: OTHER | Age: 64
Discharge: HOME | End: 2024-05-06
Attending: INTERNAL MEDICINE
Payer: MEDICARE

## 2024-05-06 ENCOUNTER — LAB (OUTPATIENT)
Dept: LAB | Facility: OTHER | Age: 64
End: 2024-05-06
Attending: INTERNAL MEDICINE
Payer: MEDICARE

## 2024-05-06 DIAGNOSIS — C91.10 CLL (CHRONIC LYMPHOCYTIC LEUKEMIA) (H): ICD-10-CM

## 2024-05-06 DIAGNOSIS — E79.0 HYPERURICEMIA: ICD-10-CM

## 2024-05-06 LAB
ACANTHOCYTES BLD QL SMEAR: ABNORMAL
ALBUMIN SERPL BCG-MCNC: 5 G/DL (ref 3.5–5.2)
ALP SERPL-CCNC: 58 U/L (ref 40–150)
ALT SERPL W P-5'-P-CCNC: 99 U/L (ref 0–70)
ANION GAP SERPL CALCULATED.3IONS-SCNC: 14 MMOL/L (ref 7–15)
AST SERPL W P-5'-P-CCNC: 69 U/L (ref 0–45)
AUER BODIES BLD QL SMEAR: ABNORMAL
BASO STIPL BLD QL SMEAR: ABNORMAL
BASOPHILS # BLD AUTO: 0.1 10E3/UL (ref 0–0.2)
BASOPHILS NFR BLD AUTO: 1 %
BILIRUB SERPL-MCNC: 0.9 MG/DL
BITE CELLS BLD QL SMEAR: ABNORMAL
BLISTER CELLS BLD QL SMEAR: ABNORMAL
BUN SERPL-MCNC: 11.9 MG/DL (ref 8–23)
BURR CELLS BLD QL SMEAR: ABNORMAL
CALCIUM SERPL-MCNC: 10.1 MG/DL (ref 8.8–10.2)
CHLORIDE SERPL-SCNC: 101 MMOL/L (ref 98–107)
CREAT SERPL-MCNC: 1.09 MG/DL (ref 0.67–1.17)
DACRYOCYTES BLD QL SMEAR: ABNORMAL
DEPRECATED HCO3 PLAS-SCNC: 24 MMOL/L (ref 22–29)
EGFRCR SERPLBLD CKD-EPI 2021: 75 ML/MIN/1.73M2
ELLIPTOCYTES BLD QL SMEAR: ABNORMAL
EOSINOPHIL # BLD AUTO: 0.4 10E3/UL (ref 0–0.7)
EOSINOPHIL NFR BLD AUTO: 4 %
ERYTHROCYTE [DISTWIDTH] IN BLOOD BY AUTOMATED COUNT: 12.9 % (ref 10–15)
FRAGMENTS BLD QL SMEAR: ABNORMAL
GIANT PLATELETS BLD QL SMEAR: ABNORMAL
GLUCOSE SERPL-MCNC: 184 MG/DL (ref 70–99)
HCT VFR BLD AUTO: 50.5 % (ref 40–53)
HGB BLD-MCNC: 17.3 G/DL (ref 13.3–17.7)
HGB C CRYSTALS: ABNORMAL
HOWELL-JOLLY BOD BLD QL SMEAR: ABNORMAL
IMM GRANULOCYTES # BLD: 0.1 10E3/UL
IMM GRANULOCYTES NFR BLD: 1 %
LDH SERPL L TO P-CCNC: 252 U/L (ref 0–250)
LYMPHOCYTES # BLD AUTO: 6.6 10E3/UL (ref 0.8–5.3)
LYMPHOCYTES NFR BLD AUTO: 60 %
MCH RBC QN AUTO: 29.1 PG (ref 26.5–33)
MCHC RBC AUTO-ENTMCNC: 34.3 G/DL (ref 31.5–36.5)
MCV RBC AUTO: 85 FL (ref 78–100)
MONOCYTES # BLD AUTO: 0.7 10E3/UL (ref 0–1.3)
MONOCYTES NFR BLD AUTO: 7 %
NEUTROPHILS # BLD AUTO: 3.1 10E3/UL (ref 1.6–8.3)
NEUTROPHILS NFR BLD AUTO: 29 %
NEUTS HYPERSEG BLD QL SMEAR: ABNORMAL
NRBC # BLD AUTO: 0 10E3/UL
NRBC BLD AUTO-RTO: 0 /100
PATH REV: ABNORMAL
PLAT MORPH BLD: ABNORMAL
PLATELET # BLD AUTO: 143 10E3/UL (ref 150–450)
POLYCHROMASIA BLD QL SMEAR: ABNORMAL
POTASSIUM SERPL-SCNC: 4.8 MMOL/L (ref 3.4–5.3)
PROT SERPL-MCNC: 7.3 G/DL (ref 6.4–8.3)
RBC # BLD AUTO: 5.95 10E6/UL (ref 4.4–5.9)
RBC AGGLUT BLD QL: ABNORMAL
RBC MORPH BLD: ABNORMAL
ROULEAUX BLD QL SMEAR: ABNORMAL
SICKLE CELLS BLD QL SMEAR: ABNORMAL
SMUDGE CELLS BLD QL SMEAR: ABNORMAL
SODIUM SERPL-SCNC: 139 MMOL/L (ref 135–145)
SPHEROCYTES BLD QL SMEAR: ABNORMAL
STOMATOCYTES BLD QL SMEAR: ABNORMAL
TARGETS BLD QL SMEAR: ABNORMAL
TOXIC GRANULES BLD QL SMEAR: ABNORMAL
URATE SERPL-MCNC: 7.9 MG/DL (ref 3.4–7)
VARIANT LYMPHS BLD QL SMEAR: PRESENT
WBC # BLD AUTO: 11 10E3/UL (ref 4–11)

## 2024-05-06 PROCEDURE — 85041 AUTOMATED RBC COUNT: CPT | Mod: ZL

## 2024-05-06 PROCEDURE — 74177 CT ABD & PELVIS W/CONTRAST: CPT | Mod: MG

## 2024-05-06 PROCEDURE — 83615 LACTATE (LD) (LDH) ENZYME: CPT | Mod: ZL

## 2024-05-06 PROCEDURE — 80053 COMPREHEN METABOLIC PANEL: CPT | Mod: ZL

## 2024-05-06 PROCEDURE — 70491 CT SOFT TISSUE NECK W/DYE: CPT | Mod: MG

## 2024-05-06 PROCEDURE — 250N000011 HC RX IP 250 OP 636: Performed by: INTERNAL MEDICINE

## 2024-05-06 PROCEDURE — 84550 ASSAY OF BLOOD/URIC ACID: CPT | Mod: ZL

## 2024-05-06 PROCEDURE — 36415 COLL VENOUS BLD VENIPUNCTURE: CPT | Mod: ZL

## 2024-05-06 PROCEDURE — 71260 CT THORAX DX C+: CPT | Mod: MG

## 2024-05-06 RX ORDER — IOPAMIDOL 755 MG/ML
127 INJECTION, SOLUTION INTRAVASCULAR ONCE
Status: COMPLETED | OUTPATIENT
Start: 2024-05-06 | End: 2024-05-06

## 2024-05-06 RX ADMIN — IOPAMIDOL 127 ML: 755 INJECTION, SOLUTION INTRAVENOUS at 11:37

## 2024-05-14 ENCOUNTER — ONCOLOGY VISIT (OUTPATIENT)
Dept: ONCOLOGY | Facility: OTHER | Age: 64
End: 2024-05-14
Attending: INTERNAL MEDICINE
Payer: MEDICARE

## 2024-05-14 VITALS
HEART RATE: 86 BPM | WEIGHT: 217 LBS | TEMPERATURE: 97.7 F | SYSTOLIC BLOOD PRESSURE: 132 MMHG | RESPIRATION RATE: 16 BRPM | BODY MASS INDEX: 34.5 KG/M2 | DIASTOLIC BLOOD PRESSURE: 82 MMHG | OXYGEN SATURATION: 97 %

## 2024-05-14 DIAGNOSIS — C91.10 CLL (CHRONIC LYMPHOCYTIC LEUKEMIA) (H): Primary | ICD-10-CM

## 2024-05-14 PROCEDURE — 99417 PROLNG OP E/M EACH 15 MIN: CPT | Performed by: INTERNAL MEDICINE

## 2024-05-14 PROCEDURE — 99215 OFFICE O/P EST HI 40 MIN: CPT | Performed by: INTERNAL MEDICINE

## 2024-05-14 PROCEDURE — G0463 HOSPITAL OUTPT CLINIC VISIT: HCPCS | Performed by: INTERNAL MEDICINE

## 2024-05-14 ASSESSMENT — PAIN SCALES - GENERAL: PAINLEVEL: NO PAIN (0)

## 2024-05-14 NOTE — NURSING NOTE
"Oncology Rooming Note    May 14, 2024 2:35 PM   Jesus Varghese is a 63 year old male who presents for:    Chief Complaint   Patient presents with    Hematology     CLL     Initial Vitals: /82 (BP Location: Right arm, Patient Position: Sitting, Cuff Size: Adult Large)   Pulse 86   Temp 97.7  F (36.5  C) (Tympanic)   Resp 16   Wt 98.4 kg (217 lb)   SpO2 97%   BMI 34.50 kg/m   Estimated body mass index is 34.5 kg/m  as calculated from the following:    Height as of 4/15/24: 1.689 m (5' 6.5\").    Weight as of this encounter: 98.4 kg (217 lb). Body surface area is 2.15 meters squared.  No Pain (0) Comment: Data Unavailable   No LMP for male patient.  Allergies reviewed: Yes  Medications reviewed: Yes    Medications: Medication refills not needed today.  Pharmacy name entered into Hotreader:    [id number] - [place], [place] - [address]  [facility] - [place], [place] - [address]     Frailty Screening:   Is the patient here for a new oncology consult visit in cancer care? 2. No      Clinical concerns: nothing specific     Sara Hardy CMA ([facility]) 5/14/2024 2:35 PM  "

## 2024-05-14 NOTE — PATIENT INSTRUCTIONS
Restart Allopurinol and discuss with Mary Kay Noble NP at your next visit with her.  Stop monthly labs, we will check them in four months.   See Dr Stark in four months after scans and labs.

## 2024-05-14 NOTE — PROGRESS NOTES
[facility] Progress Note    Patient: Jesus Varghese  MRN: [id number]  Date of Service: May 14, 2024         Reason for Visit    Chief Complaint   Patient presents with    Hematology     CLL       ECOG Performance Status: 0      Encounter Diagnoses: Stage II CLL with bulky adenopathy    History of Present Illness     Mr. Jesus Varghese returns for follow-up of stage II CLL with bulky adenopathy.  For details of history see previous note.  Patient was deemed a candidate for treatment after developed splenomegaly and bulky retroperitoneal/mediastinal adenopathy associated with fatigue.e had initially been diagnosed stage 0 CLL but now had stage II CLL with bulky adenopathy we elected to treat the patient with acalabrutinib 100 mg p.o. twice daily after 4 cycles he reports response with resolution of splenomegaly and significant reduction lymphadenopathy. After 8 cycles had a complete response. He developed headaches which he attributed to acalabrutinib .His dose reduced 100 mg p.o. daily and serial scans continue to show complete response with resolution of splenomegaly as well as lymphocytosis. Was on allopurinol for elevated uric acid.  He completed a total of 16 cycles acalabrutinib and had CT scans including CT neck chest abdomen pelvis performed on December 21, 2023 there was no evidence of lymphadenopathy spleen size was normal. This was consistent with a complete response. The patient elected to stop acalabrutinib in December 2023 due to ongoing headaches .  He has been off of this since then.  He had repeat imaging on May 6, 2024 including CT neck, CT chest, and CT abdomen/pelvis findings were that there was slight interval increase in the size of multiple mediastinal retroperitoneal mesenteric lymph nodes there was a gastrohepatic lymph node that previously measured 10 mm in currently measures 16 mm lymph nodes and the other lymph nodes had increased only by 5 to 10% in size.   There was no evidence splenomegaly.  The patient states he is doing relatively well he denies any fevers night sweats or weight loss.  Denies any lymphadenopathy.  He denies any early satiety.  He does note some nonspecific pain involving the soles of his feet.  He is currently not taking allopurinol.      ______________________________________________________________________________    Past History    Past Medical History:   Diagnosis Date    Acute respiratory failure with hypoxia (H) 2/6/2022    Age-related cataract     No Comments Provided    Calculus of kidney     No Comments Provided    Carpal tunnel syndrome     No Comments Provided    CLL (chronic lymphocytic leukemia) (H) 2/6/2022    Diverticulosis of intestine without perforation or abscess without bleeding     mild    Dorsalgia     No Comments Provided    Inflammatory liver disease     ? cause 1982    Malignant neoplasm of colon (H)     2006    Other specified postprocedural states     1/14/2016    Pneumonia due to 2019 novel coronavirus 2/6/2022    Pneumonia of both lungs due to infectious organism 2/6/2022    Pure hyperglyceridemia     No Comments Provided    Strain of muscle, fascia and tendon of other parts of biceps, left arm, initial encounter     1/7/2016       Past Surgical History:   Procedure Laterality Date    BONE MARROW BIOPSY, BONE SPECIMEN, NEEDLE/TROCAR Left 05/12/2022    Procedure: BIOPSY, BONE MARROW;  Surgeon: Delia Holguin MD;  Location: [facility] OR    COLON SURGERY      8/22/06,Sigmoid colectomy for [facility] C2 adenocarcinoma    COLONOSCOPY      9/07/07,next due in 2010    COLONOSCOPY      08/30/2010,F/U 2015    COLONOSCOPY  10/19/2015    10/19/15,F/U 2020    COLONOSCOPY N/A 11/07/2022    F/U 2027 h/o colon cancer    EXTRACAPSULAR CATARACT EXTRATION WITH INTRAOCULAR LENS IMPLANT      2011, 2012,Bilateral cataracts R 2011.. L 2012    LAPAROSCOPIC CHOLECYSTECTOMY      04/24/07    OTHER SURGICAL HISTORY      10/11/06,[id number],PORTACATH,Placement  of Port-A-Cath, right anterior chest, for chemotherapy    OTHER SURGICAL HISTORY      08/31/2010,,HERNIA REPAIR,Mesh Underlay    OTHER SURGICAL HISTORY      2/12/15,[id number],IR URETERAL STENT LEFT    OTHER SURGICAL HISTORY      1/14/2016,[id number],OTHER,Left,arthrex equipment used    TONSILLECTOMY, ADENOIDECTOMY, COMBINED      age 1    VASECTOMY       x2, spontaneous reconnected, so needed 2nd procedure           Review of systems.  CNS: There are no headaches, no blurred vision, no change in mental status,   ENT: There is no hearing loss.  Respiratory: There is no cough hemoptysis or shortness of breath  Cardiac: There is no chest pain, orthopnea, PND, or ankle edema.  GI: There is no bright red blood per rectum, no hematemesis, no reflux, no diarrhea or constipation  Musculoskeletal: He does have chronic low back pain  : There is no urinary frequency, hematuria.  Constitutional: There is no fevers, night sweats, weight loss.  Endocrine: There is no fatigue  Neuro: There are no neuropathic symptoms  Hematologic: There is no gingival bleeding, epistaxis, or easy bruisability.  Dermatologic: There is no skin rash.  A 14 point review of systems is otherwise negative.          Physical Exam    /82 (BP Location: Right arm, Patient Position: Sitting, Cuff Size: Adult Large)   Pulse 86   Temp 97.7  F (36.5  C) (Tympanic)   Resp 16   Wt 98.4 kg (217 lb)   SpO2 97%   BMI 34.50 kg/m        GENERAL: Alert and oriented to time place and person. Seated comfortably. In no distress.    HEAD: Atraumatic and normocephalic.    EYES: BEVERLEY, EOMI.  No pallor.  No icterus.    Oral cavity: no mucosal lesion or tonsillar enlargement.    NECK: supple. JVP normal.  No thyroid enlargement.    LYMPH NODES: There are no palpable cervical, supraclavicular, axillary, or inguinal nodes.    LUNGS: clear to auscultation bilaterally.    HEART: S1 and S2 are heard. Regular rate and rhythm.  No murmur or gallop or rub heard.    ABDOMEN:  Soft. Not tender. Not distended.  No palpable hepatomegaly or splenomegaly.  No other mass palpable.  Bowel sounds heard.    EXTREMITIES: There is no ankle edema    SKIN: no rash, or bruising or purpura.    NEURO: Grossly non-focal.    Lab Results    Recent Results (from the past 240 hour(s))   Comprehensive metabolic panel    Collection Time: 05/06/24 10:26 AM   Result Value Ref Range    Sodium 139 135 - 145 mmol/L    Potassium 4.8 3.4 - 5.3 mmol/L    Carbon Dioxide (CO2) 24 22 - 29 mmol/L    Anion Gap 14 7 - 15 mmol/L    Urea Nitrogen 11.9 8.0 - 23.0 mg/dL    Creatinine 1.09 0.67 - 1.17 mg/dL    GFR Estimate 75 >60 mL/min/1.73m2    Calcium 10.1 8.8 - 10.2 mg/dL    Chloride 101 98 - 107 mmol/L    Glucose 184 (H) 70 - 99 mg/dL    Alkaline Phosphatase 58 40 - 150 U/L    AST 69 (H) 0 - 45 U/L    ALT 99 (H) 0 - 70 U/L    Protein Total 7.3 6.4 - 8.3 g/dL    Albumin 5.0 3.5 - 5.2 g/dL    Bilirubin Total 0.9 <=1.2 mg/dL   Lactate Dehydrogenase    Collection Time: 05/06/24 10:26 AM   Result Value Ref Range    Lactate Dehydrogenase 252 (H) 0 - 250 U/L   Uric acid    Collection Time: 05/06/24 10:26 AM   Result Value Ref Range    Uric Acid 7.9 (H) 3.4 - 7.0 mg/dL   CBC with platelets and differential    Collection Time: 05/06/24 10:26 AM   Result Value Ref Range    WBC Count 11.0 4.0 - 11.0 10e3/uL    RBC Count 5.95 (H) 4.40 - 5.90 10e6/uL    Hemoglobin 17.3 13.3 - 17.7 g/dL    Hematocrit 50.5 40.0 - 53.0 %    MCV 85 78 - 100 fL    MCH 29.1 26.5 - 33.0 pg    MCHC 34.3 31.5 - 36.5 g/dL    RDW 12.9 10.0 - 15.0 %    Platelet Count 143 (L) 150 - 450 10e3/uL    % Neutrophils 29 %    % Lymphocytes 60 %    % Monocytes 7 %    % Eosinophils 4 %    % Basophils 1 %    % Immature Granulocytes 1 %    NRBCs per 100 WBC 0 <1 /100    Absolute Neutrophils 3.1 1.6 - 8.3 10e3/uL    Absolute Lymphocytes 6.6 (H) 0.8 - 5.3 10e3/uL    Absolute Monocytes 0.7 0.0 - 1.3 10e3/uL    Absolute Eosinophils 0.4 0.0 - 0.7 10e3/uL    Absolute Basophils 0.1  0.0 - 0.2 10e3/uL    Absolute Immature Granulocytes 0.1 <=0.4 10e3/uL    Absolute NRBCs 0.0 10e3/uL   RBC and Platelet Morphology    Collection Time: 05/06/24 10:26 AM   Result Value Ref Range    RBC Morphology Confirmed RBC Indices     Platelet Assessment  Automated Count Confirmed. Platelet morphology is normal.     Automated Count Confirmed. Platelet morphology is normal.    Giant Platelets      Acanthocytes      Lynda Rods      Basophilic Stippling      Bite Cells      Blister Cells      Bern Cells      Elliptocytes      Hgb C Crystals      Zapata-Jolly Bodies      Hypersegmented Neutrophils      Polychromasia      RBC agglutination      RBC Fragments      Reactive Lymphocytes Present (A) None Seen    Rouleaux      Sickle Cells      Smudge Cells      Spherocytes      Stomatocytes      Target Cells      Teardrop Cells      Toxic Neutrophils      Pathologist Review Comments (Blood)         Imaging    CT Abdomen Pelvis w Contrast    Result Date: 5/6/2024  Exam: CT ABDOMEN PELVIS W CONTRAST, CT CHEST W CONTRAST Exam reason: cll; CLL (chronic lymphocytic leukemia) (H) Technique: Using multidetector helical CT technique, images of the chest, abdomen, and pelvis were obtained with IV contrast.  Coronal and sagittal reconstructions also performed. Thick slab coronal MIP reconstructions of the chest also performed. This CT was performed using one or more of the following dose reduction techniques: automated exposure control, adjustment of the mA and/or kV according to patient size, and/or use of iterative reconstruction technique. Meds/Contrast: 127 ml isovue 370 Comparison: 12/21/2023, 8/17/2023, 4/10/2023 FINDINGS: CHEST: Lungs/Airways: No mass or consolidation. Similar interstitial opacities in both lungs. Elicia/Mediastinum: Slight interval increase in size of an upper mediastinal right paratracheal lymph node measuring 16 x 11 mm, previously 14 x 9 mm. There are multiple additional slightly more prominent mediastinal  lymph nodes. There is a 9 mm short axis paraesophageal lymph node, previously 7 mm. Pleura: No pleural effusion. No pneumothorax. Cardiovascular: No aortic aneurysm. Main pulmonary artery is normal caliber. Chest wall/Axilla: Within normal limits. ABDOMEN: Liver: There is diffusely decreased attenuation of the liver compatible with hepatic steatosis. Gallbladder: Resected. Bile Ducts: No biliary ductal dilation. Spleen: No splenomegaly or focal lesion. Pancreas: No mass, ductal dilatation, or inflammatory changes. Kidneys: No solid mass or hydronephrosis, or any definite calculi. There are benign renal cysts bilaterally. Adrenals: No nodules. Lymph Nodes: There is a gastrohepatic lymph node measures 16 mm short axis, previously 10 mm short axis. There is a portacaval node measuring 11 mm short axis, previously 7 mm short axis. There is an aortocaval node measuring 10 mm short axis, previously 6 mm short axis. There are multiple additional mesenteric and retroperitoneal lymph nodes which have enlarged slightly since the prior exam. Vascular: No aortic aneurysm. Abdominal Wall: No acute findings. PELVIS: No mass or adenopathy. Bowel/Peritoneal Cavity/Mesentery: -No bowel obstruction or significant ileus. -No acute inflammatory changes. -No pneumoperitoneum. -No ascites. Musculoskeletal: No acute osseous abnormalities.     IMPRESSION: Slight interval increase in size of multiple mediastinal, retroperitoneal, and mesenteric lymph nodes, as above. Hepatic steatosis. NEDRA SMYTH MD   SYSTEM ID:  [id number]    CT Chest w Contrast    Result Date: 5/6/2024  Exam: CT ABDOMEN PELVIS W CONTRAST, CT CHEST W CONTRAST Exam reason: cll; CLL (chronic lymphocytic leukemia) (H) Technique: Using multidetector helical CT technique, images of the chest, abdomen, and pelvis were obtained with IV contrast.  Coronal and sagittal reconstructions also performed. Thick slab coronal MIP reconstructions of the chest also performed. This CT was  performed using one or more of the following dose reduction techniques: automated exposure control, adjustment of the mA and/or kV according to patient size, and/or use of iterative reconstruction technique. Meds/Contrast: 127 ml isovue 370 Comparison: 12/21/2023, 8/17/2023, 4/10/2023 FINDINGS: CHEST: Lungs/Airways: No mass or consolidation. Similar interstitial opacities in both lungs. Elicia/Mediastinum: Slight interval increase in size of an upper mediastinal right paratracheal lymph node measuring 16 x 11 mm, previously 14 x 9 mm. There are multiple additional slightly more prominent mediastinal lymph nodes. There is a 9 mm short axis paraesophageal lymph node, previously 7 mm. Pleura: No pleural effusion. No pneumothorax. Cardiovascular: No aortic aneurysm. Main pulmonary artery is normal caliber. Chest wall/Axilla: Within normal limits. ABDOMEN: Liver: There is diffusely decreased attenuation of the liver compatible with hepatic steatosis. Gallbladder: Resected. Bile Ducts: No biliary ductal dilation. Spleen: No splenomegaly or focal lesion. Pancreas: No mass, ductal dilatation, or inflammatory changes. Kidneys: No solid mass or hydronephrosis, or any definite calculi. There are benign renal cysts bilaterally. Adrenals: No nodules. Lymph Nodes: There is a gastrohepatic lymph node measures 16 mm short axis, previously 10 mm short axis. There is a portacaval node measuring 11 mm short axis, previously 7 mm short axis. There is an aortocaval node measuring 10 mm short axis, previously 6 mm short axis. There are multiple additional mesenteric and retroperitoneal lymph nodes which have enlarged slightly since the prior exam. Vascular: No aortic aneurysm. Abdominal Wall: No acute findings. PELVIS: No mass or adenopathy. Bowel/Peritoneal Cavity/Mesentery: -No bowel obstruction or significant ileus. -No acute inflammatory changes. -No pneumoperitoneum. -No ascites. Musculoskeletal: No acute osseous abnormalities.      IMPRESSION: Slight interval increase in size of multiple mediastinal, retroperitoneal, and mesenteric lymph nodes, as above. Hepatic steatosis. NEDRA SMYTH MD   SYSTEM ID:  [id number]    CT Soft Tissue Neck w Contrast    Result Date: 5/6/2024  Exam: CT SOFT TISSUE NECK W CONTRAST Exam reason:  cll; CLL (chronic lymphocytic leukemia) (H) Technique: Axial scans obtained of the neck after IV contrast administration. Coronal and sagittal reconstructions performed. This CT was performed using one or more of the following dose reduction techniques: automated exposure control, adjustment of the mA and/or kV according to patient size, and/or use of iterative reconstruction technique. Meds/Contrast: 127 ml isovue 370 Comparison: 12/21/2023, 8/17/2023, 4/10/2023 FINDINGS: Visualized brain parenchyma is normal.  VIsualized orbits are normal. Paranasal sinuses are well aerated. Parotid glands: Normal Submandibular glands: Normal.  Sublingual spaces are symmetric. Oral cavity and floor of mouth: Normal Nasopharynx, Oropharynx, Hypopharynx, Larynx: Normal Thyroid gland: Normal Vessels: Normal Lymph nodes: Slightly increased size of an upper mediastinal right paratracheal lymph node measuring 15 x 13 mm, previously 12 x 10 mm. No significant interval change in multiple normal sized cervical lymph nodes bilaterally. Osseous:  No acute osseous abnormality.     IMPRESSION: Slight increase in size of an upper mediastinal right paratracheal lymph node, now measuring 15 x 13 mm, previously 12 x 10 mm. NEDRA SMYTH MD   SYSTEM ID:  [id number]     Assessment and Plan: #1: Stage II bulky CLL presenting with splenomegaly/bulky retroperitoneal adenopathyStatus post 8 cycles of acalabrutinib with complete response on imaging with resolution of splenomegaly and bulky adenopathy.  Patient went on to complete a total of 16 cycles of acalabrutinib with the last 8 cycles given at a low dose of 100 mg daily.  Imaging continues to show  complete response in December of last year and therefore the patient elected to stop acalabrutinib due to side effects.  Subsequent imaging indicates stable findings by RECIST criteria with only 1 lymph node significantly elevated in the eyal hepatis region.  There is no evidence splenomegaly.  Patient's uric acid remains elevated patient will restart allopurinol at a dose of 150 mg p.o. daily.  Otherwise we will repeat imaging current CT neck, CT chest, and CT abdomen/pelvis in 4 months.  If there is significant splenomegaly and/or bulky adenopathy may consider initiating therapy with Zanubrutinib.  Time spent: 74 minutes was spent on this patient visit: Spent time reviewing multiple physician provider notes, reviewing chemotherapy orders, discussing imaging results with the patient, performing history/physical, documenting history/physical, and ordering follow-up imaging including CT neck, CT chest and CT abdomen/pelvis as well as follow-up appointments and lab work.    Cancer Staging   No matching staging information was found for the patient.        Signed by: Med Stark MD    CC: Filiberto John MD

## 2024-05-15 ENCOUNTER — TELEPHONE (OUTPATIENT)
Dept: ONCOLOGY | Facility: OTHER | Age: 64
End: 2024-05-15
Payer: MEDICARE

## 2024-05-15 NOTE — ORAL ONC MGMT
Thank you for the opportunity to be a part in the care of this patient's oral chemotherapy. The oncology pharmacy will no longer be following this patient for oral chemotherapy. If there are any questions or the plan changes, feel free to contact us.    Gabriel Griffin, PharmD  Oral Chemotherapy Pharmacist  [phone]

## 2024-05-25 SDOH — HEALTH STABILITY: PHYSICAL HEALTH: ON AVERAGE, HOW MANY DAYS PER WEEK DO YOU ENGAGE IN MODERATE TO STRENUOUS EXERCISE (LIKE A BRISK WALK)?: 5 DAYS

## 2024-05-25 SDOH — HEALTH STABILITY: PHYSICAL HEALTH: ON AVERAGE, HOW MANY MINUTES DO YOU ENGAGE IN EXERCISE AT THIS LEVEL?: 30 MIN

## 2024-05-25 ASSESSMENT — SOCIAL DETERMINANTS OF HEALTH (SDOH): HOW OFTEN DO YOU GET TOGETHER WITH FRIENDS OR RELATIVES?: TWICE A WEEK

## 2024-05-28 ENCOUNTER — OFFICE VISIT (OUTPATIENT)
Dept: FAMILY MEDICINE | Facility: OTHER | Age: 64
End: 2024-05-28
Attending: NURSE PRACTITIONER
Payer: MEDICARE

## 2024-05-28 VITALS
SYSTOLIC BLOOD PRESSURE: 138 MMHG | DIASTOLIC BLOOD PRESSURE: 88 MMHG | HEIGHT: 67 IN | OXYGEN SATURATION: 97 % | HEART RATE: 84 BPM | BODY MASS INDEX: 34.37 KG/M2 | WEIGHT: 219 LBS | RESPIRATION RATE: 16 BRPM

## 2024-05-28 DIAGNOSIS — T45.1X5A CHEMOTHERAPY-INDUCED NEUROPATHY (H): ICD-10-CM

## 2024-05-28 DIAGNOSIS — Z00.00 ENCOUNTER FOR MEDICARE ANNUAL WELLNESS EXAM: Primary | ICD-10-CM

## 2024-05-28 DIAGNOSIS — G62.0 CHEMOTHERAPY-INDUCED NEUROPATHY (H): ICD-10-CM

## 2024-05-28 DIAGNOSIS — E11.8 CONTROLLED TYPE 2 DIABETES MELLITUS WITH COMPLICATION, WITHOUT LONG-TERM CURRENT USE OF INSULIN (H): ICD-10-CM

## 2024-05-28 DIAGNOSIS — E78.1 HYPERTRIGLYCERIDEMIA: ICD-10-CM

## 2024-05-28 DIAGNOSIS — C91.10 CLL (CHRONIC LYMPHOCYTIC LEUKEMIA) (H): ICD-10-CM

## 2024-05-28 LAB
CHOLEST SERPL-MCNC: 234 MG/DL
CREAT UR-MCNC: 81.1 MG/DL
FASTING STATUS PATIENT QL REPORTED: ABNORMAL
HBA1C MFR BLD: 7.9 % (ref 4–6.2)
HDLC SERPL-MCNC: 33 MG/DL
LDLC SERPL CALC-MCNC: ABNORMAL MG/DL
MICROALBUMIN UR-MCNC: 64.8 MG/L
MICROALBUMIN/CREAT UR: 79.9 MG/G CR (ref 0–17)
NONHDLC SERPL-MCNC: 201 MG/DL
TRIGL SERPL-MCNC: 741 MG/DL

## 2024-05-28 PROCEDURE — 83036 HEMOGLOBIN GLYCOSYLATED A1C: CPT | Mod: ZL | Performed by: NURSE PRACTITIONER

## 2024-05-28 PROCEDURE — G0463 HOSPITAL OUTPT CLINIC VISIT: HCPCS

## 2024-05-28 PROCEDURE — 80061 LIPID PANEL: CPT | Mod: ZL | Performed by: NURSE PRACTITIONER

## 2024-05-28 PROCEDURE — 82043 UR ALBUMIN QUANTITATIVE: CPT | Mod: ZL | Performed by: NURSE PRACTITIONER

## 2024-05-28 PROCEDURE — 36415 COLL VENOUS BLD VENIPUNCTURE: CPT | Mod: ZL | Performed by: NURSE PRACTITIONER

## 2024-05-28 PROCEDURE — G0402 INITIAL PREVENTIVE EXAM: HCPCS | Mod: 25 | Performed by: NURSE PRACTITIONER

## 2024-05-28 PROCEDURE — 99207 PR FOOT EXAM NO CHARGE: CPT | Performed by: NURSE PRACTITIONER

## 2024-05-28 PROCEDURE — 99214 OFFICE O/P EST MOD 30 MIN: CPT | Mod: 25 | Performed by: NURSE PRACTITIONER

## 2024-05-28 PROCEDURE — G0402 INITIAL PREVENTIVE EXAM: HCPCS | Performed by: NURSE PRACTITIONER

## 2024-05-28 ASSESSMENT — PAIN SCALES - GENERAL: PAINLEVEL: NO PAIN (0)

## 2024-05-28 NOTE — PROGRESS NOTES
Preventive Care Visit  [facility] AND [facility]  CUATE Recinos CNP, Nurse Practitioner - Family  May 28, 2024      Assessment & Plan   Problem List Items Addressed This Visit          Nervous and Auditory    Chemotherapy-induced neuropathy (H24)       Endocrine    Hypertriglyceridemia    Relevant Orders    Lipid Panel (Completed)    Controlled type 2 diabetes mellitus with complication, without long-term current use of insulin (H)    Relevant Medications    metFORMIN (GLUCOPHAGE) 500 MG tablet    Other Relevant Orders    Hemoglobin A1c (Completed)    Albumin Random Urine Quantitative with Creat Ratio (Completed)    FOOT EXAM (Completed)       Hematologic    CLL (chronic lymphocytic leukemia) (H)     Other Visit Diagnoses       Encounter for Medicare annual wellness exam    -  Primary           Continues to follow with oncology for CLL, chemotherapy-induced neuropathy.  Condition stable.  Hypertriglyceridemia, ASCVD risk score 35%.  We discussed aspirin, ACE inhibitor and statin and office today.  [employer] message also sent to patient notifying of score and recommendations.  He is declining moving forward with treatment at this time.  Type 2 diabetes, A1c elevated at 7.9.  Restart metformin 500 mg twice daily.  Recommend repeating labs in 3 months, sooner if he is having concerns.  Eye exam scheduled.  Reviewed immunizations to be completed at pharmacy if desired.  Up-to-date on preventative healthcare needs at this time.    The 10-year ASCVD risk score (Pillo ADKINS, et al., 2019) is: 35.1%    Values used to calculate the score:      Age: 63 years      Sex: Male      Is Non- : No      Diabetic: Yes      Tobacco smoker: No      Systolic Blood Pressure: 138 mmHg      Is BP treated: No      HDL Cholesterol: 33 mg/dL      Total Cholesterol: 234 mg/dL    Patient has been advised of split billing requirements and indicates understanding: Yes       Counseling  Appropriate preventive  services were discussed with this patient, including applicable screening as appropriate for fall prevention, nutrition, physical activity, Tobacco-use cessation, weight loss and cognition.  Checklist reviewing preventive services available has been given to the patient.  Reviewed patient's diet, addressing concerns and/or questions.   The patient was instructed to see the dentist every 6 months.         No follow-ups on file.      Wendi Contreras is a 63 year old, presenting for the following:  Medicare Visit         Health Care Directive  Patient does not have a Health Care Directive or Living Will:     HPI    He presents to clinic today for annual wellness exam.  He does have a diagnosis of type 2 diabetes.  Stopped metformin a couple years ago when he had COVID.  Has had significant weight loss.  A year ago A1c 6.0.  He continues to exercise and reduce sugar intake.  He is wondering about restarting metformin, due for A1c to be checked today.  He is not taking a statin, ACE inhibitor or aspirin.  He does report eye exam is scheduled at eye care clinic on July 2, 2024.    Continues to follow with oncology for CLL, chemotherapy-induced neuropathy present.  He denies any other health concerns today.        5/25/2024   General Health   How would you rate your overall physical health? (!) FAIR   Feel stress (tense, anxious, or unable to sleep) Not at all         5/25/2024   Nutrition   Diet: Breakfast skipped         5/25/2024   Exercise   Days per week of moderate/strenous exercise 5 days   Average minutes spent exercising at this level 30 min         5/25/2024   Social Factors   Frequency of gathering with friends or relatives Twice a week   Worry food won't last until get money to buy more No   Food not last or not have enough money for food? No   Do you have housing?  Yes   Are you worried about losing your housing? No   Lack of transportation? No   Unable to get utilities (heat,electricity)? No          2024   Fall Risk   Fallen 2 or more times in the past year? No   Trouble with walking or balance? No          2024   Activities of Daily Living- Home Safety   Needs help with the following daily activites None of the above   Safety concerns in the home None of the above         2024   Dental   Dentist two times every year? (!) NO         2024   Hearing Screening   Hearing concerns? None of the above         2024   Driving Risk Screening   Patient/family members have concerns about driving No         2024   General Alertness/Fatigue Screening   Have you been more tired than usual lately? No         2024   Urinary Incontinence Screening   Bothered by leaking urine in past 6 months No         2024   TB Screening   Were you born outside of the [place]? No         Today's PHQ-2 Score:       2024     1:53 PM   PHQ-2 (  [employer])   Q1: Little interest or pleasure in doing things 0   Q2: Feeling down, depressed or hopeless 0   PHQ-2 Score 0   Q1: Little interest or pleasure in doing things Not at all   Q2: Feeling down, depressed or hopeless Not at all   PHQ-2 Score 0           2024   Substance Use   Alcohol more than 3/day or more than 7/wk No   Do you have a current opioid prescription? No   How severe/bad is pain from 1 to 10? 2/10   Do you use any other substances recreationally? No     Social History     Tobacco Use    Smoking status: Former     Current packs/day: 0.00     Average packs/day: 2.0 packs/day for 30.0 years (60.0 ttl pk-yrs)     Types: Cigarettes     Start date: 1980     Quit date: 2010     Years since quittin.9     Passive exposure: Past    Smokeless tobacco: Never    Tobacco comments:     Passive exposure in adult home.    Vaping Use    Vaping status: Never Used   Substance Use Topics    Alcohol use: Not Currently    Drug use: Never             2024   One time HIV Screening   Previous HIV test? Yes   Last PSA: No results found for:  "\"PSA\"  ASCVD Risk   The ASCVD Risk score (Pillo ADKINS, et al., 2019) failed to calculate for the following reasons:    The valid total cholesterol range is 130 to 320 mg/dL            Reviewed and updated as needed this visit by Provider                    Past Medical History:   Diagnosis Date    Acute respiratory failure with hypoxia (H) 2/6/2022    Age-related cataract     No Comments Provided    Calculus of kidney     No Comments Provided    Carpal tunnel syndrome     No Comments Provided    CLL (chronic lymphocytic leukemia) (H) 2/6/2022    Diverticulosis of intestine without perforation or abscess without bleeding     mild    Dorsalgia     No Comments Provided    Inflammatory liver disease     ? cause 1982    Malignant neoplasm of colon (H)     2006    Other specified postprocedural states     1/14/2016    Pneumonia due to 2019 novel coronavirus 2/6/2022    Pneumonia of both lungs due to infectious organism 2/6/2022    Pure hyperglyceridemia     No Comments Provided    Strain of muscle, fascia and tendon of other parts of biceps, left arm, initial encounter     1/7/2016     Past Surgical History:   Procedure Laterality Date    BONE MARROW BIOPSY, BONE SPECIMEN, NEEDLE/TROCAR Left 05/12/2022    Procedure: BIOPSY, BONE MARROW;  Surgeon: Delia Holguin MD;  Location: [facility] OR    COLON SURGERY      8/22/06,Sigmoid colectomy for [facility] C2 adenocarcinoma    COLONOSCOPY      9/07/07,next due in 2010    COLONOSCOPY      08/30/2010,F/U 2015    COLONOSCOPY  10/19/2015    10/19/15,F/U 2020    COLONOSCOPY N/A 11/07/2022    F/U 2027 h/o colon cancer    EXTRACAPSULAR CATARACT EXTRATION WITH INTRAOCULAR LENS IMPLANT      2011, 2012,Bilateral cataracts R 2011.. L 2012    LAPAROSCOPIC CHOLECYSTECTOMY      04/24/07    OTHER SURGICAL HISTORY      10/11/06,568877,PORTACATH,Placement of Port-A-Cath, right anterior chest, for chemotherapy    OTHER SURGICAL HISTORY      08/31/2010,,HERNIA REPAIR,Mesh Underlay    OTHER " SURGICAL HISTORY      2/12/15,367624,IR URETERAL STENT LEFT    OTHER SURGICAL HISTORY      1/14/2016,[id number],OTHER,Left,arthrex equipment used    TONSILLECTOMY, ADENOIDECTOMY, COMBINED      age 1    VASECTOMY       x2, spontaneous reconnected, so needed 2nd procedure     Patient Active Problem List   Diagnosis    History of malignant neoplasm of large intestine    Carpal tunnel syndrome, bilateral    Senile cataract    Diverticulosis of colon    Inflammatory liver disease    Hypertriglyceridemia    Rupture of left distal biceps tendon    Status post tendon repair    Ureterolithiasis    Malignant neoplasm of colon, unspecified part of colon (H)    Chemotherapy-induced neuropathy (H24)    S/P partial colectomy    History of nephrolithiasis    Elevated LFTs    Controlled type 2 diabetes mellitus with complication, without long-term current use of insulin (H)    Non morbid obesity due to excess calories    Seborrheic keratoses    CLL (chronic lymphocytic leukemia) (H)    Class 2 severe obesity due to excess calories with serious comorbidity in adult (H)     Past Surgical History:   Procedure Laterality Date    BONE MARROW BIOPSY, BONE SPECIMEN, NEEDLE/TROCAR Left 05/12/2022    Procedure: BIOPSY, BONE MARROW;  Surgeon: Delia Holguin MD;  Location: [facility] OR    COLON SURGERY      8/22/06,Sigmoid colectomy for [facility] C2 adenocarcinoma    COLONOSCOPY      9/07/07,next due in 2010    COLONOSCOPY      08/30/2010,F/U 2015    COLONOSCOPY  10/19/2015    10/19/15,F/U 2020    COLONOSCOPY N/A 11/07/2022    F/U 2027 h/o colon cancer    EXTRACAPSULAR CATARACT EXTRATION WITH INTRAOCULAR LENS IMPLANT      2011, 2012,Bilateral cataracts R 2011.. L 2012    LAPAROSCOPIC CHOLECYSTECTOMY      04/24/07    OTHER SURGICAL HISTORY      10/11/06,997072,PORTACATH,Placement of Port-A-Cath, right anterior chest, for chemotherapy    OTHER SURGICAL HISTORY      08/31/2010,,HERNIA REPAIR,Mesh Underlay    OTHER SURGICAL HISTORY      2/12/15,805705,IR  URETERAL STENT LEFT    OTHER SURGICAL HISTORY      2016,125626,OTHER,Left,arthrex equipment used    TONSILLECTOMY, ADENOIDECTOMY, COMBINED      age 1    VASECTOMY       x2, spontaneous reconnected, so needed 2nd procedure       Social History     Tobacco Use    Smoking status: Former     Current packs/day: 0.00     Average packs/day: 2.0 packs/day for 30.0 years (60.0 ttl pk-yrs)     Types: Cigarettes     Start date: 1980     Quit date: 2010     Years since quittin.9     Passive exposure: Past    Smokeless tobacco: Never    Tobacco comments:     Passive exposure in adult home.    Substance Use Topics    Alcohol use: Not Currently     Family History   Problem Relation Age of Onset    Hypertension Mother         Hypertension    Heart Disease Father         Heart Disease,40s and 50s    Heart Disease Other         Heart Disease,40s and 50s    Anesthesia Reaction No family hx of         Anesthesia Problem    Other - See Comments No family hx of         Stroke    Blood Disease No family hx of         Blood Disease    Bleeding Diathesis No family hx of          Current Outpatient Medications   Medication Sig Dispense Refill    ACCU-CHEK GUIDE test strip USE 1 STRIP ONCE DAILY 100 strip 3    allopurinol (ZYLOPRIM) 300 MG tablet TAKE 1 TABLET(300 MG) BY MOUTH DAILY 90 tablet 0    blood glucose (NO BRAND SPECIFIED) lancets standard Dispense item covered by insurance. Test blood sugar 1 times daily. 100 each 11    blood glucose monitoring (NO BRAND SPECIFIED) meter device kit Dispense option covered by insurance. Test blood sugar 1 times daily. 1 kit 11    blood glucose monitoring (SOFTCLIX) lancets USE 1  TO CHECK GLUCOSE ONCE DAILY 100 each 0    Celery Seed OIL 4 drops daily      Fluocinolone Acetonide Scalp 0.01 % OIL oil Apply topically at bedtime 118 mL 1    HEMP OIL OR EXTRACT OR OTHER CBD CANNABINOID, NOT MEDICAL CANNABIS, Take 4 drops by mouth At Bedtime       metFORMIN (GLUCOPHAGE) 500 MG tablet  "Take 1 tablet (500 mg) by mouth 2 times daily (with meals) 180 tablet 3    multivitamin, therapeutic (THERA-VIT) TABS tablet Take 1 tablet by mouth daily      UNABLE TO FIND MEDICATION NAME: Cherry juice, 2 oz once daily      Vitamin D, Cholecalciferol, 25 MCG (1000 UT) CAPS Take 1 capsule by mouth daily        No Known Allergies  Current providers sharing in care for this patient include:  Patient Care Team:  Filiberto John MD as PCP - General (Pediatrics)  Med Stark MD as Assigned Cancer Care Provider  Ivonne Rees RN as Specialty Care Coordinator (Hematology & Oncology)  Mary Kay Noble PA-C as Assigned PCP    The following health maintenance items are reviewed in Epic and correct as of today:  Health Maintenance   Topic Date Due    COVID-19 Vaccine (1) Never done    HIV SCREENING  Never done    HEPATITIS C SCREENING  Never done    ZOSTER IMMUNIZATION (1 of 2) Never done    RSV VACCINE (Pregnancy & 60+) (1 - 1-dose 60+ series) Never done    Pneumococcal Vaccine: 65+ Years (2 of 2 - PCV) 11/20/2019    LIPID  01/13/2023    A1C  03/02/2023    MICROALBUMIN  09/02/2023    DIABETIC FOOT EXAM  09/02/2023    MEDICARE ANNUAL WELLNESS VISIT  Never done    EYE EXAM  05/18/2024    INFLUENZA VACCINE (Season Ended) 09/01/2024    BMP  05/06/2025    LUNG CANCER SCREENING  05/06/2025    FALL RISK ASSESSMENT  05/28/2025    ADVANCE CARE PLANNING  05/10/2027    COLORECTAL CANCER SCREENING  11/07/2027    DTAP/TDAP/TD IMMUNIZATION (3 - Td or Tdap) 11/20/2028    PHQ-2 (once per calendar year)  Completed    AORTIC ANEURYSM SCREENING (SYSTEM ASSIGNED)  Completed    IPV IMMUNIZATION  Aged Out    HPV IMMUNIZATION  Aged Out    MENINGITIS IMMUNIZATION  Aged Out    RSV MONOCLONAL ANTIBODY  Aged Out          Objective    Exam  /88   Pulse 84   Resp 16   Ht 1.689 m (5' 6.5\")   Wt 99.3 kg (219 lb)   SpO2 97%   BMI 34.82 kg/m     Estimated body mass index is 34.82 kg/m  as calculated from the following:    " "Height as of this encounter: 1.689 m (5' 6.5\").    Weight as of this encounter: 99.3 kg (219 lb).    Physical Exam  GENERAL: alert and no distress  EYES: Eyes grossly normal to inspection, PERRL and conjunctivae and sclerae normal  HENT: ear canals and TM's normal, nose and mouth without ulcers or lesions  NECK: no adenopathy, no asymmetry, masses, or scars  RESP: lungs clear to auscultation - no rales, rhonchi or wheezes  CV: regular rate and rhythm, normal S1 S2, no S3 or S4, no murmur, click or rub, no peripheral edema  ABDOMEN: soft, nontender, no hepatosplenomegaly, no masses and bowel sounds normal  MS: no gross musculoskeletal defects noted, no edema  SKIN: no suspicious lesions or rashes  NEURO: Normal strength and tone, mentation intact and speech normal  PSYCH: mentation appears normal, affect normal/bright  Diabetic foot exam: normal DP and PT pulses, no trophic changes or ulcerative lesions, and normal sensory exam        5/28/2024   Mini Cog   Clock Draw Score 2 Normal   3 Item Recall 3 objects recalled   Mini Cog Total Score 5         Vision Screen  Vision Screen Results: Pass      Signed Electronically by: CUATE Recinos CNP    "

## 2024-05-28 NOTE — NURSING NOTE
Patient presents today for annual medicare wellness visit.    Medication Reconciliation Complete    Keli Ch LPN  5/28/2024 2:10 PM

## 2024-05-28 NOTE — PATIENT INSTRUCTIONS
"Preventive Care Advice   This is general advice we often give to help people stay healthy. Your care team may have specific advice just for you. Please talk to your care team about your own preventive care needs.  Lifestyle  Exercise at least 150 minutes each week (30 minutes a day, 5 days a week).  Do muscle strengthening activities 2 days a week. These help control your weight and prevent disease.  No smoking.  Wear sunscreen to prevent skin cancer.  Have your home tested for radon every 2 to 5 years. Radon is a colorless, odorless gas that can harm your lungs. To learn more, go to www.health.[facility].[place]. and search for \"Radon in Homes.\"  Keep guns unloaded and locked up in a safe place like a safe or gun vault, or, use a gun lock and hide the keys. Always lock away bullets separately. To learn more, visit [employer].[place].gov and search for \"safe gun storage.\"  Nutrition  Eat 5 or more servings of fruits and vegetables each day.  Try wheat bread, brown rice and whole grain pasta (instead of white bread, rice, and pasta).  Get enough calcium and vitamin D. Check the label on foods and aim for 100% of the RDA (recommended daily allowance).  Regular exams  Have a dental exam and cleaning every 6 months.  See your health care team every year to talk about:  Any changes in your health.  Any medicines your care team has prescribed.  Preventive care, family planning, and ways to prevent chronic diseases.  Shots (vaccines)   HPV shots (up to age 26), if you've never had them before.  Hepatitis B shots (up to age 59), if you've never had them before.  COVID-19 shot: Get this shot when it's due.  Flu shot: Get a flu shot every year.  Tetanus shot: Get a tetanus shot every 10 years.  Pneumococcal, hepatitis A, and RSV shots: Ask your care team if you need these based on your risk.  Shingles shot (for age 50 and up).  General health tests  Diabetes screening:  Starting at age 35, Get screened for diabetes at least every 3 years.  If " you are younger than age 35, ask your care team if you should be screened for diabetes.  Cholesterol test: At age 39, start having a cholesterol test every 5 years, or more often if advised.  Bone density scan (DEXA): At age 50, ask your care team if you should have this scan for osteoporosis (brittle bones).  Hepatitis C: Get tested at least once in your life.  Abdominal aortic aneurysm screening: Talk to your doctor about having this screening if you:  Have ever smoked; and  Are biologically male; and  Are between the ages of 65 and 75.  STIs (sexually transmitted infections)  Before age 24: Ask your care team if you should be screened for STIs.  After age 24: Get screened for STIs if you're at risk. You are at risk for STIs (including HIV) if:  You are sexually active with more than one person.  You don't use condoms every time.  You or a partner was diagnosed with a sexually transmitted infection.  If you are at risk for HIV, ask about PrEP medicine to prevent HIV.  Get tested for HIV at least once in your life, whether you are at risk for HIV or not.  Cancer screening tests  Cervical cancer screening: If you have a cervix, begin getting regular cervical cancer screening tests at age 21. Most people who have regular screenings with normal results can stop after age 65. Talk about this with your provider.  Breast cancer scan (mammogram): If you've ever had breasts, begin having regular mammograms starting at age 40. This is a scan to check for breast cancer.  Colon cancer screening: It is important to start screening for colon cancer at age 45.  Have a colonoscopy test every 10 years (or more often if you're at risk) Or, ask your provider about stool tests like a FIT test every year or Cologuard test every 3 years.  To learn more about your testing options, visit: www.[employer]/519564.pdf.  For help making a decision, visit: natalie/by30053.  Prostate cancer screening test: If you have a prostate and are age 55  to 69, ask your provider if you would benefit from a yearly prostate cancer screening test.  Lung cancer screening: If you are a current or former smoker age 50 to 80, ask your care team if ongoing lung cancer screenings are right for you.  For informational purposes only. Not to replace the advice of your health care provider. Copyright   2023 [employer]. All rights reserved. Clinically reviewed by the [facility]. [employer] [id number] - REV 04/24.

## 2024-05-29 ENCOUNTER — TELEPHONE (OUTPATIENT)
Dept: FAMILY MEDICINE | Facility: OTHER | Age: 64
End: 2024-05-29
Payer: MEDICARE

## 2024-05-29 NOTE — TELEPHONE ENCOUNTER
After verifying pts name and date of birth with pt, pt notified that Rx was sent to [facility]. \[facility] Outlook.\"".  Ayesha Edwards LPN

## 2024-05-29 NOTE — TELEPHONE ENCOUNTER
Patient states the only prescription he wants [facility] to fill is the type II diabetes medication. Call with any questions.    Melva Abel on 5/29/2024 at 10:33 AM

## 2024-05-30 ENCOUNTER — PATIENT OUTREACH (OUTPATIENT)
Dept: GASTROENTEROLOGY | Facility: CLINIC | Age: 64
End: 2024-05-30
Payer: MEDICARE

## 2024-06-20 ENCOUNTER — TRANSFERRED RECORDS (OUTPATIENT)
Dept: HEALTH INFORMATION MANAGEMENT | Facility: OTHER | Age: 64
End: 2024-06-20
Payer: MEDICARE

## 2024-06-20 LAB — RETINOPATHY: NEGATIVE

## 2024-08-26 ENCOUNTER — HOSPITAL ENCOUNTER (OUTPATIENT)
Dept: CT IMAGING | Facility: OTHER | Age: 64
Discharge: HOME | End: 2024-08-26
Attending: INTERNAL MEDICINE
Payer: MEDICARE

## 2024-08-26 ENCOUNTER — LAB (OUTPATIENT)
Dept: LAB | Facility: OTHER | Age: 64
End: 2024-08-26
Attending: INTERNAL MEDICINE
Payer: MEDICARE

## 2024-08-26 DIAGNOSIS — C91.10 CLL (CHRONIC LYMPHOCYTIC LEUKEMIA) (H): ICD-10-CM

## 2024-08-26 LAB
ALBUMIN SERPL BCG-MCNC: 4.7 G/DL (ref 3.5–5.2)
ALP SERPL-CCNC: 44 U/L (ref 40–150)
ALT SERPL W P-5'-P-CCNC: 59 U/L (ref 0–70)
ANION GAP SERPL CALCULATED.3IONS-SCNC: 13 MMOL/L (ref 7–15)
AST SERPL W P-5'-P-CCNC: 40 U/L (ref 0–45)
BASOPHILS # BLD AUTO: 0.1 10E3/UL (ref 0–0.2)
BASOPHILS NFR BLD AUTO: 1 %
BILIRUB SERPL-MCNC: 0.7 MG/DL
BUN SERPL-MCNC: 17 MG/DL (ref 8–23)
CALCIUM SERPL-MCNC: 9.7 MG/DL (ref 8.8–10.4)
CHLORIDE SERPL-SCNC: 104 MMOL/L (ref 98–107)
CREAT SERPL-MCNC: 1.13 MG/DL (ref 0.67–1.17)
EGFRCR SERPLBLD CKD-EPI 2021: 72 ML/MIN/1.73M2
EOSINOPHIL # BLD AUTO: 0.2 10E3/UL (ref 0–0.7)
EOSINOPHIL NFR BLD AUTO: 2 %
ERYTHROCYTE [DISTWIDTH] IN BLOOD BY AUTOMATED COUNT: 13.3 % (ref 10–15)
GLUCOSE SERPL-MCNC: 126 MG/DL (ref 70–99)
HCO3 SERPL-SCNC: 24 MMOL/L (ref 22–29)
HCT VFR BLD AUTO: 48.8 % (ref 40–53)
HGB BLD-MCNC: 16.6 G/DL (ref 13.3–17.7)
IMM GRANULOCYTES # BLD: 0.1 10E3/UL
IMM GRANULOCYTES NFR BLD: 1 %
LDH SERPL L TO P-CCNC: 227 U/L (ref 0–250)
LYMPHOCYTES # BLD AUTO: 6.8 10E3/UL (ref 0.8–5.3)
LYMPHOCYTES NFR BLD AUTO: 57 %
MCH RBC QN AUTO: 29.1 PG (ref 26.5–33)
MCHC RBC AUTO-ENTMCNC: 34 G/DL (ref 31.5–36.5)
MCV RBC AUTO: 86 FL (ref 78–100)
MONOCYTES # BLD AUTO: 1.2 10E3/UL (ref 0–1.3)
MONOCYTES NFR BLD AUTO: 10 %
NEUTROPHILS # BLD AUTO: 3.7 10E3/UL (ref 1.6–8.3)
NEUTROPHILS NFR BLD AUTO: 31 %
NRBC # BLD AUTO: 0 10E3/UL
NRBC BLD AUTO-RTO: 0 /100
PLATELET # BLD AUTO: 154 10E3/UL (ref 150–450)
POTASSIUM SERPL-SCNC: 4.6 MMOL/L (ref 3.4–5.3)
PROT SERPL-MCNC: 7.2 G/DL (ref 6.4–8.3)
RBC # BLD AUTO: 5.7 10E6/UL (ref 4.4–5.9)
SODIUM SERPL-SCNC: 141 MMOL/L (ref 135–145)
URATE SERPL-MCNC: 9.6 MG/DL (ref 3.4–7)
WBC # BLD AUTO: 12 10E3/UL (ref 4–11)

## 2024-08-26 PROCEDURE — 85025 COMPLETE CBC W/AUTO DIFF WBC: CPT | Mod: ZL

## 2024-08-26 PROCEDURE — 80053 COMPREHEN METABOLIC PANEL: CPT | Mod: ZL

## 2024-08-26 PROCEDURE — 250N000011 HC RX IP 250 OP 636: Performed by: INTERNAL MEDICINE

## 2024-08-26 PROCEDURE — 71260 CT THORAX DX C+: CPT | Mod: MG

## 2024-08-26 PROCEDURE — 250N000009 HC RX 250: Performed by: INTERNAL MEDICINE

## 2024-08-26 PROCEDURE — 74177 CT ABD & PELVIS W/CONTRAST: CPT | Mod: MG

## 2024-08-26 PROCEDURE — 36415 COLL VENOUS BLD VENIPUNCTURE: CPT | Mod: ZL

## 2024-08-26 PROCEDURE — 70491 CT SOFT TISSUE NECK W/DYE: CPT | Mod: MG

## 2024-08-26 PROCEDURE — 84550 ASSAY OF BLOOD/URIC ACID: CPT | Mod: ZL

## 2024-08-26 PROCEDURE — G1010 CDSM STANSON: HCPCS

## 2024-08-26 PROCEDURE — 83615 LACTATE (LD) (LDH) ENZYME: CPT | Mod: ZL

## 2024-08-26 RX ORDER — IOPAMIDOL 755 MG/ML
126 INJECTION, SOLUTION INTRAVASCULAR ONCE
Status: COMPLETED | OUTPATIENT
Start: 2024-08-26 | End: 2024-08-26

## 2024-08-26 RX ADMIN — SODIUM CHLORIDE 60 ML: 9 INJECTION, SOLUTION INTRAVENOUS at 12:45

## 2024-08-26 RX ADMIN — IOPAMIDOL 126 ML: 755 INJECTION, SOLUTION INTRAVENOUS at 12:45

## 2024-09-03 ENCOUNTER — PATIENT OUTREACH (OUTPATIENT)
Dept: ONCOLOGY | Facility: OTHER | Age: 64
End: 2024-09-03

## 2024-09-03 ENCOUNTER — ONCOLOGY VISIT (OUTPATIENT)
Dept: ONCOLOGY | Facility: OTHER | Age: 64
End: 2024-09-03
Attending: INTERNAL MEDICINE
Payer: MEDICARE

## 2024-09-03 VITALS
HEART RATE: 78 BPM | RESPIRATION RATE: 16 BRPM | DIASTOLIC BLOOD PRESSURE: 84 MMHG | WEIGHT: 213.4 LBS | BODY MASS INDEX: 33.93 KG/M2 | SYSTOLIC BLOOD PRESSURE: 144 MMHG | TEMPERATURE: 97.6 F | OXYGEN SATURATION: 96 %

## 2024-09-03 DIAGNOSIS — E79.0 HYPERURICEMIA: ICD-10-CM

## 2024-09-03 DIAGNOSIS — C91.10 CLL (CHRONIC LYMPHOCYTIC LEUKEMIA) (H): Primary | ICD-10-CM

## 2024-09-03 PROCEDURE — 99417 PROLNG OP E/M EACH 15 MIN: CPT | Performed by: INTERNAL MEDICINE

## 2024-09-03 PROCEDURE — G2211 COMPLEX E/M VISIT ADD ON: HCPCS | Performed by: INTERNAL MEDICINE

## 2024-09-03 PROCEDURE — 99215 OFFICE O/P EST HI 40 MIN: CPT | Performed by: INTERNAL MEDICINE

## 2024-09-03 PROCEDURE — G0463 HOSPITAL OUTPT CLINIC VISIT: HCPCS | Performed by: INTERNAL MEDICINE

## 2024-09-03 ASSESSMENT — ENCOUNTER SYMPTOMS
RESPIRATORY NEGATIVE: 1
GASTROINTESTINAL NEGATIVE: 1
EYES NEGATIVE: 1
HEMATOLOGIC/LYMPHATIC NEGATIVE: 1
SHORTNESS OF BREATH: 0
PSYCHIATRIC NEGATIVE: 1
CARDIOVASCULAR NEGATIVE: 1
PALPITATIONS: 0
ENDOCRINE NEGATIVE: 1
NEUROLOGICAL NEGATIVE: 1
DIFFICULTY URINATING: 0
MUSCULOSKELETAL NEGATIVE: 1
CONSTITUTIONAL NEGATIVE: 1
COUGH: 0
BLOOD IN STOOL: 0
ABDOMINAL DISTENTION: 0
ABDOMINAL PAIN: 0
ALLERGIC/IMMUNOLOGIC NEGATIVE: 1

## 2024-09-03 ASSESSMENT — PAIN SCALES - GENERAL: PAINLEVEL: NO PAIN (0)

## 2024-09-03 NOTE — NURSING NOTE
"Oncology Rooming Note    September 3, 2024 10:57 AM   Jesus Varghese is a 63 year old male who presents for:    Chief Complaint   Patient presents with    Hematology     F/U CLL     Initial Vitals: BP (!) 144/84 (BP Location: Right arm, Patient Position: Sitting, Cuff Size: Adult Regular)   Pulse 78   Temp 97.6  F (36.4  C) (Tympanic)   Resp 16   Wt 96.8 kg (213 lb 6.4 oz)   SpO2 96%   BMI 33.93 kg/m   Estimated body mass index is 33.93 kg/m  as calculated from the following:    Height as of 5/28/24: 1.689 m (5' 6.5\").    Weight as of this encounter: 96.8 kg (213 lb 6.4 oz). Body surface area is 2.13 meters squared.  No Pain (0) Comment: Data Unavailable   No LMP for male patient.  Allergies reviewed: Yes  Medications reviewed: Yes    Medications: Medication refills not needed today.  Pharmacy name entered into uTest:    [id number] - [place], [place] - [address]  [facility] - [place], [place] - [address]     Frailty Screening:   Is the patient here for a new oncology consult visit in cancer care? 2. No      Clinical concerns: Feels great!       Sara Hardy CMA (AAMA) 9/3/2024 10:57 AM  "

## 2024-09-03 NOTE — PROGRESS NOTES
[facility] Progress Note    Patient: Jesus Varghese  MRN: [id number]  Date of Service: Sep 3, 2024         Reason for Visit    Chief Complaint   Patient presents with    Hematology     F/U CLL       ECOG Performance Status: 0      Encounter Diagnoses: Stage II CLL with bulky adenopathy      History of Present Illness:    Mr. Jesus Varghese returns for follow-up of stage II CLL with bulky adenopathy.  For details of history see previous notes. Patient was deemed a candidate for treatment after developed splenomegaly and bulky retroperitoneal/mediastinal adenopathy associated with fatigue.e had initially been diagnosed stage 0 CLL but now had stage II CLL with bulky adenopathy we elected to treat the patient with acalabrutinib 100 mg p.o. twice daily after 4 cycles he reports response with resolution of splenomegaly and significant reduction lymphadenopathy. After 8 cycles had a complete response. He developed headaches which he attributed to acalabrutinib .His dose reduced 100 mg p.o. daily and serial scans continue to show complete response with resolution of splenomegaly as well as lymphocytosis. Was on allopurinol for elevated uric acid.  He completed a total of 16 cycles acalabrutinib and had CT scans including CT neck chest abdomen pelvis performed on December 21, 2023 there was no evidence of lymphadenopathy spleen size was normal. This was consistent with a complete response. The patient elected to stop acalabrutinib in December 2023 due to ongoing headaches .  He has been off of this since then.  He had repeat imaging on May 6, 2024 including CT neck, CT chest, and CT abdomen/pelvis findings were that there was slight interval increase in the size of multiple mediastinal retroperitoneal mesenteric lymph nodes there was a gastrohepatic lymp h node that previously measured 10 mm in currently measures 16 mm lymph nodes and the other lymph nodes had increased only by 5 to 10% in  size. There was no evidence splenomegalyOnly saw him in May we felt that these findings were stable based on RECIST criteria.  We noted that his uric acid was elevated and recommended he resume allopurinol.  He comes in today doing relatively well denies any fevers night sweats or weight loss denies any lymphadenopathy.  Denies early satiety.  Denies easy bruisability.  Denies palpitations.  Denies headaches or change in mental status.  Denies abdominal pain, change in bowel habits, bright red blood per rectum, hematemesis, or melena.  He states that he stubbed his toe on the left and developed significant bleeding and swelling but this has resolved..  He had a CT neck, CT chest, CT abdomen/pelvis, on August 26 and the findings were but there was no change from previous exam there was no splenomegaly.  There was stable normal size and mildly enlarged lymph nodes in the retroperitoneum.  There was a stable 1.5 x 2 cm portacaval node.      ______________________________________________________________________________    Past History    Past Medical History:   Diagnosis Date    Acute respiratory failure with hypoxia (H) 2/6/2022    Age-related cataract     No Comments Provided    Calculus of kidney     No Comments Provided    Carpal tunnel syndrome     No Comments Provided    CLL (chronic lymphocytic leukemia) (H) 2/6/2022    Diverticulosis of intestine without perforation or abscess without bleeding     mild    Dorsalgia     No Comments Provided    Inflammatory liver disease     ? cause 1982    Malignant neoplasm of colon (H)     2006    Other specified postprocedural states     1/14/2016    Pneumonia due to 2019 novel coronavirus 2/6/2022    Pneumonia of both lungs due to infectious organism 2/6/2022    Pure hyperglyceridemia     No Comments Provided    Strain of muscle, fascia and tendon of other parts of biceps, left arm, initial encounter     1/7/2016       Past Surgical History:   Procedure Laterality Date     BONE MARROW BIOPSY, BONE SPECIMEN, NEEDLE/TROCAR Left 05/12/2022    Procedure: BIOPSY, BONE MARROW;  Surgeon: Delia Holguin MD;  Location: [facility] OR    COLON SURGERY      8/22/06,Sigmoid colectomy for [facility] C2 adenocarcinoma    COLONOSCOPY      9/07/07,next due in 2010    COLONOSCOPY      08/30/2010,F/U 2015    COLONOSCOPY  10/19/2015    10/19/15,F/U 2020    COLONOSCOPY N/A 11/07/2022    F/U 2027 h/o colon cancer    EXTRACAPSULAR CATARACT EXTRATION WITH INTRAOCULAR LENS IMPLANT      2011, 2012,Bilateral cataracts R 2011.. L 2012    LAPAROSCOPIC CHOLECYSTECTOMY      04/24/07    OTHER SURGICAL HISTORY      10/11/06,931706,PORTACATH,Placement of Port-A-Cath, right anterior chest, for chemotherapy    OTHER SURGICAL HISTORY      08/31/2010,,HERNIA REPAIR,Mesh Underlay    OTHER SURGICAL HISTORY      2/12/15,301904,IR URETERAL STENT LEFT    OTHER SURGICAL HISTORY      1/14/2016,218851,OTHER,Left,arthrex equipment used    TONSILLECTOMY, ADENOIDECTOMY, COMBINED      age 1    VASECTOMY       x2, spontaneous reconnected, so needed 2nd procedure       Review of Systems   Constitutional: Negative.    HENT: Negative.     Eyes: Negative.    Respiratory: Negative.  Negative for cough and shortness of breath.    Cardiovascular: Negative.  Negative for palpitations and leg swelling.   Gastrointestinal: Negative.  Negative for abdominal distention, abdominal pain and blood in stool.   Endocrine: Negative.    Genitourinary: Negative.  Negative for difficulty urinating.   Musculoskeletal: Negative.    Skin: Negative.    Allergic/Immunologic: Negative.    Neurological: Negative.    Hematological: Negative.    Psychiatric/Behavioral: Negative.     All other systems reviewed and are negative.         Physical Exam    BP (!) 144/84 (BP Location: Right arm, Patient Position: Sitting, Cuff Size: Adult Regular)   Pulse 78   Temp 97.6  F (36.4  C) (Tympanic)   Resp 16   Wt 96.8 kg (213 lb 6.4 oz)   SpO2 96%   BMI 33.93 kg/m       Physical Exam  Vitals and nursing note reviewed.   Constitutional:       Appearance: Normal appearance. He is normal weight.   HENT:      Head: Normocephalic and atraumatic.      Nose: Nose normal.      Mouth/Throat:      Pharynx: Oropharynx is clear.   Eyes:      Extraocular Movements: Extraocular movements intact.      Conjunctiva/sclera: Conjunctivae normal.      Pupils: Pupils are equal, round, and reactive to light.   Cardiovascular:      Rate and Rhythm: Normal rate and regular rhythm.      Pulses: Normal pulses.      Heart sounds: Normal heart sounds. No murmur heard.  Pulmonary:      Effort: Pulmonary effort is normal.      Breath sounds: Normal breath sounds.      Comments: There are no palpable axillary lymph nodes  Abdominal:      General: Bowel sounds are normal. There is no distension.      Palpations: Abdomen is soft. There is no mass.      Tenderness: There is no abdominal tenderness.      Comments: Spleen tip is barely palpable at 1 fingerbreadth below the left costal margin   Musculoskeletal:         General: Normal range of motion.      Cervical back: Normal range of motion and neck supple.      Right lower leg: No edema.      Left lower leg: No edema.      Comments: No ankle edema.   Lymphadenopathy:      Cervical: No cervical adenopathy.   Skin:     General: Skin is warm and dry.   Neurological:      General: No focal deficit present.      Mental Status: He is alert and oriented to person, place, and time.   Psychiatric:         Mood and Affect: Mood normal.         Behavior: Behavior normal.          Lab Results    Recent Results (from the past 240 hour(s))   Comprehensive metabolic panel    Collection Time: 08/26/24 12:00 PM   Result Value Ref Range    Sodium 141 135 - 145 mmol/L    Potassium 4.6 3.4 - 5.3 mmol/L    Carbon Dioxide (CO2) 24 22 - 29 mmol/L    Anion Gap 13 7 - 15 mmol/L    Urea Nitrogen 17.0 8.0 - 23.0 mg/dL    Creatinine 1.13 0.67 - 1.17 mg/dL    GFR Estimate 72 >60  mL/min/1.73m2    Calcium 9.7 8.8 - 10.4 mg/dL    Chloride 104 98 - 107 mmol/L    Glucose 126 (H) 70 - 99 mg/dL    Alkaline Phosphatase 44 40 - 150 U/L    AST 40 0 - 45 U/L    ALT 59 0 - 70 U/L    Protein Total 7.2 6.4 - 8.3 g/dL    Albumin 4.7 3.5 - 5.2 g/dL    Bilirubin Total 0.7 <=1.2 mg/dL   Lactate Dehydrogenase    Collection Time: 08/26/24 12:00 PM   Result Value Ref Range    Lactate Dehydrogenase 227 0 - 250 U/L   Uric acid    Collection Time: 08/26/24 12:00 PM   Result Value Ref Range    Uric Acid 9.6 (H) 3.4 - 7.0 mg/dL   CBC with platelets and differential    Collection Time: 08/26/24 12:00 PM   Result Value Ref Range    WBC Count 12.0 (H) 4.0 - 11.0 10e3/uL    RBC Count 5.70 4.40 - 5.90 10e6/uL    Hemoglobin 16.6 13.3 - 17.7 g/dL    Hematocrit 48.8 40.0 - 53.0 %    MCV 86 78 - 100 fL    MCH 29.1 26.5 - 33.0 pg    MCHC 34.0 31.5 - 36.5 g/dL    RDW 13.3 10.0 - 15.0 %    Platelet Count 154 150 - 450 10e3/uL    % Neutrophils 31 %    % Lymphocytes 57 %    % Monocytes 10 %    % Eosinophils 2 %    % Basophils 1 %    % Immature Granulocytes 1 %    NRBCs per 100 WBC 0 <1 /100    Absolute Neutrophils 3.7 1.6 - 8.3 10e3/uL    Absolute Lymphocytes 6.8 (H) 0.8 - 5.3 10e3/uL    Absolute Monocytes 1.2 0.0 - 1.3 10e3/uL    Absolute Eosinophils 0.2 0.0 - 0.7 10e3/uL    Absolute Basophils 0.1 0.0 - 0.2 10e3/uL    Absolute Immature Granulocytes 0.1 <=0.4 10e3/uL    Absolute NRBCs 0.0 10e3/uL       Imaging    CT Soft Tissue Neck w Contrast    Result Date: 8/26/2024  CT SOFT TISSUE NECK W CONTRAST, 8/26/2024 1:00 PM History: Male, age 63 years; CLL (chronic lymphocytic leukemia) (H) Comparison: CT scan of the neck 5/6/2024 Technique: CT scan was performed of the neck  after the administration of intravenous contrast. Axial sagittal and coronal reconstructed images were reviewed. This facility minimizes radiation dose by adjusting the mA and/or kV according to each patient size. This CT scan was performed using one or more the  following dose reduction techniques: -Automated exposure control, -Adjustment of the mA and/or kV according to patient's size, and/or, -Use of iterative reconstruction technique. FINDINGS: The visualized portions of the brain, muscles of mastication and salivary glands are unremarkable. Numerous normal-sized lymph nodes are again seen throughout the neck, similar in appearance compared to prior study. Vascular structures are also similar appearance demonstrating a small volume of calcified plaque at the carotid bifurcations. Thyroid gland remains mildly enlarged. Bony structures again demonstrate mild degenerative changes of the cervical spine. And 18 x 14 mm right paratracheal/paraesophageal lymph node is unchanged compared to prior study is currently seen on image 55 of series 3.     IMPRESSION: No acute change. Numerous normal-sized lymph nodes again seen throughout the neck without evidence of new or enlarging node. MARTA FU MD   SYSTEM ID:  [id number]    CT Abdomen Pelvis w Contrast    Result Date: 8/26/2024  CT ABDOMEN PELVIS W CONTRAST CLINICAL HISTORY: Male, age 63 years,  CLL (chronic lymphocytic leukemia) (H); Comparison:  CT scan abdomen and pelvis 5/6/2024 TECHNIQUE:  CT scanwas performed of the abdomen and pelvis with IV contrast. Axial, sagittal and coronal images were reviewed. 3-D MIP images were reviewed to optimize visualization of the vessels. This facility minimizes radiation dose by adjusting the mA and/or kV according to each patient size. This CT scan was performed using one or more the following dose reduction techniques: -Automated exposure control, -Adjustment of the mA and/or kV according to patient's size, and/or, -Use of iterative reconstruction technique. FINDINGS: Stomach and duodenum: Normal size lymph nodes again seen at the GE junction. There is no acute abnormality. Normal and mildly enlarged lymph nodes are again seen along the lesser curvature of the stomach. Liver:  Unchanged hepatic steatosis. Gallbladder surgically absent. Spleen: Unremarkable. Pancreas: Unremarkable. Adrenal glands: Unremarkable. Kidneys: Numerous cortical and parapelvic cysts. Ureters: Unremarkable. Urinary bladder: Unremarkable. Prostate gland is enlarged, again indenting the floor the urinary bladder. Large and small bowel: Postoperative changes of the sigmoid colon. Diverticulosis of the colon again seen. No diverticulitis. Small bowel loops are unremarkable. The abdominal aorta and inferior vena cava taper normally. A number of normal-sized and mildly enlarged lymph nodes again seen throughout the retroperitoneum and mesentery. A 1.5 x 2 cm portacaval node is unchanged. Normal-sized and mildly enlarged lymph nodes in the iliac and femoral chains are also similar in appearance. Postoperative changes are again seen consistent with ventral wall hernia repair. There is no evidence of recurrent hernia. Normal-sized lymph nodes are again seen in the left and right groin. Bony structures: No acute abnormality. Lumbar spine degenerative changes are similar in appearance.     IMPRESSION:  No evidence of acute change. Retroperitoneal and mesenteric lymphadenopathy is not significantly different compared the prior study. Hepatic steatosis and bilateral renal cysts are also similar in appearance. Unchanged diverticulosis of the colon. MARTA FU MD   SYSTEM ID:  [id number]    CT Chest w Contrast    Result Date: 8/26/2024  CT CHEST W CONTRAST 8/26/2024 1:00 PM CLINICAL HISTORY: Male, age 63 years,  CLL (chronic lymphocytic leukemia) (H); Comparison:  CT scan of chest 5/6/2024 TECHNIQUE:  CT scan was performed of the chest with IV contrast. Axial, sagittal and coronal images were reviewed. 3-D MIP images were used to optimize lung nodule conspicuity. This facility minimizes radiation dose by adjusting the mA and/or kV according to each patient size. This CT scan was performed using one or more the following dose  reduction techniques: -Automated exposure control, -Adjustment of the mA and/or kV according to patient's size, and/or, -Use of iterative reconstruction technique. FINDINGS: Normal-sized and mildly enlarged lymph nodes again seen throughout the mediastinum and hilar regions. Representative right paraesophageal lymph node currently measures 18.4 x 11.3 mm in size, producing measuring 18.0 x 11.7 mm when measured in a similar fashion. A 2.7 x 0.8 mm subcarinal node and a 15 x 12 mm right paraesophageal node are unchanged. Chronic changes within the lungs are similar in appearance. Moderate centrilobular emphysema is again seen. Patchy areas of groundglass opacification throughout both lungs are also similar in appearance. There is no evidence of pleural effusion. Heart and great vessels demonstrate no acute abnormality. Bony structures also demonstrate no acute abnormality.      IMPRESSION:  No evidence of acute change compared to the previous chest CT from 5/6/2024. A number of normal-sized and mildly enlarged lymph nodes are again seen throughout the mediastinum and hilar regions. Chronic appearing changes within the lungs are also similar appearance. MARTA FU MD   SYSTEM ID:  [id number]     Assessment and Plan: #1.  Stage II bulky CLL presenting with splenomegaly/bulky retroperitoneal adenopathy: Status post 8 cycles of acalabrutinib with complete response on imaging with resolution of splenomegaly and bulky adenopathy patient went on to complete a total 16 cycles of acalabrutinib with complete response in December 2023 patient has been off acalabrutinib and his imaging studies remained stable he is asymptomatic does have evidence of lymphocytosis which is new we recommended monitoring CBC every 2 months patient would rather be seen in 4 months see the patient in 4 months of his lymphocytosis increases and/or he develops anemia or thrombocytopenia would likely need to repeat imaging and consider alternate  therapies i.e. Zanubrutinib.  Time spent: 60 minutes was planets patient visit: We spent time reviewing imaging results with the patient, recommending frequent monitoring of CBC, performing history/physical, we documented a history/physical, recommended resumption of allopurinol otherwise the plan was to see the patient in 4 months with obtain a peripheral blood smear with flow cytometry, obtaining CMP and LDH.  We spent time ordering follow-up labs and appointments.  The longitudinal plan of care for the diagnosis(es)/condition(s) as documented were addressed during this visit. Due to the added complexity in care, I will continue to support  in the subsequent management and with ongoing continuity of care.    Cancer Staging   No matching staging information was found for the patient.        Signed by: Med Stark MD    CC: Filiberto John MD

## 2024-09-11 NOTE — PROGRESS NOTES
[facility]: Cancer Care Follow-Up Note                                    Discussion with Patient:                                                       was seen by Med Stark MD. He is doing well and has no concerns.              Dates of Treatment:                                                      Infusion given in last 28 days       None          Treatment Plan Medications       Oral ONC Mantle Cell Lymphoma - Acalabrutinib       Take Home Medications       Medication Sig Start/End Day/Cycle Status    acalabrutinib (CALQUENCE) 100 MG tablet Take 1 tablet (100 mg) by mouth daily. S to -- Day 1, Cycle 18 - 2/4/2024 Unsigned                            Assessment:                                                          RNCC Short Symptom Review:     Assessment completed with:: Patient    General/Short Assessment  Does the patient have all their medications?: Yes  Is the patient experiencing any new or worsening symptoms?: No  Discussion with patient: Answered patient's questions and addressed concerns;Reviewed how and when to contact clinic;Reviewed patient's future appointments    Pain  Patient Reported Pain?: No    Patient Coping  Accepting    Clinic Utilization  Patient Aware of Next Appointment?: Yes    Other Patient Concerns  Other Patient Reported Concerns: No    Intervention/Education provided during outreach:                                                         Patient to follow up as scheduled at next appt  Patient to call/[employer] message with updates  Confirmed patient has clinic and triage numbers    Signature:  Ivonne Rees RN

## 2024-11-30 ENCOUNTER — HEALTH MAINTENANCE LETTER (OUTPATIENT)
Age: 64
End: 2024-11-30

## 2025-01-01 ENCOUNTER — APPOINTMENT (OUTPATIENT)
Dept: CARDIOLOGY | Facility: CLINIC | Age: 65
DRG: 003 | End: 2025-01-01
Attending: STUDENT IN AN ORGANIZED HEALTH CARE EDUCATION/TRAINING PROGRAM
Payer: MEDICARE

## 2025-01-01 ENCOUNTER — APPOINTMENT (OUTPATIENT)
Dept: GENERAL RADIOLOGY | Facility: CLINIC | Age: 65
DRG: 003 | End: 2025-01-01
Payer: MEDICARE

## 2025-01-01 ENCOUNTER — APPOINTMENT (OUTPATIENT)
Dept: GENERAL RADIOLOGY | Facility: CLINIC | Age: 65
DRG: 003 | End: 2025-01-01
Attending: PHYSICIAN ASSISTANT
Payer: MEDICARE

## 2025-01-01 ENCOUNTER — APPOINTMENT (OUTPATIENT)
Dept: GENERAL RADIOLOGY | Facility: CLINIC | Age: 65
DRG: 003 | End: 2025-01-01
Attending: STUDENT IN AN ORGANIZED HEALTH CARE EDUCATION/TRAINING PROGRAM
Payer: MEDICARE

## 2025-01-01 ENCOUNTER — APPOINTMENT (OUTPATIENT)
Dept: CARDIOLOGY | Facility: CLINIC | Age: 65
DRG: 003 | End: 2025-01-01
Payer: MEDICARE

## 2025-01-01 ENCOUNTER — APPOINTMENT (OUTPATIENT)
Dept: ULTRASOUND IMAGING | Facility: CLINIC | Age: 65
DRG: 003 | End: 2025-01-01
Attending: STUDENT IN AN ORGANIZED HEALTH CARE EDUCATION/TRAINING PROGRAM
Payer: MEDICARE

## 2025-01-01 ENCOUNTER — APPOINTMENT (OUTPATIENT)
Dept: GENERAL RADIOLOGY | Facility: CLINIC | Age: 65
DRG: 003 | End: 2025-01-01
Attending: SURGERY
Payer: MEDICARE

## 2025-01-01 PROCEDURE — 71045 X-RAY EXAM CHEST 1 VIEW: CPT | Mod: 26 | Performed by: RADIOLOGY

## 2025-01-01 PROCEDURE — 76000 FLUOROSCOPY <1 HR PHYS/QHP: CPT

## 2025-01-01 PROCEDURE — 71045 X-RAY EXAM CHEST 1 VIEW: CPT | Mod: 77

## 2025-01-01 PROCEDURE — 71045 X-RAY EXAM CHEST 1 VIEW: CPT

## 2025-01-01 PROCEDURE — 999N000065 XR ABDOMEN PORT 1 VIEW

## 2025-01-01 PROCEDURE — 93321 DOPPLER ECHO F-UP/LMTD STD: CPT | Mod: 26 | Performed by: INTERNAL MEDICINE

## 2025-01-01 PROCEDURE — 93325 DOPPLER ECHO COLOR FLOW MAPG: CPT

## 2025-01-01 PROCEDURE — 74018 RADEX ABDOMEN 1 VIEW: CPT | Mod: 26 | Performed by: RADIOLOGY

## 2025-01-01 PROCEDURE — 76770 US EXAM ABDO BACK WALL COMP: CPT | Mod: 26 | Performed by: RADIOLOGY

## 2025-01-01 PROCEDURE — 76770 US EXAM ABDO BACK WALL COMP: CPT

## 2025-01-01 PROCEDURE — 71045 X-RAY EXAM CHEST 1 VIEW: CPT | Mod: 26 | Performed by: STUDENT IN AN ORGANIZED HEALTH CARE EDUCATION/TRAINING PROGRAM

## 2025-01-01 PROCEDURE — 93325 DOPPLER ECHO COLOR FLOW MAPG: CPT | Mod: 26 | Performed by: INTERNAL MEDICINE

## 2025-01-01 PROCEDURE — 71045 X-RAY EXAM CHEST 1 VIEW: CPT | Mod: 76

## 2025-01-01 PROCEDURE — 999N000065 XR CHEST PORT 1 VIEW

## 2025-01-01 PROCEDURE — 93308 TTE F-UP OR LMTD: CPT | Mod: 26 | Performed by: INTERNAL MEDICINE

## 2025-01-01 PROCEDURE — 74018 RADEX ABDOMEN 1 VIEW: CPT

## 2025-01-21 ENCOUNTER — OFFICE VISIT (OUTPATIENT)
Dept: FAMILY MEDICINE | Facility: OTHER | Age: 65
End: 2025-01-21
Attending: NURSE PRACTITIONER
Payer: MEDICARE

## 2025-01-21 ENCOUNTER — TELEPHONE (OUTPATIENT)
Dept: ONCOLOGY | Facility: OTHER | Age: 65
End: 2025-01-21

## 2025-01-21 VITALS
HEIGHT: 67 IN | TEMPERATURE: 98.2 F | HEART RATE: 88 BPM | OXYGEN SATURATION: 97 % | DIASTOLIC BLOOD PRESSURE: 82 MMHG | BODY MASS INDEX: 34.18 KG/M2 | SYSTOLIC BLOOD PRESSURE: 146 MMHG | WEIGHT: 217.8 LBS | RESPIRATION RATE: 20 BRPM

## 2025-01-21 DIAGNOSIS — E11.8 CONTROLLED TYPE 2 DIABETES MELLITUS WITH COMPLICATION, WITHOUT LONG-TERM CURRENT USE OF INSULIN (H): Primary | ICD-10-CM

## 2025-01-21 DIAGNOSIS — L40.9 PSORIASIS OF SCALP: ICD-10-CM

## 2025-01-21 DIAGNOSIS — C91.10 CLL (CHRONIC LYMPHOCYTIC LEUKEMIA) (H): ICD-10-CM

## 2025-01-21 DIAGNOSIS — E66.09 CLASS 1 OBESITY DUE TO EXCESS CALORIES WITH SERIOUS COMORBIDITY AND BODY MASS INDEX (BMI) OF 34.0 TO 34.9 IN ADULT: ICD-10-CM

## 2025-01-21 DIAGNOSIS — E79.0 HYPERURICEMIA: ICD-10-CM

## 2025-01-21 DIAGNOSIS — E66.811 CLASS 1 OBESITY DUE TO EXCESS CALORIES WITH SERIOUS COMORBIDITY AND BODY MASS INDEX (BMI) OF 34.0 TO 34.9 IN ADULT: ICD-10-CM

## 2025-01-21 LAB
ALBUMIN SERPL BCG-MCNC: 4.7 G/DL (ref 3.5–5.2)
ALP SERPL-CCNC: 47 U/L (ref 40–150)
ALT SERPL W P-5'-P-CCNC: 76 U/L (ref 0–70)
ANION GAP SERPL CALCULATED.3IONS-SCNC: 14 MMOL/L (ref 7–15)
AST SERPL W P-5'-P-CCNC: 49 U/L (ref 0–45)
BASOPHILS # BLD MANUAL: 0 10E3/UL (ref 0–0.2)
BASOPHILS NFR BLD MANUAL: 0 %
BILIRUB SERPL-MCNC: 0.5 MG/DL
BUN SERPL-MCNC: 17.8 MG/DL (ref 8–23)
CALCIUM SERPL-MCNC: 9.9 MG/DL (ref 8.8–10.4)
CHLORIDE SERPL-SCNC: 101 MMOL/L (ref 98–107)
CREAT SERPL-MCNC: 1.37 MG/DL (ref 0.67–1.17)
EGFRCR SERPLBLD CKD-EPI 2021: 57 ML/MIN/1.73M2
EOSINOPHIL # BLD MANUAL: 0.1 10E3/UL (ref 0–0.7)
EOSINOPHIL NFR BLD MANUAL: 1 %
ERYTHROCYTE [DISTWIDTH] IN BLOOD BY AUTOMATED COUNT: 13.1 % (ref 10–15)
EST. AVERAGE GLUCOSE BLD GHB EST-MCNC: 140 MG/DL
GLUCOSE SERPL-MCNC: 123 MG/DL (ref 70–99)
HBA1C MFR BLD: 6.5 %
HCO3 SERPL-SCNC: 26 MMOL/L (ref 22–29)
HCT VFR BLD AUTO: 47.3 % (ref 40–53)
HGB BLD-MCNC: 16.4 G/DL (ref 13.3–17.7)
LDH SERPL L TO P-CCNC: 226 U/L (ref 0–250)
LYMPHOCYTES # BLD MANUAL: 9.4 10E3/UL (ref 0.8–5.3)
LYMPHOCYTES NFR BLD MANUAL: 66 %
MCH RBC QN AUTO: 29.1 PG (ref 26.5–33)
MCHC RBC AUTO-ENTMCNC: 34.7 G/DL (ref 31.5–36.5)
MCV RBC AUTO: 84 FL (ref 78–100)
MONOCYTES # BLD MANUAL: 0.7 10E3/UL (ref 0–1.3)
MONOCYTES NFR BLD MANUAL: 5 %
NEUTROPHILS # BLD MANUAL: 4 10E3/UL (ref 1.6–8.3)
NEUTROPHILS NFR BLD MANUAL: 28 %
PLAT MORPH BLD: ABNORMAL
PLATELET # BLD AUTO: 144 10E3/UL (ref 150–450)
POTASSIUM SERPL-SCNC: 5 MMOL/L (ref 3.4–5.3)
PROT SERPL-MCNC: 7 G/DL (ref 6.4–8.3)
RBC # BLD AUTO: 5.63 10E6/UL (ref 4.4–5.9)
RBC MORPH BLD: ABNORMAL
RETICS # AUTO: 0.06 10E6/UL (ref 0.03–0.1)
RETICS/RBC NFR AUTO: 1.1 % (ref 0.5–2)
SMUDGE CELLS BLD QL SMEAR: PRESENT
SODIUM SERPL-SCNC: 141 MMOL/L (ref 135–145)
URATE SERPL-MCNC: 9.8 MG/DL (ref 3.4–7)
VARIANT LYMPHS BLD QL SMEAR: PRESENT
WBC # BLD AUTO: 14.3 10E3/UL (ref 4–11)

## 2025-01-21 PROCEDURE — 85027 COMPLETE CBC AUTOMATED: CPT | Mod: ZL | Performed by: NURSE PRACTITIONER

## 2025-01-21 PROCEDURE — 83036 HEMOGLOBIN GLYCOSYLATED A1C: CPT | Mod: ZL | Performed by: NURSE PRACTITIONER

## 2025-01-21 PROCEDURE — G0463 HOSPITAL OUTPT CLINIC VISIT: HCPCS

## 2025-01-21 PROCEDURE — 80053 COMPREHEN METABOLIC PANEL: CPT | Mod: ZL | Performed by: NURSE PRACTITIONER

## 2025-01-21 PROCEDURE — 85045 AUTOMATED RETICULOCYTE COUNT: CPT | Mod: ZL | Performed by: NURSE PRACTITIONER

## 2025-01-21 PROCEDURE — 83615 LACTATE (LD) (LDH) ENZYME: CPT | Mod: ZL | Performed by: NURSE PRACTITIONER

## 2025-01-21 PROCEDURE — 84550 ASSAY OF BLOOD/URIC ACID: CPT | Mod: ZL | Performed by: NURSE PRACTITIONER

## 2025-01-21 PROCEDURE — 85007 BL SMEAR W/DIFF WBC COUNT: CPT | Mod: ZL | Performed by: NURSE PRACTITIONER

## 2025-01-21 PROCEDURE — 36415 COLL VENOUS BLD VENIPUNCTURE: CPT | Mod: ZL | Performed by: NURSE PRACTITIONER

## 2025-01-21 RX ORDER — FLUOCINOLONE ACETONIDE 0.11 MG/ML
OIL TOPICAL AT BEDTIME
Qty: 118 ML | Refills: 1 | Status: SHIPPED | OUTPATIENT
Start: 2025-01-21

## 2025-01-21 ASSESSMENT — PAIN SCALES - GENERAL: PAINLEVEL_OUTOF10: NO PAIN (0)

## 2025-01-21 NOTE — NURSING NOTE
Patient presents today for diabetic follow up.    Medication Reconciliation Complete    Keli Ch LPN  1/21/2025 1:53 PM

## 2025-01-21 NOTE — TELEPHONE ENCOUNTER
----- Message from Ivonne THOMPSON sent at 1/21/2025  4:07 PM CST -----  Many attempts have been made to reach him and letter was sent. We would have liked to schedule him a while ago  Thanks  Ivonne  ----- Message -----  From: Malu Alarcon APRN CNP  Sent: 1/21/2025   3:49 PM CST  To: María Hutchison; Tiago Graham;  Onc Nurse    Quick review but I think patient needs follow-up scheduled. I am happy to see him-- maybe on one of the days I am there in the coming weeks.

## 2025-01-21 NOTE — PROGRESS NOTES
Assessment & Plan   Problem List Items Addressed This Visit          Endocrine    Controlled type 2 diabetes mellitus with complication, without long-term current use of insulin (H) - Primary    Relevant Medications    metFORMIN (GLUCOPHAGE) 500 MG tablet    Other Relevant Orders    Hemoglobin A1c (Completed)       Hematologic    CLL (chronic lymphocytic leukemia) (H)     Other Visit Diagnoses       Psoriasis of scalp        Relevant Medications    Fluocinolone Acetonide Scalp 0.01 % OIL oil    Hyperuricemia        Class 1 obesity due to excess calories with serious comorbidity and body mass index (BMI) of 34.0 to 34.9 in adult        Relevant Medications    metFORMIN (GLUCOPHAGE) 500 MG tablet           Labs are completed per oncology, these were reviewed and appear to be stable.  He has declined follow-up with  oncology at this time  Blood pressure is greater than goal, discussed medications versus lifestyle modification.  He would like to work on further modifications including reducing salt in his diet  Diabetes remains stable, continue with metformin  Encouraged him to continue working on daily exercise, dietary modifications  Refilled scalp oil for psoriasis  Plan to follow-up again for diabetes check in 3 to 6 months, sooner with any concerns    The longitudinal plan of care for the diagnosis(es)/condition(s) as documented were addressed during this visit. Due to the added complexity in care, I will continue to support  in the subsequent management and with ongoing continuity of care.       No follow-ups on file.      Subjective    is a 64 year old, presenting for the following health issues:  Diabetes    History of Present Illness       Diabetes:   He presents for follow up of diabetes.  He is checking home blood glucose a few times a month.   He checks blood glucose before meals and at bedtime.  Blood glucose is never over 200 and never under 70.  When his blood glucose is low, the patient is  "asymptomatic for confusion, blurred vision, lethargy and reports not feeling dizzy, shaky, or weak.   He has no concerns regarding his diabetes at this time.  He is having numbness in feet.            He eats 0-1 servings of fruits and vegetables daily.He consumes 0 sweetened beverage(s) daily.He exercises with enough effort to increase his heart rate 30 to 60 minutes per day.  He exercises with enough effort to increase his heart rate 5 days per week.   He is taking medications regularly.     He presents to clinic today for follow-up on diabetes.  Home blood pressure readings have been 100-120 5 in the morning, 140-150 in the afternoon.  He continues to work on daily exercise.  He continues to work on eating healthy.  Blood pressure readings at home have been 140s over 80s.  He does report a little salt in his diet.  No nicotine use.  He is due to have labs completed for oncology.  He had some reaction to previous contrast from imaging, he has decided he wanted to hold on any follow-up with oncology for a while.        Objective    BP (!) 146/82   Pulse 88   Temp 98.2  F (36.8  C)   Resp 20   Ht 1.689 m (5' 6.5\")   Wt 98.8 kg (217 lb 12.8 oz)   SpO2 97%   BMI 34.63 kg/m    Body mass index is 34.63 kg/m .  Physical Exam   GENERAL: alert and no distress  EYES: Eyes grossly normal to inspection  RESP: lungs clear to auscultation - no rales, rhonchi or wheezes  CV: regular rate and rhythm, normal S1 S2  NEURO: Normal strength and tone, mentation intact and speech normal  PSYCH: mentation appears normal, affect normal/bright    Results for orders placed or performed in visit on 01/21/25   Comprehensive metabolic panel     Status: Abnormal   Result Value Ref Range    Sodium 141 135 - 145 mmol/L    Potassium 5.0 3.4 - 5.3 mmol/L    Carbon Dioxide (CO2) 26 22 - 29 mmol/L    Anion Gap 14 7 - 15 mmol/L    Urea Nitrogen 17.8 8.0 - 23.0 mg/dL    Creatinine 1.37 (H) 0.67 - 1.17 mg/dL    GFR Estimate 57 (L) >60 " mL/min/1.73m2    Calcium 9.9 8.8 - 10.4 mg/dL    Chloride 101 98 - 107 mmol/L    Glucose 123 (H) 70 - 99 mg/dL    Alkaline Phosphatase 47 40 - 150 U/L    AST 49 (H) 0 - 45 U/L    ALT 76 (H) 0 - 70 U/L    Protein Total 7.0 6.4 - 8.3 g/dL    Albumin 4.7 3.5 - 5.2 g/dL    Bilirubin Total 0.5 <=1.2 mg/dL   Lactate Dehydrogenase     Status: Normal   Result Value Ref Range    Lactate Dehydrogenase 226 0 - 250 U/L   Uric acid     Status: Abnormal   Result Value Ref Range    Uric Acid 9.8 (H) 3.4 - 7.0 mg/dL   Hemoglobin A1c     Status: Abnormal   Result Value Ref Range    Estimated Average Glucose 140 (H) <117 mg/dL    Hemoglobin A1C 6.5 (H) <5.7 %   CBC with platelets and differential     Status: Abnormal   Result Value Ref Range    WBC Count 14.3 (H) 4.0 - 11.0 10e3/uL    RBC Count 5.63 4.40 - 5.90 10e6/uL    Hemoglobin 16.4 13.3 - 17.7 g/dL    Hematocrit 47.3 40.0 - 53.0 %    MCV 84 78 - 100 fL    MCH 29.1 26.5 - 33.0 pg    MCHC 34.7 31.5 - 36.5 g/dL    RDW 13.1 10.0 - 15.0 %    Platelet Count 144 (L) 150 - 450 10e3/uL   Reticulocyte count     Status: Normal   Result Value Ref Range    % Reticulocyte 1.1 0.5 - 2.0 %    Absolute Reticulocyte 0.060 0.025 - 0.095 10e6/uL   Manual Differential     Status: Abnormal   Result Value Ref Range    % Neutrophils 28 %    % Lymphocytes 66 %    % Monocytes 5 %    % Eosinophils 1 %    % Basophils 0 %    Absolute Neutrophils 4.0 1.6 - 8.3 10e3/uL    Absolute Lymphocytes 9.4 (H) 0.8 - 5.3 10e3/uL    Absolute Monocytes 0.7 0.0 - 1.3 10e3/uL    Absolute Eosinophils 0.1 0.0 - 0.7 10e3/uL    Absolute Basophils 0.0 0.0 - 0.2 10e3/uL    RBC Morphology Confirmed RBC Indices     Platelet Assessment  Automated Count Confirmed. Platelet morphology is normal.     Automated Count Confirmed. Platelet morphology is normal.    Reactive Lymphocytes Present (A) None Seen    Smudge Cells Present (A) None Seen   Lab Blood Morphology Pathologist Review     Status: Abnormal (In process)    Narrative    The  following orders were created for panel order Lab Blood Morphology Pathologist Review.  Procedure                               Abnormality         Status                     ---------                               -----------         ------                     Bld morphology pathology...[[id number]]                      In process                 CBC with platelets and d...[[id number]]  Abnormal            Final result               RBC and Platelet Morphology[[id number]]                                                 Reticulocyte count[[id number]]           Normal              Final result               Manual Differential[[id number]]          Abnormal            Final result               Morphology Tracking[[id number]]                              Final result                 Please view results for these tests on the individual orders.             Signed Electronically by: CUATE Recinos CNP

## 2025-01-23 PROBLEM — E66.812 CLASS 2 SEVERE OBESITY DUE TO EXCESS CALORIES WITH SERIOUS COMORBIDITY IN ADULT (H): Status: RESOLVED | Noted: 2024-04-15 | Resolved: 2025-01-23

## 2025-01-23 PROBLEM — E66.01 CLASS 2 SEVERE OBESITY DUE TO EXCESS CALORIES WITH SERIOUS COMORBIDITY IN ADULT (H): Status: RESOLVED | Noted: 2024-04-15 | Resolved: 2025-01-23

## 2025-01-23 NOTE — BRIEF OP NOTE
[facility]    Brief Operative Note    Pre-operative diagnosis: Leukocytosis [D72.829]  Post-operative diagnosis Same as pre-operative diagnosis    Procedure: Procedure(s):  BIOPSY, BONE MARROW  Surgeon: Surgeon(s) and Role:     * Delia Holguin MD - Primary  Anesthesia: Monitor Anesthesia Care   Estimated Blood Loss: Minimal    Drains: None  Specimens: * No specimens in log *  Findings:   None.  Complications: None.  Implants: * No implants in log *         Patient's spouse Saundra returned phone call to schedule appointments. Saundra transferred to PSR at this time.

## 2025-01-26 ENCOUNTER — HOSPITAL ENCOUNTER (INPATIENT)
Facility: OTHER | Age: 65
LOS: 2 days | Discharge: OTHER INSTITUTION | End: 2025-01-28
Attending: STUDENT IN AN ORGANIZED HEALTH CARE EDUCATION/TRAINING PROGRAM | Admitting: INTERNAL MEDICINE
Payer: MEDICARE

## 2025-01-26 ENCOUNTER — APPOINTMENT (OUTPATIENT)
Dept: CT IMAGING | Facility: OTHER | Age: 65
End: 2025-01-26
Attending: STUDENT IN AN ORGANIZED HEALTH CARE EDUCATION/TRAINING PROGRAM
Payer: MEDICARE

## 2025-01-26 DIAGNOSIS — N17.9 AKI (ACUTE KIDNEY INJURY): ICD-10-CM

## 2025-01-26 DIAGNOSIS — R09.02 HYPOXIA: ICD-10-CM

## 2025-01-26 DIAGNOSIS — J18.9 PNEUMONIA OF BOTH LUNGS DUE TO INFECTIOUS ORGANISM, UNSPECIFIED PART OF LUNG: ICD-10-CM

## 2025-01-26 DIAGNOSIS — J10.1 INFLUENZA A: ICD-10-CM

## 2025-01-26 PROBLEM — J96.01 ACUTE RESPIRATORY FAILURE WITH HYPOXIA (H): Status: ACTIVE | Noted: 2022-02-06

## 2025-01-26 PROBLEM — C91.10 CLL (CHRONIC LYMPHOCYTIC LEUKEMIA) (H): Status: ACTIVE | Noted: 2022-02-06

## 2025-01-26 PROBLEM — E11.8 CONTROLLED TYPE 2 DIABETES MELLITUS WITH COMPLICATION, WITHOUT LONG-TERM CURRENT USE OF INSULIN (H): Status: ACTIVE | Noted: 2018-11-20

## 2025-01-26 LAB
ALBUMIN SERPL BCG-MCNC: 4.2 G/DL (ref 3.5–5.2)
ALP SERPL-CCNC: 71 U/L (ref 40–150)
ALT SERPL W P-5'-P-CCNC: 88 U/L (ref 0–70)
ANION GAP SERPL CALCULATED.3IONS-SCNC: 15 MMOL/L (ref 7–15)
AST SERPL W P-5'-P-CCNC: 106 U/L (ref 0–45)
BASOPHILS # BLD AUTO: 0 10E3/UL (ref 0–0.2)
BASOPHILS NFR BLD AUTO: 0 %
BILIRUB SERPL-MCNC: 0.9 MG/DL
BUN SERPL-MCNC: 24.3 MG/DL (ref 8–23)
CALCIUM SERPL-MCNC: 9.5 MG/DL (ref 8.8–10.4)
CHLORIDE SERPL-SCNC: 93 MMOL/L (ref 98–107)
CREAT SERPL-MCNC: 1.23 MG/DL (ref 0.67–1.17)
CRP SERPL-MCNC: 154.81 MG/L
D DIMER PPP FEU-MCNC: 1.44 UG/ML FEU (ref 0–0.5)
EGFRCR SERPLBLD CKD-EPI 2021: 65 ML/MIN/1.73M2
EOSINOPHIL # BLD AUTO: 0 10E3/UL (ref 0–0.7)
EOSINOPHIL NFR BLD AUTO: 0 %
ERYTHROCYTE [DISTWIDTH] IN BLOOD BY AUTOMATED COUNT: 13.1 % (ref 10–15)
FLUAV RNA SPEC QL NAA+PROBE: POSITIVE
FLUBV RNA RESP QL NAA+PROBE: NEGATIVE
GLUCOSE SERPL-MCNC: 153 MG/DL (ref 70–99)
HCO3 SERPL-SCNC: 23 MMOL/L (ref 22–29)
HCT VFR BLD AUTO: 44.7 % (ref 40–53)
HGB BLD-MCNC: 15.6 G/DL (ref 13.3–17.7)
HOLD SPECIMEN: NORMAL
IMM GRANULOCYTES # BLD: 0.1 10E3/UL
IMM GRANULOCYTES NFR BLD: 1 %
LACTATE SERPL-SCNC: 1.9 MMOL/L (ref 0.7–2)
LYMPHOCYTES # BLD AUTO: 3.2 10E3/UL (ref 0.8–5.3)
LYMPHOCYTES NFR BLD AUTO: 34 %
MAGNESIUM SERPL-MCNC: 2 MG/DL (ref 1.7–2.3)
MCH RBC QN AUTO: 28.8 PG (ref 26.5–33)
MCHC RBC AUTO-ENTMCNC: 34.9 G/DL (ref 31.5–36.5)
MCV RBC AUTO: 83 FL (ref 78–100)
MONOCYTES # BLD AUTO: 0.4 10E3/UL (ref 0–1.3)
MONOCYTES NFR BLD AUTO: 4 %
NEUTROPHILS # BLD AUTO: 5.7 10E3/UL (ref 1.6–8.3)
NEUTROPHILS NFR BLD AUTO: 61 %
NRBC # BLD AUTO: 0 10E3/UL
NRBC BLD AUTO-RTO: 0 /100
NT-PROBNP SERPL-MCNC: 103 PG/ML (ref 0–900)
PHOSPHATE SERPL-MCNC: 4.4 MG/DL (ref 2.5–4.5)
PLATELET # BLD AUTO: 100 10E3/UL (ref 150–450)
POTASSIUM SERPL-SCNC: 4 MMOL/L (ref 3.4–5.3)
POTASSIUM SERPL-SCNC: 4.4 MMOL/L (ref 3.4–5.3)
PROCALCITONIN SERPL IA-MCNC: 0.99 NG/ML
PROT SERPL-MCNC: 7.3 G/DL (ref 6.4–8.3)
RBC # BLD AUTO: 5.42 10E6/UL (ref 4.4–5.9)
RSV RNA SPEC NAA+PROBE: NEGATIVE
SARS-COV-2 RNA RESP QL NAA+PROBE: NEGATIVE
SODIUM SERPL-SCNC: 131 MMOL/L (ref 135–145)
WBC # BLD AUTO: 9.5 10E3/UL (ref 4–11)

## 2025-01-26 PROCEDURE — 250N000013 HC RX MED GY IP 250 OP 250 PS 637: Performed by: INTERNAL MEDICINE

## 2025-01-26 PROCEDURE — 36415 COLL VENOUS BLD VENIPUNCTURE: CPT | Performed by: INTERNAL MEDICINE

## 2025-01-26 PROCEDURE — 258N000003 HC RX IP 258 OP 636: Performed by: STUDENT IN AN ORGANIZED HEALTH CARE EDUCATION/TRAINING PROGRAM

## 2025-01-26 PROCEDURE — 250N000009 HC RX 250: Performed by: STUDENT IN AN ORGANIZED HEALTH CARE EDUCATION/TRAINING PROGRAM

## 2025-01-26 PROCEDURE — 87040 BLOOD CULTURE FOR BACTERIA: CPT | Performed by: STUDENT IN AN ORGANIZED HEALTH CARE EDUCATION/TRAINING PROGRAM

## 2025-01-26 PROCEDURE — 86140 C-REACTIVE PROTEIN: CPT | Performed by: STUDENT IN AN ORGANIZED HEALTH CARE EDUCATION/TRAINING PROGRAM

## 2025-01-26 PROCEDURE — 83880 ASSAY OF NATRIURETIC PEPTIDE: CPT | Performed by: STUDENT IN AN ORGANIZED HEALTH CARE EDUCATION/TRAINING PROGRAM

## 2025-01-26 PROCEDURE — 94640 AIRWAY INHALATION TREATMENT: CPT

## 2025-01-26 PROCEDURE — 80053 COMPREHEN METABOLIC PANEL: CPT | Performed by: STUDENT IN AN ORGANIZED HEALTH CARE EDUCATION/TRAINING PROGRAM

## 2025-01-26 PROCEDURE — 87637 SARSCOV2&INF A&B&RSV AMP PRB: CPT | Performed by: STUDENT IN AN ORGANIZED HEALTH CARE EDUCATION/TRAINING PROGRAM

## 2025-01-26 PROCEDURE — 84100 ASSAY OF PHOSPHORUS: CPT | Performed by: INTERNAL MEDICINE

## 2025-01-26 PROCEDURE — 87899 AGENT NOS ASSAY W/OPTIC: CPT | Performed by: INTERNAL MEDICINE

## 2025-01-26 PROCEDURE — 82040 ASSAY OF SERUM ALBUMIN: CPT | Performed by: STUDENT IN AN ORGANIZED HEALTH CARE EDUCATION/TRAINING PROGRAM

## 2025-01-26 PROCEDURE — 250N000011 HC RX IP 250 OP 636: Performed by: STUDENT IN AN ORGANIZED HEALTH CARE EDUCATION/TRAINING PROGRAM

## 2025-01-26 PROCEDURE — 83605 ASSAY OF LACTIC ACID: CPT | Performed by: STUDENT IN AN ORGANIZED HEALTH CARE EDUCATION/TRAINING PROGRAM

## 2025-01-26 PROCEDURE — 250N000013 HC RX MED GY IP 250 OP 250 PS 637: Performed by: HOSPITALIST

## 2025-01-26 PROCEDURE — 96361 HYDRATE IV INFUSION ADD-ON: CPT | Performed by: STUDENT IN AN ORGANIZED HEALTH CARE EDUCATION/TRAINING PROGRAM

## 2025-01-26 PROCEDURE — 250N000009 HC RX 250: Performed by: HOSPITALIST

## 2025-01-26 PROCEDURE — 85379 FIBRIN DEGRADATION QUANT: CPT | Performed by: STUDENT IN AN ORGANIZED HEALTH CARE EDUCATION/TRAINING PROGRAM

## 2025-01-26 PROCEDURE — 250N000013 HC RX MED GY IP 250 OP 250 PS 637: Performed by: STUDENT IN AN ORGANIZED HEALTH CARE EDUCATION/TRAINING PROGRAM

## 2025-01-26 PROCEDURE — 250N000011 HC RX IP 250 OP 636: Performed by: HOSPITALIST

## 2025-01-26 PROCEDURE — 120N000001 HC R&B MED SURG/OB

## 2025-01-26 PROCEDURE — 999N000157 HC STATISTIC RCP TIME EA 10 MIN

## 2025-01-26 PROCEDURE — 85025 COMPLETE CBC W/AUTO DIFF WBC: CPT | Performed by: STUDENT IN AN ORGANIZED HEALTH CARE EDUCATION/TRAINING PROGRAM

## 2025-01-26 PROCEDURE — 84132 ASSAY OF SERUM POTASSIUM: CPT | Performed by: INTERNAL MEDICINE

## 2025-01-26 PROCEDURE — 36415 COLL VENOUS BLD VENIPUNCTURE: CPT | Performed by: STUDENT IN AN ORGANIZED HEALTH CARE EDUCATION/TRAINING PROGRAM

## 2025-01-26 PROCEDURE — 99285 EMERGENCY DEPT VISIT HI MDM: CPT | Mod: 25 | Performed by: STUDENT IN AN ORGANIZED HEALTH CARE EDUCATION/TRAINING PROGRAM

## 2025-01-26 PROCEDURE — 71275 CT ANGIOGRAPHY CHEST: CPT

## 2025-01-26 PROCEDURE — 84145 PROCALCITONIN (PCT): CPT | Performed by: STUDENT IN AN ORGANIZED HEALTH CARE EDUCATION/TRAINING PROGRAM

## 2025-01-26 PROCEDURE — 94640 AIRWAY INHALATION TREATMENT: CPT | Mod: 76

## 2025-01-26 PROCEDURE — 99285 EMERGENCY DEPT VISIT HI MDM: CPT | Performed by: STUDENT IN AN ORGANIZED HEALTH CARE EDUCATION/TRAINING PROGRAM

## 2025-01-26 PROCEDURE — 83735 ASSAY OF MAGNESIUM: CPT | Performed by: INTERNAL MEDICINE

## 2025-01-26 PROCEDURE — 99233 SBSQ HOSP IP/OBS HIGH 50: CPT | Performed by: INTERNAL MEDICINE

## 2025-01-26 PROCEDURE — 87205 SMEAR GRAM STAIN: CPT | Performed by: INTERNAL MEDICINE

## 2025-01-26 PROCEDURE — 96374 THER/PROPH/DIAG INJ IV PUSH: CPT | Performed by: STUDENT IN AN ORGANIZED HEALTH CARE EDUCATION/TRAINING PROGRAM

## 2025-01-26 RX ORDER — ACETAMINOPHEN 650 MG/1
650 SUPPOSITORY RECTAL EVERY 4 HOURS PRN
Status: DISCONTINUED | OUTPATIENT
Start: 2025-01-26 | End: 2025-01-28 | Stop reason: HOSPADM

## 2025-01-26 RX ORDER — CALCIUM CARBONATE 500 MG/1
1000 TABLET, CHEWABLE ORAL 4 TIMES DAILY PRN
Status: DISCONTINUED | OUTPATIENT
Start: 2025-01-26 | End: 2025-01-28 | Stop reason: HOSPADM

## 2025-01-26 RX ORDER — FLUOCINOLONE ACETONIDE 0.11 MG/ML
OIL TOPICAL AT BEDTIME
Status: DISCONTINUED | OUTPATIENT
Start: 2025-01-26 | End: 2025-01-26

## 2025-01-26 RX ORDER — GUAIFENESIN/DEXTROMETHORPHAN 100-10MG/5
10 SYRUP ORAL EVERY 4 HOURS PRN
Status: DISCONTINUED | OUTPATIENT
Start: 2025-01-26 | End: 2025-01-28 | Stop reason: HOSPADM

## 2025-01-26 RX ORDER — VITAMIN B COMPLEX
25 TABLET ORAL DAILY
Status: DISCONTINUED | OUTPATIENT
Start: 2025-01-26 | End: 2025-01-27

## 2025-01-26 RX ORDER — IPRATROPIUM BROMIDE AND ALBUTEROL SULFATE 2.5; .5 MG/3ML; MG/3ML
3 SOLUTION RESPIRATORY (INHALATION) ONCE
Status: COMPLETED | OUTPATIENT
Start: 2025-01-26 | End: 2025-01-26

## 2025-01-26 RX ORDER — IOPAMIDOL 755 MG/ML
88 INJECTION, SOLUTION INTRAVASCULAR ONCE
Status: COMPLETED | OUTPATIENT
Start: 2025-01-26 | End: 2025-01-26

## 2025-01-26 RX ORDER — CEFTRIAXONE 2 G/1
2 INJECTION, POWDER, FOR SOLUTION INTRAMUSCULAR; INTRAVENOUS EVERY 24 HOURS
Status: DISCONTINUED | OUTPATIENT
Start: 2025-01-27 | End: 2025-01-27

## 2025-01-26 RX ORDER — AMOXICILLIN 250 MG
2 CAPSULE ORAL 2 TIMES DAILY PRN
Status: DISCONTINUED | OUTPATIENT
Start: 2025-01-26 | End: 2025-01-28 | Stop reason: HOSPADM

## 2025-01-26 RX ORDER — LIDOCAINE 40 MG/G
CREAM TOPICAL
Status: DISCONTINUED | OUTPATIENT
Start: 2025-01-26 | End: 2025-01-28 | Stop reason: HOSPADM

## 2025-01-26 RX ORDER — AMOXICILLIN 250 MG
1 CAPSULE ORAL 2 TIMES DAILY PRN
Status: DISCONTINUED | OUTPATIENT
Start: 2025-01-26 | End: 2025-01-28 | Stop reason: HOSPADM

## 2025-01-26 RX ORDER — GUAIFENESIN 200 MG/10ML
200 LIQUID ORAL EVERY 4 HOURS PRN
Status: DISCONTINUED | OUTPATIENT
Start: 2025-01-26 | End: 2025-01-26

## 2025-01-26 RX ORDER — AZITHROMYCIN 500 MG/5ML
500 INJECTION, POWDER, LYOPHILIZED, FOR SOLUTION INTRAVENOUS ONCE
Status: DISCONTINUED | OUTPATIENT
Start: 2025-01-26 | End: 2025-01-26

## 2025-01-26 RX ORDER — CYCLOBENZAPRINE HCL 5 MG
5 TABLET ORAL EVERY 8 HOURS PRN
Status: DISCONTINUED | OUTPATIENT
Start: 2025-01-26 | End: 2025-01-28 | Stop reason: HOSPADM

## 2025-01-26 RX ORDER — CEFTRIAXONE 2 G/1
2 INJECTION, POWDER, FOR SOLUTION INTRAMUSCULAR; INTRAVENOUS ONCE
Status: DISCONTINUED | OUTPATIENT
Start: 2025-01-26 | End: 2025-01-26

## 2025-01-26 RX ORDER — IBUPROFEN 200 MG
200 TABLET ORAL EVERY 6 HOURS PRN
Status: DISCONTINUED | OUTPATIENT
Start: 2025-01-26 | End: 2025-01-27

## 2025-01-26 RX ORDER — OSELTAMIVIR PHOSPHATE 75 MG/1
75 CAPSULE ORAL 2 TIMES DAILY
Status: DISCONTINUED | OUTPATIENT
Start: 2025-01-26 | End: 2025-01-28

## 2025-01-26 RX ORDER — ASPIRIN 81 MG/1
81 TABLET ORAL DAILY
Status: ON HOLD | COMMUNITY

## 2025-01-26 RX ORDER — ACETAMINOPHEN 325 MG/1
650 TABLET ORAL ONCE
Status: COMPLETED | OUTPATIENT
Start: 2025-01-26 | End: 2025-01-26

## 2025-01-26 RX ORDER — ENOXAPARIN SODIUM 100 MG/ML
40 INJECTION SUBCUTANEOUS DAILY
Status: DISCONTINUED | OUTPATIENT
Start: 2025-01-26 | End: 2025-01-28

## 2025-01-26 RX ORDER — CALCIUM CARBONATE 500 MG/1
1000 TABLET, CHEWABLE ORAL 4 TIMES DAILY PRN
Status: DISCONTINUED | OUTPATIENT
Start: 2025-01-26 | End: 2025-01-26

## 2025-01-26 RX ORDER — MULTIPLE VITAMINS W/ MINERALS TAB 9MG-400MCG
1 TAB ORAL DAILY
Status: DISCONTINUED | OUTPATIENT
Start: 2025-01-26 | End: 2025-01-27

## 2025-01-26 RX ORDER — OSELTAMIVIR PHOSPHATE 75 MG/1
75 CAPSULE ORAL ONCE
Status: COMPLETED | OUTPATIENT
Start: 2025-01-26 | End: 2025-01-26

## 2025-01-26 RX ORDER — AZITHROMYCIN 500 MG/5ML
500 INJECTION, POWDER, LYOPHILIZED, FOR SOLUTION INTRAVENOUS EVERY 24 HOURS
Status: DISCONTINUED | OUTPATIENT
Start: 2025-01-27 | End: 2025-01-28 | Stop reason: HOSPADM

## 2025-01-26 RX ORDER — ACETAMINOPHEN 325 MG/1
650 TABLET ORAL EVERY 4 HOURS PRN
Status: DISCONTINUED | OUTPATIENT
Start: 2025-01-26 | End: 2025-01-28 | Stop reason: HOSPADM

## 2025-01-26 RX ORDER — IPRATROPIUM BROMIDE AND ALBUTEROL SULFATE 2.5; .5 MG/3ML; MG/3ML
3 SOLUTION RESPIRATORY (INHALATION) EVERY 4 HOURS PRN
Status: DISCONTINUED | OUTPATIENT
Start: 2025-01-26 | End: 2025-01-27

## 2025-01-26 RX ADMIN — IPRATROPIUM BROMIDE AND ALBUTEROL SULFATE 3 ML: .5; 3 SOLUTION RESPIRATORY (INHALATION) at 03:03

## 2025-01-26 RX ADMIN — ENOXAPARIN SODIUM 40 MG: 40 INJECTION SUBCUTANEOUS at 09:32

## 2025-01-26 RX ADMIN — SODIUM CHLORIDE 500 ML: 0.9 INJECTION, SOLUTION INTRAVENOUS at 03:09

## 2025-01-26 RX ADMIN — ACETAMINOPHEN 650 MG: 325 TABLET, FILM COATED ORAL at 17:58

## 2025-01-26 RX ADMIN — IBUPROFEN 600 MG: 200 TABLET, FILM COATED ORAL at 02:57

## 2025-01-26 RX ADMIN — SODIUM CHLORIDE 60 ML: 9 INJECTION, SOLUTION INTRAVENOUS at 04:29

## 2025-01-26 RX ADMIN — Medication 1 TABLET: at 09:32

## 2025-01-26 RX ADMIN — Medication 25 MCG: at 09:32

## 2025-01-26 RX ADMIN — CYCLOBENZAPRINE 5 MG: 5 TABLET, FILM COATED ORAL at 21:17

## 2025-01-26 RX ADMIN — ACETAMINOPHEN 650 MG: 325 TABLET, FILM COATED ORAL at 02:57

## 2025-01-26 RX ADMIN — OSELTAMAVIR PHOSPHATE 75 MG: 75 CAPSULE ORAL at 05:11

## 2025-01-26 RX ADMIN — IBUPROFEN 200 MG: 200 TABLET, FILM COATED ORAL at 16:57

## 2025-01-26 RX ADMIN — OSELTAMAVIR PHOSPHATE 75 MG: 75 CAPSULE ORAL at 21:17

## 2025-01-26 RX ADMIN — IPRATROPIUM BROMIDE AND ALBUTEROL SULFATE 3 ML: .5; 3 SOLUTION RESPIRATORY (INHALATION) at 14:07

## 2025-01-26 RX ADMIN — ACETAMINOPHEN 650 MG: 325 TABLET, FILM COATED ORAL at 13:52

## 2025-01-26 RX ADMIN — IOPAMIDOL 88 ML: 755 INJECTION, SOLUTION INTRAVENOUS at 04:28

## 2025-01-26 RX ADMIN — SODIUM CHLORIDE 1000 ML: 0.9 INJECTION, SOLUTION INTRAVENOUS at 05:11

## 2025-01-26 ASSESSMENT — ACTIVITIES OF DAILY LIVING (ADL)
ADLS_ACUITY_SCORE: 31
ADLS_ACUITY_SCORE: 27
ADLS_ACUITY_SCORE: 31
ADLS_ACUITY_SCORE: 27
ADLS_ACUITY_SCORE: 31
ADLS_ACUITY_SCORE: 49
ADLS_ACUITY_SCORE: 31
ADLS_ACUITY_SCORE: 31
ADLS_ACUITY_SCORE: 29
ADLS_ACUITY_SCORE: 31
ADLS_ACUITY_SCORE: 31
ADLS_ACUITY_SCORE: 49
ADLS_ACUITY_SCORE: 49
ADLS_ACUITY_SCORE: 27
ADLS_ACUITY_SCORE: 31
ADLS_ACUITY_SCORE: 49
ADLS_ACUITY_SCORE: 29
ADLS_ACUITY_SCORE: 31
ADLS_ACUITY_SCORE: 31

## 2025-01-26 ASSESSMENT — COLUMBIA-SUICIDE SEVERITY RATING SCALE - C-SSRS
1. IN THE PAST MONTH, HAVE YOU WISHED YOU WERE DEAD OR WISHED YOU COULD GO TO SLEEP AND NOT WAKE UP?: NO
2. HAVE YOU ACTUALLY HAD ANY THOUGHTS OF KILLING YOURSELF IN THE PAST MONTH?: NO
6. HAVE YOU EVER DONE ANYTHING, STARTED TO DO ANYTHING, OR PREPARED TO DO ANYTHING TO END YOUR LIFE?: NO

## 2025-01-26 NOTE — PROGRESS NOTES
Preventing Falls in the Hospital (02:42)  Your health professional recommends that you watch this short online health video.  Find out why you're at risk for falling in the hospital and how to prevent falls.   Purpose: Understand what increases a person's risk of falling in the hospital and how to prevent falls.  Goal: Understand what increases a person's risk of falling in the hospital and how to prevent falls.    Watch: Scan the QR code or visit the link to view video       https://hwi.[email or web address]  Current as of: July 17, 2023  Content Version: 14.2 2024 [facility] [employer].   Care instructions adapted under license by your healthcare professional. If you have questions about a medical condition or this instruction, always ask your healthcare professional. [employer] disclaims any warranty or liability for your use of this information.  Preventing Falls in the Hospital

## 2025-01-26 NOTE — PROGRESS NOTES
Pt remains on 3L NC.  Pt received one DuoNeb in the ED.  Pt reported breathing improved.  Sats increased to 95% post treatment.  Respiratory will continue to provide support as needed.    Paulina Rodríguez, RT

## 2025-01-26 NOTE — PROVIDER NOTIFICATION
01/26/25 0634   Initial Information   Patient Belongings none   Did you bring any home meds/supplements to the hospital?  No       Octavia Galdamez RN on 1/26/2025 at 6:42 AM

## 2025-01-26 NOTE — PROGRESS NOTES
"NS ADMISSION NOTE    Patient admitted to room 355 at approximately 0625 via wheel chair from emergency room. Patient was accompanied by transport tech.     Verbal SBAR report received from Pam prior to patient arrival.     Patient ambulated to bed with stand-by assist. Patient alert and oriented X 3. The patient is not having any pain.  . Admission vital signs: Blood pressure 131/69, pulse 69, temperature 99.1  F (37.3  C), temperature source Tympanic, resp. rate 19, height 1.689 m (5' 6.5\"), weight 96.1 kg (211 lb 12.8 oz), SpO2 93%. Patient was oriented to plan of care, call light, bed controls, tv, telephone, bathroom, and visiting hours.     Risk Assessment    The following safety risks were identified during admission: none. Yellow risk band applied: NO.     Skin Initial Assessment    This writer admitted this patient and completed a full skin assessment and Juliano score in the Adult PCS flowsheet.   Photo documentation of skin problem and/or wound competed via [employer] application (located under Media):  No    Appropriate interventions initiated as needed.          Education    Patient has a [place] to Observation order: No  Observation education completed and documented: No      Octavia Galdamez RN      "

## 2025-01-26 NOTE — ED TRIAGE NOTES
"Pt here via private car.  Pt has been sick since tuesday for cold vs flu vs covid per pt.  Pt states last night his oxygen was in the 70's and decided to come in as he is \"feeling terrible\".         "

## 2025-01-26 NOTE — PLAN OF CARE
Goal Outcome Evaluation:      Plan of Care Reviewed With: patient    Overall Patient Progress: no change    Outcome Evaluation: VSS, afebrile, no pain, on 3 liters nasal cannula

## 2025-01-26 NOTE — H&P
"Admit Date: 1/26/2025     History and Physical: Hospitalist Service    Chief Complaints:    Generalized weakness    HPI:    64 years old male with a past medical history of hypertension, chronic lymphocytic leukemia, Colon cancer,psoriasis and other medical issues was brought to the emergency room for generalized weakness and flulike symptoms with shortness of breath and cough/ fever of up to 103.3.  Denies any palpitation dizziness.  Denies any abdominal pain nausea or vomiting.  Does have episodes of diarrhea but none since friday. Workup in the hospital showed a sodium of 131 potassium 4.4 BUN 24.3 creatinine 1.23 with a CRP of 154.81.  Procalcitonin was 0.99.  WBCs 9.5 hemoglobin 15.6.  CT chest shows no evidence of pulm embolism but does show interval development of extensive diffuse bilateral airspace opacities.  Influenza A was positive and screen was negative for influenza B COVID and RSV.  Patient was initiated on nebulizers with oxygen supplementation/IV Rocephin and Zithromax.  Admitted for further evaluation    Review of symptoms:  12 point review of system is negative unless otherwise stated in history of present illness    Clinically Significant Risk Factors Present on Admission         # Hyponatremia: Lowest Na = 131 mmol/L in last 2 days, will monitor as appropriate  # Hypochloremia: Lowest Cl = 93 mmol/L in last 2 days, will monitor as appropriate        # Thrombocytopenia: Lowest platelets = 100 in last 2 days, will monitor for bleeding            # DMII: A1C = 6.5 % (Ref range: <5.7 %) within past 6 months    # Obesity: Estimated body mass index is 33.67 kg/m  as calculated from the following:    Height as of this encounter: 1.689 m (5' 6.5\").    Weight as of this encounter: 96.1 kg (211 lb 12.8 oz).                 Past Medical History:   Diagnosis Date    Acute respiratory failure with hypoxia (H) 2/6/2022    Age-related cataract     No Comments Provided    Calculus of kidney     No Comments " Provided    Carpal tunnel syndrome     No Comments Provided    CLL (chronic lymphocytic leukemia) (H) 2/6/2022    Diverticulosis of intestine without perforation or abscess without bleeding     mild    Dorsalgia     No Comments Provided    Inflammatory liver disease     ? cause 1982    Malignant neoplasm of colon (H)     2006    Other specified postprocedural states     1/14/2016    Pneumonia due to 2019 novel coronavirus 2/6/2022    Pneumonia of both lungs due to infectious organism 2/6/2022    Pure hyperglyceridemia     No Comments Provided    Strain of muscle, fascia and tendon of other parts of biceps, left arm, initial encounter     1/7/2016       Active Problems:    Influenza A    Hypoxia    DEEP (acute kidney injury)    Pneumonia of both lungs due to infectious organism, unspecified part of lung        Current Facility-Administered Medications:     acetaminophen (TYLENOL) tablet 650 mg, 650 mg, Oral, Q4H PRN **OR** acetaminophen (TYLENOL) Suppository 650 mg, 650 mg, Rectal, Q4H PRN, Eugenio Luke MD    [START ON 1/27/2025] azithromycin (ZITHROMAX) 500 mg in  mL intermittent infusion, 500 mg, Intravenous, Q24H, Eugenio Luke MD    benzocaine-menthol (CHLORASEPTIC) 6-10 MG lozenge 1 lozenge, 1 lozenge, Buccal, Q1H PRN, Eugenio Luke MD    calcium carbonate (TUMS) chewable tablet 1,000 mg, 1,000 mg, Oral, 4x Daily PRN, Eugenio Luke MD    calcium carbonate (TUMS) chewable tablet 1,000 mg, 1,000 mg, Oral, 4x Daily PRN, Eugenio Luke MD    [START ON 1/27/2025] cefTRIAXone (ROCEPHIN) 2 g vial to attach to  ml bag for ADULTS or NS 50 ml bag for PEDS, 2 g, Intravenous, Q24H, Eugenio Luke MD    enoxaparin ANTICOAGULANT (LOVENOX) injection 40 mg, 40 mg, Subcutaneous, Daily, Eugenio Luke MD    Fluocinolone Acetonide Scalp 0.01 % oil, , Topical, At Bedtime, Eugenio Luke MD    guaiFENesin (ROBITUSSIN) 20 mg/mL solution 200 mg, 200 mg,  Oral, Q4H PRN, Eugenio Luke MD    guaiFENesin-dextromethorphan (ROBITUSSIN DM) 100-10 MG/5ML syrup 10 mL, 10 mL, Oral, Q4H PRN, Eugenio Luke MD    ipratropium - albuterol 0.5 mg/2.5 mg/3 mL (DUONEB) neb solution 3 mL, 3 mL, Nebulization, Q4H PRN, Eugenio Luke MD    lidocaine (LMX4) cream, , Topical, Q1H PRN, Eugenio Luke MD    lidocaine 1 % 0.1-1 mL, 0.1-1 mL, Other, Q1H PRN, Eugenio Luke MD    menthol-zinc oxide (CALMOSEPTINE) 0.44-20.6 % ointment OINT, , Topical, 4x Daily PRN, Eugenio Luke MD    multivitamin w/minerals (THERA-VIT-M) tablet 1 tablet, 1 tablet, Oral, Daily, Eugenio Luke MD    senna-docusate (SENOKOT-S/PERICOLACE) 8.6-50 MG per tablet 1 tablet, 1 tablet, Oral, BID PRN **OR** senna-docusate (SENOKOT-S/PERICOLACE) 8.6-50 MG per tablet 2 tablet, 2 tablet, Oral, BID PRN, Eugenio Luke MD    sodium chloride (PF) 0.9% PF flush 3 mL, 3 mL, Intracatheter, Q8H, Eugenio Luke MD    sodium chloride (PF) 0.9% PF flush 3 mL, 3 mL, Intracatheter, q1 min prn, Eugenio Luke MD    Vitamin D3 (CHOLECALCIFEROL) tablet 25 mcg, 25 mcg, Oral, Daily, Eugenio Luke MD    Past Surgical History:   Procedure Laterality Date    BONE MARROW BIOPSY, BONE SPECIMEN, NEEDLE/TROCAR Left 05/12/2022    Procedure: BIOPSY, BONE MARROW;  Surgeon: Delia Holguin MD;  Location: [facility] OR    COLON SURGERY      8/22/06,Sigmoid colectomy for [facility] C2 adenocarcinoma    COLONOSCOPY      9/07/07,next due in 2010    COLONOSCOPY      08/30/2010,F/U 2015    COLONOSCOPY  10/19/2015    10/19/15,F/U 2020    COLONOSCOPY N/A 11/07/2022    F/U 2027 h/o colon cancer    EXTRACAPSULAR CATARACT EXTRATION WITH INTRAOCULAR LENS IMPLANT      2011, 2012,Bilateral cataracts R 2011.. L 2012    LAPAROSCOPIC CHOLECYSTECTOMY      04/24/07    OTHER SURGICAL HISTORY      10/11/06,[id number],PORTACATH,Placement of Port-A-Cath, right anterior chest, for chemotherapy     OTHER SURGICAL HISTORY      2010,,HERNIA REPAIR,Mesh Underlay    OTHER SURGICAL HISTORY      2/12/15,[id number],IR URETERAL STENT LEFT    OTHER SURGICAL HISTORY      2016,[id number],OTHER,Left,arthrex equipment used    TONSILLECTOMY, ADENOIDECTOMY, COMBINED      age 1    VASECTOMY       x2, spontaneous reconnected, so needed 2nd procedure       No Known Allergies    Family History   Problem Relation Age of Onset    Hypertension Mother         Hypertension    Heart Disease Father         Heart Disease,40s and 50s    Heart Disease Other         Heart Disease,40s and 50s    Anesthesia Reaction No family hx of         Anesthesia Problem    Other - See Comments No family hx of         Stroke    Blood Disease No family hx of         Blood Disease    Bleeding Diathesis No family hx of        Social History     Socioeconomic History    Marital status:    Tobacco Use    Smoking status: Former     Current packs/day: 0.00     Average packs/day: 2.0 packs/day for 30.0 years (60.0 ttl pk-yrs)     Types: Cigarettes     Start date: 1980     Quit date: 2010     Years since quittin.6     Passive exposure: Past    Smokeless tobacco: Never    Tobacco comments:     Passive exposure in adult home.    Vaping Use    Vaping status: Never Used   Substance and Sexual Activity    Alcohol use: Not Currently    Drug use: Never    Sexual activity: Yes     Partners: Female     Birth control/protection: None   Social History Narrative        Retired from [employer] as the     Has children    Has several tattoos     Social Drivers of Health     Financial Resource Strain: Low Risk  (2024)    Financial Resource Strain     Within the past 12 months, have you or your family members you live with been unable to get utilities (heat, electricity) when it was really needed?: No   Food Insecurity: Low Risk  (2024)    Food Insecurity     Within the past 12 months, did you worry that your  food would run out before you got money to buy more?: No     Within the past 12 months, did the food you bought just not last and you didn t have money to get more?: No   Transportation Needs: Low Risk  (5/25/2024)    Transportation Needs     Within the past 12 months, has lack of transportation kept you from medical appointments, getting your medicines, non-medical meetings or appointments, work, or from getting things that you need?: No   Physical Activity: Sufficiently Active (5/25/2024)    Exercise Vital Sign     Days of Exercise per Week: 5 days     Minutes of Exercise per Session: 30 min   Stress: No Stress Concern Present (5/25/2024)    [employer] [place] [employer] - Occupational Stress Questionnaire     Feeling of Stress : Not at all   Social Connections: Unknown (5/25/2024)    Social Connection and Isolation Panel [NHANES]     Frequency of Social Gatherings with Friends and Family: Twice a week   Interpersonal Safety: Low Risk  (1/21/2025)    Interpersonal Safety     Do you feel physically and emotionally safe where you currently live?: Yes     Within the past 12 months, have you been hit, slapped, kicked or otherwise physically hurt by someone?: No     Within the past 12 months, have you been humiliated or emotionally abused in other ways by your partner or ex-partner?: No   Housing Stability: Low Risk  (5/25/2024)    Housing Stability     Do you have housing? : Yes     Are you worried about losing your housing?: No       Physical Exam:  Vitals:    01/26/25 0512 01/26/25 0600 01/26/25 0614 01/26/25 0621   BP:  118/62  131/69   BP Location:    Left arm   Patient Position:    Semi-Amado's   Cuff Size:    Adult Regular   Pulse:  75  69   Resp:    19   Temp: 98.8  F (37.1  C)   99.1  F (37.3  C)   TempSrc: Tympanic   Tympanic   SpO2:  (!) 91%  93%   Weight:   96.1 kg (211 lb 12.8 oz)    Height:          /69 (BP Location: Left arm, Patient Position: Semi-Amado's, Cuff Size: Adult Regular)    "Pulse 69   Temp 99.1  F (37.3  C) (Tympanic)   Resp 19   Ht 1.689 m (5' 6.5\")   Wt 96.1 kg (211 lb 12.8 oz)   SpO2 93%   BMI 33.67 kg/m    Systolic (24hrs), Av , Min:118 , Max:144   Diastolic (24hrs), Av, Min:62, Max:79    No intake or output data in the 24 hours ending 25 0622    General:      not in pain  Head:  atraumatic, normocephalic, face symmetrical  Eye:  conjunctivae clear , anicteric  Ear:  normal external ears, grossly normal hearing  Neck:  supple, no JVD  Respiratory:   shortness of breath,  Lung Sounds:  air entry decreased at base  Cardiovascular:normal rate, regular rhythm, PMI normal, S1 - normal, S2 - normal splitting  Neurological Status:  alert, awake, oriented to person, oriented to place, oriented to time  Skin:  normal turgor, warm  PSYCH - good eye contact appears euthymic       I&O: No intake/output data recorded.    Lab Results:   CBC:   Lab Results   Component Value Date    WBC 9.5 2025    WBC 16.3 2018    RBC 5.42 2025    RBC 4.99 2018     BMP:   Lab Results   Component Value Date    POTASSIUM 4.4 2025    POTASSIUM 4.5 10/14/2022    POTASSIUM 4.5 2020    CHLORIDE 93 2025    CHLORIDE 102 10/14/2022    CHLORIDE 101 2020    BUN 24.3 2025    BUN 16 10/14/2022    BUN 13 2020     CMP:  Lab Results   Component Value Date    POTASSIUM 4.4 2025    POTASSIUM 4.5 10/14/2022    POTASSIUM 4.5 2020    CHLORIDE 93 2025    CHLORIDE 102 10/14/2022    CHLORIDE 101 2020    ANIONGAP 15 2025    ANIONGAP 9 10/14/2022    ANIONGAP 10 2020    BUN 24.3 2025    BUN 16 10/14/2022    BUN 13 2020    ALBUMIN 4.2 2025    ALBUMIN 4.6 10/14/2022    ALBUMIN 4.8 07/15/2019     2025    AST 39 07/15/2019     Coagulation: No results found for: \"INR\", \"PTT\"  Cardiac markers: No results found for: \"TROPONINI\"  ABGs: No results found for: \"PHARTERIAL\"  Mg: No components found for: " "\"MAGNESIUM\"  BNP: No results found for: \"BNP\"  Thyroid: No results found for: \"TSH\"            Assessment/Plan:      64 years old male with a past medical history of hypertension, chronic lymphocytic leukemia, Colon cancer,psoriasis and other medical issues was brought to the emergency room for generalized weakness and flulike symptoms with shortness of breath and cough/ fever of up to 103.3.  Denies any palpitation dizziness.  Denies any abdominal pain nausea or vomiting.  Does have episodes of diarrhea but none since friday. Workup in the hospital showed a sodium of 131 potassium 4.4 BUN 24.3 creatinine 1.23 with a CRP of 154.81.  Procalcitonin was 0.99.  WBCs 9.5 hemoglobin 15.6.  CT chest shows no evidence of pulm embolism but does show interval development of extensive diffuse bilateral airspace opacities.  Influenza A was positive and screen was negative for influenza B COVID and RSV.  Patient was initiated on nebulizers with oxygen supplementation/IV Rocephin and Zithromax.  Admitted for further evaluation    1 acute hypoxic respiratory failure in the setting of influenza A infection further complicated by superimposed bacterial infection  #2 pneumonia community-acquired  #3 influenza A infection.  Continue the Tamiflu initiated in the emergency room in addition to the IV Rocephin/Zithromax with nebulizers and oxygen supplementation.  Follow the blood cultures sent from the emergency room.  Reports being a shallow breather and oxygen drops at night. Quit smoking in 2006.    4.  Diabetes mellitus type 2/mild hyperglycemia.  Hold the metformin for now and check the lactic acid levels.  Watch the blood sugar ACHS with sliding scale with fluctuating p.o. intake    5  Chronic lymphocytic leukemia at baseline with ongoing follow-up with oncology in the outpatient setting    6.  DVT prophylaxis will be with Lovenox    7.  Acute kidney injury likely secondary to dehydration in setting of poor p.o. intake.  Did " receive IV fluids.  Trend for now.  Check a UA    8.History of colon ca s/p surgery and reports Bowel issues intermittently. Stable    Reviewed code status: Full code    Patient will be admitted under inpatient status given the acute hypoxic respiratory failure in the setting of flu/community-acquired pneumonia/infiltrates noted on the imaging studies of the chest.  Needs IV antibiotics with oxygen supplementation.  Expect length of stay is greater than 2 midnights      Our patient will be admitted under the hospitalist services and further management to be based on patient's clinical progress.    As the provider for the telehealth service, I attest that I introduced myself to the patient and provided my credentials. Based on review of the patient s chart and/or a discussion with members of the patient s treatment team, I determined that telemedicine via a real-time, two-way, interactive audio and video platform is an appropriate means of providing this service. The patient and I mutually agreed that this visit is appropriate for telemedicine as well.    The patient was located at [facility].  . The encounter was approximately 10 minutes. The nurse was present for the duration of the encounter. Provider location :remote [facility] site     Chart reviewed,labs and available imaging reviewed,consultant notes reviewed. Previous available medical records have been reviewed  Care plan discussed with care team    I have seen and examined the patient today. Documentation for this visit was completed using a template. Everything documented was personally performed today with the necessary additions, deletions and changes made as appropriate with the diagnoses being listed in no particular order of clinical relevance.    Tulio Cannon MD MPh  Securely message with the Vocera Web Console (learn more here)  Text page via [employer] Paging/Directory

## 2025-01-26 NOTE — ED PROVIDER NOTES
"ED PROVIDER NOTE  Patient Name: Jesus Varghese  MRN: [id number]    CC:    Chief Complaint   Patient presents with    Flu Symptoms         MDM  64 year old male presenting with shortness of breath and cough.    In the ED, BP (!) 144/79   Pulse (!) 111   Temp (!) 102  F (38.9  C) (Tympanic)   Resp 18   Ht 1.689 m (5' 6.5\")   Wt 98.4 kg (217 lb)   SpO2 (!) 86%   BMI 34.50 kg/m      Physical exam revealed clear auscultation bilaterally.  Benign abdominal exam.  Grossly neurologically intact.  In no acute distress, no accessory muscle use.  Overall does not look critically ill or toxic.      I have independently reviewed and interpreted the patients test results which I have ordered this visit which are the following:      ED Course as of 01/26/25 0512   Sun Jan 26, 2025   0322 Lactic Acid: 1.9   0326 Influenza A(!): Positive   0349 CRP Inflammation(!): 154.81   0349 D-Dimer Quantitative(!): 1.44   0401 WBC: 9.5   0401 Hemoglobin: 15.6   0401 Procalcitonin(!): 0.99   0401 N-Terminal Pro BNP Inpatient: 103   0401 ALT(!): 88   0401 AST(!): 106   0401 Alkaline Phosphatase: 71   0401 Creatinine(!): 1.23   0401 Urea Nitrogen(!): 24.3   0401 Sodium(!): 131   0401 Potassium: 4.4   0511 CT Chest Pulmonary Embolism w Contrast  IMPRESSION:  1.  No evidence of pulmonary embolus.  2.  Interval development of extensive diffuse bilateral airspace and interstitial infiltrates.  3.  Interval development of bilateral hilar, subcarinal and right paratracheal adenopathy. Recommend 3 month follow-up examination after appropriate interval therapy.  4.  Diffuse fatty infiltration of the liver.  5.  Cholecystectomy.              Influenza A  Bilateral pneumonia  Mild hypoxic respiratory insufficiency on 2 L nasal cannula  DEEP  Hx of CLL  Patient presents with few days of a cough, shortness of breath nausea and general malaise.  He was found eventually to have influenza A, he received a CT scan of his chest for hypoxia, tachycardia and " elevated D-dimer.  CT revealed no signs of pulm emboli but revealed diffuse bilateral airspace interstitial traits as well as adenopathy.  He was thus treated with ceftriaxone and azithromycin.  He was maintaining oxygenation on 2 L, he does not appear to have any likely need for BiPAP.  I opted for Tamiflu despite him being out of 48 hours given his oxygen requirement and need for admission.  -Blood cultures x 2  -DuoNebs x 1  -Tylenol 650 mg p.o.  -Ibuprofen 600 mg p.o.  -Normal saline 500 cc  - tamiflu 75 mg PO  -Ceftriaxone and azithromycin IV      Clinical impression  Influenza A  Bilateral pneumonia  Hypoxic respiratory insufficiency  DEEP    Disposition  Admission      HPI    Jesus Varghese is a 64 year old male with PMH of chronic lymphocytic leukemia, diabetes mellitus,  neoplasm of the colon status post colectomy, hypertriglyceridemia who presents with shortness of breath.     History of obtained by: patient    Patient presents with shortness of breath, nausea, headache, wet cough and significant general malaise.  He does have shortness of breath, denies any extremity swelling or calf tenderness.  No recent prolonged travel.  Denies any burning pain or chest pain    PMH and  reviewed.    10 point ROS reviewed and negative except as stated in HPI.    Past medical history:  Past Medical History:   Diagnosis Date    Acute respiratory failure with hypoxia (H) 2/6/2022    Age-related cataract     No Comments Provided    Calculus of kidney     No Comments Provided    Carpal tunnel syndrome     No Comments Provided    CLL (chronic lymphocytic leukemia) (H) 2/6/2022    Diverticulosis of intestine without perforation or abscess without bleeding     mild    Dorsalgia     No Comments Provided    Inflammatory liver disease     ? cause 1982    Malignant neoplasm of colon (H)     2006    Other specified postprocedural states     1/14/2016    Pneumonia due to 2019 novel coronavirus 2/6/2022    Pneumonia of both lungs  due to infectious organism 2022    Pure hyperglyceridemia     No Comments Provided    Strain of muscle, fascia and tendon of other parts of biceps, left arm, initial encounter     2016       Social history:  Social Connections: Unknown (2024)    Social Connection and Isolation Panel [NHANES]     Frequency of Communication with Friends and Family: Not on file     Frequency of Social Gatherings with Friends and Family: Twice a week     Attends Latter-day Services: Not on file     Active Member of Clubs or Organizations: Not on file     Attends Club or Organization Meetings: Not on file     Marital Status: Not on file     Social History     Socioeconomic History    Marital status:    Tobacco Use    Smoking status: Former     Current packs/day: 0.00     Average packs/day: 2.0 packs/day for 30.0 years (60.0 ttl pk-yrs)     Types: Cigarettes     Start date: 1980     Quit date: 2010     Years since quittin.6     Passive exposure: Past    Smokeless tobacco: Never    Tobacco comments:     Passive exposure in adult home.    Vaping Use    Vaping status: Never Used   Substance and Sexual Activity    Alcohol use: Not Currently    Drug use: Never    Sexual activity: Yes     Partners: Female     Birth control/protection: None   Social History Narrative        Retired from [employer] as the     Has children    Has several tattoos     Social Drivers of Health     Financial Resource Strain: Low Risk  (2024)    Financial Resource Strain     Within the past 12 months, have you or your family members you live with been unable to get utilities (heat, electricity) when it was really needed?: No   Food Insecurity: Low Risk  (2024)    Food Insecurity     Within the past 12 months, did you worry that your food would run out before you got money to buy more?: No     Within the past 12 months, did the food you bought just not last and you didn t have money to get more?:  No   Transportation Needs: Low Risk  (5/25/2024)    Transportation Needs     Within the past 12 months, has lack of transportation kept you from medical appointments, getting your medicines, non-medical meetings or appointments, work, or from getting things that you need?: No   Physical Activity: Sufficiently Active (5/25/2024)    Exercise Vital Sign     Days of Exercise per Week: 5 days     Minutes of Exercise per Session: 30 min   Stress: No Stress Concern Present (5/25/2024)    [employer] - Occupational Stress Questionnaire     Feeling of Stress : Not at all   Social Connections: Unknown (5/25/2024)    Social Connection and Isolation Panel [NHANES]     Frequency of Social Gatherings with Friends and Family: Twice a week   Interpersonal Safety: Low Risk  (1/21/2025)    Interpersonal Safety     Do you feel physically and emotionally safe where you currently live?: Yes     Within the past 12 months, have you been hit, slapped, kicked or otherwise physically hurt by someone?: No     Within the past 12 months, have you been humiliated or emotionally abused in other ways by your partner or ex-partner?: No   Housing Stability: Low Risk  (5/25/2024)    Housing Stability     Do you have housing? : Yes     Are you worried about losing your housing?: No         I have reviewed the patients prescribed medications:  No current facility-administered medications for this encounter.    Current Outpatient Medications:     ACCU-CHEK GUIDE test strip, USE 1 STRIP ONCE DAILY, Disp: 100 strip, Rfl: 3    blood glucose (NO BRAND SPECIFIED) lancets standard, Dispense item covered by insurance. Test blood sugar 1 times daily., Disp: 100 each, Rfl: 11    blood glucose monitoring (NO BRAND SPECIFIED) meter device kit, Dispense option covered by insurance. Test blood sugar 1 times daily., Disp: 1 kit, Rfl: 11    blood glucose monitoring (SOFTCLIX) lancets, USE 1  TO CHECK GLUCOSE ONCE DAILY, Disp: 100 each,  "Rfl: 0    Fluocinolone Acetonide Scalp 0.01 % OIL oil, Apply topically at bedtime., Disp: 118 mL, Rfl: 1    HEMP OIL OR EXTRACT OR OTHER CBD CANNABINOID, NOT MEDICAL CANNABIS,, Take 4 drops by mouth At Bedtime , Disp: , Rfl:     metFORMIN (GLUCOPHAGE) 500 MG tablet, Take 1 tablet (500 mg) by mouth 2 times daily (with meals)., Disp: 180 tablet, Rfl: 3    multivitamin, therapeutic (THERA-VIT) TABS tablet, Take 1 tablet by mouth daily, Disp: , Rfl:     UNABLE TO FIND, MEDICATION NAME: Cherry juice, 2 oz once daily, Disp: , Rfl:     Vitamin D, Cholecalciferol, 25 MCG (1000 UT) CAPS, Take 1 capsule by mouth daily , Disp: , Rfl:     Allergies:  No Known Allergies      Vitals:    01/26/25 0232   BP: (!) 144/79   Pulse: (!) 111   Resp: 18   Temp: (!) 102  F (38.9  C)   TempSrc: Tympanic   SpO2: (!) 86%   Weight: 98.4 kg (217 lb)   Height: 1.689 m (5' 6.5\")       Physical Exam  Vitals: BP (!) 144/79   Pulse (!) 111   Temp (!) 102  F (38.9  C) (Tympanic)   Resp 18   Ht 1.689 m (5' 6.5\")   Wt 98.4 kg (217 lb)   SpO2 (!) 86%   BMI 34.50 kg/m    Constitutional: awake, NAD  Eyes: No conjunctival injection and normal lids, PERRL, EOMI  ENT: external nose and ears atraumatic  Neck: Symmetric, trachea midline, Supple  CV: RRR, no murmurs appreciated.  Pulm: Unlabored respiratory effort, good air movement, CTAB, no w/c/r appreciated.  GI: Soft, nontender, nondistended.  No rebound or guarding  MSK: No deformities.  No cyanosis.  Neuro: AOx4, normal speech, following commands, grossly symmetric strength in all extremities.  Cranial nerves III-XII grossly intact.    Skin: warm & dry, no rashes or lesions  Psych: Appropriate mood & affect    Past medical history, past surgical history, problem list, family history, social history, medication list, and allergies were reviewed as documented in epic snapshot.  Relevant review of systems are documented within the HPI above.    Complexity of the Problems Addressed  5 (High) - 1 or " more chronic illnesses with severe exacerbation, progression, or side effects of treatment or 1 acute chronic illness or injury that poses threat to live or bodily function    Complexity of Data  I have reviewed the following pertinent historical labs, diagnoses, tests, and/or imaging which contribute to complexity of this patient's care.    5 - (Extensive) - (2 of three above)    Complication Risk  Based on my interpretation of the current labs, historical tests and/or external tests. Patient does have a risk level as stated below of morbidity, threat to life, and bodily function, and severe exacerbation if not treated.   5 - High (drug therapy requiring intensive monitoring, elective major surgery with risk factors, emergency major surgery, hospitalization or excalation of hospital level care, decision not to resuscitate or to de-escalate care because of poor prognosis, parenteral controlled substances)      ED Events, Labs and Imaging:  ED Course as of 01/26/25 0512   Sun Jan 26, 2025   0322 Lactic Acid: 1.9   0326 Influenza A(!): Positive   0349 CRP Inflammation(!): 154.81   0349 D-Dimer Quantitative(!): 1.44   0401 WBC: 9.5   0401 Hemoglobin: 15.6   0401 Procalcitonin(!): 0.99   0401 N-Terminal Pro BNP Inpatient: 103   0401 ALT(!): 88   0401 AST(!): 106   0401 Alkaline Phosphatase: 71   0401 Creatinine(!): 1.23   0401 Urea Nitrogen(!): 24.3   0401 Sodium(!): 131   0401 Potassium: 4.4   0511 CT Chest Pulmonary Embolism w Contrast  IMPRESSION:  1.  No evidence of pulmonary embolus.  2.  Interval development of extensive diffuse bilateral airspace and interstitial infiltrates.  3.  Interval development of bilateral hilar, subcarinal and right paratracheal adenopathy. Recommend 3 month follow-up examination after appropriate interval therapy.  4.  Diffuse fatty infiltration of the liver.  5.  Cholecystectomy.            Buddy Becker MD  Emergency Medicine    Dictation Disclaimer: Some notes are completed with  voice-recognition dictation software. Errors are generally corrected in real time. Please contact me via Epic staff message if you note any errors requiring clarification.     Lucas Becker MD  01/26/25 0515       Lucas Becker MD  01/26/25 0518       Lucas Becker MD  01/26/25 0523

## 2025-01-26 NOTE — PHARMACY-ADMISSION MEDICATION HISTORY
Pharmacist Admission Medication History    Admission medication history is complete. The information provided in this note is only as accurate as the sources available at the time of the update.    Information Source(s): Patient and CareEverywhere/SureScripts via phone    Pertinent Information: Patient very easily discussed home medication regemin.     Changes made to PTA medication list:  Added:   aspirin  Deleted: None  Changed: None    Allergies reviewed with patient and updates made in EHR: yes    Medication History Completed By: Ranjana Lowry [facility] 1/26/2025 10:15 AM    PTA Med List   Medication Sig Last Dose/Taking    ACCU-CHEK GUIDE test strip USE 1 STRIP ONCE DAILY Taking    aspirin 81 MG EC tablet Take 81 mg by mouth daily. 1/25/2025 Morning    blood glucose (NO BRAND SPECIFIED) lancets standard Dispense item covered by insurance. Test blood sugar 1 times daily. Unknown    blood glucose monitoring (NO BRAND SPECIFIED) meter device kit Dispense option covered by insurance. Test blood sugar 1 times daily. Unknown    blood glucose monitoring (SOFTCLIX) lancets USE 1  TO CHECK GLUCOSE ONCE DAILY Unknown    Fluocinolone Acetonide Scalp 0.01 % OIL oil Apply topically at bedtime. Past Week    HEMP OIL OR EXTRACT OR OTHER CBD CANNABINOID, NOT MEDICAL CANNABIS, Take 4 drops by mouth At Bedtime  Past Week    metFORMIN (GLUCOPHAGE) 500 MG tablet Take 1 tablet (500 mg) by mouth 2 times daily (with meals). 1/25/2025 Evening    multivitamin, therapeutic (THERA-VIT) TABS tablet Take 1 tablet by mouth daily 1/25/2025 Evening    UNABLE TO FIND MEDICATION NAME: Cherry juice, 2 oz once daily 1/25/2025    Vitamin D, Cholecalciferol, 25 MCG (1000 UT) CAPS Take 1 capsule by mouth daily  1/25/2025

## 2025-01-26 NOTE — PLAN OF CARE
SAFETY CHECKLIST  ID Bands and Risk clasps correct and in place (DNR, Fall risk, Allergy, Latex, Limb):  Yes  All Lines Reconciled and labeled correctly: Yes  Whiteboard updated:Yes  Environmental interventions: Yes  Verify Tele #: N/A    Марина Tan RN .......  1/26/2025  7:52 AM

## 2025-01-26 NOTE — PROGRESS NOTES
"[facility]    Hospitalist Progress Note      Assessment & Plan   Jesus Varghese is a 64 year old male who was admitted on 1/26/2025.     Clinically Significant Risk Factors Present on Admission         # Hyponatremia: Lowest Na = 131 mmol/L in last 2 days, will monitor as appropriate  # Hypochloremia: Lowest Cl = 93 mmol/L in last 2 days, will monitor as appropriate        # Drug Induced Platelet Defect: home medication list includes an antiplatelet medication            # DMII: A1C = 6.5 % (Ref range: <5.7 %) within past 6 months    # Obesity: Estimated body mass index is 33.67 kg/m  as calculated from the following:    Height as of this encounter: 1.689 m (5' 6.5\").    Weight as of this encounter: 96.1 kg (211 lb 12.8 oz).              Principal Problem:    Acute respiratory failure with hypoxia (H)  Bacterial community-acquired pneumonia  Influenza A    Assessment: Stable, likely multifactorial etiology of respiratory failure.  Viral pneumonia in addition to superimposed bacterial infection likely.  No obvious obvious evidence of pulmonary edema or COPD exacerbation playing a role.  Benefit from close monitoring given the severity of his imaging findings to ensure stability.    Plan:   -Ceftriaxone/azithromycin day 1  -Tamiflu day 1  -Obtain sputum culture if able, urine strep/Legionella  -As needed nebulizer's  -hold on further IV fluids  -Wean oxygen as able  -Surveillance imaging in 3 months after treatment of acute infection per radiology recommendations    Active Problems:    Controlled type 2 diabetes mellitus with complication, without long-term current use of insulin (H)    Assessment: Well-controlled with hemoglobin A1c of 6.5    Plan:   -Hold home metformin given recent CT      CLL (chronic lymphocytic leukemia) (H)    Assessment: Chronic stable    Plan:   -Monitor      DEEP (acute kidney injury)    Assessment: Improving with gentle fluids and likely prerenal picture    Plan: "   -Monitor daily, avoid NSAIDs and nephrotoxins    Diet: Regular Diet Adult    DVT Prophylaxis: Enoxaparin (Lovenox) SQ  [place] Catheter: Not present  Code Status: Full Code           Disposition Plan      Expected Discharge Date: 01/28/2025             Entered: Daniel Purdy MD 01/26/2025, 10:50 AM       The patient's care was discussed with the Bedside Nurse and Patient.    Daniel Purdy MD  Hospitalist Service  [facility]  Contact information available via [facility] Paging/Directory    ______________________________________________________________________    Interval History   CC: Cough and dyspnea    Overnight no acute events, continuing to have intermittent fevers but overall feels slightly improved, no productive cough, no significant wheezing, no orthopnea, pleurisy, chest pain or palpitations, no increased lower extremity edema, no nausea vomiting diarrhea or other new complaints.    -Data reviewed today: I reviewed all new labs and imaging results over the last 24 hours.    Physical Exam   Temp: 97.2  F (36.2  C) Temp src: Tympanic BP: 101/61 Pulse: 70   Resp: 20 SpO2: 93 % O2 Device: Nasal cannula Oxygen Delivery: 3 LPM  Vitals:    01/26/25 0232 01/26/25 0614   Weight: 98.4 kg (217 lb) 96.1 kg (211 lb 12.8 oz)     Vital Signs with Ranges  Temp:  [97.2  F (36.2  C)-102.1  F (38.9  C)] 97.2  F (36.2  C)  Pulse:  [] 70  Resp:  [18-20] 20  BP: (101-144)/(61-79) 101/61  SpO2:  [86 %-94 %] 93 %  No intake/output data recorded.    GENERAL: Talkative, sitting up in bed, pleasant and appropriate, in no apparent distress.  CARDIOVASCULAR: regular rate and rhythm, no murmurs, rubs, or gallops. Normal S1/S2. No lower extremity edema.   RESPIRATORY: Scant bilateral rhonchi, no significant wheezing, no crackles, diffuse air movement in all fields, no accessory muscle use or evidence respiratory distress.  GI: soft, non-tender, non-distended, normoactive bowel sounds.  MUSCULOSKELETAL: warm and well  perfused, 2+ dorsalis pedis pulses bilaterally.    SKIN: no pallor,jaundice, or rashes.     Medications   Current Facility-Administered Medications   Medication Dose Route Frequency Provider Last Rate Last Admin     Current Facility-Administered Medications   Medication Dose Route Frequency Provider Last Rate Last Admin    [START ON 1/27/2025] azithromycin (ZITHROMAX) 500 mg in  mL intermittent infusion  500 mg Intravenous Q24H Eugenio Luke MD        [START ON 1/27/2025] cefTRIAXone (ROCEPHIN) 2 g vial to attach to  ml bag for ADULTS or NS 50 ml bag for PEDS  2 g Intravenous Q24H Eugenio Luke MD        enoxaparin ANTICOAGULANT (LOVENOX) injection 40 mg  40 mg Subcutaneous Daily Eugenio Luke MD   40 mg at 01/26/25 0932    multivitamin w/minerals (THERA-VIT-M) tablet 1 tablet  1 tablet Oral Daily Eugenio Luke MD   1 tablet at 01/26/25 0932    oseltamivir (TAMIFLU) capsule 75 mg  75 mg Oral BID JoshkDaniel MD        sodium chloride (PF) 0.9% PF flush 3 mL  3 mL Intracatheter Q8H Eguenio Luke MD   3 mL at 01/26/25 0743    Vitamin D3 (CHOLECALCIFEROL) tablet 25 mcg  25 mcg Oral Daily Eugenio Luke MD   25 mcg at 01/26/25 0932       Data   Recent Labs   Lab 01/26/25  0810 01/26/25  0309 01/21/25  1411   WBC  --  9.5 14.3*   HGB  --  15.6 16.4   MCV  --  83 84   PLT  --  100* 144*   NA  --  131* 141   POTASSIUM 4.0 4.4 5.0   CHLORIDE  --  93* 101   CO2  --  23 26   BUN  --  24.3* 17.8   CR  --  1.23* 1.37*   ANIONGAP  --  15 14   LILIAN  --  9.5 9.9   GLC  --  153* 123*   ALBUMIN  --  4.2 4.7   PROTTOTAL  --  7.3 7.0   BILITOTAL  --  0.9 0.5   ALKPHOS  --  71 47   ALT  --  88* 76*   AST  --  106* 49*       Recent Results (from the past 24 hours)   CT Chest Pulmonary Embolism w Contrast    Narrative    EXAM: CT CHEST PULMONARY EMBOLISM W CONTRAST  LOCATION: [facility]  DATE: 1/26/2025    INDICATION: elevated d dimer, hypoxia,  sob  COMPARISON: 8/26/2024  TECHNIQUE: CT chest pulmonary angiogram during arterial phase injection of IV contrast. Multiplanar reformats and MIP reconstructions were performed. Dose reduction techniques were used.   CONTRAST: 88 ml isovue 370    FINDINGS:  ANGIOGRAM CHEST: Pulmonary arteries are normal caliber and negative for pulmonary emboli. Thoracic aorta is negative for dissection. No CT evidence of right heart strain.    LUNGS AND PLEURA: Interval development of extensive diffuse bilateral airspace and interstitial infiltrates. No pleural effusions.    MEDIASTINUM/AXILLAE: Interval development of bilateral hilar, subcarinal and right paratracheal adenopathy. Reactive lymph nodes are seen in the prevascular and pretracheal mediastinum. Focus of adenopathy at the level of the diaphragmatic gertrude to the   right of the esophagus. This appears slightly more prominent than prior exam. No thoracic aneurysm. Heart size within normal limits. No pericardial effusion. Visualized thyroid gland unremarkable. Esophagus unremarkable.    CORONARY ARTERY CALCIFICATION: Moderate.    UPPER ABDOMEN: Diffuse fatty infiltration the liver. Gallbladder is surgically absent.    MUSCULOSKELETAL: Mild to moderate thoracic spondylosis. Mild to moderate anterior osteophytosis. No concerning osseous abnormalities.      Impression    IMPRESSION:  1.  No evidence of pulmonary embolus.  2.  Interval development of extensive diffuse bilateral airspace and interstitial infiltrates.  3.  Interval development of bilateral hilar, subcarinal and right paratracheal adenopathy. Recommend 3 month follow-up examination after appropriate interval therapy.  4.  Diffuse fatty infiltration of the liver.  5.  Cholecystectomy.

## 2025-01-27 ENCOUNTER — ANESTHESIA (OUTPATIENT)
Dept: INTENSIVE CARE | Facility: OTHER | Age: 65
End: 2025-01-27
Payer: MEDICARE

## 2025-01-27 ENCOUNTER — APPOINTMENT (OUTPATIENT)
Dept: GENERAL RADIOLOGY | Facility: OTHER | Age: 65
End: 2025-01-27
Attending: INTERNAL MEDICINE
Payer: MEDICARE

## 2025-01-27 ENCOUNTER — ANESTHESIA EVENT (OUTPATIENT)
Dept: INTENSIVE CARE | Facility: OTHER | Age: 65
End: 2025-01-27
Payer: MEDICARE

## 2025-01-27 ENCOUNTER — APPOINTMENT (OUTPATIENT)
Dept: CARDIOLOGY | Facility: OTHER | Age: 65
End: 2025-01-27
Attending: INTERNAL MEDICINE
Payer: MEDICARE

## 2025-01-27 LAB
ALBUMIN SERPL BCG-MCNC: 3.3 G/DL (ref 3.5–5.2)
ALBUMIN SERPL BCG-MCNC: 3.4 G/DL (ref 3.5–5.2)
ALLEN'S TEST: NO
ALLEN'S TEST: YES
ALP SERPL-CCNC: 85 U/L (ref 40–150)
ALP SERPL-CCNC: 92 U/L (ref 40–150)
ALT SERPL W P-5'-P-CCNC: 66 U/L (ref 0–70)
ALT SERPL W P-5'-P-CCNC: 76 U/L (ref 0–70)
ANION GAP SERPL CALCULATED.3IONS-SCNC: 12 MMOL/L (ref 7–15)
ANION GAP SERPL CALCULATED.3IONS-SCNC: 13 MMOL/L (ref 7–15)
AST SERPL W P-5'-P-CCNC: 108 U/L (ref 0–45)
AST SERPL W P-5'-P-CCNC: 98 U/L (ref 0–45)
BASE EXCESS BLDA CALC-SCNC: -1.4 MMOL/L (ref -3–3)
BASE EXCESS BLDA CALC-SCNC: -2 MMOL/L (ref -3–3)
BASE EXCESS BLDA CALC-SCNC: -2 MMOL/L (ref -3–3)
BASE EXCESS BLDA CALC-SCNC: -3.2 MMOL/L (ref -3–3)
BASE EXCESS BLDA CALC-SCNC: -3.8 MMOL/L (ref -3–3)
BASE EXCESS BLDA CALC-SCNC: -4.5 MMOL/L (ref -3–3)
BASE EXCESS BLDA CALC-SCNC: -5.3 MMOL/L (ref -3–3)
BILIRUB SERPL-MCNC: 0.5 MG/DL
BILIRUB SERPL-MCNC: 0.5 MG/DL
BUN SERPL-MCNC: 17.1 MG/DL (ref 8–23)
BUN SERPL-MCNC: 20.4 MG/DL (ref 8–23)
CALCIUM SERPL-MCNC: 8.2 MG/DL (ref 8.8–10.4)
CALCIUM SERPL-MCNC: 9.2 MG/DL (ref 8.8–10.4)
CHLORIDE SERPL-SCNC: 96 MMOL/L (ref 98–107)
CHLORIDE SERPL-SCNC: 97 MMOL/L (ref 98–107)
CREAT SERPL-MCNC: 0.84 MG/DL (ref 0.67–1.17)
CREAT SERPL-MCNC: 1.05 MG/DL (ref 0.67–1.17)
EGFRCR SERPLBLD CKD-EPI 2021: 78 ML/MIN/1.73M2
EGFRCR SERPLBLD CKD-EPI 2021: >90 ML/MIN/1.73M2
ERYTHROCYTE [DISTWIDTH] IN BLOOD BY AUTOMATED COUNT: 13.2 % (ref 10–15)
ERYTHROCYTE [DISTWIDTH] IN BLOOD BY AUTOMATED COUNT: 13.3 % (ref 10–15)
GLUCOSE SERPL-MCNC: 127 MG/DL (ref 70–99)
GLUCOSE SERPL-MCNC: 189 MG/DL (ref 70–99)
HCO3 BLD-SCNC: 21 MMOL/L (ref 21–28)
HCO3 BLD-SCNC: 24 MMOL/L (ref 21–28)
HCO3 BLD-SCNC: 24 MMOL/L (ref 21–28)
HCO3 BLD-SCNC: 26 MMOL/L (ref 21–28)
HCO3 BLD-SCNC: 27 MMOL/L (ref 21–28)
HCO3 BLD-SCNC: 28 MMOL/L (ref 21–28)
HCO3 BLD-SCNC: 28 MMOL/L (ref 21–28)
HCO3 SERPL-SCNC: 23 MMOL/L (ref 22–29)
HCO3 SERPL-SCNC: 23 MMOL/L (ref 22–29)
HCT VFR BLD AUTO: 43.3 % (ref 40–53)
HCT VFR BLD AUTO: 43.9 % (ref 40–53)
HGB BLD-MCNC: 14.6 G/DL (ref 13.3–17.7)
HGB BLD-MCNC: 14.9 G/DL (ref 13.3–17.7)
L PNEUMO1 AG UR QL IA: NEGATIVE
LACTATE SERPL-SCNC: 0.8 MMOL/L (ref 0.7–2)
LVEF ECHO: NORMAL
MCH RBC QN AUTO: 28.6 PG (ref 26.5–33)
MCH RBC QN AUTO: 28.8 PG (ref 26.5–33)
MCHC RBC AUTO-ENTMCNC: 33.7 G/DL (ref 31.5–36.5)
MCHC RBC AUTO-ENTMCNC: 33.9 G/DL (ref 31.5–36.5)
MCV RBC AUTO: 85 FL (ref 78–100)
MCV RBC AUTO: 85 FL (ref 78–100)
MRSA DNA SPEC QL NAA+PROBE: POSITIVE
O2/TOTAL GAS SETTING VFR VENT: 100 %
O2/TOTAL GAS SETTING VFR VENT: 21 %
O2/TOTAL GAS SETTING VFR VENT: 68 %
OXYHGB MFR BLDA: 80 % (ref 92–100)
OXYHGB MFR BLDA: 85 % (ref 92–100)
OXYHGB MFR BLDA: 86 % (ref 92–100)
OXYHGB MFR BLDA: 86 % (ref 92–100)
OXYHGB MFR BLDA: 89 % (ref 92–100)
OXYHGB MFR BLDA: 91 % (ref 92–100)
OXYHGB MFR BLDA: 91 % (ref 92–100)
PCO2 BLD: 32 MM HG (ref 35–45)
PCO2 BLD: 42 MM HG (ref 35–45)
PCO2 BLD: 44 MM HG (ref 35–45)
PCO2 BLD: 78 MM HG (ref 35–45)
PCO2 BLD: 80 MM HG (ref 35–45)
PCO2 BLD: 83 MM HG (ref 35–45)
PCO2 BLD: 86 MM HG (ref 35–45)
PEEP: 15 CM H2O
PEEP: 15 CM H2O
PH BLD: 7.12 [PH] (ref 7.35–7.45)
PH BLD: 7.12 [PH] (ref 7.35–7.45)
PH BLD: 7.13 [PH] (ref 7.35–7.45)
PH BLD: 7.15 [PH] (ref 7.35–7.45)
PH BLD: 7.35 [PH] (ref 7.35–7.45)
PH BLD: 7.37 [PH] (ref 7.35–7.45)
PH BLD: 7.43 [PH] (ref 7.35–7.45)
PLATELET # BLD AUTO: 93 10E3/UL (ref 150–450)
PLATELET # BLD AUTO: 94 10E3/UL (ref 150–450)
PO2 BLD: 49 MM HG (ref 80–105)
PO2 BLD: 49 MM HG (ref 80–105)
PO2 BLD: 58 MM HG (ref 80–105)
PO2 BLD: 61 MM HG (ref 80–105)
PO2 BLD: 63 MM HG (ref 80–105)
PO2 BLD: 64 MM HG (ref 80–105)
PO2 BLD: 66 MM HG (ref 80–105)
POTASSIUM SERPL-SCNC: 4.4 MMOL/L (ref 3.4–5.3)
POTASSIUM SERPL-SCNC: 4.7 MMOL/L (ref 3.4–5.3)
PROCALCITONIN SERPL IA-MCNC: 0.99 NG/ML
PROT SERPL-MCNC: 6.2 G/DL (ref 6.4–8.3)
PROT SERPL-MCNC: 6.6 G/DL (ref 6.4–8.3)
RBC # BLD AUTO: 5.1 10E6/UL (ref 4.4–5.9)
RBC # BLD AUTO: 5.17 10E6/UL (ref 4.4–5.9)
S PNEUM AG SPEC QL: NEGATIVE
SA TARGET DNA: POSITIVE
SAO2 % BLDA: 81.2 % (ref 95–96)
SAO2 % BLDA: 85.8 % (ref 95–96)
SAO2 % BLDA: 86.8 % (ref 95–96)
SAO2 % BLDA: 87.8 % (ref 95–96)
SAO2 % BLDA: 89.8 % (ref 95–96)
SAO2 % BLDA: 92.3 % (ref 95–96)
SAO2 % BLDA: 92.6 % (ref 95–96)
SODIUM SERPL-SCNC: 131 MMOL/L (ref 135–145)
SODIUM SERPL-SCNC: 133 MMOL/L (ref 135–145)
SPECIMEN TYPE: NORMAL
WBC # BLD AUTO: 7.5 10E3/UL (ref 4–11)
WBC # BLD AUTO: 8.2 10E3/UL (ref 4–11)

## 2025-01-27 PROCEDURE — 93306 TTE W/DOPPLER COMPLETE: CPT

## 2025-01-27 PROCEDURE — 250N000013 HC RX MED GY IP 250 OP 250 PS 637: Performed by: HOSPITALIST

## 2025-01-27 PROCEDURE — 94660 CPAP INITIATION&MGMT: CPT

## 2025-01-27 PROCEDURE — 250N000009 HC RX 250

## 2025-01-27 PROCEDURE — 258N000003 HC RX IP 258 OP 636: Performed by: INTERNAL MEDICINE

## 2025-01-27 PROCEDURE — 87641 MR-STAPH DNA AMP PROBE: CPT | Performed by: INTERNAL MEDICINE

## 2025-01-27 PROCEDURE — 85027 COMPLETE CBC AUTOMATED: CPT | Performed by: INTERNAL MEDICINE

## 2025-01-27 PROCEDURE — 71045 X-RAY EXAM CHEST 1 VIEW: CPT | Mod: 77

## 2025-01-27 PROCEDURE — 5A09357 ASSISTANCE WITH RESPIRATORY VENTILATION, LESS THAN 24 CONSECUTIVE HOURS, CONTINUOUS POSITIVE AIRWAY PRESSURE: ICD-10-PCS | Performed by: INTERNAL MEDICINE

## 2025-01-27 PROCEDURE — 94799 UNLISTED PULMONARY SVC/PX: CPT

## 2025-01-27 PROCEDURE — 999N000065 XR ABDOMEN PORT 1 VIEW

## 2025-01-27 PROCEDURE — 85041 AUTOMATED RBC COUNT: CPT | Performed by: INTERNAL MEDICINE

## 2025-01-27 PROCEDURE — 36556 INSERT NON-TUNNEL CV CATH: CPT | Performed by: INTERNAL MEDICINE

## 2025-01-27 PROCEDURE — 93306 TTE W/DOPPLER COMPLETE: CPT | Mod: 26 | Performed by: INTERNAL MEDICINE

## 2025-01-27 PROCEDURE — 82374 ASSAY BLOOD CARBON DIOXIDE: CPT | Performed by: INTERNAL MEDICINE

## 2025-01-27 PROCEDURE — 5A1935Z RESPIRATORY VENTILATION, LESS THAN 24 CONSECUTIVE HOURS: ICD-10-PCS | Performed by: INTERNAL MEDICINE

## 2025-01-27 PROCEDURE — 83605 ASSAY OF LACTIC ACID: CPT | Performed by: INTERNAL MEDICINE

## 2025-01-27 PROCEDURE — 02H633Z INSERTION OF INFUSION DEVICE INTO RIGHT ATRIUM, PERCUTANEOUS APPROACH: ICD-10-PCS | Performed by: INTERNAL MEDICINE

## 2025-01-27 PROCEDURE — 3E043NZ INTRODUCTION OF ANALGESICS, HYPNOTICS, SEDATIVES INTO CENTRAL VEIN, PERCUTANEOUS APPROACH: ICD-10-PCS | Performed by: INTERNAL MEDICINE

## 2025-01-27 PROCEDURE — 85018 HEMOGLOBIN: CPT | Performed by: INTERNAL MEDICINE

## 2025-01-27 PROCEDURE — 71045 X-RAY EXAM CHEST 1 VIEW: CPT

## 2025-01-27 PROCEDURE — 258N000003 HC RX IP 258 OP 636: Performed by: HOSPITALIST

## 2025-01-27 PROCEDURE — 999N000065 XR CHEST PORT 1 VIEW

## 2025-01-27 PROCEDURE — 250N000011 HC RX IP 250 OP 636: Performed by: HOSPITALIST

## 2025-01-27 PROCEDURE — 94640 AIRWAY INHALATION TREATMENT: CPT

## 2025-01-27 PROCEDURE — 4A133R1 MONITORING OF ARTERIAL SATURATION, PERIPHERAL, PERCUTANEOUS APPROACH: ICD-10-PCS | Performed by: INTERNAL MEDICINE

## 2025-01-27 PROCEDURE — 94002 VENT MGMT INPAT INIT DAY: CPT

## 2025-01-27 PROCEDURE — 85048 AUTOMATED LEUKOCYTE COUNT: CPT | Performed by: INTERNAL MEDICINE

## 2025-01-27 PROCEDURE — 82805 BLOOD GASES W/O2 SATURATION: CPT | Performed by: INTERNAL MEDICINE

## 2025-01-27 PROCEDURE — 250N000009 HC RX 250: Performed by: HOSPITALIST

## 2025-01-27 PROCEDURE — 87077 CULTURE AEROBIC IDENTIFY: CPT | Performed by: INTERNAL MEDICINE

## 2025-01-27 PROCEDURE — 36600 WITHDRAWAL OF ARTERIAL BLOOD: CPT | Performed by: INTERNAL MEDICINE

## 2025-01-27 PROCEDURE — 84145 PROCALCITONIN (PCT): CPT | Performed by: INTERNAL MEDICINE

## 2025-01-27 PROCEDURE — 200N000001 HC R&B ICU

## 2025-01-27 PROCEDURE — 999N000065 XR ABDOMEN 1 VIEW

## 2025-01-27 PROCEDURE — 250N000009 HC RX 250: Performed by: INTERNAL MEDICINE

## 2025-01-27 PROCEDURE — 99291 CRITICAL CARE FIRST HOUR: CPT | Performed by: INTERNAL MEDICINE

## 2025-01-27 PROCEDURE — 80053 COMPREHEN METABOLIC PANEL: CPT | Performed by: INTERNAL MEDICINE

## 2025-01-27 PROCEDURE — 0BH17EZ INSERTION OF ENDOTRACHEAL AIRWAY INTO TRACHEA, VIA NATURAL OR ARTIFICIAL OPENING: ICD-10-PCS | Performed by: INTERNAL MEDICINE

## 2025-01-27 PROCEDURE — 36600 WITHDRAWAL OF ARTERIAL BLOOD: CPT | Performed by: STUDENT IN AN ORGANIZED HEALTH CARE EDUCATION/TRAINING PROGRAM

## 2025-01-27 PROCEDURE — 94640 AIRWAY INHALATION TREATMENT: CPT | Mod: 76

## 2025-01-27 PROCEDURE — 250N000011 HC RX IP 250 OP 636: Performed by: STUDENT IN AN ORGANIZED HEALTH CARE EDUCATION/TRAINING PROGRAM

## 2025-01-27 PROCEDURE — 999N000157 HC STATISTIC RCP TIME EA 10 MIN

## 2025-01-27 PROCEDURE — 250N000013 HC RX MED GY IP 250 OP 250 PS 637: Performed by: INTERNAL MEDICINE

## 2025-01-27 PROCEDURE — 82805 BLOOD GASES W/O2 SATURATION: CPT | Performed by: STUDENT IN AN ORGANIZED HEALTH CARE EDUCATION/TRAINING PROGRAM

## 2025-01-27 PROCEDURE — 250N000011 HC RX IP 250 OP 636

## 2025-01-27 PROCEDURE — 36415 COLL VENOUS BLD VENIPUNCTURE: CPT | Performed by: INTERNAL MEDICINE

## 2025-01-27 PROCEDURE — 84155 ASSAY OF PROTEIN SERUM: CPT | Performed by: INTERNAL MEDICINE

## 2025-01-27 PROCEDURE — 250N000011 HC RX IP 250 OP 636: Performed by: INTERNAL MEDICINE

## 2025-01-27 PROCEDURE — 82947 ASSAY GLUCOSE BLOOD QUANT: CPT | Performed by: INTERNAL MEDICINE

## 2025-01-27 PROCEDURE — 5A09C5K ASSISTANCE WITH RESPIRATORY VENTILATION, 8-24 CONSECUTIVE HOURS, INTUBATED PRONE POSITIONING: ICD-10-PCS | Performed by: INTERNAL MEDICINE

## 2025-01-27 PROCEDURE — 250N000009 HC RX 250: Performed by: STUDENT IN AN ORGANIZED HEALTH CARE EDUCATION/TRAINING PROGRAM

## 2025-01-27 RX ORDER — LORAZEPAM 0.5 MG/1
0.5 TABLET ORAL ONCE
Status: COMPLETED | OUTPATIENT
Start: 2025-01-27 | End: 2025-01-27

## 2025-01-27 RX ORDER — VECURONIUM BROMIDE 1 MG/ML
10 INJECTION, POWDER, LYOPHILIZED, FOR SOLUTION INTRAVENOUS ONCE
Status: COMPLETED | OUTPATIENT
Start: 2025-01-27 | End: 2025-01-27

## 2025-01-27 RX ORDER — GLIPIZIDE 10 MG/1
1 TABLET ORAL
Status: DISCONTINUED | OUTPATIENT
Start: 2025-01-27 | End: 2025-01-28 | Stop reason: HOSPADM

## 2025-01-27 RX ORDER — VANCOMYCIN HYDROCHLORIDE
1250
Status: DISCONTINUED | OUTPATIENT
Start: 2025-01-27 | End: 2025-01-27

## 2025-01-27 RX ORDER — HYDROMORPHONE HYDROCHLORIDE 1 MG/ML
.3-.5 INJECTION, SOLUTION INTRAMUSCULAR; INTRAVENOUS; SUBCUTANEOUS
Status: DISCONTINUED | OUTPATIENT
Start: 2025-01-27 | End: 2025-01-28 | Stop reason: HOSPADM

## 2025-01-27 RX ORDER — PROPOFOL 10 MG/ML
5-75 INJECTION, EMULSION INTRAVENOUS CONTINUOUS
Status: DISCONTINUED | OUTPATIENT
Start: 2025-01-27 | End: 2025-01-28 | Stop reason: HOSPADM

## 2025-01-27 RX ORDER — LIDOCAINE HYDROCHLORIDE 10 MG/ML
INJECTION, SOLUTION INFILTRATION; PERINEURAL
Status: DISCONTINUED | OUTPATIENT
Start: 2025-01-27 | End: 2025-01-27

## 2025-01-27 RX ORDER — ALBUTEROL SULFATE 90 UG/1
6 INHALANT RESPIRATORY (INHALATION) EVERY 4 HOURS PRN
Status: DISCONTINUED | OUTPATIENT
Start: 2025-01-27 | End: 2025-01-27

## 2025-01-27 RX ORDER — PIPERACILLIN SODIUM, TAZOBACTAM SODIUM 4; .5 G/20ML; G/20ML
4.5 INJECTION, POWDER, LYOPHILIZED, FOR SOLUTION INTRAVENOUS 4 TIMES DAILY
Status: DISCONTINUED | OUTPATIENT
Start: 2025-01-27 | End: 2025-01-28

## 2025-01-27 RX ORDER — VANCOMYCIN HYDROCHLORIDE
1250
Status: DISCONTINUED | OUTPATIENT
Start: 2025-01-28 | End: 2025-01-28 | Stop reason: HOSPADM

## 2025-01-27 RX ORDER — ALBUTEROL SULFATE 90 UG/1
6 INHALANT RESPIRATORY (INHALATION)
Status: DISCONTINUED | OUTPATIENT
Start: 2025-01-27 | End: 2025-01-27

## 2025-01-27 RX ORDER — NALOXONE HYDROCHLORIDE 0.4 MG/ML
0.4 INJECTION, SOLUTION INTRAMUSCULAR; INTRAVENOUS; SUBCUTANEOUS
Status: DISCONTINUED | OUTPATIENT
Start: 2025-01-27 | End: 2025-01-28 | Stop reason: HOSPADM

## 2025-01-27 RX ORDER — GLYCOPYRROLATE 0.2 MG/ML
INJECTION, SOLUTION INTRAMUSCULAR; INTRAVENOUS PRN
Status: DISCONTINUED | OUTPATIENT
Start: 2025-01-27 | End: 2025-01-27

## 2025-01-27 RX ORDER — IPRATROPIUM BROMIDE AND ALBUTEROL SULFATE 2.5; .5 MG/3ML; MG/3ML
3 SOLUTION RESPIRATORY (INHALATION)
Status: DISCONTINUED | OUTPATIENT
Start: 2025-01-27 | End: 2025-01-27

## 2025-01-27 RX ORDER — NALOXONE HYDROCHLORIDE 0.4 MG/ML
0.2 INJECTION, SOLUTION INTRAMUSCULAR; INTRAVENOUS; SUBCUTANEOUS
Status: DISCONTINUED | OUTPATIENT
Start: 2025-01-27 | End: 2025-01-28 | Stop reason: HOSPADM

## 2025-01-27 RX ORDER — LORAZEPAM 2 MG/ML
0.5 INJECTION INTRAMUSCULAR EVERY 4 HOURS PRN
Status: DISCONTINUED | OUTPATIENT
Start: 2025-01-27 | End: 2025-01-28 | Stop reason: HOSPADM

## 2025-01-27 RX ORDER — CHLORHEXIDINE GLUCONATE ORAL RINSE 1.2 MG/ML
15 SOLUTION DENTAL EVERY 12 HOURS
Status: DISCONTINUED | OUTPATIENT
Start: 2025-01-27 | End: 2025-01-28 | Stop reason: HOSPADM

## 2025-01-27 RX ORDER — VANCOMYCIN 2 GRAM/500 ML IN 0.9 % SODIUM CHLORIDE INTRAVENOUS
2000 ONCE
Status: COMPLETED | OUTPATIENT
Start: 2025-01-27 | End: 2025-01-27

## 2025-01-27 RX ORDER — KETAMINE HYDROCHLORIDE 10 MG/ML
INJECTION INTRAMUSCULAR; INTRAVENOUS PRN
Status: DISCONTINUED | OUTPATIENT
Start: 2025-01-27 | End: 2025-01-27

## 2025-01-27 RX ORDER — PROPOFOL 10 MG/ML
INJECTION, EMULSION INTRAVENOUS PRN
Status: DISCONTINUED | OUTPATIENT
Start: 2025-01-27 | End: 2025-01-27

## 2025-01-27 RX ORDER — VANCOMYCIN 2 GRAM/500 ML IN 0.9 % SODIUM CHLORIDE INTRAVENOUS
2000 ONCE
Status: DISCONTINUED | OUTPATIENT
Start: 2025-01-27 | End: 2025-01-27

## 2025-01-27 RX ADMIN — Medication 20 MG: at 15:13

## 2025-01-27 RX ADMIN — PROPOFOL 80 MG: 10 INJECTION, EMULSION INTRAVENOUS at 14:42

## 2025-01-27 RX ADMIN — PROPOFOL 60 MCG/KG/MIN: 10 INJECTION, EMULSION INTRAVENOUS at 23:36

## 2025-01-27 RX ADMIN — VECURONIUM BROMIDE 10 MG: 1 INJECTION, POWDER, LYOPHILIZED, FOR SOLUTION INTRAVENOUS at 19:11

## 2025-01-27 RX ADMIN — DEXTROSE MONOHYDRATE 3 MCG/KG/MIN: 50 INJECTION, SOLUTION INTRAVENOUS at 20:03

## 2025-01-27 RX ADMIN — IPRATROPIUM BROMIDE AND ALBUTEROL SULFATE 3 ML: .5; 3 SOLUTION RESPIRATORY (INHALATION) at 00:27

## 2025-01-27 RX ADMIN — PROPOFOL 60 MCG/KG/MIN: 10 INJECTION, EMULSION INTRAVENOUS at 20:41

## 2025-01-27 RX ADMIN — GUAIFENESIN AND DEXTROMETHORPHAN HYDROBROMIDE 10 ML: 10; 100 SYRUP ORAL at 07:47

## 2025-01-27 RX ADMIN — IBUPROFEN 200 MG: 200 TABLET, FILM COATED ORAL at 07:47

## 2025-01-27 RX ADMIN — LIDOCAINE HYDROCHLORIDE 3 ML: 10 INJECTION, SOLUTION INFILTRATION; PERINEURAL at 15:45

## 2025-01-27 RX ADMIN — Medication 2000 MG: at 09:37

## 2025-01-27 RX ADMIN — Medication 10 MG: at 16:38

## 2025-01-27 RX ADMIN — SODIUM CHLORIDE 250 ML: 0.9 INJECTION, SOLUTION INTRAVENOUS at 15:26

## 2025-01-27 RX ADMIN — AZITHROMYCIN MONOHYDRATE 500 MG: 500 INJECTION, POWDER, LYOPHILIZED, FOR SOLUTION INTRAVENOUS at 05:38

## 2025-01-27 RX ADMIN — PIPERACILLIN SODIUM AND TAZOBACTAM SODIUM 4.5 G: 4; .5 INJECTION, POWDER, LYOPHILIZED, FOR SOLUTION INTRAVENOUS at 20:30

## 2025-01-27 RX ADMIN — ACETAMINOPHEN 650 MG: 325 TABLET, FILM COATED ORAL at 01:32

## 2025-01-27 RX ADMIN — OSELTAMAVIR PHOSPHATE 75 MG: 75 CAPSULE ORAL at 09:37

## 2025-01-27 RX ADMIN — IPRATROPIUM BROMIDE AND ALBUTEROL SULFATE 3 ML: .5; 3 SOLUTION RESPIRATORY (INHALATION) at 12:52

## 2025-01-27 RX ADMIN — CEFTRIAXONE 2 G: 2 INJECTION, POWDER, FOR SOLUTION INTRAMUSCULAR; INTRAVENOUS at 04:50

## 2025-01-27 RX ADMIN — ROCURONIUM BROMIDE 50 MG: 50 INJECTION, SOLUTION INTRAVENOUS at 14:42

## 2025-01-27 RX ADMIN — PROPOFOL 55 MCG/KG/MIN: 10 INJECTION, EMULSION INTRAVENOUS at 17:50

## 2025-01-27 RX ADMIN — GLYCOPYRROLATE 0.2 MG: 0.2 INJECTION, SOLUTION INTRAMUSCULAR; INTRAVENOUS at 14:57

## 2025-01-27 RX ADMIN — Medication 100 MCG/HR: at 18:43

## 2025-01-27 RX ADMIN — MIDAZOLAM 2 MG: 1 INJECTION INTRAMUSCULAR; INTRAVENOUS at 18:22

## 2025-01-27 RX ADMIN — IPRATROPIUM BROMIDE AND ALBUTEROL SULFATE 3 ML: .5; 3 SOLUTION RESPIRATORY (INHALATION) at 08:50

## 2025-01-27 RX ADMIN — ENOXAPARIN SODIUM 40 MG: 40 INJECTION SUBCUTANEOUS at 09:37

## 2025-01-27 RX ADMIN — Medication 20 MG: at 15:28

## 2025-01-27 RX ADMIN — LORAZEPAM 0.5 MG: 2 INJECTION INTRAMUSCULAR; INTRAVENOUS at 09:10

## 2025-01-27 RX ADMIN — Medication 25 MCG: at 09:37

## 2025-01-27 RX ADMIN — PROPOFOL 10 MCG/KG/MIN: 10 INJECTION, EMULSION INTRAVENOUS at 14:58

## 2025-01-27 RX ADMIN — CHLORHEXIDINE GLUCONATE ORAL RINSE 15 ML: 1.2 SOLUTION DENTAL at 20:38

## 2025-01-27 RX ADMIN — LORAZEPAM 0.5 MG: 0.5 TABLET ORAL at 04:19

## 2025-01-27 RX ADMIN — VECURONIUM BROMIDE 10 MG: 10 INJECTION, POWDER, LYOPHILIZED, FOR SOLUTION INTRAVENOUS at 15:43

## 2025-01-27 RX ADMIN — PIPERACILLIN SODIUM AND TAZOBACTAM SODIUM 4.5 G: 4; .5 INJECTION, POWDER, LYOPHILIZED, FOR SOLUTION INTRAVENOUS at 15:57

## 2025-01-27 RX ADMIN — Medication 50 MG: at 14:42

## 2025-01-27 RX ADMIN — PIPERACILLIN SODIUM AND TAZOBACTAM SODIUM 4.5 G: 4; .5 INJECTION, POWDER, LYOPHILIZED, FOR SOLUTION INTRAVENOUS at 07:46

## 2025-01-27 RX ADMIN — MIDAZOLAM 4 MG: 1 INJECTION INTRAMUSCULAR; INTRAVENOUS at 15:35

## 2025-01-27 ASSESSMENT — ACTIVITIES OF DAILY LIVING (ADL)
ADLS_ACUITY_SCORE: 33
ADLS_ACUITY_SCORE: 31
ADLS_ACUITY_SCORE: 33
ADLS_ACUITY_SCORE: 31
ADLS_ACUITY_SCORE: 33
ADLS_ACUITY_SCORE: 33
ADLS_ACUITY_SCORE: 31
ADLS_ACUITY_SCORE: 33
ADLS_ACUITY_SCORE: 31
ADLS_ACUITY_SCORE: 31
ADLS_ACUITY_SCORE: 33

## 2025-01-27 NOTE — PROGRESS NOTES
"Pt states \"He has not slept well.\" Orders obtained from Dr Purdy. Medicated with Ativan, changed to CPAP. Pt refuses anything to eat at this time.   "

## 2025-01-27 NOTE — PROGRESS NOTES
Interdisciplinary Discharge Planning Note    Anticipated Discharge Date: TBD    Anticipated Discharge Location: TBD    Clinical Needs Before Discharge:  adequate comfort achieved, adequate fluid and/or nutritional intake, stable oxygen requirement, and stable vital signs    Treatment Needs After Discharge:  None identified    Potential Barriers to Discharge: None identified at this time.    MADINA Mead  1/27/2025,  12:12 PM

## 2025-01-27 NOTE — PLAN OF CARE
A&Ox4. VSS. Febrile managing with PRN Tylenol, ibuprofen and ice packs. Offered shower - pt declined at this time. LS faint rhonchi. SOB/BREEN. On 6 liters HFNC with humidification with Sp02 90-93%. D-sats when ambulating - increase oxygen. Diaphoretic. Generalized weakness. Ambulating with stand by assist.     Марина Tan RN .......  1/26/2025  6:17 PM      Goal Outcome Evaluation:      Plan of Care Reviewed With: patient    Overall Patient Progress: no change    Outcome Evaluation: VSS, febrile, pain 6/10 pt states as chronic - declines intervention, on 6 liters HFNC with humidification

## 2025-01-27 NOTE — PROCEDURES
CENTRALVENOUS LINE INSERTION PROCEDURE NOTE    Indication: respiratory failure    Location: right IJ    Anesthesia: 1% lidocaine    The procedure, benefits, risks, and alternatives were explained to the patients wife. She voiced understanding of the information and agreed to proceed with the procedure.    The skin overlying the right IJ was prepped and draped in normal sterile manner. A vascular ultrasound device was used to locate the vein Internal Jugular. A finder needle was used to enter the vein, through which a guidewire was inserted. The finder needle was then removed and the tract was dilated. The triple lumen central venous catheter was inserted over the guidewire without difficulty. The guidewire was removed and the catheter was sutured to the overlying skin. The patient tolerated the procedure well and there were no immediate complications. A post-procedure chest radiograph showed no pneumothroax.    Daniel Purdy MD

## 2025-01-27 NOTE — PROGRESS NOTES
Contacted tele doc Flavia Mansfield regarding patients condition. Pt not improving on vapotherm, stats 88-89%. Pt was having difficulty speaking and catching his breath. MD ordered Bipap and to transfer to ICU. Report given to Miranda SANCHEZ.     Octavia Galdamez RN on 1/27/2025 at 7:03 AM

## 2025-01-27 NOTE — PROGRESS NOTES
Switched pt to Vapotherm to break from CPAP. Pt spo2 86% with Vapotherm 40L, 100% and oxymask 15 lpm. Pt continues to have RR in the 40s.   Pt placed back on CPAP +6, 100%. Unable to main spo2 above 88%. CPAP increased to +8.    Mariela Perez, RT

## 2025-01-27 NOTE — ANESTHESIA PROCEDURE NOTES
"Airway         Procedure Start/Stop Times: 1/27/2025 2:44 PM  Staff -        CRNA: Domingo Howard APRN CRNA       Performed By: CRNA  Consent for Airway        Urgency: emergent (Discussed plan of care with family and patient, wishes for further respiratory support.)       Consent: The procedure was performed in an emergent situation.  Indications and Patient Condition       Indications for airway management: respiratory insufficiency       Induction type:intravenous       Mask difficulty assessment: 1 - vent by mask    Final Airway Details       Final airway type: endotracheal airway       Successful airway: ETT - single  Endotracheal Airway Details        ETT size (mm): 8.0       Cuffed: yes       Cuff volume (mL): 8       Successful intubation technique: video laryngoscopy       VL Blade Size: Glidescope 4       Grade View of Cords: 1       Adjucts: stylet       Position: Center       Measured from: gums/teeth       Secured at (cm): 23       Bite block used: None    Post intubation assessment        Placement verified by: capnometry, equal breath sounds and chest rise        Number of attempts at approach: 1       Number of other approaches attempted: 0       Secured with: commercial tube hightower       Ease of procedure: easy       Dentition: Intact and Unchanged    Medication(s) Administered   Medication Administration Time: 1/27/2025 2:44 PM    Additional Comments       [facility] Procedure Note          Intubation:     Date/Time: 3:02 PM  Performed by: CUATE Harris CRNA    Consent: Verbal consent obtained and the patient and caregiver states understanding of the procedure being performed/The procedure was performed in an urgent/emergent situation.  Risks and benefits: risks, benefits and alternatives were discussed  Patient identity confirmed: verbally with patient and arm band  Time out: Immediately prior to procedure a \"time out\" was called to verify the correct patient, procedure, equipment, " support staff and site/side marked as required.    Indication:  Respiratory failure/insufficiency        Complications:  None    Patient tolerance: Patient was bipap dependent prior to intubation, desaturation following intubation was managed with recruitment maneuvers. Patient being monitored by MD and tele ICU, vital signs stable, still slight hypoxia, spo2 80-88%.

## 2025-01-27 NOTE — PHARMACY-VANCOMYCIN DOSING SERVICE
"Pharmacy Vancomycin Initial Note  Date of Service 2025  Patient's  1960  64 year old, male    Indication: Community Acquired Pneumonia    Current estimated CrCl = Estimated Creatinine Clearance: 75.8 mL/min (based on SCr of 1.05 mg/dL).    Creatinine for last 3 days  2025:  3:09 AM Creatinine 1.23 mg/dL  2025:  5:44 AM Creatinine 1.05 mg/dL    Recent Vancomycin Level(s) for last 3 days  No results found for requested labs within last 3 days.      Vancomycin IV Administrations (past 72 hours)        No vancomycin orders with administrations in past 72 hours.                    Nephrotoxins and other renal medications (From now, onward)      Start     Dose/Rate Route Frequency Ordered Stop    25 0200  vancomycin (VANCOCIN) 1,250 mg in 0.9% NaCl 250 mL intermittent infusion         1,250 mg  166.7 mL/hr over 90 Minutes Intravenous EVERY 18 HOURS 25 0743      25 0800  piperacillin-tazobactam (ZOSYN) 4.5 g vial to attach to  mL bag        Note to Pharmacy: For [facility], [facility] and [facility]: For Zosyn-naive patients, use the \"Zosyn initial dose + extended infusion\" order panel.    4.5 g  over 30 Minutes Intravenous 4 TIMES DAILY 25 0723      25 0800  Vancomycin (VANCOCIN) 2,000 mg in 0.9% NaCl 500 mL intermittent infusion         2,000 mg  250 mL/hr over 2 Hours Intravenous ONCE 25 0741      25 1641  ibuprofen (ADVIL/MOTRIN) tablet 200 mg         200 mg Oral EVERY 6 HOURS PRN 25 1641              Contrast Orders - past 72 hours (72h ago, onward)      Start     Dose/Rate Route Frequency Stop    25 0410  iopamidol (ISOVUE-370) solution 88 mL         88 mL Intravenous ONCE 25 0428            [email or web address] Prediction of Planned Initial Vancomycin Regimen  Loading dose: 2000 mg at 08:00 2025.  Regimen: 1250 mg IV every 18 hours.  Start time: 02:00 on 2025  Exposure target: AUC24 (range)400-600 mg/L.hr   AUC24,ss: 441 mg/L.hr  Probability " of AUC24 > 400: 61 %  Ctrough,ss: 13.2 mg/L  Probability of Ctrough,ss > 20: 17 %  Probability of nephrotoxicity (Lodise PJ 2009): 8 %          Plan:  Start vancomycin  2000 mg IV x one loading dose, followed by 1250 mg IV Q18H.  Q18H dosing due to significant lower probability of nephrotoxicity then other regimens.   Vancomycin monitoring method: AUC  Vancomycin therapeutic monitoring goal: 400-600 mg*h/L  Pharmacy will check vancomycin levels as appropriate in 1-3 Days.    Serum creatinine levels will be ordered daily for the first week of therapy and at least twice weekly for subsequent weeks.    MRSA nasal swab pending.    Ranjana Lowry [facility]

## 2025-01-27 NOTE — PLAN OF CARE
"Goal Outcome Evaluation:    VSS. Afebrile. Denies pain. A&O, pleasant, cooperative. Diaphoretic, tylenol given with cool wash cloths. SPO2 95% on vapo therm, see previous notes regarding. BREEN, SOB, tachypnea. Diminished lung sounds with rhonchi. Numbness in feet. Using urinal at bedside.     SBA       Plan of Care Reviewed With: patient    Overall Patient Progress: declining      /71 (BP Location: Left arm, Patient Position: Semi-Amado's, Cuff Size: Adult Regular)   Pulse 86   Temp 98.9  F (37.2  C) (Oral)   Resp 20   Ht 1.689 m (5' 6.5\")   Wt 96.1 kg (211 lb 13.8 oz)   SpO2 93%   BMI 33.68 kg/m      Octavia Galdamez RN on 1/27/2025 at 5:50 AM      "

## 2025-01-27 NOTE — PROVIDER NOTIFICATION
01/26/25 2306 01/27/25 0005 01/27/25 0011   Oxygen Therapy   SpO2 92 % (!) 86 % (!) 90 %   O2 Device High Flow Nasal Cannula (HFNC) High Flow Nasal Cannula (HFNC) High Flow Nasal Cannula (HFNC)   Oxygen Delivery 8 LPM 8 LPM 10 LPM      01/27/25 0020   Oxygen Therapy   SpO2 92 %   O2 Device High Flow Nasal Cannula (HFNC)   Oxygen Delivery 12 LPM     SPO2 86% at approx 0005. Increase oxygen to 10L, O2 increased to 90%. Then increased to 12LPM. RT notified and tele doc Flavia martines. ABG and chest Xray ordered per MD.     Octavia Galdamez RN on 1/27/2025 at 12:29 AM

## 2025-01-27 NOTE — PROGRESS NOTES
"Went into pt room at approx 0115. Pt was diaphoretic stating \"I just can't catch my breath.\" SPO2 was 91% at this time, stats decreased to low 80s. Pt was then repositioned and placed on 15 LPM oxymask. RT was called and Flavia Mansfield MD was messaged, requested pt be placed on vapotherm. Pt stated he was feeling warm, temp check was 99.2 orally,  tylenol given, ice packs placed and wash cloths to face and neck.   Pt was placed on vapotherm by RT, work of breathing was greatly decreased. Temperature was then 98.7, less diaphoretic at this time.        01/27/25 0119   Vital Signs   Temp 99.2  F (37.3  C)   Temp src Oral   Pulse (!) 111   Pulse Rate Source Monitor   Oximeter Heart Rate 111 bpm   BP (!) 178/84   BP Location Left arm   Patient Position Semi-Amado's   Cuff Size Adult Regular        Latest Reference Range & Units 01/27/25 01:09   pH Arterial 7.35 - 7.45  7.43   pCO2 Arterial 35 - 45 mm Hg 32 (L)   PO2 Arterial 80 - 105 mm Hg 58 (L)   Bicarbonate Arterial 21 - 28 mmol/L 21   Base Excess Art -3.0 - 3.0 mmol/L -2.0   FIO2  68   Oxyhemoglobin Arterial 92 - 100 % 91 (L)   Darren's Test  Yes   (L): Data is abnormally low    BP (!) 157/80   Pulse (!) 107   Temp 98.7  F (37.1  C) (Oral)   Resp 24   Ht 1.689 m (5' 6.5\")   Wt 96.1 kg (211 lb 12.8 oz)   SpO2 95%   BMI 33.67 kg/m      Octavia Galdamez RN on 1/27/2025 at 2:18 AM    "

## 2025-01-27 NOTE — PROGRESS NOTES
Pt on 6 lpm high flow nasal cannula, SpO2 low 90s. PRN neb tx given. Will continue to wean oxygen as tolerated.    Mariela Perez, RT

## 2025-01-27 NOTE — PROGRESS NOTES
"Pt states \"The CPAP mask is so very uncomfortable.\" Plan reviewed with Dr Purdy, Ok to switch back to vapotherm.  "

## 2025-01-27 NOTE — PROGRESS NOTES
Patient seen and evaluated after nursing reported increased work of breathing and further hypoxia after using the bedpan.  Patient reassessed and noted to have increased work of breathing, discussed goals of care with family and are consistent with full CODE STATUS and discussed risks and benefits of central line placement and consent signed.    Patient subsequently intubated for further respiratory support, developed hypoxia requiring bagging, telemetry ICU support much appreciated with high peak pressures and further vent settings.  Started on propofol for sedation, given 250 mL fluid bolus, given vecuronium 10 mg x 1 for improved vent compliance.    Plan:  -Repeat labs now with further vent setting expertise from telemetry ICU   -bolus fluids as needed for urine output goal of 0.5 mL/kg/hr  -Continue current antibiotics and nebs  -Propofol for sedation  -Refer to critical care expertise regarding need for more frequent or prolonged paralytics    Daniel Purdy MD     I have spent greater than 70 minutes ofcritical care time related to direct patient care, care coordination, and communication exclusive of procedures with focus of care related to respiratory failure and critical illness.

## 2025-01-27 NOTE — PROGRESS NOTES
Report taken. Pt transferred per bed with resp on a Oxymask. Orientated and positioned comfortably in room 916. Bipap placed and educated on.

## 2025-01-27 NOTE — ANESTHESIA PROCEDURE NOTES
Arterial Line Procedure Note    Pre-Procedure   Staff -        CRNA: Domingo Howard APRN CRNA       Performed By: CRNA       Location: ICU       Pre-Anesthestic Checklist: patient identified, IV checked, risks and benefits discussed, informed consent, monitors and equipment checked, pre-op evaluation and at physician/surgeon's request  Timeout:       Correct Patient: Yes        Correct Procedure: Yes        Correct Site: Yes        Correct Position: Yes   Line Placement:   This line was placed Post Induction starting at 1/27/2025 3:45 PM and ending at 1/27/2025 4:00 PM  Procedure   Procedure: arterial line       Diagnosis: hemodynamic instability, MD verbal order       Laterality: right       Insertion Site: radial.  Sterile Prep        Standard elements of sterile barrier followed       Skin prep: Chloraprep  Insertion/Injection        Technique: ultrasound guided        1. Ultrasound was used to evaluate the access site.       2. Artery evaluated via ultrasound for patency/adequacy.       3. Using real-time ultrasound the needle/catheter was observed entering the artery/vein.       5. The visualized structures were anatomically normal.       6. There were no apparent abnormal pathologic findings.       Catheter Type/Size: 20 G, 1.75 in/4.5 cm quick cath (integral wire)  Narrative         Secured by: suture       Tegaderm dressing used.       Complications: None apparent,        Arterial waveform: Yes        IBP within 10% of NIBP: Yes   Comments:  ANESTHESIA ARTERIAL LINE PLACEMENT  Procedure time: 30 mintues      Anesthesia: CRNA    [place] protocol was followed. TIME OUT conducted just prior to starting procedure confirmed patient identity, site/side, procedure, patient position, and availability of correct equipment and implants.    The Time Out protocol was followed to verify the patient's identity, the surgical procedure, the regional anesthesia procedure, including the correct site and laterality.    The  skin overlying the right artery was prepped and draped in the usual fashion. A 20 gauge needle was advanced into the artery, followed by a integrated guidewire using ultrasound guidance. A 20 gauge catheter was advanced over the integrated wire.    The integrated guidewire was retracted and the catheter secured with sutures and a sterile dressing.

## 2025-01-27 NOTE — PROGRESS NOTES
"[facility]    Hospitalist Progress Note      Assessment & Plan   Jesus Varghese is a 64 year old male who was admitted on 1/26/2025.     Clinically Significant Risk Factors         # Hyponatremia: Lowest Na = 131 mmol/L in last 2 days, will monitor as appropriate  # Hypochloremia: Lowest Cl = 93 mmol/L in last 2 days, will monitor as appropriate      # Hypoalbuminemia: Lowest albumin = 3.4 g/dL at 1/27/2025  5:44 AM, will monitor as appropriate   # Thrombocytopenia: Lowest platelets = 94 in last 2 days, will monitor for bleeding       # Acute Hypoxic Respiratory Failure: Documented O2 saturation < 90%. Continue supplemental oxygen as needed  # Acute Hypoxic Respiratory Failure: Documented O2 saturation < 90%. Continue supplemental oxygen as needed        # DMII: A1C = 6.5 % (Ref range: <5.7 %) within past 6 months, PRESENT ON ADMISSION  # Obesity: Estimated body mass index is 33.68 kg/m  as calculated from the following:    Height as of this encounter: 1.689 m (5' 6.5\").    Weight as of this encounter: 96.1 kg (211 lb 13.8 oz)., PRESENT ON ADMISSION            Principal Problem:    Acute respiratory failure with hypoxia (H)  Bacterial community-acquired pneumonia  Influenza A    Assessment: deterioration overnight with escalating oxygen needs.  Now on maximal CPAP and more comfortable.  No airway compromise or tachypnea, but at high risk for needing mechanical ventilation and patient agreeable to short term pressors and ventilation if needed.  Etiolgoy of resp failure likely multifactorial from Viral pneumonia in addition to superimposed bacterial infection likely.  No obvious obvious evidence of pulmonary edema or COPD exacerbation playing a role.      Plan:   -Ceftriaxone/azithromycin day 1->vanco/zosyn day 1  -azith day 2  -Tamiflu day 2  -follow-up sputum culture, urine strep/Legionella  -scheduled and as needed nebulizer's  -hold on further IV fluids  -echo    Active Problems:    " Controlled type 2 diabetes mellitus with complication, without long-term current use of insulin (H)    Assessment: Well-controlled with hemoglobin A1c of 6.5    Plan:   -Hold home metformin given recent CT      CLL (chronic lymphocytic leukemia) (H)    Assessment: Chronic stable    Plan:   -Monitor      DEEP (acute kidney injury)    Assessment: resolved with gentle fluids yesterday.      Plan:   -Monitor daily    Diet: Regular Diet Adult    DVT Prophylaxis: Enoxaparin (Lovenox) SQ  [place] Catheter: Not present  Code Status: Full Code           Disposition Plan     Expected Discharge Date: 01/28/2025             Entered: Daniel Purdy MD 01/27/2025, 12:10 PM       The patient's care was discussed with the Bedside Nurse and Patient updated patient's wife at length in addition to patient at the bedside on 2 separate occasions, all questions answered best of ability and they were very appreciative of the care.    Daniel Purdy MD  Hospitalist Service  [facility]  Contact information available via [facility] Paging/Directory    ______________________________________________________________________    Interval History   CC: Cough and dyspnea    Overnight no acute events, continuing to have intermittent fevers but overall feels slightly improved, no productive cough, no significant wheezing, no orthopnea, pleurisy, chest pain or palpitations, no increased lower extremity edema, no nausea vomiting diarrhea or other new complaints.    -Data reviewed today: I reviewed all new labs and imaging results over the last 24 hours.    Physical Exam   Temp: 100.4  F (38  C) Temp src: Tympanic BP: 128/78 Pulse: 87   Resp: 30 SpO2: (!) 86 % O2 Device: BiPAP/CPAP Oxygen Delivery: 40 LPM  Vitals:    01/26/25 0232 01/26/25 0614 01/27/25 0208   Weight: 98.4 kg (217 lb) 96.1 kg (211 lb 12.8 oz) 96.1 kg (211 lb 13.8 oz)     Vital Signs with Ranges  Temp:  [98.3  F (36.8  C)-102.2  F (39  C)] 100.4  F (38  C)  Pulse:  []  87  Resp:  [20-39] 30  BP: (119-184)/(60-86) 128/78  FiO2 (%):  [92 %-100 %] 100 %  SpO2:  [78 %-96 %] 86 %  I/O last 3 completed shifts:  In: 480 [P.O.:480]  Out: 400 [Urine:400]    GENERAL: Talkative, laying in bed, pleasant and appropriate, cpap mask on, in no apparent distress.  CARDIOVASCULAR: regular rate and rhythm, no murmurs, rubs, or gallops. Normal S1/S2. No lower extremity edema.   RESPIRATORY: diffuse air movement but with diffuse coarse breath sounds, no significant wheezing, no crackles, no accessory muscle use or evidence respiratory distress.  GI: soft, non-tender, non-distended, normoactive bowel sounds.  MUSCULOSKELETAL: warm and well perfused, 2+ dorsalis pedis pulses bilaterally.    SKIN: no pallor,jaundice, or rashes.   Neuro: AAOx3, moves all extremities symmetrically, grossly nonfocal.    Medications   Current Facility-Administered Medications   Medication Dose Route Frequency Provider Last Rate Last Admin    No lozenges or gum should be given while patient on BIPAP/AVAPS/AVAPS AE   Does not apply Continuous PRN Flavia Mansfield MD        Patient may continue current oral medications   Does not apply Continuous PRN Flavia Mansfield MD         Current Facility-Administered Medications   Medication Dose Route Frequency Provider Last Rate Last Admin    azithromycin (ZITHROMAX) 500 mg in  mL intermittent infusion  500 mg Intravenous Q24H Eugenio Luke MD   500 mg at 01/27/25 0538    enoxaparin ANTICOAGULANT (LOVENOX) injection 40 mg  40 mg Subcutaneous Daily Eugenio Luke MD   40 mg at 01/27/25 0937    ipratropium - albuterol 0.5 mg/2.5 mg/3 mL (DUONEB) neb solution 3 mL  3 mL Nebulization 4x daily Daniel Purdy MD   3 mL at 01/27/25 0850    multivitamin w/minerals (THERA-VIT-M) tablet 1 tablet  1 tablet Oral Daily Eugenio Luke MD   1 tablet at 01/26/25 0932    oseltamivir (TAMIFLU) capsule 75 mg  75 mg Oral BID Daniel Purdy MD   75 mg at 01/27/25 0937     piperacillin-tazobactam (ZOSYN) 4.5 g vial to attach to  mL bag  4.5 g Intravenous 4x Daily Daniel Hardwick MD   4.5 g at 25 0746    sodium chloride (PF) 0.9% PF flush 3 mL  3 mL Intracatheter Q8H Eugenio Luke MD   3 mL at 25 0808    [START ON 2025] vancomycin (VANCOCIN) 1,250 mg in 0.9% NaCl 250 mL intermittent infusion  1,250 mg Intravenous Q18H Daniel Hardwick MD        Vitamin D3 (CHOLECALCIFEROL) tablet 25 mcg  25 mcg Oral Daily Eugenio Luke MD   25 mcg at 25 0937       Data   Recent Labs   Lab 25  0544 25  0810 25  0309 25  1411   WBC 7.5  --  9.5 14.3*   HGB 14.9  --  15.6 16.4   MCV 85  --  83 84   PLT 94*  --  100* 144*   *  --  131* 141   POTASSIUM 4.7 4.0 4.4 5.0   CHLORIDE 97*  --  93* 101   CO2 23  --  23 26   BUN 20.4  --  24.3* 17.8   CR 1.05  --  1.23* 1.37*   ANIONGAP 13  --  15 14   LILIAN 9.2  --  9.5 9.9   *  --  153* 123*   ALBUMIN 3.4*  --  4.2 4.7   PROTTOTAL 6.6  --  7.3 7.0   BILITOTAL 0.5  --  0.9 0.5   ALKPHOS 85  --  71 47   ALT 76*  --  88* 76*   *  --  106* 49*       Recent Results (from the past 24 hours)   XR Chest Port 1 View    Narrative    EXAM: XR CHEST PORT 1 VIEW  LOCATION: [facility]  DATE: 2025    INDICATION: hypoxia  COMPARISON: CT 2025.    FINDINGS: The heart is enlarged. The thoracic aorta is calcified. Diffuse bilateral pulmonary infiltrates. No pneumothorax.      Impression    IMPRESSION: Bilateral pulmonary infiltrates.   Echocardiogram Complete   Result Value    LVEF  60-65%    Narrative    [id number]  [id number]  [id number]  [id number]^LICK^DANIEL^TAWANDA     [facility]  [address] [place] [address]     Name: ROSALINA MEZA  MRN: [id number]  : 1960  Study Date: 2025 09:10 AM  Age: 64 yrs  Gender: Male  Patient Location: [facility]  Reason For Study: Respiratory Failure  Ordering Physician: DANIEL HARDWICK  Performed By:  Farnaz Zeb     BSA: 2.0 m2  Height: 66 in  Weight: 211 lb  HR: 102  BP: 129/73 mmHg  ______________________________________________________________________________  Procedure  Echocardiogram with two-dimensional, color and spectral Doppler. Technically  difficult study.Extremely poor acoustic windows.  ______________________________________________________________________________  Interpretation Summary  Technically difficult study.Extremely poor acoustic windows.  Global and regional left ventricular function is normal with an EF of 60-65%.  Right ventricular function, chamber size, wall motion, and thickness are  normal.  Pulmonary artery systolic pressure cannot be assessed.  The inferior vena cava is normal.  No pericardial effusion is present.  No significant valvular abnormalities present.  There is no prior study for direct comparison.  ______________________________________________________________________________  Left Ventricle  Global and regional left ventricular function is normal with an EF of 60-65%.  Left ventricular wall thickness is normal. Left ventricular size is normal.  Left ventricular diastolic function is normal. No regional wall motion  abnormalities are seen.     Right Ventricle  Right ventricular function, chamber size, wall motion, and thickness are  normal.     Atria  Both atria appear normal.     Mitral Valve  The mitral valve is normal.     Aortic Valve  Aortic valve sclerosis is present. The valve leaflets are not well visualized.  On Doppler interrogation, there is no significant stenosis or regurgitation.     Tricuspid Valve  The valve leaflets are not well visualized. On Doppler interrogation, there is  no significant stenosis or regurgitation. Pulmonary artery systolic pressure  cannot be assessed.     Pulmonic Valve  The valve leaflets are not well visualized. On Doppler interrogation, there is  no significant stenosis or regurgitation.     Vessels  The thoracic aorta is  normal. The pulmonary artery cannot be assessed. The  inferior vena cava is normal.     Pericardium  No pericardial effusion is present. Prominent epicardial fat is noted.     Miscellaneous  No significant valvular abnormalities present.     Compared to Previous Study  There is no prior study for direct comparison.  ______________________________________________________________________________  MMode/2D Measurements & Calculations  IVSd: 0.99 cm  LVIDd: 5.2 cm  LVIDs: 3.0 cm  LVPWd: 1.1 cm  FS: 42.4 %  LV mass(C)d: 205.2 grams  LV mass(C)dI: 100.3 grams/m2  Ao root diam: 3.1 cm  asc Aorta Diam: 3.3 cm  LVOT diam: 2.0 cm  LVOT area: 3.1 cm2  Ao root diam index Ht(cm/m): 1.8  Ao root diam index BSA (cm/m2): 1.5  Asc Ao diam index BSA (cm/m2): 1.6  Asc Ao diam index Ht(cm/m): 2.0  LA Volume (BP): 40.6 ml     LA Volume Index (BP): 19.8 ml/m2  RWT: 0.43  TAPSE: 2.5 cm     Doppler Measurements & Calculations  MV E max bobby: 62.1 cm/sec  MV A max bobby: 94.7 cm/sec  MV E/A: 0.66  MV dec time: 0.18 sec  Ao V2 max: 179.0 cm/sec  Ao max P.0 mmHg  Ao V2 mean: 126.0 cm/sec  Ao mean P.0 mmHg  Ao V2 VTI: 25.1 cm  DANDRE(I,D): 2.8 cm2  DANDRE(V,D): 2.4 cm2  LV V1 max P.4 mmHg  LV V1 max: 136.0 cm/sec  LV V1 VTI: 22.1 cm  SV(LVOT): 69.4 ml  SI(LVOT): 33.9 ml/m2  PA acc time: 0.13 sec     AV Bobby Ratio (DI): 0.76  DANDRE Index (cm2/m2): 1.4  E/E' av.3  Lateral E/e': 8.9  Medial E/e': 5.7     ______________________________________________________________________________  Report approved by: YOHANNES Gaitan MD on 2025 11:32 AM              I have spent greater than 70 minutes ofcritical care time related to direct patient care, care coordination, and communication exclusive of procedures with focus of care related to respiratory failure and goals of care.

## 2025-01-28 ENCOUNTER — APPOINTMENT (OUTPATIENT)
Dept: GENERAL RADIOLOGY | Facility: CLINIC | Age: 65
End: 2025-01-28
Attending: STUDENT IN AN ORGANIZED HEALTH CARE EDUCATION/TRAINING PROGRAM
Payer: MEDICARE

## 2025-01-28 ENCOUNTER — HOSPITAL ENCOUNTER (INPATIENT)
Facility: CLINIC | Age: 65
End: 2025-01-28
Attending: SURGERY | Admitting: SURGERY
Payer: MEDICARE

## 2025-01-28 VITALS
OXYGEN SATURATION: 97 % | BODY MASS INDEX: 33.25 KG/M2 | SYSTOLIC BLOOD PRESSURE: 90 MMHG | WEIGHT: 211.86 LBS | HEIGHT: 67 IN | TEMPERATURE: 98.7 F | HEART RATE: 74 BPM | DIASTOLIC BLOOD PRESSURE: 62 MMHG | RESPIRATION RATE: 28 BRPM

## 2025-01-28 DIAGNOSIS — J10.1 INFLUENZA A: Primary | ICD-10-CM

## 2025-01-28 DIAGNOSIS — T85.898A PRESSURE INJURY DUE TO MEDICAL DEVICE: ICD-10-CM

## 2025-01-28 DIAGNOSIS — L89.90 PRESSURE INJURY DUE TO MEDICAL DEVICE: ICD-10-CM

## 2025-01-28 PROBLEM — N17.9 ACUTE KIDNEY FAILURE, UNSPECIFIED: Status: ACTIVE | Noted: 2025-01-28

## 2025-01-28 LAB
ABO + RH BLD: NORMAL
ALBUMIN SERPL BCG-MCNC: 3.1 G/DL (ref 3.5–5.2)
ALLEN'S TEST: ABNORMAL
ALLEN'S TEST: NO
ALP SERPL-CCNC: 79 U/L (ref 40–150)
ALT SERPL W P-5'-P-CCNC: 59 U/L (ref 0–70)
ANION GAP SERPL CALCULATED.3IONS-SCNC: 10 MMOL/L (ref 7–15)
ANION GAP SERPL CALCULATED.3IONS-SCNC: 14 MMOL/L (ref 7–15)
APTT PPP: 29 SECONDS (ref 22–38)
AST SERPL W P-5'-P-CCNC: 74 U/L (ref 0–45)
BACTERIA SPT CULT: ABNORMAL
BASE EXCESS BLDA CALC-SCNC: -3.7 MMOL/L (ref -3–3)
BASE EXCESS BLDA CALC-SCNC: -3.8 MMOL/L (ref -3–3)
BASE EXCESS BLDA CALC-SCNC: -4.5 MMOL/L (ref -3–3)
BASE EXCESS BLDA CALC-SCNC: -4.8 MMOL/L (ref -3–3)
BASE EXCESS BLDA CALC-SCNC: -5 MMOL/L (ref -3–3)
BASE EXCESS BLDA CALC-SCNC: -5.5 MMOL/L (ref -3–3)
BASE EXCESS BLDA CALC-SCNC: -5.6 MMOL/L (ref -3–3)
BASE EXCESS BLDA CALC-SCNC: -6.5 MMOL/L (ref -3–3)
BASE EXCESS BLDA CALC-SCNC: -7 MMOL/L (ref -3–3)
BASE EXCESS BLDA CALC-SCNC: -7 MMOL/L (ref -3–3)
BILIRUB SERPL-MCNC: 0.4 MG/DL
BLD GP AB SCN SERPL QL: NEGATIVE
BLD PROD TYP BPU: NORMAL
BLD PROD TYP BPU: NORMAL
BLOOD COMPONENT TYPE: NORMAL
BLOOD COMPONENT TYPE: NORMAL
BUN SERPL-MCNC: 28.2 MG/DL (ref 8–23)
BUN SERPL-MCNC: 30.8 MG/DL (ref 8–23)
BUN SERPL-MCNC: 32.8 MG/DL (ref 8–23)
BUN SERPL-MCNC: 34.5 MG/DL (ref 8–23)
CA-I BLD-MCNC: 4.2 MG/DL (ref 4.4–5.2)
CALCIUM SERPL-MCNC: 7.8 MG/DL (ref 8.8–10.4)
CALCIUM SERPL-MCNC: 8.1 MG/DL (ref 8.8–10.4)
CALCIUM SERPL-MCNC: 8.3 MG/DL (ref 8.8–10.4)
CALCIUM SERPL-MCNC: 8.5 MG/DL (ref 8.8–10.4)
CHLORIDE SERPL-SCNC: 100 MMOL/L (ref 98–107)
CHLORIDE SERPL-SCNC: 100 MMOL/L (ref 98–107)
CHLORIDE SERPL-SCNC: 98 MMOL/L (ref 98–107)
CHLORIDE SERPL-SCNC: 99 MMOL/L (ref 98–107)
CK SERPL-CCNC: 88 U/L (ref 39–308)
CODING SYSTEM: NORMAL
CODING SYSTEM: NORMAL
CREAT SERPL-MCNC: 1.84 MG/DL (ref 0.67–1.17)
CREAT SERPL-MCNC: 2.04 MG/DL (ref 0.67–1.17)
CREAT SERPL-MCNC: 2.4 MG/DL (ref 0.67–1.17)
CREAT SERPL-MCNC: 2.55 MG/DL (ref 0.67–1.17)
CROSSMATCH: NORMAL
CROSSMATCH: NORMAL
EGFRCR SERPLBLD CKD-EPI 2021: 27 ML/MIN/1.73M2
EGFRCR SERPLBLD CKD-EPI 2021: 29 ML/MIN/1.73M2
EGFRCR SERPLBLD CKD-EPI 2021: 35 ML/MIN/1.73M2
EGFRCR SERPLBLD CKD-EPI 2021: 40 ML/MIN/1.73M2
ERYTHROCYTE [DISTWIDTH] IN BLOOD BY AUTOMATED COUNT: 13.2 % (ref 10–15)
ERYTHROCYTE [DISTWIDTH] IN BLOOD BY AUTOMATED COUNT: 13.3 % (ref 10–15)
FIBRINOGEN PPP-MCNC: 525 MG/DL (ref 170–510)
GLUCOSE BLDC GLUCOMTR-MCNC: 116 MG/DL (ref 70–99)
GLUCOSE BLDC GLUCOMTR-MCNC: 121 MG/DL (ref 70–99)
GLUCOSE BLDC GLUCOMTR-MCNC: 133 MG/DL (ref 70–99)
GLUCOSE BLDC GLUCOMTR-MCNC: 144 MG/DL (ref 70–99)
GLUCOSE SERPL-MCNC: 127 MG/DL (ref 70–99)
GLUCOSE SERPL-MCNC: 149 MG/DL (ref 70–99)
GLUCOSE SERPL-MCNC: 150 MG/DL (ref 70–99)
GLUCOSE SERPL-MCNC: 154 MG/DL (ref 70–99)
GRAM STAIN RESULT: ABNORMAL
HCO3 BLD-SCNC: 23 MMOL/L (ref 21–28)
HCO3 BLD-SCNC: 24 MMOL/L (ref 21–28)
HCO3 BLD-SCNC: 25 MMOL/L (ref 21–28)
HCO3 BLD-SCNC: 27 MMOL/L (ref 21–28)
HCO3 BLD-SCNC: 27 MMOL/L (ref 21–28)
HCO3 SERPL-SCNC: 20 MMOL/L (ref 22–29)
HCO3 SERPL-SCNC: 23 MMOL/L (ref 22–29)
HCO3 SERPL-SCNC: 24 MMOL/L (ref 22–29)
HCO3 SERPL-SCNC: 25 MMOL/L (ref 22–29)
HCT VFR BLD AUTO: 39.5 % (ref 40–53)
HCT VFR BLD AUTO: 41.6 % (ref 40–53)
HGB BLD-MCNC: 12.6 G/DL (ref 13.3–17.7)
HGB BLD-MCNC: 13.8 G/DL (ref 13.3–17.7)
HOLD SPECIMEN: NORMAL
INR PPP: 1.04 (ref 0.85–1.15)
LACTATE SERPL-SCNC: 0.7 MMOL/L (ref 0.7–2)
LACTATE SERPL-SCNC: 0.9 MMOL/L (ref 0.7–2)
LACTATE SERPL-SCNC: 0.9 MMOL/L (ref 0.7–2)
MAGNESIUM SERPL-MCNC: 2.2 MG/DL (ref 1.7–2.3)
MAGNESIUM SERPL-MCNC: 2.3 MG/DL (ref 1.7–2.3)
MCH RBC QN AUTO: 27.9 PG (ref 26.5–33)
MCH RBC QN AUTO: 28.8 PG (ref 26.5–33)
MCHC RBC AUTO-ENTMCNC: 31.9 G/DL (ref 31.5–36.5)
MCHC RBC AUTO-ENTMCNC: 33.2 G/DL (ref 31.5–36.5)
MCV RBC AUTO: 87 FL (ref 78–100)
MCV RBC AUTO: 87 FL (ref 78–100)
O2/TOTAL GAS SETTING VFR VENT: 50 %
O2/TOTAL GAS SETTING VFR VENT: 65 %
O2/TOTAL GAS SETTING VFR VENT: 70 %
O2/TOTAL GAS SETTING VFR VENT: 80 %
O2/TOTAL GAS SETTING VFR VENT: 90 %
OXYHGB MFR BLDA: 90 % (ref 92–100)
OXYHGB MFR BLDA: 93 % (ref 92–100)
OXYHGB MFR BLDA: 94 % (ref 92–100)
OXYHGB MFR BLDA: 95 % (ref 92–100)
OXYHGB MFR BLDA: 95 % (ref 92–100)
OXYHGB MFR BLDA: 97 % (ref 92–100)
OXYHGB MFR BLDA: 97 % (ref 92–100)
OXYHGB MFR BLDA: 98 % (ref 92–100)
PCO2 BLD: 55 MM HG (ref 35–45)
PCO2 BLD: 55 MM HG (ref 35–45)
PCO2 BLD: 58 MM HG (ref 35–45)
PCO2 BLD: 60 MM HG (ref 35–45)
PCO2 BLD: 63 MM HG (ref 35–45)
PCO2 BLD: 69 MM HG (ref 35–45)
PCO2 BLD: 74 MM HG (ref 35–45)
PCO2 BLD: 76 MM HG (ref 35–45)
PCO2 BLD: 77 MM HG (ref 35–45)
PCO2 BLD: 78 MM HG (ref 35–45)
PEEP: 12 CM H2O
PEEP: 14 CM H2O
PEEP: 15 CM H2O
PH BLD: 7.12 [PH] (ref 7.35–7.45)
PH BLD: 7.12 [PH] (ref 7.35–7.45)
PH BLD: 7.14 [PH] (ref 7.35–7.45)
PH BLD: 7.15 [PH] (ref 7.35–7.45)
PH BLD: 7.15 [PH] (ref 7.35–7.45)
PH BLD: 7.19 [PH] (ref 7.35–7.45)
PH BLD: 7.21 [PH] (ref 7.35–7.45)
PH BLD: 7.22 [PH] (ref 7.35–7.45)
PH BLD: 7.24 [PH] (ref 7.35–7.45)
PH BLD: 7.24 [PH] (ref 7.35–7.45)
PHOSPHATE SERPL-MCNC: 5.4 MG/DL (ref 2.5–4.5)
PHOSPHATE SERPL-MCNC: 5.9 MG/DL (ref 2.5–4.5)
PLATELET # BLD AUTO: 119 10E3/UL (ref 150–450)
PLATELET # BLD AUTO: 126 10E3/UL (ref 150–450)
PO2 BLD: 101 MM HG (ref 80–105)
PO2 BLD: 113 MM HG (ref 80–105)
PO2 BLD: 119 MM HG (ref 80–105)
PO2 BLD: 140 MM HG (ref 80–105)
PO2 BLD: 142 MM HG (ref 80–105)
PO2 BLD: 58 MM HG (ref 80–105)
PO2 BLD: 72 MM HG (ref 80–105)
PO2 BLD: 74 MM HG (ref 80–105)
PO2 BLD: 76 MM HG (ref 80–105)
PO2 BLD: 80 MM HG (ref 80–105)
POTASSIUM SERPL-SCNC: 4.9 MMOL/L (ref 3.4–5.3)
POTASSIUM SERPL-SCNC: 5.1 MMOL/L (ref 3.4–5.3)
POTASSIUM SERPL-SCNC: 5.2 MMOL/L (ref 3.4–5.3)
POTASSIUM SERPL-SCNC: 5.4 MMOL/L (ref 3.4–5.3)
PROT SERPL-MCNC: 5.8 G/DL (ref 6.4–8.3)
RBC # BLD AUTO: 4.52 10E6/UL (ref 4.4–5.9)
RBC # BLD AUTO: 4.8 10E6/UL (ref 4.4–5.9)
SAO2 % BLDA: 91.9 % (ref 95–96)
SAO2 % BLDA: 94.4 % (ref 95–96)
SAO2 % BLDA: 95.2 % (ref 95–96)
SAO2 % BLDA: 96.3 % (ref 95–96)
SAO2 % BLDA: 96.4 % (ref 95–96)
SAO2 % BLDA: 98.5 % (ref 95–96)
SAO2 % BLDA: 99 % (ref 95–96)
SAO2 % BLDA: 99 % (ref 95–96)
SAO2 % BLDA: 99.5 % (ref 95–96)
SAO2 % BLDA: 99.5 % (ref 95–96)
SODIUM SERPL-SCNC: 133 MMOL/L (ref 135–145)
SODIUM SERPL-SCNC: 134 MMOL/L (ref 135–145)
SPECIMEN EXP DATE BLD: NORMAL
TROPONIN T SERPL HS-MCNC: 33 NG/L
TROPONIN T SERPL HS-MCNC: 35 NG/L
UNIT ABO/RH: NORMAL
UNIT ABO/RH: NORMAL
UNIT NUMBER: NORMAL
UNIT NUMBER: NORMAL
UNIT STATUS: NORMAL
UNIT STATUS: NORMAL
UNIT TYPE ISBT: 7300
UNIT TYPE ISBT: 7300
VANCOMYCIN SERPL-MCNC: 15.3 UG/ML
WBC # BLD AUTO: 8 10E3/UL (ref 4–11)
WBC # BLD AUTO: 8.1 10E3/UL (ref 4–11)

## 2025-01-28 PROCEDURE — 85730 THROMBOPLASTIN TIME PARTIAL: CPT | Performed by: INTERNAL MEDICINE

## 2025-01-28 PROCEDURE — 82330 ASSAY OF CALCIUM: CPT | Performed by: INTERNAL MEDICINE

## 2025-01-28 PROCEDURE — 80053 COMPREHEN METABOLIC PANEL: CPT | Performed by: INTERNAL MEDICINE

## 2025-01-28 PROCEDURE — 5A1955Z RESPIRATORY VENTILATION, GREATER THAN 96 CONSECUTIVE HOURS: ICD-10-PCS | Performed by: SURGERY

## 2025-01-28 PROCEDURE — 83735 ASSAY OF MAGNESIUM: CPT | Performed by: STUDENT IN AN ORGANIZED HEALTH CARE EDUCATION/TRAINING PROGRAM

## 2025-01-28 PROCEDURE — 85018 HEMOGLOBIN: CPT | Performed by: INTERNAL MEDICINE

## 2025-01-28 PROCEDURE — 200N000002 HC R&B ICU UMMC

## 2025-01-28 PROCEDURE — 99291 CRITICAL CARE FIRST HOUR: CPT | Performed by: STUDENT IN AN ORGANIZED HEALTH CARE EDUCATION/TRAINING PROGRAM

## 2025-01-28 PROCEDURE — 250N000009 HC RX 250

## 2025-01-28 PROCEDURE — 250N000013 HC RX MED GY IP 250 OP 250 PS 637: Performed by: INTERNAL MEDICINE

## 2025-01-28 PROCEDURE — 82310 ASSAY OF CALCIUM: CPT | Performed by: INTERNAL MEDICINE

## 2025-01-28 PROCEDURE — 258N000003 HC RX IP 258 OP 636: Performed by: INTERNAL MEDICINE

## 2025-01-28 PROCEDURE — 99291 CRITICAL CARE FIRST HOUR: CPT | Mod: GC | Performed by: SURGERY

## 2025-01-28 PROCEDURE — 83605 ASSAY OF LACTIC ACID: CPT | Performed by: STUDENT IN AN ORGANIZED HEALTH CARE EDUCATION/TRAINING PROGRAM

## 2025-01-28 PROCEDURE — 80048 BASIC METABOLIC PNL TOTAL CA: CPT | Performed by: INTERNAL MEDICINE

## 2025-01-28 PROCEDURE — 83605 ASSAY OF LACTIC ACID: CPT | Performed by: INTERNAL MEDICINE

## 2025-01-28 PROCEDURE — 250N000011 HC RX IP 250 OP 636: Performed by: STUDENT IN AN ORGANIZED HEALTH CARE EDUCATION/TRAINING PROGRAM

## 2025-01-28 PROCEDURE — 250N000009 HC RX 250: Performed by: STUDENT IN AN ORGANIZED HEALTH CARE EDUCATION/TRAINING PROGRAM

## 2025-01-28 PROCEDURE — 3E043XZ INTRODUCTION OF VASOPRESSOR INTO CENTRAL VEIN, PERCUTANEOUS APPROACH: ICD-10-PCS | Performed by: INTERNAL MEDICINE

## 2025-01-28 PROCEDURE — 250N000011 HC RX IP 250 OP 636

## 2025-01-28 PROCEDURE — 258N000003 HC RX IP 258 OP 636: Performed by: HOSPITALIST

## 2025-01-28 PROCEDURE — 85610 PROTHROMBIN TIME: CPT | Performed by: INTERNAL MEDICINE

## 2025-01-28 PROCEDURE — 999N000157 HC STATISTIC RCP TIME EA 10 MIN

## 2025-01-28 PROCEDURE — 99291 CRITICAL CARE FIRST HOUR: CPT | Performed by: INTERNAL MEDICINE

## 2025-01-28 PROCEDURE — 84100 ASSAY OF PHOSPHORUS: CPT | Performed by: STUDENT IN AN ORGANIZED HEALTH CARE EDUCATION/TRAINING PROGRAM

## 2025-01-28 PROCEDURE — 85384 FIBRINOGEN ACTIVITY: CPT | Performed by: INTERNAL MEDICINE

## 2025-01-28 PROCEDURE — 85014 HEMATOCRIT: CPT | Performed by: STUDENT IN AN ORGANIZED HEALTH CARE EDUCATION/TRAINING PROGRAM

## 2025-01-28 PROCEDURE — 83735 ASSAY OF MAGNESIUM: CPT | Performed by: INTERNAL MEDICINE

## 2025-01-28 PROCEDURE — 84484 ASSAY OF TROPONIN QUANT: CPT | Performed by: STUDENT IN AN ORGANIZED HEALTH CARE EDUCATION/TRAINING PROGRAM

## 2025-01-28 PROCEDURE — 71045 X-RAY EXAM CHEST 1 VIEW: CPT

## 2025-01-28 PROCEDURE — 250N000009 HC RX 250: Performed by: HOSPITALIST

## 2025-01-28 PROCEDURE — 71045 X-RAY EXAM CHEST 1 VIEW: CPT | Mod: 26 | Performed by: RADIOLOGY

## 2025-01-28 PROCEDURE — 94645 CONT INHLJ TX EACH ADDL HOUR: CPT

## 2025-01-28 PROCEDURE — 86901 BLOOD TYPING SEROLOGIC RH(D): CPT | Performed by: INTERNAL MEDICINE

## 2025-01-28 PROCEDURE — 93005 ELECTROCARDIOGRAM TRACING: CPT

## 2025-01-28 PROCEDURE — 250N000009 HC RX 250: Performed by: INTERNAL MEDICINE

## 2025-01-28 PROCEDURE — 250N000011 HC RX IP 250 OP 636: Performed by: HOSPITALIST

## 2025-01-28 PROCEDURE — 94644 CONT INHLJ TX 1ST HOUR: CPT

## 2025-01-28 PROCEDURE — 36600 WITHDRAWAL OF ARTERIAL BLOOD: CPT | Performed by: INTERNAL MEDICINE

## 2025-01-28 PROCEDURE — 82805 BLOOD GASES W/O2 SATURATION: CPT | Performed by: INTERNAL MEDICINE

## 2025-01-28 PROCEDURE — 94002 VENT MGMT INPAT INIT DAY: CPT

## 2025-01-28 PROCEDURE — 82805 BLOOD GASES W/O2 SATURATION: CPT | Performed by: STUDENT IN AN ORGANIZED HEALTH CARE EDUCATION/TRAINING PROGRAM

## 2025-01-28 PROCEDURE — 250N000011 HC RX IP 250 OP 636: Performed by: INTERNAL MEDICINE

## 2025-01-28 PROCEDURE — 84100 ASSAY OF PHOSPHORUS: CPT | Performed by: INTERNAL MEDICINE

## 2025-01-28 PROCEDURE — 250N000013 HC RX MED GY IP 250 OP 250 PS 637: Performed by: STUDENT IN AN ORGANIZED HEALTH CARE EDUCATION/TRAINING PROGRAM

## 2025-01-28 PROCEDURE — 80048 BASIC METABOLIC PNL TOTAL CA: CPT | Performed by: STUDENT IN AN ORGANIZED HEALTH CARE EDUCATION/TRAINING PROGRAM

## 2025-01-28 PROCEDURE — 85048 AUTOMATED LEUKOCYTE COUNT: CPT | Performed by: INTERNAL MEDICINE

## 2025-01-28 PROCEDURE — 80202 ASSAY OF VANCOMYCIN: CPT | Performed by: INTERNAL MEDICINE

## 2025-01-28 PROCEDURE — 250N000009 HC RX 250: Performed by: SURGERY

## 2025-01-28 PROCEDURE — 3E043XZ INTRODUCTION OF VASOPRESSOR INTO CENTRAL VEIN, PERCUTANEOUS APPROACH: ICD-10-PCS | Performed by: SURGERY

## 2025-01-28 PROCEDURE — 5A09C5K ASSISTANCE WITH RESPIRATORY VENTILATION, 8-24 CONSECUTIVE HOURS, INTUBATED PRONE POSITIONING: ICD-10-PCS | Performed by: SURGERY

## 2025-01-28 PROCEDURE — 99292 CRITICAL CARE ADDL 30 MIN: CPT | Performed by: INTERNAL MEDICINE

## 2025-01-28 PROCEDURE — 86923 COMPATIBILITY TEST ELECTRIC: CPT | Performed by: INTERNAL MEDICINE

## 2025-01-28 PROCEDURE — 82550 ASSAY OF CK (CPK): CPT | Performed by: INTERNAL MEDICINE

## 2025-01-28 PROCEDURE — 258N000003 HC RX IP 258 OP 636: Performed by: STUDENT IN AN ORGANIZED HEALTH CARE EDUCATION/TRAINING PROGRAM

## 2025-01-28 RX ORDER — PIPERACILLIN SODIUM, TAZOBACTAM SODIUM 3; .375 G/15ML; G/15ML
3.38 INJECTION, POWDER, LYOPHILIZED, FOR SOLUTION INTRAVENOUS 4 TIMES DAILY
Status: DISCONTINUED | OUTPATIENT
Start: 2025-01-28 | End: 2025-01-28 | Stop reason: HOSPADM

## 2025-01-28 RX ORDER — MUPIROCIN 20 MG/G
OINTMENT TOPICAL DAILY
Status: DISCONTINUED | OUTPATIENT
Start: 2025-01-28 | End: 2025-01-28 | Stop reason: HOSPADM

## 2025-01-28 RX ORDER — ONDANSETRON 4 MG/1
4 TABLET, ORALLY DISINTEGRATING ORAL EVERY 6 HOURS PRN
Status: ACTIVE | OUTPATIENT
Start: 2025-01-28

## 2025-01-28 RX ORDER — NOREPINEPHRINE BITARTRATE 0.06 MG/ML
.01-.6 INJECTION, SOLUTION INTRAVENOUS CONTINUOUS
Status: DISPENSED | OUTPATIENT
Start: 2025-01-28

## 2025-01-28 RX ORDER — LABETALOL HYDROCHLORIDE 5 MG/ML
20-80 INJECTION, SOLUTION INTRAVENOUS EVERY 10 MIN PRN
Status: DISCONTINUED | OUTPATIENT
Start: 2025-01-28 | End: 2025-01-28

## 2025-01-28 RX ORDER — NICOTINE POLACRILEX 4 MG
15-30 LOZENGE BUCCAL
Status: DISCONTINUED | OUTPATIENT
Start: 2025-01-28 | End: 2025-01-28

## 2025-01-28 RX ORDER — BISACODYL 5 MG
10 TABLET, DELAYED RELEASE (ENTERIC COATED) ORAL DAILY PRN
Status: DISCONTINUED | OUTPATIENT
Start: 2025-01-28 | End: 2025-01-29

## 2025-01-28 RX ORDER — DEXAMETHASONE SODIUM PHOSPHATE 10 MG/ML
20 INJECTION, SOLUTION INTRAMUSCULAR; INTRAVENOUS DAILY
Status: DISPENSED | OUTPATIENT
Start: 2025-01-28 | End: 2025-02-02

## 2025-01-28 RX ORDER — DEXTROSE MONOHYDRATE 25 G/50ML
25-50 INJECTION, SOLUTION INTRAVENOUS
Status: DISCONTINUED | OUTPATIENT
Start: 2025-01-28 | End: 2025-01-28 | Stop reason: HOSPADM

## 2025-01-28 RX ORDER — POLYETHYLENE GLYCOL 3350 17 G/17G
17 POWDER, FOR SOLUTION ORAL DAILY PRN
Status: DISCONTINUED | OUTPATIENT
Start: 2025-01-28 | End: 2025-01-29

## 2025-01-28 RX ORDER — NOREPINEPHRINE BITARTRATE 0.02 MG/ML
.01-.6 INJECTION, SOLUTION INTRAVENOUS CONTINUOUS
Status: DISCONTINUED | OUTPATIENT
Start: 2025-01-28 | End: 2025-01-28 | Stop reason: HOSPADM

## 2025-01-28 RX ORDER — NALOXONE HYDROCHLORIDE 0.4 MG/ML
0.2 INJECTION, SOLUTION INTRAMUSCULAR; INTRAVENOUS; SUBCUTANEOUS
Status: ACTIVE | OUTPATIENT
Start: 2025-01-28

## 2025-01-28 RX ORDER — BISACODYL 5 MG
5 TABLET, DELAYED RELEASE (ENTERIC COATED) ORAL DAILY PRN
Status: DISCONTINUED | OUTPATIENT
Start: 2025-01-28 | End: 2025-01-29

## 2025-01-28 RX ORDER — HEPARIN SODIUM 5000 [USP'U]/.5ML
100 INJECTION, SOLUTION INTRAVENOUS; SUBCUTANEOUS
Status: DISCONTINUED | OUTPATIENT
Start: 2025-01-28 | End: 2025-01-28 | Stop reason: HOSPADM

## 2025-01-28 RX ORDER — FUROSEMIDE 10 MG/ML
40 INJECTION INTRAMUSCULAR; INTRAVENOUS ONCE
Status: COMPLETED | OUTPATIENT
Start: 2025-01-28 | End: 2025-01-28

## 2025-01-28 RX ORDER — NALOXONE HYDROCHLORIDE 0.4 MG/ML
0.4 INJECTION, SOLUTION INTRAMUSCULAR; INTRAVENOUS; SUBCUTANEOUS
Status: ACTIVE | OUTPATIENT
Start: 2025-01-28

## 2025-01-28 RX ORDER — SODIUM CHLORIDE, SODIUM LACTATE, POTASSIUM CHLORIDE, CALCIUM CHLORIDE 600; 310; 30; 20 MG/100ML; MG/100ML; MG/100ML; MG/100ML
INJECTION, SOLUTION INTRAVENOUS CONTINUOUS
Status: DISCONTINUED | OUTPATIENT
Start: 2025-01-28 | End: 2025-01-28

## 2025-01-28 RX ORDER — VANCOMYCIN HYDROCHLORIDE 1 G/200ML
1000 INJECTION, SOLUTION INTRAVENOUS EVERY 24 HOURS
Status: DISCONTINUED | OUTPATIENT
Start: 2025-01-29 | End: 2025-01-28

## 2025-01-28 RX ORDER — DEXTROSE MONOHYDRATE 25 G/50ML
25-50 INJECTION, SOLUTION INTRAVENOUS
Status: DISCONTINUED | OUTPATIENT
Start: 2025-01-28 | End: 2025-01-29

## 2025-01-28 RX ORDER — ALBUTEROL SULFATE 0.83 MG/ML
2.5 SOLUTION RESPIRATORY (INHALATION)
Status: DISCONTINUED | OUTPATIENT
Start: 2025-01-28 | End: 2025-01-29

## 2025-01-28 RX ORDER — NICOTINE POLACRILEX 4 MG
15-30 LOZENGE BUCCAL
Status: DISCONTINUED | OUTPATIENT
Start: 2025-01-28 | End: 2025-01-28 | Stop reason: HOSPADM

## 2025-01-28 RX ORDER — VANCOMYCIN HYDROCHLORIDE 1 G/200ML
1000 INJECTION, SOLUTION INTRAVENOUS EVERY 12 HOURS
Status: DISCONTINUED | OUTPATIENT
Start: 2025-01-29 | End: 2025-01-28

## 2025-01-28 RX ORDER — DEXTROSE MONOHYDRATE 25 G/50ML
25-50 INJECTION, SOLUTION INTRAVENOUS
Status: DISCONTINUED | OUTPATIENT
Start: 2025-01-28 | End: 2025-01-28

## 2025-01-28 RX ORDER — HEPARIN SODIUM 5000 [USP'U]/.5ML
5000 INJECTION, SOLUTION INTRAVENOUS; SUBCUTANEOUS EVERY 8 HOURS
Status: DISCONTINUED | OUTPATIENT
Start: 2025-01-29 | End: 2025-01-28 | Stop reason: HOSPADM

## 2025-01-28 RX ORDER — NOREPINEPHRINE BITARTRATE 0.06 MG/ML
INJECTION, SOLUTION INTRAVENOUS
Status: COMPLETED
Start: 2025-01-28 | End: 2025-01-28

## 2025-01-28 RX ORDER — ACETAMINOPHEN 325 MG/10.15ML
650 LIQUID ORAL EVERY 4 HOURS PRN
Status: DISCONTINUED | OUTPATIENT
Start: 2025-01-28 | End: 2025-01-29

## 2025-01-28 RX ORDER — CHLORHEXIDINE GLUCONATE ORAL RINSE 1.2 MG/ML
15 SOLUTION DENTAL EVERY 12 HOURS
Status: DISPENSED | OUTPATIENT
Start: 2025-01-28

## 2025-01-28 RX ORDER — AMOXICILLIN 250 MG
2 CAPSULE ORAL 2 TIMES DAILY PRN
Status: DISCONTINUED | OUTPATIENT
Start: 2025-01-28 | End: 2025-01-29

## 2025-01-28 RX ORDER — ONDANSETRON 2 MG/ML
4 INJECTION INTRAMUSCULAR; INTRAVENOUS EVERY 6 HOURS PRN
Status: ACTIVE | OUTPATIENT
Start: 2025-01-28

## 2025-01-28 RX ORDER — AMOXICILLIN 250 MG
1 CAPSULE ORAL 2 TIMES DAILY PRN
Status: DISCONTINUED | OUTPATIENT
Start: 2025-01-28 | End: 2025-01-29

## 2025-01-28 RX ORDER — HYDRALAZINE HYDROCHLORIDE 20 MG/ML
10-20 INJECTION INTRAMUSCULAR; INTRAVENOUS EVERY 4 HOURS PRN
Status: DISCONTINUED | OUTPATIENT
Start: 2025-01-28 | End: 2025-01-29

## 2025-01-28 RX ORDER — PROPOFOL 10 MG/ML
5-75 INJECTION, EMULSION INTRAVENOUS CONTINUOUS
Status: DISPENSED | OUTPATIENT
Start: 2025-01-28

## 2025-01-28 RX ORDER — SODIUM CHLORIDE 9 MG/ML
INJECTION, SOLUTION INTRAVENOUS CONTINUOUS
Status: ACTIVE | OUTPATIENT
Start: 2025-01-28 | End: 2025-01-28

## 2025-01-28 RX ORDER — SODIUM CHLORIDE 9 MG/ML
INJECTION, SOLUTION INTRAVENOUS CONTINUOUS
Status: DISCONTINUED | OUTPATIENT
Start: 2025-01-28 | End: 2025-01-28

## 2025-01-28 RX ORDER — ALBUTEROL SULFATE 90 UG/1
6 INHALANT RESPIRATORY (INHALATION) EVERY 4 HOURS
Status: DISCONTINUED | OUTPATIENT
Start: 2025-01-28 | End: 2025-01-28

## 2025-01-28 RX ORDER — HEPARIN SODIUM 5000 [USP'U]/.5ML
5000 INJECTION, SOLUTION INTRAVENOUS; SUBCUTANEOUS EVERY 8 HOURS
Status: DISCONTINUED | OUTPATIENT
Start: 2025-01-29 | End: 2025-01-29

## 2025-01-28 RX ORDER — OSELTAMIVIR PHOSPHATE 6 MG/ML
30 FOR SUSPENSION ORAL 2 TIMES DAILY
Status: DISCONTINUED | OUTPATIENT
Start: 2025-01-28 | End: 2025-01-29

## 2025-01-28 RX ORDER — VANCOMYCIN HYDROCHLORIDE 1 G/200ML
1000 INJECTION, SOLUTION INTRAVENOUS EVERY 24 HOURS
Status: DISCONTINUED | OUTPATIENT
Start: 2025-01-29 | End: 2025-01-30

## 2025-01-28 RX ORDER — OSELTAMIVIR PHOSPHATE 30 MG/1
30 CAPSULE ORAL 2 TIMES DAILY
Status: DISCONTINUED | OUTPATIENT
Start: 2025-01-28 | End: 2025-01-28 | Stop reason: HOSPADM

## 2025-01-28 RX ORDER — PROPOFOL 10 MG/ML
INJECTION, EMULSION INTRAVENOUS
Status: COMPLETED
Start: 2025-01-28 | End: 2025-01-28

## 2025-01-28 RX ORDER — NICOTINE POLACRILEX 4 MG
15-30 LOZENGE BUCCAL
Status: DISCONTINUED | OUTPATIENT
Start: 2025-01-28 | End: 2025-01-29

## 2025-01-28 RX ORDER — DEXAMETHASONE SODIUM PHOSPHATE 10 MG/ML
10 INJECTION, SOLUTION INTRAMUSCULAR; INTRAVENOUS EVERY 24 HOURS
Status: ACTIVE | OUTPATIENT
Start: 2025-02-02 | End: 2025-02-07

## 2025-01-28 RX ORDER — MAGNESIUM CARB/ALUMINUM HYDROX 105-160MG
148 TABLET,CHEWABLE ORAL
Status: ACTIVE | OUTPATIENT
Start: 2025-01-28

## 2025-01-28 RX ORDER — LABETALOL HYDROCHLORIDE 5 MG/ML
10-40 INJECTION, SOLUTION INTRAVENOUS EVERY 10 MIN PRN
Status: DISCONTINUED | OUTPATIENT
Start: 2025-01-28 | End: 2025-01-29

## 2025-01-28 RX ORDER — ALBUTEROL SULFATE 0.83 MG/ML
2.5 SOLUTION RESPIRATORY (INHALATION)
Status: ACTIVE | OUTPATIENT
Start: 2025-01-28

## 2025-01-28 RX ORDER — BISACODYL 5 MG
15 TABLET, DELAYED RELEASE (ENTERIC COATED) ORAL DAILY PRN
Status: DISCONTINUED | OUTPATIENT
Start: 2025-01-28 | End: 2025-01-29

## 2025-01-28 RX ORDER — ACETAMINOPHEN 325 MG/1
650 TABLET ORAL EVERY 4 HOURS PRN
Status: DISCONTINUED | OUTPATIENT
Start: 2025-01-28 | End: 2025-01-29

## 2025-01-28 RX ADMIN — EPOPROSTENOL 20 NG/KG/MIN: 1.5 INJECTION, POWDER, LYOPHILIZED, FOR SOLUTION INTRAVENOUS at 17:42

## 2025-01-28 RX ADMIN — SODIUM CHLORIDE, POTASSIUM CHLORIDE, SODIUM LACTATE AND CALCIUM CHLORIDE: 600; 310; 30; 20 INJECTION, SOLUTION INTRAVENOUS at 17:29

## 2025-01-28 RX ADMIN — DEXTRAN 70, GLYCERIN, HYPROMELLOSE 1 DROP: 1; 2; 3 SOLUTION/ DROPS OPHTHALMIC at 14:18

## 2025-01-28 RX ADMIN — FUROSEMIDE 40 MG: 10 INJECTION, SOLUTION INTRAMUSCULAR; INTRAVENOUS at 20:16

## 2025-01-28 RX ADMIN — NOREPINEPHRINE BITARTRATE 0.02 MCG/KG/MIN: 0.06 INJECTION, SOLUTION INTRAVENOUS at 17:09

## 2025-01-28 RX ADMIN — NOREPINEPHRINE BITARTRATE 0.04 MCG/KG/MIN: 0.02 INJECTION, SOLUTION INTRAVENOUS at 15:04

## 2025-01-28 RX ADMIN — CHLORHEXIDINE GLUCONATE ORAL RINSE 15 ML: 1.2 SOLUTION DENTAL at 08:14

## 2025-01-28 RX ADMIN — MINERAL OIL AND PETROLATUM: 150; 830 OINTMENT OPHTHALMIC at 14:18

## 2025-01-28 RX ADMIN — NOREPINEPHRINE BITARTRATE 0.03 MCG/KG/MIN: 0.02 INJECTION, SOLUTION INTRAVENOUS at 05:36

## 2025-01-28 RX ADMIN — PANTOPRAZOLE SODIUM 40 MG: 40 INJECTION, POWDER, FOR SOLUTION INTRAVENOUS at 08:11

## 2025-01-28 RX ADMIN — PROPOFOL 60 MCG/KG/MIN: 10 INJECTION, EMULSION INTRAVENOUS at 13:00

## 2025-01-28 RX ADMIN — Medication 75 MCG/HR: at 17:20

## 2025-01-28 RX ADMIN — PROPOFOL 60 MCG/KG/MIN: 10 INJECTION, EMULSION INTRAVENOUS at 14:54

## 2025-01-28 RX ADMIN — PROPOFOL 60 MCG/KG/MIN: 10 INJECTION, EMULSION INTRAVENOUS at 07:35

## 2025-01-28 RX ADMIN — Medication 20 NG/KG/MIN: at 10:20

## 2025-01-28 RX ADMIN — PROPOFOL 60 MCG/KG/MIN: 10 INJECTION, EMULSION INTRAVENOUS at 19:14

## 2025-01-28 RX ADMIN — Medication 10 MG: at 02:19

## 2025-01-28 RX ADMIN — VECURONIUM BROMIDE 0.5 MCG/KG/MIN: 1 INJECTION, POWDER, LYOPHILIZED, FOR SOLUTION INTRAVENOUS at 17:30

## 2025-01-28 RX ADMIN — SODIUM CHLORIDE 500 ML: 9 INJECTION, SOLUTION INTRAVENOUS at 08:34

## 2025-01-28 RX ADMIN — PROPOFOL 50 MCG/KG/MIN: 10 INJECTION, EMULSION INTRAVENOUS at 22:11

## 2025-01-28 RX ADMIN — PIPERACILLIN SODIUM AND TAZOBACTAM SODIUM 4.5 G: 4; .5 INJECTION, POWDER, LYOPHILIZED, FOR SOLUTION INTRAVENOUS at 02:14

## 2025-01-28 RX ADMIN — NOREPINEPHRINE BITARTRATE 0.04 MCG/KG/MIN: 0.02 INJECTION, SOLUTION INTRAVENOUS at 10:22

## 2025-01-28 RX ADMIN — OSELTAMAVIR PHOSPHATE 75 MG: 75 CAPSULE ORAL at 10:51

## 2025-01-28 RX ADMIN — DEXTRAN 70, GLYCERIN, HYPROMELLOSE 1 DROP: 1; 2; 3 SOLUTION/ DROPS OPHTHALMIC at 14:12

## 2025-01-28 RX ADMIN — PROPOFOL 60 MCG/KG/MIN: 10 INJECTION, EMULSION INTRAVENOUS at 17:12

## 2025-01-28 RX ADMIN — SODIUM CHLORIDE: 9 INJECTION, SOLUTION INTRAVENOUS at 09:50

## 2025-01-28 RX ADMIN — OSELTAMAVIR PHOSPHATE 75 MG: 75 CAPSULE ORAL at 01:22

## 2025-01-28 RX ADMIN — ALBUTEROL SULFATE 2.5 MG: 2.5 SOLUTION RESPIRATORY (INHALATION) at 20:47

## 2025-01-28 RX ADMIN — ENOXAPARIN SODIUM 40 MG: 40 INJECTION SUBCUTANEOUS at 10:19

## 2025-01-28 RX ADMIN — WHITE PETROLATUM 57.7 %-MINERAL OIL 31.9 % EYE OINTMENT: at 20:15

## 2025-01-28 RX ADMIN — SODIUM CHLORIDE 500 ML: 0.9 INJECTION, SOLUTION INTRAVENOUS at 11:04

## 2025-01-28 RX ADMIN — Medication 1250 MG: at 03:14

## 2025-01-28 RX ADMIN — Medication 20 NG/KG/MIN: at 00:44

## 2025-01-28 RX ADMIN — AZITHROMYCIN MONOHYDRATE 500 MG: 500 INJECTION, POWDER, LYOPHILIZED, FOR SOLUTION INTRAVENOUS at 06:37

## 2025-01-28 RX ADMIN — VECURONIUM BROMIDE 0.8 MCG/KG/MIN: 10 INJECTION, POWDER, LYOPHILIZED, FOR SOLUTION INTRAVENOUS at 09:51

## 2025-01-28 RX ADMIN — PROPOFOL 60 MCG/KG/MIN: 10 INJECTION, EMULSION INTRAVENOUS at 02:19

## 2025-01-28 RX ADMIN — PIPERACILLIN SODIUM AND TAZOBACTAM SODIUM 4.5 G: 4; .5 INJECTION, POWDER, LYOPHILIZED, FOR SOLUTION INTRAVENOUS at 08:32

## 2025-01-28 RX ADMIN — PIPERACILLIN AND TAZOBACTAM 3.38 G: 3; .375 INJECTION, POWDER, LYOPHILIZED, FOR SOLUTION INTRAVENOUS at 13:52

## 2025-01-28 RX ADMIN — DEXAMETHASONE SODIUM PHOSPHATE 20 MG: 10 INJECTION, SOLUTION INTRAMUSCULAR; INTRAVENOUS at 20:36

## 2025-01-28 RX ADMIN — PROPOFOL 60 MCG/KG/MIN: 10 INJECTION, EMULSION INTRAVENOUS at 04:44

## 2025-01-28 RX ADMIN — PROPOFOL 60 MCG/KG/MIN: 10 INJECTION, EMULSION INTRAVENOUS at 10:14

## 2025-01-28 RX ADMIN — PIPERACILLIN SODIUM AND TAZOBACTAM SODIUM 3.38 G: 3; .375 INJECTION, SOLUTION INTRAVENOUS at 22:10

## 2025-01-28 RX ADMIN — CHLORHEXIDINE GLUCONATE 15 ML: 1.2 SOLUTION ORAL at 20:16

## 2025-01-28 RX ADMIN — MUPIROCIN: 20 OINTMENT TOPICAL at 11:05

## 2025-01-28 ASSESSMENT — ACTIVITIES OF DAILY LIVING (ADL)
ADLS_ACUITY_SCORE: 38
ADLS_ACUITY_SCORE: 63
ADLS_ACUITY_SCORE: 63
ADLS_ACUITY_SCORE: 69
ADLS_ACUITY_SCORE: 69
ADLS_ACUITY_SCORE: 35
ADLS_ACUITY_SCORE: 38
ADLS_ACUITY_SCORE: 35
ADLS_ACUITY_SCORE: 63
ADLS_ACUITY_SCORE: 35
ADLS_ACUITY_SCORE: 63
ADLS_ACUITY_SCORE: 38
ADLS_ACUITY_SCORE: 57
ADLS_ACUITY_SCORE: 35
ADLS_ACUITY_SCORE: 38
ADLS_ACUITY_SCORE: 38
ADLS_ACUITY_SCORE: 35

## 2025-01-28 NOTE — PROGRESS NOTES
Patient continues to be in prone position with head turned q 2hrs. 3 staff assisting with repositioning, no problems noted, patient tolerated well. Oral cares provided q 2hrs. Drips titrated per orders to maintain adequate blood pressure and sedation. Urine output continues to be low. Second 500cc NS bolus administered per order.

## 2025-01-28 NOTE — PHARMACY
Pharmacy - Transfer Medication Reconciliation     The patient's transfer medication orders have been compared to the medication administration record and to the Prior to Admissions Medications list - any noted discrepancies were resolved with the MD.     Thank you. Pharmacy will continue to monitor.     Sreekanth Pearson [facility] ....................  1/27/2025   8:30 PM

## 2025-01-28 NOTE — PROGRESS NOTES
DATE/TIME OF CALL RECEIVED FROM LAB:  01/28/25 at 12:47 PM   LAB TEST:  pH  LAB VALUE:  7.12  PROVIDER NOTIFIED?: Yes  PROVIDER NAME:  Hugh SANCHEZ will notify Dr Shepard  DATE/TIME LAB VALUE REPORTED TO PROVIDER: 9848  MECHANISM OF PROVIDER NOTIFICATION: Phone Call  PROVIDER RESPONSE: Will view into room and discuss vent changes with RT

## 2025-01-28 NOTE — PLAN OF CARE
Neuro: Rass goal of -4 to -5.   CV: NS  Resp: Vent: FiO2-80%, RR-20, Tv-500, PEEP- 15. Able to wean Fi02 from 100% to 80% overnight. Expiratory wheezes heard in all lung fields. ET tube at 25cm at teeth  GI: OG at 66cm. Low continuous suction.   : Portillo in place, adequate output.   Skin: L lip abrasion  Drips: Prop at 60. Fent at 100. Nimbex at 3. Levo at 0.05.     Goal Outcome Evaluation:      Plan of Care Reviewed With: patient    Overall Patient Progress: no changeOverall Patient Progress: no change    Outcome Evaluation: Intubated/sedated, able to wean fi02 on vent

## 2025-01-28 NOTE — PROGRESS NOTES
TELE ICU Progress Note          Assessment and Plan:     Jesus Varghese is a 64 year old patient being cared for in the ICU for severe ARDS. Pertinent assessment and recommendations include:  Neurology: Encephalopathy, likely toxic metabolic   Sedation and analgesia for MV     -Deep sedation, RASS goal -4 to -5.  Use propofol and fentanyl.   -NMB with Vecuronium gtt for vent synchrony and severe ARDS    Cardiovascular / Hemodynamics: Circulatory shock, likely vasoplegic from sedation and acidosis.  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  PFT in 2022 showed no obstruction (No significant emphysema on my review), but showed moderate restriction (this was done after COVID infection so likely related to that)    -Vent support for ARDS FiO2 (%): 80 %, Resp: 17, Vent Mode: CMV/AC, Resp Rate (Set): (S) 28 breaths/min (per tele ICU), Tidal Volume (Set, mL): (S) 440 mL (per tele ICU), PEEP (cm H2O): 15 cmH2O, Resp Rate (Set): (S) 28 breaths/min (per tele ICU), Tidal Volume (Set, mL): (S) 440 mL (per tele ICU), PEEP (cm H2O): 15 cmH2O  -I attempted to decrease the plateau with reducing the TV along with increasing the RR so we can keep acceptable minute ventilation but no significant success with that and he is still with significant respiratory acidosis   -Continue NMB, prone  ventilation and full strength inhaled Veletri   -Hold of corticosteroids as literature does not support steroids in influenza and there is a concern for potentially increased mortality  -Despite all the above with maximal conventional ventilatory support, his P/F ratio is still < 100 and unable to ventilate him adequately due to the low lung compliance with the plateau > 35. Due to this we discussed with Dr. Hutson from the [facility] VV ECMO team who was aware of the patient case from last night and he thought that the patient is an acceptable candidate for VV ECMO, this was not done yesterday due to being unstable to transfer.  Today we discussed with Dr. Purdy who talked to general surgery in the hospital and team is willing to cannulate the patient and initiate ECMO support before transferring to the [place].  VV ECMO team will send perfusionist team to [facility] with the needed supply.  The plan would be to cannulate the patient using [place] approach at the bedside and then transfer to the [facility].   GI and Nutrition: Elevated AST, normal ALT and Tbili    -Check CK  -OG is not clear to be in the right position (I cannot see the sidehole), Ok to give meds but no nutrition, we will need to re-image when supine and adjust the tube position accordingly    Renal, Fluid and Electrolytes: DEEP, likely related to prerenal azotemia  +/- ATN due to above   Hyponatremia   Hypokalemia     -Monitor UOP  -Getting IVF per hospitalist but I recommended to limit the IVF to < 1-1.5 L due to his respiratory status, I would rather keep him on the dry side.  -So far no indication for RRT with normal K, HCO3, but we will watch closely every 8 hours      Infectious Disease: Influenza A with superimposed MRSA pneumonia   Septic shock     -Continue broad spectrum Ab with Vanco/Zosyn/Azithro and tamiflu   -Reduce the dse of Tamiflu to 30 mg BID due to the DEEP above    Hematology and Oncology: Hx of CLL, completed 8  cycles of acalabrutinib with complete response and now he is on surveillance  Thrombocytopenia, stable 119    -Monitor   -Check INR/Fibrinogen/D dimer    Endocrinology: BS control per ICU protocol, BS goal < 185    -sliding scale insulin     Intensive Care: Central line: Central line present, needed and to be continued  Arterial line: Arterial line present, needed and to be continued  Restraint: Not indicated   Portillo catheter: Present and needed and to be continued   DVT prophylaxis: SCD, heparin SQ  GI prophylaxis: ppi   Primary Team Communication: Discussed with Dr. Purdy   Billzuly: Total critical care time spent by me, excluding procedures, was 65 minutes.      Steven Shepard MD  1/28/2025           Hospital Course and Key events       The patient remains critically ill with Hypoxic respiratory failure and Hypercarbic respiratory failure. In the last 24 hours, he had been requiring increasing support on the ventilator requiring increased PEEP, Veletri and NMB.  He was also proned.  He was started on vasopressors for hypotension and shock.             Medications:     I have reviewed this patient's current medications  Current Facility-Administered Medications   Medication Dose Route Frequency Provider Last Rate Last Admin    epoprostenol (VELETRI) inhalation solution  20 ng/kg/min (Ideal) Nebulization Continuous Monica Ryan MD 3.9 mL/hr at 01/28/25 0719 20 ng/kg/min at 01/28/25 0719    fentaNYL (SUBLIMAZE) PCA 50 mcg/mL OPIOID NAIVE   mcg/hr Intravenous Continuous Tobin Bojorquez MD 2 mL/hr at 01/28/25 0722 100 mcg/hr at 01/28/25 0722    propofol (DIPRIVAN) infusion  5-75 mcg/kg/min Intravenous Continuous Daniel Purdy MD 34.6 mL/hr at 01/28/25 1014 60 mcg/kg/min at 01/28/25 1014    And    Medication Instruction   Does not apply Continuous PRN Daniel Purdy MD        No lozenges or gum should be given while patient on BIPAP/AVAPS/AVAPS AE   Does not apply Continuous PRN Flavia Mansfield MD         norepinephrine (LEVOPHED) 4 mg in  mL infusion PREMIX  0.01-0.6 mcg/kg/min Intravenous Continuous Monica Ryan MD 10.8 mL/hr at 25 0906 0.03 mcg/kg/min at 25 0906    Patient may continue current oral medications   Does not apply Continuous PRN Flavia Mansfield MD        sodium chloride 0.9 % infusion   Intravenous Continuous LickDaniel  mL/hr at 25 0950 New Bag at 25 0950    vecuronium (NORCURON) 1 mg/mL in D5W 50 mL  0.1-1.7 mcg/kg/min (Ideal) Intravenous Continuous Steven Shepard MD 3.1 mL/hr at 25 0951 0.8 mcg/kg/min at 25 0951          Vitals and Exam:       Vital Sign Ranges  Temperature Temp  Av.7  F (37.1  C)  Min: 97.5  F (36.4  C)  Max: 99.8  F (37.7  C)   Blood pressure Systolic (24hrs), Av , Min:90 , Max:196        Diastolic (24hrs), Av, Min:61, Max:110      Pulse Pulse  Av.8  Min: 80  Max: 116   Respirations Resp  Av.8  Min: 10  Max: 42   Pulse oximetry SpO2  Av.1 %  Min: 56 %  Max: 97 %       I/O    Intake/Output Summary (Last 24 hours) at 2025 1016  Last data filed at 2025 0900  Gross per 24 hour   Intake 1719.5 ml   Output 980 ml   Net 739.5 ml       FiO2 (%): 80 %, Resp: 24, Vent Mode: CMV/AC, Resp Rate (Set): (S) 24 breaths/min (per tele ICU), Tidal Volume (Set, mL): 500 mL, PEEP (cm H2O): 15 cmH2O, Resp Rate (Set): (S) 24 breaths/min (per tele ICU), Tidal Volume (Set, mL): 500 mL, PEEP (cm H2O): 15 cmH2O    Physical Examination:   I examined this patient remotely using the telemedicine camera in the [facility]. The patient is located at [facility]    General Appearance:   Critically ill and proned    HEENT:   Endotrachael tube is present     Respiratory:   No respiratory distress     Neuro:   Sedation: heavily sedated and unawakable   Other   None           Pertinent Data:     All pertinent labs and diagnostic studies were reviewed    Labs:  CMP  Recent Labs    Lab 01/28/25  0533 01/27/25  1600 01/27/25  0544 01/26/25  0810 01/26/25  0309   * 131* 133*  --  131*   POTASSIUM 5.4* 4.4 4.7 4.0 4.4   CHLORIDE 98 96* 97*  --  93*   CO2 25 23 23  --  23   ANIONGAP 10 12 13  --  15   * 189* 127*  --  153*   BUN 28.2* 17.1 20.4  --  24.3*   CR 1.84* 0.84 1.05  --  1.23*   GFRESTIMATED 40* >90 78  --  65   LILIAN 8.5* 8.2* 9.2  --  9.5   MAG 2.3  --   --  2.0  --    PHOS 5.9*  --   --  4.4  --    PROTTOTAL 5.8* 6.2* 6.6  --  7.3   ALBUMIN 3.1* 3.3* 3.4*  --  4.2   BILITOTAL 0.4 0.5 0.5  --  0.9   ALKPHOS 79 92 85  --  71   AST 74* 98* 108*  --  106*   ALT 59 66 76*  --  88*     CBC  Recent Labs   Lab 01/28/25  0533 01/27/25  1600 01/27/25  0544 01/26/25  0309   WBC 8.1 8.2 7.5 9.5   RBC 4.80 5.10 5.17 5.42   HGB 13.8 14.6 14.9 15.6   HCT 41.6 43.3 43.9 44.7   MCV 87 85 85 83   MCH 28.8 28.6 28.8 28.8   MCHC 33.2 33.7 33.9 34.9   RDW 13.2 13.3 13.2 13.1   * 93* 94* 100*     INRNo lab results found in last 7 days.  Arterial Blood Gas  Recent Labs   Lab 01/28/25  0908 01/28/25  0533 01/28/25  0033 01/27/25  2255   PH 7.19* 7.15* 7.15* 7.15*   PCO2 63* 77* 78* 80*   PO2 76* 72* 80 49*   HCO3 24 27 27 28   O2PER 80 80 90 100     Lactic Acid   Date Value Ref Range Status   01/28/2025 0.9 0.7 - 2.0 mmol/L Final       Imaging:  XR Chest Port 1 View  Narrative: EXAM: XR CHEST PORT 1 VIEW  LOC[facility]  DATE: 1/27/2025    INDICATION: Diminished breath sounds right side  COMPARISON: 1/27/2025 at 1534 hours  Impression: IMPRESSION: Interval placement of nasogastric tube which terminates over the stomach. Side-port projects over the GE junction, and advancement by 3 to 4 cm is suggested. Remaining lines and support tubes are stable in appearance. Extensive bilateral   interstitial and airspace opacities are unchanged. No pneumothorax. No other significant interval change.  XR Abdomen 1 View  Narrative: EXAM: XR ABDOMEN 1 VIEW  LOCATION: [facility]  DATE: 1/27/2025 9:42 PM    INDICATION: s p OG advancement  COMPARISON: 1/27/2025 7:35 PM  Impression: IMPRESSION:     Limited radiograph for tube placement. The diaphragm is not included in the field-of-view, limiting direct comparison of tube position relative to prior. The enteric tube tip remains in the stomach and the sidehole is not visualized. Consider advancing 5   cm, and consider repeat radiographs including the diaphragm.    Right upper quadrant surgical clips.    Normal, nonobstructive bowel gas pattern.  XR Abdomen Port 1 View  Narrative: EXAM: XR ABDOMEN PORT 1 VIEW  LOCATION: [facility]  DATE: 1/27/2025    INDICATION: OG tube placement.  COMPARISON: None available.  Impression: IMPRESSION: Enteric suction tube tip overlies the region of the gastric fundus. Proximal side-port is located at the gastroesophageal junction. Further advancement is recommended.    There are bibasilar airspace consolidations, with associated air bronchograms.    Nonspecific bowel gas pattern, due to a paucity of bowel gas and incomplete abdominal imaging.    Right upper quadrant cholecystectomy clips. Atherosclerotic calcifications  XR Chest Port 1 View  Narrative: Exam:  XR CHEST PORT 1 VIEW    HISTORY: Endotracheal tube positioning.    COMPARISON:  1/27/2025, 01/26/2025    FINDINGS:     There has been placement of a right-sided central venous catheter  which terminates in the right atrium. There are has been placement of  an endotracheal tube which terminates 3 cm above the maggy.    The cardiomediastinal contours are obscured.      There are diffuse opacities throughout both lungs. No pneumothorax. No  definite pleural effusion.      No acute osseous abnormality.   Impression: IMPRESSION:      There has been placement of an endotracheal tube which terminates 3 cm  above the maggy.    The right-sided central venous catheter terminates in the right  atrium.    Diffuse  opacities throughout both lungs.      NEDRA SMYTH MD         SYSTEM ID:  [id number]  Echocardiogram Complete  [id number]  [id number]  [id number]  [id number]^MULU^RENAN^TAWANDA     [facility]  [address] [place] [address]     Name: ROSALINA MEZA  MRN: [id number]  : 1960  Study Date: 2025 09:10 AM  Age: 64 yrs  Gender: Male  Patient Location: [facility]  Reason For Study: Respiratory Failure  Ordering Physician: RENAN HARDWICK  Performed By: Farnaz Carrington     BSA: 2.0 m2  Height: 66 in  Weight: 211 lb  HR: 102  BP: 129/73 mmHg  ______________________________________________________________________________  Procedure  Echocardiogram with two-dimensional, color and spectral Doppler. Technically  difficult study.Extremely poor acoustic windows.  ______________________________________________________________________________  Interpretation Summary  Technically difficult study.Extremely poor acoustic windows.  Global and regional left ventricular function is normal with an EF of 60-65%.  Right ventricular function, chamber size, wall motion, and thickness are  normal.  Pulmonary artery systolic pressure cannot be assessed.  The inferior vena cava is normal.  No pericardial effusion is present.  No significant valvular abnormalities present.  There is no prior study for direct comparison.  ______________________________________________________________________________  Left Ventricle  Global and regional left ventricular function is normal with an EF of 60-65%.  Left ventricular wall thickness is normal. Left ventricular size is normal.  Left ventricular diastolic function is normal. No regional wall motion  abnormalities are seen.     Right Ventricle  Right ventricular function, chamber size, wall motion, and thickness are  normal.     Atria  Both atria appear normal.     Mitral Valve  The mitral valve is normal.     Aortic Valve  Aortic valve sclerosis is present. The valve leaflets  are not well visualized.  On Doppler interrogation, there is no significant stenosis or regurgitation.     Tricuspid Valve  The valve leaflets are not well visualized. On Doppler interrogation, there is  no significant stenosis or regurgitation. Pulmonary artery systolic pressure  cannot be assessed.     Pulmonic Valve  The valve leaflets are not well visualized. On Doppler interrogation, there is  no significant stenosis or regurgitation.     Vessels  The thoracic aorta is normal. The pulmonary artery cannot be assessed. The  inferior vena cava is normal.     Pericardium  No pericardial effusion is present. Prominent epicardial fat is noted.     Miscellaneous  No significant valvular abnormalities present.     Compared to Previous Study  There is no prior study for direct comparison.  ______________________________________________________________________________  MMode/2D Measurements & Calculations  IVSd: 0.99 cm  LVIDd: 5.2 cm  LVIDs: 3.0 cm  LVPWd: 1.1 cm  FS: 42.4 %  LV mass(C)d: 205.2 grams  LV mass(C)dI: 100.3 grams/m2  Ao root diam: 3.1 cm  asc Aorta Diam: 3.3 cm  LVOT diam: 2.0 cm  LVOT area: 3.1 cm2  Ao root diam index Ht(cm/m): 1.8  Ao root diam index BSA (cm/m2): 1.5  Asc Ao diam index BSA (cm/m2): 1.6  Asc Ao diam index Ht(cm/m): 2.0  LA Volume (BP): 40.6 ml     LA Volume Index (BP): 19.8 ml/m2  RWT: 0.43  TAPSE: 2.5 cm     Doppler Measurements & Calculations  MV E max bobby: 62.1 cm/sec  MV A max bobby: 94.7 cm/sec  MV E/A: 0.66  MV dec time: 0.18 sec  Ao V2 max: 179.0 cm/sec  Ao max P.0 mmHg  Ao V2 mean: 126.0 cm/sec  Ao mean P.0 mmHg  Ao V2 VTI: 25.1 cm  DANDRE(I,D): 2.8 cm2  DANDRE(V,D): 2.4 cm2  LV V1 max P.4 mmHg  LV V1 max: 136.0 cm/sec  LV V1 VTI: 22.1 cm  SV(LVOT): 69.4 ml  SI(LVOT): 33.9 ml/m2  PA acc time: 0.13 sec     AV Bobby Ratio (DI): 0.76  DANDRE Index (cm2/m2): 1.4  E/E' av.3  Lateral E/e': 8.9  Medial E/e': 5.7      ______________________________________________________________________________  Report approved by: YOHANNES Gaitan MD on 01/27/2025 11:32 AM        XR Chest Port 1 View  Narrative: EXAM: XR CHEST PORT 1 VIEW  LOCATION: [facility]  DATE: 1/27/2025    INDICATION: hypoxia  COMPARISON: CT 1/26/2025.    FINDINGS: The heart is enlarged. The thoracic aorta is calcified. Diffuse bilateral pulmonary infiltrates. No pneumothorax.  Impression: IMPRESSION: Bilateral pulmonary infiltrates.

## 2025-01-28 NOTE — PROGRESS NOTES
Family asked writer to remove two rings. Writer removed rings from bilateral hands with water based lubricant. Rings given to wife and daughter. Family took home all patient belongings, bag, two earrings and two rings.

## 2025-01-28 NOTE — PROGRESS NOTES
DATE/TIME OF CALL RECEIVED FROM LAB:  01/28/25 at 12:12 PM   LAB TEST:  pH & pCO2  LAB VALUE:  7.12 & 76  PROVIDER NOTIFIED?: Yes  PROVIDER NAME: Dr Shepard via Tele ICU RN  DATE/TIME LAB VALUE REPORTED TO PROVIDER: 9727  MECHANISM OF PROVIDER NOTIFICATION: Phone Call  PROVIDER RESPONSE: Teled into room with RT,  Vent adjustments made, ABG's ordered in 30 mins from vent adjustments.

## 2025-01-28 NOTE — H&P
[facility]    ICU History and Physical    Primary Team: ICU, will need medical team on discharge from ICU  Reason for Critical Care Admission: Evaluation for ECMO  Admitting Physician: Alvaro Hutson MD  Date of Admission:  1/28/2025    Assessment: Critical Care   Jesus Varghese is a 64 year old male transferred on 1-28 from outside hospital for evaluation for ECMO cannulation.  In brief this is a 64-year-old male with past medical history of hypertension, colon cancer, chronic lymphocytic leukemia, psoriasis, and type 2 diabetes who presented to outside hospital with acute influenza A infection.  Clinical picture worsening requiring intubation and proning at outside hospital.        Plan: Critical Care   Neuro/ pain/ sedation:  - Monitor neurological status. Notify provider for any acute changes in exam  -Fent and propofol, RASS -5  -Vecuronium    Pulmonary:  #ARDS  #influenza A with superimposed MRSA pneumonia.   - Ventilator settings: CMV: Rate 24, PEEP 12, tidal volume 500.  Follow-up ABG ordered for 6 PM.  - supplemental oxygen to keep saturation about 92%  -Ventilator associated fluticasone as well as FLOLAN.  -hydrocortisone taper ordered    Cardiovascular:  #Hypertension  -As needed labetalol and hydralazine for SBP greater than 160  - Monitor hemodynamic status.  -Was transferred on Levophed    GI/Nutrition:  - Diet: NPO for Medical/Clinical Reasons Except for: NPO but receiving Tube Feeding    - Nutrition consulted. Appreciate recommendations.   -Tube feedings per dietitian    Renal/ Fluid Balance:   - Will monitor intake and output  -LR at 125 for IV fluid hydration  - ICU electrolyte replacement protocol    Endocrine:  #History of type 2 diabetes  -High sliding scale insulin  -hydrocortisone taper ordered    ID:  #Influenza A  -Currently on Tamiflu from 1-26, will plan on 10-day course  -Continue Vanco and Zosyn (started on 1-27)  -Continue  azithromycin (started on 1-26)    Hematology:  - trend CBC    MSK:   - PT and OT consulted. Appreciate recommendations.     Lines/ tubes/ drains:  Portillo Catheter: Not present  Lines: PRESENT               Central line: Central line present, needed and to be continued  Arterial line: Arterial line present, needed and to be continued  Restraint: Not indicated   Portillo catheter: Present and needed and to be continued   DVT prophylaxis: SCD, heparin SQ  GI prophylaxis: ppi       Prophylaxis:  - DVT Prophylaxis: Heparin SQ  - PUD Prophylaxis: protonix    Code Status: Full Code      Disposition:  - ICU, no ECMO at this time    The patient's care was discussed with the Attending Physician, Dr. Garcia .    Clinically Significant Risk Factors Present on Admission        # Hyperkalemia: Highest K = 5.4 mmol/L in last 2 days, will monitor as appropriate  # Hyponatremia: Lowest Na = 131 mmol/L in last 2 days, will monitor as appropriate  # Hypochloremia: Lowest Cl = 96 mmol/L in last 2 days, will monitor as appropriate      # Hypoalbuminemia: Lowest albumin = 3.1 g/dL at 1/28/2025  5:33 AM, will monitor as appropriate   # Drug Induced Platelet Defect: home medication list includes an antiplatelet medication  # Acute Kidney Injury, unspecified: based on a >150% or 0.3 mg/dL increase in last creatinine compared to past 90 day average, will monitor renal function    # Circulatory Shock: required vasopressors within past 24 hours      # Acute Hypoxic Respiratory Failure: Documented O2 saturation < 90%. Continue supplemental oxygen as needed  # Acute Hypoxic Respiratory Failure: Documented O2 saturation < 90%. Continue supplemental oxygen as needed  # Acute Hypercapnic Respiratory Failure: based on arterial blood gas results.  Continue supplemental oxygen and ventilatory support as indicated.       # DMII: A1C = 6.5 % (Ref range: <5.7 %) within past 6 months    # Obesity: Estimated body mass index is 33.68 kg/m  as calculated from the  "following:    Height as of 1/26/25: 1.689 m (5' 6.5\").    Weight as of 1/27/25: 96.1 kg (211 lb 13.8 oz).                  Mark Florence MD  [facility]  Securely message with [employer] (more info)  Text page via [facility] Paging/Directory     ______________________________________________________________________    Chief Complaint   Influenza A    History of Present Illness   Jesus Varghese is a 64 year old male transferred on 1-28 from outside hospital for evaluation for ECMO cannulation.  In brief this is a 64-year-old male with past medical history of hypertension, colon cancer, chronic lymphocytic leukemia, psoriasis, and type 2 diabetes who presented to outside hospital with acute influenza A infection.  Clinical picture worsening requiring intubation and proning at outside hospital.     Review of Systems    Review of systems not obtained due to patient factors - intubation and sedation    Past Medical History    I have reviewed this patient's medical history and updated it with pertinent information if needed.   Past Medical History:   Diagnosis Date    Acute respiratory failure with hypoxia (H) 2/6/2022    Age-related cataract     No Comments Provided    Calculus of kidney     No Comments Provided    Carpal tunnel syndrome     No Comments Provided    CLL (chronic lymphocytic leukemia) (H) 2/6/2022    Diverticulosis of intestine without perforation or abscess without bleeding     mild    Dorsalgia     No Comments Provided    Inflammatory liver disease     ? cause 1982    Malignant neoplasm of colon (H)     2006    Other specified postprocedural states     1/14/2016    Pneumonia due to 2019 novel coronavirus 2/6/2022    Pneumonia of both lungs due to infectious organism 2/6/2022    Pure hyperglyceridemia     No Comments Provided    Strain of muscle, fascia and tendon of other parts of biceps, left arm, initial encounter     1/7/2016       Past Surgical History   I " have reviewed this patient's surgical history and updated it with pertinent information if needed.  Past Surgical History:   Procedure Laterality Date    BONE MARROW BIOPSY, BONE SPECIMEN, NEEDLE/TROCAR Left 2022    Procedure: BIOPSY, BONE MARROW;  Surgeon: Delia Holguin MD;  Location: [facility] OR    COLON SURGERY      06,Sigmoid colectomy for [facility] C2 adenocarcinoma    COLONOSCOPY      07,next due in     COLONOSCOPY      2010,F/U     COLONOSCOPY  10/19/2015    10/19/15,F/U     COLONOSCOPY N/A 2022    F/U  h/o colon cancer    EXTRACAPSULAR CATARACT EXTRATION WITH INTRAOCULAR LENS IMPLANT      , ,Bilateral cataracts R .. L     LAPAROSCOPIC CHOLECYSTECTOMY      07    OTHER SURGICAL HISTORY      10/11/06,716427,PORTACATH,Placement of Port-A-Cath, right anterior chest, for chemotherapy    OTHER SURGICAL HISTORY      2010,,HERNIA REPAIR,Mesh Underlay    OTHER SURGICAL HISTORY      2/12/15,[id number],IR URETERAL STENT LEFT    OTHER SURGICAL HISTORY      2016,[id number],OTHER,Left,arthrex equipment used    TONSILLECTOMY, ADENOIDECTOMY, COMBINED      age 1    VASECTOMY       x2, spontaneous reconnected, so needed 2nd procedure       Social History   I have reviewed this patient's social history and updated it with pertinent information if needed.  Social History     Tobacco Use    Smoking status: Former     Current packs/day: 0.00     Average packs/day: 2.0 packs/day for 30.0 years (60.0 ttl pk-yrs)     Types: Cigarettes     Start date: 1980     Quit date: 2010     Years since quittin.6     Passive exposure: Past    Smokeless tobacco: Never    Tobacco comments:     Passive exposure in adult home.    Vaping Use    Vaping status: Never Used   Substance Use Topics    Alcohol use: Not Currently    Drug use: Never       Family History   I have reviewed this patient's family history and updated it with pertinent information if needed.  Family  History   Problem Relation Age of Onset    Hypertension Mother         Hypertension    Heart Disease Father         Heart Disease,40s and 50s    Heart Disease Other         Heart Disease,40s and 50s    Anesthesia Reaction No family hx of         Anesthesia Problem    Other - See Comments No family hx of         Stroke    Blood Disease No family hx of         Blood Disease    Bleeding Diathesis No family hx of        Prior to Admission Medications   Prior to Admission Medications   Prescriptions Last Dose Informant Patient Reported? Taking?   ACCU-CHEK GUIDE test strip  Self No No   Sig: USE 1 STRIP ONCE DAILY   Fluocinolone Acetonide Scalp 0.01 % OIL oil  Self No No   Sig: Apply topically at bedtime.   HEMP OIL OR EXTRACT OR OTHER CBD CANNABINOID, NOT MEDICAL CANNABIS,  Self Yes No   Sig: Take 4 drops by mouth At Bedtime    UNABLE TO FIND  Self Yes No   Sig: MEDICATION NAME: Cherry juice, 2 oz once daily   Vitamin D, Cholecalciferol, 25 MCG (1000 UT) CAPS  Self Yes No   Sig: Take 1 capsule by mouth daily    aspirin 81 MG EC tablet  Self Yes No   Sig: Take 81 mg by mouth daily.   blood glucose (NO BRAND SPECIFIED) lancets standard  Self No No   Sig: Dispense item covered by insurance. Test blood sugar 1 times daily.   blood glucose monitoring (NO BRAND SPECIFIED) meter device kit  Self No No   Sig: Dispense option covered by insurance. Test blood sugar 1 times daily.   blood glucose monitoring (SOFTCLIX) lancets  Self No No   Sig: USE 1  TO CHECK GLUCOSE ONCE DAILY   metFORMIN (GLUCOPHAGE) 500 MG tablet  Self No No   Sig: Take 1 tablet (500 mg) by mouth 2 times daily (with meals).   multivitamin, therapeutic (THERA-VIT) TABS tablet  Self Yes No   Sig: Take 1 tablet by mouth daily      Facility-Administered Medications: None     Allergies   No Known Allergies    Physical Exam   Vital Signs: Temp: 98.3  F (36.8  C) Temp src: Axillary BP: 107/64 Pulse: 70   Resp: 24 SpO2: (!) 91 % O2 Device: Mechanical Ventilator     Weight: 0 lbs 0 oz    General: intubated, sedated, and paralyzed  HEENT: back of head without lesion, patient proned  Neuro: intubated, sedated, and paralyzed  CV: RRR on monitor, weaned off levophed on transfer  Pulm: intubated  Abd: prone, unable to assess  : prone, unable to assess  Extremities: no rashes on exposed areas  Skin: no obvious rashes on exposed area  Psych: paralyzed    Data   I reviewed all medications, new labs and imaging results over the last 24 hours.  Arterial Blood Gases   Recent Labs   Lab 01/28/25  1704 01/28/25  1342 01/28/25  1228 01/28/25  1132   PH 7.21* 7.14* 7.12* 7.12*   PCO2 58* 69* 74* 76*   PO2 58* 119* 142* 140*   HCO3 23 23 24 25       Complete Blood Count   Recent Labs   Lab 01/28/25  1706 01/28/25  0533 01/27/25  1600 01/27/25  0544   WBC 8.0 8.1 8.2 7.5   HGB 12.6* 13.8 14.6 14.9   * 119* 93* 94*       Basic Metabolic Panel  Recent Labs   Lab 01/28/25  1714 01/28/25  1342 01/28/25  1319 01/28/25  0908 01/28/25  0533 01/27/25  1600   NA  --  134*  --  133* 133* 131*   POTASSIUM  --  5.1  --  5.2 5.4* 4.4   CHLORIDE  --  100  --  99 98 96*   CO2  --  20*  --  24 25 23   BUN  --  32.8*  --  30.8* 28.2* 17.1   CR  --  2.40*  --  2.04* 1.84* 0.84   * 154* 133* 149* 127* 189*       Liver Function Tests  Recent Labs   Lab 01/28/25  1342 01/28/25  0533 01/27/25  1600 01/27/25  0544 01/26/25  0309   AST  --  74* 98* 108* 106*   ALT  --  59 66 76* 88*   ALKPHOS  --  79 92 85 71   BILITOTAL  --  0.4 0.5 0.5 0.9   ALBUMIN  --  3.1* 3.3* 3.4* 4.2   INR 1.04  --   --   --   --        Pancreatic Enzymes  No lab results found in last 7 days.    Coagulation Profile  Recent Labs   Lab 01/28/25  1342   INR 1.04   PTT 29       IMAGING:  Recent Results (from the past 24 hours)   XR Abdomen Port 1 View    Narrative    EXAM: XR ABDOMEN PORT 1 VIEW  LOCATION: [facility]  DATE: 1/27/2025    INDICATION: OG tube placement.  COMPARISON: None available.       Impression    IMPRESSION: Enteric suction tube tip overlies the region of the gastric fundus. Proximal side-port is located at the gastroesophageal junction. Further advancement is recommended.    There are bibasilar airspace consolidations, with associated air bronchograms.    Nonspecific bowel gas pattern, due to a paucity of bowel gas and incomplete abdominal imaging.    Right upper quadrant cholecystectomy clips. Atherosclerotic calcifications   XR Abdomen 1 View    Narrative    EXAM: XR ABDOMEN 1 VIEW  LOCATION: [facility]  DATE: 1/27/2025 9:42 PM    INDICATION: s p OG advancement  COMPARISON: 1/27/2025 7:35 PM      Impression    IMPRESSION:     Limited radiograph for tube placement. The diaphragm is not included in the field-of-view, limiting direct comparison of tube position relative to prior. The enteric tube tip remains in the stomach and the sidehole is not visualized. Consider advancing 5   cm, and consider repeat radiographs including the diaphragm.    Right upper quadrant surgical clips.    Normal, nonobstructive bowel gas pattern.   XR Chest Port 1 View    Narrative    EXAM: XR CHEST PORT 1 VIEW  LOCATION: [facility]  DATE: 1/27/2025    INDICATION: Diminished breath sounds right side  COMPARISON: 1/27/2025 at 1534 hours      Impression    IMPRESSION: Interval placement of nasogastric tube which terminates over the stomach. Side-port projects over the GE junction, and advancement by 3 to 4 cm is suggested. Remaining lines and support tubes are stable in appearance. Extensive bilateral   interstitial and airspace opacities are unchanged. No pneumothorax. No other significant interval change.

## 2025-01-28 NOTE — PROGRESS NOTES
Tele ICU Progress Note:      I am seeing the patient via telemedicine which does not replace a bedside examination by the in house provider. I reviewed all the data from epic and formulated a plan which was conveyed with the in house provider.    Assessment:  Jesus Varghese is 64 year old male with  PMH of CLL and HTN, admitted to ICU with acute hypoxic hypercapnic respiratory failure and ARDS in setting of Influenza A and suspected superimposed bacterial pneumonia, now paralyzed and proned with persistent hypoxia and hypercarbia..       Plan  Neuro: On sedation with propofol and fentanyl, on cisatricurium infusion. Train of four monitoring.  Resp: Acute hypoxic hypercapnic respiratory failure and ARDS. Tidal volume ~ 7-8 ml/kg, desaturation when tried to decrease tidal volume. Remains on PEEP of 15, have been able to wean FiO2 slightly. Proned since around midnight with improvement in O2 sats, P/F 90 on morning ABG. On full strength Veletri started overnight. Briefly discussed with [facility] ECMO team overnight, would be ECMO candidate based on blood gases. Likely too unstable for transfer unless cannulated. Will plan to discuss with ECMO team again today.  Cardiac: Now requiring low dose norepi, unclear if sedation related vs worsening sepsis. If increasing requirements would check lactate, consider Echo once supine.   Renal: DEEP, watching electrolytes and urine output  GI: NPO for now. Should discuss obtaining post-pyloric access once supine. Only oral medication currently is Tamiflu.  ID: Positive influenza A, GPC and GPB on sputum. On azithromycin, Zosyn, Vanc.   Heme/Onc: History of stable CLL. Slight thrombocytopenia, Hgb and WBC normal    Critical Care Time: 70 min.  I spent this time providing and directing critical care services      Monica Ryan MD PhD   of Medicine  Pulmonary Critical Care   [facility]    Hospital Course  Hospital Day: 2    Weight:  Wt Readings from Last  3 Encounters:   01/27/25 96.1 kg (211 lb 13.8 oz)   01/21/25 98.8 kg (217 lb 12.8 oz)   09/03/24 96.8 kg (213 lb 6.4 oz)       Vitals:   Temp:  [97.5  F (36.4  C)-100.4  F (38  C)] 99.1  F (37.3  C)  Pulse:  [] 80  Resp:  [10-42] 20  BP: ()/() 90/62  MAP:  [57 mmHg-178 mmHg] 69 mmHg  Arterial Line BP: ()/(43-58) 108/49  FiO2 (%):  [80 %-100 %] 80 %  SpO2:  [56 %-97 %] 94 %    I/O's:    Intake/Output Summary (Last 24 hours) at 1/28/2025 0655  Last data filed at 1/28/2025 0400  Gross per 24 hour   Intake 1334.18 ml   Output 1105 ml   Net 229.18 ml       Vent Settings:   FiO2 (%): 80 %, Resp: 20, Vent Mode: CMV/AC, Resp Rate (Set): 20 breaths/min, Tidal Volume (Set, mL): 500 mL, PEEP (cm H2O): 15 cmH2O, Resp Rate (Set): 20 breaths/min, Tidal Volume (Set, mL): 500 mL, PEEP (cm H2O): 15 cmH2O   pH Arterial   Date Value Ref Range Status   01/28/2025 7.15 (LL) 7.35 - 7.45 Final     pCO2 Arterial   Date Value Ref Range Status   01/28/2025 77 (HH) 35 - 45 mm Hg Final     pO2 Arterial   Date Value Ref Range Status   01/28/2025 72 (L) 80 - 105 mm Hg Final     Bicarbonate Arterial   Date Value Ref Range Status   01/28/2025 27 21 - 28 mmol/L Final     O2 Sat, Arterial   Date Value Ref Range Status   01/28/2025 94.4 (L) 95.0 - 96.0 % Final     Base Excess/Deficit Arterial   Date Value Ref Range Status   01/28/2025 -3.8 (L) -3.0 - 3.0 mmol/L Final       Labs:  Recent Labs   Lab Test 01/28/25  0533 01/27/25  1600 01/27/25  0544   WBC 8.1 8.2 7.5   HGB 13.8 14.6 14.9   HCT 41.6 43.3 43.9   * 93* 94*     Recent Labs   Lab Test 01/28/25  0533 01/27/25  1600 01/27/25  0544 01/26/25  0810 01/26/25  0309 01/21/25  1411 08/26/24  1200 05/06/24  1026   * 131* 133*  --    < > 141 141 139   CO2 25 23 23  --    < > 26 24 24   BUN 28.2* 17.1 20.4  --    < > 17.8 17.0 11.9   PHOS 5.9*  --   --  4.4  --   --   --   --    LDH  --   --   --   --   --  226 227 252*    < > = values in this interval not displayed.  "    No results for input(s): \"INR\", \"PT\", \"PTT\" in the last 60975 hours.  Recent Labs   Lab Test 01/28/25  0533 01/28/25  0033 01/27/25  2255   PH 7.15* 7.15* 7.15*       Diagnostics:  I have reviewed the relevant diagnostic imaging, pertinent findings discussed above.    Microbiology:  I have reviewed the relevant microbiology.  Blood culture:  Results for orders placed or performed during the hospital encounter of 01/26/25   Blood Culture Peripheral Blood    Collection Time: 01/26/25  3:09 AM    Specimen: Peripheral Blood   Result Value Ref Range    Culture No growth after 2 days    Blood Culture Peripheral Blood    Collection Time: 01/26/25  3:09 AM    Specimen: Peripheral Blood   Result Value Ref Range    Culture No growth after 2 days    Results for orders placed or performed during the hospital encounter of 02/06/22   Blood Culture Arm, Left    Collection Time: 02/06/22 10:56 AM    Specimen: Arm, Left; Blood   Result Value Ref Range    Culture No Growth    Blood Culture Arm, Left    Collection Time: 02/06/22 10:56 AM    Specimen: Arm, Left; Blood   Result Value Ref Range    Culture No Growth       Urine culture:  No results found for this or any previous visit.    Medications:  Current Facility-Administered Medications   Medication Dose Route Frequency Provider Last Rate Last Admin    artificial tears ophthalmic ointment   Both Eyes Q8H Daniel Purdy MD        azithromycin (ZITHROMAX) 500 mg in  mL intermittent infusion  500 mg Intravenous Q24H Eugenio Luke MD   500 mg at 01/28/25 0637    chlorhexidine (PERIDEX) 0.12 % solution 15 mL  15 mL Mouth/Throat Q12H Daniel Purdy MD   15 mL at 01/27/25 2038    enoxaparin ANTICOAGULANT (LOVENOX) injection 40 mg  40 mg Subcutaneous Daily Eugenio Luke MD   40 mg at 01/27/25 0937    mupirocin (BACTROBAN) 2 % ointment   Topical Daily Eugenio Luke MD        oseltamivir (TAMIFLU) capsule 75 mg  75 mg Oral BID Daniel Purdy MD   75 mg at " 01/28/25 0122    pantoprazole (PROTONIX) IV push injection 40 mg  40 mg Intravenous QAM AC Daniel Purdy MD        piperacillin-tazobactam (ZOSYN) 4.5 g vial to attach to  mL bag  4.5 g Intravenous 4x Daily Daniel Purdy MD   4.5 g at 01/28/25 0214    sodium chloride (PF) 0.9% PF flush 10-40 mL  10-40 mL Intracatheter Q8H Daniel Purdy MD   10 mL at 01/27/25 2008    sodium chloride (PF) 0.9% PF flush 3 mL  3 mL Intracatheter Q8H Eugenio Luke MD   3 mL at 01/28/25 0637    vancomycin (VANCOCIN) 1,250 mg in 0.9% NaCl 250 mL intermittent infusion  1,250 mg Intravenous Q18H Daniel Purdy .7 mL/hr at 01/28/25 0314 1,250 mg at 01/28/25 0314     Current Facility-Administered Medications   Medication Dose Route Frequency Provider Last Rate Last Admin    cisatracurium (NIMBEX) 200 mg in D5W 100 mL infusion  3-10 mcg/kg/min (Ideal) Intravenous Continuous Daniel Purdy MD 5.9 mL/hr at 01/27/25 2003 3 mcg/kg/min at 01/27/25 2003    epoprostenol (VELETRI) inhalation solution  20 ng/kg/min (Ideal) Nebulization Continuous Monica Ryan MD 3.9 mL/hr at 01/28/25 0044 20 ng/kg/min at 01/28/25 0044    fentaNYL (SUBLIMAZE) PCA 50 mcg/mL OPIOID NAIVE   mcg/hr Intravenous Continuous Tobin Bojorquez MD 2 mL/hr at 01/27/25 2024 100 mcg/hr at 01/27/25 2024    propofol (DIPRIVAN) infusion  5-75 mcg/kg/min Intravenous Continuous Daniel Purdy MD 34.6 mL/hr at 01/28/25 0444 60 mcg/kg/min at 01/28/25 0444    And    Medication Instruction   Does not apply Continuous PRN Daniel Purdy MD        No lozenges or gum should be given while patient on BIPAP/AVAPS/AVAPS AE   Does not apply Continuous PRN Flavia Mansfield MD        norepinephrine (LEVOPHED) 4 mg in  mL infusion PREMIX  0.01-0.6 mcg/kg/min Intravenous Continuous Monica Ryan MD 18 mL/hr at 01/28/25 0640 0.05 mcg/kg/min at 01/28/25 0640    Patient may continue current oral medications   Does not apply Continuous PRN Real,  Flavia CUADRA MD

## 2025-01-28 NOTE — PLAN OF CARE
Problem: Gas Exchange Impaired  Goal: Optimal Gas Exchange  Outcome: Not Progressing  Intervention: Optimize Oxygenation and Ventilation  Recent Flowsheet Documentation  Taken 1/27/2025 1600 by Pauline Alas RN  Head of Bed (HOB) Positioning: HOB at 20-30 degrees  Taken 1/27/2025 1010 by Pauline Alas RN  Head of Bed (HOB) Positioning: HOB at 20-30 degrees     Problem: Pneumonia  Goal: Effective Oxygenation and Ventilation  Outcome: Not Progressing  Intervention: Promote Airway Secretion Clearance  Recent Flowsheet Documentation  Taken 1/27/2025 1600 by Pauline Alas RN  Activity Management: bedrest  Taken 1/27/2025 1010 by Pauline Alas RN  Activity Management: activity adjusted per tolerance  Intervention: Optimize Oxygenation and Ventilation  Recent Flowsheet Documentation  Taken 1/27/2025 1600 by Pauline Alas RN  Head of Bed (HOB) Positioning: HOB at 20-30 degrees  Taken 1/27/2025 1010 by Pauline Alas RN  Head of Bed (HOB) Positioning: HOB at 20-30 degrees   Goal Outcome Evaluation:    Patients respiratory status continued to deteriorate throughout the morning/afternoon. Patient was working hard to breath and was showing signs of wearing out. Sats reading low to mid 80's after attempted bedpan and would not recover. Discussed multiple times with provider and RT and decision was made to intubate. Patient was sedated and intubated by CRNA at 1445.   It took quite a while for patient to recover his SpO2- see flowsheets for VS documentation.  Propofol infusing and was utilizing boluses as patient started to wake up after intubation medications. He started to desaturate again as well so orders received to give vecuronium. SpO2 improved after vec was given. As vec began to wear off, patient began to overbreath the ventilator again and he desaturated into the 70's. Provider notified and orders received to start continuous NMB and fentanyl gtt to ensure adequate  sedation. (BIS monitoring not available at this hospital). As pharmacy was preparing NMB gtt, orders received to repeat vec since sats continued to decrease into the 60's. Patient is currently at 60 propofol, 100 fentanyl and was deeply sedated prior to second dose of vec. SpO2 reading 88% now instead of in the 60's. ABG was ordered for 30 minutes after vec was given, results in process.   Shift report given to Ian RN who will resume cares for NOC shift.

## 2025-01-28 NOTE — PROGRESS NOTES
Patient transferred from ICU bed to flight crew cot in prone position with assist of several staff. Tolerated well. Integrity of all LDA's maintained during transfer. Monitors and infusions switched from ICU room equipment to flight crew pumps and monitor. Patient care transferred to [facility] crew and assisted to helicopter on cot. Patient left facility via helicopter. Nurse to nurse report given to Claire SANCHEZ at receiving facility.

## 2025-01-28 NOTE — PROGRESS NOTES
RT assisted in proning of pt per tele MD. ETAD switched to no bite block ETAD. Cuff pressure checked pre-prone along with airway patency (suctioned). Tube 25 cm at the teeth. No complications while proning pt. Cuff pressure checked. Suction catheter passed with no complication. Tube depth remains the same. Pt is receiving set pressures. Pt's head turned to the left as of now. Will continue to monitor.    Glenna Ham, RT

## 2025-01-28 NOTE — PROGRESS NOTES
Patient remains in prone position. Sedated and chemically paralyzed. Drips titrated per order to maintain blood pressure and sedation. Minimal urine output, provider aware. Orders received for 500 ml bolus. Dr. Purdy and Tele-ICU doc discussing patients plan of care and providing guidance for Vent settings.

## 2025-01-28 NOTE — PROGRESS NOTES
Tele ICU Consult Note:      I am seeing the patient via telemedicine which does not replace a bedside examination by the in house provider. I reviewed all the data from epic and formulated a plan which was conveyed with the in house provider.    Assessment:  Jesus Varghese is 64 year old male who is admitted to the ICU for generalized weakness with a past medical history of hypertension, chronic lymphocytic leukemia, Colon cancer and psoriasis. Was found to be Influenza A positive with superimposed bacterial infection.      Hospital Course  Hospital Day: 1    Weight:  Wt Readings from Last 3 Encounters:   01/27/25 96.1 kg (211 lb 13.8 oz)   01/21/25 98.8 kg (217 lb 12.8 oz)   09/03/24 96.8 kg (213 lb 6.4 oz)       Vitals:   Temp:  [97.5  F (36.4  C)-100.4  F (38  C)] 97.5  F (36.4  C)  Pulse:  [] 95  Resp:  [12-42] 15  BP: ()/() 120/65  MAP:  [74 mmHg-86 mmHg] 74 mmHg  Arterial Line BP: (134-172)/(51-58) 136/52  FiO2 (%):  [92 %-100 %] 100 %  SpO2:  [56 %-96 %] 77 %    I/O's:    Intake/Output Summary (Last 24 hours) at 1/27/2025 1831  Last data filed at 1/27/2025 1753  Gross per 24 hour   Intake 937.28 ml   Output 625 ml   Net 312.28 ml       Vent Settings:   FiO2 (%): 100 %, Resp: 15, Vent Mode: CMV/AC, Resp Rate (Set): (S) 18 breaths/min (per tele ICU), Tidal Volume (Set, mL): 500 mL, PEEP (cm H2O): 15 cmH2O, Resp Rate (Set): (S) 18 breaths/min (per tele ICU), Tidal Volume (Set, mL): 500 mL, PEEP (cm H2O): 15 cmH2O   pH Arterial   Date Value Ref Range Status   01/27/2025 7.13 (LL) 7.35 - 7.45 Final     pCO2 Arterial   Date Value Ref Range Status   01/27/2025 78 (HH) 35 - 45 mm Hg Final     pO2 Arterial   Date Value Ref Range Status   01/27/2025 63 (L) 80 - 105 mm Hg Final     Bicarbonate Arterial   Date Value Ref Range Status   01/27/2025 26 21 - 28 mmol/L Final     O2 Sat, Arterial   Date Value Ref Range Status   01/27/2025 86.8 (L) 95.0 - 96.0 % Final     Base Excess/Deficit Arterial   Date  "Value Ref Range Status   01/27/2025 -5.3 (L) -3.0 - 3.0 mmol/L Final       Labs:  Recent Labs   Lab Test 01/27/25  1600 01/27/25  0544 01/26/25  0309   WBC 8.2 7.5 9.5   HGB 14.6 14.9 15.6   HCT 43.3 43.9 44.7   PLT 93* 94* 100*     Recent Labs   Lab Test 01/27/25  1600 01/27/25  0544 01/26/25  0810 01/26/25  0309 01/21/25  1411 08/26/24  1200 05/06/24  1026   * 133*  --  131* 141 141 139   CO2 23 23  --  23 26 24 24   BUN 17.1 20.4  --  24.3* 17.8 17.0 11.9   PHOS  --   --  4.4  --   --   --   --    LDH  --   --   --   --  226 227 252*     No results for input(s): \"INR\", \"PT\", \"PTT\" in the last 33146 hours.  Recent Labs   Lab Test 01/27/25  1601 01/27/25  1038 01/27/25  0647   PH 7.13* 7.35 7.37       Diagnostics:  I have reviewed the relevant diagnostic imaging.      Microbiology:  I have reviewed the relevant microbiology.  Blood culture:  Results for orders placed or performed during the hospital encounter of 01/26/25   Blood Culture Peripheral Blood    Collection Time: 01/26/25  3:09 AM    Specimen: Peripheral Blood   Result Value Ref Range    Culture No growth after 1 day    Blood Culture Peripheral Blood    Collection Time: 01/26/25  3:09 AM    Specimen: Peripheral Blood   Result Value Ref Range    Culture No growth after 1 day    Results for orders placed or performed during the hospital encounter of 02/06/22   Blood Culture Arm, Left    Collection Time: 02/06/22 10:56 AM    Specimen: Arm, Left; Blood   Result Value Ref Range    Culture No Growth    Blood Culture Arm, Left    Collection Time: 02/06/22 10:56 AM    Specimen: Arm, Left; Blood   Result Value Ref Range    Culture No Growth       Urine culture:  No results found for this or any previous visit.    Medications:  Current Facility-Administered Medications   Medication Dose Route Frequency Provider Last Rate Last Admin    artificial tears ophthalmic ointment   Both Eyes Q8H Tobin Bojorquez MD        azithromycin (ZITHROMAX) 500 mg in  mL " intermittent infusion  500 mg Intravenous Q24H Eugenio Luke MD   500 mg at 01/27/25 0538    chlorhexidine (PERIDEX) 0.12 % solution 15 mL  15 mL Mouth/Throat Q12H Daniel Purdy MD        enoxaparin ANTICOAGULANT (LOVENOX) injection 40 mg  40 mg Subcutaneous Daily Eugenio Luke MD   40 mg at 01/27/25 0937    oseltamivir (TAMIFLU) capsule 75 mg  75 mg Oral BID Daniel Purdy MD   75 mg at 01/27/25 0937    [START ON 1/28/2025] pantoprazole (PROTONIX) IV push injection 40 mg  40 mg Intravenous QAM AC Daniel Purdy MD        piperacillin-tazobactam (ZOSYN) 4.5 g vial to attach to  mL bag  4.5 g Intravenous 4x Daily Daniel Purdy MD   4.5 g at 01/27/25 1557    sodium chloride (PF) 0.9% PF flush 10-40 mL  10-40 mL Intracatheter Q8H Daniel Purdy MD        sodium chloride (PF) 0.9% PF flush 3 mL  3 mL Intracatheter Q8H Eugenio Luke MD   3 mL at 01/27/25 1542    [START ON 1/28/2025] vancomycin (VANCOCIN) 1,250 mg in 0.9% NaCl 250 mL intermittent infusion  1,250 mg Intravenous Q18H Daniel Purdy MD         Current Facility-Administered Medications   Medication Dose Route Frequency Provider Last Rate Last Admin    cisatracurium (NIMBEX) 200 mg in D5W 100 mL infusion  3-10 mcg/kg/min (Ideal) Intravenous Continuous Tobin Bojorquez MD        fentaNYL (SUBLIMAZE) PCA 50 mcg/mL OPIOID NAIVE   mcg/hr Intravenous Continuous Tobin Bojorquez MD        propofol (DIPRIVAN) infusion  5-75 mcg/kg/min Intravenous Continuous Daniel Purdy MD 34.6 mL/hr at 01/27/25 1754 60 mcg/kg/min at 01/27/25 1754    And    Medication Instruction   Does not apply Continuous PRN Daniel Purdy MD        No lozenges or gum should be given while patient on BIPAP/AVAPS/AVAPS AE   Does not apply Continuous PRN Flavia Mansfield MD        Patient may continue current oral medications   Does not apply Continuous PRN Flavia Mansfield MD           Plan:  Patient over the course of the day decompensated requiring oxymask  to CPAP to BiPAP where he further decompensated to the point of requiring intubation. Post intubation desaturated but was quickly bag masked to a sat in the 80's. However during transition to the ventilator patient immediatly decompensated with Sats < 60%. Not responsive to initial vent settings, Given the timing of the events likely secondary to derecruitment with loss of positive pressure after transfer to the vent. Was placed on 20 of PEEP for 20 seconds recruited. The tube was clamped during inspiration and placed on the vent to minimize de recruitment. Patient has had higher peak inspiratory pressures and corresponding plateau pressures. First blood gas post intubation showed 7.13 / 78/ 63 / 26. From this we increased his PEEP to 15, RR to 18. TV was 450-500 with a I:E of 1:2. Plats remained high and he has no signs of air trapping so we increased his I time to 1:1.5 to further reduce PIP and plateaus. Patient is not maintaining vent synchrony despite maximal sedation. Paralytic gtt was started, however we are unable to perform BIS monitoring so there is concern regarding his depth of anesthesia. Added fentanyl gtt and prn Midazolam for sedation but there is a concern for over sedation. Given his blood gas we will move forward with paralysis and We are awaiting a follow up blood gas

## 2025-01-28 NOTE — DISCHARGE SUMMARY
"[facility]    Discharge Summary  Hospitalist    Date of Admission:  1/26/2025  Date of Discharge:  1/28/2025  Discharging Provider: Daniel Purdy MD  Date of Service (when I saw the patient): 01/28/25    Discharge Diagnoses   Principal Problem:    Acute respiratory failure with hypoxia (H)    Date Noted: 2/6/2022  Active Problems:    Controlled type 2 diabetes mellitus with complication, without long-term current use of insulin (H)    Date Noted: 11/20/2018    CLL (chronic lymphocytic leukemia) (H)    Date Noted: 2/6/2022    Influenza A    Date Noted: 1/26/2025    DEEP (acute kidney injury)    Date Noted: 1/26/2025    Pneumonia of both lungs due to infectious organism, unspecified part of lung    Date Noted: 1/26/2025      History of Present Illness   Jesus Varghese is an 64 year old male presenting with the above. Patient was admitted by Dr. Luke and per H&P, \"who presented with 64 years old male with a past medical history of hypertension, chronic lymphocytic leukemia, Colon cancer,psoriasis and other medical issues was brought to the emergency room for generalized weakness and flulike symptoms with shortness of breath and cough/ fever of up to 103.3.  Denies any palpitation dizziness.  Denies any abdominal pain nausea or vomiting.  Does have episodes of diarrhea but none since friday. Workup in the hospital showed a sodium of 131 potassium 4.4 BUN 24.3 creatinine 1.23 with a CRP of 154.81.  Procalcitonin was 0.99.  WBCs 9.5 hemoglobin 15.6.  CT chest shows no evidence of pulm embolism but does show interval development of extensive diffuse bilateral airspace opacities.  Influenza A was positive and screen was negative for influenza B COVID and RSV.  Patient was initiated on nebulizers with oxygen supplementation/IV Rocephin and Zithromax.\"      Hospital Course   Jesus Varghese was admitted on 1/26/2025.  The following problems were addressed during his hospitalization:    Acute " respiratory failure with hypoxia (H)  Bacterial community-acquired pneumonia  Influenza A  shock  Initially started on ceftriaxone/azithromycin and required 3L (no O2 at baseline)  He subsequently deteriorated overnight with escalating oxygen needs, was transferred to ICU, required maximal CPAP, antibiotics broadened to vanco/zosyn and continued azithromycin.      Subsequently developed increased work of breathing, progressive hypoxia and after discussion of risks, benefits and goals of care with patient and family he was subsequently intubated.  Significant hypoxia after intubation and he was subsequently proned, started on Velitri, continued on deep sedation and paralytics and refer to tele-icu documentation regarding specific vent changes.    Noted to have MRSA in sputum culture, influenza A positive, no evidence of pulmonary edema and TTE results as noted.  No steroids given as no wheezing or copd exacerbation at this point.      Multiple discussions regarding possible need for ECMO and after discussion with family, perfusionist, flight crew and general surgery, patient remained stable on ventilator, proned, and it was felt that cannulation and ECMO initiation should be deferred until transfer.  Patient was graciously accepted to [facility] given his stability.  He will therefore be transferred by air on ventilator.  Family updated on four separate occasions throughout the day, and they were very appreciative of the teams care and consideration.      Plan:   -Ceftriaxone/azithromycin day 1->vanco/zosyn day 2  -azith day 3  -Tamiflu day 3  -follow-up sputum culture  -urine strep/Legionella negative   -scheduled and as needed nebulizer's  -wean levophed as able    Acute renal failure  poa, resolved with gentle fluids on admit (1.2->0.8)  Worsening today and received [id number] bolus of with improved urine output with 50ml/hr after.     Active Problems:    Controlled type 2 diabetes mellitus with complication, without  long-term current use of insulin (H)    Assessment: Well-controlled with hemoglobin A1c of 6.5    Plan:   -Hold home metformin        CLL (chronic lymphocytic leukemia) (H)    Assessment: Chronic stable and completed course of 8 cycles of acalabrutinib now on surveillance.      Daniel Purdy MD    Significant Results and Procedures   Right internal jugular placement  Art line placement    Pending Results   These results will be followed up by accepting team  Unresulted Labs Ordered in the Past 30 Days of this Admission       Date and Time Order Name Status Description    1/28/2025 12:32 PM Ionized Calcium In process     1/28/2025 12:21 PM Prepare red blood cells (unit) Preliminary     1/28/2025 12:21 PM Prepare red blood cells (unit) Preliminary     1/27/2025  8:47 PM MRSA Culture Reflex In process     1/26/2025  2:49 AM Blood Culture Peripheral Blood Preliminary     1/26/2025  2:49 AM Blood Culture Peripheral Blood Preliminary             Code Status   Full Code       Primary Care Physician   Filiberto John    Physical Exam   Temp: 98.7  F (37.1  C) Temp src: Axillary BP: 90/62 Pulse: 75   Resp: 28 SpO2: 97 % O2 Device: Mechanical Ventilator    Vitals:    01/26/25 0232 01/26/25 0614 01/27/25 0208   Weight: 98.4 kg (217 lb) 96.1 kg (211 lb 12.8 oz) 96.1 kg (211 lb 13.8 oz)     Vital Signs with Ranges  Temp:  [97.5  F (36.4  C)-99.8  F (37.7  C)] 98.7  F (37.1  C)  Pulse:  [] 75  Resp:  [10-33] 28  BP: ()/(61-97) 90/62  MAP:  [57 mmHg-178 mmHg] 79 mmHg  Arterial Line BP: ()/(37-59) 132/56  FiO2 (%):  [80 %-100 %] 80 %  SpO2:  [69 %-98 %] 97 %  I/O last 3 completed shifts:  In: 1390.4 [I.V.:1090.4; NG/GT:50; IV Piggyback:250]  Out: 988 [Urine:988]    GENERAL: intubated, proned and sedated, in no apparent distress.  CARDIOVASCULAR: regular rate and rhythm, no murmurs, rubs, or gallops. Normal S1/S2. No lower extremity edema.   RESPIRATORY: diffuse air movement in all fields, no wheezes, no  crackles. Vent sycrony.   GI: deferred.  MUSCULOSKELETAL: warm and well perfused, 2+ dorsalis pedis pulses bilaterally.    SKIN: no pallor,jaundice, or rashes.    Discharge Disposition   Transferred to [facility]  Condition at discharge: Critical    Consultations This Hospital Stay   PHARMACY TO [facility]  NUTRITION SERVICES ADULT IP CONSULT    Time Spent on this Encounter   I have spent greater than 120 minutes ofcritical care time related to direct patient care, care coordination, and communication exclusive of procedures with focus of care related to transfer to [facility], consideration of ECMO, conversations with family and flight crew and perfusionists.       Discharge Orders   No discharge procedures on file.  Discharge Medications   Current Discharge Medication List        CONTINUE these medications which have NOT CHANGED    Details   ACCU-CHEK GUIDE test strip USE 1 STRIP ONCE DAILY  Qty: 100 strip, Refills: 3    Associated Diagnoses: Controlled type 2 diabetes mellitus with complication, without long-term current use of insulin (H)      aspirin 81 MG EC tablet Take 81 mg by mouth daily.      blood glucose (NO BRAND SPECIFIED) lancets standard Dispense item covered by insurance. Test blood sugar 1 times daily.  Qty: 100 each, Refills: 11    Associated Diagnoses: Controlled type 2 diabetes mellitus with complication, without long-term current use of insulin (H)      blood glucose monitoring (NO BRAND SPECIFIED) meter device kit Dispense option covered by insurance. Test blood sugar 1 times daily.  Qty: 1 kit, Refills: 11    Associated Diagnoses: Controlled type 2 diabetes mellitus with complication, without long-term current use of insulin (H)      blood glucose monitoring (SOFTCLIX) lancets USE 1  TO CHECK GLUCOSE ONCE DAILY  Qty: 100 each, Refills: 0    Associated Diagnoses: Controlled type 2 diabetes mellitus with complication, without long-term current use of insulin (H)      Fluocinolone Acetonide Scalp 0.01 % OIL  oil Apply topically at bedtime.  Qty: 118 mL, Refills: 1    Associated Diagnoses: Psoriasis of scalp      HEMP OIL OR EXTRACT OR OTHER CBD CANNABINOID, NOT MEDICAL CANNABIS, Take 4 drops by mouth At Bedtime       metFORMIN (GLUCOPHAGE) 500 MG tablet Take 1 tablet (500 mg) by mouth 2 times daily (with meals).  Qty: 180 tablet, Refills: 3    Associated Diagnoses: Controlled type 2 diabetes mellitus with complication, without long-term current use of insulin (H)      multivitamin, therapeutic (THERA-VIT) TABS tablet Take 1 tablet by mouth daily      UNABLE TO FIND MEDICATION NAME: Cherry juice, 2 oz once daily      Vitamin D, Cholecalciferol, 25 MCG (1000 UT) CAPS Take 1 capsule by mouth daily            Allergies   No Known Allergies  Data   Most Recent 3 CBC's:  Recent Labs   Lab Test 01/28/25  0533 01/27/25  1600 01/27/25  0544   WBC 8.1 8.2 7.5   HGB 13.8 14.6 14.9   MCV 87 85 85   * 93* 94*      Most Recent 3 BMP's:  Recent Labs   Lab Test 01/28/25  1342 01/28/25  1319 01/28/25  0908 01/28/25  0533   *  --  133* 133*   POTASSIUM 5.1  --  5.2 5.4*   CHLORIDE 100  --  99 98   CO2 20*  --  24 25   BUN 32.8*  --  30.8* 28.2*   CR 2.40*  --  2.04* 1.84*   ANIONGAP 14  --  10 10   LILIAN 7.8*  --  8.3* 8.5*   * 133* 149* 127*     Most Recent 2 LFT's:  Recent Labs   Lab Test 01/28/25  0533 01/27/25  1600   AST 74* 98*   ALT 59 66   ALKPHOS 79 92   BILITOTAL 0.4 0.5     Most Recent INR's and Anticoagulation Dosing History:  Anticoagulation Dose History          Latest Ref Rng & Units 1/28/2025   Recent Dosing and Labs   INR 0.85 - 1.15 1.04      Most Recent 3 Troponin's:No lab results found.  Most Recent Cholesterol Panel:  Recent Labs   Lab Test 05/28/24  1445 01/13/22  1416   CHOL 234* 92   LDL  --  <5   HDL 33* 27   TRIG 741* 339*     Most Recent 6 Bacteria Isolates From Any Culture (See EPIC Reports for Culture Details):No lab results found.  Most Recent TSH, T4 and A1c Labs:  Recent Labs   Lab Test  01/21/25  1411   A1C 6.5*     Results for orders placed or performed during the hospital encounter of 01/26/25   CT Chest Pulmonary Embolism w Contrast    Narrative    EXAM: CT CHEST PULMONARY EMBOLISM W CONTRAST  LOCATION: [facility]  DATE: 1/26/2025    INDICATION: elevated d dimer, hypoxia, sob  COMPARISON: 8/26/2024  TECHNIQUE: CT chest pulmonary angiogram during arterial phase injection of IV contrast. Multiplanar reformats and MIP reconstructions were performed. Dose reduction techniques were used.   CONTRAST: 88 ml isovue 370    FINDINGS:  ANGIOGRAM CHEST: Pulmonary arteries are normal caliber and negative for pulmonary emboli. Thoracic aorta is negative for dissection. No CT evidence of right heart strain.    LUNGS AND PLEURA: Interval development of extensive diffuse bilateral airspace and interstitial infiltrates. No pleural effusions.    MEDIASTINUM/AXILLAE: Interval development of bilateral hilar, subcarinal and right paratracheal adenopathy. Reactive lymph nodes are seen in the prevascular and pretracheal mediastinum. Focus of adenopathy at the level of the diaphragmatic gertrude to the   right of the esophagus. This appears slightly more prominent than prior exam. No thoracic aneurysm. Heart size within normal limits. No pericardial effusion. Visualized thyroid gland unremarkable. Esophagus unremarkable.    CORONARY ARTERY CALCIFICATION: Moderate.    UPPER ABDOMEN: Diffuse fatty infiltration the liver. Gallbladder is surgically absent.    MUSCULOSKELETAL: Mild to moderate thoracic spondylosis. Mild to moderate anterior osteophytosis. No concerning osseous abnormalities.      Impression    IMPRESSION:  1.  No evidence of pulmonary embolus.  2.  Interval development of extensive diffuse bilateral airspace and interstitial infiltrates.  3.  Interval development of bilateral hilar, subcarinal and right paratracheal adenopathy. Recommend 3 month follow-up examination after appropriate  interval therapy.  4.  Diffuse fatty infiltration of the liver.  5.  Cholecystectomy.     XR Chest Port 1 View    Narrative    EXAM: XR CHEST PORT 1 VIEW  LOCATION: [facility]  DATE: 1/27/2025    INDICATION: hypoxia  COMPARISON: CT 1/26/2025.    FINDINGS: The heart is enlarged. The thoracic aorta is calcified. Diffuse bilateral pulmonary infiltrates. No pneumothorax.      Impression    IMPRESSION: Bilateral pulmonary infiltrates.   XR Chest Port 1 View    Narrative    Exam:  XR CHEST PORT 1 VIEW    HISTORY: Endotracheal tube positioning.    COMPARISON:  1/27/2025, 01/26/2025    FINDINGS:     There has been placement of a right-sided central venous catheter  which terminates in the right atrium. There are has been placement of  an endotracheal tube which terminates 3 cm above the maggy.    The cardiomediastinal contours are obscured.      There are diffuse opacities throughout both lungs. No pneumothorax. No  definite pleural effusion.      No acute osseous abnormality.       Impression    IMPRESSION:      There has been placement of an endotracheal tube which terminates 3 cm  above the maggy.    The right-sided central venous catheter terminates in the right  atrium.    Diffuse opacities throughout both lungs.      NEDRA SMYTH MD         SYSTEM ID:  [id number]   XR Abdomen Port 1 View    Narrative    EXAM: XR ABDOMEN PORT 1 VIEW  LOCATION: [facility]  DATE: 1/27/2025    INDICATION: OG tube placement.  COMPARISON: None available.      Impression    IMPRESSION: Enteric suction tube tip overlies the region of the gastric fundus. Proximal side-port is located at the gastroesophageal junction. Further advancement is recommended.    There are bibasilar airspace consolidations, with associated air bronchograms.    Nonspecific bowel gas pattern, due to a paucity of bowel gas and incomplete abdominal imaging.    Right upper quadrant cholecystectomy clips. Atherosclerotic  calcifications   XR Abdomen 1 View    Narrative    EXAM: XR ABDOMEN 1 VIEW  LOCATION: [facility]  DATE: 2025 9:42 PM    INDICATION: s p OG advancement  COMPARISON: 2025 7:35 PM      Impression    IMPRESSION:     Limited radiograph for tube placement. The diaphragm is not included in the field-of-view, limiting direct comparison of tube position relative to prior. The enteric tube tip remains in the stomach and the sidehole is not visualized. Consider advancing 5   cm, and consider repeat radiographs including the diaphragm.    Right upper quadrant surgical clips.    Normal, nonobstructive bowel gas pattern.   XR Chest Port 1 View    Narrative    EXAM: XR CHEST PORT 1 VIEW  LOCATION: [facility]  DATE: 2025    INDICATION: Diminished breath sounds right side  COMPARISON: 2025 at 1534 hours      Impression    IMPRESSION: Interval placement of nasogastric tube which terminates over the stomach. Side-port projects over the GE junction, and advancement by 3 to 4 cm is suggested. Remaining lines and support tubes are stable in appearance. Extensive bilateral   interstitial and airspace opacities are unchanged. No pneumothorax. No other significant interval change.   Echocardiogram Complete     Value    LVEF  60-65%    Narrative    [id number]  [id number]  [id number]  [id number]^MULU^RENAN^TAWANDA     [facility]  [address] [place] [address]     Name: ROSALINA MEZA  MRN: [id number]  : 1960  Study Date: 2025 09:10 AM  Age: 64 yrs  Gender: Male  Patient Location: [facility]  Reason For Study: Respiratory Failure  Ordering Physician: RENAN HARDWICK  Performed By: Farnaz Carirngton     BSA: 2.0 m2  Height: 66 in  Weight: 211 lb  HR: 102  BP: 129/73 mmHg  ______________________________________________________________________________  Procedure  Echocardiogram with two-dimensional, color and spectral Doppler. Technically  difficult  study.Extremely poor acoustic windows.  ______________________________________________________________________________  Interpretation Summary  Technically difficult study.Extremely poor acoustic windows.  Global and regional left ventricular function is normal with an EF of 60-65%.  Right ventricular function, chamber size, wall motion, and thickness are  normal.  Pulmonary artery systolic pressure cannot be assessed.  The inferior vena cava is normal.  No pericardial effusion is present.  No significant valvular abnormalities present.  There is no prior study for direct comparison.  ______________________________________________________________________________  Left Ventricle  Global and regional left ventricular function is normal with an EF of 60-65%.  Left ventricular wall thickness is normal. Left ventricular size is normal.  Left ventricular diastolic function is normal. No regional wall motion  abnormalities are seen.     Right Ventricle  Right ventricular function, chamber size, wall motion, and thickness are  normal.     Atria  Both atria appear normal.     Mitral Valve  The mitral valve is normal.     Aortic Valve  Aortic valve sclerosis is present. The valve leaflets are not well visualized.  On Doppler interrogation, there is no significant stenosis or regurgitation.     Tricuspid Valve  The valve leaflets are not well visualized. On Doppler interrogation, there is  no significant stenosis or regurgitation. Pulmonary artery systolic pressure  cannot be assessed.     Pulmonic Valve  The valve leaflets are not well visualized. On Doppler interrogation, there is  no significant stenosis or regurgitation.     Vessels  The thoracic aorta is normal. The pulmonary artery cannot be assessed. The  inferior vena cava is normal.     Pericardium  No pericardial effusion is present. Prominent epicardial fat is noted.     Miscellaneous  No significant valvular abnormalities present.     Compared to Previous  Study  There is no prior study for direct comparison.  ______________________________________________________________________________  MMode/2D Measurements & Calculations  IVSd: 0.99 cm  LVIDd: 5.2 cm  LVIDs: 3.0 cm  LVPWd: 1.1 cm  FS: 42.4 %  LV mass(C)d: 205.2 grams  LV mass(C)dI: 100.3 grams/m2  Ao root diam: 3.1 cm  asc Aorta Diam: 3.3 cm  LVOT diam: 2.0 cm  LVOT area: 3.1 cm2  Ao root diam index Ht(cm/m): 1.8  Ao root diam index BSA (cm/m2): 1.5  Asc Ao diam index BSA (cm/m2): 1.6  Asc Ao diam index Ht(cm/m): 2.0  LA Volume (BP): 40.6 ml     LA Volume Index (BP): 19.8 ml/m2  RWT: 0.43  TAPSE: 2.5 cm     Doppler Measurements & Calculations  MV E max bobby: 62.1 cm/sec  MV A max bobby: 94.7 cm/sec  MV E/A: 0.66  MV dec time: 0.18 sec  Ao V2 max: 179.0 cm/sec  Ao max P.0 mmHg  Ao V2 mean: 126.0 cm/sec  Ao mean P.0 mmHg  Ao V2 VTI: 25.1 cm  DANDRE(I,D): 2.8 cm2  DANDRE(V,D): 2.4 cm2  LV V1 max P.4 mmHg  LV V1 max: 136.0 cm/sec  LV V1 VTI: 22.1 cm  SV(LVOT): 69.4 ml  SI(LVOT): 33.9 ml/m2  PA acc time: 0.13 sec     AV Bobby Ratio (DI): 0.76  DANDRE Index (cm2/m2): 1.4  E/E' av.3  Lateral E/e': 8.9  Medial E/e': 5.7     ______________________________________________________________________________  Report approved by: YOHANNES Gaitan MD on 2025 11:32 AM

## 2025-01-28 NOTE — PROGRESS NOTES
Flight crew and perfusionist arrived.  Will transfer to their cart and if pt remains stable will forgo cannulation and transport prone.

## 2025-01-28 NOTE — PROGRESS NOTES
Interdisciplinary Discharge Planning Note    Anticipated Discharge Date:TBD    Anticipated Discharge Location: [facility]     Clinical Needs Before Discharge:   Medical Stability     Treatment Needs After Discharge:  None identified    Potential Barriers to Discharge: Patient will transfer to a higher level of care     HEATHER Pelayo  1/28/2025,  12:57 PM

## 2025-01-28 NOTE — PROGRESS NOTES
RT present for intubation. 8.0 tube, 25@ teeth. Breath sounds and chest rise bilaterally. Tele ICU ordered current settings as listed.   Pt continues to have high peak inspiratory pressures and high plateau pressures. Tele ICU aware.  Respiratory following.     01/27/25 1819   Ventilator Settings    Vent Mode CMV/AC   Resp Rate (Set) (S)  18 breaths/min  (per tele ICU)   Tidal Volume (Set, mL) 500 mL   FiO2 100 %   PEEP (cm H2O) 15 cmH2O   Inspiratory Flow (l/min) 60 l/min   Inspiratory Time (sec) 1.5 sec   Sensitivity Flow Triggered (L/min) 3 L/min   Vent Humidifier - Heater/HME Heater   Heater Temperature 36         Mariela Perez, RT

## 2025-01-28 NOTE — PROGRESS NOTES
Pt remains on ventilator settings of 500 VT, 20 RR, +15 PEEP, 80% FI02. Tube remains 25 cm at the teeth and secured by ETAD. Head turns done Q2H. Veletri running continuous in line with the ventilator. Respiratory will continue to follow.    Glenna Ham, RT

## 2025-01-29 ENCOUNTER — PATIENT OUTREACH (OUTPATIENT)
Dept: FAMILY MEDICINE | Facility: OTHER | Age: 65
End: 2025-01-29

## 2025-01-29 LAB
ACT BLD: 158 SECONDS (ref 74–150)
ACT BLD: 162 SECONDS (ref 74–150)
ACT BLD: 190 SECONDS (ref 74–150)
ALBUMIN SERPL BCG-MCNC: 3 G/DL (ref 3.5–5.2)
ALLEN'S TEST: ABNORMAL
ALP SERPL-CCNC: 76 U/L (ref 40–150)
ALT SERPL W P-5'-P-CCNC: 65 U/L (ref 0–70)
ANION GAP SERPL CALCULATED.3IONS-SCNC: 12 MMOL/L (ref 7–15)
ANION GAP SERPL CALCULATED.3IONS-SCNC: 15 MMOL/L (ref 7–15)
ANION GAP SERPL CALCULATED.3IONS-SCNC: 16 MMOL/L (ref 7–15)
APTT PPP: 30 SECONDS (ref 22–38)
AST SERPL W P-5'-P-CCNC: 76 U/L (ref 0–45)
ATRIAL RATE - MUSE: 61 BPM
ATRIAL RATE - MUSE: 75 BPM
BASE EXCESS BLDA CALC-SCNC: -1.7 MMOL/L (ref -3–3)
BASE EXCESS BLDA CALC-SCNC: -2.2 MMOL/L (ref -3–3)
BASE EXCESS BLDA CALC-SCNC: -2.2 MMOL/L (ref -3–3)
BASE EXCESS BLDA CALC-SCNC: -3.4 MMOL/L (ref -3–3)
BASE EXCESS BLDA CALC-SCNC: -3.9 MMOL/L (ref -3–3)
BASE EXCESS BLDA CALC-SCNC: -4 MMOL/L (ref -3–3)
BASE EXCESS BLDA CALC-SCNC: -4.2 MMOL/L (ref -3–3)
BASE EXCESS BLDA CALC-SCNC: -4.3 MMOL/L (ref -3–3)
BASE EXCESS BLDA CALC-SCNC: -5.3 MMOL/L (ref -3–3)
BASE EXCESS BLDA CALC-SCNC: -8.5 MMOL/L (ref -3–3)
BASE EXCESS BLDA CALC-SCNC: -8.7 MMOL/L (ref -3–3)
BASE EXCESS BLDV CALC-SCNC: -3 MMOL/L (ref -3–3)
BASE EXCESS BLDV CALC-SCNC: -3.3 MMOL/L (ref -3–3)
BILIRUB SERPL-MCNC: 0.6 MG/DL
BUN SERPL-MCNC: 42.1 MG/DL (ref 8–23)
BUN SERPL-MCNC: 47.9 MG/DL (ref 8–23)
BUN SERPL-MCNC: 63.9 MG/DL (ref 8–23)
CA-I BLD-MCNC: 4.4 MG/DL (ref 4.4–5.2)
CA-I BLD-MCNC: 4.4 MG/DL (ref 4.4–5.2)
CALCIUM SERPL-MCNC: 8.4 MG/DL (ref 8.8–10.4)
CALCIUM SERPL-MCNC: 8.4 MG/DL (ref 8.8–10.4)
CALCIUM SERPL-MCNC: 9 MG/DL (ref 8.8–10.4)
CHLORIDE SERPL-SCNC: 100 MMOL/L (ref 98–107)
CHLORIDE SERPL-SCNC: 96 MMOL/L (ref 98–107)
CHLORIDE SERPL-SCNC: 99 MMOL/L (ref 98–107)
CREAT SERPL-MCNC: 2.95 MG/DL (ref 0.67–1.17)
CREAT SERPL-MCNC: 3.25 MG/DL (ref 0.67–1.17)
CREAT SERPL-MCNC: 3.69 MG/DL (ref 0.67–1.17)
DIASTOLIC BLOOD PRESSURE - MUSE: NORMAL MMHG
DIASTOLIC BLOOD PRESSURE - MUSE: NORMAL MMHG
EGFRCR SERPLBLD CKD-EPI 2021: 17 ML/MIN/1.73M2
EGFRCR SERPLBLD CKD-EPI 2021: 20 ML/MIN/1.73M2
EGFRCR SERPLBLD CKD-EPI 2021: 23 ML/MIN/1.73M2
ERYTHROCYTE [DISTWIDTH] IN BLOOD BY AUTOMATED COUNT: 13.3 % (ref 10–15)
GLUCOSE BLDC GLUCOMTR-MCNC: 140 MG/DL (ref 70–99)
GLUCOSE BLDC GLUCOMTR-MCNC: 198 MG/DL (ref 70–99)
GLUCOSE BLDC GLUCOMTR-MCNC: 198 MG/DL (ref 70–99)
GLUCOSE BLDC GLUCOMTR-MCNC: 202 MG/DL (ref 70–99)
GLUCOSE BLDC GLUCOMTR-MCNC: 202 MG/DL (ref 70–99)
GLUCOSE BLDC GLUCOMTR-MCNC: 207 MG/DL (ref 70–99)
GLUCOSE BLDC GLUCOMTR-MCNC: 211 MG/DL (ref 70–99)
GLUCOSE BLDC GLUCOMTR-MCNC: 222 MG/DL (ref 70–99)
GLUCOSE BLDC GLUCOMTR-MCNC: 235 MG/DL (ref 70–99)
GLUCOSE BLDC GLUCOMTR-MCNC: 247 MG/DL (ref 70–99)
GLUCOSE SERPL-MCNC: 213 MG/DL (ref 70–99)
GLUCOSE SERPL-MCNC: 227 MG/DL (ref 70–99)
GLUCOSE SERPL-MCNC: 241 MG/DL (ref 70–99)
HCO3 BLD-SCNC: 19 MMOL/L (ref 21–28)
HCO3 BLD-SCNC: 21 MMOL/L (ref 21–28)
HCO3 BLD-SCNC: 23 MMOL/L (ref 21–28)
HCO3 BLD-SCNC: 24 MMOL/L (ref 21–28)
HCO3 BLD-SCNC: 25 MMOL/L (ref 21–28)
HCO3 BLD-SCNC: 25 MMOL/L (ref 21–28)
HCO3 BLDA-SCNC: 24 MMOL/L (ref 21–28)
HCO3 BLDV-SCNC: 25 MMOL/L (ref 21–28)
HCO3 BLDV-SCNC: 26 MMOL/L (ref 21–28)
HCO3 SERPL-SCNC: 19 MMOL/L (ref 22–29)
HCO3 SERPL-SCNC: 20 MMOL/L (ref 22–29)
HCO3 SERPL-SCNC: 23 MMOL/L (ref 22–29)
HCT VFR BLD AUTO: 40.5 % (ref 40–53)
HGB BLD-MCNC: 13.1 G/DL (ref 13.3–17.7)
INR PPP: 1.14 (ref 0.85–1.15)
INTERPRETATION ECG - MUSE: NORMAL
INTERPRETATION ECG - MUSE: NORMAL
LACTATE SERPL-SCNC: 1.6 MMOL/L (ref 0.7–2)
MAGNESIUM SERPL-MCNC: 2.5 MG/DL (ref 1.7–2.3)
MCH RBC QN AUTO: 28.2 PG (ref 26.5–33)
MCHC RBC AUTO-ENTMCNC: 32.3 G/DL (ref 31.5–36.5)
MCV RBC AUTO: 87 FL (ref 78–100)
O2/TOTAL GAS SETTING VFR VENT: 100 %
O2/TOTAL GAS SETTING VFR VENT: 60 %
O2/TOTAL GAS SETTING VFR VENT: 70 %
O2/TOTAL GAS SETTING VFR VENT: 90 %
O2/TOTAL GAS SETTING VFR VENT: 90 %
OXYHGB MFR BLDA: 89 % (ref 92–100)
OXYHGB MFR BLDA: 90 % (ref 92–100)
OXYHGB MFR BLDA: 92 % (ref 92–100)
OXYHGB MFR BLDA: 93 % (ref 92–100)
OXYHGB MFR BLDA: 94 % (ref 92–100)
OXYHGB MFR BLDA: 95 % (ref 92–100)
OXYHGB MFR BLDA: 96 % (ref 92–100)
OXYHGB MFR BLDA: 96 % (ref 92–100)
OXYHGB MFR BLDA: 97 % (ref 92–100)
OXYHGB MFR BLDA: 98 % (ref 92–100)
OXYHGB MFR BLDA: 99 % (ref 75–100)
OXYHGB MFR BLDV: 73 %
OXYHGB MFR BLDV: 81 % (ref 70–75)
P AXIS - MUSE: 23 DEGREES
P AXIS - MUSE: 38 DEGREES
PCO2 BLD: 43 MM HG (ref 35–45)
PCO2 BLD: 45 MM HG (ref 35–45)
PCO2 BLD: 46 MM HG (ref 35–45)
PCO2 BLD: 46 MM HG (ref 35–45)
PCO2 BLD: 48 MM HG (ref 35–45)
PCO2 BLD: 56 MM HG (ref 35–45)
PCO2 BLD: 58 MM HG (ref 35–45)
PCO2 BLD: 61 MM HG (ref 35–45)
PCO2 BLD: 63 MM HG (ref 35–45)
PCO2 BLD: 63 MM HG (ref 35–45)
PCO2 BLDA: 54 MM HG (ref 35–45)
PCO2 BLDV: 62 MM HG (ref 40–50)
PCO2 BLDV: 63 MM HG (ref 40–50)
PEEP: 10 CM H2O
PEEP: 12 CM H2O
PEEP: 12 CM H2O
PEEP: 14 CM H2O
PH BLD: 7.15 [PH] (ref 7.35–7.45)
PH BLD: 7.19 [PH] (ref 7.35–7.45)
PH BLD: 7.21 [PH] (ref 7.35–7.45)
PH BLD: 7.21 [PH] (ref 7.35–7.45)
PH BLD: 7.24 [PH] (ref 7.35–7.45)
PH BLD: 7.24 [PH] (ref 7.35–7.45)
PH BLD: 7.3 [PH] (ref 7.35–7.45)
PH BLD: 7.32 [PH] (ref 7.35–7.45)
PH BLD: 7.33 [PH] (ref 7.35–7.45)
PH BLD: 7.35 [PH] (ref 7.35–7.45)
PH BLDA: 7.25 [PH] (ref 7.35–7.45)
PH BLDV: 7.22 [PH] (ref 7.32–7.43)
PH BLDV: 7.22 [PH] (ref 7.32–7.43)
PHOSPHATE SERPL-MCNC: 6.2 MG/DL (ref 2.5–4.5)
PLATELET # BLD AUTO: 175 10E3/UL (ref 150–450)
PO2 BLD: 101 MM HG (ref 80–105)
PO2 BLD: 115 MM HG (ref 80–105)
PO2 BLD: 59 MM HG (ref 80–105)
PO2 BLD: 64 MM HG (ref 80–105)
PO2 BLD: 64 MM HG (ref 80–105)
PO2 BLD: 69 MM HG (ref 80–105)
PO2 BLD: 75 MM HG (ref 80–105)
PO2 BLD: 77 MM HG (ref 80–105)
PO2 BLD: 94 MM HG (ref 80–105)
PO2 BLD: 97 MM HG (ref 80–105)
PO2 BLDA: 391 MM HG (ref 80–105)
PO2 BLDV: 41 MM HG (ref 25–47)
PO2 BLDV: 48 MM HG (ref 25–47)
POTASSIUM SERPL-SCNC: 4.5 MMOL/L (ref 3.4–5.3)
POTASSIUM SERPL-SCNC: 4.5 MMOL/L (ref 3.4–5.3)
POTASSIUM SERPL-SCNC: 5.4 MMOL/L (ref 3.4–5.3)
PR INTERVAL - MUSE: 144 MS
PR INTERVAL - MUSE: 168 MS
PROT SERPL-MCNC: 5.6 G/DL (ref 6.4–8.3)
QRS DURATION - MUSE: 88 MS
QRS DURATION - MUSE: 96 MS
QT - MUSE: 398 MS
QT - MUSE: 460 MS
QTC - MUSE: 444 MS
QTC - MUSE: 463 MS
R AXIS - MUSE: 37 DEGREES
R AXIS - MUSE: 46 DEGREES
RBC # BLD AUTO: 4.64 10E6/UL (ref 4.4–5.9)
SAO2 % BLDA: 90.5 % (ref 95–96)
SAO2 % BLDA: 91.9 % (ref 95–96)
SAO2 % BLDA: 93.4 % (ref 95–96)
SAO2 % BLDA: 95 % (ref 95–96)
SAO2 % BLDA: 95.2 % (ref 95–96)
SAO2 % BLDA: 96.6 % (ref 95–96)
SAO2 % BLDA: 97.6 % (ref 95–96)
SAO2 % BLDA: 97.8 % (ref 95–96)
SAO2 % BLDA: 98.6 % (ref 95–96)
SAO2 % BLDA: 99.3 % (ref 95–96)
SAO2 % BLDA: >100 % (ref 95–96)
SAO2 % BLDV: 74 % (ref 70–75)
SAO2 % BLDV: 82.5 % (ref 70–75)
SODIUM SERPL-SCNC: 132 MMOL/L (ref 135–145)
SODIUM SERPL-SCNC: 133 MMOL/L (ref 135–145)
SODIUM SERPL-SCNC: 135 MMOL/L (ref 135–145)
SYSTOLIC BLOOD PRESSURE - MUSE: NORMAL MMHG
SYSTOLIC BLOOD PRESSURE - MUSE: NORMAL MMHG
T AXIS - MUSE: 29 DEGREES
T AXIS - MUSE: 37 DEGREES
TRIGL SERPL-MCNC: 451 MG/DL
UFH PPP CHRO-ACNC: 0.15 IU/ML
VENTRICULAR RATE- MUSE: 61 BPM
VENTRICULAR RATE- MUSE: 75 BPM
WBC # BLD AUTO: 19.2 10E3/UL (ref 4–11)

## 2025-01-29 PROCEDURE — 94645 CONT INHLJ TX EACH ADDL HOUR: CPT

## 2025-01-29 PROCEDURE — 82805 BLOOD GASES W/O2 SATURATION: CPT | Performed by: STUDENT IN AN ORGANIZED HEALTH CARE EDUCATION/TRAINING PROGRAM

## 2025-01-29 PROCEDURE — 85520 HEPARIN ASSAY: CPT | Performed by: STUDENT IN AN ORGANIZED HEALTH CARE EDUCATION/TRAINING PROGRAM

## 2025-01-29 PROCEDURE — 85730 THROMBOPLASTIN TIME PARTIAL: CPT | Performed by: STUDENT IN AN ORGANIZED HEALTH CARE EDUCATION/TRAINING PROGRAM

## 2025-01-29 PROCEDURE — 85347 COAGULATION TIME ACTIVATED: CPT

## 2025-01-29 PROCEDURE — 250N000013 HC RX MED GY IP 250 OP 250 PS 637

## 2025-01-29 PROCEDURE — 999N000157 HC STATISTIC RCP TIME EA 10 MIN

## 2025-01-29 PROCEDURE — 06HY33Z INSERTION OF INFUSION DEVICE INTO LOWER VEIN, PERCUTANEOUS APPROACH: ICD-10-PCS | Performed by: SURGERY

## 2025-01-29 PROCEDURE — 85025 COMPLETE CBC W/AUTO DIFF WBC: CPT | Performed by: STUDENT IN AN ORGANIZED HEALTH CARE EDUCATION/TRAINING PROGRAM

## 2025-01-29 PROCEDURE — 99291 CRITICAL CARE FIRST HOUR: CPT | Mod: GC | Performed by: SURGERY

## 2025-01-29 PROCEDURE — 93005 ELECTROCARDIOGRAM TRACING: CPT

## 2025-01-29 PROCEDURE — 82310 ASSAY OF CALCIUM: CPT | Performed by: STUDENT IN AN ORGANIZED HEALTH CARE EDUCATION/TRAINING PROGRAM

## 2025-01-29 PROCEDURE — 83735 ASSAY OF MAGNESIUM: CPT | Performed by: STUDENT IN AN ORGANIZED HEALTH CARE EDUCATION/TRAINING PROGRAM

## 2025-01-29 PROCEDURE — 82374 ASSAY BLOOD CARBON DIOXIDE: CPT | Performed by: STUDENT IN AN ORGANIZED HEALTH CARE EDUCATION/TRAINING PROGRAM

## 2025-01-29 PROCEDURE — 82330 ASSAY OF CALCIUM: CPT | Performed by: STUDENT IN AN ORGANIZED HEALTH CARE EDUCATION/TRAINING PROGRAM

## 2025-01-29 PROCEDURE — 250N000012 HC RX MED GY IP 250 OP 636 PS 637: Performed by: STUDENT IN AN ORGANIZED HEALTH CARE EDUCATION/TRAINING PROGRAM

## 2025-01-29 PROCEDURE — 250N000013 HC RX MED GY IP 250 OP 250 PS 637: Performed by: SURGERY

## 2025-01-29 PROCEDURE — C1769 GUIDE WIRE: HCPCS | Performed by: SURGERY

## 2025-01-29 PROCEDURE — 250N000009 HC RX 250: Performed by: STUDENT IN AN ORGANIZED HEALTH CARE EDUCATION/TRAINING PROGRAM

## 2025-01-29 PROCEDURE — 85610 PROTHROMBIN TIME: CPT | Performed by: STUDENT IN AN ORGANIZED HEALTH CARE EDUCATION/TRAINING PROGRAM

## 2025-01-29 PROCEDURE — 250N000011 HC RX IP 250 OP 636: Performed by: STUDENT IN AN ORGANIZED HEALTH CARE EDUCATION/TRAINING PROGRAM

## 2025-01-29 PROCEDURE — 33946 ECMO/ECLS INITIATION VENOUS: CPT | Mod: GC | Performed by: SURGERY

## 2025-01-29 PROCEDURE — 410N000003 HC PER-PERFUSION 1ST 30 MIN: Performed by: SURGERY

## 2025-01-29 PROCEDURE — 82805 BLOOD GASES W/O2 SATURATION: CPT

## 2025-01-29 PROCEDURE — 84100 ASSAY OF PHOSPHORUS: CPT | Performed by: STUDENT IN AN ORGANIZED HEALTH CARE EDUCATION/TRAINING PROGRAM

## 2025-01-29 PROCEDURE — 94003 VENT MGMT INPAT SUBQ DAY: CPT

## 2025-01-29 PROCEDURE — 82247 BILIRUBIN TOTAL: CPT

## 2025-01-29 PROCEDURE — 250N000009 HC RX 250: Performed by: SURGERY

## 2025-01-29 PROCEDURE — 94640 AIRWAY INHALATION TREATMENT: CPT

## 2025-01-29 PROCEDURE — 250N000009 HC RX 250

## 2025-01-29 PROCEDURE — 80051 ELECTROLYTE PANEL: CPT | Performed by: STUDENT IN AN ORGANIZED HEALTH CARE EDUCATION/TRAINING PROGRAM

## 2025-01-29 PROCEDURE — 250N000011 HC RX IP 250 OP 636: Performed by: SURGERY

## 2025-01-29 PROCEDURE — 360N000078 HC SURGERY LEVEL 5, PER MIN: Performed by: SURGERY

## 2025-01-29 PROCEDURE — 0DH97UZ INSERTION OF FEEDING DEVICE INTO DUODENUM, VIA NATURAL OR ARTIFICIAL OPENING: ICD-10-PCS | Performed by: SURGERY

## 2025-01-29 PROCEDURE — 85027 COMPLETE CBC AUTOMATED: CPT | Performed by: STUDENT IN AN ORGANIZED HEALTH CARE EDUCATION/TRAINING PROGRAM

## 2025-01-29 PROCEDURE — 83605 ASSAY OF LACTIC ACID: CPT | Performed by: STUDENT IN AN ORGANIZED HEALTH CARE EDUCATION/TRAINING PROGRAM

## 2025-01-29 PROCEDURE — 80048 BASIC METABOLIC PNL TOTAL CA: CPT | Performed by: STUDENT IN AN ORGANIZED HEALTH CARE EDUCATION/TRAINING PROGRAM

## 2025-01-29 PROCEDURE — 84478 ASSAY OF TRIGLYCERIDES: CPT | Performed by: SURGERY

## 2025-01-29 PROCEDURE — 85041 AUTOMATED RBC COUNT: CPT | Performed by: STUDENT IN AN ORGANIZED HEALTH CARE EDUCATION/TRAINING PROGRAM

## 2025-01-29 PROCEDURE — 272N000204 HC AVALON BI-CAVAL DUAL CANNULA

## 2025-01-29 PROCEDURE — 272N000237 HC CARDIOHELP CIRCUIT

## 2025-01-29 PROCEDURE — 44500 INTRO GASTROINTESTINAL TUBE: CPT

## 2025-01-29 PROCEDURE — 200N000002 HC R&B ICU UMMC

## 2025-01-29 PROCEDURE — 250N000011 HC RX IP 250 OP 636: Mod: JZ

## 2025-01-29 PROCEDURE — 272N000206 HC AVALON INSERTION KIT

## 2025-01-29 PROCEDURE — 33946 ECMO/ECLS INITIATION VENOUS: CPT

## 2025-01-29 PROCEDURE — 250N000013 HC RX MED GY IP 250 OP 250 PS 637: Performed by: STUDENT IN AN ORGANIZED HEALTH CARE EDUCATION/TRAINING PROGRAM

## 2025-01-29 PROCEDURE — 5A1522H EXTRACORPOREAL OXYGENATION, MEMBRANE, PERIPHERAL VENO-VENOUS: ICD-10-PCS | Performed by: SURGERY

## 2025-01-29 PROCEDURE — 999N000185 HC STATISTIC TRANSPORT TIME EA 15 MIN

## 2025-01-29 RX ORDER — LIDOCAINE HYDROCHLORIDE 20 MG/ML
5 SOLUTION OROPHARYNGEAL ONCE
Status: COMPLETED | OUTPATIENT
Start: 2025-01-29 | End: 2025-01-29

## 2025-01-29 RX ORDER — AMINO ACIDS/PROTEIN HYDROLYS 11G-40/45
1 LIQUID IN PACKET (ML) ORAL 4 TIMES DAILY
Status: ACTIVE | OUTPATIENT
Start: 2025-01-29

## 2025-01-29 RX ORDER — DEXTROSE MONOHYDRATE 25 G/50ML
25-50 INJECTION, SOLUTION INTRAVENOUS
Status: ACTIVE | OUTPATIENT
Start: 2025-01-29

## 2025-01-29 RX ORDER — AMOXICILLIN 250 MG
1 CAPSULE ORAL 2 TIMES DAILY
Status: DISPENSED | OUTPATIENT
Start: 2025-01-29

## 2025-01-29 RX ORDER — ROPIVACAINE IN 0.9% SOD CHL/PF 0.1 %
.01-.125 PLASTIC BAG, INJECTION (ML) EPIDURAL CONTINUOUS
Status: DISCONTINUED | OUTPATIENT
Start: 2025-01-29 | End: 2025-01-29

## 2025-01-29 RX ORDER — OSELTAMIVIR PHOSPHATE 6 MG/ML
30 FOR SUSPENSION ORAL DAILY
Status: DISPENSED | OUTPATIENT
Start: 2025-01-30 | End: 2025-02-06

## 2025-01-29 RX ORDER — GUAIFENESIN 600 MG/1
15 TABLET, EXTENDED RELEASE ORAL DAILY
Status: DISPENSED | OUTPATIENT
Start: 2025-01-29

## 2025-01-29 RX ORDER — ACETAMINOPHEN 325 MG/10.15ML
650 LIQUID ORAL EVERY 4 HOURS PRN
Status: ACTIVE | OUTPATIENT
Start: 2025-01-29

## 2025-01-29 RX ORDER — HEPARIN SODIUM 10000 [USP'U]/100ML
10-50 INJECTION, SOLUTION INTRAVENOUS CONTINUOUS
Status: CANCELLED | OUTPATIENT
Start: 2025-01-29

## 2025-01-29 RX ORDER — DEXTROSE MONOHYDRATE 100 MG/ML
INJECTION, SOLUTION INTRAVENOUS CONTINUOUS PRN
Status: ACTIVE | OUTPATIENT
Start: 2025-01-29

## 2025-01-29 RX ORDER — IPRATROPIUM BROMIDE AND ALBUTEROL SULFATE 2.5; .5 MG/3ML; MG/3ML
3 SOLUTION RESPIRATORY (INHALATION)
Status: DISPENSED | OUTPATIENT
Start: 2025-01-29

## 2025-01-29 RX ORDER — OSELTAMIVIR PHOSPHATE 6 MG/ML
30 FOR SUSPENSION ORAL DAILY
Status: DISCONTINUED | OUTPATIENT
Start: 2025-01-30 | End: 2025-01-29

## 2025-01-29 RX ORDER — HEPARIN SODIUM 10000 [USP'U]/100ML
5000 INJECTION, SOLUTION INTRAVENOUS ONCE
Status: DISCONTINUED | OUTPATIENT
Start: 2025-01-29 | End: 2025-01-29

## 2025-01-29 RX ORDER — HEPARIN SODIUM 1000 [USP'U]/ML
5000 INJECTION, SOLUTION INTRAVENOUS; SUBCUTANEOUS ONCE
Status: COMPLETED | OUTPATIENT
Start: 2025-01-29 | End: 2025-01-29

## 2025-01-29 RX ORDER — CALCIUM GLUCONATE 20 MG/ML
1 INJECTION, SOLUTION INTRAVENOUS ONCE
Status: COMPLETED | OUTPATIENT
Start: 2025-01-29 | End: 2025-01-29

## 2025-01-29 RX ORDER — ACETAMINOPHEN 325 MG/1
650 TABLET ORAL EVERY 4 HOURS PRN
Status: DISPENSED | OUTPATIENT
Start: 2025-01-29

## 2025-01-29 RX ORDER — HEPARIN SODIUM 10000 [USP'U]/100ML
10-50 INJECTION, SOLUTION INTRAVENOUS CONTINUOUS
Status: DISPENSED | OUTPATIENT
Start: 2025-01-29

## 2025-01-29 RX ORDER — POLYETHYLENE GLYCOL 3350 17 G/17G
17 POWDER, FOR SOLUTION ORAL DAILY
Status: DISPENSED | OUTPATIENT
Start: 2025-01-29

## 2025-01-29 RX ORDER — NICOTINE POLACRILEX 4 MG
15-30 LOZENGE BUCCAL
Status: ACTIVE | OUTPATIENT
Start: 2025-01-29

## 2025-01-29 RX ORDER — TROMETHAMINE IN STERILE WATER 1.8 G/50ML
500 SYRINGE (ML) INTRAVENOUS ONCE
Status: COMPLETED | OUTPATIENT
Start: 2025-01-29 | End: 2025-01-29

## 2025-01-29 RX ORDER — AMOXICILLIN 250 MG
2 CAPSULE ORAL 2 TIMES DAILY
Status: DISPENSED | OUTPATIENT
Start: 2025-01-29

## 2025-01-29 RX ORDER — FUROSEMIDE 10 MG/ML
60 INJECTION INTRAMUSCULAR; INTRAVENOUS ONCE
Status: COMPLETED | OUTPATIENT
Start: 2025-01-29 | End: 2025-01-29

## 2025-01-29 RX ADMIN — POLYETHYLENE GLYCOL 3350 17 G: 17 POWDER, FOR SOLUTION ORAL at 09:01

## 2025-01-29 RX ADMIN — NOREPINEPHRINE BITARTRATE 0.04 MCG/KG/MIN: 0.06 INJECTION, SOLUTION INTRAVENOUS at 18:57

## 2025-01-29 RX ADMIN — NOREPINEPHRINE BITARTRATE 0.03 MCG/KG/MIN: 0.02 INJECTION, SOLUTION INTRAVENOUS at 13:31

## 2025-01-29 RX ADMIN — PROPOFOL 40 MCG/KG/MIN: 10 INJECTION, EMULSION INTRAVENOUS at 20:52

## 2025-01-29 RX ADMIN — IPRATROPIUM BROMIDE AND ALBUTEROL SULFATE 3 ML: .5; 3 SOLUTION RESPIRATORY (INHALATION) at 17:33

## 2025-01-29 RX ADMIN — INSULIN ASPART 5 UNITS: 100 INJECTION, SOLUTION INTRAVENOUS; SUBCUTANEOUS at 05:42

## 2025-01-29 RX ADMIN — PROPOFOL 50 MCG/KG/MIN: 10 INJECTION, EMULSION INTRAVENOUS at 05:37

## 2025-01-29 RX ADMIN — PROPOFOL 45 MCG/KG/MIN: 10 INJECTION, EMULSION INTRAVENOUS at 12:13

## 2025-01-29 RX ADMIN — HEPARIN SODIUM 5000 UNITS: 5000 INJECTION, SOLUTION INTRAVENOUS; SUBCUTANEOUS at 08:27

## 2025-01-29 RX ADMIN — Medication 50 MCG: at 06:33

## 2025-01-29 RX ADMIN — ALBUTEROL SULFATE 2.5 MG: 2.5 SOLUTION RESPIRATORY (INHALATION) at 08:15

## 2025-01-29 RX ADMIN — INSULIN HUMAN 2 UNITS/HR: 1 INJECTION, SOLUTION INTRAVENOUS at 08:26

## 2025-01-29 RX ADMIN — ALTEPLASE 2 MG: 2.2 INJECTION, POWDER, LYOPHILIZED, FOR SOLUTION INTRAVENOUS at 00:06

## 2025-01-29 RX ADMIN — CHLORHEXIDINE GLUCONATE 15 ML: 1.2 SOLUTION ORAL at 20:03

## 2025-01-29 RX ADMIN — Medication 75 MCG/HR: at 22:35

## 2025-01-29 RX ADMIN — PROPOFOL 40 MCG/KG/MIN: 10 INJECTION, EMULSION INTRAVENOUS at 02:01

## 2025-01-29 RX ADMIN — PANTOPRAZOLE SODIUM 40 MG: 40 INJECTION, POWDER, FOR SOLUTION INTRAVENOUS at 08:27

## 2025-01-29 RX ADMIN — DEXAMETHASONE SODIUM PHOSPHATE 20 MG: 10 INJECTION, SOLUTION INTRAMUSCULAR; INTRAVENOUS at 08:46

## 2025-01-29 RX ADMIN — SENNOSIDES AND DOCUSATE SODIUM 1 TABLET: 50; 8.6 TABLET ORAL at 20:02

## 2025-01-29 RX ADMIN — PIPERACILLIN SODIUM AND TAZOBACTAM SODIUM 3.38 G: 3; .375 INJECTION, SOLUTION INTRAVENOUS at 02:08

## 2025-01-29 RX ADMIN — IPRATROPIUM BROMIDE AND ALBUTEROL SULFATE 3 ML: .5; 3 SOLUTION RESPIRATORY (INHALATION) at 11:45

## 2025-01-29 RX ADMIN — Medication 10 MG: at 02:39

## 2025-01-29 RX ADMIN — IPRATROPIUM BROMIDE AND ALBUTEROL SULFATE 3 ML: .5; 3 SOLUTION RESPIRATORY (INHALATION) at 19:44

## 2025-01-29 RX ADMIN — Medication 15 ML: at 20:02

## 2025-01-29 RX ADMIN — METOLAZONE 5 MG: 10 TABLET ORAL at 05:43

## 2025-01-29 RX ADMIN — WHITE PETROLATUM 57.7 %-MINERAL OIL 31.9 % EYE OINTMENT: at 04:08

## 2025-01-29 RX ADMIN — CHLORHEXIDINE GLUCONATE 15 ML: 1.2 SOLUTION ORAL at 08:27

## 2025-01-29 RX ADMIN — INSULIN ASPART 1 UNITS: 100 INJECTION, SOLUTION INTRAVENOUS; SUBCUTANEOUS at 02:28

## 2025-01-29 RX ADMIN — TROMETHAMINE 500 ML: 3.6 INJECTION, SOLUTION INTRAVENOUS at 06:16

## 2025-01-29 RX ADMIN — VANCOMYCIN HYDROCHLORIDE 1000 MG: 1 INJECTION, SOLUTION INTRAVENOUS at 03:57

## 2025-01-29 RX ADMIN — SENNOSIDES AND DOCUSATE SODIUM 1 TABLET: 50; 8.6 TABLET ORAL at 09:01

## 2025-01-29 RX ADMIN — Medication 100 MCG: at 15:58

## 2025-01-29 RX ADMIN — OSELTAMIVIR PHOSPHATE 30 MG: 6 FOR SUSPENSION ORAL at 00:48

## 2025-01-29 RX ADMIN — HEPARIN SODIUM 5000 UNITS: 1000 INJECTION INTRAVENOUS; SUBCUTANEOUS at 16:15

## 2025-01-29 RX ADMIN — EPOPROSTENOL 20 NG/KG/MIN: 1.5 INJECTION, POWDER, LYOPHILIZED, FOR SOLUTION INTRAVENOUS at 04:32

## 2025-01-29 RX ADMIN — WHITE PETROLATUM 57.7 %-MINERAL OIL 31.9 % EYE OINTMENT: at 12:14

## 2025-01-29 RX ADMIN — HEPARIN SODIUM 650 UNITS/HR: 10000 INJECTION, SOLUTION INTRAVENOUS at 18:21

## 2025-01-29 RX ADMIN — CALCIUM GLUCONATE 1 G: 20 INJECTION, SOLUTION INTRAVENOUS at 06:07

## 2025-01-29 RX ADMIN — ALBUTEROL SULFATE 2.5 MG: 2.5 SOLUTION RESPIRATORY (INHALATION) at 02:24

## 2025-01-29 RX ADMIN — PROPOFOL 40 MCG/KG/MIN: 10 INJECTION, EMULSION INTRAVENOUS at 19:00

## 2025-01-29 RX ADMIN — PROPOFOL 50 MCG/KG/MIN: 10 INJECTION, EMULSION INTRAVENOUS at 08:27

## 2025-01-29 RX ADMIN — Medication 60 ML: at 20:02

## 2025-01-29 RX ADMIN — PROPOFOL 40 MCG/KG/MIN: 10 INJECTION, EMULSION INTRAVENOUS at 15:14

## 2025-01-29 RX ADMIN — EPOPROSTENOL 20 NG/KG/MIN: 1.5 INJECTION, POWDER, LYOPHILIZED, FOR SOLUTION INTRAVENOUS at 15:52

## 2025-01-29 RX ADMIN — FUROSEMIDE 60 MG: 10 INJECTION, SOLUTION INTRAMUSCULAR; INTRAVENOUS at 06:07

## 2025-01-29 RX ADMIN — OSELTAMIVIR PHOSPHATE 30 MG: 6 FOR SUSPENSION ORAL at 08:57

## 2025-01-29 ASSESSMENT — ACTIVITIES OF DAILY LIVING (ADL)
ADLS_ACUITY_SCORE: 48
ADLS_ACUITY_SCORE: 46
ADLS_ACUITY_SCORE: 48
ADLS_ACUITY_SCORE: 48
ADLS_ACUITY_SCORE: 46
ADLS_ACUITY_SCORE: 48
ADLS_ACUITY_SCORE: 49
ADLS_ACUITY_SCORE: 48
ADLS_ACUITY_SCORE: 48
ADLS_ACUITY_SCORE: 46
ADLS_ACUITY_SCORE: 52
ADLS_ACUITY_SCORE: 48
ADLS_ACUITY_SCORE: 46
ADLS_ACUITY_SCORE: 48
ADLS_ACUITY_SCORE: 48
ADLS_ACUITY_SCORE: 49
ADLS_ACUITY_SCORE: 48
ADLS_ACUITY_SCORE: 49
ADLS_ACUITY_SCORE: 46
ADLS_ACUITY_SCORE: 46
ADLS_ACUITY_SCORE: 48

## 2025-01-29 NOTE — PROGRESS NOTES
Major Shift Events: Mutiple vent changes throughout the night for acidosis and increased CO2. AM blood gas showed pH 7.14 and CO2 of 61. Lasix, metolazone, and tromethamine for acidosis. Proned and paralyzed with BIS 40-60. Train of four with 1 twitch. Every two hour head turns. Potassium 5.4 in AM. Did not shift as giving IV lasix. Co2 corrected initially with vent changes. Acidosis correction pending following RAVINDER.    Neuro: Paralyzed on vec. 1 twitch ToF. BIS <60. PERRL  CV: NSR, Levo for MAP >65. SBP <160. Pulses dopplerable. More hypotensive on L side  Resp: ETT 25 @ teeth CMV 12/420/30/70. Coarse lung sounds. Proned @ 1800, Supine @ 1000. Plateau pressures 32. Flolan inhaled  GI: NPO. OG @ 85 ok for meds. Appears high on x-ray.   : Portillo coude. 40mg lasix x1, UOP 30-75ml/hr  Skin: Padded for proning    Lines: RIJ 3L CVC  R radial art line  L PIV x2    Gtt:  Levo: off  Vec: 0.7  Prop 50  Fent 75  Flolan 20    Plan: Fix acidosis. Supine @ 1000. Improve CO2 retention. VV ECMO if needed  For vital signs and complete assessments, please see documentation flowsheets.

## 2025-01-29 NOTE — OP NOTE
PREOPERATIVE DIAGNOSIS: Severe acute respiratory distress syndrome  POSTOPERATIVE DIAGNOSIS: Severe acute respiratory distress syndrome  PROCEDURE: Cannulation for VV ECMO (48312) with a 30 Fr. [place] cannula, ultrasound guidance, fluoroscopic guidance.  ANESTHESIA: General endotracheal.   SURGEON: MD Dr. Jaya Wilson and Dr Garcia were present and observing the procedure (not billable)   Fellow: Saleem OBRIEN  Resident: Naman OBRIEN  ESTIMATED BLOOD LOSS: 50 ml   COMORBIDITIES:  Patient Active Problem List   Diagnosis    History of malignant neoplasm of large intestine    Carpal tunnel syndrome, bilateral    Senile cataract    Diverticulosis of colon    Inflammatory liver disease    Hypertriglyceridemia    Rupture of left distal biceps tendon    Status post tendon repair    Ureterolithiasis    Malignant neoplasm of colon, unspecified part of colon (H)    Chemotherapy-induced neuropathy    S/P partial colectomy    History of nephrolithiasis    Elevated LFTs    Controlled type 2 diabetes mellitus with complication, without long-term current use of insulin (H)    Non morbid obesity due to excess calories    Seborrheic keratoses    Acute respiratory failure with hypoxia (H)    CLL (chronic lymphocytic leukemia) (H)    Influenza A    DEEP (acute kidney injury)    Pneumonia of both lungs due to infectious organism, unspecified part of lung    Acute kidney failure, unspecified (H)     COMPLICATIONS: None.   OPERATIVE INDICATIONS: Jesus Varghese is a 64 year old male who presented with with signs and symptoms of severe ARDS due to Influenza and bacterial pneumonia. Today he is increasingly hypoxic and with increasing airway pressures despite maximal support including:  maximal ventilator support, paralytic, and proning. ECMO  with a  30 Fr [place] cannula is recommended. Risks, benefits and alternatives were discussed with the patient's family and all agreed to proceed with the operation as planned.   OPERATIVE  DETAILS: The procedure was performed in the ICU with the patient is a supine position. The neck was prepped and draped in a sterile fashion. Under ultrasound guidance the right internal jugular vein was accessed with the introducer needle. A wire was placed through the needle into the vein. This wire was manipulated under fluoro until it was seen to go into the IVC. The blue dilator from the Catharpin kit was place over the wire. The tract was then dilated with serial dilation. The final  dilator was reinserted three times. Heparin was allowed to circulate for 3 minutes. Fluoro was used again to confirm the wire was still in the IVC. The 30 fr [place]  cannula with the insertion dilator in it was then advanced over the wire easily into position with the curved infusion port up. Good position was confirmed with fluoro. The cannula was connected to the venous port and to the arterialized port, taking care to avoid air in the lines. ECMO was initiated. Sats riley to 100%. TTE was performed and the cannula was withdrawn slightly and rotated medially until TTE confirmed good position.  The cannula was sutured in place.  Dressings applied  There were no known complications and the patient tolerated the procedure well. All sponge, needle, instrument counts were correct at the conclusion of the case.   I was present and scrubbed for the entire procedure.

## 2025-01-29 NOTE — PROCEDURES
[facility]    Central line    Date/Time: 1/29/2025 5:58 PM    Performed by: Mark Florence MD  Authorized by: Mark Florence MD  Indications: vascular access      UNIVERSAL PROTOCOL   Site Marked: No  Prior Images Obtained and Reviewed:  No  Required items: Required blood products, implants, devices and special equipment available    Patient identity confirmed:  Provided demographic data  Patient was reevaluated immediately before administering moderate or deep sedation or anesthesia  Confirmation Checklist:  Relevant allergies, procedure was appropriate and matched the consent or emergent situation and correct equipment/implants were available  Time out: Immediately prior to the procedure a time out was called    Universal Protocol: the Joint Commission Universal Protocol was followed    Preparation: Patient was prepped and draped in usual sterile fashion    ESBL (mL):  2     ANESTHESIA    Anesthesia:  Local infiltration  Local Anesthetic:  Lidocaine 1% without epinephrine  Anesthetic Total (mL):  2      SEDATION    Patient Sedated: No    Vital signs: Vital signs monitored during sedation      Preparation: skin prepped with 2% chlorhexidine  Skin prep agent dried: skin prep agent completely dried prior to procedure  Sterile barriers: all five maximum sterile barriers used - cap, mask, sterile gown, sterile gloves, and large sterile sheet  Hand hygiene: hand hygiene performed prior to central venous catheter insertion  Catheter type: triple lumen  Pre-procedure: landmarks identified  Ultrasound guidance: yes  Sterile ultrasound techniques: sterile gel and sterile probe covers were used  Number of attempts: 1  Successful placement: yes  Post-procedure: line sutured and dressing applied  Assessment: blood return through all ports      PROCEDURE    Patient Tolerance:  Patient tolerated the procedure well with no immediate complications  Length of time  physician/provider present for 1:1 monitoring during sedation: 0

## 2025-01-29 NOTE — PROGRESS NOTES
"CLINICAL NUTRITION SERVICES - ASSESSMENT NOTE      Malnutrition Status:    Patient does not meet two of the established criteria necessary for diagnosing malnutrition but is at risk for malnutrition    Registered Dietitian Interventions:  - Feeding tube placed, AXR pending  - EN orders placed - when OK with SICU (post ECMO cannulation): initiate EN:  Novasource Renal (or equivalent) @ 35 ml/hr (840 ml) + 4 packets prosource TF20  provides 2000 kcal (27 kcal/kg), 156 g pro (2.1 g/kg), 154 g CHO, 602 ml free water, and 0 g fiber daily.   Initiate @ 15 ml/hr, adv by 10 ml q 6 hours  FWF 30 ml q 4 hours  Certavite daily    Future/Additional Recommendations:  - Monitor Renal function / lytes  - Monitor propofol kcals      REASON FOR ASSESSMENT  Provider order - Registered Dietitian to order TF per Medical Nutrition Therapy Guidelines    Patient with a past medical history of hypertension, colon cancer, chronic lymphocytic leukemia, psoriasis, and type 2 diabetes who presented to outside hospital with acute influenza A infection; Per SICU, plan for ECMO cannulation @ 1500 today.    SUBJECTIVE INFORMATION  Assessed patient in room.    NUTRITION HISTORY  NPO since intubated 2 days ago  Per review of OSH records, recent diarrhea and electrolyte abnormalities  Feeding tube placed by writer, see procedure note    CURRENT NUTRITION ORDERS  Diet: NPO    LABS  Nutrition-relevant labs:   Na+ 133 (L)  K+ 5.4 (H)  BUN 42.1 (H)  Cr 2.95 (H) <--2.55 (H)  Phos 6.2 (H)   (H)    MEDICATIONS  Nutrition-relevant medications:   Dexamethasone  Tamiflu suspension  Miralax and senna  Veletri  Insulin gtt  Norepi @ 0.03 mcg/kg/min  Propofol @ 28.8 ml/hr (760 kcals per day)    ANTHROPOMETRICS  Height: 0 cm (Data Unavailable) 5' 6.5\"  Most Recent Weight:  96.1 kg (211 lbs 13.8 oz)  IBW: 65 kg  % IBW: 148 %  BMI (kg/m ): Obesity Class I BMI 30-34.9  Weight History:     Wt Readings from Last 20 Encounters:   01/27/25 96.1 kg (211 lb 13.8 oz) "   01/21/25 98.8 kg (217 lb 12.8 oz)   09/03/24 96.8 kg (213 lb 6.4 oz)   05/28/24 99.3 kg (219 lb)   05/14/24 98.4 kg (217 lb)   04/15/24 100.6 kg (221 lb 12.8 oz)   01/18/24 98 kg (216 lb)   08/22/23 99.3 kg (219 lb)   05/23/23 97.1 kg (214 lb)   04/13/23 97.1 kg (214 lb)   01/24/23 98.4 kg (217 lb)   11/07/22 91.6 kg (202 lb)   10/18/22 91.6 kg (202 lb)   09/08/22 92.5 kg (204 lb)   09/02/22 90.7 kg (200 lb)   06/01/22 83.5 kg (184 lb)   05/12/22 81.6 kg (180 lb)   05/10/22 81.6 kg (180 lb)   03/25/22 76.2 kg (168 lb)   03/06/22 81.2 kg (179 lb)       Dosing Weight: 73 kg, based on adjusted wt based on admit weight of 96 kg and IBW of 65 kg    ASSESSED NUTRITION NEEDS  Estimated Energy Needs: 1825 - 2190 kcals/day (25 - 30 kcals/kg)  Justification: Maintenance and Vented  Estimated Protein Needs:146 - 183 grams protein/day (2 - 2.5 grams of pro/kg)  Justification: Hypercatabolism with acute illness and Increased needs, aim for higher end if ECMO  Estimated Fluid Needs: 1 mL/kcal  Justification: Per provider pending fluid status    MALNUTRITION  % Intake: Decreased intake does not meet criteria  % Weight Loss: > 2% in 1 week (severe)   Subcutaneous Fat Loss: None observed  Muscle Loss: None observed  Fluid Accumulation/Edema: Does not meet criteria  Malnutrition Diagnosis: Patient does not meet two of the established criteria necessary for diagnosing malnutrition but is at risk for malnutrition  Malnutrition Present on Admission: No    NUTRITION DIAGNOSIS  Inadequate oral intake related to NPO with mechanical ventilation as evidenced by NPO x 2 days with plans to initiate EN via new enteral access    INTERVENTIONS  Collaboration by nutrition professional with other providers  Enteral nutrition management  Insert enteral feeding tube    Goals  Total avg nutritional intake to meet a minimum of 20 kcal/kg and 2 g PRO/kg daily (per dosing wt 73 kg).     Monitoring/Evaluation  Progress toward goals will be monitored and  "evaluated per policy.    Liz Huynh, RD, LD, CNSC  \"4E Clinical Dietitian\" on Vocera  Weekend/Holiday RD - \"Weekend Clinical Dietitian\" on vocera    "

## 2025-01-29 NOTE — PLAN OF CARE
Goal Outcome Evaluation: No improvement     Overall Patient Progress: decliningOverall Patient Progress: declining    Outcome Evaluation: ABGs not improving. Plan for VV ECMO today.    Neuro: Paralyzed on vec. 1 twitch ToF. BIS 40-60. PERRL  CV: NSR HR  60's, Levo for MAP >65. SBP <160. Pulses dopplerable.  Resp: ETT 25 @ teeth CMV 10/400/30/100. Coarse lung sounds.  Supine @ 1000. Plateau pressures 25. Flolan inhaled. pH 7.19 & 7.21   GI: NPO. NJ Placed. Await Xray.  : Portillo coude. AUOP 75-225ml/hr.   Skin: No new deficits noted  Lines: RIJ 3L CVC  R radial art line  L PIV x4  Gtt:  Levo: 0.02-0.05  Vec: 0.4  Prop 40  Fent 75  Flolan 20    Plan: Cannulate for VV ECMO this afternoon.

## 2025-01-29 NOTE — PROGRESS NOTES
SURGICAL ICU PROGRESS NOTE  January 29, 2025      CO-MORBIDITIES:   No diagnosis found.    ASSESSMENT:   Jesus Varghese is a 64 year old male transferred on 1/28 from outside hospital for evaluation for ECMO cannulation.  In brief this is a 64-year-old male with past medical history of hypertension, colon cancer, chronic lymphocytic leukemia, psoriasis, and type 2 diabetes who presented to outside hospital with acute influenza A infection.  Clinical picture worsening requiring intubation and proning at outside hospital.      Plan: Critical Care   Neuro / Psych  - Monitor neurological status. Notify provider for any acute changes in exam  Sedation / Paralysis  - continue propofol, trend BIS   - continue vecuronium, monitor TOF  Pain  - continue fentanyl gtt, attempt to wean     Pulmonary:  #ARDS  #Hypercarbic Respiratory Failure  #influenza A with superimposed MRSA pneumonia  - Ventilator settings: VAC: Rate 28, PEEP 10, tidal volume 400.  Follow-up ABG ordered for 8 AM  - failed supination last night secondary to hypoxia; retry today at 10:00 AM  - wean O2 as tolerated, maintain PEEP at 10 to encourage recruitment  - s/p tromethamine x1 this AM  - continue epoprostenol  - dexamethasone 20mg x5 days, then 10 mg x 5 days  - start duo-nebs q4h  - if above interventions do not improve patients respiratory failure, will discuss VV-ECMO      Cardiovascular: EF 60-65%, no RV dysfunction (01/27)  #PMHx HTN  - wean levophed as tolerated for MAP >65, pressor requirement likely secondary to sedation  - Monitor hemodynamic status  - as needed labetalol and hydralazine for SBP greater than 160     GI/Nutrition:  - Diet: start trickle TF today  - Nutrition consulted. Appreciate recommendations  - last BM unknown; start miralax, senna   - continue protonix 40mg daily      Renal/ Fluid Balance:  #DEEP baseline 1.0-1.10  - received metolazone 5mg, lasix 60mg this AM  - goal net even to slightly negative  - monitor urinary output,  maintain nunez   - ICU electrolyte replacement protocol     Endocrine:  #History of type 2 diabetes  #Acute Hyperglycemia   - discontinue sliding scale, start continuous infusion for goal 100-150  - steroids as above      Infectious Disease  #Sepsis  #Influenza A  #MRSA PNA   - Currently on Tamiflu from 1/26, will plan on 10-day course  - Continue Vanc (started on 1/27); discontinue zosyn today  - continue trending WBC, fever curve       Hematology:  #PMHx of chronic lymphocytic   - trend CBC    Prophylaxis:    - HSQ for DVT ppx   - PO protonix     Lines/ tubes/ drains:  - RIJ TLC  - R radial arterial line  - ETT  - OGT  - 2 PIVs    Disposition:  - Surgical ICU    Patient seen, findings and plan discussed with surgical ICU staff, Dr. Garcia.  Critical care time: 45 minutes    Willie Wiklinson PA-C  Surgical Critical Care     ====================================    OBJECTIVE:   1. VITAL SIGNS:   Temp:  [97.2  F (36.2  C)-99.5  F (37.5  C)] 97.5  F (36.4  C)  Pulse:  [61-89] 62  Resp:  [0-30] 28  BP: (107)/(64) 107/64  MAP:  [57 mmHg-90 mmHg] 71 mmHg  Arterial Line BP: ()/(37-66) 114/52  FiO2 (%):  [65 %-70 %] 70 %  SpO2:  [91 %-98 %] 97 %  FiO2 (%): 70 %, Resp: 28, Vent Mode: CMV/AC, Resp Rate (Set): 28 breaths/min, Tidal Volume (Set, mL): 480 mL, PEEP (cm H2O): 10 cmH2O, Resp Rate (Set): 28 breaths/min, Tidal Volume (Set, mL): 480 mL, PEEP (cm H2O): 10 cmH2O    2. INTAKE/ OUTPUT:   I/O last 3 completed shifts:  In: 381.56 [I.V.:381.56]  Out: 560 [Urine:560]    3. PHYSICAL EXAMINATION:   GEN: supine, ill appearing   EYES: PERRL, Anicteric sclera.   HEENT:  Normocephalic, atraumatic, ETT secure  CV: assessment complicated due to prone position; RRR on tele, faint distant heart sound  PULM/CHEST: coarse breath sounds, faint wheezes bilaterally, no crackles appreciated   GI: unable to assess due to prone position   : nunez catheter in place, urine yellow and clear  EXTREMITIES: 1+ peripheral edema, peripheral  pulses 2+  NEURO: Sedated, paralyzed. PERRL  SKIN: No rashes, sores or ulcerations appreciated     4. INVESTIGATIONS:   Arterial Blood Gases   Recent Labs   Lab 01/29/25  0531 01/29/25  0348 01/29/25  0029 01/28/25  2215   PH 7.21* 7.15* 7.24* 7.22*   PCO2 48* 61* 56* 60*   PO2 101 97 94 101   HCO3 19* 21 24 24     Complete Blood Count   Recent Labs   Lab 01/29/25  0347 01/28/25  1706 01/28/25  0533 01/27/25  1600   WBC 19.2* 8.0 8.1 8.2   HGB 13.1* 12.6* 13.8 14.6    126* 119* 93*     Basic Metabolic Panel  Recent Labs   Lab 01/29/25  0533 01/29/25  0353 01/29/25 0347 01/29/25  0029 01/28/25  1714 01/28/25  1706 01/28/25  1342 01/28/25  1319 01/28/25  0908   NA  --   --  133*  --   --  133* 134*  --  133*   POTASSIUM  --   --  5.4*  --   --  4.9 5.1  --  5.2   CHLORIDE  --   --  99  --   --  100 100  --  99   CO2  --   --  19*  --   --  23 20*  --  24   BUN  --   --  42.1*  --   --  34.5* 32.8*  --  30.8*   CR  --   --  2.95*  --   --  2.55* 2.40*  --  2.04*   * 198* 213* 140*   < > 150* 154*   < > 149*    < > = values in this interval not displayed.     Liver Function Tests  Recent Labs   Lab 01/28/25  1342 01/28/25  0533 01/27/25  1600 01/27/25  0544 01/26/25  0309   AST  --  74* 98* 108* 106*   ALT  --  59 66 76* 88*   ALKPHOS  --  79 92 85 71   BILITOTAL  --  0.4 0.5 0.5 0.9   ALBUMIN  --  3.1* 3.3* 3.4* 4.2   INR 1.04  --   --   --   --      Pancreatic Enzymes  No lab results found in last 7 days.  Coagulation Profile  Recent Labs   Lab 01/28/25  1342   INR 1.04   PTT 29         5. RADIOLOGY:   Recent Results (from the past 24 hours)   XR Chest Port 1 View    Narrative    Exam: XR CHEST PORT 1 VIEW, 1/28/2025 5:35 PM    Comparison: 1/27/2045    History: Endotracheal tube positioning    Findings:  2 AP portable supine views of the chest. Endotracheal tube with tip in  the mid thoracic trachea. Right IJ central venous catheter with tip in  the SVC. Enteric tube with tip in the distal  esophagus.    Trachea is midline. Mediastinum is within normal limits.  Cardiopulmonary silhouette is within normal limits. Extensive mixed  opacities are present throughout the lungs There is no pneumothorax or  pleural effusion. The upper abdomen is unremarkable.      Impression    Impression:   1. Endotracheal tube with tip in the mid thoracic trachea.  2. Increased diffuse mixed pulmonary opacities likely representing  combination of infection and edema. ARDS.  3. Enteric tube with tip near the GE junction. Consider advancing 10  cm.    I have personally reviewed the examination and initial interpretation  and I agree with the findings.    RANI ROBLES MD         SYSTEM ID:  [id number]   XR Abdomen Port 1 View    Impression    RESIDENT PRELIMINARY INTERPRETATION  IMPRESSION:  Enteric tube in stable position with tip near the gastroesophageal  junction. Tube does not appear advanced compared to prior.   XR Abdomen Port 1 View    Impression    RESIDENT PRELIMINARY INTERPRETATION  IMPRESSION: Enteric tube tip projects near the gastroesophageal  junction, slightly advanced compared to prior. Enteric tube sidehole  projects over the distal esophagus. Recommend advancement.   XR Abdomen Port 1 View    Impression    RESIDENT PRELIMINARY INTERPRETATION  IMPRESSION: Enteric tube tip projects near the gastroesophageal  junction, unchanged compared to most recent radiograph. The enteric  tube sidehole projects over the distal esophagus. Recommend  advancement.   XR Chest Port 1 View    Impression    RESIDENT PRELIMINARY INTERPRETATION  Impression:   1.  Endotracheal tube tip projects over the mid thoracic trachea.  2.  Decreased diffuse bilateral mixed pulmonary opacities, suggestive  of ARDS.  3.  Enteric tube tip projects over the stomach, advanced compared to  prior. The enteric tube sidehole projects over the distal esophagus.  Recommend advancement.       =========================================

## 2025-01-29 NOTE — CONSULTS
"SPIRITUAL HEALTH SERVICES Consult Note  [facility] 4A     Summary: Visit with patient Jesus \"\" Abimael for prayer (Tenriism) as requested by family. No family present at time of prayer; I spoke with RN.     Plan: Chaplains will follow for spiritual support while  is admitted.     Brittaney Mcdonald M.Div., [facility]     To reach Spiritual Health, securely message with the [employer] Web Console or enter an ASAP/STAT consult in [employer], which will also page the on-call .    "

## 2025-01-29 NOTE — CONSULTS
Care Management Initial Consult    General Information  Assessment completed with: Spouse or significant other, Wife  Type of CM/SW Visit: Initial Assessment    Primary Care Provider verified and updated as needed: Yes   Readmission within the last 30 days: no previous admission in last 30 days      Reason for Consult: discharge planning  Advance Care Planning: Advance Care Planning Reviewed: no concerns identified          Communication Assessment  Patient's communication style: spoken language (English or Bilingual)    Hearing Difficulty or Deaf: no   Wear Glasses or Blind: yes    Cognitive  Cognitive/Neuro/Behavioral: .WDL except, arousability, level of consciousness, motor response  Level of Consciousness: sedated  Arousal Level: unresponsive (paralyzed)  Orientation: other (see comments) (YAZMIN)  Mood/Behavior: other (see comments) (YAZMIN)     Speech: endotracheal tube    Living Environment:   People in home: spouse  Wife Nicole  Current living Arrangements: mobile home      Able to return to prior arrangements: yes       Family/Social Support:  Care provided by: self, spouse/significant other  Provides care for: no one  Marital Status:   Support system: Wife          Description of Support System: Supportive, Involved    Support Assessment: Adequate social supports, Adequate family and caregiver support    Current Resources:   Patient receiving home care services: No        Community Resources: DME  Equipment currently used at home: grab bar, tub/shower, shower chair  Supplies currently used at home: None    Employment/Financial:  Employment Status: retired        Financial Concerns: none   Referral to Financial Worker: No       Does the patient's insurance plan have a 3 day qualifying hospital stay waiver?  No    Lifestyle & Psychosocial Needs:  Social Drivers of Health     Food Insecurity: Low Risk  (1/28/2025)    Food Insecurity     Within the past 12 months, did you worry that your food would run out  before you got money to buy more?: No     Within the past 12 months, did the food you bought just not last and you didn t have money to get more?: No   Depression: Not at risk (1/21/2025)    PHQ-2     PHQ-2 Score: 0   Housing Stability: Low Risk  (1/28/2025)    Housing Stability     Do you have housing? : Yes     Are you worried about losing your housing?: No   Tobacco Use: Medium Risk (1/21/2025)    Patient History     Smoking Tobacco Use: Former     Smokeless Tobacco Use: Never     Passive Exposure: Past   Financial Resource Strain: Low Risk  (1/28/2025)    Financial Resource Strain     Within the past 12 months, have you or your family members you live with been unable to get utilities (heat, electricity) when it was really needed?: No   Alcohol Use: Not on file   Transportation Needs: Low Risk  (1/28/2025)    Transportation Needs     Within the past 12 months, has lack of transportation kept you from medical appointments, getting your medicines, non-medical meetings or appointments, work, or from getting things that you need?: No   Physical Activity: Sufficiently Active (5/25/2024)    Exercise Vital Sign     Days of Exercise per Week: 5 days     Minutes of Exercise per Session: 30 min   Interpersonal Safety: Unknown (1/28/2025)    Interpersonal Safety     Do you feel physically and emotionally safe where you currently live?: Patient unable to answer     Within the past 12 months, have you been hit, slapped, kicked or otherwise physically hurt by someone?: Patient unable to answer     Within the past 12 months, have you been humiliated or emotionally abused in other ways by your partner or ex-partner?: Patient unable to answer   Stress: No Stress Concern Present (5/25/2024)    [employer] - Occupational Stress Questionnaire     Feeling of Stress : Not at all   Social Connections: Unknown (5/25/2024)    Social Connection and Isolation Panel [NHANES]     Frequency of Communication with  "Friends and Family: Not on file     Frequency of Social Gatherings with Friends and Family: Twice a week     Attends Gnosticist Services: Not on file     Active Member of Clubs or Organizations: Not on file     Attends Club or Organization Meetings: Not on file     Marital Status: Not on file   Health Literacy: Not on file       Functional Status:  Prior to admission patient needed assistance:   Dependent ADLs:: Independent, Ambulation-no assistive device, Dressing, Eating, Grooming, Transfers, Positioning, Toileting  Dependent IADLs:: Independent, Meal Preparation, Medication Management, Transportation, Laundry, Shopping  Assesssment of Functional Status: Not at  functional baseline    Mental Health Status:  Mental Health Status: No Current Concerns       Chemical Dependency Status:  Chemical Dependency Status: No Current Concerns             Values/Beliefs:  Spiritual, Cultural Beliefs, Gnosticist Practices, Values that affect care: yes  Description of Beliefs that Will Affect Care: Mormonism       Values/Beliefs Comment: Requested spiritual care for prayer and support    Discussed  Partnership in Safe Discharge Planning  document with patient/family: No    Additional Information:    Per Dr. Florence Assessment on 1/28/25 \"He was in critical condition as the result of acute hypoxic respiratory failure, ARDS, influenza A and MRSA pneumonia.\" Patient was transferred to [facility] from [place], [place].      Patient in intubated and is on ventilator support, paralyzed.   RNCC spoke with patient's wife Nicole and explained care management role,  confirmed demographics and PCP.    Patient lived with his wife in moble home with 3 stairs to the main entrance. Patient was completely independent with all ADLS, meals, meds and IADLs and transportation.     Patient has grab bars in bathroom and a shower chair. He was  not been connected with [facility] or any community programs.   Patient is retired and collects a " pension. Family denied any financial needs at this time.   Patient did not have any mental health services. No safety concerns were reported.  Patient has a good family support. He has some family in [facility] and wife plans to stay with them when she will be coming to visit the patient at the hospital.        Nicole, wife asked for patient's daughter Ashlee Lee ( [phone]) to be added to the contact list.     Family requested visit by  for prayer and support. Order placed.   Family will provided discharge transportation.       Next Steps: Care Management team will continue to follow for this admission. Charlotte Negrete, MSN, MA, CCM, RN  4C/4A/4E ICU Care Coordinator  Phone:[phone]  Available via bright box     For Weekend & Holiday on call RN Care Coordinator:  [facility] ([phone]) Saturday & Sunday; [phone]) [facility] Recognized Holidays   Weekend 4C/4A/4E ICUs /6A Care Coordinator  Available via bright box

## 2025-01-29 NOTE — PROCEDURES
"Small Bowel Feeding Tube Placement Assessment  Reason for Feeding Tube Placement: post pyloric enteral access for nutrition and medication administration  Cortrak Start Time: 1330   Cortrak End Time: 1345  Medicine Delivered During Procedure: lubricating gel  Placement Successful: yes per Cortrak tracing pending AXR confirmation      Procedure Complications: none  Final Placement Bruce at exit of nare:  108 cm  Face to Face time with patient: 15 minutes    Bridle Placement:   Reason for bridle placement: securement of nasoenteric feeding tube    Medicine delivered during procedure: lubricating jelly  Procedure: Successful   Location of top of clip on FT: @ 110 cm marker   Condition of nose/skin at time of bridle placement: Unremarkable   Face to Face time with patient: < 5 minutes.    Liz Huynh, RD, LD, CNSC  \"4E Clinical Dietitian\" on Vocera  Weekend/Holiday RD - \"Weekend Clinical Dietitian\" on vocera    "

## 2025-01-29 NOTE — PHARMACY
Pharmacy Tube Feeding Consult    Medication reviewed for administration by feeding tube and for potential food/drug interactions.    Recommendation: No changes are needed at this time.     Pharmacy will continue to follow as new medications are ordered.    Yen Bagley, SuzanneD

## 2025-01-29 NOTE — PLAN OF CARE
CMA completed. Family anticipates that patient will need rehab services post discharge when he recovers.

## 2025-01-29 NOTE — PROGRESS NOTES
Brief Patient Care Update   SICU    Seen at bedside multiple times by fellow(s), staff, and self for ventilator management.  First ABG of the night with significant hypoxia. FiO2 increased and PEEP decreased to 10 to reduce risk of barotrauma, with continued respiratory rate of 24.      Subsequent ABG with improved oxygenation and stable hypercarbia.  Began weaning FiO2 and tidal volumes decreased to 420.      Third ABG with PaO2 of 74 and unchanged hypercarbia, FiO2 increased accordingly though PEEP able to be decreased to 12.      Respiratory rate later increased to 30 for hypercarbia, showed some improvement (56 from 60).     Last ABG of the shift with pH of 7.15; tidal volumes increased back to 480, FiO2 stable at 70, PEEP decreased to 10 from 12 and respiratory rate decreased to 28.  Also ordered metolazone, and 60 mg of IV Lasix.  Will also order RAVINDER.    Georgina Hrarington MD   General Surgery PGY2

## 2025-01-29 NOTE — PHARMACY-VANCOMYCIN DOSING SERVICE
"Pharmacy Vancomycin Initial Note  Date of Service 2025  Patient's  1960  64 year old, male    Indication: Healthcare-Associated Pneumonia    Current estimated CrCl = Estimated Creatinine Clearance: 31.2 mL/min (A) (based on SCr of 2.55 mg/dL (H)).    Creatinine for last 3 days  2025:  3:09 AM Creatinine 1.23 mg/dL  2025:  5:44 AM Creatinine 1.05 mg/dL;  4:00 PM Creatinine 0.84 mg/dL  2025:  5:33 AM Creatinine 1.84 mg/dL;  9:08 AM Creatinine 2.04 mg/dL;  1:42 PM Creatinine 2.40 mg/dL;  5:06 PM Creatinine 2.55 mg/dL    Recent Vancomycin Level(s) for last 3 days  2025:  1:42 PM Vancomycin 15.3 ug/mL      Vancomycin IV Administrations (past 72 hours)                     vancomycin (VANCOCIN) 1,250 mg in 0.9% NaCl 250 mL intermittent infusion (mg) 1,250 mg New Bag 25 0314    Vancomycin (VANCOCIN) 2,000 mg in 0.9% NaCl 500 mL intermittent infusion (mg) 2,000 mg New Bag 25 0937                    Nephrotoxins and other renal medications (From now, onward)      Start     Dose/Rate Route Frequency Ordered Stop    25 0400  vancomycin (VANCOCIN) 1,000 mg in 200 mL dextrose intermittent infusion         1,000 mg  200 mL/hr over 1 Hours Intravenous EVERY 24 HOURS 25 18125  piperacillin-tazobactam (ZOSYN) intermittent infusion 3.375 g        Note to Pharmacy: For [facility], [facility] and [facility]: For Zosyn-naive patients, use the \"Zosyn initial dose + extended infusion\" order panel.    3.375 g  100 mL/hr over 30 Minutes Intravenous EVERY 6 HOURS 25 1743      25 173  norepinephrine (LEVOPHED) 16 mg in  mL infusion MAX CONC CENTRAL LINE         0.01-0.6 mcg/kg/min × 96.1 kg (Dosing Weight)  0.9-54.1 mL/hr  Intravenous CONTINUOUS 25 1701              Contrast Orders - past 72 hours (72h ago, onward)      None            InsightRX Prediction of Planned Initial Vancomycin Regimen    Loading dose: N/A  Regimen: 1000 mg IV every 24 hours.  Start " time: 03:14 on 01/29/2025  Exposure target: AUC24 (range)400-600 mg/L.hr   AUC24,ss: 511 mg/L.hr  Probability of AUC24 > 400: 86 %  Ctrough,ss: 17.7 mg/L  Probability of Ctrough,ss > 20: 34 %  Probability of nephrotoxicity (Lodise PJ 2009): 14 %          Plan:  Continue dose from OSH of vancomycin 1000mg IV q24h  Vancomycin monitoring method: AUC  Vancomycin therapeutic monitoring goal: 400-600 mg*h/L  Pharmacy will check vancomycin levels as appropriate in 1-3 Days.    Serum creatinine levels will be ordered daily for the first week of therapy and at least twice weekly for subsequent weeks.      Cyn Raymundo RPH

## 2025-01-29 NOTE — TELEPHONE ENCOUNTER
Patient transferred to higher level of care. No TCM call required per policy.   [facility] Br[facility] RN on 1/29/2025 at 11:30 AM

## 2025-01-29 NOTE — PROGRESS NOTES
[facility]    ECLS Cannulation Note:     Date on: 1/29/2025  Time on: 1629  Cannulating Physician: Haresh  Pre-cannulation ABG: pH 7.21/PaCO2 63/ PaO2 64/HCO3 25/Lactate no result  ABG Time: 1258  ECMO: Rolf CUADRA    Venous ECMO Cannula: 30 Fr. [place] In the Right Internal Jugular Vein    ECMO components include:  Console Serial Number: [id number]  Circuit Lot Number: [id number]  Oxygenator Lot Number: [id number]    Cannulation was performed at the patient's bedside, placement was verified by fluoroscopy, cannula position is good.      Wen Harp, RT  ECMO Specialist  1/29/2025 5:03 PM

## 2025-01-30 VITALS
RESPIRATION RATE: 24 BRPM | HEART RATE: 69 BPM | BODY MASS INDEX: 34.52 KG/M2 | OXYGEN SATURATION: 89 % | SYSTOLIC BLOOD PRESSURE: 107 MMHG | DIASTOLIC BLOOD PRESSURE: 64 MMHG | WEIGHT: 217.15 LBS | TEMPERATURE: 98.4 F

## 2025-01-30 LAB
ACT BLD: 137 SECONDS (ref 74–150)
ACT BLD: 146 SECONDS (ref 74–150)
ACT BLD: 154 SECONDS (ref 74–150)
ACT BLD: 154 SECONDS (ref 74–150)
ACT BLD: 158 SECONDS (ref 74–150)
ACT BLD: 158 SECONDS (ref 74–150)
ACT BLD: 166 SECONDS (ref 74–150)
ALBUMIN MFR UR ELPH: 25.1 MG/DL
ALBUMIN SERPL BCG-MCNC: 2.8 G/DL (ref 3.5–5.2)
ALBUMIN UR-MCNC: 20 MG/DL
ALLEN'S TEST: ABNORMAL
ALT SERPL W P-5'-P-CCNC: 57 U/L (ref 0–70)
AMORPH CRY #/AREA URNS HPF: ABNORMAL /HPF
ANION GAP SERPL CALCULATED.3IONS-SCNC: 12 MMOL/L (ref 7–15)
ANION GAP SERPL CALCULATED.3IONS-SCNC: 13 MMOL/L (ref 7–15)
ANION GAP SERPL CALCULATED.3IONS-SCNC: 13 MMOL/L (ref 7–15)
ANION GAP SERPL CALCULATED.3IONS-SCNC: 15 MMOL/L (ref 7–15)
APPEARANCE UR: ABNORMAL
APTT PPP: 35 SECONDS (ref 22–38)
APTT PPP: 36 SECONDS (ref 22–38)
APTT PPP: 49 SECONDS (ref 22–38)
APTT PPP: 62 SECONDS (ref 22–38)
AT III ACT/NOR PPP CHRO: 90 % (ref 85–135)
BACTERIA #/AREA URNS HPF: ABNORMAL /HPF
BACTERIA BLD CULT: NORMAL
BACTERIA BLD CULT: NORMAL
BACTERIA SPEC CULT: ABNORMAL
BASE EXCESS BLDA CALC-SCNC: -0.3 MMOL/L (ref -3–3)
BASE EXCESS BLDA CALC-SCNC: -0.4 MMOL/L (ref -3–3)
BASE EXCESS BLDA CALC-SCNC: -0.7 MMOL/L (ref -3–3)
BASE EXCESS BLDA CALC-SCNC: -0.8 MMOL/L (ref -3–3)
BASE EXCESS BLDA CALC-SCNC: -1.1 MMOL/L (ref -3–3)
BASE EXCESS BLDA CALC-SCNC: -1.5 MMOL/L (ref -3–3)
BASE EXCESS BLDA CALC-SCNC: -1.5 MMOL/L (ref -3–3)
BASE EXCESS BLDA CALC-SCNC: -1.6 MMOL/L (ref -3–3)
BASE EXCESS BLDA CALC-SCNC: -1.7 MMOL/L (ref -3–3)
BASE EXCESS BLDA CALC-SCNC: -1.8 MMOL/L (ref -3–3)
BASE EXCESS BLDA CALC-SCNC: -1.9 MMOL/L (ref -3–3)
BASE EXCESS BLDA CALC-SCNC: -2 MMOL/L (ref -3–3)
BASE EXCESS BLDA CALC-SCNC: -2.5 MMOL/L (ref -3–3)
BASE EXCESS BLDA CALC-SCNC: -2.8 MMOL/L (ref -3–3)
BASE EXCESS BLDV CALC-SCNC: -0.7 MMOL/L (ref -3–3)
BASE EXCESS BLDV CALC-SCNC: -1.2 MMOL/L (ref -3–3)
BASE EXCESS BLDV CALC-SCNC: -1.3 MMOL/L (ref -3–3)
BASE EXCESS BLDV CALC-SCNC: -3.4 MMOL/L (ref -3–3)
BASOPHILS # BLD AUTO: 0.1 10E3/UL (ref 0–0.2)
BASOPHILS # BLD AUTO: 0.1 10E3/UL (ref 0–0.2)
BASOPHILS # BLD MANUAL: 0 10E3/UL (ref 0–0.2)
BASOPHILS NFR BLD AUTO: 0 %
BASOPHILS NFR BLD AUTO: 0 %
BASOPHILS NFR BLD MANUAL: 0 %
BILIRUB UR QL STRIP: NEGATIVE
BUN SERPL-MCNC: 105 MG/DL (ref 8–23)
BUN SERPL-MCNC: 70.5 MG/DL (ref 8–23)
BUN SERPL-MCNC: 81.3 MG/DL (ref 8–23)
BUN SERPL-MCNC: 91.4 MG/DL (ref 8–23)
BURR CELLS BLD QL SMEAR: SLIGHT
CA-I BLD-MCNC: 4.7 MG/DL (ref 4.4–5.2)
CA-I BLD-MCNC: 4.8 MG/DL (ref 4.4–5.2)
CALCIUM SERPL-MCNC: 8.4 MG/DL (ref 8.8–10.4)
CALCIUM SERPL-MCNC: 8.7 MG/DL (ref 8.8–10.4)
CALCIUM SERPL-MCNC: 8.8 MG/DL (ref 8.8–10.4)
CALCIUM SERPL-MCNC: 9.6 MG/DL (ref 8.8–10.4)
CHLORIDE SERPL-SCNC: 101 MMOL/L (ref 98–107)
CHLORIDE SERPL-SCNC: 103 MMOL/L (ref 98–107)
CHLORIDE SERPL-SCNC: 104 MMOL/L (ref 98–107)
CHLORIDE SERPL-SCNC: 105 MMOL/L (ref 98–107)
COLOR UR AUTO: YELLOW
CREAT SERPL-MCNC: 3.82 MG/DL (ref 0.67–1.17)
CREAT SERPL-MCNC: 4.04 MG/DL (ref 0.67–1.17)
CREAT SERPL-MCNC: 4.06 MG/DL (ref 0.67–1.17)
CREAT SERPL-MCNC: 4.12 MG/DL (ref 0.67–1.17)
CREAT UR-MCNC: 117 MG/DL
D DIMER PPP FEU-MCNC: 2.87 UG/ML FEU (ref 0–0.5)
D DIMER PPP FEU-MCNC: 3.51 UG/ML FEU (ref 0–0.5)
EGFRCR SERPLBLD CKD-EPI 2021: 15 ML/MIN/1.73M2
EGFRCR SERPLBLD CKD-EPI 2021: 15 ML/MIN/1.73M2
EGFRCR SERPLBLD CKD-EPI 2021: 16 ML/MIN/1.73M2
EGFRCR SERPLBLD CKD-EPI 2021: 17 ML/MIN/1.73M2
EOSINOPHIL # BLD AUTO: 0 10E3/UL (ref 0–0.7)
EOSINOPHIL # BLD AUTO: 0 10E3/UL (ref 0–0.7)
EOSINOPHIL # BLD MANUAL: 0 10E3/UL (ref 0–0.7)
EOSINOPHIL NFR BLD AUTO: 0 %
EOSINOPHIL NFR BLD AUTO: 0 %
EOSINOPHIL NFR BLD MANUAL: 0 %
ERYTHROCYTE [DISTWIDTH] IN BLOOD BY AUTOMATED COUNT: 13.2 % (ref 10–15)
ERYTHROCYTE [DISTWIDTH] IN BLOOD BY AUTOMATED COUNT: 13.3 % (ref 10–15)
ERYTHROCYTE [DISTWIDTH] IN BLOOD BY AUTOMATED COUNT: 13.4 % (ref 10–15)
ERYTHROCYTE [DISTWIDTH] IN BLOOD BY AUTOMATED COUNT: 13.6 % (ref 10–15)
ERYTHROCYTE [DISTWIDTH] IN BLOOD BY AUTOMATED COUNT: 13.7 % (ref 10–15)
FIBRINOGEN PPP-MCNC: 435 MG/DL (ref 170–510)
FIBRINOGEN PPP-MCNC: 506 MG/DL (ref 170–510)
FRAGMENTS BLD QL SMEAR: SLIGHT
GLUCOSE BLDC GLUCOMTR-MCNC: 109 MG/DL (ref 70–99)
GLUCOSE BLDC GLUCOMTR-MCNC: 122 MG/DL (ref 70–99)
GLUCOSE BLDC GLUCOMTR-MCNC: 133 MG/DL (ref 70–99)
GLUCOSE BLDC GLUCOMTR-MCNC: 145 MG/DL (ref 70–99)
GLUCOSE BLDC GLUCOMTR-MCNC: 152 MG/DL (ref 70–99)
GLUCOSE BLDC GLUCOMTR-MCNC: 160 MG/DL (ref 70–99)
GLUCOSE BLDC GLUCOMTR-MCNC: 167 MG/DL (ref 70–99)
GLUCOSE BLDC GLUCOMTR-MCNC: 167 MG/DL (ref 70–99)
GLUCOSE BLDC GLUCOMTR-MCNC: 170 MG/DL (ref 70–99)
GLUCOSE BLDC GLUCOMTR-MCNC: 172 MG/DL (ref 70–99)
GLUCOSE BLDC GLUCOMTR-MCNC: 174 MG/DL (ref 70–99)
GLUCOSE BLDC GLUCOMTR-MCNC: 178 MG/DL (ref 70–99)
GLUCOSE BLDC GLUCOMTR-MCNC: 178 MG/DL (ref 70–99)
GLUCOSE BLDC GLUCOMTR-MCNC: 192 MG/DL (ref 70–99)
GLUCOSE BLDC GLUCOMTR-MCNC: 195 MG/DL (ref 70–99)
GLUCOSE BLDC GLUCOMTR-MCNC: 200 MG/DL (ref 70–99)
GLUCOSE BLDC GLUCOMTR-MCNC: 202 MG/DL (ref 70–99)
GLUCOSE BLDC GLUCOMTR-MCNC: 202 MG/DL (ref 70–99)
GLUCOSE BLDC GLUCOMTR-MCNC: 206 MG/DL (ref 70–99)
GLUCOSE BLDC GLUCOMTR-MCNC: 208 MG/DL (ref 70–99)
GLUCOSE BLDC GLUCOMTR-MCNC: 213 MG/DL (ref 70–99)
GLUCOSE BLDC GLUCOMTR-MCNC: 217 MG/DL (ref 70–99)
GLUCOSE BLDC GLUCOMTR-MCNC: 226 MG/DL (ref 70–99)
GLUCOSE BLDC GLUCOMTR-MCNC: 91 MG/DL (ref 70–99)
GLUCOSE SERPL-MCNC: 130 MG/DL (ref 70–99)
GLUCOSE SERPL-MCNC: 179 MG/DL (ref 70–99)
GLUCOSE SERPL-MCNC: 196 MG/DL (ref 70–99)
GLUCOSE SERPL-MCNC: 237 MG/DL (ref 70–99)
GLUCOSE UR STRIP-MCNC: NEGATIVE MG/DL
HCO3 BLD-SCNC: 23 MMOL/L (ref 21–28)
HCO3 BLD-SCNC: 24 MMOL/L (ref 21–28)
HCO3 BLD-SCNC: 25 MMOL/L (ref 21–28)
HCO3 BLDA-SCNC: 22 MMOL/L (ref 21–28)
HCO3 BLDA-SCNC: 23 MMOL/L (ref 21–28)
HCO3 BLDV-SCNC: 22 MMOL/L (ref 21–28)
HCO3 BLDV-SCNC: 25 MMOL/L (ref 21–28)
HCO3 SERPL-SCNC: 22 MMOL/L (ref 22–29)
HCO3 SERPL-SCNC: 22 MMOL/L (ref 22–29)
HCO3 SERPL-SCNC: 23 MMOL/L (ref 22–29)
HCO3 SERPL-SCNC: 24 MMOL/L (ref 22–29)
HCT VFR BLD AUTO: 31.2 % (ref 40–53)
HCT VFR BLD AUTO: 32 % (ref 40–53)
HCT VFR BLD AUTO: 32.8 % (ref 40–53)
HCT VFR BLD AUTO: 33.9 % (ref 40–53)
HCT VFR BLD AUTO: 34 % (ref 40–53)
HGB BLD-MCNC: 11 G/DL (ref 13.3–17.7)
HGB BLD-MCNC: 11.1 G/DL (ref 13.3–17.7)
HGB BLD-MCNC: 11.4 G/DL (ref 13.3–17.7)
HGB BLD-MCNC: 11.5 G/DL (ref 13.3–17.7)
HGB BLD-MCNC: 11.8 G/DL (ref 13.3–17.7)
HGB FREE PLAS-MCNC: 50 MG/DL
HGB UR QL STRIP: NEGATIVE
IMM GRANULOCYTES # BLD: 1 10E3/UL
IMM GRANULOCYTES # BLD: 1 10E3/UL
IMM GRANULOCYTES NFR BLD: 5 %
IMM GRANULOCYTES NFR BLD: 5 %
INR PPP: 1.08 (ref 0.85–1.15)
INR PPP: 1.1 (ref 0.85–1.15)
INR PPP: 1.18 (ref 0.85–1.15)
INR PPP: 1.63 (ref 0.85–1.15)
KETONES UR STRIP-MCNC: NEGATIVE MG/DL
LACTATE SERPL-SCNC: 1.4 MMOL/L (ref 0.7–2)
LACTATE SERPL-SCNC: 1.7 MMOL/L (ref 0.7–2)
LACTATE SERPL-SCNC: 2.1 MMOL/L (ref 0.7–2)
LACTATE SERPL-SCNC: 2.4 MMOL/L (ref 0.7–2)
LEUKOCYTE ESTERASE UR QL STRIP: ABNORMAL
LYMPHOCYTES # BLD AUTO: 7.9 10E3/UL (ref 0.8–5.3)
LYMPHOCYTES # BLD AUTO: 9.9 10E3/UL (ref 0.8–5.3)
LYMPHOCYTES # BLD MANUAL: 10.7 10E3/UL (ref 0.8–5.3)
LYMPHOCYTES # BLD MANUAL: 14.9 10E3/UL (ref 0.8–5.3)
LYMPHOCYTES # BLD MANUAL: 7.3 10E3/UL (ref 0.8–5.3)
LYMPHOCYTES NFR BLD AUTO: 42 %
LYMPHOCYTES NFR BLD AUTO: 52 %
LYMPHOCYTES NFR BLD MANUAL: 55 %
LYMPHOCYTES NFR BLD MANUAL: 58 %
LYMPHOCYTES NFR BLD MANUAL: 59 %
MAGNESIUM SERPL-MCNC: 3 MG/DL (ref 1.7–2.3)
MCH RBC QN AUTO: 28.1 PG (ref 26.5–33)
MCH RBC QN AUTO: 28.3 PG (ref 26.5–33)
MCH RBC QN AUTO: 28.5 PG (ref 26.5–33)
MCH RBC QN AUTO: 28.6 PG (ref 26.5–33)
MCH RBC QN AUTO: 29.2 PG (ref 26.5–33)
MCHC RBC AUTO-ENTMCNC: 33.5 G/DL (ref 31.5–36.5)
MCHC RBC AUTO-ENTMCNC: 34.4 G/DL (ref 31.5–36.5)
MCHC RBC AUTO-ENTMCNC: 34.8 G/DL (ref 31.5–36.5)
MCHC RBC AUTO-ENTMCNC: 35.1 G/DL (ref 31.5–36.5)
MCHC RBC AUTO-ENTMCNC: 35.6 G/DL (ref 31.5–36.5)
MCV RBC AUTO: 82 FL (ref 78–100)
MCV RBC AUTO: 84 FL (ref 78–100)
METAMYELOCYTES # BLD MANUAL: 0.4 10E3/UL
METAMYELOCYTES NFR BLD MANUAL: 3 %
MONOCYTES # BLD AUTO: 0.6 10E3/UL (ref 0–1.3)
MONOCYTES # BLD AUTO: 0.9 10E3/UL (ref 0–1.3)
MONOCYTES # BLD MANUAL: 0.3 10E3/UL (ref 0–1.3)
MONOCYTES # BLD MANUAL: 0.7 10E3/UL (ref 0–1.3)
MONOCYTES # BLD MANUAL: 1.1 10E3/UL (ref 0–1.3)
MONOCYTES NFR BLD AUTO: 3 %
MONOCYTES NFR BLD AUTO: 5 %
MONOCYTES NFR BLD MANUAL: 2 %
MONOCYTES NFR BLD MANUAL: 4 %
MONOCYTES NFR BLD MANUAL: 4 %
MUCOUS THREADS #/AREA URNS LPF: PRESENT /LPF
MYELOCYTES # BLD MANUAL: 0.1 10E3/UL
MYELOCYTES # BLD MANUAL: 0.3 10E3/UL
MYELOCYTES NFR BLD MANUAL: 1 %
MYELOCYTES NFR BLD MANUAL: 1 %
NEUTROPHILS # BLD AUTO: 7.7 10E3/UL (ref 1.6–8.3)
NEUTROPHILS # BLD AUTO: 8.8 10E3/UL (ref 1.6–8.3)
NEUTROPHILS # BLD MANUAL: 10.8 10E3/UL (ref 1.6–8.3)
NEUTROPHILS # BLD MANUAL: 4.5 10E3/UL (ref 1.6–8.3)
NEUTROPHILS # BLD MANUAL: 6.7 10E3/UL (ref 1.6–8.3)
NEUTROPHILS NFR BLD AUTO: 40 %
NEUTROPHILS NFR BLD AUTO: 47 %
NEUTROPHILS NFR BLD MANUAL: 36 %
NEUTROPHILS NFR BLD MANUAL: 37 %
NEUTROPHILS NFR BLD MANUAL: 40 %
NITRATE UR QL: NEGATIVE
NRBC # BLD AUTO: 0 10E3/UL
NRBC # BLD AUTO: 0 10E3/UL
NRBC # BLD AUTO: 0.2 10E3/UL
NRBC BLD AUTO-RTO: 0 /100
NRBC BLD AUTO-RTO: 0 /100
NRBC BLD MANUAL-RTO: 1 %
O2/TOTAL GAS SETTING VFR VENT: 100 %
O2/TOTAL GAS SETTING VFR VENT: 100 %
O2/TOTAL GAS SETTING VFR VENT: 60 %
O2/TOTAL GAS SETTING VFR VENT: 70 %
OXYHGB MFR BLDA: 90 % (ref 92–100)
OXYHGB MFR BLDA: 91 % (ref 92–100)
OXYHGB MFR BLDA: 92 % (ref 92–100)
OXYHGB MFR BLDA: 92 % (ref 92–100)
OXYHGB MFR BLDA: 93 % (ref 92–100)
OXYHGB MFR BLDA: 93 % (ref 92–100)
OXYHGB MFR BLDA: 94 % (ref 92–100)
OXYHGB MFR BLDA: 95 % (ref 92–100)
OXYHGB MFR BLDA: 95 % (ref 92–100)
OXYHGB MFR BLDA: 99 % (ref 75–100)
OXYHGB MFR BLDA: 99 % (ref 75–100)
OXYHGB MFR BLDV: 74 %
OXYHGB MFR BLDV: 86 %
OXYHGB MFR BLDV: 88 % (ref 70–75)
OXYHGB MFR BLDV: 91 % (ref 70–75)
PATH REV: ABNORMAL
PCO2 BLD: 41 MM HG (ref 35–45)
PCO2 BLD: 42 MM HG (ref 35–45)
PCO2 BLD: 43 MM HG (ref 35–45)
PCO2 BLD: 43 MM HG (ref 35–45)
PCO2 BLD: 44 MM HG (ref 35–45)
PCO2 BLD: 45 MM HG (ref 35–45)
PCO2 BLD: 46 MM HG (ref 35–45)
PCO2 BLDA: 38 MM HG (ref 35–45)
PCO2 BLDA: 38 MM HG (ref 35–45)
PCO2 BLDV: 41 MM HG (ref 40–50)
PCO2 BLDV: 45 MM HG (ref 40–50)
PCO2 BLDV: 46 MM HG (ref 40–50)
PCO2 BLDV: 47 MM HG (ref 40–50)
PEEP: 10 CM H2O
PH BLD: 7.33 [PH] (ref 7.35–7.45)
PH BLD: 7.34 [PH] (ref 7.35–7.45)
PH BLD: 7.34 [PH] (ref 7.35–7.45)
PH BLD: 7.35 [PH] (ref 7.35–7.45)
PH BLD: 7.36 [PH] (ref 7.35–7.45)
PH BLD: 7.36 [PH] (ref 7.35–7.45)
PH BLD: 7.37 [PH] (ref 7.35–7.45)
PH BLD: 7.38 [PH] (ref 7.35–7.45)
PH BLDA: 7.38 [PH] (ref 7.35–7.45)
PH BLDA: 7.4 [PH] (ref 7.35–7.45)
PH BLDV: 7.33 [PH] (ref 7.32–7.43)
PH BLDV: 7.34 [PH] (ref 7.32–7.43)
PH BLDV: 7.35 [PH] (ref 7.32–7.43)
PH BLDV: 7.35 [PH] (ref 7.32–7.43)
PH UR STRIP: 5 [PH] (ref 5–7)
PHOSPHATE SERPL-MCNC: 5.3 MG/DL (ref 2.5–4.5)
PLAT MORPH BLD: ABNORMAL
PLATELET # BLD AUTO: 185 10E3/UL (ref 150–450)
PLATELET # BLD AUTO: 220 10E3/UL (ref 150–450)
PLATELET # BLD AUTO: 230 10E3/UL (ref 150–450)
PLATELET # BLD AUTO: 237 10E3/UL (ref 150–450)
PLATELET # BLD AUTO: 268 10E3/UL (ref 150–450)
PO2 BLD: 60 MM HG (ref 80–105)
PO2 BLD: 65 MM HG (ref 80–105)
PO2 BLD: 66 MM HG (ref 80–105)
PO2 BLD: 69 MM HG (ref 80–105)
PO2 BLD: 69 MM HG (ref 80–105)
PO2 BLD: 70 MM HG (ref 80–105)
PO2 BLD: 71 MM HG (ref 80–105)
PO2 BLD: 72 MM HG (ref 80–105)
PO2 BLD: 72 MM HG (ref 80–105)
PO2 BLD: 74 MM HG (ref 80–105)
PO2 BLD: 78 MM HG (ref 80–105)
PO2 BLD: 81 MM HG (ref 80–105)
PO2 BLDA: 400 MM HG (ref 80–105)
PO2 BLDA: 459 MM HG (ref 80–105)
PO2 BLDV: 41 MM HG (ref 25–47)
PO2 BLDV: 53 MM HG (ref 25–47)
PO2 BLDV: 59 MM HG (ref 25–47)
PO2 BLDV: 65 MM HG (ref 25–47)
POTASSIUM SERPL-SCNC: 4 MMOL/L (ref 3.4–5.3)
POTASSIUM SERPL-SCNC: 4 MMOL/L (ref 3.4–5.3)
POTASSIUM SERPL-SCNC: 4.2 MMOL/L (ref 3.4–5.3)
POTASSIUM SERPL-SCNC: 4.3 MMOL/L (ref 3.4–5.3)
PROT/CREAT 24H UR: 0.21 MG/MG CR (ref 0–0.2)
RBC # BLD AUTO: 3.8 10E6/UL (ref 4.4–5.9)
RBC # BLD AUTO: 3.89 10E6/UL (ref 4.4–5.9)
RBC # BLD AUTO: 4.02 10E6/UL (ref 4.4–5.9)
RBC # BLD AUTO: 4.06 10E6/UL (ref 4.4–5.9)
RBC # BLD AUTO: 4.14 10E6/UL (ref 4.4–5.9)
RBC MORPH BLD: ABNORMAL
RBC URINE: 16 /HPF
SAO2 % BLDA: 91.5 % (ref 95–96)
SAO2 % BLDA: 93 % (ref 95–96)
SAO2 % BLDA: 93.8 % (ref 95–96)
SAO2 % BLDA: 94.1 % (ref 95–96)
SAO2 % BLDA: 94.4 % (ref 95–96)
SAO2 % BLDA: 94.5 % (ref 95–96)
SAO2 % BLDA: 95.3 % (ref 95–96)
SAO2 % BLDA: 95.5 % (ref 95–96)
SAO2 % BLDA: 95.5 % (ref 95–96)
SAO2 % BLDA: 96 % (ref 95–96)
SAO2 % BLDA: 96.3 % (ref 95–96)
SAO2 % BLDA: 96.7 % (ref 95–96)
SAO2 % BLDA: >100 % (ref 95–96)
SAO2 % BLDA: >100 % (ref 95–96)
SAO2 % BLDV: 75.6 % (ref 70–75)
SAO2 % BLDV: 87.4 % (ref 70–75)
SAO2 % BLDV: 90.1 % (ref 70–75)
SAO2 % BLDV: 93.4 % (ref 70–75)
SODIUM SERPL-SCNC: 137 MMOL/L (ref 135–145)
SODIUM SERPL-SCNC: 138 MMOL/L (ref 135–145)
SODIUM SERPL-SCNC: 141 MMOL/L (ref 135–145)
SODIUM SERPL-SCNC: 141 MMOL/L (ref 135–145)
SP GR UR STRIP: 1.02 (ref 1–1.03)
SQUAMOUS EPITHELIAL: 2 /HPF
TRANSITIONAL EPI: 1 /HPF
TRIGL SERPL-MCNC: 301 MG/DL
UFH PPP CHRO-ACNC: 0.18 IU/ML
UFH PPP CHRO-ACNC: 0.19 IU/ML
UFH PPP CHRO-ACNC: 0.35 IU/ML
UFH PPP CHRO-ACNC: 0.39 IU/ML
UROBILINOGEN UR STRIP-MCNC: NORMAL MG/DL
VANCOMYCIN SERPL-MCNC: 18.4 UG/ML
WBC # BLD AUTO: 12.6 10E3/UL (ref 4–11)
WBC # BLD AUTO: 18.1 10E3/UL (ref 4–11)
WBC # BLD AUTO: 18.6 10E3/UL (ref 4–11)
WBC # BLD AUTO: 19.3 10E3/UL (ref 4–11)
WBC # BLD AUTO: 27 10E3/UL (ref 4–11)
WBC URINE: 26 /HPF

## 2025-01-30 PROCEDURE — 82310 ASSAY OF CALCIUM: CPT | Performed by: STUDENT IN AN ORGANIZED HEALTH CARE EDUCATION/TRAINING PROGRAM

## 2025-01-30 PROCEDURE — 82805 BLOOD GASES W/O2 SATURATION: CPT | Performed by: STUDENT IN AN ORGANIZED HEALTH CARE EDUCATION/TRAINING PROGRAM

## 2025-01-30 PROCEDURE — 83735 ASSAY OF MAGNESIUM: CPT | Performed by: STUDENT IN AN ORGANIZED HEALTH CARE EDUCATION/TRAINING PROGRAM

## 2025-01-30 PROCEDURE — 31622 DX BRONCHOSCOPE/WASH: CPT

## 2025-01-30 PROCEDURE — 31622 DX BRONCHOSCOPE/WASH: CPT | Performed by: SURGERY

## 2025-01-30 PROCEDURE — 85520 HEPARIN ASSAY: CPT | Performed by: STUDENT IN AN ORGANIZED HEALTH CARE EDUCATION/TRAINING PROGRAM

## 2025-01-30 PROCEDURE — 250N000009 HC RX 250

## 2025-01-30 PROCEDURE — 82330 ASSAY OF CALCIUM: CPT | Performed by: STUDENT IN AN ORGANIZED HEALTH CARE EDUCATION/TRAINING PROGRAM

## 2025-01-30 PROCEDURE — 250N000013 HC RX MED GY IP 250 OP 250 PS 637: Performed by: SURGERY

## 2025-01-30 PROCEDURE — 85347 COAGULATION TIME ACTIVATED: CPT

## 2025-01-30 PROCEDURE — 81001 URINALYSIS AUTO W/SCOPE: CPT | Performed by: STUDENT IN AN ORGANIZED HEALTH CARE EDUCATION/TRAINING PROGRAM

## 2025-01-30 PROCEDURE — 99223 1ST HOSP IP/OBS HIGH 75: CPT | Mod: GC | Performed by: STUDENT IN AN ORGANIZED HEALTH CARE EDUCATION/TRAINING PROGRAM

## 2025-01-30 PROCEDURE — 31645 BRNCHSC W/THER ASPIR 1ST: CPT | Mod: GC | Performed by: SURGERY

## 2025-01-30 PROCEDURE — 83605 ASSAY OF LACTIC ACID: CPT | Performed by: STUDENT IN AN ORGANIZED HEALTH CARE EDUCATION/TRAINING PROGRAM

## 2025-01-30 PROCEDURE — 94003 VENT MGMT INPAT SUBQ DAY: CPT

## 2025-01-30 PROCEDURE — 250N000013 HC RX MED GY IP 250 OP 250 PS 637: Performed by: PHYSICIAN ASSISTANT

## 2025-01-30 PROCEDURE — 85610 PROTHROMBIN TIME: CPT | Performed by: STUDENT IN AN ORGANIZED HEALTH CARE EDUCATION/TRAINING PROGRAM

## 2025-01-30 PROCEDURE — 80069 RENAL FUNCTION PANEL: CPT | Performed by: STUDENT IN AN ORGANIZED HEALTH CARE EDUCATION/TRAINING PROGRAM

## 2025-01-30 PROCEDURE — 999N000254 HC STATISTIC VENTILATOR TRANSFER

## 2025-01-30 PROCEDURE — 250N000013 HC RX MED GY IP 250 OP 250 PS 637: Performed by: STUDENT IN AN ORGANIZED HEALTH CARE EDUCATION/TRAINING PROGRAM

## 2025-01-30 PROCEDURE — 82374 ASSAY BLOOD CARBON DIOXIDE: CPT | Performed by: STUDENT IN AN ORGANIZED HEALTH CARE EDUCATION/TRAINING PROGRAM

## 2025-01-30 PROCEDURE — 99291 CRITICAL CARE FIRST HOUR: CPT | Performed by: PHYSICIAN ASSISTANT

## 2025-01-30 PROCEDURE — 83051 HEMOGLOBIN PLASMA: CPT | Performed by: STUDENT IN AN ORGANIZED HEALTH CARE EDUCATION/TRAINING PROGRAM

## 2025-01-30 PROCEDURE — 80048 BASIC METABOLIC PNL TOTAL CA: CPT | Performed by: STUDENT IN AN ORGANIZED HEALTH CARE EDUCATION/TRAINING PROGRAM

## 2025-01-30 PROCEDURE — 85730 THROMBOPLASTIN TIME PARTIAL: CPT | Performed by: STUDENT IN AN ORGANIZED HEALTH CARE EDUCATION/TRAINING PROGRAM

## 2025-01-30 PROCEDURE — 84478 ASSAY OF TRIGLYCERIDES: CPT | Performed by: SURGERY

## 2025-01-30 PROCEDURE — 85041 AUTOMATED RBC COUNT: CPT | Performed by: STUDENT IN AN ORGANIZED HEALTH CARE EDUCATION/TRAINING PROGRAM

## 2025-01-30 PROCEDURE — 85007 BL SMEAR W/DIFF WBC COUNT: CPT | Performed by: STUDENT IN AN ORGANIZED HEALTH CARE EDUCATION/TRAINING PROGRAM

## 2025-01-30 PROCEDURE — 250N000011 HC RX IP 250 OP 636: Performed by: STUDENT IN AN ORGANIZED HEALTH CARE EDUCATION/TRAINING PROGRAM

## 2025-01-30 PROCEDURE — 33948 ECMO/ECLS DAILY MGMT-VENOUS: CPT | Performed by: SURGERY

## 2025-01-30 PROCEDURE — 85379 FIBRIN DEGRADATION QUANT: CPT | Performed by: STUDENT IN AN ORGANIZED HEALTH CARE EDUCATION/TRAINING PROGRAM

## 2025-01-30 PROCEDURE — 200N000002 HC R&B ICU UMMC

## 2025-01-30 PROCEDURE — 94645 CONT INHLJ TX EACH ADDL HOUR: CPT

## 2025-01-30 PROCEDURE — 999N000157 HC STATISTIC RCP TIME EA 10 MIN

## 2025-01-30 PROCEDURE — 84156 ASSAY OF PROTEIN URINE: CPT | Performed by: STUDENT IN AN ORGANIZED HEALTH CARE EDUCATION/TRAINING PROGRAM

## 2025-01-30 PROCEDURE — 80202 ASSAY OF VANCOMYCIN: CPT | Performed by: SURGERY

## 2025-01-30 PROCEDURE — 33948 ECMO/ECLS DAILY MGMT-VENOUS: CPT

## 2025-01-30 PROCEDURE — 84460 ALANINE AMINO (ALT) (SGPT): CPT | Performed by: STUDENT IN AN ORGANIZED HEALTH CARE EDUCATION/TRAINING PROGRAM

## 2025-01-30 PROCEDURE — 82805 BLOOD GASES W/O2 SATURATION: CPT | Performed by: SURGERY

## 2025-01-30 PROCEDURE — 250N000012 HC RX MED GY IP 250 OP 636 PS 637: Performed by: PHYSICIAN ASSISTANT

## 2025-01-30 PROCEDURE — 250N000011 HC RX IP 250 OP 636: Performed by: SURGERY

## 2025-01-30 PROCEDURE — 0BJ08ZZ INSPECTION OF TRACHEOBRONCHIAL TREE, VIA NATURAL OR ARTIFICIAL OPENING ENDOSCOPIC: ICD-10-PCS | Performed by: SURGERY

## 2025-01-30 PROCEDURE — G0463 HOSPITAL OUTPT CLINIC VISIT: HCPCS

## 2025-01-30 PROCEDURE — 85300 ANTITHROMBIN III ACTIVITY: CPT | Performed by: STUDENT IN AN ORGANIZED HEALTH CARE EDUCATION/TRAINING PROGRAM

## 2025-01-30 PROCEDURE — 85025 COMPLETE CBC W/AUTO DIFF WBC: CPT | Performed by: STUDENT IN AN ORGANIZED HEALTH CARE EDUCATION/TRAINING PROGRAM

## 2025-01-30 PROCEDURE — 85384 FIBRINOGEN ACTIVITY: CPT | Performed by: STUDENT IN AN ORGANIZED HEALTH CARE EDUCATION/TRAINING PROGRAM

## 2025-01-30 PROCEDURE — 250N000011 HC RX IP 250 OP 636: Performed by: PHYSICIAN ASSISTANT

## 2025-01-30 RX ORDER — FUROSEMIDE 10 MG/ML
20 INJECTION INTRAMUSCULAR; INTRAVENOUS ONCE
Status: ACTIVE | OUTPATIENT
Start: 2025-01-30

## 2025-01-30 RX ORDER — FUROSEMIDE 10 MG/ML
60 INJECTION INTRAMUSCULAR; INTRAVENOUS ONCE
Status: DISCONTINUED | OUTPATIENT
Start: 2025-01-30 | End: 2025-01-30

## 2025-01-30 RX ORDER — FUROSEMIDE 10 MG/ML
20 INJECTION INTRAMUSCULAR; INTRAVENOUS EVERY 6 HOURS
Status: COMPLETED | OUTPATIENT
Start: 2025-01-30 | End: 2025-01-30

## 2025-01-30 RX ORDER — FUROSEMIDE 10 MG/ML
20 INJECTION INTRAMUSCULAR; INTRAVENOUS ONCE
Status: DISCONTINUED | OUTPATIENT
Start: 2025-01-30 | End: 2025-01-30

## 2025-01-30 RX ADMIN — PROPOFOL 35 MCG/KG/MIN: 10 INJECTION, EMULSION INTRAVENOUS at 04:00

## 2025-01-30 RX ADMIN — SENNOSIDES AND DOCUSATE SODIUM 2 TABLET: 50; 8.6 TABLET ORAL at 07:56

## 2025-01-30 RX ADMIN — INSULIN HUMAN 10 UNITS/HR: 1 INJECTION, SOLUTION INTRAVENOUS at 03:52

## 2025-01-30 RX ADMIN — Medication 20 MG: at 12:11

## 2025-01-30 RX ADMIN — Medication 60 ML: at 15:57

## 2025-01-30 RX ADMIN — ACETAMINOPHEN 650 MG: 325 TABLET, FILM COATED ORAL at 11:20

## 2025-01-30 RX ADMIN — INSULIN GLARGINE 25 UNITS: 100 INJECTION, SOLUTION SUBCUTANEOUS at 11:11

## 2025-01-30 RX ADMIN — Medication 40 MG: at 07:57

## 2025-01-30 RX ADMIN — IPRATROPIUM BROMIDE AND ALBUTEROL SULFATE 3 ML: .5; 3 SOLUTION RESPIRATORY (INHALATION) at 07:59

## 2025-01-30 RX ADMIN — Medication 60 ML: at 07:56

## 2025-01-30 RX ADMIN — PROPOFOL 15 MCG/KG/MIN: 10 INJECTION, EMULSION INTRAVENOUS at 11:38

## 2025-01-30 RX ADMIN — PROPOFOL 30 MCG/KG/MIN: 10 INJECTION, EMULSION INTRAVENOUS at 22:18

## 2025-01-30 RX ADMIN — INSULIN HUMAN 10 UNITS/HR: 1 INJECTION, SOLUTION INTRAVENOUS at 11:07

## 2025-01-30 RX ADMIN — Medication 50 MCG: at 12:10

## 2025-01-30 RX ADMIN — Medication 100 MCG/HR: at 22:17

## 2025-01-30 RX ADMIN — VANCOMYCIN HYDROCHLORIDE 1000 MG: 1 INJECTION, SOLUTION INTRAVENOUS at 04:01

## 2025-01-30 RX ADMIN — CHLORHEXIDINE GLUCONATE 15 ML: 1.2 SOLUTION ORAL at 07:54

## 2025-01-30 RX ADMIN — POLYETHYLENE GLYCOL 3350 17 G: 17 POWDER, FOR SOLUTION ORAL at 07:56

## 2025-01-30 RX ADMIN — PROPOFOL 30 MCG/KG/MIN: 10 INJECTION, EMULSION INTRAVENOUS at 17:29

## 2025-01-30 RX ADMIN — Medication 15 ML: at 07:56

## 2025-01-30 RX ADMIN — PROPOFOL 45 MCG/KG/MIN: 10 INJECTION, EMULSION INTRAVENOUS at 00:13

## 2025-01-30 RX ADMIN — Medication 60 ML: at 11:58

## 2025-01-30 RX ADMIN — CHLORHEXIDINE GLUCONATE 15 ML: 1.2 SOLUTION ORAL at 19:56

## 2025-01-30 RX ADMIN — FUROSEMIDE 20 MG: 10 INJECTION, SOLUTION INTRAMUSCULAR; INTRAVENOUS at 18:03

## 2025-01-30 RX ADMIN — IPRATROPIUM BROMIDE AND ALBUTEROL SULFATE 3 ML: .5; 3 SOLUTION RESPIRATORY (INHALATION) at 16:17

## 2025-01-30 RX ADMIN — Medication 60 ML: at 19:57

## 2025-01-30 RX ADMIN — Medication 50 MCG: at 23:28

## 2025-01-30 RX ADMIN — FUROSEMIDE 20 MG: 10 INJECTION, SOLUTION INTRAMUSCULAR; INTRAVENOUS at 13:08

## 2025-01-30 RX ADMIN — IPRATROPIUM BROMIDE AND ALBUTEROL SULFATE 3 ML: .5; 3 SOLUTION RESPIRATORY (INHALATION) at 04:51

## 2025-01-30 RX ADMIN — OSELTAMIVIR PHOSPHATE 30 MG: 6 POWDER, FOR SUSPENSION ORAL at 07:56

## 2025-01-30 RX ADMIN — INSULIN HUMAN 11 UNITS/HR: 1 INJECTION, SOLUTION INTRAVENOUS at 23:21

## 2025-01-30 RX ADMIN — IPRATROPIUM BROMIDE AND ALBUTEROL SULFATE 3 ML: .5; 3 SOLUTION RESPIRATORY (INHALATION) at 00:32

## 2025-01-30 RX ADMIN — Medication 50 MCG: at 08:10

## 2025-01-30 RX ADMIN — Medication 50 MCG: at 22:27

## 2025-01-30 RX ADMIN — HEPARIN SODIUM 1400 UNITS/HR: 10000 INJECTION, SOLUTION INTRAVENOUS at 19:56

## 2025-01-30 RX ADMIN — IPRATROPIUM BROMIDE AND ALBUTEROL SULFATE 3 ML: .5; 3 SOLUTION RESPIRATORY (INHALATION) at 11:57

## 2025-01-30 RX ADMIN — DEXAMETHASONE SODIUM PHOSPHATE 20 MG: 10 INJECTION, SOLUTION INTRAMUSCULAR; INTRAVENOUS at 10:00

## 2025-01-30 RX ADMIN — IPRATROPIUM BROMIDE AND ALBUTEROL SULFATE 3 ML: .5; 3 SOLUTION RESPIRATORY (INHALATION) at 20:25

## 2025-01-30 ASSESSMENT — ACTIVITIES OF DAILY LIVING (ADL)
ADLS_ACUITY_SCORE: 50
ADLS_ACUITY_SCORE: 54
ADLS_ACUITY_SCORE: 49
ADLS_ACUITY_SCORE: 50
ADLS_ACUITY_SCORE: 50
ADLS_ACUITY_SCORE: 49
ADLS_ACUITY_SCORE: 54
ADLS_ACUITY_SCORE: 49
ADLS_ACUITY_SCORE: 50
ADLS_ACUITY_SCORE: 49
ADLS_ACUITY_SCORE: 50
ADLS_ACUITY_SCORE: 54
ADLS_ACUITY_SCORE: 50
ADLS_ACUITY_SCORE: 50
ADLS_ACUITY_SCORE: 49
ADLS_ACUITY_SCORE: 49
ADLS_ACUITY_SCORE: 50
ADLS_ACUITY_SCORE: 54
ADLS_ACUITY_SCORE: 50
ADLS_ACUITY_SCORE: 49
ADLS_ACUITY_SCORE: 49

## 2025-01-30 NOTE — PROGRESS NOTES
SURGICAL ICU PROGRESS NOTE    ASSESSMENT:   Jesus Varghese is a 64 year old male transferred on 1/28 from outside hospital for evaluation for ECMO cannulation.  In brief this is a 64-year-old male with past medical history of hypertension, colon cancer, chronic lymphocytic leukemia, psoriasis, and type 2 diabetes who presented to outside hospital with acute influenza A infection and ARDS. Patient did not tolerate being supinated. He was cannulated for VV ECMO 01/30/25.       TODAY  - Lantus 25 daily. Monitor closely after steroids tapered.  - Veletri discontinued  - Goal NET negative to slightly even  - Nephrology consultation. No indication for HD at this time as making good urine and lytes okay.   - bronchoscopy today at 12:00 PM    Plan:  Neuro / Psych  # Acute Pain  - Fentanyl gtt  - PRN Acetaminophen    # Sedation  - continue propofol gtt  - RASS -2 to -3     Pulmonary:  #ARDS  # Acute Hypercarbic and Hypoxic Respiratory Failure, secondary to Influenza A and superimposed MRSA Pneumonia    FiO2 (%): 60 %, Resp: 16, Inspiratory Pressure (cm H2O) (Drager Merle): 25, Vent Mode: PCV Plus assist, Resp Rate (Set): 16 breaths/min, Tidal Volume (Set, mL): 380 mL, PEEP (cm H2O): 10 cmH2O, Resp Rate (Set): 16 breaths/min, Tidal Volume (Set, mL): 380 mL, PEEP (cm H2O): 10 cmH2O    - Cannulated for VV ECMO 01/30. Right internal jugular to Right internal jugular   - Duonebs q4hr  - dexamethasone 20mg x5 days then 10 mg x 5 days  - Bronchoscopy today  - Nephrology consultation.    # VV ECMO  Mode: V-V (1/30/2025  4:00 AM)  Blood Flow (Circuit) LPM: 4.52 LPM (1/30/2025  4:00 AM)  RPM's: 3545 (1/30/2025  4:00 AM)  Sweep LPM: 2 LPM (1/30/2025  4:00 AM)  Sweep FiO2   %: 100 % (1/30/2025  4:00 AM)  SvO2  %: 82 % (1/30/2025  4:00 AM)  HgB: 11.4 (1/30/2025  4:00 AM)  ACT  (seconds): 158 seconds (1/30/2025  4:00 AM)  - Cannulated for VV ECMO 01/30. Right internal jugular to Right internal jugular   - Monitoring pulses,  dopplerable.  - ACT goal 160-180     Cardiovascular: EF 60-65%, no RV dysfunction (01/27)  # Essential Hypertension  # Hypotension  - Hypotension in setting of sedation.   - MAP Goal >65.   - NE gtt, wean as tolerated.     GI/Nutrition:  - RD consulted. NJ in place with TF at goal.   - Bowel Regimen: Miralax BID and Senna BID. Last BM PTA.     # Elevated Triglycerides  - Down trending. Will continue to monitor.     Renal/ Fluid Balance:  #DEEP   - Baseline 1.0-1.10.   - Currently 3.8.   - received metolazone 5mg, lasix 60mg this AM?   - Nephrology consult today  - 20 IV x2 today.  - Goal net even to slightly negative  - Monitor urinary output, maintain nunez   - ICU electrolyte replacement protocol     Endocrine:  # History of type 2 diabetes  # Stress and Steroid induced Hyperglycemia   - Insulin gtt  - Lantus 25 daily  - Goal BG <180     Infectious Disease:  # Sepsis  # Influenza A  # MRSA Pneumonia  - Tamiflu f( 1/26 - Current) will plan on 10-day course  - Continue Vanc (01/27 - current)    Cultures  - MRSA swab (01/27) positive  - Sputum (01/26) 3+ MRSA  - Blood (01/26) NGTD    Hematology:  # Acute Anemia of critical illness  - Continue to monitor.     # Chronic Lymphocytic Leukemia  - s/p 16 cycles of Acalabrutinib completed in December 2023 and now in remission.    # MSK/Skin  - PT/OT eval and treatment    # Penile Wound  - WOC consult    Prophylaxis:    - DVT: Heparin gtt  - GI: PPI    Lines/ tubes/ drains:  - 2 Right internal jugular cannulas  - Left internal jugular CVC  - R radial arterial line  - ETT  - NJ    Disposition: SICU  32 minutes of CC Time spent in the management of this patient.    ICU Daily Rounding Checklist     Checklist Response Notes   Can sedation be reduced?  Yes    Can analgesia be reduced? No    Is delirium being assessed, addressed and prevented? Yes    Spontaneous awakening trial and/or Spontaneous breathing trial candidate?  No    Total fluid balance goal reviewed?  Yes NET event to  negative 500   Is the patient at goals for lung protective ventilation? Yes    Head of bed elevation (30 degrees)? Yes    Skin breakdown assessment (prevention) completed? Yes    Is enteral nutrition at goal? Yes    Is blood glucose at goal? Yes    Deep venous thrombosis prophylaxis? Yes      Gastric ulcer prophylaxis?  Yes If coagulopathy (INR-1.5 PTT2x normal. Ph<50k), mechanical ventilation 48hr, history of GI bleed/ulcer within past year. TBL, SCI, or burn, or if >= 2 minor risk factors (sepsis, ICU stay 1 week, occult GI bleed > 6 days. glucocorticoid therapy, NSAID use, antiplatelet use)   Can Antibiotics be narrowed or discontinued? No    Early mobility candidate and physical therapy consulted? No    Is nunez catheter needed? Yes    Is central venous/arterial catheter needed? Yes    Has the family been updated? Yes    Are the patient's goals of care and code status current? Yes          Patient seen, findings and plan discussed with surgical ICU staff.    Nawaf Ghotra PA-C    ====================================    OBJECTIVE:   1. VITAL SIGNS:   Temp:  [96.3  F (35.7  C)-99  F (37.2  C)] 97.9  F (36.6  C)  Pulse:  [60-90] 64  Resp:  [0-30] 16  MAP:  [54 mmHg-276 mmHg] 71 mmHg  Arterial Line BP: ()/(38-60) 126/51  FiO2 (%):  [60 %-100 %] 60 %  SpO2:  [76 %-98 %] 95 %  FiO2 (%): 60 %, Resp: 16, Inspiratory Pressure (cm H2O) (Drager Merle): 25, Vent Mode: PCV Plus assist, Resp Rate (Set): 16 breaths/min, Tidal Volume (Set, mL): 380 mL, PEEP (cm H2O): 10 cmH2O, Resp Rate (Set): 16 breaths/min, Tidal Volume (Set, mL): 380 mL, PEEP (cm H2O): 10 cmH2O    2. INTAKE/ OUTPUT:   I/O last 3 completed shifts:  In: 1580.04 [I.V.:1155.04; NG/GT:210]  Out: 2135 [Urine:2135]    3. PHYSICAL EXAMINATION:   GEN: sedated, intubated  EYES: PERRL, Anicteric sclera.   HEENT:  Normocephalic, atraumatic, ETT secure  CV: RRR  PULM/CHEST: coarse breath sounds, faint wheezes bilaterally, no crackles appreciated   GI: unable to  assess due to prone position   : nunez catheter in place, urine yellow and clear  EXTREMITIES: 1+ peripheral edema, peripheral pulses 2+  NEURO: Sedated, paralyzed. PERRL  SKIN: No rashes, sores or ulcerations appreciated     4. INVESTIGATIONS:   Arterial Blood Gases   Recent Labs   Lab 01/30/25  0602 01/30/25 0343 01/30/25 0205 01/29/25 2358   PH 7.38 7.35 7.33* 7.34*   PCO2 42 45 46* 45   PO2 74* 71* 72* 70*   HCO3 25 25 25 24     Complete Blood Count   Recent Labs   Lab 01/30/25  0341 01/29/25 2154 01/29/25 0347 01/28/25  1706   WBC 18.1* 19.3* 19.2* 8.0   HGB 11.5* 11.8* 13.1* 12.6*    237 175 126*     Basic Metabolic Panel  Recent Labs   Lab 01/30/25  0657 01/30/25  0601 01/30/25  0504 01/30/25  0352 01/30/25 0341 01/29/25  2200 01/29/25 2154 01/29/25  1110 01/29/25  1108 01/29/25  0353 01/29/25  0347   NA  --   --   --   --  137  --  135  --  132*  --  133*   POTASSIUM  --   --   --   --  4.2  --  4.5  --  4.5  --  5.4*   CHLORIDE  --   --   --   --  101  --  100  --  96*  --  99   CO2  --   --   --   --  24  --  23  --  20*  --  19*   BUN  --   --   --   --  70.5*  --  63.9*  --  47.9*  --  42.1*   CR  --   --   --   --  3.82*  --  3.69*  --  3.25*  --  2.95*   * 174* 206* 178* 196*   < > 241*   < > 227*   < > 213*    < > = values in this interval not displayed.     Liver Function Tests  Recent Labs   Lab 01/30/25  0341 01/29/25 2154 01/29/25  1108 01/28/25  1342 01/28/25  0533 01/27/25  1600 01/27/25  0544   AST  --   --  76*  --  74* 98* 108*   ALT 57  --  65  --  59 66 76*   ALKPHOS  --   --  76  --  79 92 85   BILITOTAL  --   --  0.6  --  0.4 0.5 0.5   ALBUMIN 2.8*  --  3.0*  --  3.1* 3.3* 3.4*   INR 1.10 1.14  --  1.04  --   --   --      Pancreatic Enzymes  No lab results found in last 7 days.  Coagulation Profile  Recent Labs   Lab 01/30/25  0341 01/29/25  2154 01/28/25  1342   INR 1.10 1.14 1.04   PTT 35 30 29         5. RADIOLOGY:   Recent Results (from the past 24 hours)   XR  Chest Port 1 View    Narrative    Portable chest 2025 at 1041    INDICATION: Acute desaturation after supination. Evaluate lung fields.    COMPARISON: Same day 0120 hours    FINDINGS: Continued patchy and streaky opacities in the lungs are  similar from earlier this morning. Endotracheal tube again present  with tip approximately 3.6 cm above the maggy. Right IJ catheter tip  in the distal most SVC. Aortic knob atherosclerotic calcifications  again present. NG/OG tube beyond the inferior margin of the image.      Impression    IMPRESSION: No significant changes radiographically with ARDS    RANI ROBLES MD         SYSTEM ID:  [id number]   XR Abdomen Port 1 View    Narrative    EXAM: XR ABDOMEN PORT 1 VIEW  2025 3:05 PM      HISTORY: Verify small bowel feeding tube bedside placement    COMPARISON: Abdominal x-ray 2025    FINDINGS:   Single AP view of the abdomen. Enteric tube terminates over the distal  duodenum towards the DJ flexure. Cholecystectomy clips. Pelvic  surgical clip.    Nonspecific paucity of bowel gas. No pneumatosis. No portal venous  gas. Pelvic phleboliths.      Impression    IMPRESSION:  Enteric tube terminates over the distal duodenum towards the DJ  flexure.    I have personally reviewed the examination and initial interpretation  and I agree with the findings.    FER URIAS MD         SYSTEM ID:  [id number]   Echocardiogram Limited    Narrative    [id number]  [id number]  [id number]  [id number]^JENNA^HUSSEIN     [facility]  Echocardiography [facility]  [address] [place] [id number]     Name: ROSALINA MEZA  MRN: [id number]  : 1960  Study Date: 2025 04:36 PM  Age: 64 yrs  Gender: Male  Patient Location: [facility]  Reason For Study: Fever, Other Cardiac Device In Situ  Ordering Physician: HUSSEIN WEST  Performed By: Dorothy Jenkins RDCS     BSA: 2.0 m2  Height: 66 in  Weight: 211 lb  BP: 144/58  mmHg  ______________________________________________________________________________  Procedure  Limited Echocardiogram with two-dimensional, color and spectral Doppler.  ______________________________________________________________________________  Interpretation Summary  Limited echo to guide VV ECMO placement.  VV ECMO outflow is directed towards the tricuspid valve.  ECMO tip is not well visualized.  ______________________________________________________________________________  Right Ventricle  Global right ventricular function is mildly reduced.  ______________________________________________________________________________  Report approved by: Krystian Ochoa MD on 01/29/2025 07:37 PM     ______________________________________________________________________________      XR Fluoro Port Time 0/1 Hour    Narrative    This exam was marked as non-reportable because it will not be read by a   radiologist or a [place] non-radiologist provider.         XR Chest Port 1 View    Narrative    Exam: XR CHEST PORT 1 VIEW, 1/29/2025 6:48 PM    Comparison: 1/29/2025    History: s/p VV ECMO placement and central line placement    Findings:  Single AP portable view of the chest. Endotracheal tube tip is in the  midthoracic trachea Left IJ central venous catheter with tip in the  SVC. New right IJ ECMO cannula with tip below the field of view.  Enteric tube traverses into the stomach and below the field-of-view.    Trachea is midline. The cardiomediastinal silhouette is difficult to  visualize. Aortic arch calcifications. Diffuse mixed pulmonary  opacities and consolidative retrocardiac opacity with air  bronchograms, increased since prior. No pneumothorax. Probable small  left effusion. There is no pneumothorax or pleural effusion. The upper  abdomen is unremarkable.      Impression    Impression:   1. New left IJ central venous catheter with tip at the SVC.  2. New ECMO cannula with tip below the field of view.  3.  Increased diffuse mixed opacities and new retrocardiac  consolidative opacity consistent with ARDS and/or infection.    I have personally reviewed the examination and initial interpretation  and I agree with the findings.    MELANIE MURRAY,          SYSTEM ID:  [id number]   XR Chest Port 1 View    Impression    RESIDENT PRELIMINARY INTERPRETATION  Impression:   1.  Stable diffuse mixed pulmonary opacities and retrocardiac  consolidation, suggestive of ARDS versus infection.  2.  Stable support devices.  3.  Probable left pleural effusion.       =========================================

## 2025-01-30 NOTE — PROCEDURES
SICU BEDSIDE PROCEDURE   NAME: Jesus Varghese   MRN: [id number]  : 1960    Diagnosis: Respiratory failure, secretions    Procedure: Flexible bronchoscopy     Specimen:  None sent    Premedication: 50 mcg Fentanyl  Procedure Meds:   Increased propofol to 30 mcg/kg/min, 1% topical lidocaine solution at the maggy    Procedure: A timeout was called to review the case and patient information. The patient was already positioned supine, intubated, and sedated Bilateral tracheobronchial trees were inspected closely to the level of the subsegmental bronchi.  Secretions were found to be minimal.  These were evacuated without difficulty.. The procedure was completed and the patient tolerated the procedure well and without complications.      Findings: Minimal secretions      Dr. Garcia was present for the entire procedure.      Diaz Roberts  SICU fellow

## 2025-01-30 NOTE — PROGRESS NOTES
Nephrology Initial Consult  January 30, 2025      eJsus Varghese MRN:[id number] YOB: 1960  Date of Admission:1/28/2025  Primary care provider: Filiberto John  Requesting physician: Alvaro Hutson MD    Reason for consult: DEEP    MEDICAL DECISION MAKING     RECOMMENDATIONS:  Continue aggressive diuresis - can increase Lasix to 120mg and give BID or TID for desired UOP. Metolazone can be added to increase UOP if desired     No indication for urgent initiation of RRT    ASSESSMENT:  Jesus Varghese is a 64 year old w HTN, T2DN, CLL, transferred from OSH for further care of his ARDS in the setting of influenza A, secondary bacterial PNA.   Nephrology consulted for DEEP    DEEP  sCr on admit 2.55.  Peak sCr 4.12. Baseline sCr 1.0  UA: albumin, 26 WBC, 16 RBC, squamous cells. UPCR 0.21 g/g.  Renal US w/o hydro, multiple b/lk cytes (known prior)  Likely 2/2 ATN in setting of ARDS and prior septic shock  Consented for RRT on 1/30    Recommendations were communicated to primary team via note & verbal    Seen and discussed with Dr. Alvaro Rick MD  Division of Renal Disease and Hypertension  [facility]  [email or web address]  Vocera Web Console    SUBJECTIVE     HISTORY OF PRESENT ILLNESS:  Admitting provider and nursing notes reviewed  Jesus Varghese is a 64 year old w HTN, T2DN, CLL, transferred from OSH for further care of his ARDS in the setting of influenza A, secondary bacterial PNA. Case discussed with wife.    REVIEW OF SYSTEMS:  A comprehensive review of systems was negative except as noted above.    PAST MEDICAL HISTORY:  Past Medical History:   Diagnosis Date    Acute respiratory failure with hypoxia (H) 2/6/2022    Age-related cataract     No Comments Provided    Calculus of kidney     No Comments Provided    Carpal tunnel syndrome     No Comments Provided    CLL (chronic lymphocytic leukemia) (H) 2/6/2022    Diverticulosis of intestine without perforation or abscess without  bleeding     mild    Dorsalgia     No Comments Provided    Inflammatory liver disease     ? cause 1982    Malignant neoplasm of colon (H)     2006    Other specified postprocedural states     1/14/2016    Pneumonia due to 2019 novel coronavirus 2/6/2022    Pneumonia of both lungs due to infectious organism 2/6/2022    Pure hyperglyceridemia     No Comments Provided    Strain of muscle, fascia and tendon of other parts of biceps, left arm, initial encounter     1/7/2016       Past Surgical History:   Procedure Laterality Date    BONE MARROW BIOPSY, BONE SPECIMEN, NEEDLE/TROCAR Left 05/12/2022    Procedure: BIOPSY, BONE MARROW;  Surgeon: Delia Holguin MD;  Location:  OR    COLON SURGERY      8/22/06,Sigmoid colectomy for [facility] C2 adenocarcinoma    COLONOSCOPY      9/07/07,next due in 2010    COLONOSCOPY      08/30/2010,F/U 2015    COLONOSCOPY  10/19/2015    10/19/15,F/U 2020    COLONOSCOPY N/A 11/07/2022    F/U 2027 h/o colon cancer    EXTRACAPSULAR CATARACT EXTRATION WITH INTRAOCULAR LENS IMPLANT      2011, 2012,Bilateral cataracts R 2011.. L 2012    INSERTION, VENO-VENOUS CANNULA, ADULT, FOR ECMO, IN NON-OPERATING ROOM SETTING N/A 1/29/2025    Procedure: INSERTION, VENO-VENOUS CANNULA, ADULT, FOR ECMO, IN NON-OPERATING ROOM SETTING;  Surgeon: Alvaro Hutson MD;  Location: [facility]    LAPAROSCOPIC CHOLECYSTECTOMY      04/24/07    OTHER SURGICAL HISTORY      10/11/06,739760,PORTACATH,Placement of Port-A-Cath, right anterior chest, for chemotherapy    OTHER SURGICAL HISTORY      08/31/2010,,HERNIA REPAIR,Mesh Underlay    OTHER SURGICAL HISTORY      2/12/15,[id number],IR URETERAL STENT LEFT    OTHER SURGICAL HISTORY      1/14/2016,[id number],OTHER,Left,arthrex equipment used    TONSILLECTOMY, ADENOIDECTOMY, COMBINED      age 1    VASECTOMY       x2, spontaneous reconnected, so needed 2nd procedure        MEDICATIONS:  PTA Meds  Prior to Admission medications    Medication Sig Last Dose Taking? Auth Provider Long  Term End Date   ACCU-CHEK GUIDE test strip USE 1 STRIP ONCE DAILY   Madiha Skinner APRN CNP     aspirin 81 MG EC tablet Take 81 mg by mouth daily.   Unknown, Entered By History No    blood glucose (NO BRAND SPECIFIED) lancets standard Dispense item covered by insurance. Test blood sugar 1 times daily.   Filiberto John MD     blood glucose monitoring (NO BRAND SPECIFIED) meter device kit Dispense option covered by insurance. Test blood sugar 1 times daily.   Filiberto John MD     blood glucose monitoring (SOFTCLIX) lancets USE 1  TO CHECK GLUCOSE ONCE DAILY   Filiberto John MD     Fluocinolone Acetonide Scalp 0.01 % OIL oil Apply topically at bedtime.   Lydia Edwards APRN CNP     HEMP OIL OR EXTRACT OR OTHER CBD CANNABINOID, NOT MEDICAL CANNABIS, Take 4 drops by mouth At Bedtime    Reported, Patient     metFORMIN (GLUCOPHAGE) 500 MG tablet Take 1 tablet (500 mg) by mouth 2 times daily (with meals).   Lydia Edwards APRN CNP Yes    multivitamin, therapeutic (THERA-VIT) TABS tablet Take 1 tablet by mouth daily   Reported, Patient     UNABLE TO FIND MEDICATION NAME: Cherry juice, 2 oz once daily   Reported, Patient     Vitamin D, Cholecalciferol, 25 MCG (1000 UT) CAPS Take 1 capsule by mouth daily    Reported, Patient        Current Meds  Current Facility-Administered Medications   Medication Dose Route Frequency Provider Last Rate Last Admin    chlorhexidine (PERIDEX) 0.12 % solution 15 mL  15 mL Mouth/Throat Q12H Mark Florence MD   15 mL at 01/30/25 0754    dexAMETHasone PF (DECADRON) injection 20 mg  20 mg Intravenous Daily Mark Florence MD   20 mg at 01/30/25 1000    Followed by    [START ON 2/2/2025] dexAMETHasone PF (DECADRON) injection 10 mg  10 mg Intravenous Q24H Mark Florence MD        furosemide (LASIX) injection 20 mg  20 mg Intravenous Q6H Nawaf Ghotra PA-C   20 mg at 01/30/25 1308    insulin glargine (LANTUS PEN) injection 25 Units  25 Units  Subcutaneous QAM Nawaf Piña PA-C   25 Units at 01/30/25 1111    ipratropium - albuterol 0.5 mg/2.5 mg/3 mL (DUONEB) neb solution 3 mL  3 mL Nebulization Q4H Willie Wilkinson PA-C   3 mL at 01/30/25 1157    multivitamins w/minerals liquid 15 mL  15 mL Per Feeding Tube Daily Mark Florence MD   15 mL at 01/30/25 0756    oseltamivir (TAMIFLU) suspension 30 mg  30 mg Oral or Feeding Tube Daily Alvaro Hutson MD   30 mg at 01/30/25 0756    [START ON 1/31/2025] pantoprazole (PROTONIX) 2 mg/mL suspension 40 mg  40 mg Oral or Feeding Tube QAM Nawaf Piña PA-C        polyethylene glycol (MIRALAX) Packet 17 g  17 g Oral or Feeding Tube Daily Alvaro Hutson MD   17 g at 01/30/25 0756    Prosource TF20 ENfit Compatibl EN LIQD (PROSOURCE TF20) packet 60 mL  1 packet Per Feeding Tube 4x Daily Mark Florence MD   60 mL at 01/30/25 1158    senna-docusate (SENOKOT-S/PERICOLACE) 8.6-50 MG per tablet 1 tablet  1 tablet Oral or Feeding Tube BID Alvaro Hutson MD   1 tablet at 01/29/25 2002    Or    senna-docusate (SENOKOT-S/PERICOLACE) 8.6-50 MG per tablet 2 tablet  2 tablet Oral or Feeding Tube BID Alvaro Hutson MD   2 tablet at 01/30/25 0756    vancomycin place hightower - receiving intermittent dosing  1 each Intravenous See Admin Instructions Alvaro Hutson MD         Infusion Meds  Current Facility-Administered Medications   Medication Dose Route Frequency Provider Last Rate Last Admin    dextrose 10% infusion   Intravenous Continuous PRN Willie Wilkinson PA-C        dextrose 10% infusion   Intravenous Continuous PRN Mark Florence MD        fentaNYL (SUBLIMAZE) infusion   mcg/hr Intravenous Continuous Mark Florence MD 2 mL/hr at 01/30/25 1200 100 mcg/hr at 01/30/25 1200    heparin (porcine) 100 unit/mL in 0.45% Sodium Chloride ANTICOAGULANT infusion  10-50 Units/kg/hr (Ideal) Other Continuous Ra Roberts MD 11 mL/hr at  25 1200 1,100 Units/hr at 25 1200    insulin regular (MYXREDLIN) 1 unit/mL infusion  0-24 Units/hr Intravenous Continuous Willie Wilkinson PA-C 1 mL/hr at 25 1310 1 Units/hr at 25 1310    propofol (DIPRIVAN) infusion  5-75 mcg/kg/min Intravenous Continuous Mark Florence MD 11.5 mL/hr at 25 1310 20 mcg/kg/min at 25 1310    And    Medication Instruction   Does not apply Continuous PRN Mark Florence MD        norepinephrine (LEVOPHED) 16 mg in  mL infusion MAX CONC CENTRAL LINE  0.01-0.6 mcg/kg/min (Dosing Weight) Intravenous Continuous Mark Florence MD 1.8 mL/hr at 25 1213 0.02 mcg/kg/min at 25 1213       ALLERGIES:    No Known Allergies    SOCIAL HISTORY:   Social History     Socioeconomic History    Marital status:      Spouse name: Not on file    Number of children: Not on file    Years of education: Not on file    Highest education level: Not on file   Occupational History    Not on file   Tobacco Use    Smoking status: Former     Current packs/day: 0.00     Average packs/day: 2.0 packs/day for 30.0 years (60.0 ttl pk-yrs)     Types: Cigarettes     Start date: 1980     Quit date: 2010     Years since quittin.6     Passive exposure: Past    Smokeless tobacco: Never    Tobacco comments:     Passive exposure in adult home.    Vaping Use    Vaping status: Never Used   Substance and Sexual Activity    Alcohol use: Not Currently    Drug use: Never    Sexual activity: Yes     Partners: Female     Birth control/protection: None   Other Topics Concern    Parent/sibling w/ CABG, MI or angioplasty before 65F 55M? Not Asked   Social History Narrative        Retired from [employer] the     Has children    Has several tattoos     Social Drivers of Health     Financial Resource Strain: Low Risk  (2025)    Financial Resource Strain     Within the past 12 months, have you or your family members  you live with been unable to get utilities (heat, electricity) when it was really needed?: No   Food Insecurity: Low Risk  (1/28/2025)    Food Insecurity     Within the past 12 months, did you worry that your food would run out before you got money to buy more?: No     Within the past 12 months, did the food you bought just not last and you didn t have money to get more?: No   Transportation Needs: Low Risk  (1/28/2025)    Transportation Needs     Within the past 12 months, has lack of transportation kept you from medical appointments, getting your medicines, non-medical meetings or appointments, work, or from getting things that you need?: No   Physical Activity: Sufficiently Active (5/25/2024)    Exercise Vital Sign     Days of Exercise per Week: 5 days     Minutes of Exercise per Session: 30 min   Stress: No Stress Concern Present (5/25/2024)    [employer] [employer] - Occupational Stress Questionnaire     Feeling of Stress : Not at all   Social Connections: Unknown (5/25/2024)    Social Connection and Isolation Panel [NHANES]     Frequency of Communication with Friends and Family: Not on file     Frequency of Social Gatherings with Friends and Family: Twice a week     Attends [employer] Services: Not on file     Active Member of Clubs or Organizations: Not on file     Attends Club or Organization Meetings: Not on file     Marital Status: Not on file   Interpersonal Safety: Unknown (1/28/2025)    Interpersonal Safety     Do you feel physically and emotionally safe where you currently live?: Patient unable to answer     Within the past 12 months, have you been hit, slapped, kicked or otherwise physically hurt by someone?: Patient unable to answer     Within the past 12 months, have you been humiliated or emotionally abused in other ways by your partner or ex-partner?: Patient unable to answer   Housing Stability: Low Risk  (1/28/2025)    Housing Stability     Do you have housing? : Yes     Are  you worried about losing your housing?: No       FAMILY MEDICAL HISTORY:   Family History   Problem Relation Age of Onset    Hypertension Mother         Hypertension    Heart Disease Father         Heart Disease,40s and 50s    Heart Disease Other         Heart Disease,40s and 50s    Anesthesia Reaction No family hx of         Anesthesia Problem    Other - See Comments No family hx of         Stroke    Blood Disease No family hx of         Blood Disease    Bleeding Diathesis No family hx of        OBJECTIVE     PHYSICAL EXAM:   Temp  Av.1  F (36.7  C)  Min: 96.3  F (35.7  C)  Max: 102.2  F (39  C)  Arterial Line BP  Min: 82/43  Max: 177/71  Arterial Line MAP (mmHg)  Av.5 mmHg  Min: 54 mmHg  Max: 276 mmHg      Pulse  Av.5  Min: 60  Max: 116 Resp  Av  Min: 0  Max: 42  FiO2 (%)  Av.5 %  Min: 60 %  Max: 100 %  SpO2  Av.6 %  Min: 56 %  Max: 98 %       /64 (BP Location: Left arm)   Pulse 67   Temp 98.1  F (36.7  C)   Resp 14   Wt 98.5 kg (217 lb 2.5 oz)   SpO2 93%   BMI 34.52 kg/m     Date 25 0700 - 25 0659   Shift 4227-9753 3528-6346 8278-1083 24 Hour Total   INTAKE   I.V. 298.87   298.87   NG/   250   Enteral 180   180   Shift Total(mL/kg) 728.87(7.4)   728.87(7.4)   OUTPUT   Urine 325   325   Shift Total(mL/kg) 325(3.3)   325(3.3)   Weight (kg) 98.5 98.5 98.5 98.5      Admit Weight: 98.5 kg (217 lb 2.5 oz)     General: supine   HEENT: ETT in place   Cardiac: rrr  Abdominal: nondistended  Extremities: no LE edema     LABS:   CMP  Recent Labs   Lab 25  1304 25  1151 25  1113 25  0956 25  0954 25  0352 25  0341 25  2200 25  2154 25  1110 25  1108 25  0353 25  0347 25  1714 25  1706 25  0908 25  0533 25  1600 25  0544   NA  --   --   --   --  141  --  137  --  135  --  132*  --  133*  --  133*   < > 133* 131* 133*   POTASSIUM  --   --   --   --  4.0  --   4.2  --  4.5  --  4.5  --  5.4*  --  4.9   < > 5.4* 4.4 4.7   CHLORIDE  --   --   --   --  105  --  101  --  100  --  96*  --  99  --  100   < > 98 96* 97*   CO2  --   --   --   --  23  --  24  --  23  --  20*  --  19*  --  23   < > 25 23 23   ANIONGAP  --   --   --   --  13  --  12  --  12  --  16*  --  15  --  10   < > 10 12 13   * 109* 91 122* 130*   < > 196*   < > 241*   < > 227*   < > 213*   < > 150*   < > 127* 189* 127*   BUN  --   --   --   --  81.3*  --  70.5*  --  63.9*  --  47.9*  --  42.1*  --  34.5*   < > 28.2* 17.1 20.4   CR  --   --   --   --  4.12*  --  3.82*  --  3.69*  --  3.25*  --  2.95*  --  2.55*   < > 1.84* 0.84 1.05   GFRESTIMATED  --   --   --   --  15*  --  17*  --  17*  --  20*  --  23*  --  27*   < > 40* >90 78   LILIAN  --   --   --   --  8.8  --  8.4*  --  8.4*  --  9.0  --  8.4*  --  8.1*   < > 8.5* 8.2* 9.2   MAG  --   --   --   --   --   --  3.0*  --   --   --   --   --  2.5*  --  2.2  --  2.3  --   --    PHOS  --   --   --   --   --   --  5.3*  --   --   --   --   --  6.2*  --  5.4*  --  5.9*  --   --    PROTTOTAL  --   --   --   --   --   --   --   --   --   --  5.6*  --   --   --   --   --  5.8* 6.2* 6.6   ALBUMIN  --   --   --   --   --   --  2.8*  --   --   --  3.0*  --   --   --   --   --  3.1* 3.3* 3.4*   BILITOTAL  --   --   --   --   --   --   --   --   --   --  0.6  --   --   --   --   --  0.4 0.5 0.5   ALKPHOS  --   --   --   --   --   --   --   --   --   --  76  --   --   --   --   --  79 92 85   AST  --   --   --   --   --   --   --   --   --   --  76*  --   --   --   --   --  74* 98* 108*   ALT  --   --   --   --   --   --  57  --   --   --  65  --   --   --   --   --  59 66 76*    < > = values in this interval not displayed.     CBC  Recent Labs   Lab 01/30/25  0954 01/30/25 0341 01/29/25 2154 01/29/25 0347   HGB 11.4* 11.5* 11.8* 13.1*   WBC 27.0* 18.1* 19.3* 19.2*   RBC 4.06* 4.02* 4.14* 4.64   HCT 34.0* 32.8* 33.9* 40.5   MCV 84 82 82 87   MCH 28.1 28.6 28.5  28.2   MCHC 33.5 35.1 34.8 32.3   RDW 13.3 13.4 13.2 13.3    220 237 175     INR  Recent Labs   Lab 01/30/25  0954 01/30/25  0341 01/29/25  2154 01/28/25  1342   INR 1.08 1.10 1.14 1.04   PTT 36 35 30 29     ABG  Recent Labs   Lab 01/30/25  1149 01/30/25  0954 01/30/25  0736 01/30/25  0602   PH 7.38 7.37 7.38 7.38   PCO2 43 43 42 42   PO2 66* 60* 72* 74*   HCO3 25 24 25 25   O2PER 60 60 60 70      URINE STUDIES  Recent Labs   Lab Test 01/30/25  1008 02/06/22  1554   COLOR Yellow Yellow   APPEARANCE Cloudy* Slightly Cloudy*   URINEGLC Negative Negative   URINEBILI Negative Negative   URINEKETONE Negative Negative   SG 1.019 1.028   UBLD Negative Negative   URINEPH 5.0 5.5   PROTEIN 20* 30*   NITRITE Negative Negative   LEUKEST Trace* Negative   RBCU 16* 1   WBCU 26* 1     No lab results found.  PTH  No lab results found.  IRON STUDIES  No lab results found.    Rafa Rick MD

## 2025-01-30 NOTE — CONSULTS
[facility]  WO Nurse Inpatient Assessment     Consulted for: ECMO  1/30: penis wound    Summary: per chart review pt with nunez placed PTA and alva proning prior to transfer and ECMO cannulation     [facility] nurse follow-up plan: weekly    Patient History (according to provider note(s):      Jesus Varghese is a 64 year old male transferred on 1/28 from outside hospital for evaluation for ECMO cannulation.  In brief this is a 64-year-old male with past medical history of hypertension, colon cancer, chronic lymphocytic leukemia, psoriasis, and type 2 diabetes who presented to outside hospital with acute influenza A infection.  Clinical picture worsening requiring intubation and proning at outside hospital.     Assessment:      Areas visualized during today's visit: Focused: ECMO    ECMO cannula location: Right IJ ECMO cannula  Areas assessed: Ears, Occiput, Sacrum/Coccyx, Perineum, Perianal, Knee, Feet, and Heels  Positioning tolerance: Good  Date of ECMO cannulation: 1/29 Venous ECMO Cannula: 30 Fr. [place] In the Right Internal Jugular Vein     Presence of ischemia: No  Location of ischemia: none  Pressure Injury Present::Yes (penis)  Pressure Injury Prevention Interventions In Place:  Optifoam Dressing under ECMO Cannula, Z flow Positioner under head, Pillows for repositioning, TAPs Wedge Positioners in use, Heel off-loading boots, Pillows under calves for heel suspension, Mepilex Sacral Dressing, and Dressing to sacrum for prevention        Pressure Injury Prevention Interventions Added:  None     Pressure Injury Location: penis    Last photo: 1/30  Wound type: Pressure Injury     Pressure Injury Stage: Mucosal, present on admission      This is a Medical Device Related Pressure Injury (MDRPI) due to nunez  Wound history/plan of care:   found on ECMO assessment, nunez is not secured with tension. Wound consistent with PI from proning PTA.    Wound base: 100 %  Superficial scab mucosal     Palpation of the wound bed: normal and firm      Drainage: none     Description of drainage: none     Measurements (length x width x depth, in cm) 0.6  x 0.4  x  0.1 cm      Tunneling N/A     Undermining N/A  Periwound skin: Intact      Color: normal and consistent with surrounding tissue      Temperature: normal   Odor: none  Pain: no grimacing or signs of discomfort, none  Pain intervention prior to dressing change: N/A  Treatment goal: Protection  STATUS: initial assessment  Supplies ordered: at bedside    Treatment Plan:     Penis wound(s): BID and PRN BID cleanse with nunez wipes and allow to air dry. Apply thin layer of Vaseline to tip penile meatus. ENSURE catheter is secured without tension.     ECMO pressure injury prevention plan:      Reposition patient every 1-2 hours using positioning wedges  Reposition head with each turn or every 2 hours using fluidized positioner, remold fluidized positioner with each reposition so that pressure is redistributed along the entire head/neck. Ensure head and neck are aligned in a neutral position without any cords or tubes resting under head or ears.   Pad ECMO IJ cannula with non adhesive foam dressing (#[id number]) along face and scalp between skin and cannula and under [place] head wraps    Pad ECMO groin and chest cannula under rigid connectors with non adhesive foam dressing or Soft cloth  Heel off-loading Boots at all times  Sacral silicon adhesive dressing for Prevention, change every 5 days and prn  Low Air loss mattress    Orders: Written    RECOMMEND PRIMARY TEAM ORDER: None, at this time  Education provided: plan of care and wound progress  Discussed plan of care with: Nurse  Notify WO if wound(s) deteriorate.  Nursing to notify the Provider(s) and re-consult the WOC Nurse if new skin concern.    DATA:     Current support surface: Standard  Low air loss (ANGELA pump, Isolibrium, Pulsate)  Containment of urine/stool: Incontinent pad in bed  and Indwelling catheter  BMI: Body mass index is 34.52 kg/m .   Active diet order: Orders Placed This Encounter      NPO for Medical/Clinical Reasons Except for: NPO but receiving Tube Feeding     Output: I/O last 3 completed shifts:  In: 1580.04 [I.V.:1155.04; NG/GT:210]  Out: 2135 [Urine:2135]     Labs:   Recent Labs   Lab 01/30/25  0341   ALBUMIN 2.8*   HGB 11.5*   INR 1.10   WBC 18.1*     Pressure injury risk assessment:   Sensory Perception: 1-->completely limited  Moisture: 2-->very moist  Activity: 1-->bedfast  Mobility: 1-->completely immobile  Nutrition: 2-->probably inadequate  Friction and Shear: 2-->potential problem  Juliano Score: 9    Anu Gomez RN CWOCN   Pager no longer is use, please contact through Alicia Vargas group: [facility] Nurse [place]  Dept. Office Number: [phone]

## 2025-01-30 NOTE — PROGRESS NOTES
[facility]    ECLS Shift Summary:     ECMO Equipment:  Console Serial Number: [id number]  Circuit Lot Number: [id number]  Oxygenator Lot Number: [id number]    Circuit Assessment: Free of fibrin, clot, and air    Venous ECMO Cannula: 30 Fr dual lumen [place] in the Right Internal Jugular Vein         Patient remains on V-V ECMO, all equipment is functioning and alarms are appropriately set. RPM's: 3200 with Blood Flow (Circuit) LPM  Av LPM  Min: 3.97 LPM  Max: 4.06 LPM L/min. Sweep is at 4 LPM and 100 %. Extremities are perfused.     Significant Shift Events: Patient had a renal USN this am followed by an unremarkable bronchoscopy.  Minimal secretions.  Weaned ecmo flow to 4 lpm 2/2 venous pressures ~ -90 mmhg.  Escalated heparin to achieve ordered range.    Vent settings:  FiO2 (%): 60 %, Resp: 14, Inspiratory Pressure (cm H2O) (Drager Merle): 25, Vent Mode: PCV Plus assist, Resp Rate (Set): 16 breaths/min, Tidal Volume (Set, mL): 380 mL, PEEP (cm H2O): 10 cmH2O, Resp Rate (Set): 16 breaths/min, Tidal Volume (Set, mL): 380 mL, PEEP (cm H2O): 10 cmH2O    Anticoagulation:  Dose (units/hr) HEParin: 1400 Units/hr  Rate (mL/hr) HEParin: 14 mL/hr  Concentration HEParin: 100 Units/mL        Most recent: ACT  (seconds): 158 seconds    Urine output is 900 today.    Blood loss was minimal. Product given included  none.     Intake/Output Summary (Last 24 hours) at 2025 1756  Last data filed at 2025 1700  Gross per 24 hour   Intake 2174.2 ml   Output 1890 ml   Net 284.2 ml       Labs:  Recent Labs   Lab 25  1531 25  1346 25  1149 25  0954   PH 7.35 7.34* 7.38 7.37   PCO2 44 45 43 43   PO2 69* 81 66* 60*   HCO3 24 24 25 24   O2PER 60  60  60  100 60 60 60       Lab Results   Component Value Date    HGB 11.1 (L) 2025    PHGB 50 (H) 2025     2025    FIBR 435 2025    INR 1.18 (H) 2025    PTT 49 (H) 2025     DD 3.51 (H) 01/30/2025    ANTCH 90 01/30/2025       Plan is continue support and wean as tolerated.    Sundeep Salgado, RT  ECMO Specialist  1/30/2025 5:56 PM

## 2025-01-30 NOTE — PLAN OF CARE
Status  D/I: Patient on unit 4A CVICU for VV-ECMO  Neuro- Pupils e/r, brisk; moves all 4 extremities purposefully, bilateral mitts on, pulling at tubes and lines, strong  Pain/Sedation- fentanyl at 100, propofol 20-30, nodded yes x1 to pain questions, tylenol given  CV- sinus rhythm/bradycardia, no ectopy noted; BP labile, levo from off to 0.04, art line positional; afebrile  Pulm- vented, PCV + assist, 25/10; Pulling volumes 190-250; lungs coarse/diminished; bedside bronch done today; goal sat >92%, sitting 90-94%, desats with coughing and turns but recovers fairly quickly; 23 at the teeth, SICU aware, reviewed x-ray, no orders to advance; incessant coughing intermittently  GI/- NJ with TF at goal, standard flushes; nunez in place, AUOP, lasix trial today, consented for CRRT if needed; stool x2, liquid soft, rectal tube placed  Skin- WOC assessed today, small injury on lower left area on lip, medial lower urethral opening skin injury, no consult placed and WOC assessed  Gtts- Propofol, fentanyl, heparin, levophed, insulin algor 4--lantus started today  Labs- see ResultsReview  Activity- bedrest, goal to get up to chair in the next few days  ECMO- sweep 4, flows 4.0, 3200 rpms, no chugging    PLAN- continue to wean sedation as able, up to chair in next few days, wean ECMO as able. Possible CRRT, patient consented by wife over phone, lasix challenge at present.     See flow sheets for further interventions and assessments.  P: Continue to monitor pt closely, Notify MD of changes/concerns.      Problem: ECLS (Extracorporeal Life Support)  Goal: Hemodynamic Stability  Outcome: Not Progressing  Intervention: Optimize Blood Flow, Oxygenation and Ventilation  Recent Flowsheet Documentation  Taken 1/30/2025 1600 by Keli Briseno, LAURA  VTE Prevention/Management: (ordered) SCDs off (sequential compression devices)  Taken 1/30/2025 1200 by Keli Briseno, RN  VTE Prevention/Management: (ordered) SCDs off (sequential  compression devices)  Taken 1/30/2025 0800 by Keli Briseno RN  VTE Prevention/Management: (ordered) SCDs off (sequential compression devices)     Problem: Adult Inpatient Plan of Care  Goal: Absence of Hospital-Acquired Illness or Injury  Intervention: Identify and Manage Fall Risk  Recent Flowsheet Documentation  Taken 1/30/2025 1600 by Keli Briseno RN  Safety Promotion/Fall Prevention:   clutter free environment maintained   increase visualization of patient   room near nurse's station   room organization consistent   safety round/check completed  Taken 1/30/2025 1200 by Keli Briseno RN  Safety Promotion/Fall Prevention:   clutter free environment maintained   increase visualization of patient   room near nurse's station   room organization consistent   safety round/check completed  Taken 1/30/2025 0800 by Keli Briseno RN  Safety Promotion/Fall Prevention:   clutter free environment maintained   increase visualization of patient   room near nurse's station   room organization consistent   safety round/check completed

## 2025-01-30 NOTE — PLAN OF CARE
"Major Shift Events:  Patient remains cannulated on VV ECMO. No alarms on circuit overnight. Sweep 4, pO2 60s-70s. On Propofol and Fentanyl for sedation, withdrawing in all extremities. TF initiated; UOP nominal. SB-SR overnight, remains on Levo to maintain MAP>65; Insulin gtt to maintain BG, currently on 14 units.     Plan: Defer to SICU  For vital signs and complete assessments, please see documentation flowsheets.           Problem: Adult Inpatient Plan of Care  Goal: Plan of Care Review  Description: The Plan of Care Review/Shift note should be completed every shift.  The Outcome Evaluation is a brief statement about your assessment that the patient is improving, declining, or no change.  This information will be displayed automatically on your shift  note.  Outcome: Progressing  Goal: Patient-Specific Goal (Individualized)  Description: You can add care plan individualizations to a care plan. Examples of Individualization might be:  \"Parent requests to be called daily at 9am for status\", \"I have a hard time hearing out of my right ear\", or \"Do not touch me to wake me up as it startles  me\".  Outcome: Progressing  Goal: Absence of Hospital-Acquired Illness or Injury  Outcome: Progressing  Intervention: Identify and Manage Fall Risk  Recent Flowsheet Documentation  Taken 1/30/2025 0400 by Benji Larsen, RN  Safety Promotion/Fall Prevention:   clutter free environment maintained   increase visualization of patient   room near nurse's station   room organization consistent   safety round/check completed  Taken 1/30/2025 0000 by Benji Larsen, RN  Safety Promotion/Fall Prevention:   clutter free environment maintained   increase visualization of patient   room near nurse's station   room organization consistent   safety round/check completed  Taken 1/29/2025 2000 by Benji Larsen, RN  Safety Promotion/Fall Prevention:   clutter free environment maintained   increase visualization of patient   room near nurse's " station   room organization consistent   safety round/check completed  Intervention: Prevent Skin Injury  Recent Flowsheet Documentation  Taken 1/30/2025 0400 by Benji Larsen RN  Body Position:   turned   side-lying   right  Taken 1/30/2025 0000 by Benji Larsen RN  Body Position:   turned   side-lying   right  Taken 1/29/2025 2200 by Benji Larsen RN  Body Position:   turned   side-lying   left  Taken 1/29/2025 2000 by Benji Larsen RN  Body Position:   turned   right   supine   supine, head elevated  Intervention: Prevent and Manage VTE (Venous Thromboembolism) Risk  Recent Flowsheet Documentation  Taken 1/30/2025 0400 by Benji Larsen RN  VTE Prevention/Management: SCDs off (sequential compression devices)  Taken 1/30/2025 0000 by Benji Larsen RN  VTE Prevention/Management: SCDs off (sequential compression devices)  Taken 1/29/2025 2000 by Benji Larsen RN  VTE Prevention/Management: SCDs off (sequential compression devices)  Intervention: Prevent Infection  Recent Flowsheet Documentation  Taken 1/30/2025 0400 by Benji Larsen RN  Infection Prevention:   equipment surfaces disinfected   hand hygiene promoted   personal protective equipment utilized   rest/sleep promoted   single patient room provided  Taken 1/30/2025 0000 by Benji Larsen RN  Infection Prevention:   equipment surfaces disinfected   hand hygiene promoted   personal protective equipment utilized   rest/sleep promoted   single patient room provided  Taken 1/29/2025 2000 by Benji Larsen RN  Infection Prevention:   equipment surfaces disinfected   hand hygiene promoted   personal protective equipment utilized   rest/sleep promoted   single patient room provided  Goal: Optimal Comfort and Wellbeing  Outcome: Progressing  Intervention: Provide Person-Centered Care  Recent Flowsheet Documentation  Taken 1/30/2025 0400 by Benji Larsen RN  Trust Relationship/Rapport:   care explained   reassurance provided  Taken  1/30/2025 0000 by Benji Larsen RN  Trust Relationship/Rapport:   care explained   reassurance provided  Taken 1/29/2025 2000 by Bneji Larsen RN  Trust Relationship/Rapport:   care explained   reassurance provided   questions answered  Goal: Readiness for Transition of Care  Outcome: Progressing     Problem: Risk for Delirium  Goal: Optimal Coping  Outcome: Progressing  Intervention: Optimize Psychosocial Adjustment to Delirium  Recent Flowsheet Documentation  Taken 1/29/2025 2000 by Benji Larsen RN  Family/Support System Care:   presence promoted   self-care encouraged  Goal: Improved Behavioral Control  Outcome: Progressing  Intervention: Minimize Safety Risk  Recent Flowsheet Documentation  Taken 1/30/2025 0400 by Benji Larsen RN  Enhanced Safety Measures:   review medications for side effects with activity   room near unit station  Trust Relationship/Rapport:   care explained   reassurance provided  Taken 1/30/2025 0000 by Benji Larsen RN  Enhanced Safety Measures:   review medications for side effects with activity   room near unit station  Trust Relationship/Rapport:   care explained   reassurance provided  Taken 1/29/2025 2000 by Benji Larsen RN  Enhanced Safety Measures:   review medications for side effects with activity   room near unit station  Trust Relationship/Rapport:   care explained   reassurance provided   questions answered  Goal: Improved Attention and Thought Clarity  Outcome: Progressing  Goal: Improved Sleep  Outcome: Progressing     Problem: ECLS (Extracorporeal Life Support)  Goal: Optimal Coping with Therapy  Outcome: Progressing  Intervention: Optimize Psychosocial Response  Recent Flowsheet Documentation  Taken 1/29/2025 2000 by Benji Larsen RN  Family/Support System Care:   presence promoted   self-care encouraged  Goal: Absence of Bleeding  Outcome: Progressing  Goal: Optimal Device Function  Outcome: Progressing  Goal: Hemodynamic Stability  Outcome:  Progressing  Intervention: Optimize Blood Flow, Oxygenation and Ventilation  Recent Flowsheet Documentation  Taken 1/30/2025 0400 by Benji Larsen RN  VTE Prevention/Management: SCDs off (sequential compression devices)  Taken 1/30/2025 0000 by Benji Larsen RN  VTE Prevention/Management: SCDs off (sequential compression devices)  Taken 1/29/2025 2000 by Benji Larsen RN  VTE Prevention/Management: SCDs off (sequential compression devices)  Goal: Absence of Infection Signs and Symptoms  Outcome: Progressing  Intervention: Prevent or Manage Infection  Recent Flowsheet Documentation  Taken 1/30/2025 0400 by Benji Larsen RN  Infection Prevention:   equipment surfaces disinfected   hand hygiene promoted   personal protective equipment utilized   rest/sleep promoted   single patient room provided  Isolation Precautions:   droplet precautions maintained   contact precautions maintained  Taken 1/30/2025 0000 by Benji Larsen RN  Infection Prevention:   equipment surfaces disinfected   hand hygiene promoted   personal protective equipment utilized   rest/sleep promoted   single patient room provided  Isolation Precautions:   droplet precautions maintained   contact precautions maintained  Taken 1/29/2025 2000 by Benji Larsen RN  Infection Prevention:   equipment surfaces disinfected   hand hygiene promoted   personal protective equipment utilized   rest/sleep promoted   single patient room provided  Isolation Precautions:   droplet precautions maintained   contact precautions maintained   Goal Outcome Evaluation:

## 2025-01-30 NOTE — PROGRESS NOTES
[facility]    ECLS Shift Summary:     ECMO Equipment:  Console Serial Number: [id number]  Circuit Lot Number: [id number]  Oxygenator Lot Number: [id number]    Circuit Assessment: Free of fibrin, clot, and air      Venous ECMO Cannula: 30 Fr [place] Fr in the Right Internal Jugular Vein      ECMO Cannula Venous Right internal jugular-Site Assessment: WDL  ECMO Cannula Venous Right internal jugular-Site Intervention: No intervention needed    Patient remains on V-V ECMO, all equipment is functioning and alarms are appropriately set. RPM's: 3545 with Blood Flow (Circuit) LPM  Av.5 LPM  Min: 4.47 LPM  Max: 4.54 LPM L/min. Sweep is at 2 LPM and 100 %. Extremities are warm and perfused.     Significant Shift Events: During shift no issues with ECMO circuit or flows. Pt has persistent coughs with turns. Heparin steadily increased throughout night to maintain ACT goals.    Vent settings:  FiO2 (%): 70 %, Resp: 16, Inspiratory Pressure (cm H2O) (Drager Merle): 25, Vent Mode: PCV Plus assist, Resp Rate (Set): 16 breaths/min, Tidal Volume (Set, mL): 380 mL, PEEP (cm H2O): 10 cmH2O, Resp Rate (Set): 16 breaths/min, Tidal Volume (Set, mL): 380 mL, PEEP (cm H2O): 10 cmH2O    Anticoagulation:  Dose (units/hr) HEParin: 800 Units/hr  Rate (mL/hr) HEParin: 8 mL/hr  Concentration HEParin: 100 Units/mL        Most recent: ACT  (seconds): 158 seconds    Urine output is 100-125 mL/hr.  .  Blood loss was minimal. Product given included none.     Intake/Output Summary (Last 24 hours) at 2025 0609  Last data filed at 2025 0500  Gross per 24 hour   Intake 1934.58 ml   Output 1985 ml   Net -50.42 ml       Labs:  Recent Labs   Lab 25  0343 25  0341 25  0205 25  2358 25  2242   PH 7.35  --  7.33* 7.34* 7.33*   PCO2 45  --  46* 45 45   PO2 71*  --  72* 70* 69*   HCO3 25  --  25 24 24   O2PER 70 70  70  100 70 70 70       Lab Results   Component Value Date     HGB 11.5 (L) 01/30/2025    PHGB 50 (H) 01/30/2025     01/30/2025    FIBR 506 01/30/2025    INR 1.10 01/30/2025    PTT 35 01/30/2025    DD 2.87 (H) 01/30/2025       Plan is for continued VV-ECMO support.    Jonah Teague RN  ECMO Specialist  1/30/2025 6:09 AM

## 2025-01-30 NOTE — PROGRESS NOTES
Venovenous (VV) ECMO Fellow Progress Note  1/30/2025    Jesus Varghese is a 64 year old male who was started on ECMO due to severe respiratory failure from influenza A pneumonia with superimposed MRSA pneumonia.     Interval events: ECMO cannulation last evening. Paralytic and veletri weaned off. Lung protective/pressure control rest ventilation settings initiated    The patients vital signs today are as follows:    Temp:  [97  F (36.1  C)-99  F (37.2  C)] 97.9  F (36.6  C)  Pulse:  [] 65  Resp:  [11-30] 13  MAP:  [54 mmHg-276 mmHg] 73 mmHg  Arterial Line BP: ()/(38-88) 126/50  FiO2 (%):  [60 %-100 %] 60 %  SpO2:  [76 %-97 %] 94 %    Intake/Output Summary (Last 24 hours) at 1/30/2025 1346  Last data filed at 1/30/2025 1300  Gross per 24 hour   Intake 1976.17 ml   Output 1415 ml   Net 561.17 ml    FiO2 (%): 60 %, Resp: 13, Inspiratory Pressure (cm H2O) (Drager Merle): 25, Vent Mode: PCV Plus assist, Resp Rate (Set): 16 breaths/min, Tidal Volume (Set, mL): 380 mL, PEEP (cm H2O): 10 cmH2O, Resp Rate (Set): 16 breaths/min, Tidal Volume (Set, mL): 380 mL, PEEP (cm H2O): 10 cmH2O   Recent Labs   Lab 01/30/25  1149 01/30/25  0954 01/30/25  0736 01/30/25  0602   PH 7.38 7.37 7.38 7.38   PCO2 43 43 42 42   PO2 66* 60* 72* 74*   HCO3 25 24 25 25   O2PER 60 60 60 70      Recent Labs   Lab 01/30/25  0954 01/30/25  0341 01/29/25  2154 01/29/25  0347   WBC 27.0* 18.1* 19.3* 19.2*   HGB 11.4* 11.5* 11.8* 13.1*     Creatinine   Date Value Ref Range Status   01/30/2025 4.12 (H) 0.67 - 1.17 mg/dL Final   01/30/2025 3.82 (H) 0.67 - 1.17 mg/dL Final   01/29/2025 3.69 (H) 0.67 - 1.17 mg/dL Final   01/29/2025 3.25 (H) 0.67 - 1.17 mg/dL Final   12/14/2020 1.13 0.70 - 1.30 mg/dL Final   07/15/2019 1.07 0.70 - 1.30 mg/dL Final   11/20/2018 0.98 0.70 - 1.30 mg/dL Final   04/29/2016 0.91 0.70 - 1.30 mg/dL      Physical Exam:   Cannula unchanged.  Minimal oozing.  Good air entry bilaterally centrally, decreased at  bases  Interactive with cares/purposeful but not following commands  Extremities edematous    ECMO Issues including assessments and plan on DOS 1/30/2025:    Neuro: Sedated for mechanical ventilation and ECMO.  NIRS monitoring in place. RASS goal: -1 to -2. Wean propofol as able, continue fentanyl gtt.   CV: Hemodynamically stable, intermittently needing some levophed with increased sedation.    Pulm: Severe respiratory failure requiring ECMO and mechanical ventilation. Flows unchanged at 4-4.5 Lpm. Keep vent settings at rest settings: PC 25, PEEP10, FiO2 60%. Increased coughing with lightened sedation - will bronch today. Continue decadron for ARDS.  FEN/Renal: Creatinine/BUN increased, making adequate urine. Will diurese today - also will consult nephrology as will likely need CRRT in order to facilitate volume removal.   Heme: Hemoglobin stable, no signs bleeding. Heparin gtt for goal -180 .  Goals: if O2 sat >85% Hgb may drift to 7-8.  If O2 Sat <85% keep Hgb 10-12.  ID: On vancomycin for MRSA pneumonia, oseltamivir for influenza A pneumonia.  GI: advance Tfs to goal. PPI.   Endo: insulin gtt.      All pertinent labs, imaging studies, physical exam and medications have been reviewed by me.     This patient requires continued ICU monitoring and cares.  I apprecitate the input of the SICU team for these issues.  I have discussed patient care and treatment plan with the SICU team and the patient's family.         Diaz Roberts  SICU Fellow  Pager [phone]

## 2025-01-30 NOTE — PROGRESS NOTES
"Bronchoscopy Risk Assessment Guidelines      A. Patient symptoms to consider when assessing pulmonary TB risk are:    I. Cough greater than 3 weeks; and fever, hemoptysis, pleuritic chest    pain, weight loss greater than 10 lbs, night sweats, fatigue, infiltrates on    upper lobes or superior segments of lower lobes, cavitation on chest    x-ray.   B. Patient risk factors to consider when assessing pulmonary TB risk are:    I. Exposure to known TB case, foreign-born persons (within 5 years of    arrival to [place]), residence in a crowded setting (correctional facility,     long-term care center, etc.), persons with HIV or immunosuppression.    Patients with symptoms and risk factors should generally be considered \"suspect risk\" and bronchoscopies should be performed in airborne precautions.    This patient has NO KNOWN RISK of Tuberculosis (proceed with bronchoscopy)    Specimens sent: yes  Complications: None  Scope used: #[id number]  Slim  Attending Physician: Dr. Jose Ruth, RT on 1/30/2025 at 12:25 PM  "

## 2025-01-30 NOTE — PHARMACY-VANCOMYCIN DOSING SERVICE
Pharmacy Vancomycin Note  Date of Service 2025  Patient's  1960   64 year old, male    Indication: Healthcare-Associated Pneumonia  Day of Therapy: 3  Current vancomycin regimen:  1000 mg IV q24h  Current vancomycin monitoring method: AUC  Current vancomycin therapeutic monitoring goal: 400-600 mg*h/L    Current estimated CrCl = Estimated Creatinine Clearance: 21.1 mL/min (A) (based on SCr of 3.82 mg/dL (H)).    Creatinine for last 3 days  2025:  4:00 PM Creatinine 0.84 mg/dL  2025:  5:33 AM Creatinine 1.84 mg/dL;  9:08 AM Creatinine 2.04 mg/dL;  1:42 PM Creatinine 2.40 mg/dL;  5:06 PM Creatinine 2.55 mg/dL  2025:  3:47 AM Creatinine 2.95 mg/dL; 11:08 AM Creatinine 3.25 mg/dL;  9:54 PM Creatinine 3.69 mg/dL  2025:  3:41 AM Creatinine 3.82 mg/dL    Recent Vancomycin Levels (past 3 days)  2025:  1:42 PM Vancomycin 15.3 ug/mL  2025:  3:41 AM Vancomycin 18.4 ug/mL    Vancomycin IV Administrations (past 72 hours)                     vancomycin (VANCOCIN) 1,000 mg in 200 mL dextrose intermittent infusion (mg) 1,000 mg New Bag 25 0401     1,000 mg New Bag 25 0357    vancomycin (VANCOCIN) 1,250 mg in 0.9% NaCl 250 mL intermittent infusion (mg) 1,250 mg New Bag 25 0314    Vancomycin (VANCOCIN) 2,000 mg in 0.9% NaCl 500 mL intermittent infusion (mg) 2,000 mg New Bag 25 0937                    Nephrotoxins and other renal medications (From now, onward)      Start     Dose/Rate Route Frequency Ordered Stop    25 0400  vancomycin (VANCOCIN) 1,000 mg in 200 mL dextrose intermittent infusion         1,000 mg  200 mL/hr over 1 Hours Intravenous EVERY 24 HOURS 25 1819      25 1730  norepinephrine (LEVOPHED) 16 mg in  mL infusion MAX CONC CENTRAL LINE         0.01-0.6 mcg/kg/min × 96.1 kg (Dosing Weight)  0.9-54.1 mL/hr  Intravenous CONTINUOUS 25 1706                 Contrast Orders - past 72 hours (72h ago, onward)      None             Interpretation of levels and current regimen:  Vancomycin level is reflective of therapeutic level    Has serum creatinine changed greater than 50% in last 72 hours: Yes    Urine output:  good urine output    Renal Function: Worsening    Plan to transition to intermittent dosing based off decline in renal function, patient got 1 vancomycin dose after AM level this morning, will plan for an additional level tomorrow (1/31) AM    Plan:  Intermittent dosing  Vancomycin monitoring method: Trough (Method 2 = manual dose calculation)  Vancomycin therapeutic monitoring goal: 15-20 mg/L  Pharmacy will check vancomycin levels as appropriate in 1-3 Days.  Serum creatinine levels will be ordered daily for the first week of therapy and at least twice weekly for subsequent weeks.    Rowdy Chavez, [facility]

## 2025-01-31 LAB
ACT BLD: 166 SECONDS (ref 74–150)
ACT BLD: 170 SECONDS (ref 74–150)
ACT BLD: 174 SECONDS (ref 74–150)
ACT BLD: 182 SECONDS (ref 74–150)
ACT BLD: 182 SECONDS (ref 74–150)
ALBUMIN SERPL BCG-MCNC: 3.1 G/DL (ref 3.5–5.2)
ALLEN'S TEST: ABNORMAL
ALT SERPL W P-5'-P-CCNC: 59 U/L (ref 0–70)
ANION GAP SERPL CALCULATED.3IONS-SCNC: 16 MMOL/L (ref 7–15)
ANION GAP SERPL CALCULATED.3IONS-SCNC: 17 MMOL/L (ref 7–15)
ANION GAP SERPL CALCULATED.3IONS-SCNC: 18 MMOL/L (ref 7–15)
ANION GAP SERPL CALCULATED.3IONS-SCNC: 19 MMOL/L (ref 7–15)
APTT PPP: 55 SECONDS (ref 22–38)
APTT PPP: 58 SECONDS (ref 22–38)
APTT PPP: 68 SECONDS (ref 22–38)
APTT PPP: 75 SECONDS (ref 22–38)
AT III ACT/NOR PPP CHRO: 90 % (ref 85–135)
BACTERIA BLD CULT: NO GROWTH
BACTERIA BLD CULT: NO GROWTH
BASE EXCESS BLDA CALC-SCNC: -1.5 MMOL/L (ref -3–3)
BASE EXCESS BLDA CALC-SCNC: -1.8 MMOL/L (ref -3–3)
BASE EXCESS BLDA CALC-SCNC: -1.8 MMOL/L (ref -3–3)
BASE EXCESS BLDA CALC-SCNC: -1.9 MMOL/L (ref -3–3)
BASE EXCESS BLDA CALC-SCNC: -2.3 MMOL/L (ref -3–3)
BASE EXCESS BLDA CALC-SCNC: -2.3 MMOL/L (ref -3–3)
BASE EXCESS BLDA CALC-SCNC: -2.7 MMOL/L (ref -3–3)
BASE EXCESS BLDA CALC-SCNC: -2.9 MMOL/L (ref -3–3)
BASE EXCESS BLDA CALC-SCNC: -2.9 MMOL/L (ref -3–3)
BASE EXCESS BLDA CALC-SCNC: -3.1 MMOL/L (ref -3–3)
BASE EXCESS BLDA CALC-SCNC: -3.2 MMOL/L (ref -3–3)
BASE EXCESS BLDA CALC-SCNC: -3.5 MMOL/L (ref -3–3)
BASE EXCESS BLDA CALC-SCNC: -3.9 MMOL/L (ref -3–3)
BASE EXCESS BLDA CALC-SCNC: -4 MMOL/L (ref -3–3)
BASE EXCESS BLDV CALC-SCNC: -1.8 MMOL/L (ref -3–3)
BASE EXCESS BLDV CALC-SCNC: -2 MMOL/L (ref -3–3)
BASE EXCESS BLDV CALC-SCNC: -3.2 MMOL/L (ref -3–3)
BASE EXCESS BLDV CALC-SCNC: -3.6 MMOL/L (ref -3–3)
BASOPHILS # BLD AUTO: 0.1 10E3/UL (ref 0–0.2)
BASOPHILS # BLD AUTO: 0.1 10E3/UL (ref 0–0.2)
BASOPHILS # BLD MANUAL: 0 10E3/UL (ref 0–0.2)
BASOPHILS NFR BLD AUTO: 0 %
BASOPHILS NFR BLD AUTO: 0 %
BASOPHILS NFR BLD MANUAL: 0 %
BUN SERPL-MCNC: 114 MG/DL (ref 8–23)
BUN SERPL-MCNC: 116 MG/DL (ref 8–23)
BUN SERPL-MCNC: 136 MG/DL (ref 8–23)
BUN SERPL-MCNC: 147 MG/DL (ref 8–23)
BURR CELLS BLD QL SMEAR: ABNORMAL
CA-I BLD-MCNC: 4.7 MG/DL (ref 4.4–5.2)
CA-I BLD-MCNC: 4.7 MG/DL (ref 4.4–5.2)
CA-I BLD-MCNC: 4.8 MG/DL (ref 4.4–5.2)
CA-I BLD-MCNC: 4.9 MG/DL (ref 4.4–5.2)
CALCIUM SERPL-MCNC: 9.1 MG/DL (ref 8.8–10.4)
CALCIUM SERPL-MCNC: 9.1 MG/DL (ref 8.8–10.4)
CALCIUM SERPL-MCNC: 9.3 MG/DL (ref 8.8–10.4)
CALCIUM SERPL-MCNC: 9.5 MG/DL (ref 8.8–10.4)
CHLORIDE SERPL-SCNC: 104 MMOL/L (ref 98–107)
CHLORIDE SERPL-SCNC: 104 MMOL/L (ref 98–107)
CHLORIDE SERPL-SCNC: 105 MMOL/L (ref 98–107)
CHLORIDE SERPL-SCNC: 105 MMOL/L (ref 98–107)
CK SERPL-CCNC: 51 U/L (ref 39–308)
CREAT SERPL-MCNC: 4.13 MG/DL (ref 0.67–1.17)
CREAT SERPL-MCNC: 4.28 MG/DL (ref 0.67–1.17)
CREAT SERPL-MCNC: 4.3 MG/DL (ref 0.67–1.17)
CREAT SERPL-MCNC: 4.37 MG/DL (ref 0.67–1.17)
D DIMER PPP FEU-MCNC: 1.31 UG/ML FEU (ref 0–0.5)
D DIMER PPP FEU-MCNC: 2.72 UG/ML FEU (ref 0–0.5)
DACRYOCYTES BLD QL SMEAR: SLIGHT
EGFRCR SERPLBLD CKD-EPI 2021: 14 ML/MIN/1.73M2
EGFRCR SERPLBLD CKD-EPI 2021: 14 ML/MIN/1.73M2
EGFRCR SERPLBLD CKD-EPI 2021: 15 ML/MIN/1.73M2
EGFRCR SERPLBLD CKD-EPI 2021: 15 ML/MIN/1.73M2
EOSINOPHIL # BLD AUTO: 0 10E3/UL (ref 0–0.7)
EOSINOPHIL # BLD AUTO: 0 10E3/UL (ref 0–0.7)
EOSINOPHIL # BLD MANUAL: 0 10E3/UL (ref 0–0.7)
EOSINOPHIL NFR BLD AUTO: 0 %
EOSINOPHIL NFR BLD AUTO: 0 %
EOSINOPHIL NFR BLD MANUAL: 0 %
ERYTHROCYTE [DISTWIDTH] IN BLOOD BY AUTOMATED COUNT: 13.9 % (ref 10–15)
ERYTHROCYTE [DISTWIDTH] IN BLOOD BY AUTOMATED COUNT: 14.2 % (ref 10–15)
ERYTHROCYTE [DISTWIDTH] IN BLOOD BY AUTOMATED COUNT: 14.3 % (ref 10–15)
ERYTHROCYTE [DISTWIDTH] IN BLOOD BY AUTOMATED COUNT: 14.3 % (ref 10–15)
FIBRINOGEN PPP-MCNC: 390 MG/DL (ref 170–510)
FIBRINOGEN PPP-MCNC: 414 MG/DL (ref 170–510)
FRAGMENTS BLD QL SMEAR: SLIGHT
GLUCOSE BLDC GLUCOMTR-MCNC: 103 MG/DL (ref 70–99)
GLUCOSE BLDC GLUCOMTR-MCNC: 128 MG/DL (ref 70–99)
GLUCOSE BLDC GLUCOMTR-MCNC: 129 MG/DL (ref 70–99)
GLUCOSE BLDC GLUCOMTR-MCNC: 134 MG/DL (ref 70–99)
GLUCOSE BLDC GLUCOMTR-MCNC: 135 MG/DL (ref 70–99)
GLUCOSE BLDC GLUCOMTR-MCNC: 136 MG/DL (ref 70–99)
GLUCOSE BLDC GLUCOMTR-MCNC: 139 MG/DL (ref 70–99)
GLUCOSE BLDC GLUCOMTR-MCNC: 142 MG/DL (ref 70–99)
GLUCOSE BLDC GLUCOMTR-MCNC: 144 MG/DL (ref 70–99)
GLUCOSE BLDC GLUCOMTR-MCNC: 144 MG/DL (ref 70–99)
GLUCOSE BLDC GLUCOMTR-MCNC: 157 MG/DL (ref 70–99)
GLUCOSE BLDC GLUCOMTR-MCNC: 157 MG/DL (ref 70–99)
GLUCOSE BLDC GLUCOMTR-MCNC: 162 MG/DL (ref 70–99)
GLUCOSE BLDC GLUCOMTR-MCNC: 177 MG/DL (ref 70–99)
GLUCOSE BLDC GLUCOMTR-MCNC: 194 MG/DL (ref 70–99)
GLUCOSE SERPL-MCNC: 142 MG/DL (ref 70–99)
GLUCOSE SERPL-MCNC: 143 MG/DL (ref 70–99)
GLUCOSE SERPL-MCNC: 167 MG/DL (ref 70–99)
GLUCOSE SERPL-MCNC: 187 MG/DL (ref 70–99)
HCO3 BLD-SCNC: 22 MMOL/L (ref 21–28)
HCO3 BLD-SCNC: 22 MMOL/L (ref 21–28)
HCO3 BLD-SCNC: 23 MMOL/L (ref 21–28)
HCO3 BLD-SCNC: 23 MMOL/L (ref 21–28)
HCO3 BLD-SCNC: 24 MMOL/L (ref 21–28)
HCO3 BLD-SCNC: 25 MMOL/L (ref 21–28)
HCO3 BLDA-SCNC: 21 MMOL/L (ref 21–28)
HCO3 BLDA-SCNC: 23 MMOL/L (ref 21–28)
HCO3 BLDV-SCNC: 23 MMOL/L (ref 21–28)
HCO3 BLDV-SCNC: 23 MMOL/L (ref 21–28)
HCO3 BLDV-SCNC: 24 MMOL/L (ref 21–28)
HCO3 BLDV-SCNC: 24 MMOL/L (ref 21–28)
HCO3 SERPL-SCNC: 20 MMOL/L (ref 22–29)
HCO3 SERPL-SCNC: 20 MMOL/L (ref 22–29)
HCO3 SERPL-SCNC: 21 MMOL/L (ref 22–29)
HCO3 SERPL-SCNC: 22 MMOL/L (ref 22–29)
HCT VFR BLD AUTO: 28.6 % (ref 40–53)
HCT VFR BLD AUTO: 31.4 % (ref 40–53)
HCT VFR BLD AUTO: 32.2 % (ref 40–53)
HCT VFR BLD AUTO: 33.3 % (ref 40–53)
HGB BLD-MCNC: 10 G/DL (ref 13.3–17.7)
HGB BLD-MCNC: 10.8 G/DL (ref 13.3–17.7)
HGB BLD-MCNC: 10.9 G/DL (ref 13.3–17.7)
HGB BLD-MCNC: 11.3 G/DL (ref 13.3–17.7)
HGB FREE PLAS-MCNC: 130 MG/DL
IMM GRANULOCYTES # BLD: 1.4 10E3/UL
IMM GRANULOCYTES # BLD: 1.8 10E3/UL
IMM GRANULOCYTES NFR BLD: 5 %
IMM GRANULOCYTES NFR BLD: 5 %
INR PPP: 1.07 (ref 0.85–1.15)
INR PPP: 1.15 (ref 0.85–1.15)
INR PPP: 1.15 (ref 0.85–1.15)
INR PPP: 1.17 (ref 0.85–1.15)
LACTATE SERPL-SCNC: 1.9 MMOL/L (ref 0.7–2)
LACTATE SERPL-SCNC: 2.3 MMOL/L (ref 0.7–2)
LACTATE SERPL-SCNC: 2.6 MMOL/L (ref 0.7–2)
LACTATE SERPL-SCNC: 2.7 MMOL/L (ref 0.7–2)
LVEF ECHO: NORMAL
LYMPHOCYTES # BLD AUTO: 12.7 10E3/UL (ref 0.8–5.3)
LYMPHOCYTES # BLD AUTO: 21.1 10E3/UL (ref 0.8–5.3)
LYMPHOCYTES # BLD MANUAL: 8.9 10E3/UL (ref 0.8–5.3)
LYMPHOCYTES NFR BLD AUTO: 48 %
LYMPHOCYTES NFR BLD AUTO: 57 %
LYMPHOCYTES NFR BLD MANUAL: 44 %
MAGNESIUM SERPL-MCNC: 3.3 MG/DL (ref 1.7–2.3)
MCH RBC QN AUTO: 28.3 PG (ref 26.5–33)
MCH RBC QN AUTO: 28.7 PG (ref 26.5–33)
MCH RBC QN AUTO: 29.2 PG (ref 26.5–33)
MCH RBC QN AUTO: 29.4 PG (ref 26.5–33)
MCHC RBC AUTO-ENTMCNC: 33.5 G/DL (ref 31.5–36.5)
MCHC RBC AUTO-ENTMCNC: 33.9 G/DL (ref 31.5–36.5)
MCHC RBC AUTO-ENTMCNC: 34.7 G/DL (ref 31.5–36.5)
MCHC RBC AUTO-ENTMCNC: 35 G/DL (ref 31.5–36.5)
MCV RBC AUTO: 84 FL (ref 78–100)
MCV RBC AUTO: 84 FL (ref 78–100)
MCV RBC AUTO: 85 FL (ref 78–100)
MCV RBC AUTO: 85 FL (ref 78–100)
METAMYELOCYTES # BLD MANUAL: 0.4 10E3/UL
METAMYELOCYTES NFR BLD MANUAL: 2 %
MONOCYTES # BLD AUTO: 1.4 10E3/UL (ref 0–1.3)
MONOCYTES # BLD AUTO: 1.8 10E3/UL (ref 0–1.3)
MONOCYTES # BLD MANUAL: 0.4 10E3/UL (ref 0–1.3)
MONOCYTES NFR BLD AUTO: 5 %
MONOCYTES NFR BLD AUTO: 5 %
MONOCYTES NFR BLD MANUAL: 2 %
MYELOCYTES # BLD MANUAL: 0.4 10E3/UL
MYELOCYTES NFR BLD MANUAL: 2 %
NEUTROPHILS # BLD AUTO: 11.2 10E3/UL (ref 1.6–8.3)
NEUTROPHILS # BLD AUTO: 12.5 10E3/UL (ref 1.6–8.3)
NEUTROPHILS # BLD MANUAL: 10.2 10E3/UL (ref 1.6–8.3)
NEUTROPHILS NFR BLD AUTO: 34 %
NEUTROPHILS NFR BLD AUTO: 42 %
NEUTROPHILS NFR BLD MANUAL: 50 %
NRBC # BLD AUTO: 0 10E3/UL
NRBC # BLD AUTO: 0 10E3/UL
NRBC BLD AUTO-RTO: 0 /100
NRBC BLD AUTO-RTO: 0 /100
O2/TOTAL GAS SETTING VFR VENT: 100 %
O2/TOTAL GAS SETTING VFR VENT: 60 %
OXYHGB MFR BLDA: 76 % (ref 92–100)
OXYHGB MFR BLDA: 84 % (ref 92–100)
OXYHGB MFR BLDA: 84 % (ref 92–100)
OXYHGB MFR BLDA: 88 % (ref 92–100)
OXYHGB MFR BLDA: 89 % (ref 92–100)
OXYHGB MFR BLDA: 89 % (ref 92–100)
OXYHGB MFR BLDA: 90 % (ref 92–100)
OXYHGB MFR BLDA: 91 % (ref 92–100)
OXYHGB MFR BLDA: 91 % (ref 92–100)
OXYHGB MFR BLDA: 94 % (ref 92–100)
OXYHGB MFR BLDA: 97 % (ref 75–100)
OXYHGB MFR BLDA: 99 % (ref 75–100)
OXYHGB MFR BLDV: 75 %
OXYHGB MFR BLDV: 80 %
OXYHGB MFR BLDV: 87 % (ref 70–75)
OXYHGB MFR BLDV: 90 % (ref 70–75)
PCO2 BLD: 41 MM HG (ref 35–45)
PCO2 BLD: 42 MM HG (ref 35–45)
PCO2 BLD: 44 MM HG (ref 35–45)
PCO2 BLD: 45 MM HG (ref 35–45)
PCO2 BLD: 46 MM HG (ref 35–45)
PCO2 BLD: 46 MM HG (ref 35–45)
PCO2 BLD: 51 MM HG (ref 35–45)
PCO2 BLD: 57 MM HG (ref 35–45)
PCO2 BLDA: 37 MM HG (ref 35–45)
PCO2 BLDA: 38 MM HG (ref 35–45)
PCO2 BLDV: 45 MM HG (ref 40–50)
PCO2 BLDV: 46 MM HG (ref 40–50)
PEEP: 10 CM H2O
PH BLD: 7.25 [PH] (ref 7.35–7.45)
PH BLD: 7.29 [PH] (ref 7.35–7.45)
PH BLD: 7.31 [PH] (ref 7.35–7.45)
PH BLD: 7.33 [PH] (ref 7.35–7.45)
PH BLD: 7.34 [PH] (ref 7.35–7.45)
PH BLD: 7.36 [PH] (ref 7.35–7.45)
PH BLD: 7.36 [PH] (ref 7.35–7.45)
PH BLDA: 7.36 [PH] (ref 7.35–7.45)
PH BLDA: 7.38 [PH] (ref 7.35–7.45)
PH BLDV: 7.31 [PH] (ref 7.32–7.43)
PH BLDV: 7.31 [PH] (ref 7.32–7.43)
PH BLDV: 7.33 [PH] (ref 7.32–7.43)
PH BLDV: 7.33 [PH] (ref 7.32–7.43)
PHOSPHATE SERPL-MCNC: 6.1 MG/DL (ref 2.5–4.5)
PLAT MORPH BLD: ABNORMAL
PLAT MORPH BLD: ABNORMAL
PLAT MORPH BLD: NORMAL
PLATELET # BLD AUTO: 251 10E3/UL (ref 150–450)
PLATELET # BLD AUTO: 266 10E3/UL (ref 150–450)
PLATELET # BLD AUTO: 283 10E3/UL (ref 150–450)
PLATELET # BLD AUTO: 347 10E3/UL (ref 150–450)
PO2 BLD: 47 MM HG (ref 80–105)
PO2 BLD: 53 MM HG (ref 80–105)
PO2 BLD: 54 MM HG (ref 80–105)
PO2 BLD: 57 MM HG (ref 80–105)
PO2 BLD: 58 MM HG (ref 80–105)
PO2 BLD: 59 MM HG (ref 80–105)
PO2 BLD: 59 MM HG (ref 80–105)
PO2 BLD: 62 MM HG (ref 80–105)
PO2 BLD: 66 MM HG (ref 80–105)
PO2 BLD: 66 MM HG (ref 80–105)
PO2 BLD: 68 MM HG (ref 80–105)
PO2 BLD: 81 MM HG (ref 80–105)
PO2 BLDA: 367 MM HG (ref 80–105)
PO2 BLDA: 437 MM HG (ref 80–105)
PO2 BLDV: 43 MM HG (ref 25–47)
PO2 BLDV: 48 MM HG (ref 25–47)
PO2 BLDV: 58 MM HG (ref 25–47)
PO2 BLDV: 63 MM HG (ref 25–47)
POLYCHROMASIA BLD QL SMEAR: SLIGHT
POTASSIUM SERPL-SCNC: 4.1 MMOL/L (ref 3.4–5.3)
POTASSIUM SERPL-SCNC: 4.2 MMOL/L (ref 3.4–5.3)
POTASSIUM SERPL-SCNC: 4.3 MMOL/L (ref 3.4–5.3)
POTASSIUM SERPL-SCNC: 4.4 MMOL/L (ref 3.4–5.3)
RBC # BLD AUTO: 3.42 10E6/UL (ref 4.4–5.9)
RBC # BLD AUTO: 3.71 10E6/UL (ref 4.4–5.9)
RBC # BLD AUTO: 3.82 10E6/UL (ref 4.4–5.9)
RBC # BLD AUTO: 3.94 10E6/UL (ref 4.4–5.9)
RBC MORPH BLD: ABNORMAL
RBC MORPH BLD: ABNORMAL
RBC MORPH BLD: NORMAL
SAO2 % BLDA: 77.6 % (ref 95–96)
SAO2 % BLDA: 85.7 % (ref 95–96)
SAO2 % BLDA: 86.1 % (ref 95–96)
SAO2 % BLDA: 89.8 % (ref 95–96)
SAO2 % BLDA: 90.2 % (ref 95–96)
SAO2 % BLDA: 90.7 % (ref 95–96)
SAO2 % BLDA: 90.7 % (ref 95–96)
SAO2 % BLDA: 91.2 % (ref 95–96)
SAO2 % BLDA: 92.5 % (ref 95–96)
SAO2 % BLDA: 93.1 % (ref 95–96)
SAO2 % BLDA: 93.2 % (ref 95–96)
SAO2 % BLDA: 96.1 % (ref 95–96)
SAO2 % BLDA: 99.9 % (ref 95–96)
SAO2 % BLDA: >100 % (ref 95–96)
SAO2 % BLDV: 76.8 % (ref 70–75)
SAO2 % BLDV: 81.7 % (ref 70–75)
SAO2 % BLDV: 88.8 % (ref 70–75)
SAO2 % BLDV: 92.3 % (ref 70–75)
SODIUM SERPL-SCNC: 142 MMOL/L (ref 135–145)
SODIUM SERPL-SCNC: 142 MMOL/L (ref 135–145)
SODIUM SERPL-SCNC: 143 MMOL/L (ref 135–145)
SODIUM SERPL-SCNC: 144 MMOL/L (ref 135–145)
TARGETS BLD QL SMEAR: SLIGHT
TARGETS BLD QL SMEAR: SLIGHT
TRIGL SERPL-MCNC: 378 MG/DL
UFH PPP CHRO-ACNC: 0.53 IU/ML
UFH PPP CHRO-ACNC: 0.55 IU/ML
UFH PPP CHRO-ACNC: 0.58 IU/ML
UFH PPP CHRO-ACNC: 0.61 IU/ML
VANCOMYCIN SERPL-MCNC: 21.9 UG/ML
WBC # BLD AUTO: 18 10E3/UL (ref 4–11)
WBC # BLD AUTO: 20.3 10E3/UL (ref 4–11)
WBC # BLD AUTO: 26.7 10E3/UL (ref 4–11)
WBC # BLD AUTO: 37.4 10E3/UL (ref 4–11)

## 2025-01-31 PROCEDURE — 84478 ASSAY OF TRIGLYCERIDES: CPT | Performed by: STUDENT IN AN ORGANIZED HEALTH CARE EDUCATION/TRAINING PROGRAM

## 2025-01-31 PROCEDURE — 85300 ANTITHROMBIN III ACTIVITY: CPT | Performed by: STUDENT IN AN ORGANIZED HEALTH CARE EDUCATION/TRAINING PROGRAM

## 2025-01-31 PROCEDURE — 83605 ASSAY OF LACTIC ACID: CPT | Performed by: STUDENT IN AN ORGANIZED HEALTH CARE EDUCATION/TRAINING PROGRAM

## 2025-01-31 PROCEDURE — 82805 BLOOD GASES W/O2 SATURATION: CPT | Performed by: SURGERY

## 2025-01-31 PROCEDURE — 82805 BLOOD GASES W/O2 SATURATION: CPT | Performed by: STUDENT IN AN ORGANIZED HEALTH CARE EDUCATION/TRAINING PROGRAM

## 2025-01-31 PROCEDURE — 85384 FIBRINOGEN ACTIVITY: CPT | Performed by: STUDENT IN AN ORGANIZED HEALTH CARE EDUCATION/TRAINING PROGRAM

## 2025-01-31 PROCEDURE — 250N000011 HC RX IP 250 OP 636: Performed by: STUDENT IN AN ORGANIZED HEALTH CARE EDUCATION/TRAINING PROGRAM

## 2025-01-31 PROCEDURE — 250N000013 HC RX MED GY IP 250 OP 250 PS 637: Performed by: SURGERY

## 2025-01-31 PROCEDURE — 82330 ASSAY OF CALCIUM: CPT | Performed by: STUDENT IN AN ORGANIZED HEALTH CARE EDUCATION/TRAINING PROGRAM

## 2025-01-31 PROCEDURE — 85379 FIBRIN DEGRADATION QUANT: CPT | Performed by: STUDENT IN AN ORGANIZED HEALTH CARE EDUCATION/TRAINING PROGRAM

## 2025-01-31 PROCEDURE — 99291 CRITICAL CARE FIRST HOUR: CPT | Performed by: PHYSICIAN ASSISTANT

## 2025-01-31 PROCEDURE — 85018 HEMOGLOBIN: CPT | Performed by: STUDENT IN AN ORGANIZED HEALTH CARE EDUCATION/TRAINING PROGRAM

## 2025-01-31 PROCEDURE — 200N000002 HC R&B ICU UMMC

## 2025-01-31 PROCEDURE — 80048 BASIC METABOLIC PNL TOTAL CA: CPT | Performed by: STUDENT IN AN ORGANIZED HEALTH CARE EDUCATION/TRAINING PROGRAM

## 2025-01-31 PROCEDURE — 85730 THROMBOPLASTIN TIME PARTIAL: CPT | Performed by: STUDENT IN AN ORGANIZED HEALTH CARE EDUCATION/TRAINING PROGRAM

## 2025-01-31 PROCEDURE — 250N000009 HC RX 250

## 2025-01-31 PROCEDURE — 85041 AUTOMATED RBC COUNT: CPT | Performed by: STUDENT IN AN ORGANIZED HEALTH CARE EDUCATION/TRAINING PROGRAM

## 2025-01-31 PROCEDURE — 85347 COAGULATION TIME ACTIVATED: CPT

## 2025-01-31 PROCEDURE — 85004 AUTOMATED DIFF WBC COUNT: CPT | Performed by: STUDENT IN AN ORGANIZED HEALTH CARE EDUCATION/TRAINING PROGRAM

## 2025-01-31 PROCEDURE — 84100 ASSAY OF PHOSPHORUS: CPT | Performed by: STUDENT IN AN ORGANIZED HEALTH CARE EDUCATION/TRAINING PROGRAM

## 2025-01-31 PROCEDURE — 258N000003 HC RX IP 258 OP 636: Performed by: SURGERY

## 2025-01-31 PROCEDURE — 85520 HEPARIN ASSAY: CPT | Performed by: STUDENT IN AN ORGANIZED HEALTH CARE EDUCATION/TRAINING PROGRAM

## 2025-01-31 PROCEDURE — 94640 AIRWAY INHALATION TREATMENT: CPT

## 2025-01-31 PROCEDURE — 82374 ASSAY BLOOD CARBON DIOXIDE: CPT | Performed by: STUDENT IN AN ORGANIZED HEALTH CARE EDUCATION/TRAINING PROGRAM

## 2025-01-31 PROCEDURE — 82550 ASSAY OF CK (CPK): CPT | Performed by: STUDENT IN AN ORGANIZED HEALTH CARE EDUCATION/TRAINING PROGRAM

## 2025-01-31 PROCEDURE — 250N000011 HC RX IP 250 OP 636

## 2025-01-31 PROCEDURE — 33948 ECMO/ECLS DAILY MGMT-VENOUS: CPT | Performed by: SURGERY

## 2025-01-31 PROCEDURE — 85610 PROTHROMBIN TIME: CPT | Performed by: STUDENT IN AN ORGANIZED HEALTH CARE EDUCATION/TRAINING PROGRAM

## 2025-01-31 PROCEDURE — 85007 BL SMEAR W/DIFF WBC COUNT: CPT | Performed by: STUDENT IN AN ORGANIZED HEALTH CARE EDUCATION/TRAINING PROGRAM

## 2025-01-31 PROCEDURE — 80202 ASSAY OF VANCOMYCIN: CPT | Performed by: SURGERY

## 2025-01-31 PROCEDURE — 250N000013 HC RX MED GY IP 250 OP 250 PS 637: Performed by: PHYSICIAN ASSISTANT

## 2025-01-31 PROCEDURE — 80069 RENAL FUNCTION PANEL: CPT | Performed by: STUDENT IN AN ORGANIZED HEALTH CARE EDUCATION/TRAINING PROGRAM

## 2025-01-31 PROCEDURE — 33948 ECMO/ECLS DAILY MGMT-VENOUS: CPT

## 2025-01-31 PROCEDURE — 83735 ASSAY OF MAGNESIUM: CPT | Performed by: STUDENT IN AN ORGANIZED HEALTH CARE EDUCATION/TRAINING PROGRAM

## 2025-01-31 PROCEDURE — 99233 SBSQ HOSP IP/OBS HIGH 50: CPT | Mod: GC | Performed by: STUDENT IN AN ORGANIZED HEALTH CARE EDUCATION/TRAINING PROGRAM

## 2025-01-31 PROCEDURE — 83051 HEMOGLOBIN PLASMA: CPT | Performed by: STUDENT IN AN ORGANIZED HEALTH CARE EDUCATION/TRAINING PROGRAM

## 2025-01-31 PROCEDURE — 999N000157 HC STATISTIC RCP TIME EA 10 MIN

## 2025-01-31 PROCEDURE — 250N000011 HC RX IP 250 OP 636: Mod: JZ | Performed by: PHYSICIAN ASSISTANT

## 2025-01-31 PROCEDURE — 250N000011 HC RX IP 250 OP 636: Performed by: SURGERY

## 2025-01-31 PROCEDURE — 94003 VENT MGMT INPAT SUBQ DAY: CPT

## 2025-01-31 PROCEDURE — 84132 ASSAY OF SERUM POTASSIUM: CPT | Performed by: STUDENT IN AN ORGANIZED HEALTH CARE EDUCATION/TRAINING PROGRAM

## 2025-01-31 PROCEDURE — 250N000013 HC RX MED GY IP 250 OP 250 PS 637: Performed by: STUDENT IN AN ORGANIZED HEALTH CARE EDUCATION/TRAINING PROGRAM

## 2025-01-31 PROCEDURE — 94640 AIRWAY INHALATION TREATMENT: CPT | Mod: 76

## 2025-01-31 PROCEDURE — 84460 ALANINE AMINO (ALT) (SGPT): CPT | Performed by: STUDENT IN AN ORGANIZED HEALTH CARE EDUCATION/TRAINING PROGRAM

## 2025-01-31 RX ORDER — FUROSEMIDE 10 MG/ML
80 INJECTION INTRAMUSCULAR; INTRAVENOUS ONCE
Status: DISCONTINUED | OUTPATIENT
Start: 2025-01-31 | End: 2025-01-31

## 2025-01-31 RX ORDER — FUROSEMIDE 10 MG/ML
80 INJECTION INTRAMUSCULAR; INTRAVENOUS ONCE
Status: COMPLETED | OUTPATIENT
Start: 2025-01-31 | End: 2025-01-31

## 2025-01-31 RX ORDER — DEXTROMETHORPHAN POLISTIREX 30 MG/5ML
15 SUSPENSION ORAL EVERY 12 HOURS PRN
Status: DISCONTINUED | OUTPATIENT
Start: 2025-01-31 | End: 2025-02-03

## 2025-01-31 RX ORDER — CLINDAMYCIN PHOSPHATE 900 MG/50ML
900 INJECTION, SOLUTION INTRAVENOUS EVERY 8 HOURS
Status: DISCONTINUED | OUTPATIENT
Start: 2025-01-31 | End: 2025-01-31

## 2025-01-31 RX ORDER — FUROSEMIDE 10 MG/ML
100 INJECTION INTRAMUSCULAR; INTRAVENOUS ONCE
Status: DISCONTINUED | OUTPATIENT
Start: 2025-01-31 | End: 2025-01-31

## 2025-01-31 RX ORDER — MIDAZOLAM HCL IN 0.9 % NACL/PF 1 MG/ML
1-8 PLASTIC BAG, INJECTION (ML) INTRAVENOUS CONTINUOUS
Status: DISCONTINUED | OUTPATIENT
Start: 2025-01-31 | End: 2025-01-31

## 2025-01-31 RX ORDER — GUAIFENESIN 200 MG/10ML
200 LIQUID ORAL EVERY 4 HOURS PRN
Status: DISCONTINUED | OUTPATIENT
Start: 2025-01-31 | End: 2025-01-31

## 2025-01-31 RX ORDER — BENZONATATE 100 MG/1
100 CAPSULE ORAL 3 TIMES DAILY PRN
Status: DISCONTINUED | OUTPATIENT
Start: 2025-01-31 | End: 2025-01-31

## 2025-01-31 RX ORDER — FUROSEMIDE 10 MG/ML
INJECTION INTRAMUSCULAR; INTRAVENOUS
Status: COMPLETED
Start: 2025-01-31 | End: 2025-01-31

## 2025-01-31 RX ORDER — FUROSEMIDE 10 MG/ML
40 INJECTION INTRAMUSCULAR; INTRAVENOUS ONCE
Status: COMPLETED | OUTPATIENT
Start: 2025-01-31 | End: 2025-01-31

## 2025-01-31 RX ORDER — LINEZOLID 2 MG/ML
600 INJECTION, SOLUTION INTRAVENOUS EVERY 12 HOURS
Status: DISCONTINUED | OUTPATIENT
Start: 2025-01-31 | End: 2025-02-03

## 2025-01-31 RX ORDER — HEPARIN SODIUM 10000 [USP'U]/100ML
10-50 INJECTION, SOLUTION INTRAVENOUS CONTINUOUS
Status: DISCONTINUED | OUTPATIENT
Start: 2025-01-31 | End: 2025-01-31

## 2025-01-31 RX ORDER — HEPARIN SODIUM 10000 [USP'U]/100ML
10-50 INJECTION, SOLUTION INTRAVENOUS CONTINUOUS
Status: DISCONTINUED | OUTPATIENT
Start: 2025-01-31 | End: 2025-02-02

## 2025-01-31 RX ADMIN — Medication 60 ML: at 21:20

## 2025-01-31 RX ADMIN — IPRATROPIUM BROMIDE AND ALBUTEROL SULFATE 3 ML: .5; 3 SOLUTION RESPIRATORY (INHALATION) at 04:04

## 2025-01-31 RX ADMIN — VANCOMYCIN HYDROCHLORIDE 750 MG: 1 INJECTION, POWDER, LYOPHILIZED, FOR SOLUTION INTRAVENOUS at 11:29

## 2025-01-31 RX ADMIN — PROPOFOL 75 MCG/KG/MIN: 10 INJECTION, EMULSION INTRAVENOUS at 13:04

## 2025-01-31 RX ADMIN — Medication 50 MCG: at 03:00

## 2025-01-31 RX ADMIN — PROPOFOL 65 MCG/KG/MIN: 10 INJECTION, EMULSION INTRAVENOUS at 22:58

## 2025-01-31 RX ADMIN — LINEZOLID 600 MG: 600 INJECTION, SOLUTION INTRAVENOUS at 15:27

## 2025-01-31 RX ADMIN — Medication 50 MCG: at 02:13

## 2025-01-31 RX ADMIN — Medication 15 ML: at 08:53

## 2025-01-31 RX ADMIN — INSULIN HUMAN 13 UNITS/HR: 1 INJECTION, SOLUTION INTRAVENOUS at 17:48

## 2025-01-31 RX ADMIN — INSULIN HUMAN 9.5 UNITS/HR: 1 INJECTION, SOLUTION INTRAVENOUS at 08:51

## 2025-01-31 RX ADMIN — CHLORHEXIDINE GLUCONATE 15 ML: 1.2 SOLUTION ORAL at 08:53

## 2025-01-31 RX ADMIN — PROPOFOL 75 MCG/KG/MIN: 10 INJECTION, EMULSION INTRAVENOUS at 11:29

## 2025-01-31 RX ADMIN — HEPARIN SODIUM 1500 UNITS/HR: 10000 INJECTION, SOLUTION INTRAVENOUS at 13:54

## 2025-01-31 RX ADMIN — IPRATROPIUM BROMIDE AND ALBUTEROL SULFATE 3 ML: .5; 3 SOLUTION RESPIRATORY (INHALATION) at 08:11

## 2025-01-31 RX ADMIN — PROPOFOL 75 MCG/KG/MIN: 10 INJECTION, EMULSION INTRAVENOUS at 17:48

## 2025-01-31 RX ADMIN — PROPOFOL 65 MCG/KG/MIN: 10 INJECTION, EMULSION INTRAVENOUS at 20:05

## 2025-01-31 RX ADMIN — IPRATROPIUM BROMIDE AND ALBUTEROL SULFATE 3 ML: .5; 3 SOLUTION RESPIRATORY (INHALATION) at 00:31

## 2025-01-31 RX ADMIN — PROPOFOL 75 MCG/KG/MIN: 10 INJECTION, EMULSION INTRAVENOUS at 08:51

## 2025-01-31 RX ADMIN — DEXAMETHASONE SODIUM PHOSPHATE 20 MG: 10 INJECTION, SOLUTION INTRAMUSCULAR; INTRAVENOUS at 08:53

## 2025-01-31 RX ADMIN — Medication 40 MG: at 08:53

## 2025-01-31 RX ADMIN — CLINDAMYCIN IN 5 PERCENT DEXTROSE 900 MG: 18 INJECTION, SOLUTION INTRAVENOUS at 10:04

## 2025-01-31 RX ADMIN — FUROSEMIDE 40 MG: 10 INJECTION, SOLUTION INTRAMUSCULAR; INTRAVENOUS at 03:13

## 2025-01-31 RX ADMIN — MIDAZOLAM 4 MG: 1 INJECTION INTRAMUSCULAR; INTRAVENOUS at 03:01

## 2025-01-31 RX ADMIN — FUROSEMIDE 40 MG: 10 INJECTION INTRAMUSCULAR; INTRAVENOUS at 03:13

## 2025-01-31 RX ADMIN — Medication 60 ML: at 08:54

## 2025-01-31 RX ADMIN — PROPOFOL 75 MCG/KG/MIN: 10 INJECTION, EMULSION INTRAVENOUS at 06:42

## 2025-01-31 RX ADMIN — IPRATROPIUM BROMIDE AND ALBUTEROL SULFATE 3 ML: .5; 3 SOLUTION RESPIRATORY (INHALATION) at 19:54

## 2025-01-31 RX ADMIN — Medication 0.1 MG/HR: at 11:44

## 2025-01-31 RX ADMIN — Medication 50 MCG: at 00:23

## 2025-01-31 RX ADMIN — HEPARIN SODIUM 1300 UNITS/HR: 10000 INJECTION, SOLUTION INTRAVENOUS at 22:06

## 2025-01-31 RX ADMIN — PROPOFOL 75 MCG/KG/MIN: 10 INJECTION, EMULSION INTRAVENOUS at 04:28

## 2025-01-31 RX ADMIN — PROPOFOL 75 MCG/KG/MIN: 10 INJECTION, EMULSION INTRAVENOUS at 06:46

## 2025-01-31 RX ADMIN — PROPOFOL 75 MCG/KG/MIN: 10 INJECTION, EMULSION INTRAVENOUS at 15:27

## 2025-01-31 RX ADMIN — IPRATROPIUM BROMIDE AND ALBUTEROL SULFATE 3 ML: .5; 3 SOLUTION RESPIRATORY (INHALATION) at 16:09

## 2025-01-31 RX ADMIN — CHLORHEXIDINE GLUCONATE 15 ML: 1.2 SOLUTION ORAL at 21:20

## 2025-01-31 RX ADMIN — Medication 60 ML: at 11:30

## 2025-01-31 RX ADMIN — Medication 60 ML: at 15:29

## 2025-01-31 RX ADMIN — OSELTAMIVIR PHOSPHATE 30 MG: 6 POWDER, FOR SUSPENSION ORAL at 08:53

## 2025-01-31 RX ADMIN — IPRATROPIUM BROMIDE AND ALBUTEROL SULFATE 3 ML: .5; 3 SOLUTION RESPIRATORY (INHALATION) at 12:10

## 2025-01-31 RX ADMIN — Medication 1 MG/HR: at 03:30

## 2025-01-31 RX ADMIN — PROPOFOL 50 MCG/KG/MIN: 10 INJECTION, EMULSION INTRAVENOUS at 02:13

## 2025-01-31 RX ADMIN — FUROSEMIDE 80 MG: 10 INJECTION, SOLUTION INTRAMUSCULAR; INTRAVENOUS at 10:11

## 2025-01-31 ASSESSMENT — ACTIVITIES OF DAILY LIVING (ADL)
ADLS_ACUITY_SCORE: 50

## 2025-01-31 NOTE — PROGRESS NOTES
[facility]    ECLS Shift Summary:     ECMO Equipment:    Circuit Assessment: Free of fibrin, clot, and air    Venous ECMO Cannula: 30 Fr dual lumen [place] in the Right Internal Jugular Vein     Patient remains on V-V ECMO, all equipment is functioning and alarms are appropriately set. RPM's: 3500 with Blood Flow (Circuit) LPM  Av.4 LPM  Min: 4.05 LPM  Max: 4.55 LPM L/min. Sweep is at 4 LPM and 100 %. Extremities are perfused.     Significant Shift Events: Episodes of low SpO2 desaturation and 1 with sudden loss of flow unchanged by medication, ECMO flow titration, and ventilator changes. Determined that Pt and cannula position is the probable cause of these episodes.    Vent settings:  FiO2 (%): 60 %, Resp: 17, Inspiratory Pressure (cm H2O) (Drager Merle): 25, Vent Mode: PCV Plus assist, Resp Rate (Set): 16 breaths/min, PEEP (cm H2O): 10 cmH2O, Resp Rate (Set): 16 breaths/min, PEEP (cm H2O): 10 cmH2O    Anticoagulation:  Dose (units/hr) HEParin: 1400 Units/hr  Rate (mL/hr) HEParin: 14 mL/hr  Concentration HEParin: 100 Units/mL        Most recent: ACT  (seconds): 166 seconds    Urine output is adequate.  Blood loss was minimal. Product given included none.     Intake/Output Summary (Last 24 hours) at 2025 0610  Last data filed at 2025 0500  Gross per 24 hour   Intake 2585.32 ml   Output 2535 ml   Net 50.32 ml       Labs:  Recent Labs   Lab 25  0401 25  0159 25  0008 25  2148   PH 7.34* 7.36 7.36 7.36   PCO2 45 42 42 42   PO2 66* 81 57* 65*   HCO3 24 24 24 24   O2PER 100  60  60  60 60 60 60       Lab Results   Component Value Date    HGB 10.8 (L) 2025    PHGB 130 (H) 2025     2025    FIBR 414 2025    INR 1.15 2025    PTT 55 (H) 2025    DD 2.72 (H) 2025    ANTCH 90 2025       Plan is continue to rest on VV ECMO.    Blayne Sabillon RN  ECMO Specialist  2025 6:10 AM

## 2025-01-31 NOTE — PHARMACY-VANCOMYCIN DOSING SERVICE
Pharmacy Vancomycin Note  Date of Service 2025  Patient's  1960   64 year old, male    Indication: Healthcare-Associated Pneumonia  Day of Therapy: 4  Current vancomycin regimen:  Intermittent dosing  Current vancomycin monitoring method: Trough (Method 2 = manual dose calculation)  Current vancomycin therapeutic monitoring goal: 15-20 mg/L    Current estimated CrCl = Estimated Creatinine Clearance: 19.5 mL/min (A) (based on SCr of 4.13 mg/dL (H)).    Creatinine for last 3 days  2025:  9:08 AM Creatinine 2.04 mg/dL;  1:42 PM Creatinine 2.40 mg/dL;  5:06 PM Creatinine 2.55 mg/dL  2025:  3:47 AM Creatinine 2.95 mg/dL; 11:08 AM Creatinine 3.25 mg/dL;  9:54 PM Creatinine 3.69 mg/dL  2025:  3:41 AM Creatinine 3.82 mg/dL;  9:54 AM Creatinine 4.12 mg/dL;  3:31 PM Creatinine 4.04 mg/dL;  9:48 PM Creatinine 4.06 mg/dL  2025:  4:01 AM Creatinine 4.13 mg/dL    Recent Vancomycin Levels (past 3 days)  2025:  1:42 PM Vancomycin 15.3 ug/mL  2025:  3:41 AM Vancomycin 18.4 ug/mL  2025:  4:01 AM Vancomycin 21.9 ug/mL    Vancomycin IV Administrations (past 72 hours)                     vancomycin (VANCOCIN) 1,000 mg in 200 mL dextrose intermittent infusion (mg) 1,000 mg New Bag 25 0401     1,000 mg New Bag 25 0357                    Nephrotoxins and other renal medications (From now, onward)      Start     Dose/Rate Route Frequency Ordered Stop    25 1700  furosemide (LASIX) injection 20 mg         20 mg  over 1-3 Minutes Intravenous ONCE 25 1644      25 0659  vancomycin place hightower - receiving intermittent dosing         1 each Intravenous SEE ADMIN INSTRUCTIONS 25 0702      25 1730  norepinephrine (LEVOPHED) 16 mg in  mL infusion MAX CONC CENTRAL LINE         0.01-0.6 mcg/kg/min × 96.1 kg (Dosing Weight)  0.9-54.1 mL/hr  Intravenous CONTINUOUS 25 1701                 Contrast Orders - past 72 hours (72h ago, onward)       None            Interpretation of levels and current regimen:  Vancomycin level is reflective of supratherapeutic level at the time of collection    Has serum creatinine changed greater than 50% in last 72 hours: Yes    Urine output:  good urine output    Renal Function: Worsening    Plan:  Given increased volume of distribution from recent VV ECMO cannulation, as well as good urine output, will give an additional dose today (750mg (7.6mg/kg) IV once)  at 1200 at calculated time that level would fall below 20ug/mL  Vancomycin monitoring method: Trough (Method 2 = manual dose calculation)  Vancomycin therapeutic monitoring goal: 15-20 mg/L  Pharmacy will check vancomycin levels as appropriate in 1-3 Days.  Serum creatinine levels will be ordered daily for the first week of therapy and at least twice weekly for subsequent weeks.    Rowdy Chavez [facility]

## 2025-01-31 NOTE — PLAN OF CARE
Major Shift Events:  SICU contacted @ 0245 for saturations in low 80's despite increase in flows and sedation. SICU resident and fellow at bedside. Lasix and versed given. Chest xray done. Patient repositioned. Versed gtt started.     SICU called @ 0612 to come to bedside for saturations in 50's after attempting to turn L. Flows lost. Pt turned back onto R side, pt recovered back to 90's.     Pt currently sedated. Rass -5. Sedated on fentanyl, versed, and propofol gtts. No response to painful stimuli in extremities. Pupils equal and reactive. Bilateral mitts in place for previously reaching for lines, will reassess need for mitts. Afebrile. SR, HR 60's-90's. Norepinephrine to maintain MAP goal > 65. PCV + 60%/25/16/10/ TVs 200. Lung sounds clear/diminished/expiratory wheezes. PRN boluses of fentanyl given for coughing/desaturation. Suctioning small creamy secretions from ETT. ECMO sweep @ 4LPM, 100% FiO2. TF @ GR 35ml/hr, 85l2kTKI. Insulin gtt. Rectal tube. Portillo with adequate output. Creatinine rising, potential to start CRRT today. Heparin gtt 1,400 units/hr. Hgb stable.     Plan: Continue with POC. Potential to start CRRT for fluid removal.   For vital signs and complete assessments, please see documentation flowsheets.      Goal Outcome Evaluation:      Plan of Care Reviewed With: patient    Overall Patient Progress: no changeOverall Patient Progress: no change      Problem: Adult Inpatient Plan of Care  Goal: Plan of Care Review  Description: The Plan of Care Review/Shift note should be completed every shift.  The Outcome Evaluation is a brief statement about your assessment that the patient is improving, declining, or no change.  This information will be displayed automatically on your shift  note.  Outcome: Not Progressing  Flowsheets (Taken 1/31/2025 6917)  Plan of Care Reviewed With: patient  Overall Patient Progress: no change  Goal: Patient-Specific Goal (Individualized)  Description: You can add care  "plan individualizations to a care plan. Examples of Individualization might be:  \"Parent requests to be called daily at 9am for status\", \"I have a hard time hearing out of my right ear\", or \"Do not touch me to wake me up as it startles  me\".  Outcome: Not Progressing  Goal: Absence of Hospital-Acquired Illness or Injury  Outcome: Not Progressing  Intervention: Identify and Manage Fall Risk  Recent Flowsheet Documentation  Taken 1/31/2025 0400 by Nathalie Saab RN  Safety Promotion/Fall Prevention:   clutter free environment maintained   increased rounding and observation   increase visualization of patient   lighting adjusted   room door open   room near nurse's station   room organization consistent   safety round/check completed  Taken 1/31/2025 0000 by Nathalie Saab RN  Safety Promotion/Fall Prevention:   clutter free environment maintained   increased rounding and observation   increase visualization of patient   lighting adjusted   room door open   room near nurse's station   room organization consistent   safety round/check completed  Taken 1/30/2025 2000 by Nathalie Saab RN  Safety Promotion/Fall Prevention:   clutter free environment maintained   increased rounding and observation   increase visualization of patient   lighting adjusted   room door open   room near nurse's station   room organization consistent   safety round/check completed  Intervention: Prevent Skin Injury  Recent Flowsheet Documentation  Taken 1/31/2025 0400 by Nathalie Saab RN  Body Position:   turned   side-lying   right   lower extremity elevated   neutral body alignment   neutral head position   upper extremity elevated  Skin Protection:   incontinence pads utilized   silicone foam dressing in place   skin to device areas padded   transparent dressing maintained   tubing/devices free from skin contact  Taken 1/31/2025 0200 by Nathalie Saab RN  Body Position:   turned   side-lying   left   lower extremity " elevated   neutral body alignment   neutral head position   upper extremity elevated  Taken 1/31/2025 0000 by Nathalie Saab RN  Body Position:   turned   side-lying   right   lower extremity elevated   neutral body alignment   neutral head position   upper extremity elevated  Skin Protection:   incontinence pads utilized   silicone foam dressing in place   skin to device areas padded   transparent dressing maintained   tubing/devices free from skin contact  Taken 1/30/2025 2200 by Nathalie Saab RN  Body Position:   turned   side-lying   left   lower extremity elevated   neutral body alignment   neutral head position   upper extremity elevated  Taken 1/30/2025 2000 by Nathalie Saab RN  Body Position:   turned   side-lying   right   lower extremity elevated   neutral body alignment   neutral head position   upper extremity elevated  Skin Protection:   incontinence pads utilized   silicone foam dressing in place   skin to device areas padded   transparent dressing maintained   tubing/devices free from skin contact  Intervention: Prevent and Manage VTE (Venous Thromboembolism) Risk  Recent Flowsheet Documentation  Taken 1/31/2025 0400 by Nathalie Saab RN  VTE Prevention/Management: SCDs off (sequential compression devices)  Taken 1/31/2025 0000 by Nathalie Saab RN  VTE Prevention/Management: SCDs off (sequential compression devices)  Taken 1/30/2025 2000 by Nathalie Saab RN  VTE Prevention/Management: SCDs off (sequential compression devices)  Intervention: Prevent Infection  Recent Flowsheet Documentation  Taken 1/31/2025 0400 by Nathalie Saab RN  Infection Prevention:   equipment surfaces disinfected   hand hygiene promoted   personal protective equipment utilized   single patient room provided  Taken 1/31/2025 0000 by Nathalie Saab RN  Infection Prevention:   equipment surfaces disinfected   hand hygiene promoted   personal protective equipment utilized   single patient  room provided  Taken 1/30/2025 2000 by Nathalie Saab RN  Infection Prevention:   equipment surfaces disinfected   hand hygiene promoted   personal protective equipment utilized   single patient room provided  Goal: Optimal Comfort and Wellbeing  Outcome: Not Progressing  Intervention: Monitor Pain and Promote Comfort  Recent Flowsheet Documentation  Taken 1/31/2025 0400 by Nathalie Saab RN  Pain Management Interventions:   medication (see MAR)   around-the-clock dosing utilized   care clustered   pillow support provided   quiet environment facilitated   relaxation techniques promoted   repositioned   rest  Taken 1/31/2025 0222 by Nathalie Saab RN  Pain Management Interventions: medication (see MAR)  Taken 1/31/2025 0213 by Nathalie Saab RN  Pain Management Interventions:   medication (see MAR)   around-the-clock dosing utilized   care clustered   pillow support provided   quiet environment facilitated   relaxation techniques promoted   repositioned   rest  Taken 1/31/2025 0035 by Nathalie Saab RN  Pain Management Interventions:   medication (see MAR)   around-the-clock dosing utilized   care clustered   pillow support provided   quiet environment facilitated   relaxation techniques promoted   repositioned   rest  Taken 1/31/2025 0023 by Nathalie Saab RN  Pain Management Interventions: medication (see MAR)  Taken 1/31/2025 0000 by Nathalie Saab RN  Pain Management Interventions:   medication (see MAR)   around-the-clock dosing utilized   care clustered   pillow support provided   quiet environment facilitated   relaxation techniques promoted   repositioned   rest  Taken 1/30/2025 2328 by Nathalie Saab RN  Pain Management Interventions:   medication (see MAR)   around-the-clock dosing utilized   care clustered   pillow support provided   quiet environment facilitated   relaxation techniques promoted   repositioned   rest  Taken 1/30/2025 2237 by Nathalie Saab  RN  Pain Management Interventions:   medication (see MAR)   around-the-clock dosing utilized   care clustered   pillow support provided   quiet environment facilitated   relaxation techniques promoted   repositioned   rest  Taken 1/30/2025 2227 by Nathalie Saab RN  Pain Management Interventions: medication (see MAR)  Taken 1/30/2025 2000 by Nathalie Saab RN  Pain Management Interventions:   medication (see MAR)   around-the-clock dosing utilized   care clustered   emotional support   pillow support provided   quiet environment facilitated   relaxation techniques promoted   repositioned   rest  Intervention: Provide Person-Centered Care  Recent Flowsheet Documentation  Taken 1/31/2025 0400 by Nathalie Saab RN  Trust Relationship/Rapport:   care explained   reassurance provided  Taken 1/31/2025 0000 by Nathalie Saab RN  Trust Relationship/Rapport:   care explained   reassurance provided  Taken 1/30/2025 2000 by Nathalie Saab RN  Trust Relationship/Rapport:   care explained   reassurance provided  Goal: Readiness for Transition of Care  Outcome: Not Progressing     Problem: Risk for Delirium  Goal: Optimal Coping  Outcome: Not Progressing  Intervention: Optimize Psychosocial Adjustment to Delirium  Recent Flowsheet Documentation  Taken 1/31/2025 0400 by Nathalie Saab RN  Supportive Measures:   positive reinforcement provided   relaxation techniques promoted  Taken 1/31/2025 0000 by Nathalie Saab RN  Supportive Measures:   positive reinforcement provided   relaxation techniques promoted  Taken 1/30/2025 2000 by Nathalie Saab RN  Supportive Measures:   positive reinforcement provided   relaxation techniques promoted  Goal: Improved Behavioral Control  Outcome: Not Progressing  Intervention: Prevent and Manage Agitation  Recent Flowsheet Documentation  Taken 1/31/2025 0400 by Nathalie Saab RN  Environment Familiarity/Consistency: daily routine followed  Taken 1/31/2025  0000 by Nathalie Saab RN  Environment Familiarity/Consistency: daily routine followed  Taken 1/30/2025 2000 by Nathalie Saab RN  Environment Familiarity/Consistency: daily routine followed  Intervention: Minimize Safety Risk  Recent Flowsheet Documentation  Taken 1/31/2025 0400 by Nathalie Saab RN  Enhanced Safety Measures:   review medications for side effects with activity   room near unit station  Trust Relationship/Rapport:   care explained   reassurance provided  Taken 1/31/2025 0000 by Nathalie Saab RN  Enhanced Safety Measures:   review medications for side effects with activity   room near unit station  Trust Relationship/Rapport:   care explained   reassurance provided  Taken 1/30/2025 2000 by Nathalie Saab RN  Enhanced Safety Measures:   review medications for side effects with activity   room near unit station  Trust Relationship/Rapport:   care explained   reassurance provided  Goal: Improved Attention and Thought Clarity  Outcome: Not Progressing  Intervention: Maximize Cognitive Function  Recent Flowsheet Documentation  Taken 1/31/2025 0400 by Nathalie Saab RN  Sensory Stimulation Regulation:   auditory stimulation minimized   care clustered   lighting decreased   quiet environment promoted  Reorientation Measures:   calendar in view   clock in view   reorientation provided  Taken 1/31/2025 0000 by Nathalie Saab RN  Sensory Stimulation Regulation:   auditory stimulation minimized   care clustered   lighting decreased   quiet environment promoted  Reorientation Measures:   calendar in view   clock in view   reorientation provided  Taken 1/30/2025 2000 by Nathalie Saab RN  Sensory Stimulation Regulation:   auditory stimulation minimized   care clustered   lighting decreased   quiet environment promoted  Reorientation Measures:   calendar in view   clock in view   reorientation provided  Goal: Improved Sleep  Outcome: Not Progressing  Intervention: Promote  Sleep  Recent Flowsheet Documentation  Taken 1/31/2025 0400 by Nathalie Saab RN  Sleep/Rest Enhancement:   awakenings minimized   consistent schedule promoted   noise level reduced   regular sleep/rest pattern promoted   relaxation techniques promoted   room darkened  Taken 1/31/2025 0000 by Nathalie Saab RN  Sleep/Rest Enhancement:   awakenings minimized   consistent schedule promoted   noise level reduced   regular sleep/rest pattern promoted   relaxation techniques promoted   room darkened  Taken 1/30/2025 2000 by Nathalie Saab RN  Sleep/Rest Enhancement:   awakenings minimized   consistent schedule promoted   noise level reduced   regular sleep/rest pattern promoted   relaxation techniques promoted   room darkened     Problem: ECLS (Extracorporeal Life Support)  Goal: Optimal Coping with Therapy  Outcome: Not Progressing  Intervention: Optimize Psychosocial Response  Recent Flowsheet Documentation  Taken 1/31/2025 0400 by Nathalie Saab RN  Supportive Measures:   positive reinforcement provided   relaxation techniques promoted  Taken 1/31/2025 0000 by Nathalie Saab RN  Supportive Measures:   positive reinforcement provided   relaxation techniques promoted  Taken 1/30/2025 2000 by Nathalie Saab RN  Supportive Measures:   positive reinforcement provided   relaxation techniques promoted  Goal: Absence of Bleeding  Outcome: Not Progressing  Intervention: Prevent and Manage Bleeding  Recent Flowsheet Documentation  Taken 1/31/2025 0400 by Nathalie Saab RN  Bleeding Precautions:   blood pressure closely monitored   coagulation study results reviewed   monitored for signs of bleeding  Taken 1/31/2025 0000 by Nathalie Saab RN  Bleeding Precautions:   blood pressure closely monitored   coagulation study results reviewed   monitored for signs of bleeding  Taken 1/30/2025 2000 by Nathalie Saab RN  Bleeding Precautions:   blood pressure closely monitored   coagulation  study results reviewed   monitored for signs of bleeding  Goal: Optimal Device Function  Outcome: Not Progressing  Intervention: Optimize Device Care and Function  Recent Flowsheet Documentation  Taken 1/31/2025 0400 by Nathalie Saab RN  Thermoregulation Maintenance: warming devices utilized  Circuit Management:   air detection alarms on   circuit line warming device in use  Taken 1/31/2025 0000 by Nathalie Saab RN  Thermoregulation Maintenance: warming devices utilized  Circuit Management:   air detection alarms on   circuit line warming device in use  Taken 1/30/2025 2000 by Nathalie Saab RN  Thermoregulation Maintenance: warming devices utilized  Circuit Management:   air detection alarms on   circuit line warming device in use  Intervention: Reduce Pressure and Protect Skin Integrity  Recent Flowsheet Documentation  Taken 1/31/2025 0400 by Nathalie Saab RN  Pressure Reduction Techniques:   heels elevated off bed   positioned off wounds   pressure points protected   weight shift assistance provided  Pressure Reduction Devices:   foam padding utilized   heel offloading device utilized   positioning supports utilized  Taken 1/31/2025 0000 by Nathalie Saab RN  Pressure Reduction Techniques:   heels elevated off bed   positioned off wounds   pressure points protected   weight shift assistance provided  Pressure Reduction Devices:   foam padding utilized   heel offloading device utilized   positioning supports utilized  Taken 1/30/2025 2000 by Nathalie Saab RN  Pressure Reduction Techniques:   heels elevated off bed   positioned off wounds   pressure points protected   weight shift assistance provided  Pressure Reduction Devices:   foam padding utilized   heel offloading device utilized   positioning supports utilized  Goal: Hemodynamic Stability  Outcome: Not Progressing  Intervention: Optimize Blood Flow, Oxygenation and Ventilation  Recent Flowsheet Documentation  Taken 1/31/2025  0400 by Nathalie Saab RN  Airway/Ventilation Management:   airway patency maintained   calming measures promoted   humidification applied   oxygen therapy provided   position adjusted   pulmonary hygiene promoted  VTE Prevention/Management: SCDs off (sequential compression devices)  Environmental Support:   calm environment promoted   environmental consistency promoted   rest periods encouraged  Taken 1/31/2025 0000 by Nathalie Saab RN  Airway/Ventilation Management:   airway patency maintained   calming measures promoted   humidification applied   oxygen therapy provided   position adjusted   pulmonary hygiene promoted  VTE Prevention/Management: SCDs off (sequential compression devices)  Environmental Support:   calm environment promoted   environmental consistency promoted   rest periods encouraged  Taken 1/30/2025 2000 by Nathalie Saab RN  Airway/Ventilation Management:   airway patency maintained   calming measures promoted   humidification applied   oxygen therapy provided   position adjusted   pulmonary hygiene promoted  VTE Prevention/Management: SCDs off (sequential compression devices)  Environmental Support:   calm environment promoted   environmental consistency promoted   rest periods encouraged  Goal: Absence of Infection Signs and Symptoms  Outcome: Not Progressing  Intervention: Prevent or Manage Infection  Recent Flowsheet Documentation  Taken 1/31/2025 0400 by Nathalie Saab RN  Infection Prevention:   equipment surfaces disinfected   hand hygiene promoted   personal protective equipment utilized   single patient room provided  Infection Management: aseptic technique maintained  Isolation Precautions:   droplet precautions maintained   contact precautions maintained  Taken 1/31/2025 0000 by Nathalie Saab RN  Infection Prevention:   equipment surfaces disinfected   hand hygiene promoted   personal protective equipment utilized   single patient room provided  Infection  Management: aseptic technique maintained  Isolation Precautions:   droplet precautions maintained   contact precautions maintained  Taken 1/30/2025 2000 by Nathalie Saab RN  Infection Prevention:   equipment surfaces disinfected   hand hygiene promoted   personal protective equipment utilized   single patient room provided  Infection Management: aseptic technique maintained  Isolation Precautions:   droplet precautions maintained   contact precautions maintained     Problem: Restraint, Nonviolent  Goal: Absence of Harm or Injury  Outcome: Not Progressing  Intervention: Implement Least Restrictive Safety Strategies  Recent Flowsheet Documentation  Taken 1/31/2025 0400 by Nathalie Saab RN  Medical Device Protection: tubing secured  Taken 1/31/2025 0000 by Nathalie Saab RN  Medical Device Protection: tubing secured  Taken 1/30/2025 2000 by Nathalie Saab RN  Medical Device Protection: tubing secured  Intervention: Protect Dignity, Rights and Personal Wellbeing  Recent Flowsheet Documentation  Taken 1/31/2025 0400 by Nathalie Saab RN  Trust Relationship/Rapport:   care explained   reassurance provided  Taken 1/31/2025 0000 by Nathalie Saab RN  Trust Relationship/Rapport:   care explained   reassurance provided  Taken 1/30/2025 2000 by Nathalie Saab RN  Trust Relationship/Rapport:   care explained   reassurance provided  Intervention: Protect Skin and Joint Integrity  Recent Flowsheet Documentation  Taken 1/31/2025 0400 by Nathalie Saab RN  Body Position:   turned   side-lying   right   lower extremity elevated   neutral body alignment   neutral head position   upper extremity elevated  Skin Protection:   incontinence pads utilized   silicone foam dressing in place   skin to device areas padded   transparent dressing maintained   tubing/devices free from skin contact  Range of Motion: ROM (range of motion) performed  Taken 1/31/2025 0200 by Nathalie Saab RN  Body  Position:   turned   side-lying   left   lower extremity elevated   neutral body alignment   neutral head position   upper extremity elevated  Taken 1/31/2025 0000 by Nathalie Saab RN  Body Position:   turned   side-lying   right   lower extremity elevated   neutral body alignment   neutral head position   upper extremity elevated  Skin Protection:   incontinence pads utilized   silicone foam dressing in place   skin to device areas padded   transparent dressing maintained   tubing/devices free from skin contact  Range of Motion: ROM (range of motion) performed  Taken 1/30/2025 2200 by Nathalie Saab RN  Body Position:   turned   side-lying   left   lower extremity elevated   neutral body alignment   neutral head position   upper extremity elevated  Taken 1/30/2025 2000 by Nathalie Saab RN  Body Position:   turned   side-lying   right   lower extremity elevated   neutral body alignment   neutral head position   upper extremity elevated  Skin Protection:   incontinence pads utilized   silicone foam dressing in place   skin to device areas padded   transparent dressing maintained   tubing/devices free from skin contact  Range of Motion: ROM (range of motion) performed

## 2025-01-31 NOTE — PROGRESS NOTES
Nephrology Progress Note  01/31/2025           MEDICAL DECISION MAKING     RECOMMENDATIONS:  Continue aggressive diuresis - can increase Lasix to 120mg and give TID for desired UOP. Can also start Lasix gtt.   Metolazone can be added to increase UOP if desired      No indication for urgent initiation of RRT     ASSESSMENT:  Jesus Varghese is a 64 year old w HTN, T2DN, CLL, transferred from OSH for further care of his ARDS in the setting of influenza A, secondary bacterial PNA.   Nephrology consulted for DEEP     DEEP  sCr on admit 2.55.  Peak sCr 4.12. Baseline sCr 1.0  UA: albumin, 26 WBC, 16 RBC, squamous cells. UPCR 0.21 g/g.  Renal US w/o hydro, multiple b/lk cytes (known prior)  Likely 2/2 ATN in setting of ARDS and prior septic shock  Consented for RRT on 1/30     Recommendations were communicated to primary team via note & verbal      Seen and discussed with Dr. Alvaro Rick MD   Division of Renal Disease and Hypertension  [facility]  myairmail  Vocera Web Console    SUBJECTIVE     Interval History :   Nursing and provider notes from last 24 hours reviewed.  In the last 24 hours Jesus Varghese   Remains on ECMO. 2L UOP    A comprehensive review of systems was negative except as noted above.    OBJECTIVE     Physical Exam:   I/O last 3 completed shifts:  In: 2780.39 [I.V.:1467.89; NG/GT:520]  Out: 2835 [Urine:2635; Stool:200]   /64 (BP Location: Left arm)   Pulse 85   Temp 98.2  F (36.8  C)   Resp (!) 8   Wt 98.2 kg (216 lb 7.9 oz)   SpO2 (!) 77%   BMI 34.42 kg/m       General: supine   HEENT: ETT in place   Cardiac: rrr  Abdominal: nondistended  Extremities: no LE edema     Labs:   All labs reviewed by me  Electrolytes/Renal -   Recent Labs   Lab Test 01/31/25  0818 01/31/25  0659 01/31/25  0606 01/31/25  0414 01/31/25  0401 01/30/25  2154 01/30/25  2148 01/30/25  1601 01/30/25  1531 01/30/25  0352 01/30/25  0341 01/29/25  0353 01/29/25  0347   NA  --   --   --   --  142  --  141  --   138   < > 137   < > 133*   POTASSIUM  --   --   --   --  4.1  --  4.0  --  4.3   < > 4.2   < > 5.4*   CHLORIDE  --   --   --   --  105  --  104  --  103   < > 101   < > 99   CO2  --   --   --   --  21*  --  22  --  22   < > 24   < > 19*   BUN  --   --   --   --  114.0*  --  105.0*  --  91.4*   < > 70.5*   < > 42.1*   CR  --   --   --   --  4.13*  --  4.06*  --  4.04*   < > 3.82*   < > 2.95*   * 129* 144*   < > 142*   < > 179*   < > 237*   < > 196*   < > 213*   LILIAN  --   --   --   --  9.3  --  9.6  --  8.7*   < > 8.4*   < > 8.4*   MAG  --   --   --   --  3.3*  --   --   --   --   --  3.0*  --  2.5*   PHOS  --   --   --   --  6.1*  --   --   --   --   --  5.3*  --  6.2*    < > = values in this interval not displayed.       CBC -   Recent Labs   Lab Test 01/31/25  0401 01/30/25  2148 01/30/25  1531   WBC 26.7* 18.6* 12.6*   HGB 10.8* 11.0* 11.1*    230 185       LFTs -   Recent Labs   Lab Test 01/31/25  0401 01/30/25  0341 01/29/25  1108 01/28/25  0533 01/27/25  1600 08/21/18  1342 02/09/17  1642   ALKPHOS  --   --  76 79 92   < > 41   BILITOTAL  --   --  0.6 0.4 0.5   < > 0.5   BILIDIRECT  --   --   --   --   --   --  0.09   ALT 59 57 65 59 66   < >  --    AST  --   --  76* 74* 98*   < >  --    PROTTOTAL  --   --  5.6* 5.8* 6.2*   < > 6.9   ALBUMIN 3.1* 2.8* 3.0* 3.1* 3.3*   < > 4.3    < > = values in this interval not displayed.       Iron Panel - No lab results found.    Current Medications:  Current Facility-Administered Medications   Medication Dose Route Frequency Provider Last Rate Last Admin    chlorhexidine (PERIDEX) 0.12 % solution 15 mL  15 mL Mouth/Throat Q12H Mark Florence MD   15 mL at 01/30/25 1956    clindamycin (CLEOCIN) 900 mg in 50 mL D5W intermittent infusion  900 mg Intravenous Q8H Nawaf Ghotra, CATERINA        dexAMETHasone PF (DECADRON) injection 20 mg  20 mg Intravenous Daily Mark Florence MD   20 mg at 01/30/25 1000    Followed by    [START ON 2/2/2025] dexAMETHasone  PF (DECADRON) injection 10 mg  10 mg Intravenous Q24H Mark Florence MD        furosemide (LASIX) injection 20 mg  20 mg Intravenous Once Mark Florence MD        insulin glargine (LANTUS PEN) injection 40 Units  40 Units Subcutaneous QAM Nawaf Piña PA-C        ipratropium - albuterol 0.5 mg/2.5 mg/3 mL (DUONEB) neb solution 3 mL  3 mL Nebulization Q4H Willie Wilkinson PA-C   3 mL at 01/31/25 0811    multivitamins w/minerals liquid 15 mL  15 mL Per Feeding Tube Daily Mark Florence MD   15 mL at 01/30/25 0756    oseltamivir (TAMIFLU) suspension 30 mg  30 mg Oral or Feeding Tube Daily Alvaro Hutson MD   30 mg at 01/30/25 0756    pantoprazole (PROTONIX) 2 mg/mL suspension 40 mg  40 mg Oral or Feeding Tube QAM Nawaf Piña PA-C        polyethylene glycol (MIRALAX) Packet 17 g  17 g Oral or Feeding Tube Daily Alvaro Hutson MD   17 g at 01/30/25 0756    Prosource TF20 ENfit Compatibl EN LIQD (PROSOURCE TF20) packet 60 mL  1 packet Per Feeding Tube 4x Daily Mark Florence MD   60 mL at 01/30/25 1957    senna-docusate (SENOKOT-S/PERICOLACE) 8.6-50 MG per tablet 1 tablet  1 tablet Oral or Feeding Tube BID Alvaro Hutson MD   1 tablet at 01/29/25 2002    Or    senna-docusate (SENOKOT-S/PERICOLACE) 8.6-50 MG per tablet 2 tablet  2 tablet Oral or Feeding Tube BID Alvaro Hutson MD   2 tablet at 01/30/25 0756    vancomycin (VANCOCIN) 750 mg in sodium chloride 0.9 % 282.5 mL intermittent infusion  750 mg Intravenous Once Alvaro Hutson MD        vancomycin place hightower - receiving intermittent dosing  1 each Intravenous See Admin Instructions Alvaro Hutson MD         Current Facility-Administered Medications   Medication Dose Route Frequency Provider Last Rate Last Admin    dextrose 10% infusion   Intravenous Continuous PRN Willie Wilkinson PA-C        dextrose 10% infusion   Intravenous Continuous PRN Mark Florence MD         fentaNYL (SUBLIMAZE) infusion   mcg/hr Intravenous Continuous Mark Florence MD 4 mL/hr at 01/31/25 0800 200 mcg/hr at 01/31/25 0800    heparin (porcine) 100 unit/mL in 0.45% Sodium Chloride ANTICOAGULANT infusion  10-50 Units/kg/hr (Ideal) Other Continuous Ra Roberts MD 14 mL/hr at 01/31/25 0800 1,400 Units/hr at 01/31/25 0800    insulin regular (MYXREDLIN) 1 unit/mL infusion  0-24 Units/hr Intravenous Continuous Willie Wilkinson PA-C 9.5 mL/hr at 01/31/25 0800 9.5 Units/hr at 01/31/25 0800    propofol (DIPRIVAN) infusion  5-75 mcg/kg/min Intravenous Continuous Mark Florence MD 43.2 mL/hr at 01/31/25 0800 75 mcg/kg/min at 01/31/25 0800    And    Medication Instruction   Does not apply Continuous PRN Mark Florence MD        norepinephrine (LEVOPHED) 16 mg in  mL infusion MAX CONC CENTRAL LINE  0.01-0.6 mcg/kg/min (Dosing Weight) Intravenous Continuous Mark Florence MD 7.2 mL/hr at 01/31/25 0800 0.08 mcg/kg/min at 01/31/25 0800     Rafa Rick MD

## 2025-01-31 NOTE — PROGRESS NOTES
Venovenous (VV) ECMO Fellow Progress Note  1/31/2025    64 year old male who was started on ECMO due to severe respiratory failure from influenza A pneumonia with superimposed MRSA pneumonia.     Interval events: Desaturation o/n after a turn. Slow recovery. Decreased tidal volumes this am. Versed gtt started. TTE obtained to evaluate cannula position.      The patients vital signs today are as follows:    Temp:  [96.8  F (36  C)-98.6  F (37  C)] 97.7  F (36.5  C)  Pulse:  [] 81  Resp:  [0-37] 17  MAP:  [43 mmHg-237 mmHg] 73 mmHg  Arterial Line BP: ()/() 134/54  FiO2 (%):  [60 %-100 %] 60 %  SpO2:  [62 %-96 %] 84 %    Intake/Output Summary (Last 24 hours) at 1/31/2025 1249  Last data filed at 1/31/2025 1200  Gross per 24 hour   Intake 3090.53 ml   Output 3120 ml   Net -29.47 ml    FiO2 (%): 60 %, Resp: 17, Inspiratory Pressure (cm H2O) (Drager Merle): 25, Vent Mode: PCV Plus assist, Resp Rate (Set): 16 breaths/min, PEEP (cm H2O): 10 cmH2O, Resp Rate (Set): 16 breaths/min, PEEP (cm H2O): 10 cmH2O   Recent Labs   Lab 01/31/25  1152 01/31/25  1003 01/31/25  0815 01/31/25  0604   PH 7.33* 7.29* 7.25* 7.34*   PCO2 46* 51* 57* 44   PO2 53* 54* 47* 68*   HCO3 24 24 25 24   O2PER 60 60 100 60      Recent Labs   Lab 01/31/25  1003 01/31/25  0401 01/30/25  2148 01/30/25  1531   WBC 37.4* 26.7* 18.6* 12.6*   HGB 11.3* 10.8* 11.0* 11.1*     Creatinine   Date Value Ref Range Status   01/31/2025 4.37 (H) 0.67 - 1.17 mg/dL Final   01/31/2025 4.13 (H) 0.67 - 1.17 mg/dL Final   01/30/2025 4.06 (H) 0.67 - 1.17 mg/dL Final   01/30/2025 4.04 (H) 0.67 - 1.17 mg/dL Final   12/14/2020 1.13 0.70 - 1.30 mg/dL Final   07/15/2019 1.07 0.70 - 1.30 mg/dL Final   11/20/2018 0.98 0.70 - 1.30 mg/dL Final   04/29/2016 0.91 0.70 - 1.30 mg/dL      Physical Exam:   Cannula positioned unchanged externally.  Minimal oozing.  Decreased air entry bilaterally  Deeply sedated  Extremities edematous    ECMO Issues including assessments and  plan on DOS 1/31/2025:    Neuro: Sedated for mechanical ventilation and ECMO.  NIRS monitoring in place. Increased sedation o/n, now RASS -5. Discontinue versed gtt, continue versed pushes for turns if needed, continue propofol but wean as able, continue fentanyl gtt. Triglycerides stable, CK wnl.   CV: Requiring levophed with increased sedation.  Echo with difficulty demonstrating cannula outflow but appears to have laminar flow directed towards tricuspid valve implying cannula position is adequate.   Pulm: Severe respiratory failure requiring ECMO and mechanical ventilation. Flows increased to 4.8 this am due to desaturation. Keep vent settings at rest settings: PC 25, PEEP10, FiO2 60%. Desaturations and decreased flows with coughing - will attempt to suppress. Continue decadron for ARDS.  FEN/Renal: Creatinine/BUN increased, making adequate urine. Will attempt diuresis today - nephrology consulted as may need CRRT in order to facilitate volume removal.   Heme: Hemoglobin stable, no signs bleeding. Heparin gtt for goal -180  Plasma free Hgb increased today. Will target high end of goal.  Goals: if O2 sat >85% Hgb may drift to 7-8.  If O2 Sat <85% keep Hgb 10-12.  ID: On vancomycin for MRSA pneumonia, will add clindamycin given decreased tidal volumes/desaturations and increased WBC and consult ID. Continue oseltamivir for influenza A pneumonia.  GI: Tfs at goal. PPI.   Endo: insulin gtt.    All pertinent labs, imaging studies, physical exam and medications have been reviewed by me.     This patient requires continued ICU monitoring and cares.  I apprecitate the input of the SICU team for these issues.  I have discussed patient care and treatment plan with the SICU team and the patient's family.         Diaz Roberts  SICU Fellow  Pager [phone]

## 2025-01-31 NOTE — PROGRESS NOTES
SURGICAL ICU PROGRESS NOTE    ASSESSMENT:   Jesus Varghese is a 64 year old male transferred on 1/28 from outside hospital for evaluation for ECMO cannulation.  In brief this is a 64-year-old male with past medical history of hypertension, colon cancer, chronic lymphocytic leukemia, psoriasis, and type 2 diabetes who presented to outside hospital with acute influenza A infection and ARDS. Patient did not tolerate being supinated. He was cannulated for VV ECMO 01/30/25.    TODAY  - ON: had multiple desaturation events into the 50-60s with slower recovery over 5-10 minutes. Usually associated with turns.   - Lantus to 45 BID  - Versed IV pushes. Discontinue gtt.   - Lasix 80 mg x1. Goal NET even to slight Net negative.   - RT placed.   - ACT goal 160- 180, with increased flows attempt to keep at high end of range.  - dilaudid gtt    Plan:  Neuro   # Acute Pain  - Fentanyl gtt to dilaudid gtt (fentanyl adheres to circuit)  - PRN Acetaminophen    # Sedation  - continue propofol gtt  - Versed 4 mg q2hr  - RASS -2 to -3     Pulmonary:  #ARDS  # Acute Hypercarbic and Hypoxic Respiratory Failure, secondary to Influenza A and superimposed MRSA Pneumonia    FiO2 (%): 100 %, Resp: (!) 8, Inspiratory Pressure (cm H2O) (Drager Merle): 25, Vent Mode: PCV Plus assist, Resp Rate (Set): 16 breaths/min, PEEP (cm H2O): 10 cmH2O, Resp Rate (Set): 16 breaths/min, PEEP (cm H2O): 10 cmH2O    - Cannulated for VV ECMO 01/30. Right internal jugular to Right internal jugular   - Duonebs q4hr  - dexamethasone 20mg x5 days then 10 mg x 5 days  - Bronchoscopy  - Nephrology consultation.    # VV ECMO  Mode: V-V (1/31/2025  4:00 AM)  Blood Flow (Circuit) LPM: 4.55 LPM (1/31/2025  4:00 AM)  RPM's: 3500 (1/31/2025  4:00 AM)  Sweep LPM: 4 LPM (1/31/2025  4:00 AM)  Sweep FiO2   %: 100 % (1/31/2025  4:00 AM)  SvO2  %: 78.4 % (1/31/2025  4:00 AM)  HgB: 10.8 (1/31/2025  4:00 AM)  ACT  (seconds): 166 seconds (1/31/2025  4:00 AM)  - Cannulated for VV  ECMO 01/30. Right internal jugular to Right internal jugular   - Monitoring pulses, dopplerable.  - ACT goal 160-180     Cardiovascular: EF 60-65%, no RV dysfunction (01/27)  # Essential Hypertension  # Hypotension  - Hypotension in setting of sedation.   - MAP Goal >65.   - NE gtt, wean as tolerated.  - Echo (01/31) read pending.     GI/Nutrition:  - RD consulted. NJ in place with TF at goal.   - Bowel Regimen: Miralax BID and Senna BID.     # Elevated Triglycerides  - Will continue to monitor with daily CK and TG.    Renal/ Fluid Balance:  #DEEP   - Baseline 1.0-1.10.   - Currently 3.8.   - Nephrology consult today  - Goal net even to slightly negative  - Monitor urinary output, maintain nunez   - ICU electrolyte replacement protocol  - 80 lasix      Endocrine:  # History of type 2 diabetes  # Stress and Steroid induced Hyperglycemia   - Insulin gtt  - Lantus 45 BID, monitor closely when and titrate down if needed when steroids reduced.   - Goal BG <180     Infectious Disease:  # Sepsis  # Influenza A  # MRSA Pneumonia  - Tamiflu f( 1/26 - Current) will plan on 10-day course  - Continue Vanc (01/27 - current)  - Clindamycin    Cultures  - MRSA swab (01/27) positive  - Sputum (01/26) 3+ MRSA  - Blood (01/26) NGTD    Hematology:  # Acute Anemia of critical illness  - Continue to monitor.     # Chronic Lymphocytic Leukemia  - s/p 16 cycles of Acalabrutinib completed in December 2023 and now in remission.    # MSK/Skin  - PT/OT eval and treatment    # Penile Pressure Wound  - WOC consult    Prophylaxis:    - DVT: Heparin gtt (ACT goals)  - GI: PPI    Lines/ tubes/ drains:  - ETT  - 2 Right internal jugular cannulas  - Left internal jugular CVC  - R radial arterial line  - NJ  - nunez  - Rectal pouch    Code status: Full code.    - spoke with spouse 01/31. She reports if he was not able to recover on ECMO. She does not believe he would be okay with receiving a lung transplant and that this would be the max amount of  supportive measure that would be acceptable to him.     Disposition: SICU    32 minutes of CC Time spent in the management of this patient.    ICU Daily Rounding Checklist     Checklist Response Notes   Can sedation be reduced?  Yes    Can analgesia be reduced? No    Is delirium being assessed, addressed and prevented? Yes    Spontaneous awakening trial and/or Spontaneous breathing trial candidate?  No    Total fluid balance goal reviewed?  Yes NET event to negative 500   Is the patient at goals for lung protective ventilation? Yes    Head of bed elevation (30 degrees)? Yes    Skin breakdown assessment (prevention) completed? Yes    Is enteral nutrition at goal? Yes    Is blood glucose at goal? Yes    Deep venous thrombosis prophylaxis? Yes      Gastric ulcer prophylaxis?  Yes If coagulopathy (INR-1.5 PTT2x normal. Ph<50k), mechanical ventilation 48hr, history of GI bleed/ulcer within past year. TBL, SCI, or burn, or if >= 2 minor risk factors (sepsis, ICU stay 1 week, occult GI bleed > 6 days. glucocorticoid therapy, NSAID use, antiplatelet use)   Can Antibiotics be narrowed or discontinued? No    Early mobility candidate and physical therapy consulted? No    Is nunez catheter needed? Yes    Is central venous/arterial catheter needed? Yes    Has the family been updated? Yes    Are the patient's goals of care and code status current? Yes        Patient seen, findings and plan discussed with surgical ICU staff.    Nawaf Ghotra PA-C    ====================================    OBJECTIVE:   1. VITAL SIGNS:   Temp:  [96.8  F (36  C)-98.6  F (37  C)] 98.1  F (36.7  C)  Pulse:  [] 71  Resp:  [12-37] 19  MAP:  [58 mmHg-237 mmHg] 85 mmHg  Arterial Line BP: ()/() 103/70  FiO2 (%):  [60 %] 60 %  SpO2:  [63 %-97 %] 96 %  FiO2 (%): 60 %, Resp: 19, Inspiratory Pressure (cm H2O) (Drager Merle): 25, Vent Mode: PCV Plus assist, Resp Rate (Set): 16 breaths/min, PEEP (cm H2O): 10 cmH2O, Resp Rate (Set): 16  breaths/min, PEEP (cm H2O): 10 cmH2O    2. INTAKE/ OUTPUT:   I/O last 3 completed shifts:  In: 2494.62 [I.V.:1292.12; NG/GT:520]  Out: 2175 [Urine:2175]    3. PHYSICAL EXAMINATION:   GEN: sedated, intubated  EYES: PERRL, Anicteric sclera.   HEENT:  Normocephalic, atraumatic, ETT secure  CV: RRR  PULM/CHEST: coarse breath sounds, faint wheezes bilaterally, no crackles appreciated   GI: unable to assess due to prone position   : nunez catheter in place, urine yellow and clear  EXTREMITIES: 1+ peripheral edema, peripheral pulses 2+  NEURO: Sedated, paralyzed. PERRL  SKIN: No rashes, sores or ulcerations appreciated     4. INVESTIGATIONS:   Arterial Blood Gases   Recent Labs   Lab 01/31/25  0604 01/31/25  0401 01/31/25  0159 01/31/25  0008   PH 7.34* 7.34* 7.36 7.36   PCO2 44 45 42 42   PO2 68* 66* 81 57*   HCO3 24 24 24 24     Complete Blood Count   Recent Labs   Lab 01/31/25  0401 01/30/25  2148 01/30/25  1531 01/30/25  0954   WBC 26.7* 18.6* 12.6* 27.0*   HGB 10.8* 11.0* 11.1* 11.4*    230 185 268     Basic Metabolic Panel  Recent Labs   Lab 01/31/25  0606 01/31/25  0514 01/31/25  0414 01/31/25  0401 01/30/25  2154 01/30/25  2148 01/30/25  1601 01/30/25  1531 01/30/25  0956 01/30/25  0954   NA  --   --   --  142  --  141  --  138  --  141   POTASSIUM  --   --   --  4.1  --  4.0  --  4.3  --  4.0   CHLORIDE  --   --   --  105  --  104  --  103  --  105   CO2  --   --   --  21*  --  22  --  22  --  23   BUN  --   --   --  114.0*  --  105.0*  --  91.4*  --  81.3*   CR  --   --   --  4.13*  --  4.06*  --  4.04*  --  4.12*   * 134* 103* 142*   < > 179*   < > 237*   < > 130*    < > = values in this interval not displayed.     Liver Function Tests  Recent Labs   Lab 01/31/25  0401 01/30/25  2148 01/30/25  1531 01/30/25  0954 01/30/25  0341 01/29/25  2154 01/29/25  1108 01/28/25  1342 01/28/25  0533 01/27/25  1600 01/27/25  0544   AST  --   --   --   --   --   --  76*  --  74* 98* 108*   ALT 59  --   --   --   57  --  65  --  59 66 76*   ALKPHOS  --   --   --   --   --   --  76  --  79 92 85   BILITOTAL  --   --   --   --   --   --  0.6  --  0.4 0.5 0.5   ALBUMIN 3.1*  --   --   --  2.8*  --  3.0*  --  3.1* 3.3* 3.4*   INR 1.15 1.63* 1.18* 1.08 1.10   < >  --    < >  --   --   --     < > = values in this interval not displayed.     Pancreatic Enzymes  No lab results found in last 7 days.  Coagulation Profile  Recent Labs   Lab 01/31/25  0401 01/30/25  2148 01/30/25  1531 01/30/25  0954   INR 1.15 1.63* 1.18* 1.08   PTT 55* 62* 49* 36         5. RADIOLOGY:   Recent Results (from the past 24 hours)   US Renal Complete Non-Vascular    Narrative    EXAMINATION: US RENAL COMPLETE NON-VASCULAR, 1/30/2025 11:22 AM     COMPARISON: 8/26/2024 CT abdomen and pelvis    HISTORY: sirisha    TECHNIQUE: The kidneys and bladder were scanned in the standard  fashion with specialized ultrasound transducer(s) using both gray  scale and limited color/spectral Doppler techniques.    FINDINGS:    Right kidney: Measures 12.3 cm in length. No focal mass. No  hydronephrosis. Exophytic simple cyst at inferior pole measuring up to  4.1 cm.    Left kidney: Measures 13.5 cm in length. No focal mass. No  hydronephrosis. 2 large parapelvic simple renal cysts measuring up to  5.6 cm and 3.5 cm.    Bladder: Decompressed with Portillo in place      Impression    IMPRESSION:  Redemonstration of multiple bilateral anechoic cysts previously seen  on CT. No hydronephrosis or renal masses.    I have personally reviewed the examination and initial interpretation  and I agree with the findings.    ELIESER ARREAGA MD         SYSTEM ID:  [id number]   XR Chest Port 1 View    Narrative    Exam: XR CHEST PORT 1 VIEW, 1/31/2025 2:02 AM    Indication: interval change, ARDS    Comparison: Prior radiographs, including 1/30/2025    Findings:   Portable semiupright AP view of the chest. Endotracheal tube tip  projects over the mid thoracic trachea, slightly advanced compared  to  prior. Remaining support devices are stable. Trachea is midline.  Decreased retrocardiac consolidation. Similar diffuse mixed opacities.  No definite pneumothorax. Probable left pleural effusion.      Impression    Impression:   1.  Endotracheal tube tip projects over the midthoracic trachea,  slightly advanced compared to prior. Remaining support devices are  stable.  2.  Stable diffuse mixed opacities with slightly decreased  retrocardiac consolidation, which may represent ARDS versus infection.  3.  Small left pleural effusion.    I have personally reviewed the examination and initial interpretation  and I agree with the findings.    CARMELINA DIAL MD         SYSTEM ID:  [id number]   XR Chest Port 1 View    Narrative    Exam: XR CHEST PORT 1 VIEW, 1/31/2025 3:32 AM    Indication: desats    Comparison: Prior radiographs, including 1/31/2025 at 0137    Findings:   Portable semiupright AP view of the chest. Endotracheal tube tip  projects over the upper thoracic trachea, retracted compared to prior.  Right IJ ECMO cannula courses below the level of the diaphragm and out  of the field-of-view. Feeding tube also courses below the level of the  diaphragm and out of the field-of-view. Stable position of the left IJ  central venous catheter tip in the mid SVC.    Trachea is midline. Increased retrocardiac opacity with air  bronchograms. Similar perihilar mixed opacities. Low lung volumes. No  definite pneumothorax. Probable left pleural effusion.      Impression    Impression:   1.  Stable diffuse mixed opacities with increased retrocardiac  consolidation, which may represent ARDS versus infection.  2.  Endotracheal tube tip projects over the upper thoracic trachea,  retracted compared to prior.  3.  Stable small left pleural effusion.    I have personally reviewed the examination and initial interpretation  and I agree with the findings.    CARMELINA DIAL MD         SYSTEM ID:  [id number]   XR Chest Port 1 View     Impression    RESIDENT PRELIMINARY INTERPRETATION  IMPRESSION:  1.  Marked increase in bilateral pulmonary opacity/consolidation with  minimal aeration of left upper and right lower lungs.   2.  Endotracheal tube tip projects over the upper thoracic trachea,  stable in position from most recent film. Remainder support devices  are stable.       =========================================

## 2025-02-01 LAB
ACT BLD: 162 SECONDS (ref 74–150)
ACT BLD: 170 SECONDS (ref 74–150)
ACT BLD: 174 SECONDS (ref 74–150)
ACT BLD: 178 SECONDS (ref 74–150)
ALBUMIN SERPL BCG-MCNC: 2.8 G/DL (ref 3.5–5.2)
ALLEN'S TEST: ABNORMAL
ALT SERPL W P-5'-P-CCNC: 61 U/L (ref 0–70)
ANION GAP SERPL CALCULATED.3IONS-SCNC: 17 MMOL/L (ref 7–15)
ANION GAP SERPL CALCULATED.3IONS-SCNC: 17 MMOL/L (ref 7–15)
ANION GAP SERPL CALCULATED.3IONS-SCNC: 20 MMOL/L (ref 7–15)
ANION GAP SERPL CALCULATED.3IONS-SCNC: 20 MMOL/L (ref 7–15)
APTT PPP: 56 SECONDS (ref 22–38)
APTT PPP: 61 SECONDS (ref 22–38)
APTT PPP: 62 SECONDS (ref 22–38)
APTT PPP: 68 SECONDS (ref 22–38)
AT III ACT/NOR PPP CHRO: 77 % (ref 85–135)
BASE EXCESS BLDA CALC-SCNC: -3 MMOL/L (ref -3–3)
BASE EXCESS BLDA CALC-SCNC: -3.1 MMOL/L (ref -3–3)
BASE EXCESS BLDA CALC-SCNC: -3.2 MMOL/L (ref -3–3)
BASE EXCESS BLDA CALC-SCNC: -3.3 MMOL/L (ref -3–3)
BASE EXCESS BLDA CALC-SCNC: -3.4 MMOL/L (ref -3–3)
BASE EXCESS BLDA CALC-SCNC: -3.4 MMOL/L (ref -3–3)
BASE EXCESS BLDA CALC-SCNC: -3.6 MMOL/L (ref -3–3)
BASE EXCESS BLDA CALC-SCNC: -3.9 MMOL/L (ref -3–3)
BASE EXCESS BLDA CALC-SCNC: -4 MMOL/L (ref -3–3)
BASE EXCESS BLDA CALC-SCNC: -4.4 MMOL/L (ref -3–3)
BASE EXCESS BLDA CALC-SCNC: -4.4 MMOL/L (ref -3–3)
BASE EXCESS BLDA CALC-SCNC: -4.5 MMOL/L (ref -3–3)
BASE EXCESS BLDA CALC-SCNC: -4.5 MMOL/L (ref -3–3)
BASE EXCESS BLDA CALC-SCNC: -4.7 MMOL/L (ref -3–3)
BASE EXCESS BLDV CALC-SCNC: -4.1 MMOL/L (ref -3–3)
BASE EXCESS BLDV CALC-SCNC: -4.3 MMOL/L (ref -3–3)
BASE EXCESS BLDV CALC-SCNC: -4.3 MMOL/L (ref -3–3)
BASE EXCESS BLDV CALC-SCNC: -4.7 MMOL/L (ref -3–3)
BASE EXCESS BLDV CALC-SCNC: -4.7 MMOL/L (ref -3–3)
BASOPHILS # BLD MANUAL: 0 10E3/UL (ref 0–0.2)
BASOPHILS NFR BLD MANUAL: 0 %
BUN SERPL-MCNC: 149 MG/DL (ref 8–23)
BUN SERPL-MCNC: 158 MG/DL (ref 8–23)
BUN SERPL-MCNC: 171 MG/DL (ref 8–23)
BUN SERPL-MCNC: 183 MG/DL (ref 8–23)
BURR CELLS BLD QL SMEAR: ABNORMAL
BURR CELLS BLD QL SMEAR: ABNORMAL
BURR CELLS BLD QL SMEAR: SLIGHT
CA-I BLD-MCNC: 4.7 MG/DL (ref 4.4–5.2)
CA-I BLD-MCNC: 4.8 MG/DL (ref 4.4–5.2)
CALCIUM SERPL-MCNC: 8.1 MG/DL (ref 8.8–10.4)
CALCIUM SERPL-MCNC: 8.6 MG/DL (ref 8.8–10.4)
CALCIUM SERPL-MCNC: 8.7 MG/DL (ref 8.8–10.4)
CALCIUM SERPL-MCNC: 8.9 MG/DL (ref 8.8–10.4)
CHLORIDE SERPL-SCNC: 105 MMOL/L (ref 98–107)
CHLORIDE SERPL-SCNC: 106 MMOL/L (ref 98–107)
CHLORIDE SERPL-SCNC: 107 MMOL/L (ref 98–107)
CHLORIDE SERPL-SCNC: 107 MMOL/L (ref 98–107)
CK SERPL-CCNC: 24 U/L (ref 39–308)
CREAT SERPL-MCNC: 4.19 MG/DL (ref 0.67–1.17)
CREAT SERPL-MCNC: 4.2 MG/DL (ref 0.67–1.17)
CREAT SERPL-MCNC: 4.22 MG/DL (ref 0.67–1.17)
CREAT SERPL-MCNC: 4.24 MG/DL (ref 0.67–1.17)
D DIMER PPP FEU-MCNC: 1.25 UG/ML FEU (ref 0–0.5)
D DIMER PPP FEU-MCNC: 1.31 UG/ML FEU (ref 0–0.5)
EGFRCR SERPLBLD CKD-EPI 2021: 15 ML/MIN/1.73M2
EOSINOPHIL # BLD MANUAL: 0 10E3/UL (ref 0–0.7)
EOSINOPHIL # BLD MANUAL: 0.2 10E3/UL (ref 0–0.7)
EOSINOPHIL NFR BLD MANUAL: 0 %
EOSINOPHIL NFR BLD MANUAL: 1 %
ERYTHROCYTE [DISTWIDTH] IN BLOOD BY AUTOMATED COUNT: 14 % (ref 10–15)
ERYTHROCYTE [DISTWIDTH] IN BLOOD BY AUTOMATED COUNT: 14.1 % (ref 10–15)
FIBRINOGEN PPP-MCNC: 311 MG/DL (ref 170–510)
FIBRINOGEN PPP-MCNC: 325 MG/DL (ref 170–510)
GLUCOSE BLDC GLUCOMTR-MCNC: 127 MG/DL (ref 70–99)
GLUCOSE BLDC GLUCOMTR-MCNC: 134 MG/DL (ref 70–99)
GLUCOSE BLDC GLUCOMTR-MCNC: 137 MG/DL (ref 70–99)
GLUCOSE BLDC GLUCOMTR-MCNC: 140 MG/DL (ref 70–99)
GLUCOSE BLDC GLUCOMTR-MCNC: 142 MG/DL (ref 70–99)
GLUCOSE BLDC GLUCOMTR-MCNC: 148 MG/DL (ref 70–99)
GLUCOSE BLDC GLUCOMTR-MCNC: 150 MG/DL (ref 70–99)
GLUCOSE BLDC GLUCOMTR-MCNC: 157 MG/DL (ref 70–99)
GLUCOSE BLDC GLUCOMTR-MCNC: 167 MG/DL (ref 70–99)
GLUCOSE BLDC GLUCOMTR-MCNC: 177 MG/DL (ref 70–99)
GLUCOSE BLDC GLUCOMTR-MCNC: 184 MG/DL (ref 70–99)
GLUCOSE BLDC GLUCOMTR-MCNC: 195 MG/DL (ref 70–99)
GLUCOSE SERPL-MCNC: 151 MG/DL (ref 70–99)
GLUCOSE SERPL-MCNC: 152 MG/DL (ref 70–99)
GLUCOSE SERPL-MCNC: 197 MG/DL (ref 70–99)
GLUCOSE SERPL-MCNC: 215 MG/DL (ref 70–99)
HCO3 BLD-SCNC: 21 MMOL/L (ref 21–28)
HCO3 BLD-SCNC: 22 MMOL/L (ref 21–28)
HCO3 BLDA-SCNC: 20 MMOL/L (ref 21–28)
HCO3 BLDA-SCNC: 20 MMOL/L (ref 21–28)
HCO3 BLDV-SCNC: 21 MMOL/L (ref 21–28)
HCO3 BLDV-SCNC: 22 MMOL/L (ref 21–28)
HCO3 SERPL-SCNC: 18 MMOL/L (ref 22–29)
HCO3 SERPL-SCNC: 18 MMOL/L (ref 22–29)
HCO3 SERPL-SCNC: 20 MMOL/L (ref 22–29)
HCO3 SERPL-SCNC: 20 MMOL/L (ref 22–29)
HCT VFR BLD AUTO: 26.9 % (ref 40–53)
HCT VFR BLD AUTO: 27.2 % (ref 40–53)
HCT VFR BLD AUTO: 27.2 % (ref 40–53)
HCT VFR BLD AUTO: 27.8 % (ref 40–53)
HGB BLD-MCNC: 9.1 G/DL (ref 13.3–17.7)
HGB BLD-MCNC: 9.2 G/DL (ref 13.3–17.7)
HGB BLD-MCNC: 9.3 G/DL (ref 13.3–17.7)
HGB BLD-MCNC: 9.5 G/DL (ref 13.3–17.7)
HGB FREE PLAS-MCNC: NORMAL MG/L
INR PPP: 1.19 (ref 0.85–1.15)
INR PPP: 1.2 (ref 0.85–1.15)
INR PPP: 1.2 (ref 0.85–1.15)
INR PPP: 1.22 (ref 0.85–1.15)
LACTATE SERPL-SCNC: 2.6 MMOL/L (ref 0.7–2)
LACTATE SERPL-SCNC: 2.8 MMOL/L (ref 0.7–2)
LACTATE SERPL-SCNC: 2.9 MMOL/L (ref 0.7–2)
LACTATE SERPL-SCNC: 3.7 MMOL/L (ref 0.7–2)
LYMPHOCYTES # BLD MANUAL: 12.5 10E3/UL (ref 0.8–5.3)
LYMPHOCYTES # BLD MANUAL: 4.5 10E3/UL (ref 0.8–5.3)
LYMPHOCYTES # BLD MANUAL: 6.4 10E3/UL (ref 0.8–5.3)
LYMPHOCYTES # BLD MANUAL: 7.6 10E3/UL (ref 0.8–5.3)
LYMPHOCYTES # BLD MANUAL: 8 10E3/UL (ref 0.8–5.3)
LYMPHOCYTES NFR BLD MANUAL: 29 %
LYMPHOCYTES NFR BLD MANUAL: 36 %
LYMPHOCYTES NFR BLD MANUAL: 42 %
LYMPHOCYTES NFR BLD MANUAL: 46 %
LYMPHOCYTES NFR BLD MANUAL: 57 %
MAGNESIUM SERPL-MCNC: 3.1 MG/DL (ref 1.7–2.3)
MCH RBC QN AUTO: 28.4 PG (ref 26.5–33)
MCH RBC QN AUTO: 28.6 PG (ref 26.5–33)
MCH RBC QN AUTO: 28.8 PG (ref 26.5–33)
MCH RBC QN AUTO: 29.3 PG (ref 26.5–33)
MCHC RBC AUTO-ENTMCNC: 33.1 G/DL (ref 31.5–36.5)
MCHC RBC AUTO-ENTMCNC: 33.5 G/DL (ref 31.5–36.5)
MCHC RBC AUTO-ENTMCNC: 34.6 G/DL (ref 31.5–36.5)
MCHC RBC AUTO-ENTMCNC: 34.9 G/DL (ref 31.5–36.5)
MCV RBC AUTO: 83 FL (ref 78–100)
MCV RBC AUTO: 84 FL (ref 78–100)
MCV RBC AUTO: 86 FL (ref 78–100)
MCV RBC AUTO: 86 FL (ref 78–100)
METAMYELOCYTES # BLD MANUAL: 0.2 10E3/UL
METAMYELOCYTES # BLD MANUAL: 0.2 10E3/UL
METAMYELOCYTES # BLD MANUAL: 0.4 10E3/UL
METAMYELOCYTES # BLD MANUAL: 0.4 10E3/UL
METAMYELOCYTES NFR BLD MANUAL: 1 %
METAMYELOCYTES NFR BLD MANUAL: 1 %
METAMYELOCYTES NFR BLD MANUAL: 2 %
METAMYELOCYTES NFR BLD MANUAL: 2 %
MONOCYTES # BLD MANUAL: 0.4 10E3/UL (ref 0–1.3)
MONOCYTES # BLD MANUAL: 0.5 10E3/UL (ref 0–1.3)
MONOCYTES # BLD MANUAL: 0.7 10E3/UL (ref 0–1.3)
MONOCYTES # BLD MANUAL: 0.8 10E3/UL (ref 0–1.3)
MONOCYTES # BLD MANUAL: 1 10E3/UL (ref 0–1.3)
MONOCYTES NFR BLD MANUAL: 2 %
MONOCYTES NFR BLD MANUAL: 3 %
MONOCYTES NFR BLD MANUAL: 3 %
MONOCYTES NFR BLD MANUAL: 5 %
MONOCYTES NFR BLD MANUAL: 6 %
MYELOCYTES # BLD MANUAL: 0.2 10E3/UL
MYELOCYTES # BLD MANUAL: 0.3 10E3/UL
MYELOCYTES # BLD MANUAL: 0.4 10E3/UL
MYELOCYTES # BLD MANUAL: 0.7 10E3/UL
MYELOCYTES NFR BLD MANUAL: 1 %
MYELOCYTES NFR BLD MANUAL: 2 %
MYELOCYTES NFR BLD MANUAL: 2 %
MYELOCYTES NFR BLD MANUAL: 4 %
NEUTROPHILS # BLD MANUAL: 10.1 10E3/UL (ref 1.6–8.3)
NEUTROPHILS # BLD MANUAL: 7.4 10E3/UL (ref 1.6–8.3)
NEUTROPHILS # BLD MANUAL: 8.6 10E3/UL (ref 1.6–8.3)
NEUTROPHILS # BLD MANUAL: 9.5 10E3/UL (ref 1.6–8.3)
NEUTROPHILS # BLD MANUAL: 9.8 10E3/UL (ref 1.6–8.3)
NEUTROPHILS NFR BLD MANUAL: 39 %
NEUTROPHILS NFR BLD MANUAL: 43 %
NEUTROPHILS NFR BLD MANUAL: 53 %
NEUTROPHILS NFR BLD MANUAL: 57 %
NEUTROPHILS NFR BLD MANUAL: 63 %
O2/TOTAL GAS SETTING VFR VENT: 100 %
O2/TOTAL GAS SETTING VFR VENT: 60 %
OXYHGB MFR BLDA: 90 % (ref 92–100)
OXYHGB MFR BLDA: 91 % (ref 92–100)
OXYHGB MFR BLDA: 92 % (ref 92–100)
OXYHGB MFR BLDA: 92 % (ref 92–100)
OXYHGB MFR BLDA: 93 % (ref 92–100)
OXYHGB MFR BLDA: 93 % (ref 92–100)
OXYHGB MFR BLDA: 94 % (ref 92–100)
OXYHGB MFR BLDA: 99 % (ref 75–100)
OXYHGB MFR BLDA: 99 % (ref 75–100)
OXYHGB MFR BLDV: 77 %
OXYHGB MFR BLDV: 82 %
OXYHGB MFR BLDV: 88 % (ref 70–75)
OXYHGB MFR BLDV: 90 % (ref 70–75)
OXYHGB MFR BLDV: 93 % (ref 70–75)
PCO2 BLD: 37 MM HG (ref 35–45)
PCO2 BLD: 38 MM HG (ref 35–45)
PCO2 BLD: 38 MM HG (ref 35–45)
PCO2 BLD: 39 MM HG (ref 35–45)
PCO2 BLD: 40 MM HG (ref 35–45)
PCO2 BLD: 40 MM HG (ref 35–45)
PCO2 BLD: 42 MM HG (ref 35–45)
PCO2 BLDA: 33 MM HG (ref 35–45)
PCO2 BLDA: 34 MM HG (ref 35–45)
PCO2 BLDV: 37 MM HG (ref 40–50)
PCO2 BLDV: 38 MM HG (ref 40–50)
PCO2 BLDV: 39 MM HG (ref 40–50)
PCO2 BLDV: 41 MM HG (ref 40–50)
PCO2 BLDV: 42 MM HG (ref 40–50)
PEEP: 10 CM H2O
PH BLD: 7.33 [PH] (ref 7.35–7.45)
PH BLD: 7.34 [PH] (ref 7.35–7.45)
PH BLD: 7.34 [PH] (ref 7.35–7.45)
PH BLD: 7.35 [PH] (ref 7.35–7.45)
PH BLD: 7.35 [PH] (ref 7.35–7.45)
PH BLD: 7.36 [PH] (ref 7.35–7.45)
PH BLD: 7.37 [PH] (ref 7.35–7.45)
PH BLD: 7.38 [PH] (ref 7.35–7.45)
PH BLDA: 7.38 [PH] (ref 7.35–7.45)
PH BLDA: 7.39 [PH] (ref 7.35–7.45)
PH BLDV: 7.32 [PH] (ref 7.32–7.43)
PH BLDV: 7.33 [PH] (ref 7.32–7.43)
PH BLDV: 7.34 [PH] (ref 7.32–7.43)
PH BLDV: 7.35 [PH] (ref 7.32–7.43)
PH BLDV: 7.35 [PH] (ref 7.32–7.43)
PHOSPHATE SERPL-MCNC: 7 MG/DL (ref 2.5–4.5)
PLAT MORPH BLD: ABNORMAL
PLATELET # BLD AUTO: 213 10E3/UL (ref 150–450)
PLATELET # BLD AUTO: 224 10E3/UL (ref 150–450)
PLATELET # BLD AUTO: 235 10E3/UL (ref 150–450)
PLATELET # BLD AUTO: 262 10E3/UL (ref 150–450)
PO2 BLD: 65 MM HG (ref 80–105)
PO2 BLD: 67 MM HG (ref 80–105)
PO2 BLD: 70 MM HG (ref 80–105)
PO2 BLD: 72 MM HG (ref 80–105)
PO2 BLD: 72 MM HG (ref 80–105)
PO2 BLD: 74 MM HG (ref 80–105)
PO2 BLD: 78 MM HG (ref 80–105)
PO2 BLD: 79 MM HG (ref 80–105)
PO2 BLD: 80 MM HG (ref 80–105)
PO2 BLD: 83 MM HG (ref 80–105)
PO2 BLD: 83 MM HG (ref 80–105)
PO2 BLD: 84 MM HG (ref 80–105)
PO2 BLDA: 338 MM HG (ref 80–105)
PO2 BLDA: 446 MM HG (ref 80–105)
PO2 BLDV: 45 MM HG (ref 25–47)
PO2 BLDV: 51 MM HG (ref 25–47)
PO2 BLDV: 61 MM HG (ref 25–47)
PO2 BLDV: 66 MM HG (ref 25–47)
PO2 BLDV: 75 MM HG (ref 25–47)
POTASSIUM SERPL-SCNC: 4.2 MMOL/L (ref 3.4–5.3)
POTASSIUM SERPL-SCNC: 4.2 MMOL/L (ref 3.4–5.3)
POTASSIUM SERPL-SCNC: 4.3 MMOL/L (ref 3.4–5.3)
POTASSIUM SERPL-SCNC: 4.3 MMOL/L (ref 3.4–5.3)
RBC # BLD AUTO: 3.18 10E6/UL (ref 4.4–5.9)
RBC # BLD AUTO: 3.23 10E6/UL (ref 4.4–5.9)
RBC # BLD AUTO: 3.24 10E6/UL (ref 4.4–5.9)
RBC # BLD AUTO: 3.24 10E6/UL (ref 4.4–5.9)
RBC MORPH BLD: ABNORMAL
SAO2 % BLDA: 92.2 % (ref 95–96)
SAO2 % BLDA: 93.3 % (ref 95–96)
SAO2 % BLDA: 94.4 % (ref 95–96)
SAO2 % BLDA: 94.6 % (ref 95–96)
SAO2 % BLDA: 95 % (ref 95–96)
SAO2 % BLDA: 95.3 % (ref 95–96)
SAO2 % BLDA: 96.1 % (ref 95–96)
SAO2 % BLDA: 96.5 % (ref 95–96)
SAO2 % BLDA: 96.5 % (ref 95–96)
SAO2 % BLDA: 96.6 % (ref 95–96)
SAO2 % BLDA: 96.7 % (ref 95–96)
SAO2 % BLDA: 97 % (ref 95–96)
SAO2 % BLDA: >100 % (ref 95–96)
SAO2 % BLDA: >100 % (ref 95–96)
SAO2 % BLDV: 78.9 % (ref 70–75)
SAO2 % BLDV: 84.1 % (ref 70–75)
SAO2 % BLDV: 90.8 % (ref 70–75)
SAO2 % BLDV: 92.3 % (ref 70–75)
SAO2 % BLDV: 95.2 % (ref 70–75)
SODIUM SERPL-SCNC: 143 MMOL/L (ref 135–145)
SODIUM SERPL-SCNC: 143 MMOL/L (ref 135–145)
SODIUM SERPL-SCNC: 144 MMOL/L (ref 135–145)
SODIUM SERPL-SCNC: 145 MMOL/L (ref 135–145)
TRIGL SERPL-MCNC: 485 MG/DL
UFH PPP CHRO-ACNC: 0.46 IU/ML
UFH PPP CHRO-ACNC: 0.49 IU/ML
UFH PPP CHRO-ACNC: 0.51 IU/ML
UFH PPP CHRO-ACNC: 0.53 IU/ML
WBC # BLD AUTO: 15.5 10E3/UL (ref 4–11)
WBC # BLD AUTO: 17.3 10E3/UL (ref 4–11)
WBC # BLD AUTO: 17.8 10E3/UL (ref 4–11)
WBC # BLD AUTO: 22 10E3/UL (ref 4–11)

## 2025-02-01 PROCEDURE — 83735 ASSAY OF MAGNESIUM: CPT | Performed by: STUDENT IN AN ORGANIZED HEALTH CARE EDUCATION/TRAINING PROGRAM

## 2025-02-01 PROCEDURE — 80069 RENAL FUNCTION PANEL: CPT | Performed by: STUDENT IN AN ORGANIZED HEALTH CARE EDUCATION/TRAINING PROGRAM

## 2025-02-01 PROCEDURE — 33948 ECMO/ECLS DAILY MGMT-VENOUS: CPT | Performed by: SURGERY

## 2025-02-01 PROCEDURE — 83605 ASSAY OF LACTIC ACID: CPT | Performed by: STUDENT IN AN ORGANIZED HEALTH CARE EDUCATION/TRAINING PROGRAM

## 2025-02-01 PROCEDURE — 82805 BLOOD GASES W/O2 SATURATION: CPT | Performed by: SURGERY

## 2025-02-01 PROCEDURE — 94003 VENT MGMT INPAT SUBQ DAY: CPT

## 2025-02-01 PROCEDURE — 82330 ASSAY OF CALCIUM: CPT | Performed by: STUDENT IN AN ORGANIZED HEALTH CARE EDUCATION/TRAINING PROGRAM

## 2025-02-01 PROCEDURE — 250N000009 HC RX 250: Performed by: NURSE PRACTITIONER

## 2025-02-01 PROCEDURE — 82805 BLOOD GASES W/O2 SATURATION: CPT | Performed by: STUDENT IN AN ORGANIZED HEALTH CARE EDUCATION/TRAINING PROGRAM

## 2025-02-01 PROCEDURE — 85730 THROMBOPLASTIN TIME PARTIAL: CPT | Performed by: STUDENT IN AN ORGANIZED HEALTH CARE EDUCATION/TRAINING PROGRAM

## 2025-02-01 PROCEDURE — 99291 CRITICAL CARE FIRST HOUR: CPT | Mod: GC | Performed by: SURGERY

## 2025-02-01 PROCEDURE — 82040 ASSAY OF SERUM ALBUMIN: CPT | Performed by: STUDENT IN AN ORGANIZED HEALTH CARE EDUCATION/TRAINING PROGRAM

## 2025-02-01 PROCEDURE — 85384 FIBRINOGEN ACTIVITY: CPT | Performed by: STUDENT IN AN ORGANIZED HEALTH CARE EDUCATION/TRAINING PROGRAM

## 2025-02-01 PROCEDURE — 250N000011 HC RX IP 250 OP 636: Performed by: NURSE PRACTITIONER

## 2025-02-01 PROCEDURE — 82435 ASSAY OF BLOOD CHLORIDE: CPT | Performed by: STUDENT IN AN ORGANIZED HEALTH CARE EDUCATION/TRAINING PROGRAM

## 2025-02-01 PROCEDURE — 250N000013 HC RX MED GY IP 250 OP 250 PS 637: Performed by: PHYSICIAN ASSISTANT

## 2025-02-01 PROCEDURE — 84478 ASSAY OF TRIGLYCERIDES: CPT | Performed by: STUDENT IN AN ORGANIZED HEALTH CARE EDUCATION/TRAINING PROGRAM

## 2025-02-01 PROCEDURE — 85018 HEMOGLOBIN: CPT | Performed by: STUDENT IN AN ORGANIZED HEALTH CARE EDUCATION/TRAINING PROGRAM

## 2025-02-01 PROCEDURE — 85300 ANTITHROMBIN III ACTIVITY: CPT | Performed by: STUDENT IN AN ORGANIZED HEALTH CARE EDUCATION/TRAINING PROGRAM

## 2025-02-01 PROCEDURE — 94640 AIRWAY INHALATION TREATMENT: CPT | Mod: 76

## 2025-02-01 PROCEDURE — 250N000013 HC RX MED GY IP 250 OP 250 PS 637: Performed by: SURGERY

## 2025-02-01 PROCEDURE — 85379 FIBRIN DEGRADATION QUANT: CPT | Performed by: STUDENT IN AN ORGANIZED HEALTH CARE EDUCATION/TRAINING PROGRAM

## 2025-02-01 PROCEDURE — 250N000013 HC RX MED GY IP 250 OP 250 PS 637: Performed by: STUDENT IN AN ORGANIZED HEALTH CARE EDUCATION/TRAINING PROGRAM

## 2025-02-01 PROCEDURE — 33948 ECMO/ECLS DAILY MGMT-VENOUS: CPT

## 2025-02-01 PROCEDURE — 85520 HEPARIN ASSAY: CPT | Performed by: STUDENT IN AN ORGANIZED HEALTH CARE EDUCATION/TRAINING PROGRAM

## 2025-02-01 PROCEDURE — 250N000011 HC RX IP 250 OP 636: Performed by: SURGERY

## 2025-02-01 PROCEDURE — 80048 BASIC METABOLIC PNL TOTAL CA: CPT | Performed by: STUDENT IN AN ORGANIZED HEALTH CARE EDUCATION/TRAINING PROGRAM

## 2025-02-01 PROCEDURE — 84460 ALANINE AMINO (ALT) (SGPT): CPT | Performed by: STUDENT IN AN ORGANIZED HEALTH CARE EDUCATION/TRAINING PROGRAM

## 2025-02-01 PROCEDURE — 200N000002 HC R&B ICU UMMC

## 2025-02-01 PROCEDURE — 85610 PROTHROMBIN TIME: CPT | Performed by: STUDENT IN AN ORGANIZED HEALTH CARE EDUCATION/TRAINING PROGRAM

## 2025-02-01 PROCEDURE — 83051 HEMOGLOBIN PLASMA: CPT | Performed by: STUDENT IN AN ORGANIZED HEALTH CARE EDUCATION/TRAINING PROGRAM

## 2025-02-01 PROCEDURE — 250N000011 HC RX IP 250 OP 636: Performed by: STUDENT IN AN ORGANIZED HEALTH CARE EDUCATION/TRAINING PROGRAM

## 2025-02-01 PROCEDURE — 94640 AIRWAY INHALATION TREATMENT: CPT

## 2025-02-01 PROCEDURE — 85007 BL SMEAR W/DIFF WBC COUNT: CPT | Performed by: STUDENT IN AN ORGANIZED HEALTH CARE EDUCATION/TRAINING PROGRAM

## 2025-02-01 PROCEDURE — 250N000009 HC RX 250

## 2025-02-01 PROCEDURE — 999N000157 HC STATISTIC RCP TIME EA 10 MIN

## 2025-02-01 PROCEDURE — 85347 COAGULATION TIME ACTIVATED: CPT

## 2025-02-01 PROCEDURE — 250N000013 HC RX MED GY IP 250 OP 250 PS 637: Performed by: NURSE PRACTITIONER

## 2025-02-01 PROCEDURE — 82374 ASSAY BLOOD CARBON DIOXIDE: CPT | Performed by: STUDENT IN AN ORGANIZED HEALTH CARE EDUCATION/TRAINING PROGRAM

## 2025-02-01 PROCEDURE — 82550 ASSAY OF CK (CPK): CPT | Performed by: STUDENT IN AN ORGANIZED HEALTH CARE EDUCATION/TRAINING PROGRAM

## 2025-02-01 PROCEDURE — 250N000011 HC RX IP 250 OP 636: Performed by: PHYSICIAN ASSISTANT

## 2025-02-01 PROCEDURE — 84100 ASSAY OF PHOSPHORUS: CPT | Performed by: STUDENT IN AN ORGANIZED HEALTH CARE EDUCATION/TRAINING PROGRAM

## 2025-02-01 RX ORDER — CALCIUM GLUCONATE 20 MG/ML
2 INJECTION, SOLUTION INTRAVENOUS EVERY 8 HOURS PRN
Status: DISCONTINUED | OUTPATIENT
Start: 2025-02-01 | End: 2025-02-01

## 2025-02-01 RX ORDER — POTASSIUM CHLORIDE 29.8 MG/ML
20 INJECTION INTRAVENOUS EVERY 8 HOURS PRN
Status: DISCONTINUED | OUTPATIENT
Start: 2025-02-01 | End: 2025-02-01

## 2025-02-01 RX ORDER — ASPIRIN 81 MG/1
81 TABLET, CHEWABLE ORAL DAILY
Status: DISCONTINUED | OUTPATIENT
Start: 2025-02-01 | End: 2025-02-03

## 2025-02-01 RX ORDER — MAGNESIUM SULFATE HEPTAHYDRATE 40 MG/ML
2 INJECTION, SOLUTION INTRAVENOUS EVERY 8 HOURS PRN
Status: DISCONTINUED | OUTPATIENT
Start: 2025-02-01 | End: 2025-02-01

## 2025-02-01 RX ORDER — CALCIUM CHLORIDE, MAGNESIUM CHLORIDE, SODIUM CHLORIDE, SODIUM BICARBONATE, POTASSIUM CHLORIDE AND SODIUM PHOSPHATE DIBASIC DIHYDRATE 3.68; 3.05; 6.34; 3.09; .314; .187 G/L; G/L; G/L; G/L; G/L; G/L
12.5 INJECTION INTRAVENOUS CONTINUOUS
Status: DISCONTINUED | OUTPATIENT
Start: 2025-02-01 | End: 2025-02-01

## 2025-02-01 RX ORDER — CALCIUM CHLORIDE, MAGNESIUM CHLORIDE, SODIUM CHLORIDE, SODIUM BICARBONATE, POTASSIUM CHLORIDE AND SODIUM PHOSPHATE DIBASIC DIHYDRATE 3.68; 3.05; 6.34; 3.09; .314; .187 G/L; G/L; G/L; G/L; G/L; G/L
INJECTION INTRAVENOUS CONTINUOUS
Status: DISCONTINUED | OUTPATIENT
Start: 2025-02-01 | End: 2025-02-01

## 2025-02-01 RX ORDER — FUROSEMIDE 10 MG/ML
60 INJECTION INTRAMUSCULAR; INTRAVENOUS ONCE
Status: COMPLETED | OUTPATIENT
Start: 2025-02-01 | End: 2025-02-01

## 2025-02-01 RX ADMIN — Medication 10 MG: at 22:08

## 2025-02-01 RX ADMIN — Medication 10 MG: at 14:30

## 2025-02-01 RX ADMIN — Medication 60 ML: at 17:11

## 2025-02-01 RX ADMIN — Medication 10 MG: at 08:30

## 2025-02-01 RX ADMIN — OSELTAMIVIR PHOSPHATE 30 MG: 6 POWDER, FOR SUSPENSION ORAL at 08:10

## 2025-02-01 RX ADMIN — INSULIN HUMAN 9 UNITS/HR: 1 INJECTION, SOLUTION INTRAVENOUS at 10:52

## 2025-02-01 RX ADMIN — LINEZOLID 600 MG: 600 INJECTION, SOLUTION INTRAVENOUS at 02:14

## 2025-02-01 RX ADMIN — Medication 10 MG: at 08:50

## 2025-02-01 RX ADMIN — PROPOFOL 30 MCG/KG/MIN: 10 INJECTION, EMULSION INTRAVENOUS at 21:02

## 2025-02-01 RX ADMIN — Medication 15 ML: at 08:10

## 2025-02-01 RX ADMIN — PROPOFOL 55 MCG/KG/MIN: 10 INJECTION, EMULSION INTRAVENOUS at 08:44

## 2025-02-01 RX ADMIN — IPRATROPIUM BROMIDE AND ALBUTEROL SULFATE 3 ML: .5; 3 SOLUTION RESPIRATORY (INHALATION) at 08:14

## 2025-02-01 RX ADMIN — INSULIN HUMAN 9 UNITS/HR: 1 INJECTION, SOLUTION INTRAVENOUS at 02:13

## 2025-02-01 RX ADMIN — FUROSEMIDE 5 MG/HR: 10 INJECTION, SOLUTION INTRAVENOUS at 10:44

## 2025-02-01 RX ADMIN — HEPARIN SODIUM 1300 UNITS/HR: 10000 INJECTION, SOLUTION INTRAVENOUS at 08:09

## 2025-02-01 RX ADMIN — PROPOFOL 65 MCG/KG/MIN: 10 INJECTION, EMULSION INTRAVENOUS at 03:30

## 2025-02-01 RX ADMIN — MIDAZOLAM 4 MG: 1 INJECTION INTRAMUSCULAR; INTRAVENOUS at 10:18

## 2025-02-01 RX ADMIN — IPRATROPIUM BROMIDE AND ALBUTEROL SULFATE 3 ML: .5; 3 SOLUTION RESPIRATORY (INHALATION) at 12:05

## 2025-02-01 RX ADMIN — CHLORHEXIDINE GLUCONATE 15 ML: 1.2 SOLUTION ORAL at 20:07

## 2025-02-01 RX ADMIN — IPRATROPIUM BROMIDE AND ALBUTEROL SULFATE 3 ML: .5; 3 SOLUTION RESPIRATORY (INHALATION) at 04:47

## 2025-02-01 RX ADMIN — PROPOFOL 30 MCG/KG/MIN: 10 INJECTION, EMULSION INTRAVENOUS at 14:59

## 2025-02-01 RX ADMIN — MIDAZOLAM 4 MG: 1 INJECTION INTRAMUSCULAR; INTRAVENOUS at 00:01

## 2025-02-01 RX ADMIN — Medication 40 MG: at 08:10

## 2025-02-01 RX ADMIN — IPRATROPIUM BROMIDE AND ALBUTEROL SULFATE 3 ML: .5; 3 SOLUTION RESPIRATORY (INHALATION) at 21:23

## 2025-02-01 RX ADMIN — LINEZOLID 600 MG: 600 INJECTION, SOLUTION INTRAVENOUS at 14:59

## 2025-02-01 RX ADMIN — INSULIN HUMAN 11 UNITS/HR: 1 INJECTION, SOLUTION INTRAVENOUS at 20:07

## 2025-02-01 RX ADMIN — DEXAMETHASONE SODIUM PHOSPHATE 20 MG: 10 INJECTION, SOLUTION INTRAMUSCULAR; INTRAVENOUS at 08:10

## 2025-02-01 RX ADMIN — PROPOFOL 65 MCG/KG/MIN: 10 INJECTION, EMULSION INTRAVENOUS at 06:12

## 2025-02-01 RX ADMIN — Medication 60 ML: at 20:07

## 2025-02-01 RX ADMIN — PROPOFOL 55 MCG/KG/MIN: 10 INJECTION, EMULSION INTRAVENOUS at 10:52

## 2025-02-01 RX ADMIN — CHLORHEXIDINE GLUCONATE 15 ML: 1.2 SOLUTION ORAL at 08:10

## 2025-02-01 RX ADMIN — PROPOFOL 65 MCG/KG/MIN: 10 INJECTION, EMULSION INTRAVENOUS at 01:04

## 2025-02-01 RX ADMIN — IPRATROPIUM BROMIDE AND ALBUTEROL SULFATE 3 ML: .5; 3 SOLUTION RESPIRATORY (INHALATION) at 16:41

## 2025-02-01 RX ADMIN — DEXTROMETHORPHAN POLISTIREX 15 MG: 30 SUSPENSION ORAL at 08:50

## 2025-02-01 RX ADMIN — Medication 60 ML: at 12:25

## 2025-02-01 RX ADMIN — Medication 10 MG: at 14:55

## 2025-02-01 RX ADMIN — Medication 60 ML: at 08:11

## 2025-02-01 RX ADMIN — FUROSEMIDE 60 MG: 10 INJECTION, SOLUTION INTRAMUSCULAR; INTRAVENOUS at 10:01

## 2025-02-01 RX ADMIN — IPRATROPIUM BROMIDE AND ALBUTEROL SULFATE 3 ML: .5; 3 SOLUTION RESPIRATORY (INHALATION) at 00:20

## 2025-02-01 RX ADMIN — ASPIRIN 81 MG CHEWABLE TABLET 81 MG: 81 TABLET CHEWABLE at 10:01

## 2025-02-01 RX ADMIN — DEXTROMETHORPHAN POLISTIREX 15 MG: 30 SUSPENSION ORAL at 20:08

## 2025-02-01 ASSESSMENT — ACTIVITIES OF DAILY LIVING (ADL)
ADLS_ACUITY_SCORE: 50

## 2025-02-01 NOTE — PROGRESS NOTES
[facility]    ECLS Shift Summary:     ECMO Equipment:  Console Serial Number: [id number]  Circuit Lot Number: [id number]  Oxygenator Lot Number: [id number]     Circuit Assessment: Fibrin  Fibrin Location: connectors      Venous ECMO Cannula: 30 Fr Barker catheter in the Right Internal Jugular Vein      ECMO Cannula Venous Right internal jugular-Site Assessment: WDL  ECMO Cannula Venous Right internal jugular-Site Intervention: Dressing changed/new dressing    Patient remains on V-V ECMO, all equipment is functioning and alarms are appropriately set. RPM's: 3650 with Blood Flow (Circuit) LPM  Av LPM  Min: 4.95 LPM  Max: 5.09 LPM L/min. Sweep is at 6 LPM and 100 %. Extremities are warm.     Significant Shift Events: At start of shift cannula site was bleeding and oozing significantly. ACT goal was dropped to 140-160. Began Heparin titration. ACT was 170 when cannula dressing was changed. Bleeding significantly decreased if not stopped. Kept ACT at 170. Will discuss with day team ACT goal.    Vent settings:  FiO2 (%): 60 %, Resp: 16, Inspiratory Pressure (cm H2O) (Drager Merle): 25, Vent Mode: PCV Plus assist, Resp Rate (Set): 16 breaths/min, PEEP (cm H2O): 10 cmH2O, Resp Rate (Set): 16 breaths/min, PEEP (cm H2O): 10 cmH2O    Anticoagulation:  Dose (units/hr) HEParin: 1300 Units/hr  Rate (mL/hr) HEParin: 13 mL/hr  Concentration HEParin: 100 Units/mL        Most recent: ACT  (seconds): 170 seconds    Urine output is charted per RN. Yellow / Straw Colored.  Blood loss was minimal. Product given included none.     Intake/Output Summary (Last 24 hours) at 2025 0610  Last data filed at 2025 0500  Gross per 24 hour   Intake 4021.7 ml   Output 2910 ml   Net 1111.7 ml       Labs:  Recent Labs   Lab 25  0553 25  0342 25  0206 25  2353   PH 7.36 7.33* 7.35 7.36   PCO2 39 42 40 39   PO2 83 65* 78* 72*   HCO3 22 22 22 22   O2PER 60 60  100  100   60 60 60       Lab Results   Component Value Date    HGB 9.2 (L) 02/01/2025    PHGB 200 (H) 02/01/2025     02/01/2025    FIBR 325 02/01/2025    INR 1.20 (H) 02/01/2025    PTT 68 (H) 02/01/2025    DD 1.31 (H) 02/01/2025    ANTCH 90 01/31/2025       Plan is continue VV ECMO and make adjustments as needed.    Hemalatha Devi, RT  ECMO Specialist  2/1/2025 6:10 AM

## 2025-02-01 NOTE — PLAN OF CARE
"Neuro:  PERRL 2-3mm. RASS -5. No response to painful stimuli in extremities.     CV: SR 60s-70s, MAP >65. Pulses dopplerable.  VV ECMO 100%, 4.8L, 6L sweep  Resp: ETT 23 @ teeth. PC 60%, 25/10, rate 16. LS clear/diminished/wheezy.   GI: TF at goal 35ml per [place]. Rectal tube  : Portillo coude, adequate UO  Skin: urethral opening suspected injury noted, WOC assessed new consult placed.  Lines: LIJ 3L CVC  R radial art line  PIV x4  RIJ ECMO Cannulas.  Gtt:  Levo 0.03  Prop 65  Dilaudid 0.2mg/hr  Insulin Alg#4+6  Heparin 1300 (ACT goal 160-180)    Plan: Wean as able  For vital signs and complete assessments, please see documentation flowsheets.      Problem: Adult Inpatient Plan of Care  Goal: Plan of Care Review  Description: The Plan of Care Review/Shift note should be completed every shift.  The Outcome Evaluation is a brief statement about your assessment that the patient is improving, declining, or no change.  This information will be displayed automatically on your shift  note.  Outcome: Progressing  Goal: Patient-Specific Goal (Individualized)  Description: You can add care plan individualizations to a care plan. Examples of Individualization might be:  \"Parent requests to be called daily at 9am for status\", \"I have a hard time hearing out of my right ear\", or \"Do not touch me to wake me up as it startles  me\".  Outcome: Progressing  Goal: Absence of Hospital-Acquired Illness or Injury  Outcome: Progressing  Intervention: Identify and Manage Fall Risk  Recent Flowsheet Documentation  Taken 2/1/2025 0400 by Melissa Shaffer, RN  Safety Promotion/Fall Prevention:   clutter free environment maintained   increased rounding and observation   increase visualization of patient   lighting adjusted   room door open   room near nurse's station   room organization consistent   safety round/check completed  Taken 2/1/2025 0000 by Melissa Shaffer, RN  Safety Promotion/Fall Prevention:   clutter free environment " maintained   increased rounding and observation   increase visualization of patient   lighting adjusted   room door open   room near nurse's station   room organization consistent   safety round/check completed  Taken 1/31/2025 2000 by Melissa Shaffer RN  Safety Promotion/Fall Prevention:   clutter free environment maintained   increased rounding and observation   increase visualization of patient   lighting adjusted   room door open   room near nurse's station   room organization consistent   safety round/check completed  Intervention: Prevent Skin Injury  Recent Flowsheet Documentation  Taken 2/1/2025 0400 by Melissa Shaffer RN  Body Position:   supine, legs elevated   upper extremity elevated  Skin Protection:   incontinence pads utilized   silicone foam dressing in place   skin to device areas padded   transparent dressing maintained   tubing/devices free from skin contact  Taken 2/1/2025 0200 by Melissa Shaffer RN  Body Position:   turned   right   lower extremity elevated   upper extremity elevated  Taken 2/1/2025 0000 by Melissa Shaffer RN  Body Position:   supine, legs elevated   upper extremity elevated  Skin Protection:   incontinence pads utilized   silicone foam dressing in place   skin to device areas padded   transparent dressing maintained   tubing/devices free from skin contact  Taken 1/31/2025 2200 by Melissa Shaffer RN  Body Position:   turned   right   upper extremity elevated   lower extremity elevated  Taken 1/31/2025 2000 by Melissa Shaffer RN  Body Position:   supine, legs elevated   upper extremity elevated  Skin Protection:   incontinence pads utilized   silicone foam dressing in place   skin to device areas padded   transparent dressing maintained   tubing/devices free from skin contact  Intervention: Prevent and Manage VTE (Venous Thromboembolism) Risk  Recent Flowsheet Documentation  Taken 2/1/2025 0400 by Melissa Shaffer RN  VTE Prevention/Management: SCDs off  (sequential compression devices)  Taken 2/1/2025 0000 by Melissa Shaffer RN  VTE Prevention/Management: SCDs off (sequential compression devices)  Taken 1/31/2025 2000 by Melissa Shaffer RN  VTE Prevention/Management: SCDs off (sequential compression devices)  Intervention: Prevent Infection  Recent Flowsheet Documentation  Taken 2/1/2025 0400 by Melissa Shaffer RN  Infection Prevention:   equipment surfaces disinfected   hand hygiene promoted   personal protective equipment utilized   single patient room provided  Taken 2/1/2025 0000 by Melissa Shaffer RN  Infection Prevention:   equipment surfaces disinfected   hand hygiene promoted   personal protective equipment utilized   single patient room provided  Taken 1/31/2025 2000 by Melissa Shaffer RN  Infection Prevention:   equipment surfaces disinfected   hand hygiene promoted   personal protective equipment utilized   single patient room provided  Goal: Optimal Comfort and Wellbeing  Outcome: Progressing  Intervention: Monitor Pain and Promote Comfort  Recent Flowsheet Documentation  Taken 2/1/2025 0400 by Melissa Shaffer RN  Pain Management Interventions: medication (see MAR)  Taken 2/1/2025 0000 by Melissa Shaffer RN  Pain Management Interventions: medication (see MAR)  Taken 1/31/2025 2000 by Melissa Shaffer RN  Pain Management Interventions: medication (see MAR)  Intervention: Provide Person-Centered Care  Recent Flowsheet Documentation  Taken 2/1/2025 0400 by Melissa Shaffer RN  Trust Relationship/Rapport:   care explained   reassurance provided  Taken 2/1/2025 0000 by Melissa Shaffer RN  Trust Relationship/Rapport:   care explained   reassurance provided  Taken 1/31/2025 2000 by Melissa Shaffer RN  Trust Relationship/Rapport:   care explained   reassurance provided  Goal: Readiness for Transition of Care  Outcome: Progressing   Goal Outcome Evaluation:

## 2025-02-01 NOTE — PROGRESS NOTES
[facility]    ECLS Shift Summary:     ECMO Equipment:  Console Serial Number: [id number]  Circuit Lot Number: [id number]  Oxygenator Lot Number: [id number]    Circuit Assessment: Free of fibrin, clot, and air    Venous ECMO Cannula: 30 Fr [place] catheter in the Right Internal Jugular Vein    Patient remains on V-V ECMO, all equipment is functioning and alarms are appropriately set. RPM's: 3650 with Blood Flow (Circuit) LPM  Av.8 LPM  Min: 4.52 LPM  Max: 4.9 LPM L/min. Sweep is at 6 LPM and 100 %. Extremities are warm and perfused.     Significant Shift Events: None    Vent settings:  FiO2 (%): 60 %, Resp: 18, Inspiratory Pressure (cm H2O) (Drager Merle): 25, Vent Mode: PCV Plus assist, Resp Rate (Set): 16 breaths/min, PEEP (cm H2O): 10 cmH2O, Resp Rate (Set): 16 breaths/min, PEEP (cm H2O): 10 cmH2O    Anticoagulation:  Dose (units/hr) HEParin: 1500 Units/hr  Rate (mL/hr) HEParin: 15 mL/hr  Concentration HEParin: 100 Units/mL        Most recent: ACT  (seconds): 174 seconds    Urine output is 125-175 ml/hr. Yellow / Straw Colored.  Blood loss was oozing at RIJ cannula site. No product given.     Intake/Output Summary (Last 24 hours) at 2025 1818  Last data filed at 2025 1800  Gross per 24 hour   Intake 3733.53 ml   Output 3345 ml   Net 388.53 ml       Labs:  Recent Labs   Lab 25  1748 25  1541 25  1356 25  1152   PH 7.31* 7.31* 7.31* 7.33*   PCO2 45 45 46* 46*   PO2 66* 62* 59* 53*   HCO3 22 23 24 24   O2PER 60 60  60  100  100 60 60       Lab Results   Component Value Date    HGB 10.9 (L) 2025    PHGB 130 (H) 2025     2025    FIBR 390 2025    INR 1.15 2025    PTT 68 (H) 2025    DD 1.31 (H) 2025    ANTCH 90 2025       Plan is continue on VV ecmo support.    Pillo Murdock, RT  ECMO Specialist  2025 6:18 PM

## 2025-02-01 NOTE — PROGRESS NOTES
Venovenous (VV) ECMO Fellow Progress Note  2/1/2025    64 year old male who was started on ECMO due to severe respiratory failure from influenza A pneumonia with superimposed MRSA pneumonia.     Interval events: No significant desaturation o/n - briefly with a turn but quick recovery. Tolerating turns well with versed pushes. Deeply sedated this am.    The patients vital signs today are as follows:    Temp:  [97.7  F (36.5  C)-98.6  F (37  C)] 98.4  F (36.9  C)  Pulse:  [74-94] 83  Resp:  [0-38] 19  MAP:  [57 mmHg-82 mmHg] 70 mmHg  Arterial Line BP: (101-154)/(44-58) 123/53  FiO2 (%):  [60 %] 60 %  SpO2:  [62 %-96 %] 93 %    Intake/Output Summary (Last 24 hours) at 2/1/2025 0840  Last data filed at 2/1/2025 0800  Gross per 24 hour   Intake 3861.16 ml   Output 2910 ml   Net 951.16 ml    FiO2 (%): 60 %, Resp: 19, Inspiratory Pressure (cm H2O) (Drager Merle): 25, Vent Mode: PCV Plus assist, Resp Rate (Set): 16 breaths/min, PEEP (cm H2O): 10 cmH2O, Resp Rate (Set): 16 breaths/min, PEEP (cm H2O): 10 cmH2O   Recent Labs   Lab 02/01/25  0749 02/01/25  0553 02/01/25  0342 02/01/25  0206   PH 7.35 7.36 7.33* 7.35   PCO2 40 39 42 40   PO2 74* 83 65* 78*   HCO3 22 22 22 22   O2PER 60 60 60  100  100  60 60      Recent Labs   Lab 02/01/25  0342 01/31/25  2205 01/31/25  1541 01/31/25  1003   WBC 17.3* 18.0* 20.3* 37.4*   HGB 9.2* 10.0* 10.9* 11.3*     Creatinine   Date Value Ref Range Status   02/01/2025 4.22 (H) 0.67 - 1.17 mg/dL Final   01/31/2025 4.28 (H) 0.67 - 1.17 mg/dL Final   01/31/2025 4.30 (H) 0.67 - 1.17 mg/dL Final   01/31/2025 4.37 (H) 0.67 - 1.17 mg/dL Final   12/14/2020 1.13 0.70 - 1.30 mg/dL Final   07/15/2019 1.07 0.70 - 1.30 mg/dL Final   11/20/2018 0.98 0.70 - 1.30 mg/dL Final   04/29/2016 0.91 0.70 - 1.30 mg/dL      Physical Exam:   Cannula positioned unchanged externally.  Minimal oozing.  Decreased air entry bilaterally  Deeply sedated  Extremities edematous    ECMO Issues including assessments and plan  on DOS 2/1/2025:    Neuro: Sedated for mechanical ventilation and ECMO.  RASS -4. On propofol 70 - will wean today. Triglycerides slightly increased from yesterday but CK wnl/low. Will recheck tomorrow. NIRS unremarkable  RASS goal -1 to -2.   CV: Requiring levophed likely due to increased sedation.  Echo yesterday with difficulty demonstrating cannula outflow but appears to have laminar flow directed towards tricuspid valve implying cannula position is adequate.   Pulm: Severe respiratory failure requiring ECMO and mechanical ventilation. Flows increased to 4.8 this am due to desaturation. Keep vent settings at rest settings: PC 25, PEEP10, FiO2 60%. Desaturations and decreased flows with coughing - will attempt to suppress. Continue decadron for ARDS.  FEN/Renal: Creatinine/BUN increased, making adequate urine. Will attempt diuresis today - nephrology consulted as may need CRRT in order to facilitate volume removal. Will start lasix gtt 10 /hr and 60 mg bolus, target 200-300 urine/hr.  Heme: Hemoglobin stable, no signs bleeding. Heparin gtt decreased to goal -180 yesterday due to concern of bleeding around cannula site. Stable this am, will increase to 160-180.  Plasma free Hgb increased yesterday - lipemic sample today, will recheck once propofol decreased.   Goals: if O2 sat >85% Hgb may drift to 7-8.  If O2 Sat <85% keep Hgb 10-12.  ID: Switched to linezolid yesterday for better alveolor penetration for MRSA pneumonia due to increased leukocytosis and desaturations yesterday. WBC decreased today. Continue oseltamivir for influenza A pneumonia.  GI: Tfs at goal. PPI.   Endo: insulin gtt.    All pertinent labs, imaging studies, physical exam and medications have been reviewed by me.   This patient requires continued ICU monitoring and cares.  I apprecitate the input of the SICU team for these issues.  I have discussed patient care and treatment plan with the SICU team and will discuss with the patient's  family.         Diaz Roberts  [facility] Fellow  Pager [phone]

## 2025-02-01 NOTE — PROGRESS NOTES
ICU Daily Rounding Checklist     Checklist Response Notes   Can sedation be reduced?  Yes    Can analgesia be reduced? No    Is delirium being assessed, addressed and prevented? Yes    Spontaneous awakening trial and/or Spontaneous breathing trial candidate?  No    Total fluid balance goal reviewed?  Yes Target Goals:        200-300 ml/Hr, starting lasix drip   Is the patient at goals for lung protective ventilation? Yes    Head of bed elevation (30 degrees)? Yes    Skin breakdown assessment (prevention) completed? Yes    Is enteral nutrition at goal? Yes    Is blood glucose at goal? Yes    Deep venous thrombosis prophylaxis? Yes      Gastric ulcer prophylaxis?  Yes If coagulopathy (INR-1.5 PTT2x normal. Ph<50k), mechanical ventilation 48hr, history of GI bleed/ulcer within past year. TBL, SCI, or burn, or if >= 2 minor risk factors (sepsis, ICU stay 1 week, occult GI bleed > 6 days. glucocorticoid therapy, NSAID use, antiplatelet use)   Can Antibiotics be narrowed or discontinued? No    Early mobility candidate and physical therapy consulted? No    Is nuenz catheter needed? Yes    Is central venous/arterial catheter needed? Yes    Has the family been updated? No    Are the patient's goals of care and code status current? Yes

## 2025-02-01 NOTE — PROGRESS NOTES
SURGICAL ICU PROGRESS NOTE    ASSESSMENT:   Jesus Varghese is a 64 year old male transferred on 1/28 from outside hospital for evaluation for ECMO cannulation.  In brief this is a 64-year-old male with past medical history of hypertension, colon cancer, chronic lymphocytic leukemia, psoriasis, and type 2 diabetes who presented to outside hospital with acute influenza A infection and ARDS. Patient did not tolerate being supinated. He was cannulated for VV ECMO 01/30/25.    Overnight from 1/31 into 2/1 he had bleeding from his cannula sites. His ACT range was increased from 160 to 180    TODAY  - starting lasix drip, targeting 200-300mL/Hr UOP  - resuming ASA    Plan:  Neuro   # Acute Pain  - Fentanyl gtt to dilaudid gtt (fentanyl adheres to circuit)  - PRN Acetaminophen    # Sedation  - continue propofol gtt, wean to as low as possible  - Versed 4 mg q2hr  - RASS -2 to -3     Pulmonary:  #ARDS  # Acute Hypercarbic and Hypoxic Respiratory Failure, secondary to Influenza A and superimposed MRSA Pneumonia    FiO2 (%): 60 %, Resp: 19, Inspiratory Pressure (cm H2O) (Drager Merle): 25, Vent Mode: PCV Plus assist, Resp Rate (Set): 16 breaths/min, PEEP (cm H2O): 10 cmH2O, Resp Rate (Set): 16 breaths/min, PEEP (cm H2O): 10 cmH2O    - Cannulated for VV ECMO 01/30. Right internal jugular to Right internal jugular   - Duonebs q4hr  - dexamethasone 20mg x5 days then 10 mg x 5 days  - Bronchoscopy 1/30 with minimal secretions.     # VV ECMO  Mode: V-V (2/1/2025  4:00 AM)  Blood Flow (Circuit) LPM: 4.95 LPM (2/1/2025  4:00 AM)  RPM's: 3650 (2/1/2025  4:00 AM)  Sweep LPM: 6 LPM (2/1/2025  4:00 AM)  Sweep FiO2   %: 100 % (2/1/2025  4:00 AM)  SvO2  %: 86.4 % (2/1/2025  4:00 AM)  HgB: 9.4 (2/1/2025  4:00 AM)  ACT  (seconds): 170 seconds (2/1/2025  4:00 AM)  - Cannulated for VV ECMO 01/30. Right internal jugular to Right internal jugular   - Monitoring pulses, dopplerable.  - ACT goal 160-180     Cardiovascular: EF 60-65%, no RV  dysfunction (01/27)  # Essential Hypertension  # Hypotension  - Hypotension in setting of sedation.   - MAP Goal >65.   - NE gtt, wean as tolerated.  - Echo (01/31): hyperkinetic LV function, EF>70%, ECMO cannulae not well visualized, but there is significant flow towards tricuspid valve.  -PTA ASA     GI/Nutrition:  - RD consulted. NJ in place with TF at goal.   - Bowel Regimen: Miralax BID and Senna BID.     # Elevated Triglycerides  - Will continue to monitor with daily CK and TG.    Renal/ Fluid Balance:  #DEEP   - Baseline 1.0-1.10.   - Currently 4.22  - Nephrology consulted, appreciate reccs 1/31  Continue aggressive diuresis - can increase Lasix to 120mg and give TID for desired UOP. Can also start Lasix gtt.   Metolazone can be added to increase UOP if desired      No indication for urgent initiation of RRT  - targeting UOP of 200-300/hr UOP  - Monitor urinary output, maintain nunez  - ICU electrolyte replacement protocol     Endocrine:  # History of type 2 diabetes  # Stress and Steroid induced Hyperglycemia   - Insulin gtt  - Lantus 45 BID, monitor closely when and titrate down if needed when steroids reduced.   - Goal BG <180     Infectious Disease:  # Sepsis  # Influenza A  # MRSA Pneumonia  - Tamiflu f( 1/26 - Current) will plan on 10-day course  - Continue Vanc (01/27 - 1/30, switched to lineazolid (1/31 - current  - Clindamycin    Cultures  - MRSA swab (01/27) positive  - Sputum (01/26) 3+ MRSA  - Blood (01/26) NGTD    Hematology:  # Acute Anemia of critical illness  - Continue to monitor.     # Chronic Lymphocytic Leukemia  - s/p 16 cycles of Acalabrutinib completed in December 2023 and now in remission.    # MSK/Skin  - PT/OT eval and treatment    # Penile Pressure Wound  - WOC consult    Prophylaxis:    - DVT: Heparin gtt (ACT goals)  - GI: PPI    Lines/ tubes/ drains:  - ETT  - ECMO RIJ  - Left internal jugular CVC  - R radial arterial line  - NJ  - nunez  - Rectal pouch    Code status: Full code.  "   - spoke with spouse 01/31. She reports if he was not able to recover on ECMO. She does not believe he would be okay with receiving a lung transplant and that this would be the max amount of supportive measure that would be acceptable to him.     Disposition: VALE Flores \"Huey\" Naman OBRIEN  General Surgery PGY-2    ====================================    OBJECTIVE:   1. VITAL SIGNS:   Temp:  [97.7  F (36.5  C)-98.6  F (37  C)] 98.2  F (36.8  C)  Pulse:  [74-94] 86  Resp:  [0-38] 19  MAP:  [43 mmHg-82 mmHg] 70 mmHg  Arterial Line BP: ()/(35-58) 123/53  FiO2 (%):  [60 %-100 %] 60 %  SpO2:  [62 %-96 %] 94 %  FiO2 (%): 60 %, Resp: 19, Inspiratory Pressure (cm H2O) (Drager Merle): 25, Vent Mode: PCV Plus assist, Resp Rate (Set): 16 breaths/min, PEEP (cm H2O): 10 cmH2O, Resp Rate (Set): 16 breaths/min, PEEP (cm H2O): 10 cmH2O    2. INTAKE/ OUTPUT:   I/O last 3 completed shifts:  In: 3895.18 [I.V.:2315.18; Other:350; NG/GT:390]  Out: 3270 [Urine:2920; Stool:350]    3. PHYSICAL EXAMINATION:   GEN: sedated, intubated  HEENT:  Normocephalic, atraumatic, ETT secure  CV: RRR  PULM/CHEST: intubated  GI: soft, minimally distended  : nunez catheter in place, urine yellow and clear  EXTREMITIES: 1+ peripheral edema, peripheral pulses 2+  NEURO: Sedated  SKIN: No rashes, sores or ulcerations appreciated     4. INVESTIGATIONS:   Arterial Blood Gases   Recent Labs   Lab 02/01/25  0553 02/01/25  0342 02/01/25  0206 01/31/25  2353   PH 7.36 7.33* 7.35 7.36   PCO2 39 42 40 39   PO2 83 65* 78* 72*   HCO3 22 22 22 22     Complete Blood Count   Recent Labs   Lab 02/01/25  0342 01/31/25  2205 01/31/25  1541 01/31/25  1003   WBC 17.3* 18.0* 20.3* 37.4*   HGB 9.2* 10.0* 10.9* 11.3*    251 266 347     Basic Metabolic Panel  Recent Labs   Lab 02/01/25  0553 02/01/25  0346 02/01/25  0342 02/01/25  0205 01/31/25  2352 01/31/25 2205 01/31/25  1547 01/31/25  1541 01/31/25  1009 01/31/25  1003   NA  --   --  143  --   --  144  -- "  142  --  143   POTASSIUM  --   --  4.3  --   --  4.2  --  4.4  --  4.3   CHLORIDE  --   --  106  --   --  105  --  104  --  104   CO2  --   --  20*  --   --  20*  --  20*  --  22   BUN  --   --  149.0*  --   --  147.0*  --  136.0*  --  116.0*   CR  --   --  4.22*  --   --  4.28*  --  4.30*  --  4.37*   * 184* 197* 150*   < > 167*   < > 187*   < > 143*    < > = values in this interval not displayed.     Liver Function Tests  Recent Labs   Lab 02/01/25 0342 01/31/25 2205 01/31/25  1541 01/31/25  1003 01/31/25  0401 01/30/25  0954 01/30/25 0341 01/29/25  2154 01/29/25  1108 01/28/25  1342 01/28/25  0533 01/27/25  1600 01/27/25  0544   AST  --   --   --   --   --   --   --   --  76*  --  74* 98* 108*   ALT 61  --   --   --  59  --  57  --  65  --  59 66 76*   ALKPHOS  --   --   --   --   --   --   --   --  76  --  79 92 85   BILITOTAL  --   --   --   --   --   --   --   --  0.6  --  0.4 0.5 0.5   ALBUMIN 2.8*  --   --   --  3.1*  --  2.8*  --  3.0*  --  3.1* 3.3* 3.4*   INR 1.20* 1.17* 1.15 1.07 1.15   < > 1.10   < >  --    < >  --   --   --     < > = values in this interval not displayed.     Pancreatic Enzymes  No lab results found in last 7 days.  Coagulation Profile  Recent Labs   Lab 02/01/25 0342 01/31/25 2205 01/31/25  1541 01/31/25  1003   INR 1.20* 1.17* 1.15 1.07   PTT 68* 75* 68* 58*         5. RADIOLOGY:   Recent Results (from the past 24 hours)   XR Chest Port 1 View    Narrative    EXAM: XR CHEST PORT 1 VIEW  1/31/2025 8:55 AM     HISTORY:  hypoxia, hypotension. interval change       COMPARISON:  Same-day chest radiograph at 0326 hours    FINDINGS:     Portable supine view of the chest. Stable ECMO cannula. Enteric tube  extends below the field of view. Endotracheal tube tip projects 4.6 cm  from the maggy. Right upper quadrant surgical clips. Left internal  jugular central venous catheter tip in the mid SVC.    Trachea is midline. Cardiomediastinal silhouette is obscured.  Atherosclerotic  calcifications of the aortic arch. Markedly increase  of bilateral pulmonary opacities with near complete silhouetting of  both hemidiaphragms and the cardiac silhouette. Dense retrocardiac  opacity. Faint aeration of the right lower lung and left upper lung in  the setting of markedly low lung volumes. Possible layering effusion,  though limited in supine positioning. No significant pneumothorax.      Impression    IMPRESSION:  1.  Marked increase in bilateral pulmonary opacity/consolidation with  minimal aeration of left upper and right lower lungs.   2.  Endotracheal tube tip projects over the upper thoracic trachea,  stable in position from most recent film. Remainder support devices  are stable.    I have personally reviewed the examination and initial interpretation  and I agree with the findings.    MELANIE MURRAY,          SYSTEM ID:  [id number]   Echo Limited   Result Value    LVEF  70%    Narrative    [id number]  [id number]  [id number]  [id number]^FRANCIA^NIGEL     [facility],[place]  Echocardiography Laboratory  [address] [place] [id number]     Name: ROSALINA MEZA  MRN: [id number]  : 1960  Study Date: 2025 07:45 AM  Age: 64 yrs  Gender: Male  Patient Location: [facility]  Reason For Study: Respiratory Failure  Ordering Physician: NIGEL FELDMAN  Performed By: Nadira Zhu     BSA: 2.1 m2  Height: 66 in  Weight: 216 lb  HR: 80  BP: 107/64 mmHg  ______________________________________________________________________________  Procedure  Limited Echocardiogram with two-dimensional, color and spectral Doppler.  ______________________________________________________________________________  Interpretation Summary  On V-V ECMO at 4.5LPM.  Global and regional left ventricular function is hyperkinetic with an EF >70%.  Global right ventricular function is normal.  ECMO canula not well visualized. There is significant floew towards the  tricuspis  valve.  ______________________________________________________________________________  Left Ventricle  On V-V ECMO at 4.5LPM. Global and regional left ventricular function is  hyperkinetic with an EF >70%.     Right Ventricle  Global right ventricular function is normal. Mild right ventricular dilation  is present.     Atria  ECMO canula not well visualized. There is significant floew towards the  tricuspis valve.     Pericardium  No pericardial effusion is present.     ______________________________________________________________________________  Doppler Measurements & Calculations  RV S Bobby: 22.2 cm/sec     ______________________________________________________________________________  Report approved by: YOHANNES Gaitan MD on 01/31/2025 10:27 AM         XR Chest Port 1 View    Impression    RESIDENT PRELIMINARY INTERPRETATION  Impression:   1.  Decreased diffuse bilateral patchy opacities with improved  aeration of the left upper and right lower lobes.  2.  Stable position of support devices.  3.  Probable trace bilateral pleural effusions.       =========================================

## 2025-02-01 NOTE — PLAN OF CARE
Goal Outcome Evaluation:  Major Shift Events: Sedated, PERRL pupils 2mm. SR, normotensive to hypotensive with norepi infusing to maintain MAP goals >65, afebrile. PCP+ 60% 25/16/10 with TV ranging 's. LS diminished and wheezy. Desaturation with repositioning and without intervention. SPO2 60-91% today. ECMO circuit without and chugging. Slight bleeding at site, heparin gtt increased today to 1500 units/hr. RPM 3650, 4.8 LPM & 6 sweep @ 100 FiO2, PO2 47-66 today. TF @ goal via NJ, rectal tube with 100 ml output. Portillo with  ml/hr output yellow, 80 mg IV lasix given today. L internal jugular infusing norepi (0.02-0.12), insulin (A4+5) and TKO for abx. Bilat PIVs infusing propofol @ 75 & Dilaudid @ 0.2. New right radial art line drsg, pulsatile flow. Chest XR and ECHO today. Pt family at bedside briefly today.    Plan: Continue per POC  For vital signs and complete assessments, please see documentation flowsheets.

## 2025-02-01 NOTE — PROGRESS NOTES
"  Nephrology Progress Note  02/01/2025           MEDICAL DECISION MAKING     RECOMMENDATIONS:  Continue aggressive diuresis - can increase Lasix to 120mg and give TID for desired UOP. Can also start Lasix gtt.   Metolazone can be added to increase UOP if desired     ASSESSMENT:  Jesus Varghese is a 64 year old w HTN, T2DN, CLL, transferred from OSH for further care of his ARDS in the setting of influenza A, secondary bacterial PNA.   Nephrology consulted for DEEP     DEEP  sCr on admit 2.55.  Peak sCr 4.12. Baseline sCr 1.0  UA: albumin, 26 WBC, 16 RBC, squamous cells. UPCR 0.21 g/g.  Renal US w/o hydro, multiple b/lk cytes (known prior)  Likely 2/2 ATN in setting of ARDS and prior septic shock  Consented for RRT on 1/30    Azotemia  Concern for uremic platelet dysfunction  Patient had \"cannula site was bleeding and oozing significantly.\" Patient is on heparin while on ECMO so that could be the cause. He has a rising BUN (a surrogate marker for toxins which can cause platelet dysfunction), since he is sedated we cannot assess for other uremic symptoms. Uremic platelet dysfunction may be playing a role in his bleeding, but unclear at this time      Recommendations were communicated to primary team via note & verbal      Seen and discussed with Dr. Dom Rick MD   Division of Renal Disease and Hypertension  [facility]  myairmail  Vocera Web Console    SUBJECTIVE     Interval History :   Nursing and provider notes from last 24 hours reviewed.  In the last 24 hours Jesus Varghese   Remains on ECMO. 2.9L UOP yestday    A comprehensive review of systems was negative except as noted above.    OBJECTIVE     Physical Exam:   I/O last 3 completed shifts:  In: 4206.25 [I.V.:2326.25; Other:650; NG/GT:390]  Out: 3085 [Urine:2685; Stool:400]   /64 (BP Location: Left arm)   Pulse 88   Temp 98.1  F (36.7  C)   Resp 16   Wt 97.5 kg (214 lb 15.2 oz)   SpO2 95%   BMI 34.17 kg/m       General: supine   HEENT: " ETT in place   Cardiac: rrr  Abdominal: nondistended  Extremities: no LE edema     Labs:   All labs reviewed by me  Electrolytes/Renal -   Recent Labs   Lab Test 02/01/25  1013 02/01/25  1010 02/01/25  0751 02/01/25 0346 02/01/25 0342 01/31/25  2352 01/31/25  2205 01/31/25  0414 01/31/25  0401 01/30/25  0352 01/30/25 0341   NA  --  144  --   --  143  --  144   < > 142   < > 137   POTASSIUM  --  4.2  --   --  4.3  --  4.2   < > 4.1   < > 4.2   CHLORIDE  --  107  --   --  106  --  105   < > 105   < > 101   CO2  --  20*  --   --  20*  --  20*   < > 21*   < > 24   BUN  --  158.0*  --   --  149.0*  --  147.0*   < > 114.0*   < > 70.5*   CR  --  4.19*  --   --  4.22*  --  4.28*   < > 4.13*   < > 3.82*   * 151* 127*   < > 197*   < > 167*   < > 142*   < > 196*   LILIAN  --  8.9  --   --  8.6*  --  9.5   < > 9.3   < > 8.4*   MAG  --   --   --   --  3.1*  --   --   --  3.3*  --  3.0*   PHOS  --   --   --   --  7.0*  --   --   --  6.1*  --  5.3*    < > = values in this interval not displayed.       CBC -   Recent Labs   Lab Test 02/01/25  1010 02/01/25 0342 01/31/25 2205   WBC 15.5* 17.3* 18.0*   HGB 9.5* 9.2* 10.0*    235 251       LFTs -   Recent Labs   Lab Test 02/01/25  0342 01/31/25  0401 01/30/25  0341 01/29/25  1108 01/28/25  0533 01/27/25  1600 08/21/18  1342 02/09/17  1642   ALKPHOS  --   --   --  76 79 92   < > 41   BILITOTAL  --   --   --  0.6 0.4 0.5   < > 0.5   BILIDIRECT  --   --   --   --   --   --   --  0.09   ALT 61 59 57 65 59 66   < >  --    AST  --   --   --  76* 74* 98*   < >  --    PROTTOTAL  --   --   --  5.6* 5.8* 6.2*   < > 6.9   ALBUMIN 2.8* 3.1* 2.8* 3.0* 3.1* 3.3*   < > 4.3    < > = values in this interval not displayed.       Iron Panel - No lab results found.    Current Medications:  Current Facility-Administered Medications   Medication Dose Route Frequency Provider Last Rate Last Admin    aspirin (ASA) chewable tablet 81 mg  81 mg Per Feeding Tube Daily Anel Smith,  APRN CNP   81 mg at 02/01/25 1001    chlorhexidine (PERIDEX) 0.12 % solution 15 mL  15 mL Mouth/Throat Q12H Mark Florence MD   15 mL at 02/01/25 0810    [START ON 2/2/2025] dexAMETHasone PF (DECADRON) injection 10 mg  10 mg Intravenous Q24H Mark Florence MD        sodium chloride 0.9% DIALYSIS Cath LOCK - RED Lumen  10 mL Intracatheter Once in dialysis/CRRT Rafa Rick MD        Followed by    heparin 1000 unit/mL DIALYSIS Cath LOCK - RED Lumen  1.3-2.6 mL Intracatheter Once in dialysis/CRRT Rafa Rick MD        sodium chloride 0.9% DIALYSIS Cath LOCK - BLUE Lumen  10 mL Intracatheter Once in dialysis/CRRT Rafa Rick MD        Followed by    heparin 1000 unit/mL DIALYSIS Cath LOCK -BLUE Lumen  1.3-2.6 mL Intracatheter Once in dialysis/CRRT Rafa Rick MD        insulin glargine (LANTUS PEN) injection 45 Units  45 Units Subcutaneous BID Mark Flornece MD   45 Units at 02/01/25 0810    ipratropium - albuterol 0.5 mg/2.5 mg/3 mL (DUONEB) neb solution 3 mL  3 mL Nebulization Q4H Willie Wilkinson PA-C   3 mL at 02/01/25 0814    linezolid (ZYVOX) infusion 600 mg  600 mg Intravenous Q12H Nawaf Ghotra PA-C   600 mg at 02/01/25 0214    multivitamins w/minerals liquid 15 mL  15 mL Per Feeding Tube Daily Mark Florence MD   15 mL at 02/01/25 0810    oseltamivir (TAMIFLU) suspension 30 mg  30 mg Oral or Feeding Tube Daily Alvaro Hutson MD   30 mg at 02/01/25 0810    pantoprazole (PROTONIX) 2 mg/mL suspension 40 mg  40 mg Oral or Feeding Tube QAM AC Nawaf Ghotra PA-C   40 mg at 02/01/25 0810    polyethylene glycol (MIRALAX) Packet 17 g  17 g Oral or Feeding Tube Daily Alvaro Hutson MD   17 g at 01/30/25 0756    Prosource TF20 ENfit Compatibl EN LIQD (PROSOURCE TF20) packet 60 mL  1 packet Per Feeding Tube 4x Daily Mark Florence MD   60 mL at 02/01/25 0811    senna-docusate (SENOKOT-S/PERICOLACE) 8.6-50 MG per tablet 1 tablet  1 tablet Oral or  Feeding Tube BID Alvaro Hutson MD   1 tablet at 01/29/25 2002    Or    senna-docusate (SENOKOT-S/PERICOLACE) 8.6-50 MG per tablet 2 tablet  2 tablet Oral or Feeding Tube BID Alvaro Hutson MD   2 tablet at 01/30/25 0756     Current Facility-Administered Medications   Medication Dose Route Frequency Provider Last Rate Last Admin    dextrose 10% infusion   Intravenous Continuous PRN Willie Wilkinson PA-C        dextrose 10% infusion   Intravenous Continuous PRN Mark Florence MD        dialysate for CVVHD & CVVHDF (Phoxillum BK4/2.5)  12.5 mL/kg/hr CRRT Continuous Rafa Rick MD        furosemide (LASIX) 500 mg/50mL infusion ADULT MAX CONC  5-15 mg/hr Intravenous Continuous Aenl Smith APRN CNP 0.5 mL/hr at 02/01/25 1044 5 mg/hr at 02/01/25 1044    heparin (porcine) 100 unit/mL in 0.45% Sodium Chloride ANTICOAGULANT infusion  10-50 Units/kg/hr (Ideal) Other Continuous Alvaro Hutson MD 13 mL/hr at 02/01/25 1000 1,300 Units/hr at 02/01/25 1000    HYDROmorphone (DILAUDID) 0.2 mg/mL infusion ADULT/PEDS GREATER than or EQUAL to 20 kg  0.1-0.3 mg/hr Intravenous Continuous Nawaf Ghotra PA-C 1 mL/hr at 02/01/25 1000 0.2 mg/hr at 02/01/25 1000    insulin regular (MYXREDLIN) 1 unit/mL infusion  0-24 Units/hr Intravenous Continuous Willie Wilkinson PA-C 9 mL/hr at 02/01/25 1052 9 Units/hr at 02/01/25 1052    propofol (DIPRIVAN) infusion  5-75 mcg/kg/min Intravenous Continuous Mark Florence MD 31.7 mL/hr at 02/01/25 1052 55 mcg/kg/min at 02/01/25 1052    And    Medication Instruction   Does not apply Continuous PRN Mark Florence MD        No heparin required   Does not apply Continuous PRN Rafa Rick MD        norepinephrine (LEVOPHED) 16 mg in  mL infusion MAX CONC CENTRAL LINE  0.01-0.6 mcg/kg/min (Dosing Weight) Intravenous Continuous Mark Florence MD   Stopped at 02/01/25 1045    POST-filter replacement solution for CVVHD &  CVVHDF (Phoxillum BK4/2.5)   CRRT Continuous Rafa Rick MD        PRE-filter replacement solution for CVVHD & CVVHDF (Phoxillum BK4/2.5)  12.5 mL/kg/hr CRRT Continuous Rafa Rick MD Samuel Weinberg, MD

## 2025-02-02 LAB
ABO + RH BLD: NORMAL
ACT BLD: 145 SECONDS (ref 74–150)
ACT BLD: 150 SECONDS (ref 74–150)
ACT BLD: 162 SECONDS (ref 74–150)
ACT BLD: 162 SECONDS (ref 74–150)
ACT BLD: 166 SECONDS (ref 74–150)
ACT BLD: 166 SECONDS (ref 74–150)
ACT BLD: 170 SECONDS (ref 74–150)
ACT BLD: 174 SECONDS (ref 74–150)
ALBUMIN SERPL BCG-MCNC: 2.9 G/DL (ref 3.5–5.2)
ALBUMIN SERPL BCG-MCNC: 3 G/DL (ref 3.5–5.2)
ALBUMIN SERPL BCG-MCNC: 3.1 G/DL (ref 3.5–5.2)
ALLEN'S TEST: ABNORMAL
ALT SERPL W P-5'-P-CCNC: 75 U/L (ref 0–70)
ANION GAP SERPL CALCULATED.3IONS-SCNC: 16 MMOL/L (ref 7–15)
ANION GAP SERPL CALCULATED.3IONS-SCNC: 17 MMOL/L (ref 7–15)
ANION GAP SERPL CALCULATED.3IONS-SCNC: 18 MMOL/L (ref 7–15)
ANION GAP SERPL CALCULATED.3IONS-SCNC: 22 MMOL/L (ref 7–15)
APTT PPP: 57 SECONDS (ref 22–38)
APTT PPP: 64 SECONDS (ref 22–38)
APTT PPP: 68 SECONDS (ref 22–38)
APTT PPP: 77 SECONDS (ref 22–38)
AT III ACT/NOR PPP CHRO: 90 % (ref 85–135)
BASE EXCESS BLDA CALC-SCNC: -1.5 MMOL/L (ref -3–3)
BASE EXCESS BLDA CALC-SCNC: -1.6 MMOL/L (ref -3–3)
BASE EXCESS BLDA CALC-SCNC: -1.8 MMOL/L (ref -3–3)
BASE EXCESS BLDA CALC-SCNC: -1.9 MMOL/L (ref -3–3)
BASE EXCESS BLDA CALC-SCNC: -1.9 MMOL/L (ref -3–3)
BASE EXCESS BLDA CALC-SCNC: -2 MMOL/L (ref -3–3)
BASE EXCESS BLDA CALC-SCNC: -2.2 MMOL/L (ref -3–3)
BASE EXCESS BLDA CALC-SCNC: -2.5 MMOL/L (ref -3–3)
BASE EXCESS BLDA CALC-SCNC: -2.5 MMOL/L (ref -3–3)
BASE EXCESS BLDA CALC-SCNC: -2.6 MMOL/L (ref -3–3)
BASE EXCESS BLDA CALC-SCNC: -2.9 MMOL/L (ref -3–3)
BASE EXCESS BLDA CALC-SCNC: -3 MMOL/L (ref -3–3)
BASE EXCESS BLDA CALC-SCNC: -3.2 MMOL/L (ref -3–3)
BASE EXCESS BLDA CALC-SCNC: -3.4 MMOL/L (ref -3–3)
BASE EXCESS BLDV CALC-SCNC: -2 MMOL/L (ref -3–3)
BASE EXCESS BLDV CALC-SCNC: -2.9 MMOL/L (ref -3–3)
BASE EXCESS BLDV CALC-SCNC: -2.9 MMOL/L (ref -3–3)
BASOPHILS # BLD MANUAL: 0 10E3/UL (ref 0–0.2)
BASOPHILS NFR BLD MANUAL: 0 %
BLD GP AB SCN SERPL QL: NEGATIVE
BUN SERPL-MCNC: 131 MG/DL (ref 8–23)
BUN SERPL-MCNC: 148 MG/DL (ref 8–23)
BUN SERPL-MCNC: 156 MG/DL (ref 8–23)
BUN SERPL-MCNC: 176 MG/DL (ref 8–23)
BUN SERPL-MCNC: 193 MG/DL (ref 8–23)
BUN SERPL-MCNC: 199 MG/DL (ref 8–23)
CA-I BLD-MCNC: 4.3 MG/DL (ref 4.4–5.2)
CA-I BLD-MCNC: 4.4 MG/DL (ref 4.4–5.2)
CA-I BLD-MCNC: 4.6 MG/DL (ref 4.4–5.2)
CA-I BLD-MCNC: 4.7 MG/DL (ref 4.4–5.2)
CA-I BLD-MCNC: 4.7 MG/DL (ref 4.4–5.2)
CA-I BLD-MCNC: 4.9 MG/DL (ref 4.4–5.2)
CALCIUM SERPL-MCNC: 8 MG/DL (ref 8.8–10.4)
CALCIUM SERPL-MCNC: 8.3 MG/DL (ref 8.8–10.4)
CALCIUM SERPL-MCNC: 8.8 MG/DL (ref 8.8–10.4)
CALCIUM SERPL-MCNC: 9 MG/DL (ref 8.8–10.4)
CALCIUM SERPL-MCNC: 9.1 MG/DL (ref 8.8–10.4)
CALCIUM SERPL-MCNC: 9.4 MG/DL (ref 8.8–10.4)
CHLORIDE SERPL-SCNC: 107 MMOL/L (ref 98–107)
CHLORIDE SERPL-SCNC: 108 MMOL/L (ref 98–107)
CHLORIDE SERPL-SCNC: 108 MMOL/L (ref 98–107)
CHLORIDE SERPL-SCNC: 110 MMOL/L (ref 98–107)
CHLORIDE SERPL-SCNC: 110 MMOL/L (ref 98–107)
CHLORIDE SERPL-SCNC: 112 MMOL/L (ref 98–107)
CK SERPL-CCNC: 21 U/L (ref 39–308)
CREAT SERPL-MCNC: 2.74 MG/DL (ref 0.67–1.17)
CREAT SERPL-MCNC: 2.88 MG/DL (ref 0.67–1.17)
CREAT SERPL-MCNC: 3.25 MG/DL (ref 0.67–1.17)
CREAT SERPL-MCNC: 3.68 MG/DL (ref 0.67–1.17)
CREAT SERPL-MCNC: 4.27 MG/DL (ref 0.67–1.17)
CREAT SERPL-MCNC: 4.29 MG/DL (ref 0.67–1.17)
CRP SERPL-MCNC: 16.2 MG/L
D DIMER PPP FEU-MCNC: 0.99 UG/ML FEU (ref 0–0.5)
D DIMER PPP FEU-MCNC: 1.28 UG/ML FEU (ref 0–0.5)
EGFRCR SERPLBLD CKD-EPI 2021: 14 ML/MIN/1.73M2
EGFRCR SERPLBLD CKD-EPI 2021: 15 ML/MIN/1.73M2
EGFRCR SERPLBLD CKD-EPI 2021: 17 ML/MIN/1.73M2
EGFRCR SERPLBLD CKD-EPI 2021: 20 ML/MIN/1.73M2
EGFRCR SERPLBLD CKD-EPI 2021: 23 ML/MIN/1.73M2
EGFRCR SERPLBLD CKD-EPI 2021: 25 ML/MIN/1.73M2
EOSINOPHIL # BLD MANUAL: 0 10E3/UL (ref 0–0.7)
EOSINOPHIL NFR BLD MANUAL: 0 %
ERYTHROCYTE [DISTWIDTH] IN BLOOD BY AUTOMATED COUNT: 14.1 % (ref 10–15)
ERYTHROCYTE [DISTWIDTH] IN BLOOD BY AUTOMATED COUNT: 14.1 % (ref 10–15)
ERYTHROCYTE [DISTWIDTH] IN BLOOD BY AUTOMATED COUNT: 14.2 % (ref 10–15)
ERYTHROCYTE [DISTWIDTH] IN BLOOD BY AUTOMATED COUNT: 14.2 % (ref 10–15)
FIBRINOGEN PPP-MCNC: 257 MG/DL (ref 170–510)
FIBRINOGEN PPP-MCNC: 264 MG/DL (ref 170–510)
GLUCOSE BLDC GLUCOMTR-MCNC: 103 MG/DL (ref 70–99)
GLUCOSE BLDC GLUCOMTR-MCNC: 109 MG/DL (ref 70–99)
GLUCOSE BLDC GLUCOMTR-MCNC: 110 MG/DL (ref 70–99)
GLUCOSE BLDC GLUCOMTR-MCNC: 113 MG/DL (ref 70–99)
GLUCOSE BLDC GLUCOMTR-MCNC: 116 MG/DL (ref 70–99)
GLUCOSE BLDC GLUCOMTR-MCNC: 124 MG/DL (ref 70–99)
GLUCOSE BLDC GLUCOMTR-MCNC: 129 MG/DL (ref 70–99)
GLUCOSE BLDC GLUCOMTR-MCNC: 133 MG/DL (ref 70–99)
GLUCOSE BLDC GLUCOMTR-MCNC: 135 MG/DL (ref 70–99)
GLUCOSE BLDC GLUCOMTR-MCNC: 84 MG/DL (ref 70–99)
GLUCOSE BLDC GLUCOMTR-MCNC: 99 MG/DL (ref 70–99)
GLUCOSE SERPL-MCNC: 110 MG/DL (ref 70–99)
GLUCOSE SERPL-MCNC: 116 MG/DL (ref 70–99)
GLUCOSE SERPL-MCNC: 126 MG/DL (ref 70–99)
GLUCOSE SERPL-MCNC: 146 MG/DL (ref 70–99)
GLUCOSE SERPL-MCNC: 165 MG/DL (ref 70–99)
GLUCOSE SERPL-MCNC: 94 MG/DL (ref 70–99)
HCO3 BLD-SCNC: 22 MMOL/L (ref 21–28)
HCO3 BLD-SCNC: 23 MMOL/L (ref 21–28)
HCO3 BLD-SCNC: 24 MMOL/L (ref 21–28)
HCO3 BLDA-SCNC: 20 MMOL/L (ref 21–28)
HCO3 BLDA-SCNC: 23 MMOL/L (ref 21–28)
HCO3 BLDV-SCNC: 22 MMOL/L (ref 21–28)
HCO3 BLDV-SCNC: 22 MMOL/L (ref 21–28)
HCO3 BLDV-SCNC: 24 MMOL/L (ref 21–28)
HCO3 SERPL-SCNC: 18 MMOL/L (ref 22–29)
HCO3 SERPL-SCNC: 19 MMOL/L (ref 22–29)
HCO3 SERPL-SCNC: 20 MMOL/L (ref 22–29)
HCT VFR BLD AUTO: 24.1 % (ref 40–53)
HCT VFR BLD AUTO: 24.4 % (ref 40–53)
HCT VFR BLD AUTO: 24.8 % (ref 40–53)
HCT VFR BLD AUTO: 25 % (ref 40–53)
HGB BLD-MCNC: 8.1 G/DL (ref 13.3–17.7)
HGB BLD-MCNC: 8.2 G/DL (ref 13.3–17.7)
HGB BLD-MCNC: 8.5 G/DL (ref 13.3–17.7)
HGB BLD-MCNC: 8.7 G/DL (ref 13.3–17.7)
HGB FREE PLAS-MCNC: 60 MG/DL
INR PPP: 1.18 (ref 0.85–1.15)
INR PPP: 1.19 (ref 0.85–1.15)
INR PPP: 1.22 (ref 0.85–1.15)
INR PPP: 1.24 (ref 0.85–1.15)
LACTATE SERPL-SCNC: 2.3 MMOL/L (ref 0.7–2)
LACTATE SERPL-SCNC: 2.4 MMOL/L (ref 0.7–2)
LACTATE SERPL-SCNC: 2.7 MMOL/L (ref 0.7–2)
LACTATE SERPL-SCNC: 2.8 MMOL/L (ref 0.7–2)
LYMPHOCYTES # BLD MANUAL: 15.4 10E3/UL (ref 0.8–5.3)
LYMPHOCYTES # BLD MANUAL: 6.5 10E3/UL (ref 0.8–5.3)
LYMPHOCYTES # BLD MANUAL: 9.3 10E3/UL (ref 0.8–5.3)
LYMPHOCYTES NFR BLD MANUAL: 31 %
LYMPHOCYTES NFR BLD MANUAL: 48 %
LYMPHOCYTES NFR BLD MANUAL: 52 %
MAGNESIUM SERPL-MCNC: 2.8 MG/DL (ref 1.7–2.3)
MAGNESIUM SERPL-MCNC: 2.8 MG/DL (ref 1.7–2.3)
MAGNESIUM SERPL-MCNC: 3 MG/DL (ref 1.7–2.3)
MCH RBC QN AUTO: 28 PG (ref 26.5–33)
MCH RBC QN AUTO: 28.8 PG (ref 26.5–33)
MCH RBC QN AUTO: 29.1 PG (ref 26.5–33)
MCH RBC QN AUTO: 29.2 PG (ref 26.5–33)
MCHC RBC AUTO-ENTMCNC: 32.7 G/DL (ref 31.5–36.5)
MCHC RBC AUTO-ENTMCNC: 34 G/DL (ref 31.5–36.5)
MCHC RBC AUTO-ENTMCNC: 34.8 G/DL (ref 31.5–36.5)
MCHC RBC AUTO-ENTMCNC: 34.8 G/DL (ref 31.5–36.5)
MCV RBC AUTO: 83 FL (ref 78–100)
MCV RBC AUTO: 84 FL (ref 78–100)
MCV RBC AUTO: 86 FL (ref 78–100)
MCV RBC AUTO: 86 FL (ref 78–100)
METAMYELOCYTES # BLD MANUAL: 0.6 10E3/UL
METAMYELOCYTES NFR BLD MANUAL: 3 %
MONOCYTES # BLD MANUAL: 1.6 10E3/UL (ref 0–1.3)
MONOCYTES # BLD MANUAL: 1.7 10E3/UL (ref 0–1.3)
MONOCYTES # BLD MANUAL: 1.8 10E3/UL (ref 0–1.3)
MONOCYTES NFR BLD MANUAL: 10 %
MONOCYTES NFR BLD MANUAL: 5 %
MONOCYTES NFR BLD MANUAL: 8 %
MYELOCYTES # BLD MANUAL: 0.6 10E3/UL
MYELOCYTES NFR BLD MANUAL: 3 %
NEUTROPHILS # BLD MANUAL: 11.5 10E3/UL (ref 1.6–8.3)
NEUTROPHILS # BLD MANUAL: 15.1 10E3/UL (ref 1.6–8.3)
NEUTROPHILS # BLD MANUAL: 6.8 10E3/UL (ref 1.6–8.3)
NEUTROPHILS NFR BLD MANUAL: 38 %
NEUTROPHILS NFR BLD MANUAL: 47 %
NEUTROPHILS NFR BLD MANUAL: 55 %
NRBC # BLD AUTO: 0.2 10E3/UL
NRBC BLD MANUAL-RTO: 1 %
O2/TOTAL GAS SETTING VFR VENT: 100 %
O2/TOTAL GAS SETTING VFR VENT: 60 %
OXYHGB MFR BLDA: 90 % (ref 92–100)
OXYHGB MFR BLDA: 91 % (ref 92–100)
OXYHGB MFR BLDA: 92 % (ref 92–100)
OXYHGB MFR BLDA: 94 % (ref 92–100)
OXYHGB MFR BLDA: 94 % (ref 92–100)
OXYHGB MFR BLDA: 95 % (ref 92–100)
OXYHGB MFR BLDA: 96 % (ref 92–100)
OXYHGB MFR BLDA: 97 % (ref 92–100)
OXYHGB MFR BLDA: 98 % (ref 75–100)
OXYHGB MFR BLDA: 98 % (ref 92–100)
OXYHGB MFR BLDA: 98 % (ref 92–100)
OXYHGB MFR BLDA: 99 % (ref 75–100)
OXYHGB MFR BLDV: 80 %
OXYHGB MFR BLDV: 82 %
OXYHGB MFR BLDV: 92 % (ref 70–75)
PCO2 BLD: 35 MM HG (ref 35–45)
PCO2 BLD: 36 MM HG (ref 35–45)
PCO2 BLD: 36 MM HG (ref 35–45)
PCO2 BLD: 37 MM HG (ref 35–45)
PCO2 BLD: 37 MM HG (ref 35–45)
PCO2 BLD: 38 MM HG (ref 35–45)
PCO2 BLD: 39 MM HG (ref 35–45)
PCO2 BLD: 40 MM HG (ref 35–45)
PCO2 BLD: 40 MM HG (ref 35–45)
PCO2 BLD: 41 MM HG (ref 35–45)
PCO2 BLDA: 31 MM HG (ref 35–45)
PCO2 BLDA: 42 MM HG (ref 35–45)
PCO2 BLDV: 38 MM HG (ref 40–50)
PCO2 BLDV: 38 MM HG (ref 40–50)
PCO2 BLDV: 46 MM HG (ref 40–50)
PEEP: 10 CM H2O
PH BLD: 7.37 [PH] (ref 7.35–7.45)
PH BLD: 7.38 [PH] (ref 7.35–7.45)
PH BLD: 7.39 [PH] (ref 7.35–7.45)
PH BLD: 7.4 [PH] (ref 7.35–7.45)
PH BLD: 7.4 [PH] (ref 7.35–7.45)
PH BLDA: 7.36 [PH] (ref 7.35–7.45)
PH BLDA: 7.43 [PH] (ref 7.35–7.45)
PH BLDV: 7.33 [PH] (ref 7.32–7.43)
PH BLDV: 7.37 [PH] (ref 7.32–7.43)
PH BLDV: 7.37 [PH] (ref 7.32–7.43)
PHOSPHATE SERPL-MCNC: 5.2 MG/DL (ref 2.5–4.5)
PHOSPHATE SERPL-MCNC: 5.8 MG/DL (ref 2.5–4.5)
PHOSPHATE SERPL-MCNC: 6.1 MG/DL (ref 2.5–4.5)
PLAT MORPH BLD: ABNORMAL
PLATELET # BLD AUTO: 217 10E3/UL (ref 150–450)
PLATELET # BLD AUTO: 229 10E3/UL (ref 150–450)
PLATELET # BLD AUTO: 285 10E3/UL (ref 150–450)
PLATELET # BLD AUTO: 428 10E3/UL (ref 150–450)
PO2 BLD: 101 MM HG (ref 80–105)
PO2 BLD: 120 MM HG (ref 80–105)
PO2 BLD: 121 MM HG (ref 80–105)
PO2 BLD: 134 MM HG (ref 80–105)
PO2 BLD: 137 MM HG (ref 80–105)
PO2 BLD: 165 MM HG (ref 80–105)
PO2 BLD: 62 MM HG (ref 80–105)
PO2 BLD: 68 MM HG (ref 80–105)
PO2 BLD: 69 MM HG (ref 80–105)
PO2 BLD: 77 MM HG (ref 80–105)
PO2 BLD: 79 MM HG (ref 80–105)
PO2 BLD: 87 MM HG (ref 80–105)
PO2 BLDA: 367 MM HG (ref 80–105)
PO2 BLDA: 418 MM HG (ref 80–105)
PO2 BLDV: 49 MM HG (ref 25–47)
PO2 BLDV: 50 MM HG (ref 25–47)
PO2 BLDV: 70 MM HG (ref 25–47)
POTASSIUM SERPL-SCNC: 3.9 MMOL/L (ref 3.4–5.3)
POTASSIUM SERPL-SCNC: 3.9 MMOL/L (ref 3.4–5.3)
POTASSIUM SERPL-SCNC: 4.1 MMOL/L (ref 3.4–5.3)
POTASSIUM SERPL-SCNC: 4.1 MMOL/L (ref 3.4–5.3)
POTASSIUM SERPL-SCNC: 4.3 MMOL/L (ref 3.4–5.3)
POTASSIUM SERPL-SCNC: 4.4 MMOL/L (ref 3.4–5.3)
RBC # BLD AUTO: 2.81 10E6/UL (ref 4.4–5.9)
RBC # BLD AUTO: 2.89 10E6/UL (ref 4.4–5.9)
RBC # BLD AUTO: 2.92 10E6/UL (ref 4.4–5.9)
RBC # BLD AUTO: 3.02 10E6/UL (ref 4.4–5.9)
RBC MORPH BLD: ABNORMAL
SAO2 % BLDA: 100 % (ref 95–96)
SAO2 % BLDA: 92.2 % (ref 95–96)
SAO2 % BLDA: 93.1 % (ref 95–96)
SAO2 % BLDA: 94.2 % (ref 95–96)
SAO2 % BLDA: 96.1 % (ref 95–96)
SAO2 % BLDA: 96.2 % (ref 95–96)
SAO2 % BLDA: 97.8 % (ref 95–96)
SAO2 % BLDA: 98.4 % (ref 95–96)
SAO2 % BLDA: 99.3 % (ref 95–96)
SAO2 % BLDA: 99.4 % (ref 95–96)
SAO2 % BLDA: 99.4 % (ref 95–96)
SAO2 % BLDA: 99.5 % (ref 95–96)
SAO2 % BLDA: 99.9 % (ref 95–96)
SAO2 % BLDA: >100 % (ref 95–96)
SAO2 % BLDV: 82 % (ref 70–75)
SAO2 % BLDV: 84 % (ref 70–75)
SAO2 % BLDV: 94.4 % (ref 70–75)
SODIUM SERPL-SCNC: 144 MMOL/L (ref 135–145)
SODIUM SERPL-SCNC: 145 MMOL/L (ref 135–145)
SODIUM SERPL-SCNC: 147 MMOL/L (ref 135–145)
SODIUM SERPL-SCNC: 148 MMOL/L (ref 135–145)
SODIUM SERPL-SCNC: 148 MMOL/L (ref 135–145)
SODIUM SERPL-SCNC: 149 MMOL/L (ref 135–145)
SPECIMEN EXP DATE BLD: NORMAL
TRIGL SERPL-MCNC: 549 MG/DL
UFH PPP CHRO-ACNC: 0.43 IU/ML
UFH PPP CHRO-ACNC: 0.56 IU/ML
UFH PPP CHRO-ACNC: 0.65 IU/ML
UFH PPP CHRO-ACNC: 0.77 IU/ML
WBC # BLD AUTO: 17.9 10E3/UL (ref 4–11)
WBC # BLD AUTO: 20.9 10E3/UL (ref 4–11)
WBC # BLD AUTO: 32.1 10E3/UL (ref 4–11)
WBC # BLD AUTO: 68.9 10E3/UL (ref 4–11)

## 2025-02-02 PROCEDURE — 86140 C-REACTIVE PROTEIN: CPT | Performed by: STUDENT IN AN ORGANIZED HEALTH CARE EDUCATION/TRAINING PROGRAM

## 2025-02-02 PROCEDURE — 84460 ALANINE AMINO (ALT) (SGPT): CPT | Performed by: STUDENT IN AN ORGANIZED HEALTH CARE EDUCATION/TRAINING PROGRAM

## 2025-02-02 PROCEDURE — 83051 HEMOGLOBIN PLASMA: CPT | Performed by: STUDENT IN AN ORGANIZED HEALTH CARE EDUCATION/TRAINING PROGRAM

## 2025-02-02 PROCEDURE — 85347 COAGULATION TIME ACTIVATED: CPT

## 2025-02-02 PROCEDURE — 85007 BL SMEAR W/DIFF WBC COUNT: CPT | Performed by: STUDENT IN AN ORGANIZED HEALTH CARE EDUCATION/TRAINING PROGRAM

## 2025-02-02 PROCEDURE — 90947 DIALYSIS REPEATED EVAL: CPT

## 2025-02-02 PROCEDURE — 82805 BLOOD GASES W/O2 SATURATION: CPT | Performed by: SURGERY

## 2025-02-02 PROCEDURE — 85384 FIBRINOGEN ACTIVITY: CPT | Performed by: STUDENT IN AN ORGANIZED HEALTH CARE EDUCATION/TRAINING PROGRAM

## 2025-02-02 PROCEDURE — 82040 ASSAY OF SERUM ALBUMIN: CPT | Performed by: STUDENT IN AN ORGANIZED HEALTH CARE EDUCATION/TRAINING PROGRAM

## 2025-02-02 PROCEDURE — 250N000013 HC RX MED GY IP 250 OP 250 PS 637: Performed by: STUDENT IN AN ORGANIZED HEALTH CARE EDUCATION/TRAINING PROGRAM

## 2025-02-02 PROCEDURE — 94640 AIRWAY INHALATION TREATMENT: CPT

## 2025-02-02 PROCEDURE — 250N000011 HC RX IP 250 OP 636: Performed by: SURGERY

## 2025-02-02 PROCEDURE — 82805 BLOOD GASES W/O2 SATURATION: CPT | Performed by: STUDENT IN AN ORGANIZED HEALTH CARE EDUCATION/TRAINING PROGRAM

## 2025-02-02 PROCEDURE — 200N000002 HC R&B ICU UMMC

## 2025-02-02 PROCEDURE — 99233 SBSQ HOSP IP/OBS HIGH 50: CPT | Mod: GC | Performed by: INTERNAL MEDICINE

## 2025-02-02 PROCEDURE — 250N000011 HC RX IP 250 OP 636: Mod: JZ | Performed by: STUDENT IN AN ORGANIZED HEALTH CARE EDUCATION/TRAINING PROGRAM

## 2025-02-02 PROCEDURE — 85520 HEPARIN ASSAY: CPT | Performed by: STUDENT IN AN ORGANIZED HEALTH CARE EDUCATION/TRAINING PROGRAM

## 2025-02-02 PROCEDURE — 82330 ASSAY OF CALCIUM: CPT | Performed by: STUDENT IN AN ORGANIZED HEALTH CARE EDUCATION/TRAINING PROGRAM

## 2025-02-02 PROCEDURE — 85610 PROTHROMBIN TIME: CPT | Performed by: STUDENT IN AN ORGANIZED HEALTH CARE EDUCATION/TRAINING PROGRAM

## 2025-02-02 PROCEDURE — 84478 ASSAY OF TRIGLYCERIDES: CPT | Performed by: STUDENT IN AN ORGANIZED HEALTH CARE EDUCATION/TRAINING PROGRAM

## 2025-02-02 PROCEDURE — 250N000009 HC RX 250

## 2025-02-02 PROCEDURE — 85049 AUTOMATED PLATELET COUNT: CPT | Performed by: STUDENT IN AN ORGANIZED HEALTH CARE EDUCATION/TRAINING PROGRAM

## 2025-02-02 PROCEDURE — 84520 ASSAY OF UREA NITROGEN: CPT | Performed by: STUDENT IN AN ORGANIZED HEALTH CARE EDUCATION/TRAINING PROGRAM

## 2025-02-02 PROCEDURE — 85014 HEMATOCRIT: CPT

## 2025-02-02 PROCEDURE — 85379 FIBRIN DEGRADATION QUANT: CPT | Performed by: STUDENT IN AN ORGANIZED HEALTH CARE EDUCATION/TRAINING PROGRAM

## 2025-02-02 PROCEDURE — 94640 AIRWAY INHALATION TREATMENT: CPT | Mod: 76

## 2025-02-02 PROCEDURE — 250N000011 HC RX IP 250 OP 636: Performed by: STUDENT IN AN ORGANIZED HEALTH CARE EDUCATION/TRAINING PROGRAM

## 2025-02-02 PROCEDURE — 250N000009 HC RX 250: Performed by: STUDENT IN AN ORGANIZED HEALTH CARE EDUCATION/TRAINING PROGRAM

## 2025-02-02 PROCEDURE — 84100 ASSAY OF PHOSPHORUS: CPT | Performed by: STUDENT IN AN ORGANIZED HEALTH CARE EDUCATION/TRAINING PROGRAM

## 2025-02-02 PROCEDURE — 86900 BLOOD TYPING SEROLOGIC ABO: CPT | Performed by: SURGERY

## 2025-02-02 PROCEDURE — 94003 VENT MGMT INPAT SUBQ DAY: CPT

## 2025-02-02 PROCEDURE — 85730 THROMBOPLASTIN TIME PARTIAL: CPT | Performed by: STUDENT IN AN ORGANIZED HEALTH CARE EDUCATION/TRAINING PROGRAM

## 2025-02-02 PROCEDURE — 85300 ANTITHROMBIN III ACTIVITY: CPT | Performed by: STUDENT IN AN ORGANIZED HEALTH CARE EDUCATION/TRAINING PROGRAM

## 2025-02-02 PROCEDURE — 82550 ASSAY OF CK (CPK): CPT | Performed by: STUDENT IN AN ORGANIZED HEALTH CARE EDUCATION/TRAINING PROGRAM

## 2025-02-02 PROCEDURE — 250N000013 HC RX MED GY IP 250 OP 250 PS 637: Performed by: PHYSICIAN ASSISTANT

## 2025-02-02 PROCEDURE — 82310 ASSAY OF CALCIUM: CPT | Performed by: STUDENT IN AN ORGANIZED HEALTH CARE EDUCATION/TRAINING PROGRAM

## 2025-02-02 PROCEDURE — 83735 ASSAY OF MAGNESIUM: CPT | Performed by: STUDENT IN AN ORGANIZED HEALTH CARE EDUCATION/TRAINING PROGRAM

## 2025-02-02 PROCEDURE — 250N000011 HC RX IP 250 OP 636: Performed by: PHYSICIAN ASSISTANT

## 2025-02-02 PROCEDURE — 82947 ASSAY GLUCOSE BLOOD QUANT: CPT | Performed by: STUDENT IN AN ORGANIZED HEALTH CARE EDUCATION/TRAINING PROGRAM

## 2025-02-02 PROCEDURE — 33948 ECMO/ECLS DAILY MGMT-VENOUS: CPT | Performed by: SURGERY

## 2025-02-02 PROCEDURE — 83605 ASSAY OF LACTIC ACID: CPT | Performed by: STUDENT IN AN ORGANIZED HEALTH CARE EDUCATION/TRAINING PROGRAM

## 2025-02-02 PROCEDURE — 99291 CRITICAL CARE FIRST HOUR: CPT | Performed by: NURSE PRACTITIONER

## 2025-02-02 PROCEDURE — 99222 1ST HOSP IP/OBS MODERATE 55: CPT | Performed by: STUDENT IN AN ORGANIZED HEALTH CARE EDUCATION/TRAINING PROGRAM

## 2025-02-02 PROCEDURE — 258N000003 HC RX IP 258 OP 636: Performed by: STUDENT IN AN ORGANIZED HEALTH CARE EDUCATION/TRAINING PROGRAM

## 2025-02-02 PROCEDURE — 85027 COMPLETE CBC AUTOMATED: CPT | Performed by: STUDENT IN AN ORGANIZED HEALTH CARE EDUCATION/TRAINING PROGRAM

## 2025-02-02 PROCEDURE — 33948 ECMO/ECLS DAILY MGMT-VENOUS: CPT

## 2025-02-02 PROCEDURE — 250N000013 HC RX MED GY IP 250 OP 250 PS 637: Performed by: SURGERY

## 2025-02-02 PROCEDURE — 5A1D90Z PERFORMANCE OF URINARY FILTRATION, CONTINUOUS, GREATER THAN 18 HOURS PER DAY: ICD-10-PCS | Performed by: INTERNAL MEDICINE

## 2025-02-02 PROCEDURE — 80069 RENAL FUNCTION PANEL: CPT | Performed by: STUDENT IN AN ORGANIZED HEALTH CARE EDUCATION/TRAINING PROGRAM

## 2025-02-02 PROCEDURE — 999N000157 HC STATISTIC RCP TIME EA 10 MIN

## 2025-02-02 PROCEDURE — 84132 ASSAY OF SERUM POTASSIUM: CPT | Performed by: STUDENT IN AN ORGANIZED HEALTH CARE EDUCATION/TRAINING PROGRAM

## 2025-02-02 PROCEDURE — 250N000013 HC RX MED GY IP 250 OP 250 PS 637: Performed by: NURSE PRACTITIONER

## 2025-02-02 RX ORDER — OSELTAMIVIR PHOSPHATE 6 MG/ML
75 FOR SUSPENSION ORAL 2 TIMES DAILY
Status: DISCONTINUED | OUTPATIENT
Start: 2025-02-02 | End: 2025-02-03

## 2025-02-02 RX ORDER — CALCIUM CHLORIDE, MAGNESIUM CHLORIDE, SODIUM CHLORIDE, SODIUM BICARBONATE, POTASSIUM CHLORIDE AND SODIUM PHOSPHATE DIBASIC DIHYDRATE 3.68; 3.05; 6.34; 3.09; .314; .187 G/L; G/L; G/L; G/L; G/L; G/L
12.5 INJECTION INTRAVENOUS CONTINUOUS
Status: DISCONTINUED | OUTPATIENT
Start: 2025-02-02 | End: 2025-02-03

## 2025-02-02 RX ORDER — HEPARIN SODIUM 10000 [USP'U]/100ML
10-50 INJECTION, SOLUTION INTRAVENOUS CONTINUOUS
Status: DISCONTINUED | OUTPATIENT
Start: 2025-02-02 | End: 2025-02-03

## 2025-02-02 RX ORDER — DEXMEDETOMIDINE HYDROCHLORIDE 4 UG/ML
.1-1.2 INJECTION, SOLUTION INTRAVENOUS CONTINUOUS
Status: DISCONTINUED | OUTPATIENT
Start: 2025-02-02 | End: 2025-02-03

## 2025-02-02 RX ORDER — POTASSIUM CHLORIDE 29.8 MG/ML
20 INJECTION INTRAVENOUS EVERY 8 HOURS PRN
Status: DISCONTINUED | OUTPATIENT
Start: 2025-02-02 | End: 2025-02-03

## 2025-02-02 RX ORDER — CALCIUM CHLORIDE, MAGNESIUM CHLORIDE, SODIUM CHLORIDE, SODIUM BICARBONATE, POTASSIUM CHLORIDE AND SODIUM PHOSPHATE DIBASIC DIHYDRATE 3.68; 3.05; 6.34; 3.09; .314; .187 G/L; G/L; G/L; G/L; G/L; G/L
INJECTION INTRAVENOUS CONTINUOUS
Status: DISCONTINUED | OUTPATIENT
Start: 2025-02-02 | End: 2025-02-03

## 2025-02-02 RX ORDER — MAGNESIUM SULFATE HEPTAHYDRATE 40 MG/ML
2 INJECTION, SOLUTION INTRAVENOUS EVERY 8 HOURS PRN
Status: DISCONTINUED | OUTPATIENT
Start: 2025-02-02 | End: 2025-02-03

## 2025-02-02 RX ORDER — CALCIUM GLUCONATE 20 MG/ML
2 INJECTION, SOLUTION INTRAVENOUS EVERY 8 HOURS PRN
Status: DISCONTINUED | OUTPATIENT
Start: 2025-02-02 | End: 2025-02-03

## 2025-02-02 RX ADMIN — DEXTROMETHORPHAN POLISTIREX 15 MG: 30 SUSPENSION ORAL at 09:32

## 2025-02-02 RX ADMIN — Medication 60 ML: at 12:06

## 2025-02-02 RX ADMIN — LINEZOLID 600 MG: 600 INJECTION, SOLUTION INTRAVENOUS at 16:04

## 2025-02-02 RX ADMIN — IPRATROPIUM BROMIDE AND ALBUTEROL SULFATE 3 ML: .5; 3 SOLUTION RESPIRATORY (INHALATION) at 16:11

## 2025-02-02 RX ADMIN — IPRATROPIUM BROMIDE AND ALBUTEROL SULFATE 3 ML: .5; 3 SOLUTION RESPIRATORY (INHALATION) at 20:32

## 2025-02-02 RX ADMIN — DEXTROMETHORPHAN POLISTIREX 15 MG: 30 SUSPENSION ORAL at 20:54

## 2025-02-02 RX ADMIN — DEXAMETHASONE SODIUM PHOSPHATE 10 MG: 10 INJECTION INTRAMUSCULAR; INTRAVENOUS at 08:03

## 2025-02-02 RX ADMIN — PROPOFOL 30 MCG/KG/MIN: 10 INJECTION, EMULSION INTRAVENOUS at 01:47

## 2025-02-02 RX ADMIN — Medication 10 MG: at 06:09

## 2025-02-02 RX ADMIN — CALCIUM CHLORIDE, MAGNESIUM CHLORIDE, SODIUM CHLORIDE, SODIUM BICARBONATE, POTASSIUM CHLORIDE AND SODIUM PHOSPHATE DIBASIC DIHYDRATE 12.5 ML/KG/HR: 3.68; 3.05; 6.34; 3.09; .314; .187 INJECTION INTRAVENOUS at 19:28

## 2025-02-02 RX ADMIN — CHLORHEXIDINE GLUCONATE 15 ML: 1.2 SOLUTION ORAL at 08:02

## 2025-02-02 RX ADMIN — Medication 60 ML: at 20:16

## 2025-02-02 RX ADMIN — CALCIUM GLUCONATE 2 G: 20 INJECTION, SOLUTION INTRAVENOUS at 18:30

## 2025-02-02 RX ADMIN — INSULIN HUMAN 8 UNITS/HR: 1 INJECTION, SOLUTION INTRAVENOUS at 16:11

## 2025-02-02 RX ADMIN — Medication 15 ML: at 08:02

## 2025-02-02 RX ADMIN — CHLORHEXIDINE GLUCONATE 15 ML: 1.2 SOLUTION ORAL at 20:15

## 2025-02-02 RX ADMIN — Medication 40 MG: at 08:02

## 2025-02-02 RX ADMIN — Medication 60 ML: at 16:06

## 2025-02-02 RX ADMIN — INSULIN HUMAN 11 UNITS/HR: 1 INJECTION, SOLUTION INTRAVENOUS at 06:13

## 2025-02-02 RX ADMIN — IPRATROPIUM BROMIDE AND ALBUTEROL SULFATE 3 ML: .5; 3 SOLUTION RESPIRATORY (INHALATION) at 00:15

## 2025-02-02 RX ADMIN — IPRATROPIUM BROMIDE AND ALBUTEROL SULFATE 3 ML: .5; 3 SOLUTION RESPIRATORY (INHALATION) at 08:02

## 2025-02-02 RX ADMIN — MIDAZOLAM 4 MG: 1 INJECTION INTRAMUSCULAR; INTRAVENOUS at 21:09

## 2025-02-02 RX ADMIN — CALCIUM CHLORIDE, MAGNESIUM CHLORIDE, SODIUM CHLORIDE, SODIUM BICARBONATE, POTASSIUM CHLORIDE AND SODIUM PHOSPHATE DIBASIC DIHYDRATE 12.5 ML/KG/HR: 3.68; 3.05; 6.34; 3.09; .314; .187 INJECTION INTRAVENOUS at 23:50

## 2025-02-02 RX ADMIN — OSELTAMIVIR PHOSPHATE 30 MG: 6 POWDER, FOR SUSPENSION ORAL at 08:02

## 2025-02-02 RX ADMIN — LINEZOLID 600 MG: 600 INJECTION, SOLUTION INTRAVENOUS at 02:24

## 2025-02-02 RX ADMIN — HEPARIN SODIUM 1400 UNITS/HR: 10000 INJECTION, SOLUTION INTRAVENOUS at 08:01

## 2025-02-02 RX ADMIN — ASPIRIN 81 MG CHEWABLE TABLET 81 MG: 81 TABLET CHEWABLE at 08:02

## 2025-02-02 RX ADMIN — DEXMEDETOMIDINE HYDROCHLORIDE 0.2 MCG/KG/HR: 400 INJECTION INTRAVENOUS at 09:26

## 2025-02-02 RX ADMIN — CALCIUM CHLORIDE, MAGNESIUM CHLORIDE, SODIUM CHLORIDE, SODIUM BICARBONATE, POTASSIUM CHLORIDE AND SODIUM PHOSPHATE DIBASIC DIHYDRATE: 3.68; 3.05; 6.34; 3.09; .314; .187 INJECTION INTRAVENOUS at 10:27

## 2025-02-02 RX ADMIN — IPRATROPIUM BROMIDE AND ALBUTEROL SULFATE 3 ML: .5; 3 SOLUTION RESPIRATORY (INHALATION) at 12:33

## 2025-02-02 RX ADMIN — IPRATROPIUM BROMIDE AND ALBUTEROL SULFATE 3 ML: .5; 3 SOLUTION RESPIRATORY (INHALATION) at 04:29

## 2025-02-02 RX ADMIN — OSELTAMIVIR PHOSPHATE 75 MG: 6 FOR SUSPENSION ORAL at 20:16

## 2025-02-02 RX ADMIN — Medication 60 ML: at 08:03

## 2025-02-02 RX ADMIN — CALCIUM CHLORIDE, MAGNESIUM CHLORIDE, SODIUM CHLORIDE, SODIUM BICARBONATE, POTASSIUM CHLORIDE AND SODIUM PHOSPHATE DIBASIC DIHYDRATE 12.5 ML/KG/HR: 3.68; 3.05; 6.34; 3.09; .314; .187 INJECTION INTRAVENOUS at 10:27

## 2025-02-02 RX ADMIN — PROPOFOL 25 MCG/KG/MIN: 10 INJECTION, EMULSION INTRAVENOUS at 08:01

## 2025-02-02 RX ADMIN — MIDAZOLAM 4 MG: 1 INJECTION INTRAMUSCULAR; INTRAVENOUS at 00:17

## 2025-02-02 RX ADMIN — CALCIUM CHLORIDE, MAGNESIUM CHLORIDE, SODIUM CHLORIDE, SODIUM BICARBONATE, POTASSIUM CHLORIDE AND SODIUM PHOSPHATE DIBASIC DIHYDRATE 12.5 ML/KG/HR: 3.68; 3.05; 6.34; 3.09; .314; .187 INJECTION INTRAVENOUS at 19:27

## 2025-02-02 RX ADMIN — SODIUM CHLORIDE, POTASSIUM CHLORIDE, SODIUM LACTATE AND CALCIUM CHLORIDE 500 ML: 600; 310; 30; 20 INJECTION, SOLUTION INTRAVENOUS at 01:03

## 2025-02-02 ASSESSMENT — ACTIVITIES OF DAILY LIVING (ADL)
ADLS_ACUITY_SCORE: 50

## 2025-02-02 NOTE — PROGRESS NOTES
"Overnight note:    Was called about \"chugging\" d/w overnight staff and ecmo staff, gave 500cc bolus and stopped the lasix drip.     Mark \"Jam\" Naman OBRIEN  General Surgery PGY-2  Please page with a 10 digit call back number on call resident through [facility].  Surgical team members do not have reliable access to [employer] and paging is the only reliable way of contacting a surgical team member.   "

## 2025-02-02 NOTE — PROGRESS NOTES
CRRT INITIATION NOTE    Consent for CRRT Completed:  YES  Patient s Vascular Access: ECMO Catheter                Placement Confirmed: YES  Manufacture:  na  Model:  na  Length/Faroese Size:  na  Flush Volume:  na    DATA:  Procedure:  CVVHDF  Start Time:  1046  Machine#:  6  Filter:    Blood Flow Rate:  200  mL/min  Pre-Replacement Solution Rate:  1200 mL/hr  Dialysate Flow Rate:  1200 mL/hr   Post-Replacement Solution Rate:  200 mL/hr    Solutions  Pre-Replacement Solution:  Phoxillum BK 4/2.5  Dialysate Solution:  Phoxillum BK 4/2.5  Post-Replacement Solution:  Phoxillum BK 4/2.5  Patient Removal Rate:  0 mL/hr  Anticoagulation Type and Rate:  na    ASSESSMENT:  How Patient Tolerated Initiation:   Vital Signs:  see chart    Initial Pressures  Access:  105  Return:  26  TMP:  29  Filter pressure Drop:  32  Filter Pressure:  82  Effluent Pressure:  7      INTERVENTIONS:  Initiate CRRT.    PLAN:  Continue CRRT per nephrology orders.  Change set q72h and PRN.  Contact resource nurse at [phone] with any questions and concerns.

## 2025-02-02 NOTE — PROGRESS NOTES
[facility]    ECLS Shift Summary:     ECMO Equipment:  Console Serial Number: [id number]  Circuit Lot Number: [id number]  Oxygenator Lot Number: [id number]    Circuit Assessment: Free of fibrin, clot, and air    Venous ECMO Cannula: 30 Fr Innis in the Right Internal Jugular Vein        Patient remains on V-V ECMO, all equipment is functioning and alarms are appropriately set. RPM's: 3650 with Blood Flow (Circuit) LPM  Av LPM  Min: 4.99 LPM  Max: 5.02 LPM L/min. Sweep is at 6 LPM and 100 %. Extremities are warm and perfused.     Significant Shift Events: None    Vent settings:  FiO2 (%): 60 %, Resp: 16, Inspiratory Pressure (cm H2O) (Drager Merle): 25, Vent Mode: PCV Plus assist, Resp Rate (Set): 16 breaths/min, PEEP (cm H2O): 10 cmH2O, Resp Rate (Set): 16 breaths/min, PEEP (cm H2O): 10 cmH2O    Anticoagulation:  Dose (units/hr) HEParin: 1300 Units/hr  Rate (mL/hr) HEParin: 13 mL/hr  Concentration HEParin: 100 Units/mL        Most recent: ACT  (seconds): 170 seconds    Urine output is 200ml/hr.  .  Blood loss was not seen. No product given.     Intake/Output Summary (Last 24 hours) at 2025 1837  Last data filed at 2025 1800  Gross per 24 hour   Intake 3599.64 ml   Output 3715 ml   Net -115.36 ml       Labs:  Recent Labs   Lab 25  1717 25  1609 25  1429 25  1231 25  1010   PH  --  7.36 7.36 7.34* 7.36   PCO2  --  37 37 39 39   PO2  --  79* 70* 72* 67*   HCO3  --  21 21 21 22   O2PER 60 60  60  60  100 60 60 60       Lab Results   Component Value Date    HGB 9.1 (L) 2025    PHGB  2025      Comment:      Unsatisfactory specimen - lipemic  This is a corrected result. Previous result was 200 mg/dL on 2025 at  4:58 AM CST     2025    FIBR 311 2025    INR 1.22 (H) 2025    PTT 56 (H) 2025    DD 1.25 (H) 2025    ANTCH 77 (L) 2025       Plan is continue VV ECMO support.    Pillo  YOLANDA Murdock, RT  ECMO Specialist  2/1/2025 6:37 PM

## 2025-02-02 NOTE — PROGRESS NOTES
"  Nephrology Progress Note  02/02/2025           MEDICAL DECISION MAKING     RECOMMENDATIONS:  Start CRRT    25 mL/kg/h, 0-25 mL/h net UF, 4k    ASSESSMENT:  Jesus Varghese is a 64 year old w HTN, T2DN, CLL, transferred from OSH for further care of his ARDS in the setting of influenza A, secondary bacterial PNA.   Nephrology consulted for DEEP     DEEP  sCr on admit 2.55.  Peak sCr 4.12. Baseline sCr 1.0  UA: albumin, 26 WBC, 16 RBC, squamous cells. UPCR 0.21 g/g.  Renal US w/o hydro, multiple b/lk cytes (known prior)  Likely 2/2 ATN in setting of ARDS and prior septic shock  Consented for RRT on 1/30  Start CRRT on 2/2 - wife in agreement    25 mL/kg/h, 0-25 mL/h net UF, 4k    Azotemia  Concern for uremic platelet dysfunction  Patient had \"cannula site was bleeding and oozing significantly.\" Patient is on heparin while on ECMO so that could be the cause. He has a rising BUN (a surrogate marker for toxins which can cause platelet dysfunction), since he is sedated we cannot assess for other uremic symptoms. Uremic platelet dysfunction may be playing a role in his bleeding, but unclear at this time      Recommendations were communicated to primary team via note & verbal      Seen and discussed with Dr. Dom Rick MD   Division of Renal Disease and Hypertension  [facility]  [email or web address]  Vocera Web Console    SUBJECTIVE     Interval History :   Nursing and provider notes from last 24 hours reviewed.  In the last 24 hours Jesus Varghese   Remains on ECMO. 3.7L UOP yestday. BUN cont to rise    A comprehensive review of systems was negative except as noted above.    OBJECTIVE     Physical Exam:   I/O last 3 completed shifts:  In: 3618.75 [I.V.:1518.75; Other:300; NG/GT:495; IV Piggyback:500]  Out: 4890 [Urine:4165; Stool:725]   /64 (BP Location: Left arm)   Pulse 119   Temp 98.8  F (37.1  C) (Esophageal)   Resp 16   Wt 96.8 kg (213 lb 6.5 oz)   SpO2 (!) 86%   BMI 33.93 kg/m       General: supine "   HEENT: ETT in place   Cardiac: rrr  Abdominal: nondistended  Extremities: no LE edema     Labs:   All labs reviewed by me  Electrolytes/Renal -   Recent Labs   Lab Test 02/02/25  0825 02/02/25  0602 02/02/25 0402 02/01/25 2355 02/01/25 2201 02/01/25  1617 02/01/25  1609 02/01/25  0346 02/01/25 0342 01/31/25  0414 01/31/25  0401   NA  --   --  147*  --  145  --  143   < > 143   < > 142   POTASSIUM  --   --  4.1  --  4.3  --  4.2   < > 4.3   < > 4.1   CHLORIDE  --   --  107  --  107  --  105   < > 106   < > 105   CO2  --   --  18*  --  18*  --  18*   < > 20*   < > 21*   BUN  --   --  193.0*  --  183.0*  --  171.0*   < > 149.0*   < > 114.0*   CR  --   --  4.27*  --  4.24*  --  4.20*   < > 4.22*   < > 4.13*   * 133* 129*  165*   < > 152*   < > 215*   < > 197*   < > 142*   LILIAN  --   --  8.0*  --  8.1*  --  8.7*   < > 8.6*   < > 9.3   MAG  --   --  3.0*  --   --   --   --   --  3.1*  --  3.3*   PHOS  --   --  6.1*  --   --   --   --   --  7.0*  --  6.1*    < > = values in this interval not displayed.       CBC -   Recent Labs   Lab Test 02/02/25 0402 02/01/25 2201 02/01/25  1609   WBC 17.9* 22.0* 17.8*   HGB 8.5* 9.3* 9.1*    262 224       LFTs -   Recent Labs   Lab Test 02/02/25  0402 02/01/25  0342 01/31/25  0401 01/30/25  0341 01/29/25  1108 01/28/25  0533 01/27/25  1600 08/21/18  1342 02/09/17  1642   ALKPHOS  --   --   --   --  76 79 92   < > 41   BILITOTAL  --   --   --   --  0.6 0.4 0.5   < > 0.5   BILIDIRECT  --   --   --   --   --   --   --   --  0.09   ALT 75* 61 59   < > 65 59 66   < >  --    AST  --   --   --   --  76* 74* 98*   < >  --    PROTTOTAL  --   --   --   --  5.6* 5.8* 6.2*   < > 6.9   ALBUMIN 2.9* 2.8* 3.1*   < > 3.0* 3.1* 3.3*   < > 4.3    < > = values in this interval not displayed.       Iron Panel - No lab results found.    Current Medications:  Current Facility-Administered Medications   Medication Dose Route Frequency Provider Last Rate Last Admin    aspirin (ASA)  chewable tablet 81 mg  81 mg Per Feeding Tube Daily Anel Smith, CUATE CNP   81 mg at 02/02/25 0802    chlorhexidine (PERIDEX) 0.12 % solution 15 mL  15 mL Mouth/Throat Q12H Mark Florence MD   15 mL at 02/02/25 0802    dexAMETHasone PF (DECADRON) injection 10 mg  10 mg Intravenous Q24H Mark Florence MD   10 mg at 02/02/25 0803    insulin glargine (LANTUS PEN) injection 45 Units  45 Units Subcutaneous BID Mark Florence MD   45 Units at 02/02/25 0803    ipratropium - albuterol 0.5 mg/2.5 mg/3 mL (DUONEB) neb solution 3 mL  3 mL Nebulization Q4H Willie Wilkinson PA-C   3 mL at 02/02/25 0802    linezolid (ZYVOX) infusion 600 mg  600 mg Intravenous Q12H Nawaf Ghotra PA-C   600 mg at 02/02/25 0224    multivitamins w/minerals liquid 15 mL  15 mL Per Feeding Tube Daily Mark Florence MD   15 mL at 02/02/25 0802    oseltamivir (TAMIFLU) suspension 30 mg  30 mg Oral or Feeding Tube Daily Alvaro Hutson MD   30 mg at 02/02/25 0802    pantoprazole (PROTONIX) 2 mg/mL suspension 40 mg  40 mg Oral or Feeding Tube QAM AC Nawaf Ghotra PA-C   40 mg at 02/02/25 0802    polyethylene glycol (MIRALAX) Packet 17 g  17 g Oral or Feeding Tube Daily Alvaro Hutson MD   17 g at 01/30/25 0756    Prosource TF20 ENfit Compatibl EN LIQD (PROSOURCE TF20) packet 60 mL  1 packet Per Feeding Tube 4x Daily Mark Florence MD   60 mL at 02/02/25 0803    senna-docusate (SENOKOT-S/PERICOLACE) 8.6-50 MG per tablet 1 tablet  1 tablet Oral or Feeding Tube BID Alvaro Hutson MD   1 tablet at 01/29/25 2002    Or    senna-docusate (SENOKOT-S/PERICOLACE) 8.6-50 MG per tablet 2 tablet  2 tablet Oral or Feeding Tube BID Alvaro Hutson MD   2 tablet at 01/30/25 0756     Current Facility-Administered Medications   Medication Dose Route Frequency Provider Last Rate Last Admin    dexmedeTOMIDine (PRECEDEX) 4 mcg/mL in sodium chloride 0.9 % 100 mL infusion  0.1-1.2 mcg/kg/hr (Dosing  Weight) Intravenous Continuous Modesto Charles MD 4.8 mL/hr at 02/02/25 0926 0.2 mcg/kg/hr at 02/02/25 0926    dextrose 10% infusion   Intravenous Continuous PRN Willie Wilkinson PA-C        dextrose 10% infusion   Intravenous Continuous PRN Mark Florence MD        [Held by provider] furosemide (LASIX) 500 mg/50mL infusion ADULT MAX CONC  5-15 mg/hr Intravenous Continuous Anel Smith APRN CNP   Stopped at 02/02/25 0030    heparin (porcine) 100 unit/mL in 0.45% Sodium Chloride ANTICOAGULANT infusion  10-50 Units/kg/hr (Ideal) Other Continuous Ra Roberts MD 16 mL/hr at 02/02/25 0932 1,600 Units/hr at 02/02/25 0932    HYDROmorphone (DILAUDID) 0.2 mg/mL infusion ADULT/PEDS GREATER than or EQUAL to 20 kg  0.1-0.3 mg/hr Intravenous Continuous Nawaf Ghotra PA-C 1 mL/hr at 02/02/25 0800 0.2 mg/hr at 02/02/25 0800    insulin regular (MYXREDLIN) 1 unit/mL infusion  0-24 Units/hr Intravenous Continuous Willie Wilkinson PA-C 11 mL/hr at 02/02/25 0800 11 Units/hr at 02/02/25 0800    norepinephrine (LEVOPHED) 16 mg in  mL infusion MAX CONC CENTRAL LINE  0.01-0.6 mcg/kg/min (Dosing Weight) Intravenous Continuous Mark Florence MD   Stopped at 02/02/25 0245     Rafa Rick MD

## 2025-02-02 NOTE — PROGRESS NOTES
SURGICAL ICU PROGRESS NOTE    Addendum, 1440 hours:  Discussed with patient's wife, Nicole, daughter Ayesha, and son in law at bedside. We dicussed the current situation. Nicole is aware that  has improved clinically and that it is possible for him to survive this critical illness and continue to enjoy time with his family and partake in activities he enjoys in the future. Nicole reports that over the last three years, since  was ill with Covid, he has talked often about his wishes in the event he were critically ill. Nicole reports that  was experiencing increasing shortness of breath, fatigue, and weakness over the last six months and that his quality of life was already in a place that was difficult for him to accept. While Nicole and Ayesha understand that he could continue to improve they feel strongly that aggressive medical interventions should be stopped and do not align with his previously expressed wishes. Nicole reports the patient's brother, who he is quite close to, is coming to town this evening and would also like to talk with the SICU team as he shares this perspective.     Informed Dr. Roberts of the above who requested consult to palliative care for family support and to discuss GOC with the medical team. Discussed with Dr. Solomon of palliative care who will have a preliminary call with Nicole today and their team is happy to join the discussion. Plan is for a family meeting with SICU and palliative sometime tomorrow afternoon.     Abbey Smith -[facility]  Critical Care  [facility]        ASSESSMENT:   Jesus Varghese is a 64 year old male transferred on 1/28 from outside hospital for evaluation for ECMO cannulation.  In brief this is a 64-year-old male with past medical history of hypertension, colon cancer, chronic lymphocytic leukemia, psoriasis, and type 2 diabetes who presented to outside hospital with acute influenza A infection and ARDS. Patient did  not tolerate being supinated. He was cannulated for VV ECMO 01/30/25.    Overnight from 1/31 into 2/1 he had bleeding from his cannula sites. His ACT range was increased from 160 to 180. No further oozing.    TODAY  - Lasix gtt stopped overnight and 500 ml bolus given for chugging. Given rising BUN nephrology recommending CRRT and wife has agreed to this intervention.  - Stop propofol and initiate precedex with hope that this plus clearing BUN may improve patient ability to participate in GOC discussions  - resuming ASA  - discontinue propofol, switch to precedex. Goal RASS - 1 to -2  - Sweep to 5      Plan:  Neuro   # Acute Pain  - Fentanyl gtt to dilaudid gtt (fentanyl adheres to circuit)  - PRN Acetaminophen    # Sedation  - continue propofol gtt, wean to as low as possible  - Versed 4 mg q2hr  - RASS -1 to -2    # Toxic Metabolic Encephalopathy, multifactorial  - Worsening uremia may be playing a role, would expect at least 1-2 day lag time before mental status improves once BUN improving.      Pulmonary:  #ARDS  # Acute Hypercarbic and Hypoxic Respiratory Failure, secondary to Influenza A and superimposed MRSA Pneumonia  # Restrictive physiology following COVID infection, PFTS were not repeated however no obvious fibrotic change but did have chronic GGO  FiO2 (%): 60 %, Resp: 16, Inspiratory Pressure (cm H2O) (Drager Merle): 25, Vent Mode: PCV Plus assist, Resp Rate (Set): 16 breaths/min, PEEP (cm H2O): 10 cmH2O, Resp Rate (Set): 16 breaths/min, PEEP (cm H2O): 10 cmH2O  - Cannulated for VV ECMO 01/30. Right internal jugular to Right internal jugular   - Duonebs q4hr  - dexamethasone 20mg x5 days then 10 mg x 5 days  - Bronchoscopy 1/30 with minimal secretions.     # VV ECMO  Mode: V-V (2/2/2025  4:00 AM)  Blood Flow (Circuit) LPM: 5.16 LPM (2/2/2025  4:00 AM)  RPM's: 3649 (2/2/2025  4:00 AM)  Sweep LPM: 6 LPM (2/2/2025  4:00 AM)  Sweep FiO2   %: 100 % (2/2/2025  4:00 AM)  SvO2  %: 77.6 % (2/2/2025  4:00  AM)  HgB: 8.5 (2/2/2025  4:00 AM)  ACT  (seconds): 174 seconds (2/2/2025  4:00 AM)  - Cannulated for VV ECMO 01/30. Right internal jugular to Right internal jugular   - Monitoring pulses, dopplerable.  - ACT goal 160-180     Cardiovascular: EF 60-65%, no RV dysfunction (01/27)  # Essential Hypertension  # Hypotension  - Hypotension in setting of sedation related vasoplegia   - MAP Goal >65.   - NE gtt weaned off overnight.  - Echo (01/31): hyperkinetic LV function, EF>70%, ECMO cannulae not well visualized, but there is significant flow towards tricuspid valve.  -PTA ASA     GI/Nutrition:  - RD consulted. NJ in place with TF at goal.   - Bowel Regimen: Miralax BID and Senna BID- held currently due to large burden of liquid stool    # Elevated Triglycerides  - Will continue to monitor with daily CK and TG.    Renal/ Fluid Balance:  #DEEP   - Baseline 1.0-1.10.   - Creat has remained stable however worsening uremia day to day  - CRRT starting today per nephrology recs given ongoing rise in BUN, targeting 0-25 mL/h net UF   - Monitor urinary output, maintain nunez  - ICU electrolyte replacement protocol.      Endocrine:  # History of type 2 diabetes  # Stress and Steroid induced Hyperglycemia   - Insulin gtt continues  - Lantus 45 BID, monitor closely when and titrate down if needed when steroids reduced.   - Goal BG <180     Infectious Disease:  # Sepsis  # Influenza A  # MRSA Pneumonia  - Tamiflu f( 1/26 - Current) will plan on 10-day course  - Initial treatment with Vanc + Clinda (01/27 - 1/30, switched to linezolid (1/31 - current)    Cultures  - MRSA swab (01/27) positive  - Sputum (01/26) 3+ MRSA  - Blood (01/26) NGTD    Hematology:  # Acute Anemia of critical illness  - Continue to monitor.     # Chronic Lymphocytic Leukemia  - s/p 18 cycles of Acalabrutinib completed in Feb 2024.  - Please note: per recent Oncology notes (September 2024) it is noted that he was to have monitoring of CBC q 2 months due to  "evidence of lymphocytosis at that visit however patient preferred q 4 months instead. Plan was to repeat imaging and consider alternative treatment with Zanubrutinib if he developed anemia or worsening lymphocytosis.     # MSK/Skin  - PT/OT eval and treatment    # Penile Pressure Wound  - WOC consult    Prophylaxis:    - DVT: Heparin gtt (ACT goals)  - GI: PPI    Lines/ tubes/ drains:  - ETT  - ECMO RIJ  - Left internal jugular CVC  - R radial arterial line  - NJ  - nunez  - Rectal pouch    Code status: Full code.    - Per Dr. Florence: \"spoke with spouse 01/31. She reports if he was not able to recover on ECMO. She does not believe he would be okay with receiving a lung transplant and that this would be the max amount of supportive measure that would be acceptable to him.\" Will discuss with family when they arrive today.    Disposition: SICU    Total of 45 minutes critical care time, including time spent discussing with patient's family at bedside.     Abbey Smith AG-ACNP  Critical Care  [facility]      ====================================    OBJECTIVE:   1. VITAL SIGNS:   Temp:  [98.1  F (36.7  C)-98.8  F (37.1  C)] 98.6  F (37  C)  Pulse:  [] 100  Resp:  [16-22] 16  MAP:  [57 mmHg-174 mmHg] 69 mmHg  Arterial Line BP: ()/(42-62) 118/53  FiO2 (%):  [60 %] 60 %  SpO2:  [90 %-96 %] 94 %  FiO2 (%): 60 %, Resp: 16, Inspiratory Pressure (cm H2O) (Drager Merle): 25, Vent Mode: PCV Plus assist, Resp Rate (Set): 16 breaths/min, PEEP (cm H2O): 10 cmH2O, Resp Rate (Set): 16 breaths/min, PEEP (cm H2O): 10 cmH2O    2. INTAKE/ OUTPUT:   I/O last 3 completed shifts:  In: 3618.75 [I.V.:1518.75; Other:300; NG/GT:495; IV Piggyback:500]  Out: 4890 [Urine:4165; Stool:725]    3. PHYSICAL EXAMINATION:   GEN: sedated, intubated  HEENT:  Normocephalic, atraumatic, ETT secure  CV: RRR  PULM/CHEST: intubated  GI: soft, minimally distended  : nunez catheter in place, urine yellow and " clear  EXTREMITIES: 1+ peripheral edema, peripheral pulses 2+  NEURO: Sedated, upward gaze. Pupils midline, sluggishly react  SKIN: No rashes, sores or ulcerations appreciated     4. INVESTIGATIONS:   Arterial Blood Gases   Recent Labs   Lab 02/02/25  0823 02/02/25  0605 02/02/25  0402 02/02/25  0153   PH 7.38 7.38 7.37 7.39   PCO2 36 37 39 36   PO2 69* 79* 68* 62*   HCO3 22 22 22 22     Complete Blood Count   Recent Labs   Lab 02/02/25 0402 02/01/25 2201 02/01/25  1609 02/01/25  1010   WBC 17.9* 22.0* 17.8* 15.5*   HGB 8.5* 9.3* 9.1* 9.5*    262 224 213     Basic Metabolic Panel  Recent Labs   Lab 02/02/25  0602 02/02/25  0402 02/02/25  0152 02/01/25  2355 02/01/25 2201 02/01/25  1617 02/01/25  1609 02/01/25  1013 02/01/25  1010   NA  --  147*  --   --  145  --  143  --  144   POTASSIUM  --  4.1  --   --  4.3  --  4.2  --  4.2   CHLORIDE  --  107  --   --  107  --  105  --  107   CO2  --  18*  --   --  18*  --  18*  --  20*   BUN  --  193.0*  --   --  183.0*  --  171.0*  --  158.0*   CR  --  4.27*  --   --  4.24*  --  4.20*  --  4.19*   * 129*  165* 116*   < > 152*   < > 215*   < > 151*    < > = values in this interval not displayed.     Liver Function Tests  Recent Labs   Lab 02/02/25 0402 02/01/25 2201 02/01/25  1609 02/01/25  1010 02/01/25  0342 01/31/25  1003 01/31/25  0401 01/30/25  0954 01/30/25  0341 01/29/25  2154 01/29/25  1108 01/28/25  1342 01/28/25  0533 01/27/25  1600 01/27/25  0544   AST  --   --   --   --   --   --   --   --   --   --  76*  --  74* 98* 108*   ALT 75*  --   --   --  61  --  59  --  57  --  65  --  59 66 76*   ALKPHOS  --   --   --   --   --   --   --   --   --   --  76  --  79 92 85   BILITOTAL  --   --   --   --   --   --   --   --   --   --  0.6  --  0.4 0.5 0.5   ALBUMIN 2.9*  --   --   --  2.8*  --  3.1*  --  2.8*  --  3.0*  --  3.1* 3.3* 3.4*   INR 1.24* 1.19* 1.22* 1.20* 1.20*   < > 1.15   < > 1.10   < >  --    < >  --   --   --     < > = values in this  interval not displayed.     Pancreatic Enzymes  No lab results found in last 7 days.  Coagulation Profile  Recent Labs   Lab 02/02/25  0402 02/01/25  2201 02/01/25  1609 02/01/25  1010   INR 1.24* 1.19* 1.22* 1.20*   PTT 64* 61* 56* 62*         5. RADIOLOGY:   Recent Results (from the past 24 hours)   XR Chest Port 1 View    Narrative    Exam: XR CHEST PORT 1 VIEW, 2/2/2025 1:52 AM    Indication: ECMO cannula check, ETT check, ongoing ARDS monitoring    Comparison: Prior radiographs, including 2/1/2025    Findings:   Portable supine AP view of the chest. Endotracheal tube tip projects  over the midthoracic trachea. Left IJ central venous catheter tip at  the mid SVC. Stable ECMO cannula. Feeding tube courses below the level  of the diaphragm and out of the field-of-view.    Trachea is midline. Unchanged silhouetting of the cardiac borders.  Aortic arch calcifications. Low lung volumes. Unchanged diffuse  bilateral mixed pulmonary opacities. Continued retrocardiac opacity  with air bronchograms. No definite pneumothorax. Probable trace  bilateral pleural effusions.      Impression    Impression:   1.  Stable diffuse bilateral mixed pulmonary opacities.  2.  Stable position of support devices.  3.  Probable trace bilateral pleural effusions.    I have personally reviewed the examination and initial interpretation  and I agree with the findings.    MELANIE MURRAY DO         SYSTEM ID:  [id number]       =========================================

## 2025-02-02 NOTE — PLAN OF CARE
Goal Outcome Evaluation:   Major Shift Events: Assumed care from 2605-5470. Weaned sedation off of propofol and transitioned to precedex. Opening eyes, deviated up, PERRL- pupils 3mm. SR, normotensive and afebrile. Doppler LE. PCP+ 60%, 25/10/16, -500s. Minimal secretions via ETT/orally. LS clear/dim. NJ with TF @ goal, SFWF. Rectal tube with small output. Portillo with adequate UOP. CRRT started, 4K bath with goal of net neg 0-25 ml/hr. Precedex, dilaudid and insulin infusing. Hep gtt increased to 1600 units/hr r/t ACT goal.     Plan: Continue POC  For vital signs and complete assessments, please see documentation flowsheets.

## 2025-02-02 NOTE — CONSULTS
Palliative Care Consultation Note  [facility]      Patient: Jesus Varghese  Date of Admission:  1/28/2025    Requesting Clinician / Team: SICU  Reason for consult: Goals of care, Decisional support, and Patient and family support     Recommendations & Counseling     GOALS OF CARE:   See discussion below with wife.  While patient has been clinically improving, she and the rest of the family agreed patient would not want to continue with this level of care and would like to move towards a comfort focused plan of care.  Care conference requested by the patient/family.  Defer to primary team to facilitate this multispecialty meeting.  The palliative care team would be pleased to assist and is available anytime after late morning on 2/3 to provide specialty trained communication in support to the patient, family, and medical team.      ADVANCE CARE PLANNING:  No health care directive on file. Per system policy, Surrogate Decision-makers for Patients With Diminished Decision-making Capacity offers guidance on possible decision-makers.  Wife Nicole has been identified as a surrogate decision maker.   There is no POLST form on file, defer to patient and/or next of kin for decisions   Code status: Full Code    MEDICAL MANAGEMENT:   We are not actively managing symptoms at this time.    PSYCHOSOCIAL/SPIRITUAL:  Family Wife, and daughter, as well as brother.  Druze brian is incredibly important to patient and his family.  I placed a  consult today.    Palliative Care will continue to follow. Thank you for the consult and allowing us to aid in the care of Jesus Varghese.      Ama Solomon, DO  Securely message with [employer] (more info)  Text page via Risk Ident Paging/Directory           55 MINUTES SPENT BY ME on the date of service doing chart review, history, exam, documentation & further activities per the note.     Palliative Summary/HPI     Jesus URBINA  "Abimael is a 64 year old male with a past medical history of progressive lung difficulties since Covid-19 infection in 02/2022, colon cancer (s/p partial colectomy and chemotherapy), and stage II bulky CLL s/p 8 cycles acalabrutinib w/ CR) who was transferred from [facility] for evaluation for ECMO cannulation 2/2 severe respiratory failure and ARDS from influenza A pneumonia with superimposed MRSA pneumonia. He was cannulated for VV ECMO 01/30/25. Palliative Care was consulted for assistance with goals of care discussion and family support.    Today, the patient was seen for:  VV ECMO  ARDS  Patient and family support  Goals of care discussion    Palliative Care Summary:   Evaluated patient in room. He was alone.    Called wife, Nicole, over the phone. Introduced the role of palliative care as an interdisciplinary team that cares for patients with serious illness to help support symptom management, communication, coping for patients and their families as well as support with medical decision making.    She confirms the statements that she made when talking with SICU.    She states that she has been with them for 43 years, and he has repeatedly said \"no life support\".  She had no idea or expectation of what was going to happen when he was transferred down here, and she has no criticisms of the care that he has received so prior.  She just knows that based on their conversations and his prior health experiences, he would not want to continue with his current level of care.    She recounts the multiple instances of medical complications he has had over his life, including going for colon cancer treatment, CLL treatment, blood infections, kidney issues, and \"a liver that does not play nice with him\".  He previously had not wanted to continue to go in for routine lab monitoring for his CLL.  She understands there is a chance he could get back to doing some of the things he enjoys, however she feels it would take too much time, " "and \"he is already tired\".  \"If he could not be on his Speedy by 5/1, forget it\".  Ever since he had COVID 3 years ago, he really started to struggle with breathing issues and decreased quality of life, and she notes specifically seeing a decline over the last 3 to 4 months.    She states right now watching him go through all of this, and each time the new machine is added, \"it feels like I am breaking my 43 years vows\".    She states that she, their daughter Ayesha, and patient's brother are all in agreement that he would not want to go through what he is going through right now.  Their goal is to make him comfortable, \"so he can go to heaven\".    Prognosis, Goals, & Planning:    Functional Status just prior to this current hospitalization:  Outpatient Palliative Performance Score (PPS) 60%  Significant disease.  Normal or reduced intake. Normal LOC or confusion. Reduced ambulation, some assist w/self-care, unable to work/do housework.      Prognosis, Goals, and/or Advance Care Planning:  See above conversation with wife.    Code Status was addressed today:   No      Patient's decision making preferences: unable to assess        Patient has decision-making capacity today for complex decisions: No.            Coping, Meaning, & Spirituality:   Mood, coping, and/or meaning in the context of serious illness were addressed today: No    Social:   Lives with his wife.  Has a daughter.  Very strong Tenriism brian.    Medications:  I have reviewed this patient's medication profile and medications from this hospitalization.       ROS:  Comprehensive ROS is reviewed and is negative except as here & per HPI:     Physical Exam   Vital Signs with Ranges  Temp:  [98.1  F (36.7  C)-98.8  F (37.1  C)] 98.6  F (37  C)  Pulse:  [] 106  Resp:  [16-22] 17  MAP:  [57 mmHg-174 mmHg] 91 mmHg  Arterial Line BP: ()/(42-72) 144/70  FiO2 (%):  [60 %] 60 %  SpO2:  [86 %-100 %] 98 %  213 lbs 6.48 oz    PHYSICAL EXAM:  Vitals were " reviewed  Temp: 98.6  F (37  C) Temp src: Esophageal   Pulse: 106   Resp: 17 SpO2: 98 % O2 Device: Mechanical Ventilator    Gen: Lying in bed.  No acute distress.  ENT:  Intubated.  Msk: no gross deformity  Skin:  no jaundice  Neuro: Sleeping    Data reviewed:  Lab Results   Component Value Date    WBC 20.9 (H) 02/02/2025    HGB 8.2 (L) 02/02/2025    HCT 24.1 (L) 02/02/2025     02/02/2025     (H) 02/02/2025    POTASSIUM 3.9 02/02/2025    CHLORIDE 112 (H) 02/02/2025    CO2 19 (L) 02/02/2025    .0 (H) 02/02/2025    CR 3.68 (H) 02/02/2025     (H) 02/02/2025     (H) 02/06/2022    DD 0.99 (H) 02/02/2025    NTBNPI 103 01/26/2025    AST 76 (H) 01/29/2025    ALT 75 (H) 02/02/2025    ALKPHOS 76 01/29/2025    BILITOTAL 0.6 01/29/2025    BILIDIRECT 0.09 02/09/2017    INR 1.22 (H) 02/02/2025       IMAGING: CXR 2/2/25- Impression:   1.  Stable diffuse bilateral mixed pulmonary opacities.  2.  Stable position of support devices.  3.  Probable trace bilateral pleural effusions.

## 2025-02-02 NOTE — PROGRESS NOTES
[facility]    ECLS Shift Summary:     ECMO Equipment:  Console Serial Number: [id number]  Circuit Lot Number: [id number]  Oxygenator Lot Number: [id number]          Circuit Assessment: Free of fibrin, clot, and air    Venous ECMO Cannula: 30 Fr [place] in the Right Internal Jugular Vein     ECMO Cannula Venous Right internal jugular-Site Assessment: WDL  ECMO Cannula Venous Right internal jugular-Site Intervention: No intervention needed    Patient remains on V-V ECMO, all equipment is functioning and alarms are appropriately set. RPM's: 3649 with Blood Flow (Circuit) LPM  Av.1 LPM  Min: 4.92 LPM  Max: 5.16 LPM L/min. Sweep is at 6 LPM and 100 %. Extremities are warm.     Significant Shift Events: Pt had episode of chugging early this AM. Chugging resolved with a 500 mL fluid bolus.    Vent settings:  FiO2 (%): 60 %, Resp: 16, Inspiratory Pressure (cm H2O) (Drager Merle): 25, Vent Mode: PCV Plus assist, Resp Rate (Set): 16 breaths/min, PEEP (cm H2O): 10 cmH2O, Resp Rate (Set): 16 breaths/min, PEEP (cm H2O): 10 cmH2O    Anticoagulation:  Dose (units/hr) HEParin: 1400 Units/hr  Rate (mL/hr) HEParin: 14 mL/hr  Concentration HEParin: 100 Units/mL        Most recent: ACT  (seconds): 174 seconds    Urine output is charted per RN.  .  Blood loss was none. Product given included doron.     Intake/Output Summary (Last 24 hours) at 2025 0615  Last data filed at 2025 0500  Gross per 24 hour   Intake 3529.45 ml   Output 4690 ml   Net -1160.55 ml       Labs:  Recent Labs   Lab 25  0605 25  0402 25  0153 25  2355   PH 7.38 7.37 7.39 7.40   PCO2 37 39 36 35   PO2 79* 68* 62* 77*   HCO3 22 22 22 22   O2PER 60 60  100  100  60 60 60       Lab Results   Component Value Date    HGB 8.5 (L) 2025    PHGB 60 (H) 2025     2025    FIBR 257 2025    INR 1.24 (H) 2025    PTT 64 (H) 2025    DD 0.99 (H) 2025     NIDIA 77 (L) 02/01/2025       Plan is continue VV ECMO.    Hemalatha Devi, RT  ECMO Specialist  2/2/2025 6:15 AM

## 2025-02-02 NOTE — PLAN OF CARE
Goal Outcome Evaluation:  Major Shift Events: Weaning sedation- opening eyes to pain, PERRL pupils 3 mm. SR, normotensive to hypotensive with norepi infusing to maintain MAP goals >65, afebrile. PCP+ 60% 25/16/10 with TV ranging 300-500's. LS diminished and wheezy. Some coughing today with sedation wean, prn propofol bolus and one time versed IV prn for large turn. SPO2 >90% today. ECMO circuit without and chugging. No bleeding at site, heparin gtt@ 1300 units/hr. 6 of sweep @ 100 FiO2, PO2 67-79 today. TF @ goal via NJ, rectal tube with 325 ml output. Portillo with 100-350 ml/hr output today. 60 mg IV lasix and lasix gtt started this afternoon for goal of 200-300 ml/hr UOP- titrating to achieve goal. L internal jugular infusing norepi, insulin (A4+6) and TKO for abx. Bilat PIVs, R PIVs infusing propofol @ 30 & Dilaudid @ 0.2 and lasix @ 15 mg/hr. R radial art line pulsatile flow. Pt family at bedside briefly today with SICU resident and fellow present.     Plan: Continue to monitor UOP and notify SICU if UOP goal is not achieved, otherwise continue to follow POC.  For vital signs and complete assessments, please see documentation flowsheets.

## 2025-02-02 NOTE — PROGRESS NOTES
Venovenous (VV) ECMO Fellow Progress Note  2/2/2025    64 year old male who was started on ECMO due to severe respiratory failure from influenza A pneumonia with superimposed MRSA pneumonia.     Interval events: No events o/n. Given 500 ml bolus x 1 due to decreased ECMO flows.     The patients vital signs today are as follows:    Temp:  [98.1  F (36.7  C)-98.8  F (37.1  C)] 98.8  F (37.1  C)  Pulse:  [] 119  Resp:  [16-22] 16  MAP:  [57 mmHg-174 mmHg] 81 mmHg  Arterial Line BP: ()/(42-62) 159/58  FiO2 (%):  [60 %] 60 %  SpO2:  [86 %-99 %] 86 %    Intake/Output Summary (Last 24 hours) at 2/2/2025 1057  Last data filed at 2/2/2025 1000  Gross per 24 hour   Intake 3465.43 ml   Output 5005 ml   Net -1539.57 ml    FiO2 (%): 60 %, Resp: 16, Inspiratory Pressure (cm H2O) (Drager Merle): 25, Vent Mode: PCV Plus assist, Resp Rate (Set): 16 breaths/min, PEEP (cm H2O): 10 cmH2O, Resp Rate (Set): 16 breaths/min, PEEP (cm H2O): 10 cmH2O   Recent Labs   Lab 02/02/25  1015 02/02/25  0823 02/02/25  0605 02/02/25  0402   PH 7.38 7.38 7.38 7.37   PCO2 39 36 37 39   PO2 87 69* 79* 68*   HCO3 23 22 22 22   O2PER 60 60 60 60  100  100  60      Recent Labs   Lab 02/02/25  1015 02/02/25  0402 02/01/25  2201 02/01/25  1609   WBC 20.9* 17.9* 22.0* 17.8*   HGB 8.2* 8.5* 9.3* 9.1*     Creatinine   Date Value Ref Range Status   02/02/2025 4.27 (H) 0.67 - 1.17 mg/dL Final   02/01/2025 4.24 (H) 0.67 - 1.17 mg/dL Final   02/01/2025 4.20 (H) 0.67 - 1.17 mg/dL Final   02/01/2025 4.19 (H) 0.67 - 1.17 mg/dL Final   12/14/2020 1.13 0.70 - 1.30 mg/dL Final   07/15/2019 1.07 0.70 - 1.30 mg/dL Final   11/20/2018 0.98 0.70 - 1.30 mg/dL Final   04/29/2016 0.91 0.70 - 1.30 mg/dL      Physical Exam:   Cannula positioned unchanged externally.  Minimal oozing.  Decreased air entry bilaterally  Deeply sedated  Extremities edematous    ECMO Issues including assessments and plan on DOS 2/2/2025:    Neuro: Sedated for mechanical ventilation and  ECMO.  RASS -3 this am. On propofol 30 - will wean off today, transition to precedex if additional sedation needed. Triglycerides slightly increased from yesterday but CK continues to be wnl/low. NIRS unremarkable  RASS goal -1 to -2.   CV: HDS, off levophed.  Echo 1/31 with difficulty demonstrating cannula outflow but appears to have laminar flow directed towards tricuspid valve implying cannula position is adequate. CXR shows cannula likely a bit low, but adequate based on flows and ABG.  Pulm: Severe respiratory failure requiring ECMO and mechanical ventilation. Flows 5Lpm, patient ABG acceptable. Keep vent settings at rest settings: PC 25, PEEP10, FiO2 60%. No desaturations o/n, now only minimal with coughing. Continue decadron for ARDS. TVs improved today - now 450+.  FEN/Renal: Creatinine/BUN continue to rise, but appears to plateau. Tolerated diuresis yesterday, net negative 1L+ on lasix gtt. Held this am due to decreased ECMO flows. Nephrology recommends CRRT for fluid removal, which is reasonable. Making urine. Will attempt gentle diuresis today - goal net even to -500 net negative.  Heme: Hemoglobin downtrending, but no signs bleeding. Heparin gtt, goal -180.  Plasma free Hgb increased 1/31  - lipemic sample yesterday, now downtrending.  Goals: if O2 sat >85% Hgb may drift to 7-8.  If O2 Sat <85% keep Hgb 10-12.  ID: Switched to linezolid 1/31 for better alveolor penetration for MRSA pneumonia due to increased leukocytosis and desaturations. WBC remain elevated, but downtrending. Continue oseltamivir for influenza A pneumonia.  GI: Tfs at goal. PPI.   Endo: insulin gtt.      All pertinent labs, imaging studies, physical exam and medications have been reviewed by me.   This patient requires continued ICU monitoring and cares.  I apprecitate the input of the SICU team for these issues.  I have discussed patient care and treatment plan with the SICU team and will discuss with the patient's family.          Diaz Roberts  [facility] Fellow  Pager [phone]

## 2025-02-02 NOTE — PROGRESS NOTES
Patient continues on VV ECMO,also CRRT was started this am approx. 1030 per 7a-11a RN.Goal of CRRT is net neg 0-25mls/hr, patient is also putting out minimally 100cc/hr per nunez, Creat and BUN, very elevated, please see lab data on results review , replacing 2gm Cagluc. As per order, B/P getting soft around 1700, dropped fluid removal to 215cc/hr.Patient had propofol stopped on morning before 11am, patient is now awake and responds appropriatly wiggles toeas and squeezes hand to simple motor commands, Does not look this author in eyes, ABBIE gutierrez.. Family distraught as they do not want to keep patient on support as he and his wife had conversations in past about patient not wanting these measures. Meeting with family, Drs and Palliative tomorrow.Bother came to see patient and very tearful. Monitor all parameters and continue  plan of care.Support family.

## 2025-02-02 NOTE — PROGRESS NOTES
CRRT STATUS NOTE    DATA:  Time:  5:43 PM  Pressures WNL:  Yes  Filter Status:  WDL    Problems Reported/Alarms Noted:  None    Supplies Present:  Yes    ASSESSMENT:  Patient Net Fluid Balance:  Net IO Since Admission: -775.92 mL [02/02/25 1743]     Intake/Output Summary (Last 24 hours) at 2/2/2025 1743  Last data filed at 2/2/2025 1600  Gross per 24 hour   Intake 3047.96 ml   Output 4386 ml   Net -1338.04 ml      Vitals:  Temp:  [97.9  F (36.6  C)-98.8  F (37.1  C)] 97.9  F (36.6  C)  Pulse:  [] 107  Resp:  [16-30] 30  MAP:  [60 mmHg-174 mmHg] 84 mmHg  Arterial Line BP: (100-169)/(46-72) 135/65  FiO2 (%):  [60 %] 60 %  SpO2:  [86 %-100 %] 98 %   Labs:    Lab Results   Component Value Date     (H) 02/02/2025    POTASSIUM 4.3 02/02/2025    CR 3.25 (H) 02/02/2025    .0 (H) 02/02/2025    MAG 2.8 (H) 02/02/2025    PHOS 5.2 (H) 02/02/2025    WBC 32.1 (H) 02/02/2025    HGB 8.7 (L) 02/02/2025    HCT 25.0 (L) 02/02/2025     02/02/2025           Goals of Therapy:  0-25 mL/h net neg     INTERVENTIONS:   Review flow sheets and discuss plan of care with bedside nurse and nephrology team.    PLAN:  Continue fluid removal as tolerated per goals of therapy.  Check filter daily and change filter q 72 hrs and PRN.  Please contact the CRRT resource RN at [phone] with any questions/concerns.

## 2025-02-03 VITALS
TEMPERATURE: 98.6 F | RESPIRATION RATE: 23 BRPM | DIASTOLIC BLOOD PRESSURE: 64 MMHG | BODY MASS INDEX: 33.23 KG/M2 | SYSTOLIC BLOOD PRESSURE: 107 MMHG | OXYGEN SATURATION: 98 % | WEIGHT: 209 LBS | HEART RATE: 86 BPM

## 2025-02-03 LAB
ACANTHOCYTES BLD QL SMEAR: SLIGHT
ACT BLD: 166 SECONDS (ref 74–150)
ACT BLD: 166 SECONDS (ref 74–150)
ACT BLD: 170 SECONDS (ref 74–150)
ACT BLD: 170 SECONDS (ref 74–150)
ALBUMIN SERPL BCG-MCNC: 3.1 G/DL (ref 3.5–5.2)
ALBUMIN SERPL BCG-MCNC: 3.2 G/DL (ref 3.5–5.2)
ALLEN'S TEST: ABNORMAL
ALP SERPL-CCNC: 53 U/L (ref 40–150)
ALT SERPL W P-5'-P-CCNC: 81 U/L (ref 0–70)
ANION GAP SERPL CALCULATED.3IONS-SCNC: 12 MMOL/L (ref 7–15)
ANION GAP SERPL CALCULATED.3IONS-SCNC: 13 MMOL/L (ref 7–15)
ANION GAP SERPL CALCULATED.3IONS-SCNC: 13 MMOL/L (ref 7–15)
APTT PPP: 70 SECONDS (ref 22–38)
APTT PPP: 84 SECONDS (ref 22–38)
AST SERPL W P-5'-P-CCNC: 76 U/L (ref 0–45)
AT III ACT/NOR PPP CHRO: 94 % (ref 85–135)
BASE EXCESS BLDA CALC-SCNC: -0.4 MMOL/L (ref -3–3)
BASE EXCESS BLDA CALC-SCNC: -0.5 MMOL/L (ref -3–3)
BASE EXCESS BLDA CALC-SCNC: -0.8 MMOL/L (ref -3–3)
BASE EXCESS BLDA CALC-SCNC: -1 MMOL/L (ref -3–3)
BASE EXCESS BLDA CALC-SCNC: -1.2 MMOL/L (ref -3–3)
BASE EXCESS BLDA CALC-SCNC: -1.3 MMOL/L (ref -3–3)
BASE EXCESS BLDA CALC-SCNC: -1.5 MMOL/L (ref -3–3)
BASE EXCESS BLDA CALC-SCNC: 0.1 MMOL/L (ref -3–3)
BASE EXCESS BLDV CALC-SCNC: -1.2 MMOL/L (ref -3–3)
BASE EXCESS BLDV CALC-SCNC: -1.2 MMOL/L (ref -3–3)
BASOPHILS # BLD MANUAL: 0 10E3/UL (ref 0–0.2)
BASOPHILS NFR BLD MANUAL: 0 %
BILIRUB DIRECT SERPL-MCNC: 0.38 MG/DL (ref 0–0.3)
BILIRUB SERPL-MCNC: 0.6 MG/DL
BUN SERPL-MCNC: 100 MG/DL (ref 8–23)
BUN SERPL-MCNC: 102 MG/DL (ref 8–23)
BUN SERPL-MCNC: 118 MG/DL (ref 8–23)
BURR CELLS BLD QL SMEAR: ABNORMAL
C DIFF TOX B STL QL: NEGATIVE
CA-I BLD-MCNC: 4.4 MG/DL (ref 4.4–5.2)
CA-I BLD-MCNC: 4.5 MG/DL (ref 4.4–5.2)
CALCIUM SERPL-MCNC: 8.2 MG/DL (ref 8.8–10.4)
CALCIUM SERPL-MCNC: 8.3 MG/DL (ref 8.8–10.4)
CALCIUM SERPL-MCNC: 8.3 MG/DL (ref 8.8–10.4)
CHLORIDE SERPL-SCNC: 104 MMOL/L (ref 98–107)
CHLORIDE SERPL-SCNC: 105 MMOL/L (ref 98–107)
CHLORIDE SERPL-SCNC: 106 MMOL/L (ref 98–107)
CK SERPL-CCNC: 48 U/L (ref 39–308)
CREAT SERPL-MCNC: 1.97 MG/DL (ref 0.67–1.17)
CREAT SERPL-MCNC: 2 MG/DL (ref 0.67–1.17)
CREAT SERPL-MCNC: 2.31 MG/DL (ref 0.67–1.17)
D DIMER PPP FEU-MCNC: 1.12 UG/ML FEU (ref 0–0.5)
DACRYOCYTES BLD QL SMEAR: SLIGHT
DACRYOCYTES BLD QL SMEAR: SLIGHT
EGFRCR SERPLBLD CKD-EPI 2021: 30 ML/MIN/1.73M2
EGFRCR SERPLBLD CKD-EPI 2021: 36 ML/MIN/1.73M2
EGFRCR SERPLBLD CKD-EPI 2021: 37 ML/MIN/1.73M2
ELLIPTOCYTES BLD QL SMEAR: SLIGHT
EOSINOPHIL # BLD MANUAL: 0 10E3/UL (ref 0–0.7)
EOSINOPHIL # BLD MANUAL: 1.1 10E3/UL (ref 0–0.7)
EOSINOPHIL NFR BLD MANUAL: 0 %
EOSINOPHIL NFR BLD MANUAL: 2 %
ERYTHROCYTE [DISTWIDTH] IN BLOOD BY AUTOMATED COUNT: 13.8 % (ref 10–15)
ERYTHROCYTE [DISTWIDTH] IN BLOOD BY AUTOMATED COUNT: 14.1 % (ref 10–15)
ERYTHROCYTE [DISTWIDTH] IN BLOOD BY AUTOMATED COUNT: 14.2 % (ref 10–15)
ERYTHROCYTE [DISTWIDTH] IN BLOOD BY AUTOMATED COUNT: 14.2 % (ref 10–15)
ERYTHROCYTE [DISTWIDTH] IN BLOOD BY AUTOMATED COUNT: 14.3 % (ref 10–15)
FIBRINOGEN PPP-MCNC: 276 MG/DL (ref 170–510)
FRAGMENTS BLD QL SMEAR: SLIGHT
GIANT PLATELETS BLD QL SMEAR: SLIGHT
GLUCOSE BLDC GLUCOMTR-MCNC: 101 MG/DL (ref 70–99)
GLUCOSE BLDC GLUCOMTR-MCNC: 119 MG/DL (ref 70–99)
GLUCOSE BLDC GLUCOMTR-MCNC: 123 MG/DL (ref 70–99)
GLUCOSE BLDC GLUCOMTR-MCNC: 156 MG/DL (ref 70–99)
GLUCOSE BLDC GLUCOMTR-MCNC: 88 MG/DL (ref 70–99)
GLUCOSE BLDC GLUCOMTR-MCNC: 90 MG/DL (ref 70–99)
GLUCOSE BLDC GLUCOMTR-MCNC: 98 MG/DL (ref 70–99)
GLUCOSE SERPL-MCNC: 133 MG/DL (ref 70–99)
GLUCOSE SERPL-MCNC: 144 MG/DL (ref 70–99)
GLUCOSE SERPL-MCNC: 172 MG/DL (ref 70–99)
HCO3 BLD-SCNC: 24 MMOL/L (ref 21–28)
HCO3 BLD-SCNC: 25 MMOL/L (ref 21–28)
HCO3 BLD-SCNC: 25 MMOL/L (ref 21–28)
HCO3 BLDA-SCNC: 25 MMOL/L (ref 21–28)
HCO3 BLDV-SCNC: 24 MMOL/L (ref 21–28)
HCO3 BLDV-SCNC: 25 MMOL/L (ref 21–28)
HCO3 SERPL-SCNC: 21 MMOL/L (ref 22–29)
HCT VFR BLD AUTO: 22 % (ref 40–53)
HCT VFR BLD AUTO: 22.4 % (ref 40–53)
HCT VFR BLD AUTO: 23.2 % (ref 40–53)
HCT VFR BLD AUTO: 24.5 % (ref 40–53)
HCT VFR BLD AUTO: 26.2 % (ref 40–53)
HGB BLD-MCNC: 7.5 G/DL (ref 13.3–17.7)
HGB BLD-MCNC: 7.6 G/DL (ref 13.3–17.7)
HGB BLD-MCNC: 7.7 G/DL (ref 13.3–17.7)
HGB BLD-MCNC: 8 G/DL (ref 13.3–17.7)
HGB BLD-MCNC: 8.2 G/DL (ref 13.3–17.7)
HGB FREE PLAS-MCNC: 90 MG/DL
INR PPP: 1.23 (ref 0.85–1.15)
INR PPP: 1.24 (ref 0.85–1.15)
LACTATE SERPL-SCNC: 2 MMOL/L (ref 0.7–2)
LACTATE SERPL-SCNC: 2 MMOL/L (ref 0.7–2)
LDH SERPL L TO P-CCNC: 846 U/L (ref 0–250)
LYMPHOCYTES # BLD MANUAL: 23.8 10E3/UL (ref 0.8–5.3)
LYMPHOCYTES # BLD MANUAL: 33.6 10E3/UL (ref 0.8–5.3)
LYMPHOCYTES # BLD MANUAL: 41.3 10E3/UL (ref 0.8–5.3)
LYMPHOCYTES # BLD MANUAL: 45.5 10E3/UL (ref 0.8–5.3)
LYMPHOCYTES NFR BLD MANUAL: 49 %
LYMPHOCYTES NFR BLD MANUAL: 59 %
LYMPHOCYTES NFR BLD MANUAL: 60 %
LYMPHOCYTES NFR BLD MANUAL: 70 %
MAGNESIUM SERPL-MCNC: 2.7 MG/DL (ref 1.7–2.3)
MAGNESIUM SERPL-MCNC: 2.7 MG/DL (ref 1.7–2.3)
MCH RBC QN AUTO: 28.3 PG (ref 26.5–33)
MCH RBC QN AUTO: 29 PG (ref 26.5–33)
MCH RBC QN AUTO: 29.1 PG (ref 26.5–33)
MCHC RBC AUTO-ENTMCNC: 30.5 G/DL (ref 31.5–36.5)
MCHC RBC AUTO-ENTMCNC: 32.8 G/DL (ref 31.5–36.5)
MCHC RBC AUTO-ENTMCNC: 33.5 G/DL (ref 31.5–36.5)
MCHC RBC AUTO-ENTMCNC: 34.1 G/DL (ref 31.5–36.5)
MCHC RBC AUTO-ENTMCNC: 34.4 G/DL (ref 31.5–36.5)
MCV RBC AUTO: 85 FL (ref 78–100)
MCV RBC AUTO: 86 FL (ref 78–100)
MCV RBC AUTO: 93 FL (ref 78–100)
METAMYELOCYTES # BLD MANUAL: 0.5 10E3/UL
METAMYELOCYTES # BLD MANUAL: 0.6 10E3/UL
METAMYELOCYTES # BLD MANUAL: 0.7 10E3/UL
METAMYELOCYTES NFR BLD MANUAL: 1 %
MONOCYTES # BLD MANUAL: 0.5 10E3/UL (ref 0–1.3)
MONOCYTES # BLD MANUAL: 1.1 10E3/UL (ref 0–1.3)
MONOCYTES # BLD MANUAL: 1.4 10E3/UL (ref 0–1.3)
MONOCYTES # BLD MANUAL: 2.6 10E3/UL (ref 0–1.3)
MONOCYTES NFR BLD MANUAL: 1 %
MONOCYTES NFR BLD MANUAL: 2 %
MONOCYTES NFR BLD MANUAL: 2 %
MONOCYTES NFR BLD MANUAL: 4 %
MYELOCYTES # BLD MANUAL: 0.6 10E3/UL
MYELOCYTES # BLD MANUAL: 2.8 10E3/UL
MYELOCYTES NFR BLD MANUAL: 1 %
MYELOCYTES NFR BLD MANUAL: 4 %
NEUTROPHILS # BLD MANUAL: 16.3 10E3/UL (ref 1.6–8.3)
NEUTROPHILS # BLD MANUAL: 20 10E3/UL (ref 1.6–8.3)
NEUTROPHILS # BLD MANUAL: 22.8 10E3/UL (ref 1.6–8.3)
NEUTROPHILS # BLD MANUAL: 23.4 10E3/UL (ref 1.6–8.3)
NEUTROPHILS NFR BLD MANUAL: 25 %
NEUTROPHILS NFR BLD MANUAL: 34 %
NEUTROPHILS NFR BLD MANUAL: 35 %
NEUTROPHILS NFR BLD MANUAL: 47 %
NRBC # BLD AUTO: 0.7 10E3/UL
NRBC # BLD AUTO: 1.4 10E3/UL
NRBC BLD MANUAL-RTO: 1 %
NRBC BLD MANUAL-RTO: 2 %
O2/TOTAL GAS SETTING VFR VENT: 100 %
O2/TOTAL GAS SETTING VFR VENT: 100 %
O2/TOTAL GAS SETTING VFR VENT: 60 %
OXYHGB MFR BLDA: 94 % (ref 92–100)
OXYHGB MFR BLDA: 97 % (ref 92–100)
OXYHGB MFR BLDA: 98 % (ref 92–100)
OXYHGB MFR BLDA: 99 % (ref 75–100)
OXYHGB MFR BLDV: 83 %
OXYHGB MFR BLDV: 95 % (ref 70–75)
PCO2 BLD: 37 MM HG (ref 35–45)
PCO2 BLD: 39 MM HG (ref 35–45)
PCO2 BLD: 39 MM HG (ref 35–45)
PCO2 BLD: 41 MM HG (ref 35–45)
PCO2 BLD: 42 MM HG (ref 35–45)
PCO2 BLDA: 48 MM HG (ref 35–45)
PCO2 BLDV: 42 MM HG (ref 40–50)
PCO2 BLDV: 50 MM HG (ref 40–50)
PEEP: 10 CM H2O
PEEP: 10 CM H2O
PH BLD: 7.37 [PH] (ref 7.35–7.45)
PH BLD: 7.37 [PH] (ref 7.35–7.45)
PH BLD: 7.38 [PH] (ref 7.35–7.45)
PH BLD: 7.39 [PH] (ref 7.35–7.45)
PH BLD: 7.4 [PH] (ref 7.35–7.45)
PH BLD: 7.41 [PH] (ref 7.35–7.45)
PH BLD: 7.42 [PH] (ref 7.35–7.45)
PH BLDA: 7.32 [PH] (ref 7.35–7.45)
PH BLDV: 7.31 [PH] (ref 7.32–7.43)
PH BLDV: 7.37 [PH] (ref 7.32–7.43)
PHOSPHATE SERPL-MCNC: 5.4 MG/DL (ref 2.5–4.5)
PHOSPHATE SERPL-MCNC: 5.6 MG/DL (ref 2.5–4.5)
PLAT MORPH BLD: ABNORMAL
PLATELET # BLD AUTO: 287 10E3/UL (ref 150–450)
PLATELET # BLD AUTO: 321 10E3/UL (ref 150–450)
PLATELET # BLD AUTO: 348 10E3/UL (ref 150–450)
PLATELET # BLD AUTO: 393 10E3/UL (ref 150–450)
PLATELET # BLD AUTO: 414 10E3/UL (ref 150–450)
PO2 BLD: 109 MM HG (ref 80–105)
PO2 BLD: 109 MM HG (ref 80–105)
PO2 BLD: 117 MM HG (ref 80–105)
PO2 BLD: 131 MM HG (ref 80–105)
PO2 BLD: 77 MM HG (ref 80–105)
PO2 BLD: 81 MM HG (ref 80–105)
PO2 BLD: 82 MM HG (ref 80–105)
PO2 BLDA: 325 MM HG (ref 80–105)
PO2 BLDV: 55 MM HG (ref 25–47)
PO2 BLDV: 85 MM HG (ref 25–47)
POLYCHROMASIA BLD QL SMEAR: SLIGHT
POTASSIUM SERPL-SCNC: 4.3 MMOL/L (ref 3.4–5.3)
POTASSIUM SERPL-SCNC: 4.4 MMOL/L (ref 3.4–5.3)
POTASSIUM SERPL-SCNC: 4.8 MMOL/L (ref 3.4–5.3)
PROMYELOCYTES # BLD MANUAL: 1 10E3/UL
PROMYELOCYTES NFR BLD MANUAL: 2 %
PROT SERPL-MCNC: 5 G/DL (ref 6.4–8.3)
RBC # BLD AUTO: 2.59 10E6/UL (ref 4.4–5.9)
RBC # BLD AUTO: 2.65 10E6/UL (ref 4.4–5.9)
RBC # BLD AUTO: 2.69 10E6/UL (ref 4.4–5.9)
RBC # BLD AUTO: 2.83 10E6/UL (ref 4.4–5.9)
RBC # BLD AUTO: 2.9 10E6/UL (ref 4.4–5.9)
RBC MORPH BLD: ABNORMAL
SAO2 % BLDA: 100 % (ref 95–96)
SAO2 % BLDA: 96.3 % (ref 95–96)
SAO2 % BLDA: 96.3 % (ref 95–96)
SAO2 % BLDA: 96.4 % (ref 95–96)
SAO2 % BLDA: 98.8 % (ref 95–96)
SAO2 % BLDA: 98.9 % (ref 95–96)
SAO2 % BLDA: 99.1 % (ref 95–96)
SAO2 % BLDA: >100 % (ref 95–96)
SAO2 % BLDV: 84.6 % (ref 70–75)
SAO2 % BLDV: 96.9 % (ref 70–75)
SMUDGE CELLS BLD QL SMEAR: PRESENT
SMUDGE CELLS BLD QL SMEAR: PRESENT
SODIUM SERPL-SCNC: 137 MMOL/L (ref 135–145)
SODIUM SERPL-SCNC: 139 MMOL/L (ref 135–145)
SODIUM SERPL-SCNC: 140 MMOL/L (ref 135–145)
TARGETS BLD QL SMEAR: SLIGHT
TOXIC GRANULES BLD QL SMEAR: PRESENT
TOXIC GRANULES BLD QL SMEAR: PRESENT
TRIGL SERPL-MCNC: 416 MG/DL
UFH PPP CHRO-ACNC: 0.71 IU/ML
UFH PPP CHRO-ACNC: 0.77 IU/ML
VARIANT LYMPHS BLD QL SMEAR: PRESENT
WBC # BLD AUTO: 41.4 10E3/UL (ref 4–11)
WBC # BLD AUTO: 48.5 10E3/UL (ref 4–11)
WBC # BLD AUTO: 57 10E3/UL (ref 4–11)
WBC # BLD AUTO: 65 10E3/UL (ref 4–11)
WBC # BLD AUTO: 66 10E3/UL (ref 4–11)

## 2025-02-03 PROCEDURE — 82374 ASSAY BLOOD CARBON DIOXIDE: CPT | Performed by: STUDENT IN AN ORGANIZED HEALTH CARE EDUCATION/TRAINING PROGRAM

## 2025-02-03 PROCEDURE — 82805 BLOOD GASES W/O2 SATURATION: CPT | Performed by: STUDENT IN AN ORGANIZED HEALTH CARE EDUCATION/TRAINING PROGRAM

## 2025-02-03 PROCEDURE — 85049 AUTOMATED PLATELET COUNT: CPT | Performed by: STUDENT IN AN ORGANIZED HEALTH CARE EDUCATION/TRAINING PROGRAM

## 2025-02-03 PROCEDURE — 999N000111 HC STATISTIC OT IP EVAL DEFER

## 2025-02-03 PROCEDURE — 33948 ECMO/ECLS DAILY MGMT-VENOUS: CPT

## 2025-02-03 PROCEDURE — 82805 BLOOD GASES W/O2 SATURATION: CPT | Performed by: SURGERY

## 2025-02-03 PROCEDURE — 82247 BILIRUBIN TOTAL: CPT | Performed by: STUDENT IN AN ORGANIZED HEALTH CARE EDUCATION/TRAINING PROGRAM

## 2025-02-03 PROCEDURE — 85384 FIBRINOGEN ACTIVITY: CPT | Performed by: STUDENT IN AN ORGANIZED HEALTH CARE EDUCATION/TRAINING PROGRAM

## 2025-02-03 PROCEDURE — 87493 C DIFF AMPLIFIED PROBE: CPT

## 2025-02-03 PROCEDURE — 85730 THROMBOPLASTIN TIME PARTIAL: CPT | Performed by: STUDENT IN AN ORGANIZED HEALTH CARE EDUCATION/TRAINING PROGRAM

## 2025-02-03 PROCEDURE — 999N000259 HC STATISTIC EXTUBATION

## 2025-02-03 PROCEDURE — 85610 PROTHROMBIN TIME: CPT | Performed by: STUDENT IN AN ORGANIZED HEALTH CARE EDUCATION/TRAINING PROGRAM

## 2025-02-03 PROCEDURE — 36415 COLL VENOUS BLD VENIPUNCTURE: CPT | Performed by: SURGERY

## 2025-02-03 PROCEDURE — 83735 ASSAY OF MAGNESIUM: CPT | Performed by: STUDENT IN AN ORGANIZED HEALTH CARE EDUCATION/TRAINING PROGRAM

## 2025-02-03 PROCEDURE — 250N000013 HC RX MED GY IP 250 OP 250 PS 637: Performed by: NURSE PRACTITIONER

## 2025-02-03 PROCEDURE — 84450 TRANSFERASE (AST) (SGOT): CPT | Performed by: STUDENT IN AN ORGANIZED HEALTH CARE EDUCATION/TRAINING PROGRAM

## 2025-02-03 PROCEDURE — 85347 COAGULATION TIME ACTIVATED: CPT

## 2025-02-03 PROCEDURE — 94640 AIRWAY INHALATION TREATMENT: CPT

## 2025-02-03 PROCEDURE — 83605 ASSAY OF LACTIC ACID: CPT | Performed by: STUDENT IN AN ORGANIZED HEALTH CARE EDUCATION/TRAINING PROGRAM

## 2025-02-03 PROCEDURE — 999N000157 HC STATISTIC RCP TIME EA 10 MIN

## 2025-02-03 PROCEDURE — 90947 DIALYSIS REPEATED EVAL: CPT

## 2025-02-03 PROCEDURE — 250N000009 HC RX 250

## 2025-02-03 PROCEDURE — 250N000011 HC RX IP 250 OP 636

## 2025-02-03 PROCEDURE — 84155 ASSAY OF PROTEIN SERUM: CPT | Performed by: STUDENT IN AN ORGANIZED HEALTH CARE EDUCATION/TRAINING PROGRAM

## 2025-02-03 PROCEDURE — 82330 ASSAY OF CALCIUM: CPT | Performed by: STUDENT IN AN ORGANIZED HEALTH CARE EDUCATION/TRAINING PROGRAM

## 2025-02-03 PROCEDURE — 85027 COMPLETE CBC AUTOMATED: CPT | Performed by: STUDENT IN AN ORGANIZED HEALTH CARE EDUCATION/TRAINING PROGRAM

## 2025-02-03 PROCEDURE — 33948 ECMO/ECLS DAILY MGMT-VENOUS: CPT | Performed by: SURGERY

## 2025-02-03 PROCEDURE — 84295 ASSAY OF SERUM SODIUM: CPT | Performed by: STUDENT IN AN ORGANIZED HEALTH CARE EDUCATION/TRAINING PROGRAM

## 2025-02-03 PROCEDURE — 82947 ASSAY GLUCOSE BLOOD QUANT: CPT | Performed by: STUDENT IN AN ORGANIZED HEALTH CARE EDUCATION/TRAINING PROGRAM

## 2025-02-03 PROCEDURE — 94640 AIRWAY INHALATION TREATMENT: CPT | Mod: 76

## 2025-02-03 PROCEDURE — 250N000013 HC RX MED GY IP 250 OP 250 PS 637: Performed by: SURGERY

## 2025-02-03 PROCEDURE — 250N000011 HC RX IP 250 OP 636: Performed by: PHYSICIAN ASSISTANT

## 2025-02-03 PROCEDURE — 250N000011 HC RX IP 250 OP 636: Performed by: STUDENT IN AN ORGANIZED HEALTH CARE EDUCATION/TRAINING PROGRAM

## 2025-02-03 PROCEDURE — 84075 ASSAY ALKALINE PHOSPHATASE: CPT | Performed by: STUDENT IN AN ORGANIZED HEALTH CARE EDUCATION/TRAINING PROGRAM

## 2025-02-03 PROCEDURE — 94003 VENT MGMT INPAT SUBQ DAY: CPT

## 2025-02-03 PROCEDURE — 250N000009 HC RX 250: Performed by: STUDENT IN AN ORGANIZED HEALTH CARE EDUCATION/TRAINING PROGRAM

## 2025-02-03 PROCEDURE — 85379 FIBRIN DEGRADATION QUANT: CPT | Performed by: STUDENT IN AN ORGANIZED HEALTH CARE EDUCATION/TRAINING PROGRAM

## 2025-02-03 PROCEDURE — 83051 HEMOGLOBIN PLASMA: CPT | Performed by: STUDENT IN AN ORGANIZED HEALTH CARE EDUCATION/TRAINING PROGRAM

## 2025-02-03 PROCEDURE — 85520 HEPARIN ASSAY: CPT | Performed by: STUDENT IN AN ORGANIZED HEALTH CARE EDUCATION/TRAINING PROGRAM

## 2025-02-03 PROCEDURE — 82565 ASSAY OF CREATININE: CPT | Performed by: STUDENT IN AN ORGANIZED HEALTH CARE EDUCATION/TRAINING PROGRAM

## 2025-02-03 PROCEDURE — 84478 ASSAY OF TRIGLYCERIDES: CPT | Performed by: STUDENT IN AN ORGANIZED HEALTH CARE EDUCATION/TRAINING PROGRAM

## 2025-02-03 PROCEDURE — 83615 LACTATE (LD) (LDH) ENZYME: CPT

## 2025-02-03 PROCEDURE — 85007 BL SMEAR W/DIFF WBC COUNT: CPT | Performed by: STUDENT IN AN ORGANIZED HEALTH CARE EDUCATION/TRAINING PROGRAM

## 2025-02-03 PROCEDURE — 999N000147 HC STATISTIC PT IP EVAL DEFER

## 2025-02-03 PROCEDURE — 250N000013 HC RX MED GY IP 250 OP 250 PS 637: Performed by: PHYSICIAN ASSISTANT

## 2025-02-03 PROCEDURE — 06PY33Z REMOVAL OF INFUSION DEVICE FROM LOWER VEIN, PERCUTANEOUS APPROACH: ICD-10-PCS | Performed by: SURGERY

## 2025-02-03 PROCEDURE — 82550 ASSAY OF CK (CPK): CPT | Performed by: STUDENT IN AN ORGANIZED HEALTH CARE EDUCATION/TRAINING PROGRAM

## 2025-02-03 PROCEDURE — 85300 ANTITHROMBIN III ACTIVITY: CPT | Performed by: STUDENT IN AN ORGANIZED HEALTH CARE EDUCATION/TRAINING PROGRAM

## 2025-02-03 PROCEDURE — 84460 ALANINE AMINO (ALT) (SGPT): CPT | Performed by: STUDENT IN AN ORGANIZED HEALTH CARE EDUCATION/TRAINING PROGRAM

## 2025-02-03 PROCEDURE — 82435 ASSAY OF BLOOD CHLORIDE: CPT | Performed by: STUDENT IN AN ORGANIZED HEALTH CARE EDUCATION/TRAINING PROGRAM

## 2025-02-03 PROCEDURE — 99291 CRITICAL CARE FIRST HOUR: CPT | Mod: FS | Performed by: PHYSICIAN ASSISTANT

## 2025-02-03 PROCEDURE — 250N000013 HC RX MED GY IP 250 OP 250 PS 637: Performed by: STUDENT IN AN ORGANIZED HEALTH CARE EDUCATION/TRAINING PROGRAM

## 2025-02-03 PROCEDURE — 84100 ASSAY OF PHOSPHORUS: CPT | Performed by: STUDENT IN AN ORGANIZED HEALTH CARE EDUCATION/TRAINING PROGRAM

## 2025-02-03 PROCEDURE — 82248 BILIRUBIN DIRECT: CPT | Performed by: STUDENT IN AN ORGANIZED HEALTH CARE EDUCATION/TRAINING PROGRAM

## 2025-02-03 PROCEDURE — 80053 COMPREHEN METABOLIC PANEL: CPT | Performed by: STUDENT IN AN ORGANIZED HEALTH CARE EDUCATION/TRAINING PROGRAM

## 2025-02-03 PROCEDURE — 87040 BLOOD CULTURE FOR BACTERIA: CPT | Performed by: SURGERY

## 2025-02-03 RX ORDER — POLYETHYLENE GLYCOL 3350 17 G/17G
17 POWDER, FOR SOLUTION ORAL 2 TIMES DAILY PRN
Status: DISCONTINUED | OUTPATIENT
Start: 2025-02-03 | End: 2025-02-03

## 2025-02-03 RX ORDER — AMOXICILLIN 250 MG
1 CAPSULE ORAL 2 TIMES DAILY PRN
Status: DISCONTINUED | OUTPATIENT
Start: 2025-02-03 | End: 2025-02-03

## 2025-02-03 RX ORDER — GLYCOPYRROLATE 0.2 MG/ML
0.2 INJECTION, SOLUTION INTRAMUSCULAR; INTRAVENOUS ONCE
Status: COMPLETED | OUTPATIENT
Start: 2025-02-03 | End: 2025-02-03

## 2025-02-03 RX ORDER — AMOXICILLIN 250 MG
2 CAPSULE ORAL 2 TIMES DAILY PRN
Status: DISCONTINUED | OUTPATIENT
Start: 2025-02-03 | End: 2025-02-03

## 2025-02-03 RX ORDER — PROPOFOL 10 MG/ML
5-75 INJECTION, EMULSION INTRAVENOUS CONTINUOUS
Status: DISCONTINUED | OUTPATIENT
Start: 2025-02-03 | End: 2025-02-03

## 2025-02-03 RX ORDER — PROPOFOL 10 MG/ML
INJECTION, EMULSION INTRAVENOUS
Status: COMPLETED
Start: 2025-02-03 | End: 2025-02-03

## 2025-02-03 RX ORDER — GLYCOPYRROLATE 0.2 MG/ML
0.1 INJECTION, SOLUTION INTRAMUSCULAR; INTRAVENOUS EVERY 4 HOURS PRN
Status: DISCONTINUED | OUTPATIENT
Start: 2025-02-03 | End: 2025-02-03 | Stop reason: HOSPADM

## 2025-02-03 RX ORDER — LIDOCAINE 40 MG/G
CREAM TOPICAL
Status: DISCONTINUED | OUTPATIENT
Start: 2025-02-03 | End: 2025-02-03 | Stop reason: HOSPADM

## 2025-02-03 RX ORDER — MIDAZOLAM HCL IN 0.9 % NACL/PF 1 MG/ML
1-8 PLASTIC BAG, INJECTION (ML) INTRAVENOUS CONTINUOUS
Status: DISCONTINUED | OUTPATIENT
Start: 2025-02-03 | End: 2025-02-03 | Stop reason: HOSPADM

## 2025-02-03 RX ORDER — FUROSEMIDE 10 MG/ML
20 INJECTION INTRAMUSCULAR; INTRAVENOUS ONCE
Status: COMPLETED | OUTPATIENT
Start: 2025-02-03 | End: 2025-02-03

## 2025-02-03 RX ADMIN — DEXMEDETOMIDINE HYDROCHLORIDE 1 MCG/KG/HR: 400 INJECTION INTRAVENOUS at 08:32

## 2025-02-03 RX ADMIN — IPRATROPIUM BROMIDE AND ALBUTEROL SULFATE 3 ML: .5; 3 SOLUTION RESPIRATORY (INHALATION) at 04:33

## 2025-02-03 RX ADMIN — Medication 2 MG/HR: at 14:56

## 2025-02-03 RX ADMIN — CALCIUM CHLORIDE, MAGNESIUM CHLORIDE, SODIUM CHLORIDE, SODIUM BICARBONATE, POTASSIUM CHLORIDE AND SODIUM PHOSPHATE DIBASIC DIHYDRATE 12.5 ML/KG/HR: 3.68; 3.05; 6.34; 3.09; .314; .187 INJECTION INTRAVENOUS at 08:18

## 2025-02-03 RX ADMIN — CALCIUM CHLORIDE, MAGNESIUM CHLORIDE, SODIUM CHLORIDE, SODIUM BICARBONATE, POTASSIUM CHLORIDE AND SODIUM PHOSPHATE DIBASIC DIHYDRATE 12.5 ML/KG/HR: 3.68; 3.05; 6.34; 3.09; .314; .187 INJECTION INTRAVENOUS at 12:37

## 2025-02-03 RX ADMIN — DEXMEDETOMIDINE HYDROCHLORIDE 1.2 MCG/KG/HR: 400 INJECTION INTRAVENOUS at 01:21

## 2025-02-03 RX ADMIN — HEPARIN SODIUM 1600 UNITS/HR: 10000 INJECTION, SOLUTION INTRAVENOUS at 01:31

## 2025-02-03 RX ADMIN — INSULIN HUMAN 4 UNITS/HR: 1 INJECTION, SOLUTION INTRAVENOUS at 13:42

## 2025-02-03 RX ADMIN — Medication 60 ML: at 08:34

## 2025-02-03 RX ADMIN — CALCIUM CHLORIDE, MAGNESIUM CHLORIDE, SODIUM CHLORIDE, SODIUM BICARBONATE, POTASSIUM CHLORIDE AND SODIUM PHOSPHATE DIBASIC DIHYDRATE 12.5 ML/KG/HR: 3.68; 3.05; 6.34; 3.09; .314; .187 INJECTION INTRAVENOUS at 03:59

## 2025-02-03 RX ADMIN — CALCIUM CHLORIDE, MAGNESIUM CHLORIDE, SODIUM CHLORIDE, SODIUM BICARBONATE, POTASSIUM CHLORIDE AND SODIUM PHOSPHATE DIBASIC DIHYDRATE 12.5 ML/KG/HR: 3.68; 3.05; 6.34; 3.09; .314; .187 INJECTION INTRAVENOUS at 08:17

## 2025-02-03 RX ADMIN — Medication 60 ML: at 12:38

## 2025-02-03 RX ADMIN — CHLORHEXIDINE GLUCONATE 15 ML: 1.2 SOLUTION ORAL at 08:34

## 2025-02-03 RX ADMIN — NOREPINEPHRINE BITARTRATE 0.02 MCG/KG/MIN: 0.06 INJECTION, SOLUTION INTRAVENOUS at 01:17

## 2025-02-03 RX ADMIN — MIDAZOLAM 4 MG: 1 INJECTION INTRAMUSCULAR; INTRAVENOUS at 08:06

## 2025-02-03 RX ADMIN — LINEZOLID 600 MG: 600 INJECTION, SOLUTION INTRAVENOUS at 03:03

## 2025-02-03 RX ADMIN — IPRATROPIUM BROMIDE AND ALBUTEROL SULFATE 3 ML: .5; 3 SOLUTION RESPIRATORY (INHALATION) at 12:10

## 2025-02-03 RX ADMIN — IPRATROPIUM BROMIDE AND ALBUTEROL SULFATE 3 ML: .5; 3 SOLUTION RESPIRATORY (INHALATION) at 08:46

## 2025-02-03 RX ADMIN — Medication 40 MG: at 08:34

## 2025-02-03 RX ADMIN — ASPIRIN 81 MG CHEWABLE TABLET 81 MG: 81 TABLET CHEWABLE at 08:34

## 2025-02-03 RX ADMIN — OSELTAMIVIR PHOSPHATE 75 MG: 6 FOR SUSPENSION ORAL at 08:35

## 2025-02-03 RX ADMIN — FUROSEMIDE 20 MG: 10 INJECTION, SOLUTION INTRAMUSCULAR; INTRAVENOUS at 04:02

## 2025-02-03 RX ADMIN — MIDAZOLAM 4 MG: 1 INJECTION INTRAMUSCULAR; INTRAVENOUS at 01:30

## 2025-02-03 RX ADMIN — CALCIUM CHLORIDE, MAGNESIUM CHLORIDE, SODIUM CHLORIDE, SODIUM BICARBONATE, POTASSIUM CHLORIDE AND SODIUM PHOSPHATE DIBASIC DIHYDRATE: 3.68; 3.05; 6.34; 3.09; .314; .187 INJECTION INTRAVENOUS at 12:37

## 2025-02-03 RX ADMIN — DEXMEDETOMIDINE HYDROCHLORIDE 1.2 MCG/KG/HR: 400 INJECTION INTRAVENOUS at 04:04

## 2025-02-03 RX ADMIN — DEXMEDETOMIDINE HYDROCHLORIDE 1 MCG/KG/HR: 400 INJECTION INTRAVENOUS at 12:48

## 2025-02-03 RX ADMIN — MIDAZOLAM 4 MG: 1 INJECTION INTRAMUSCULAR; INTRAVENOUS at 14:00

## 2025-02-03 RX ADMIN — GLYCOPYRROLATE 0.2 MG: 0.2 INJECTION INTRAMUSCULAR; INTRAVENOUS at 15:41

## 2025-02-03 RX ADMIN — IPRATROPIUM BROMIDE AND ALBUTEROL SULFATE 3 ML: .5; 3 SOLUTION RESPIRATORY (INHALATION) at 00:24

## 2025-02-03 RX ADMIN — Medication 15 ML: at 08:34

## 2025-02-03 RX ADMIN — DEXAMETHASONE SODIUM PHOSPHATE 10 MG: 10 INJECTION INTRAMUSCULAR; INTRAVENOUS at 08:34

## 2025-02-03 ASSESSMENT — ACTIVITIES OF DAILY LIVING (ADL)
ADLS_ACUITY_SCORE: 50

## 2025-02-03 NOTE — PROGRESS NOTES
"  Nephrology Progress Note  02/03/2025           MEDICAL DECISION MAKING     RECOMMENDATIONS:  Continue CRRT pending family conference    ASSESSMENT:  Jesus Varghese is a 64 year old w HTN, T2DN, CLL, transferred from OSH for further care of his ARDS in the setting of influenza A, secondary bacterial PNA.   Nephrology consulted for DEEP     DEEP  sCr on admit 2.55.  Peak sCr 4.12. Baseline sCr 1.0  UA: albumin, 26 WBC, 16 RBC, squamous cells. UPCR 0.21 g/g.  Renal US w/o hydro, multiple b/lk cytes (known prior)  Likely 2/2 ATN in setting of ARDS and prior septic shock  Consented for RRT on 1/30  Start CRRT on 2/2 - wife in agreement    25 mL/kg/h, 0-25 mL/h net UF, 4k    Azotemia  Concern for uremic platelet dysfunction  Patient had \"cannula site was bleeding and oozing significantly.\" Patient is on heparin while on ECMO so that could be the cause. He has a rising BUN (a surrogate marker for toxins which can cause platelet dysfunction), since he is sedated we cannot assess for other uremic symptoms. Uremic platelet dysfunction may be playing a role in his bleeding, but unclear at this time      Recommendations were communicated to primary team via note & verbal       Jose Maria Dooley MD   Division of Renal Disease and Hypertension      SUBJECTIVE     Interval History :   Nursing and provider notes from last 24 hours reviewed.  In the last 24 hours Jesus Varghese   Remains on ECMO. 1.7L UOP yestday.    Comprehensive ROS not performed as patient is on VV ECMO    OBJECTIVE     Physical Exam:   I/O last 3 completed shifts:  In: 2989.04 [I.V.:1659.04; NG/GT:490]  Out: 3116.8 [Urine:1705; Other:961.8; Stool:450]   /64 (BP Location: Left arm)   Pulse 77   Temp 98.6  F (37  C)   Resp 20   Wt 94.8 kg (208 lb 15.9 oz)   SpO2 98%   BMI 33.23 kg/m       General: supine   HEENT: ETT in place   Cardiac: rrr  Abdominal: nondistended  Extremities: no LE edema     Labs:   All labs reviewed by " me  Electrolytes/Renal -   Recent Labs   Lab Test 02/03/25  0557 02/03/25  0344 02/03/25 0341 02/03/25  0006 02/02/25 2206 02/02/25 2204 02/02/25 2001 02/02/25  1211 02/02/25  1203   NA  --   --  140  --  144  --  145   < > 149*   POTASSIUM  --   --  4.4  --  4.4  --  4.1   < > 3.9   CHLORIDE  --   --  106  --  108*  --  108*   < > 112*   CO2  --   --  21*  --  20*  --  20*   < > 19*   BUN  --   --  118.0*  --  131.0*  --  148.0*   < > 176.0*   CR  --   --  2.31*  --  2.74*  --  2.88*   < > 3.68*   GLC 90 123* 144*   < > 126*   < > 110*   < > 94   LILIAN  --   --  8.2*  --  9.1  --  8.8   < > 8.3*   MAG  --   --  2.7*  --   --   --  2.8*  --  2.8*   PHOS  --   --  5.6*  --   --   --  5.8*  --  5.2*    < > = values in this interval not displayed.       CBC -   Recent Labs   Lab Test 02/03/25 0341 02/02/25 2305 02/02/25 2206   WBC 57.0* 66.0* 68.9*   HGB 7.6* 8.2* 8.1*    393 428       LFTs -   Recent Labs   Lab Test 02/03/25 0341 02/02/25 2001 02/02/25  1203 02/02/25  0402 02/01/25  0342 01/30/25  0341 01/29/25  1108 01/28/25  0533 08/21/18  1342 02/09/17  1642   ALKPHOS 53  --   --   --   --   --  76 79   < > 41   BILITOTAL 0.6  --   --   --   --   --  0.6 0.4   < > 0.5   BILIDIRECT  --   --   --   --   --   --   --   --   --  0.09   ALT 81*  --   --  75* 61   < > 65 59   < >  --    AST 76*  --   --   --   --   --  76* 74*   < >  --    PROTTOTAL 5.0*  --   --   --   --   --  5.6* 5.8*   < > 6.9   ALBUMIN 3.1* 3.1* 3.0* 2.9* 2.8*   < > 3.0* 3.1*   < > 4.3    < > = values in this interval not displayed.       Iron Panel - No lab results found.    Current Medications:  Current Facility-Administered Medications   Medication Dose Route Frequency Provider Last Rate Last Admin    aspirin (ASA) chewable tablet 81 mg  81 mg Per Feeding Tube Daily Anel Smith APRN CNP   81 mg at 02/02/25 0802    chlorhexidine (PERIDEX) 0.12 % solution 15 mL  15 mL Mouth/Throat Q12H Mark Florence MD   15 mL at  02/02/25 2015    dexAMETHasone PF (DECADRON) injection 10 mg  10 mg Intravenous Q24H Mark Florence MD   10 mg at 02/02/25 0803    sodium chloride 0.9% DIALYSIS Cath LOCK - RED Lumen  10 mL Intracatheter Once in dialysis/CRRT Rafa Rick MD        Followed by    heparin 1000 unit/mL DIALYSIS Cath LOCK - RED Lumen  1.3-2.6 mL Intracatheter Once in dialysis/CRRT Rafa Rick MD        sodium chloride 0.9% DIALYSIS Cath LOCK - BLUE Lumen  10 mL Intracatheter Once in dialysis/CRRT Rafa Rick MD        Followed by    heparin 1000 unit/mL DIALYSIS Cath LOCK -BLUE Lumen  1.3-2.6 mL Intracatheter Once in dialysis/CRRT Rafa Rick MD        insulin glargine (LANTUS PEN) injection 45 Units  45 Units Subcutaneous BID Mark Florence MD   45 Units at 02/02/25 2015    ipratropium - albuterol 0.5 mg/2.5 mg/3 mL (DUONEB) neb solution 3 mL  3 mL Nebulization Q4H Willie Wilkinson PA-C   3 mL at 02/03/25 0433    linezolid (ZYVOX) infusion 600 mg  600 mg Intravenous Q12H Nawaf Ghotra PA-C   600 mg at 02/03/25 0303    multivitamins w/minerals liquid 15 mL  15 mL Per Feeding Tube Daily Mark Florence MD   15 mL at 02/02/25 0802    oseltamivir (TAMIFLU) suspension 75 mg  75 mg Oral or Feeding Tube BID Alvaro Hutson MD   75 mg at 02/02/25 2016    pantoprazole (PROTONIX) 2 mg/mL suspension 40 mg  40 mg Oral or Feeding Tube QAM AC Nawfa Ghotra PA-C   40 mg at 02/02/25 0802    polyethylene glycol (MIRALAX) Packet 17 g  17 g Oral or Feeding Tube Daily Alvaro Hutson MD   17 g at 01/30/25 0756    Prosource TF20 ENfit Compatibl EN LIQD (PROSOURCE TF20) packet 60 mL  1 packet Per Feeding Tube 4x Daily Mark Florence MD   60 mL at 02/02/25 2016    senna-docusate (SENOKOT-S/PERICOLACE) 8.6-50 MG per tablet 1 tablet  1 tablet Oral or Feeding Tube BID Alvaro Hutson MD   1 tablet at 01/29/25 2002    Or    senna-docusate (SENOKOT-S/PERICOLACE) 8.6-50 MG per tablet 2  tablet  2 tablet Oral or Feeding Tube BID Alvaro Hutson MD   2 tablet at 01/30/25 0756     Current Facility-Administered Medications   Medication Dose Route Frequency Provider Last Rate Last Admin    dexmedeTOMIDine (PRECEDEX) 4 mcg/mL in sodium chloride 0.9 % 100 mL infusion  0.1-1.2 mcg/kg/hr (Dosing Weight) Intravenous Continuous [place] Charles MD 24 mL/hr at 02/03/25 0700 1 mcg/kg/hr at 02/03/25 0700    dextrose 10% infusion   Intravenous Continuous PRN Willie Wilkinson PA-C        dextrose 10% infusion   Intravenous Continuous PRN Mark Florence MD        dialysate for CVVHD & CVVHDF (Phoxillum BK4/2.5)  12.5 mL/kg/hr CRRT Continuous Rafa Rick MD 1,200 mL/hr at 02/03/25 0359 12.5 mL/kg/hr at 02/03/25 0359    [Held by provider] furosemide (LASIX) 500 mg/50mL infusion ADULT MAX CONC  5-15 mg/hr Intravenous Continuous Anel Smith APRN CNP   Stopped at 02/02/25 0030    heparin (porcine) 100 unit/mL in 0.45% Sodium Chloride ANTICOAGULANT infusion  10-50 Units/kg/hr (Ideal) Other Continuous Ra Roberts MD 16 mL/hr at 02/03/25 0700 1,600 Units/hr at 02/03/25 0700    HYDROmorphone (DILAUDID) 0.2 mg/mL infusion ADULT/PEDS GREATER than or EQUAL to 20 kg  0.1-0.3 mg/hr Intravenous Continuous Nawaf Ghotra PA-C 1 mL/hr at 02/03/25 0700 0.2 mg/hr at 02/03/25 0700    insulin regular (MYXREDLIN) 1 unit/mL infusion  0-24 Units/hr Intravenous Continuous Willie Wilkinson PA-C 3 mL/hr at 02/03/25 0700 3 Units/hr at 02/03/25 0700    No heparin required   Does not apply Continuous PRN Rafa Rick MD        norepinephrine (LEVOPHED) 16 mg in  mL infusion MAX CONC CENTRAL LINE  0.01-0.6 mcg/kg/min (Dosing Weight) Intravenous Continuous Mark Florence MD 2.7 mL/hr at 02/03/25 0700 0.03 mcg/kg/min at 02/03/25 0700    POST-filter replacement solution for CVVHD & CVVHDF (Phoxillum BK4/2.5)   CRRT Continuous Rafa Rick  mL/hr at  02/02/25 1027 New Bag at 02/02/25 1027    PRE-filter replacement solution for CVVHD & CVVHDF (Phoxillum BK4/2.5)  12.5 mL/kg/hr CRRT Continuous Rafa Rick MD 1,200 mL/hr at 02/03/25 0359 12.5 mL/kg/hr at 02/03/25 0359     Jose Maria Dooley MD

## 2025-02-03 NOTE — DISCHARGE SUMMARY
[facility]    Death Summary  Surgical ICU Service    Date of Admission:  1/28/2025  Date of Death:  2/3/2025  Discharging Provider: Emeka Barahona  Date of Service (when I saw the patient): 02/03/25  Cause of Death: Acute hypoxic respiratory failure secondary to influenza pneumonia with superimposed bacterial pneumonia    Primary Provider: Filiberto John  Primary Care clinic: [address]   [facility] [address]  Phone: [phone]  Fax number: [phone]     Discharge Diagnoses   Patient Active Problem List    Diagnosis Date Noted    Acute kidney failure, unspecified (H) 01/28/2025     Priority: Medium    Influenza A 01/26/2025     Priority: Medium    DEEP (acute kidney injury) 01/26/2025     Priority: Medium    Pneumonia of both lungs due to infectious organism, unspecified part of lung 01/26/2025     Priority: Medium    Acute respiratory failure with hypoxia (H) 02/06/2022     Priority: Medium    CLL (chronic lymphocytic leukemia) (H) 02/06/2022     Priority: Medium    Seborrheic keratoses 07/15/2019     Priority: Medium    Malignant neoplasm of colon, unspecified part of colon (H) 11/20/2018     Priority: Medium    Chemotherapy-induced neuropathy 11/20/2018     Priority: Medium    S/P partial colectomy 11/20/2018     Priority: Medium    History of nephrolithiasis 11/20/2018     Priority: Medium    Elevated LFTs 11/20/2018     Priority: Medium    Controlled type 2 diabetes mellitus with complication, without long-term current use of insulin (H) 11/20/2018     Priority: Medium    Non morbid obesity due to excess calories 11/20/2018     Priority: Medium    History of malignant neoplasm of large intestine 01/26/2018     Priority: Medium     Overview:   stage lll, T3N1MO      Carpal tunnel syndrome, bilateral 01/26/2018     Priority: Medium    Inflammatory liver disease 01/26/2018     Priority: Medium     Overview:   in 1983 after tattoo has been  vaccinated for Hepatitis B since. He did have   marked jaundice and fatigue during that illness      Status post tendon repair 2016     Priority: Medium    Rupture of left distal biceps tendon 2016     Priority: Medium    Ureterolithiasis 2015     Priority: Medium    Senile cataract 2011     Priority: Medium    Hypertriglyceridemia 2011     Priority: Medium    Diverticulosis of colon 2010     Priority: Medium       Hospital Course     Significant Results and Procedures   DATE: 25  Procedure(s):  INSERTION, VENO-VENOUS CANNULA, ADULT, FOR ECMO, IN NON-OPERATING ROOM SETTING  Surgeon(s): Surgeons and Role:     * Alvaro Hutson MD - Primary     * Mark Florence MD - Resident - Assisting     * Nadira Monge MD - Fellow - Assisting     * Ra Roberts MD - Fellow - Assisting     * Urbano Garcia MD    DATE: 25  Procedure(s):  Central Line Placement  Surgeon(s): Surgeons and Role:     * Mark Florence MD - Resident    DATE: 25  Procedure(s):  Flexible bronchoscopy  Surgeon(s): Surgeons and Role:     * Urbano Garcia MD - Primary     * Ra Roberts MD - Fellow - Assisting    Pending Results   These results will not be followed up since the patient has .  Unresulted Labs Ordered in the Past 30 Days of this Admission       Date and Time Order Name Status Description    2/3/2025  9:38 AM Blood Culture Hand, Left In process     2/3/2025  9:38 AM Blood Culture Hand, Left In process     2025 12:21 PM Prepare red blood cells (unit) Preliminary     2025 12:21 PM Prepare red blood cells (unit) Preliminary           Code Status   Comfort Care    SUBJECTIVE: Jesus Varghese is a 64 year old male with a past medical history including hypertension, colon cancer, chronic lymphocytic leukemia, psoriasis, and type 2 diabetes mellitus transferred to [facility] on 25 from an outside hospital for evaluation for ECMO cannulation. He  had presented to an outside hospital on 1/26/25 with generalized weakness, flulike symptoms, shortness of breath, cough, and fever up to 103.3F. He was admitted and found to have an acute Influenza A infection, ARDS, and an acute kidney injury likely secondary to dehydration. Patient was initiated on nebulizers with oxygen supplementation, nebulizers, Tamiflu, IV Ceftriaxone and Azithromycin. Respiratory culture was obtained and subsequently grew MRSA, consistent with superimposed bacterial pneumonia in addition to Influenza A pneumonia. He was given IV fluids to treat his dehydration and acute kidney injury and electrolytes were replaced as indicated, including sodium for hyponatremia and potassium for hypokalemia.    He subsequently had increased work of breathing and hypoxia requiring increasing oxygen supplementation, transitioning from oxymask to CPAP to BiPAP, ultimately requiring intubation and mechanical ventilation for acute hypoxic respiratory failure and ARDS. He developed likely toxic metabolic encephalopathy started on sedation and analgesia while intubated to assist with ventilator synchrony. Due to poor oxygen saturation several ventilator adjustments were made and the patient was placed prone. Despite this he had persistent hypoxia and hypercarbia with elevated airway pressures. He developed septic shock and hypotension, so he was started on vasopressors to maintain mean arterial pressure goals. Zosyn and Vancomycin were added for treatment of his pneumonia. Sliding scale insulin was given for hypergylcemia. Due to worsening clinic status ECMO was discussed with the treatment team and family, and outside facilities were contacted for possible transfer.    On 1/28 he was transferred to [facility] for evaluation for ECMO cannulation and admitted to the ICU. Discussion was had with the ECMO team, and on 1/29 he underwent cannulation for VV ECMO with the Surgical ICU team. A central line was placed by the  Surgical ICU team on  for vascular access. His type 2 diabetes mellitus and stress and steroid induced hyperglycemia previously treated with sliding scale insulin was transitioned to treatment with an insulin drip and Lantus daily. On  he had copious respiratory secretions, for which a flexible bronchoscopy was performed and secretions evacuated. His acute anemia of critical illness was monitored with complete blood count labs. He developed worsening kidney function for which Nephrology was consulted. On  he had a desaturation event during a positioning turn, for which a transthoracic echocardiogram was obtained to assess cannula position. There was bleeding from the cannula site overnight that subsequently resolved, thought to be due to uremic platelet dysfunction.    On  he was started on continuous renal replacement therapy due to worsening kidney function likely secondary to ATN in the setting of ARDS and prior septic shock. He had diarrhea so a stool sample was examined and was negative for C. Diff. infection. Palliative care discussed goals of care with the patient's family. During that discussion his wife expressed that the patient would not want ongoing life support measures. The patient's daughter and brother were in agreement with that assessment. They decided to pursue comfort measures and withdraw life support on 2/3/25, and he subsequently .      Physical Exam   Temp: 98.6  F (37  C)     Pulse: 86     SpO2: 98 % O2 Device: Mechanical Ventilator    Vitals:    25 0100 25 0100 25 0100   Weight: 97.5 kg (214 lb 15.2 oz) 96.8 kg (213 lb 6.5 oz) 94.8 kg (208 lb 15.9 oz)     Vital Signs with Ranges  Temp:  [98.1  F (36.7  C)-98.6  F (37  C)] 98.6  F (37  C)  Pulse:  [64-88] 86  MAP:  [63 mmHg-96 mmHg] 63 mmHg  Arterial Line BP: ()/(42-71) 119/42  FiO2 (%):  [60 %] 60 %  SpO2:  [97 %-100 %] 98 %  I/O last 3 completed shifts:  In: 6046.17 [I.V.:2886.17; Other:300;  NG/GT:820; IV Piggyback:500]  Out: 7621.8 [Urine:5485; Other:961.8; Stool:1175]    Patient  during this admission  Condition at discharge:   Discharge VS: Blood pressure 107/64, pulse 64, temperature 98.6  F (37  C), resp. rate 23, weight 94.8 kg (208 lb 15.9 oz), SpO2 98%.    Consultations This Hospital Stay   NUTRITION SERVICES ADULT IP CONSULT  PHYSICAL THERAPY ADULT IP CONSULT  OCCUPATIONAL THERAPY ADULT IP CONSULT  PHARMACY IP CONSULT  PHARMACY IP CONSULT  PHARMACY TO DOSE VANCO  NURSING TO CONSULT FOR VASCULAR ACCESS CARE IP CONSULT  CARE MANAGEMENT / SOCIAL WORK IP CONSULT  PHARMACY IP CONSULT  SPIRITUAL HEALTH SERVICES IP CONSULT  NUTRITION SERVICES ADULT IP CONSULT  PHARMACY IP CONSULT  WOUND OSTOMY CONTINENCE NURSE  IP CONSULT  NEPHROLOGY ICU IP CONSULT  WOUND OSTOMY CONTINENCE NURSE  IP CONSULT  PHARMACY CRRT IP CONSULT  PHARMACY CRRT IP CONSULT  PALLIATIVE CARE ADULT IP CONSULT  SPIRITUAL HEALTH SERVICES IP CONSULT  HEMATOLOGY ADULT IP CONSULT  ONCOLOGY ADULT IP CONSULT  PALLIATIVE CARE ADULT IP CONSULT  WOUND OSTOMY CONTINENCE NURSE  IP CONSULT      Allergies   No Known Allergies  Data   Most Recent 3 CBC's:  Recent Labs   Lab Test 25  1240 25  1009 25  0341   WBC 41.4* 48.5* 57.0*   HGB 7.5* 7.7* 7.6*   MCV 85 85 86    321 348      Most Recent 3 BMP's:  Recent Labs   Lab Test 25  1244 25  1240 25  1013 25  1009 25  0344 25  0341   NA  --  137  --  139  --  140   POTASSIUM  --  4.8  --  4.3  --  4.4   CHLORIDE  --  104  --  105  --  106   CO2  --  21*  --  21*  --  21*   BUN  --  100.0*  --  102.0*  --  118.0*   CR  --  1.97*  --  2.00*  --  2.31*   ANIONGAP  --  12  --  13  --  13   LILIAN  --  8.3*  --  8.3*  --  8.2*   * 172* 119* 133*   < > 144*    < > = values in this interval not displayed.     Most Recent 2 LFT's:  Recent Labs   Lab Test 25  0341 25  0402 25  0341 25  1108   AST 76*  --   --  76*    ALT 81* 75*   < > 65   ALKPHOS 53  --   --  76   BILITOTAL 0.6  --   --  0.6    < > = values in this interval not displayed.     Most Recent INR's and Anticoagulation Dosing History:  Anticoagulation Dose History  More data may exist         Latest Ref Rng & Units 1/28/2025 1/29/2025 1/30/2025 1/31/2025 2/1/2025 2/2/2025 2/3/2025   Recent Dosing and Labs   INR 0.85 - 1.15 1.04  1.14  1.63  1.18  1.08  1.10  1.17  1.15  1.07  1.15  1.19  1.22  1.20  1.20  1.19  1.18  1.22  1.24  1.24  1.23       Details          Multiple values from one day are sorted in reverse-chronological order             Most Recent 3 Troponin's:No lab results found.  Most Recent 6 Bacteria Isolates From Any Culture (See EPIC Reports for Culture Details):No lab results found.  Most Recent TSH, T4 and A1c Labs:  Recent Labs   Lab Test 01/21/25  1411   A1C 6.5*     Results for orders placed or performed during the hospital encounter of 01/28/25   XR Chest Port 1 View    Narrative    Exam: XR CHEST PORT 1 VIEW, 1/28/2025 5:35 PM    Comparison: 1/27/2045    History: Endotracheal tube positioning    Findings:  2 AP portable supine views of the chest. Endotracheal tube with tip in  the mid thoracic trachea. Right IJ central venous catheter with tip in  the SVC. Enteric tube with tip in the distal esophagus.    Trachea is midline. Mediastinum is within normal limits.  Cardiopulmonary silhouette is within normal limits. Extensive mixed  opacities are present throughout the lungs There is no pneumothorax or  pleural effusion. The upper abdomen is unremarkable.      Impression    Impression:   1. Endotracheal tube with tip in the mid thoracic trachea.  2. Increased diffuse mixed pulmonary opacities likely representing  combination of infection and edema. ARDS.  3. Enteric tube with tip near the GE junction. Consider advancing 10  cm.    I have personally reviewed the examination and initial interpretation  and I agree with the findings.    RANI CASTELLANOS  MD MARGARET         SYSTEM ID:  [id number]   XR Abdomen Port 1 View    Narrative    EXAMINATION: XR ABDOMEN PORT 1 VIEW  1/28/2025 9:21 PM      CLINICAL HISTORY: OG tube advancement, Proned    COMPARISON: 1/27/2025    FINDINGS:  Single AP portable prone view of the abdomen. Enteric tube with tip in  stable position, presumably near the GE junction. No distended or  dilated air-filled loops of large or small bowel.      Impression    IMPRESSION:  Enteric tube in stable position with tip near the gastroesophageal  junction. Tube does not appear advanced compared to prior.    I have personally reviewed the examination and initial interpretation  and I agree with the findings.    LELAND TAYLOR MD         SYSTEM ID:  [id number]   XR Abdomen Port 1 View    Narrative    EXAMINATION: XR ABDOMEN PORT 1 VIEW 1/28/2025 11:44 PM     COMPARISON: Same day radiograph at 2117, 1/27/2025    HISTORY: OG, proned patient    TECHNIQUE: Portable prone AP view of the abdomen.    FINDINGS: Enteric tube tip projects near the gastroesophageal  junction, slightly advanced compared to prior. Enteric tube sidehole  projects over the distal esophagus. No abnormally dilated loops of  bowel. No pneumatosis or portal venous gas.       Impression    IMPRESSION: Enteric tube tip projects near the gastroesophageal  junction, slightly advanced compared to prior. Enteric tube sidehole  projects over the distal esophagus. Recommend advancement.    I have personally reviewed the examination and initial interpretation  and I agree with the findings.    LELAND TAYLOR MD         SYSTEM ID:  [id number]   XR Abdomen Port 1 View    Narrative    EXAMINATION: XR ABDOMEN PORT 1 VIEW 1/28/2025 11:45 PM     COMPARISON: Same day radiograph at 2332, 2117    HISTORY: og advanced    TECHNIQUE: Portable prone AP view of the abdomen.    FINDINGS: Enteric tube tip projects near the gastroesophageal  junction, unchanged compared to most recent radiograph. The  enteric  tube sidehole projects over the distal esophagus. No abnormally  dilated loops of bowel. No pneumatosis or portal venous gas.       Impression    IMPRESSION: Enteric tube tip projects near the gastroesophageal  junction, unchanged compared to most recent radiograph. The enteric  tube sidehole projects over the distal esophagus. Recommend  advancement.    I have personally reviewed the examination and initial interpretation  and I agree with the findings.    LELAND TAYLOR MD         SYSTEM ID:  [id number]   XR Chest Port 1 View    Narrative    Exam: XR CHEST PORT 1 VIEW, 1/29/2025 1:49 AM    Indication: ETT    Comparison: Chest radiographs 1/28/2025, 1/27/2025    Findings:   Portable prone AP view of the chest. Endotracheal tube tip projects  over the midthoracic trachea. Right IJ central venous catheter tip in  the mid SVC. Enteric tube tip projects over the stomach. The enteric  tube sidehole projects over the distal esophagus.    Trachea is midline. Silhouetting of the cardiac borders. Decreased  diffuse bilateral mixed opacities. No definite pneumothorax or pleural  effusion.      Impression    Impression:   1.  Endotracheal tube tip projects over the mid thoracic trachea.  2.  Decreased diffuse bilateral mixed pulmonary opacities, suggestive  of ARDS.  3.  Enteric tube tip projects over the stomach, advanced compared to  prior. The enteric tube sidehole projects over the distal esophagus.  Recommend further advancement.    I have personally reviewed the examination and initial interpretation  and I agree with the findings.    LELAND TAYLOR MD         SYSTEM ID:  [id number]   XR Chest Port 1 View    Narrative    Portable chest 1/29/2025 at 1041    INDICATION: Acute desaturation after supination. Evaluate lung fields.    COMPARISON: Same day 0120 hours    FINDINGS: Continued patchy and streaky opacities in the lungs are  similar from earlier this morning. Endotracheal tube again present  with tip  approximately 3.6 cm above the maggy. Right IJ catheter tip  in the distal most SVC. Aortic knob atherosclerotic calcifications  again present. NG/OG tube beyond the inferior margin of the image.      Impression    IMPRESSION: No significant changes radiographically with ARDS    RANI ROBLES MD         SYSTEM ID:  [id number]   XR Abdomen Port 1 View    Narrative    EXAM: XR ABDOMEN PORT 1 VIEW  1/29/2025 3:05 PM      HISTORY: Verify small bowel feeding tube bedside placement    COMPARISON: Abdominal x-ray 1/28/2025    FINDINGS:   Single AP view of the abdomen. Enteric tube terminates over the distal  duodenum towards the DJ flexure. Cholecystectomy clips. Pelvic  surgical clip.    Nonspecific paucity of bowel gas. No pneumatosis. No portal venous  gas. Pelvic phleboliths.      Impression    IMPRESSION:  Enteric tube terminates over the distal duodenum towards the DJ  flexure.    I have personally reviewed the examination and initial interpretation  and I agree with the findings.    FER URIAS MD         SYSTEM ID:  [id number]   XR Fluoro Port Time 0/1 Hour    Narrative    This exam was marked as non-reportable because it will not be read by a   radiologist or a [place] non-radiologist provider.         XR Chest Port 1 View    Narrative    Exam: XR CHEST PORT 1 VIEW, 1/29/2025 6:48 PM    Comparison: 1/29/2025    History: s/p VV ECMO placement and central line placement    Findings:  Single AP portable view of the chest. Endotracheal tube tip is in the  midthoracic trachea Left IJ central venous catheter with tip in the  SVC. New right IJ ECMO cannula with tip below the field of view.  Enteric tube traverses into the stomach and below the field-of-view.    Trachea is midline. The cardiomediastinal silhouette is difficult to  visualize. Aortic arch calcifications. Diffuse mixed pulmonary  opacities and consolidative retrocardiac opacity with air  bronchograms, increased since prior. No pneumothorax.  Probable small  left effusion. There is no pneumothorax or pleural effusion. The upper  abdomen is unremarkable.      Impression    Impression:   1. New left IJ central venous catheter with tip at the SVC.  2. New ECMO cannula with tip below the field of view.  3. Increased diffuse mixed opacities and new retrocardiac  consolidative opacity consistent with ARDS and/or infection.    I have personally reviewed the examination and initial interpretation  and I agree with the findings.    MELANIE MURRAY DO         SYSTEM ID:  [id number]   XR Chest Port 1 View    Narrative    Exam: XR CHEST PORT 1 VIEW, 1/30/2025 2:27 AM    Indication: ETT    Comparison: Prior radiographs, including 1/29/2025    Findings:   Portable supine AP view of the chest. Endotracheal tube tip projects  over the midthoracic trachea. Left IJ central venous catheter tip in  the high SVC. Right IJ ECMO cannula courses below the diaphragm and  out of the field of view. Feeding tube courses below the diaphragm and  out of the field-of-view.    The trachea is midline. Unchanged silhouetting of the cardiac borders.  Similar diffuse mixed pulmonary opacities. Retrocardiac opacity with  air bronchograms. No definite pneumothorax. The right costophrenic  angle is clear. Probable left pleural effusion. Cholecystectomy clips.      Impression    Impression:   1.  Stable diffuse mixed pulmonary opacities and retrocardiac  consolidation, suggestive of ARDS versus infection.  2.  Stable support devices.  3.  Probable left pleural effusion.    I have personally reviewed the examination and initial interpretation  and I agree with the findings.    CARMELINA DIAL MD         SYSTEM ID:  [id number]   US Renal Complete Non-Vascular    Narrative    EXAMINATION: US RENAL COMPLETE NON-VASCULAR, 1/30/2025 11:22 AM     COMPARISON: 8/26/2024 CT abdomen and pelvis    HISTORY: sirisha    TECHNIQUE: The kidneys and bladder were scanned in the standard  fashion with specialized  ultrasound transducer(s) using both gray  scale and limited color/spectral Doppler techniques.    FINDINGS:    Right kidney: Measures 12.3 cm in length. No focal mass. No  hydronephrosis. Exophytic simple cyst at inferior pole measuring up to  4.1 cm.    Left kidney: Measures 13.5 cm in length. No focal mass. No  hydronephrosis. 2 large parapelvic simple renal cysts measuring up to  5.6 cm and 3.5 cm.    Bladder: Decompressed with Portillo in place      Impression    IMPRESSION:  Redemonstration of multiple bilateral anechoic cysts previously seen  on CT. No hydronephrosis or renal masses.    I have personally reviewed the examination and initial interpretation  and I agree with the findings.    ELIESER ARREAGA MD         SYSTEM ID:  [id number]   XR Chest Port 1 View    Narrative    Exam: XR CHEST PORT 1 VIEW, 1/31/2025 2:02 AM    Indication: interval change, ARDS    Comparison: Prior radiographs, including 1/30/2025    Findings:   Portable semiupright AP view of the chest. Endotracheal tube tip  projects over the mid thoracic trachea, slightly advanced compared to  prior. Remaining support devices are stable. Trachea is midline.  Decreased retrocardiac consolidation. Similar diffuse mixed opacities.  No definite pneumothorax. Probable left pleural effusion.      Impression    Impression:   1.  Endotracheal tube tip projects over the midthoracic trachea,  slightly advanced compared to prior. Remaining support devices are  stable.  2.  Stable diffuse mixed opacities with slightly decreased  retrocardiac consolidation, which may represent ARDS versus infection.  3.  Small left pleural effusion.    I have personally reviewed the examination and initial interpretation  and I agree with the findings.    CARMELINA DIAL MD         SYSTEM ID:  [id number]   XR Chest Port 1 View    Narrative    Exam: XR CHEST PORT 1 VIEW, 1/31/2025 3:32 AM    Indication: desats    Comparison: Prior radiographs, including 1/31/2025 at  0137    Findings:   Portable semiupright AP view of the chest. Endotracheal tube tip  projects over the upper thoracic trachea, retracted compared to prior.  Right IJ ECMO cannula courses below the level of the diaphragm and out  of the field-of-view. Feeding tube also courses below the level of the  diaphragm and out of the field-of-view. Stable position of the left IJ  central venous catheter tip in the mid SVC.    Trachea is midline. Increased retrocardiac opacity with air  bronchograms. Similar perihilar mixed opacities. Low lung volumes. No  definite pneumothorax. Probable left pleural effusion.      Impression    Impression:   1.  Stable diffuse mixed opacities with increased retrocardiac  consolidation, which may represent ARDS versus infection.  2.  Endotracheal tube tip projects over the upper thoracic trachea,  retracted compared to prior.  3.  Stable small left pleural effusion.    I have personally reviewed the examination and initial interpretation  and I agree with the findings.    CARMELINA DIAL MD         SYSTEM ID:  [id number]   XR Chest Port 1 View    Narrative    EXAM: XR CHEST PORT 1 VIEW  1/31/2025 8:55 AM     HISTORY:  hypoxia, hypotension. interval change       COMPARISON:  Same-day chest radiograph at 0326 hours    FINDINGS:     Portable supine view of the chest. Stable ECMO cannula. Enteric tube  extends below the field of view. Endotracheal tube tip projects 4.6 cm  from the maggy. Right upper quadrant surgical clips. Left internal  jugular central venous catheter tip in the mid SVC.    Trachea is midline. Cardiomediastinal silhouette is obscured.  Atherosclerotic calcifications of the aortic arch. Markedly increase  of bilateral pulmonary opacities with near complete silhouetting of  both hemidiaphragms and the cardiac silhouette. Dense retrocardiac  opacity. Faint aeration of the right lower lung and left upper lung in  the setting of markedly low lung volumes. Possible layering  effusion,  though limited in supine positioning. No significant pneumothorax.      Impression    IMPRESSION:  1.  Marked increase in bilateral pulmonary opacity/consolidation with  minimal aeration of left upper and right lower lungs.   2.  Endotracheal tube tip projects over the upper thoracic trachea,  stable in position from most recent film. Remainder support devices  are stable.    I have personally reviewed the examination and initial interpretation  and I agree with the findings.    MELANIE MURRAY DO         SYSTEM ID:  [id number]   XR Chest Port 1 View    Narrative    Exam: XR CHEST PORT 1 VIEW, 2/1/2025 2:16 AM    Indication: ECMO cannula check, ETT check, ongoing ARDS monitoring    Comparison: Prior radiographs, including 1/31/2025    Findings:   Portable supine AP view of the chest. Endotracheal tube tip projects  over the mid thoracic trachea. Left IJ central venous catheter tip at  the mid SVC. Stable ECMO cannula. Feeding tube tip courses below the  level of the diaphragm and out of the field of view.    Trachea is midline. Silhouetting of the cardiac borders, improved  compared to prior. Aortic arch calcifications. Diffuse bilateral  patchy opacities, overall decreased compared to prior. Continued  retrocardiac opacity with air bronchograms, silhouetting the left  hemidiaphragm. No definite pneumothorax. Probable trace bilateral  pleural effusions.      Impression    Impression:   1.  Decreased diffuse bilateral patchy opacities with improved  aeration of the right upper and left lower lobes.  2.  Stable position of support devices.  3.  Probable trace bilateral pleural effusions.    I have personally reviewed the examination and initial interpretation  and I agree with the findings.    MELANIE MURRAY DO         SYSTEM ID:  [id number]   XR Chest Port 1 View    Narrative    Exam: XR CHEST PORT 1 VIEW, 2/2/2025 1:52 AM    Indication: ECMO cannula check, ETT check, ongoing ARDS monitoring    Comparison:  Prior radiographs, including 2/1/2025    Findings:   Portable supine AP view of the chest. Endotracheal tube tip projects  over the midthoracic trachea. Left IJ central venous catheter tip at  the mid SVC. Stable ECMO cannula. Feeding tube courses below the level  of the diaphragm and out of the field-of-view.    Trachea is midline. Unchanged silhouetting of the cardiac borders.  Aortic arch calcifications. Low lung volumes. Unchanged diffuse  bilateral mixed pulmonary opacities. Continued retrocardiac opacity  with air bronchograms. No definite pneumothorax. Probable trace  bilateral pleural effusions.      Impression    Impression:   1.  Stable diffuse bilateral mixed pulmonary opacities.  2.  Stable position of support devices.  3.  Probable trace bilateral pleural effusions.    I have personally reviewed the examination and initial interpretation  and I agree with the findings.    MELANIE MURRAY DO         SYSTEM ID:  [id number]   XR Chest Port 1 View    Narrative    EXAM: XR CHEST PORT 1 VIEW 2/3/2025 1:34 AM      HISTORY: ECMO cannula check, ETT check, ongoing ARDS monitoring.    COMPARISON: Previous day.     TECHNIQUE: Frontal view of the chest.    FINDINGS:   Endotracheal tube projects over the mid thoracic trachea. Left  internal jugular central venous catheter with tip projecting over the  mid SVC. Stable ECMO cannula. Feeding tube courses beyond the  field-of-view.  Trachea is midline. Unchanged blunting of the cardiac borders. Aortic  arch calcifications. Low lung volumes. Diffuse mixed pulmonary  opacities. Retrocardiac opacity with air bronchograms, unchanged. No  significant pleural effusion. No pneumothorax.  No acute osseous abnormality. Visualized soft tissues and upper  abdomen are unremarkable.      Impression    IMPRESSION:   Grossly stable appearance of the chest including diffuse mixed  pulmonary opacities and low lung volumes.    I have personally reviewed the examination and initial  interpretation  and I agree with the findings.    SUSANA PEREIRA DO         SYSTEM ID:  [id number]   Echocardiogram Limited    Narrative    [id number]  [id number]  [id number]  [id number]^JENNA^HUSSEIN     [facility],[place]  Echocardiography Laboratory  [address] [place] [id number]     Name: ROSALINA MEZA  MRN: [id number]  : 1960  Study Date: 2025 04:36 PM  Age: 64 yrs  Gender: Male  Patient Location: [facility]  Reason For Study: Fever, Other Cardiac Device In Situ  Ordering Physician: HUSSEIN WEST  Performed By: Dorothy Jenkins RDCS     BSA: 2.0 m2  Height: 66 in  Weight: 211 lb  BP: 144/58 mmHg  ______________________________________________________________________________  Procedure  Limited Echocardiogram with two-dimensional, color and spectral Doppler.  ______________________________________________________________________________  Interpretation Summary  Limited echo to guide VV ECMO placement.  VV ECMO outflow is directed towards the tricuspid valve.  ECMO tip is not well visualized.  ______________________________________________________________________________  Right Ventricle  Global right ventricular function is mildly reduced.  ______________________________________________________________________________  Report approved by: Krystian Ochoa MD on 2025 07:37 PM     ______________________________________________________________________________      Echo Limited     Value    LVEF  70%    Narrative    [id number]  [id number]  [id number]  [id number]^FRANCIA^NIGEL     [facility],[place]  Echocardiography Laboratory  [address] [place] [id number]     Name: ROSALINA MEZA  MRN: [id number]  : 1960  Study Date: 2025 07:45 AM  Age: 64 yrs  Gender: Male  Patient Location: [facility]  Reason For Study: Respiratory Failure  Ordering Physician: NIGEL FELDMAN  Performed By: Nadira Zhu     BSA: 2.1 m2  Height: 66  in  Weight: 216 lb  HR: 80  BP: 107/64 mmHg  ______________________________________________________________________________  Procedure  Limited Echocardiogram with two-dimensional, color and spectral Doppler.  ______________________________________________________________________________  Interpretation Summary  On V-V ECMO at 4.5LPM.  Global and regional left ventricular function is hyperkinetic with an EF >70%.  Global right ventricular function is normal.  ECMO canula not well visualized. There is significant floew towards the  tricuspis valve.  ______________________________________________________________________________  Left Ventricle  On V-V ECMO at 4.5LPM. Global and regional left ventricular function is  hyperkinetic with an EF >70%.     Right Ventricle  Global right ventricular function is normal. Mild right ventricular dilation  is present.     Atria  ECMO canula not well visualized. There is significant floew towards the  tricuspis valve.     Pericardium  No pericardial effusion is present.     ______________________________________________________________________________  Doppler Measurements & Calculations  RV S Bobby: 22.2 cm/sec     ______________________________________________________________________________  Report approved by: YOHANNES Gaitan MD on 01/31/2025 10:27 AM             Time Spent on this Encounter   I, Mick Spear DO, personally saw the patient today and spent greater than 30 minutes discharging this patient.    We appreciate the opportunity to care for your patient while in the hospital.  Should you have any questions about your patient's stay in the ICU or this discharge summary our contact information is below.    Surgical Intensive Care Unit  [facility]   Department of Critical Care and Acute Care Surgery  [address] [place] [id number]  Office: [phone]  If you wish to discuss patient with a provider, contact the  at [phone].

## 2025-02-03 NOTE — PROGRESS NOTES
Care Conference    Care conference this afternoon with Son (gume) via phone and Spouse (Nicole), brother (Raul), daughter (Ayesha) physically present. Began to discuss current clinical status with family. However, family reports they were not interested in hearing about anything further as they had made up their mind regarding initiation of comfort measure only. They report  would not be okay with current level of life support and his care is above and beyond what would be acceptable to him. Comfort measure initiated including comfort medications and discontinuation of life support measures. Code status DNR/DNI, order changed to reflect this within EMR. Time spent answering additional questions.    Nawaf Ghotra PA-C

## 2025-02-03 NOTE — PLAN OF CARE
"Neuro:  PERRL 2-3mm, moves all extremities, moves toes and fingers with command    CV: SR 80s-110s, MAP >65. Pulses dopplerable, on/off on norepi    VV ECMO 100%, flow 5lpm, RPM: 3600, sweep: 1    Resp: ETT 23 @ teeth. PC 60%, 25/10, rate 16. LS clear/diminished, strong cough, minimal secretions, paO2 above 80    GI: TF at goal 35ml per NJ. Rectal tube    : Portillo coude. Minimal UOP, 20mg Lasix dose given    Skin: left lower lip pressure injury, penile erosion, right inner thigh blister    Lines: LIJ 3L CVC  R radial art line  PIV x4  RIJ ECMO Cannulas.    Gtt:  Norepi: 0.02  Precedex: 1.0  Dilaudid 0.2  Insulin alg 4+4: 6units/hr  Heparin 1600 (ACT goal 160-180)    Plan: Wean as able  For vital signs and complete assessments, please see documentation flowsheets.           Problem: Adult Inpatient Plan of Care  Goal: Plan of Care Review  Description: The Plan of Care Review/Shift note should be completed every shift.  The Outcome Evaluation is a brief statement about your assessment that the patient is improving, declining, or no change.  This information will be displayed automatically on your shift  note.  Outcome: Progressing  Goal: Patient-Specific Goal (Individualized)  Description: You can add care plan individualizations to a care plan. Examples of Individualization might be:  \"Parent requests to be called daily at 9am for status\", \"I have a hard time hearing out of my right ear\", or \"Do not touch me to wake me up as it startles  me\".  Outcome: Progressing  Goal: Absence of Hospital-Acquired Illness or Injury  Outcome: Progressing  Intervention: Identify and Manage Fall Risk  Recent Flowsheet Documentation  Taken 2/3/2025 0400 by Melissa Shaffer RN  Safety Promotion/Fall Prevention:   clutter free environment maintained   increased rounding and observation   increase visualization of patient   lighting adjusted   room door open   room near nurse's station   room organization consistent   safety " round/check completed  Taken 2/3/2025 0000 by Melissa Shaffer RN  Safety Promotion/Fall Prevention:   clutter free environment maintained   increased rounding and observation   increase visualization of patient   lighting adjusted   room door open   room near nurse's station   room organization consistent   safety round/check completed  Taken 2/2/2025 2000 by Melissa Shaffer RN  Safety Promotion/Fall Prevention:   clutter free environment maintained   increased rounding and observation   increase visualization of patient   lighting adjusted   room door open   room near nurse's station   room organization consistent   safety round/check completed  Intervention: Prevent Skin Injury  Recent Flowsheet Documentation  Taken 2/3/2025 0400 by Melissa Shaffer RN  Body Position:   turned   right   lower extremity elevated   upper extremity elevated  Taken 2/3/2025 0000 by Melissa Shaffer RN  Body Position:   turned   right   lower extremity elevated   upper extremity elevated  Taken 2/2/2025 2000 by Melissa Shaffer RN  Body Position:   upper extremity elevated   lower extremity elevated   supine  Intervention: Prevent and Manage VTE (Venous Thromboembolism) Risk  Recent Flowsheet Documentation  Taken 2/3/2025 0400 by Melissa Shaffer RN  VTE Prevention/Management: SCDs on (sequential compression devices)  Taken 2/3/2025 0000 by Melissa Shaffer RN  VTE Prevention/Management: SCDs on (sequential compression devices)  Taken 2/2/2025 2000 by Melissa Shaffer RN  VTE Prevention/Management: SCDs on (sequential compression devices)  Intervention: Prevent Infection  Recent Flowsheet Documentation  Taken 2/3/2025 0400 by Melissa Shaffer RN  Infection Prevention:   equipment surfaces disinfected   hand hygiene promoted   personal protective equipment utilized   single patient room provided  Taken 2/3/2025 0000 by Melissa Shaffer RN  Infection Prevention:   equipment surfaces disinfected   hand  hygiene promoted   personal protective equipment utilized   single patient room provided  Taken 2/2/2025 2000 by Melissa Shaffer, RN  Infection Prevention:   equipment surfaces disinfected   hand hygiene promoted   personal protective equipment utilized   single patient room provided  Goal: Optimal Comfort and Wellbeing  Outcome: Progressing  Intervention: Monitor Pain and Promote Comfort  Recent Flowsheet Documentation  Taken 2/3/2025 0400 by Melissa Shaffer, RN  Pain Management Interventions: medication (see MAR)  Taken 2/3/2025 0000 by Melissa Shaffer RN  Pain Management Interventions: medication (see MAR)  Taken 2/2/2025 2000 by Melissa Shaffer RN  Pain Management Interventions: medication (see MAR)  Goal: Readiness for Transition of Care  Outcome: Progressing   Goal Outcome Evaluation:

## 2025-02-03 NOTE — PROGRESS NOTES
SURGICAL ICU PROGRESS NOTE    ASSESSMENT:   Jesus Varghese is a 64 year old male transferred on 1/28 from outside hospital for evaluation for ECMO cannulation.  In brief this is a 64-year-old male with past medical history of hypertension, colon cancer, chronic lymphocytic leukemia, psoriasis, and type 2 diabetes who presented to outside hospital with acute influenza A infection and ARDS. Patient did not tolerate being supinated. He was cannulated for VV ECMO 01/30/25.      TODAY  - Overnight: did better on CRRT but had low UOP overnight, responsive to lasix 20 IV  - Care conference with palliative care and family today to discuss GOC   - Goal NET even to - 500 on CRRT.   - Blood cultures repeated  - Heme/oncology consultation.    Plan:  Neuro   # Acute Pain  - Dilaudid gtt  - PRN Acetaminophen    # Sedation  - propofol discontinued d/t elevated TG.  - Precedex 0.4-1.2 mcg/kg/hr  - Versed 4 mg q2hr PRN  - RASS -1 to -2     Pulmonary:  #ARDS  # Acute Hypercarbic and Hypoxic Respiratory Failure, secondary to Influenza A and superimposed MRSA Pneumonia  # Restrictive physiology following COVID infection, PFTS were not repeated however no obvious fibrotic change but did have chronic GGO  FiO2 (%): 60 %, Resp: 20, Inspiratory Pressure (cm H2O) (Drager Merle): 25, Vent Mode: PCV Plus assist, Resp Rate (Set): 16 breaths/min, PEEP (cm H2O): 10 cmH2O, Resp Rate (Set): 16 breaths/min, PEEP (cm H2O): 10 cmH2O  - Cannulated for VV ECMO 01/30. Right internal jugular to Right internal jugular   - Duonebs q4hr  - dexamethasone tapering. 10 mg x 5 days  - Bronchoscopy 1/30 with minimal secretions.     # VV ECMO  Mode: V-V (2/3/2025  8:00 AM)  Blood Flow (Circuit) LPM: 4.88 LPM (2/3/2025  8:00 AM)  RPM's: 3601 (2/3/2025  8:00 AM)  Sweep LPM: 1 LPM (2/3/2025  8:00 AM)  Sweep FiO2   %: 100 % (2/3/2025  8:00 AM)  SvO2  %: 94 % (2/3/2025  8:00 AM)  HgB: 7.7 (2/3/2025  8:00 AM)  ACT  (seconds): 170 seconds (2/3/2025  8:00 AM)  -  Cannulated for VV ECMO 01/30. Right internal jugular to Right internal jugular   - Monitoring pulses, dopplerable.  - ACT goal 160-180     Cardiovascular: EF 60-65%, no RV dysfunction (01/27)  # Essential Hypertension  # Hypotension  - Echo (01/31): hyperkinetic LV function, EF>70%, ECMO cannulae not well visualized, but there is significant flow towards tricuspid valve.  - Hypotension in setting of sedation related Vasoplegia  - MAP Goal >65.   - NE gtt.  - PTA ASA     GI/Nutrition:  - RD consulted. NJ in place with TF at goal.     # Diarrhea, non infectious  - C Diff. (02/03) negative.   - Rectal Pouch (02/01 - present)  - Bowel Regimen: Miralax BID PRN and Senna BID PRN    # Elevated Triglycerides  - Will continue to monitor with daily CK/TG; TG down trending.    Renal/ Fluid Balance:  #DEEP   - Baseline 1.0-1.10.   - Nephrology is following.  - CRRT (02/02 - Current,  targeting 0-25 mL/h net UF   - Monitor urinary output, maintain nunez    - ICU electrolyte replacement protocol.      Endocrine:  # History of type 2 diabetes  # Stress and Steroid induced Hyperglycemia   - Insulin gtt continues  - Lantus 30 BID, monitor closely when and titrate down if needed when steroids reduced.   - Goal BG <180     Infectious Disease:  # Sepsis  # Influenza A  # MRSA Pneumonia  - Tamiflu f( 1/26 - Current) will plan on 10-day course  - Initial treatment with Vanc + Clinda (01/27 - 1/30, switched to linezolid (1/31 - current)    Cultures  - Blood cultures (02/03): Pending  - MRSA swab (01/27): positive  - Sputum (01/27): MRSA  - Sputum (01/26): 3+ MRSA  - Blood (01/26): NGTD    Hematology:  # Acute Anemia of critical illness  # Chronic Lymphocytic Leukemia  #Leukocytosis  - s/p 18 cycles of Acalabrutinib completed in Feb 2024.  - Please note: per recent Oncology notes (September 2024) it is noted that he was to have monitoring of CBC q 2 months due to evidence of lymphocytosis at that visit however patient preferred q 4 months  instead. Plan was to repeat imaging and consider alternative treatment with Zanubrutinib if he developed anemia or worsening lymphocytosis.   - Hematology/oncology consultation    # MSK/Skin  - PT/OT eval and treatment    # Penile Pressure Wound  - Likely d/t proning  - WOC consult    Prophylaxis:    - DVT: Heparin gtt (ACT goals)  - GI: PPI    Lines/ tubes/ drains:  - ETT  - ECMO RIJ  - Left internal jugular CVC  - R radial arterial line  - NJ  - nunez  - Rectal pouch    Code status: Full code.     Disposition: SICU    40 minutes of critical care time spent, excluding procedures same day.    Nawaf Ghotra PA-C    ====================================  INTERVAL EVENTS    No chugging overnight. Awakes and follows commands.    OBJECTIVE:   1. VITAL SIGNS:   Temp:  [97.5  F (36.4  C)-98.8  F (37.1  C)] 98.4  F (36.9  C)  Pulse:  [] 74  Resp:  [16-30] 19  MAP:  [55 mmHg-97 mmHg] 71 mmHg  Arterial Line BP: ()/(38-72) 123/52  FiO2 (%):  [60 %] 60 %  SpO2:  [86 %-100 %] 98 %  FiO2 (%): 60 %, Resp: 19, Inspiratory Pressure (cm H2O) (Drager Merle): 25, Vent Mode: PCV Plus assist, Resp Rate (Set): 16 breaths/min, PEEP (cm H2O): 10 cmH2O, Resp Rate (Set): 16 breaths/min, PEEP (cm H2O): 10 cmH2O    2. INTAKE/ OUTPUT:   I/O last 3 completed shifts:  In: 3170.13 [I.V.:1340.13; NG/GT:490; IV Piggyback:500]  Out: 4308.8 [Urine:2735; Other:823.8; Stool:750]    3. PHYSICAL EXAMINATION:   GEN: sedated, intubated  HEENT:  Normocephalic, atraumatic, ETT secure  CV: RRR, no murmur noted   - ECMO cannula right internal jugular  PULM/CHEST: intubated, lung sound with some find crackles but improved from prior  GI: soft and non distended. Rectal pouch in place.  : nunez catheter in place, urine yellow and clear  EXTREMITIES: 1+ peripheral edema, peripheral pulses 2+  NEURO: Sedated, upward gaze. Pupils midline, sluggishly react  SKIN: No rashes, sores or ulcerations appreciated     4. INVESTIGATIONS:   Arterial Blood Gases    Recent Labs   Lab 02/03/25 0341 02/03/25 0203 02/03/25  0004 02/02/25 2206   PH 7.38 7.37 7.37 7.37   PCO2 41 41 42 40   PO2 109* 131* 109* 120*   HCO3 24 24 24 23     Complete Blood Count   Recent Labs   Lab 02/03/25 0341 02/02/25  2305 02/02/25 2206 02/02/25  1559   WBC 57.0* 66.0* 68.9* 32.1*   HGB 7.6* 8.2* 8.1* 8.7*    393 428 285     Basic Metabolic Panel  Recent Labs   Lab 02/03/25  0557 02/03/25 0344 02/03/25 0341 02/03/25 0200 02/03/25  0006 02/02/25 2206 02/02/25 2204 02/02/25 2001 02/02/25  1822 02/02/25  1559   NA  --   --  140  --   --  144  --  145  --  148*   POTASSIUM  --   --  4.4  --   --  4.4  --  4.1  --  4.3   CHLORIDE  --   --  106  --   --  108*  --  108*  --  110*   CO2  --   --  21*  --   --  20*  --  20*  --  20*   BUN  --   --  118.0*  --   --  131.0*  --  148.0*  --  156.0*   CR  --   --  2.31*  --   --  2.74*  --  2.88*  --  3.25*   GLC 90 123* 144* 88   < > 126*   < > 110*   < > 146*    < > = values in this interval not displayed.     Liver Function Tests  Recent Labs   Lab 02/03/25 0341 02/02/25 2206 02/02/25 2001 02/02/25  1559 02/02/25  1203 02/02/25  1015 02/02/25  0402 02/01/25  1010 02/01/25  0342 01/31/25  1003 01/31/25  0401 01/29/25  2154 01/29/25  1108 01/28/25  1342 01/28/25  0533 01/27/25  1600   AST 76*  --   --   --   --   --   --   --   --   --   --   --  76*  --  74* 98*   ALT 81*  --   --   --   --   --  75*  --  61  --  59   < > 65  --  59 66   ALKPHOS 53  --   --   --   --   --   --   --   --   --   --   --  76  --  79 92   BILITOTAL 0.6  --   --   --   --   --   --   --   --   --   --   --  0.6  --  0.4 0.5   ALBUMIN 3.1*  --  3.1*  --  3.0*  --  2.9*  --  2.8*  --  3.1*   < > 3.0*  --  3.1* 3.3*   INR 1.23* 1.19*  --  1.18*  --  1.22* 1.24*   < > 1.20*   < > 1.15   < >  --    < >  --   --     < > = values in this interval not displayed.     Pancreatic Enzymes  No lab results found in last 7 days.  Coagulation Profile  Recent Labs   Lab  02/03/25  0341 02/02/25  2206 02/02/25  1559 02/02/25  1015   INR 1.23* 1.19* 1.18* 1.22*   PTT 84* 77* 68* 57*         5. RADIOLOGY:   No results found for this or any previous visit (from the past 24 hours).      =========================================

## 2025-02-03 NOTE — DEATH PRONOUNCEMENT
MD DEATH PRONOUNCEMENT    Called to pronounce Jesus Varghese dead.    Physical Exam: Unresponsive to noxious stimuli, Spontaneous respirations absent, Breath sounds absent, Heart sounds absent, and Corneal blink reflex absent.    Patient was pronounced dead at 4:45 PM, February 3, 2025.    Preliminary Cause of Death: Acute hypoxic respiratory failure secondary to influenza pneumonia with superimposed bacterial pneumonia.    Active Problems:    Influenza A    Acute kidney failure, unspecified (H)       Infectious disease present?: YES    Communicable disease present? (examples: HIV, chicken pox, TB, Ebola, CJD) :  NO    Multi-drug resistant organism present? (example: MRSA): YES      Body disposition: Autopsy was discussed with family member:  Spouse, Daughter, and Brother in person.  Permission for autopsy was declined. Body will be released to the  home.

## 2025-02-03 NOTE — CARE CONFERENCE
Care Management Follow Up    Length of Stay (days): 6    Expected Discharge Date:  tbd      Concerns to be Addressed: all concerns addressed in this encounter     Patient plan of care discussed at interdisciplinary rounds: Yes    Additional Information:    Scheduling Care Conference    Time & Location:   Due to conflict in schedule Care Conference moved to today 2/3/25 to 2 pm - meeting for providers and 2: 15 pm meeting with family at  conference room.     Providers:   Palliative- Sent Vocera update to MONICA COLLAZO - Brandin Sent Vocera update to Dr. ARIANE Ghotra    Family:   Left voice message for wife Nicole and update Kyala, daughter.      Call into conference information:   [phone]   Meeting # [id number]  PIN #  [phone]     Updated primary RN.       Addendum 0258 pm:   Family Care Conference      Participants: SICU attending, SICU fellow, Resident, RNCC, Patient's Nicole wife, Raul brother, Ayesha daughter, Amoropher son via phone.        Discussion: Family stated that since patient was ill with chronic lung disease for the most of his life,  he requested the family never to place him on ventilator support. Family felt that they made a decision for ventilatory support and ECMO driven by emotions and violated patient's wishes. Family requested to betty patient's wish of comfort and death with dignity. Family requested DNR/DNI. Team has discussed the process of the transition to comfort care, comfort medications, extubation, ECMO and family expressed agreement and readiness for the comfort plan.   Emotional support was provided. The Team addressed all  family's questions.    patrice Quispe came to the room at the end of the meeting per family request.   Rosio, Palliative team, has been updated to stop by for support.       SHORTY Humphries, MA, CCM, RN  4C/4A ICU Care Coordinator  Phone:[phone]  Pager: [phone]   SHORTY Humphries, MA, CCM, RN  4C/4A/4E ICU Care  Coordinator  Phone:[phone]  Available via [employer] [facility]     For Weekend & Holiday on call RN Care Coordinator:  [facility] [place], [place] ([phone]) Saturday & Sunday; [phone]) [facility] Recognized Holidays   Weekend 4C/4A/4E ICUs /6A Care Coordinator  Available via [employer]

## 2025-02-03 NOTE — PROGRESS NOTES
[facility]    ECLS Discontinuation Note:     ECLS was discontinued 2/3/2025 at 1550 at bedside.    Blayne Villagomez, RT  ECMO Specialist  2/3/2025 4:09 PM

## 2025-02-03 NOTE — PROGRESS NOTES
Brief nephrology progress note:    Noted care conference today where family initiated comfort cares. CRRT discontinued. Nephrology will sign off at this time.

## 2025-02-03 NOTE — PLAN OF CARE
Time of death at 1645 pronounced by SICU resident. [facility] called around 1600, paperwork started. Patient will be sent with security and eventually transferred to [facility] in [place], [place]. Daughter, wife, and brother were all present at bedside during passing.

## 2025-02-03 NOTE — PROGRESS NOTES
"SPIRITUAL HEALTH SERVICES Progress Note (Palliative)  [facility] ([place]) 4E     Summary: Visits with patient Jesus \"\" Abimael and family, both together and separately: wife Nicole, daughter Ayesha, and brother.    Family very clear, following care conference, that they believe  would wish to immediately transition to comfort measures only. Per family, 's Zoroastrianism brian is deeply important to him, and they requested prayers at bedside with  only; they plan to rejoin  once he had transitioned to comfort.    Family are grieving as they anticipate 's death while also sure that he would wish to have a peaceful death.     Plan: Chaplains will follow for spiritual support while  is admitted;  [place] to follow up this afternoon.     Brittaney Mcdonald M.Div., [facility]     To reach Spiritual Health, securely message with the Vocera Web Console or enter an ASAP/STAT consult in [employer], which will also page the on-call .    "

## 2025-02-03 NOTE — PROGRESS NOTES
CRRT STATUS NOTE    DATA:  Time:  0600 AM  Pressures WNL:  YES  Filter Status:  WDL    Problems Reported/Alarms Noted:  None reported    Supplies Present:  YES    ASSESSMENT:  Patient Net Fluid Balance:  Net -874 yesterday, Net +569 cc since midnight  Vital Signs:  Temp 98.2, HR 79, /55 (79), Sats 100%  Labs:  Last Comprehensive Metabolic Panel:  Lab Results   Component Value Date     02/03/2025    POTASSIUM 4.4 02/03/2025    CHLORIDE 106 02/03/2025    CO2 21 (L) 02/03/2025    ANIONGAP 13 02/03/2025     (H) 02/03/2025    .0 (H) 02/03/2025    CR 2.31 (H) 02/03/2025    GFRESTIMATED 30 (L) 02/03/2025    LILIAN 8.2 (L) 02/03/2025      CBC RESULTS:   Recent Labs   Lab Test 02/03/25  0341   WBC 57.0*   RBC 2.69*   HGB 7.6*   HCT 23.2*   MCV 86   MCH 28.3   MCHC 32.8   RDW 14.2         Goals of Therapy:  0-25 ml/hour net negative    INTERVENTIONS:   None required overnight.    PLAN:  Continue with current plan of care. Change circuit Q 72 hours and prn.  Contact CRRT Resource RN via [employer] with questions or concerns.

## 2025-02-03 NOTE — PLAN OF CARE
Comfort cares initiated at 1500. Versed drip started. Heparin, insulin, precedex, and CRRT stopped. Plan to clamp ECMO and terminally extubate after meds are administered.

## 2025-02-03 NOTE — PROGRESS NOTES
Rt Discontinue vent 2/3/2025 at 15:45 per provider order.  Pt extubated to comfort care  Steve Thao, RT

## 2025-02-03 NOTE — PLAN OF CARE
Occupational Therapy: Orders received. Chart reviewed and discussed with care team.? Occupational Therapy not indicated due to pt transitioning to comfort focus cares. Will complete orders.

## 2025-02-03 NOTE — PROGRESS NOTES
Venovenous (VV) ECMO Fellow Progress Note  2/3/2025    64 year old male who was started on ECMO due to severe respiratory failure from influenza A pneumonia with superimposed MRSA pneumonia.     Interval events: No events o/n. WBC increased - differential primarily lymphocytes, consistent with reactivation/hyperleukocytosis of CLL.    The patients vital signs today are as follows:    Temp:  [97.5  F (36.4  C)-98.6  F (37  C)] 98.1  F (36.7  C)  Pulse:  [] 76  Resp:  [16-30] 23  MAP:  [55 mmHg-97 mmHg] 64 mmHg  Arterial Line BP: ()/(38-72) 109/46  FiO2 (%):  [60 %] 60 %  SpO2:  [84 %-100 %] 96 %    Intake/Output Summary (Last 24 hours) at 2/3/2025 0854  Last data filed at 2/3/2025 0800  Gross per 24 hour   Intake 2976.87 ml   Output 3093.8 ml   Net -116.93 ml    FiO2 (%): 60 %, Resp: 23, Inspiratory Pressure (cm H2O) (Drager Merle): 25, Vent Mode: PCV Plus assist, Resp Rate (Set): 16 breaths/min, PEEP (cm H2O): 10 cmH2O, Resp Rate (Set): 16 breaths/min, PEEP (cm H2O): 10 cmH2O   Recent Labs   Lab 02/03/25  0559 02/03/25  0341 02/03/25  0203 02/03/25  0004   PH 7.39 7.38 7.37 7.37   PCO2 41 41 41 42   PO2 81 109* 131* 109*   HCO3 25 24 24 24   O2PER 60 60  60  100  100 60 60      Recent Labs   Lab 02/03/25  0341 02/02/25  2305 02/02/25  2206 02/02/25  1559   WBC 57.0* 65.0*  66.0* 68.9* 32.1*   HGB 7.6* 8.0*  8.2* 8.1* 8.7*     Creatinine   Date Value Ref Range Status   02/03/2025 2.31 (H) 0.67 - 1.17 mg/dL Final   02/02/2025 2.74 (H) 0.67 - 1.17 mg/dL Final   02/02/2025 2.88 (H) 0.67 - 1.17 mg/dL Final   02/02/2025 3.25 (H) 0.67 - 1.17 mg/dL Final   12/14/2020 1.13 0.70 - 1.30 mg/dL Final   07/15/2019 1.07 0.70 - 1.30 mg/dL Final   11/20/2018 0.98 0.70 - 1.30 mg/dL Final   04/29/2016 0.91 0.70 - 1.30 mg/dL      Physical Exam:   Cannula positioned unchanged externally.  Minimal oozing.  Good air entry bilaterally  Sedated but arousable, following commands  Extremities edematous    ECMO Issues  including assessments and plan on DOS 2/3/2025:    Neuro: Sedated for mechanical ventilation and ECMO.  RASS 0 to -1 this am. On precedex, dilaudid gtts. Triglycerides trended upward so propofol discontinued, now downtrending. CK continues to be wnl/low. NIRS unremarkable    CV: HDS, on levophed this am.  Echo 1/31 with difficulty demonstrating cannula outflow but appears to have laminar flow directed towards tricuspid valve implying cannula position is adequate. CXR shows cannula likely a bit low, but adequate based on flows and ABG.   Pulm: Severe respiratory failure requiring ECMO and mechanical ventilation. Flows 4.75=5 Lpm, patient ABG acceptable. Keep vent settings at rest settings: PC 25, PEEP10, FiO2 60%. No desaturations o/n. TVs now 500+, RR 16 on sweep 1. Sweep clamped for 30 min earlier, with desaturation into mid 80s and tachypnea to 28. Not ready for decannulation today.  Continue decadron for ARDS.  FEN/Renal: On CRRT for volume removal, continues to make urine. Net negative yesterday, pressors increased somewhat. Continue to keep net even to negative.  Heme: No signs bleeding. Heparin gtt, goal -180.  Plasma free Hgb increased 1/31  - now decreased. No concerns for hemolysis at this time, D dimer downtrending.  Goals: if O2 sat >85% Hgb may drift to 7-8.  If O2 Sat <85% keep Hgb 10-12.  ID: Switched to linezolid 1/31 for better alveolor penetration for MRSA pneumonia due to increased leukocytosis and desaturations. WBC now very elevated, C diff negative. Differential indicates 60% lymphocytes - hx of CLL reportedly well controlled. Given stable pressors and differential, consider hyperleukocytosis of CLL most likely. Will consult hematology/oncology today, send blood cultures. Continue oseltamivir for influenza A pneumonia.  GI: Tfs at goal. PPI.   Endo: insulin gtt.       All pertinent labs, imaging studies, physical exam and medications have been reviewed by me.     This patient requires  continued ICU monitoring and cares.  I apprecitate the input of the SICU team for these issues.  I have discussed patient care and treatment plan with the SICU team and the patient's family.         Diaz Roberts  SICU Fellow  Pager [phone]

## 2025-02-03 NOTE — PLAN OF CARE
4A Physical Therapy - Per chart review and discussion with care team, decision to transition in goals of care and no longer appropriate for PT evaluation. Orders completed.

## 2025-02-03 NOTE — PROGRESS NOTES
[facility]    ECLS Shift Summary:     ECMO Equipment:  Console Serial Number: [id number]  Circuit Lot Number: [id number]  Oxygenator Lot Number: [id number]    Circuit Assessment: Free of fibrin, clot, and air    Venous ECMO Cannula: 30 Fr [place] in the Right Internal Jugular Vein     Patient remains on V-V ECMO, all equipment is functioning and alarms are appropriately set. RPM's: (S) 3600 with Blood Flow (Circuit) LPM  Av LPM  Min: 5.01 LPM  Max: 5.08 LPM L/min. Sweep is at (S) 1 LPM and 100 %. Extremities are warm and perfused.     Significant Shift Events: Pt started on CRRT. Sweep weaned to 1 LPM.    Vent settings:  FiO2 (%): 60 %, Resp: 21, Inspiratory Pressure (cm H2O) (Drager Merle): 25, Vent Mode: PCV Plus assist, Resp Rate (Set): 16 breaths/min, PEEP (cm H2O): 10 cmH2O, Resp Rate (Set): 16 breaths/min, PEEP (cm H2O): 10 cmH2O    Anticoagulation:  Dose (units/hr) HEParin: 1600 Units/hr  Rate (mL/hr) HEParin: 16 mL/hr  Concentration HEParin: 100 Units/mL        Most recent: ACT  (seconds): 170 seconds    Pt on CRRT  Blood loss was minimal. No product given.     Intake/Output Summary (Last 24 hours) at 2025 1844  Last data filed at 2025 1700  Gross per 24 hour   Intake 3094.93 ml   Output 4221 ml   Net -1126.07 ml       Labs:  Recent Labs   Lab 25  1831 25  1559 25  1407 25  1203   PH 7.37 7.39 7.40 7.39   PCO2 40 39 37 38   PO2 121* 137* 165* 101   HCO3 23 23 23 23   O2PER 60 100  60  100 60 60       Lab Results   Component Value Date    HGB 8.7 (L) 2025    PHGB 60 (H) 2025     2025    FIBR 264 2025    INR 1.18 (H) 2025    PTT 68 (H) 2025    DD 1.28 (H) 2025    ANTCH 90 2025       Plan is wean ecmo sweep as able. ECMO plan pending family decision.    Pillo Murdock, RT  ECMO Specialist  2025 6:44 PM

## 2025-02-03 NOTE — CONSULTS
SPIRITUAL HEALTH SERVICES  SPIRITUAL ASSESSMENT Progress Note  [facility] ([place]) 4A     REFERRAL SOURCE: Routine Consult    I facilitated prayers with  during extubation and provided emotional support to family. Family is coping well and mutually supportive.     Radha Tao MDiv  Chaplain Resident    Spiritual Health Services is available 24/7 for emergent requests and consults, either by paging the on-call  or by entering an ASAP/STAT consult in [employer], which will also page the on-call .

## 2025-02-03 NOTE — CARE CONFERENCE
Additional Information:     Scheduled Care Conference     Time:  12:00 pm with Doctors   12:15 pm with Family     Conference Room: 4543    Providers:  Palliative: Rosio Benitez   SICU: Nawaf Deleon     Family:  Spouse - Nicole Varghese  Daughter - Ayesha Lee  Brother - (Will be attending)     Call into conference information:   [phone]   Meeting # [id number]  PIN #  [phone]      Lenka White   Inpatient W  [employer] 4 ICU/ED/OBS  [phone]

## 2025-02-06 LAB
BACTERIA BLD CULT: NORMAL
BACTERIA BLD CULT: NORMAL

## 2025-02-08 LAB
BACTERIA BLD CULT: NO GROWTH
BACTERIA BLD CULT: NO GROWTH
